# Patient Record
Sex: MALE | Race: WHITE | NOT HISPANIC OR LATINO | Employment: FULL TIME | URBAN - METROPOLITAN AREA
[De-identification: names, ages, dates, MRNs, and addresses within clinical notes are randomized per-mention and may not be internally consistent; named-entity substitution may affect disease eponyms.]

---

## 2017-02-13 ENCOUNTER — OFFICE VISIT (OUTPATIENT)
Dept: FAMILY MEDICINE CLINIC | Facility: CLINIC | Age: 62
End: 2017-02-13

## 2017-02-13 ENCOUNTER — TELEPHONE (OUTPATIENT)
Dept: FAMILY MEDICINE CLINIC | Facility: CLINIC | Age: 62
End: 2017-02-13

## 2017-02-13 VITALS
HEART RATE: 68 BPM | TEMPERATURE: 98.6 F | SYSTOLIC BLOOD PRESSURE: 150 MMHG | DIASTOLIC BLOOD PRESSURE: 82 MMHG | BODY MASS INDEX: 29.29 KG/M2 | WEIGHT: 221 LBS | HEIGHT: 73 IN | OXYGEN SATURATION: 99 %

## 2017-02-13 DIAGNOSIS — I10 ESSENTIAL HYPERTENSION: Primary | ICD-10-CM

## 2017-02-13 DIAGNOSIS — R74.8 ELEVATED LIVER ENZYMES: ICD-10-CM

## 2017-02-13 DIAGNOSIS — E78.5 HYPERLIPIDEMIA, UNSPECIFIED HYPERLIPIDEMIA TYPE: ICD-10-CM

## 2017-02-13 DIAGNOSIS — M1A.0490 CHRONIC GOUT OF HAND, UNSPECIFIED CAUSE, UNSPECIFIED LATERALITY: ICD-10-CM

## 2017-02-13 DIAGNOSIS — R74.8 ELEVATED LIVER ENZYMES: Primary | ICD-10-CM

## 2017-02-13 DIAGNOSIS — B35.1 ONYCHOMYCOSIS: ICD-10-CM

## 2017-02-13 LAB
ALBUMIN SERPL-MCNC: 4.4 G/DL (ref 3.5–5.2)
ALBUMIN/GLOB SERPL: 1.8 G/DL
ALP SERPL-CCNC: 59 U/L (ref 39–117)
ALT SERPL W P-5'-P-CCNC: 69 U/L (ref 1–41)
ANION GAP SERPL CALCULATED.3IONS-SCNC: 13.2 MMOL/L
AST SERPL-CCNC: 63 U/L (ref 1–40)
BILIRUB SERPL-MCNC: 0.4 MG/DL (ref 0.1–1.2)
BUN BLD-MCNC: 12 MG/DL (ref 6–20)
BUN/CREAT SERPL: 11.5 (ref 7–25)
CALCIUM SPEC-SCNC: 11.1 MG/DL (ref 8.6–10.5)
CHLORIDE SERPL-SCNC: 105 MMOL/L (ref 98–107)
CHOLEST SERPL-MCNC: 249 MG/DL (ref 0–200)
CO2 SERPL-SCNC: 24.8 MMOL/L (ref 22–29)
CREAT BLD-MCNC: 1.04 MG/DL (ref 0.76–1.27)
ERYTHROCYTE [DISTWIDTH] IN BLOOD BY AUTOMATED COUNT: 14.6 % (ref 4.5–15)
GFR SERPL CREATININE-BSD FRML MDRD: 73 ML/MIN/1.73
GLOBULIN UR ELPH-MCNC: 2.5 GM/DL
GLUCOSE BLD-MCNC: 86 MG/DL (ref 65–99)
HCT VFR BLD AUTO: 43.9 % (ref 35–60)
HDLC SERPL-MCNC: 94 MG/DL (ref 40–60)
HGB BLD-MCNC: 13.5 G/DL (ref 13.5–18)
KOH PREP NAIL: ABNORMAL
LDLC SERPL CALC-MCNC: 136 MG/DL (ref 0–100)
LDLC/HDLC SERPL: 1.44 {RATIO}
LYMPHOCYTES # BLD AUTO: 1.6 10*3/MM3 (ref 1.2–3.4)
LYMPHOCYTES NFR BLD AUTO: 30.8 % (ref 21–51)
MCH RBC QN AUTO: 30.8 PG (ref 26.1–33.1)
MCHC RBC AUTO-ENTMCNC: 30.8 G/DL (ref 33–37)
MCV RBC AUTO: 100.2 FL (ref 80–99)
MONOCYTES # BLD AUTO: 0.5 10*3/MM3 (ref 0.1–0.6)
MONOCYTES NFR BLD AUTO: 9.5 % (ref 2–9)
NEUTROPHILS # BLD AUTO: 3.2 10*3/MM3 (ref 1.4–6.5)
NEUTROPHILS NFR BLD AUTO: 59.7 % (ref 42–75)
PLATELET # BLD AUTO: 203 10*3/MM3 (ref 150–450)
PMV BLD AUTO: 7 FL (ref 7.1–10.5)
POTASSIUM BLD-SCNC: 4.9 MMOL/L (ref 3.5–5.2)
PROT SERPL-MCNC: 6.9 G/DL (ref 6–8.5)
RBC # BLD AUTO: 4.38 10*6/MM3 (ref 4–6)
SODIUM BLD-SCNC: 143 MMOL/L (ref 136–145)
TRIGL SERPL-MCNC: 96 MG/DL (ref 0–150)
URATE SERPL-MCNC: 3.3 MG/DL (ref 3.4–7)
VLDLC SERPL-MCNC: 19.2 MG/DL (ref 5–40)
WBC NRBC COR # BLD: 5.3 10*3/MM3 (ref 4.5–10)

## 2017-02-13 PROCEDURE — 80061 LIPID PANEL: CPT | Performed by: NURSE PRACTITIONER

## 2017-02-13 PROCEDURE — 36415 COLL VENOUS BLD VENIPUNCTURE: CPT | Performed by: NURSE PRACTITIONER

## 2017-02-13 PROCEDURE — 87220 TISSUE EXAM FOR FUNGI: CPT | Performed by: NURSE PRACTITIONER

## 2017-02-13 PROCEDURE — 99214 OFFICE O/P EST MOD 30 MIN: CPT | Performed by: NURSE PRACTITIONER

## 2017-02-13 PROCEDURE — 80053 COMPREHEN METABOLIC PANEL: CPT | Performed by: NURSE PRACTITIONER

## 2017-02-13 PROCEDURE — 85025 COMPLETE CBC W/AUTO DIFF WBC: CPT | Performed by: NURSE PRACTITIONER

## 2017-02-13 PROCEDURE — 84550 ASSAY OF BLOOD/URIC ACID: CPT | Performed by: NURSE PRACTITIONER

## 2017-02-13 RX ORDER — LISINOPRIL 10 MG/1
10 TABLET ORAL DAILY
Qty: 90 TABLET | Refills: 1 | Status: SHIPPED | OUTPATIENT
Start: 2017-02-13 | End: 2018-06-27 | Stop reason: SDUPTHER

## 2017-02-13 RX ORDER — TERBINAFINE HYDROCHLORIDE 250 MG/1
250 TABLET ORAL DAILY
Qty: 30 TABLET | Refills: 0 | Status: SHIPPED | OUTPATIENT
Start: 2017-02-13 | End: 2018-06-27

## 2017-02-13 RX ORDER — ALLOPURINOL 300 MG/1
300 TABLET ORAL DAILY
Qty: 90 TABLET | Refills: 1 | Status: SHIPPED | OUTPATIENT
Start: 2017-02-13 | End: 2017-08-17 | Stop reason: SDUPTHER

## 2017-02-13 NOTE — PATIENT INSTRUCTIONS
check labs and call with results, consider terbinafine pending labs, refill allopurinol 300 mg daily and lisinopril 10 mg daily, check BP at home, cont low purine diet, enc schedule overdue thyroid US per Dr Castellano

## 2017-02-13 NOTE — TELEPHONE ENCOUNTER
Yeast on fingernail, erx terbinafine 250 mg 1 PO daily x 1 mo and we can re-eval. We may have to do 2 mo but can elevate your liver enyzmes so we will need to recheck liver enzymes in 1 mo. Your liver enzymes are still high but stable. Have you ever had US of liver? Chol is elevated but chol medication can also increase your liver. Let's treat the fungal nail first and work on low chol diet and then we can consider chol medication. BS, kidney normal Uric acid level low at 3.3 but calcium still high 11.1.     Recommend we check US of liver, referral made

## 2017-02-13 NOTE — PROGRESS NOTES
Subjective   Mo Willoughby is a 61 y.o. male.     History of Present Illness   Here to FU on HTN on lisinopril 10 mg daily didn't take today, no CP dizziness HA LE edema, no regular BP checks at home, with gout on allopurinol 300 mg daily, last flair > 5 mo ago, now on low purine diet and weight maintained 221, with last uric acid 3, with elevated calcium and PTH, had FU with endo recommended parathyroid scan no clear adenoma, had FU with Dr Castellano who recommended thyroid US to recheck, has since had 7 teeth removed and took abx from dentist with some swelling improved, reluctant to have sgy  Last colonoscopy 2016 Dr Vilchis with hx of colon polyps, last PSA 07/16 normal  C/o thickened nail pointer finger hand nontender, works as cobbler, OTC ointment no change  With last labs elevated liver enzymes, neg hepatitis panel, with elevated chol never started crestor per Dr Webb    The following portions of the patient's history were reviewed and updated as appropriate: allergies, current medications, past family history, past medical history, past social history, past surgical history and problem list.    Review of Systems   Constitutional: Negative for fever.   Respiratory: Negative for cough, shortness of breath and wheezing.    Cardiovascular: Negative for chest pain, palpitations and leg swelling.   Musculoskeletal: Negative for arthralgias.   Skin: Positive for color change (nail).   Neurological: Negative for dizziness and headaches.   All other systems reviewed and are negative.      Objective   Physical Exam   Constitutional: He is oriented to person, place, and time. He appears well-developed and well-nourished.   HENT:   Head: Normocephalic and atraumatic.   Eyes: Conjunctivae and EOM are normal. Pupils are equal, round, and reactive to light.   Cardiovascular: Normal rate, regular rhythm and normal heart sounds.    Pulmonary/Chest: Effort normal and breath sounds normal.   Musculoskeletal: Normal range of  motion.   Neurological: He is alert and oriented to person, place, and time.   Skin: Skin is warm and dry.   Thickened fungal fingernail pointer finger L hand   Psychiatric: He has a normal mood and affect. His behavior is normal. Judgment and thought content normal.   Vitals reviewed.      Assessment/Plan   Mo was seen today for follow-up and nail problem.    Diagnoses and all orders for this visit:    Essential hypertension  -     CBC & Differential  -     Comprehensive Metabolic Panel  -     Lipid Panel  -     CBC Auto Differential    Chronic gout of hand, unspecified cause, unspecified laterality  -     Uric Acid    Onychomycosis  -     KOH Prep    Hyperlipidemia, unspecified hyperlipidemia type    Elevated liver enzymes    Other orders  -     lisinopril (PRINIVIL,ZESTRIL) 10 MG tablet; Take 1 tablet by mouth Daily.  -     allopurinol (ZYLOPRIM) 300 MG tablet; Take 1 tablet by mouth Daily.    check labs and call with results, consider terbinafine pending labs, refill allopurinol 300 mg daily and lisinopril 10 mg daily, check BP at home, cont low purine diet, enc schedule overdue thyroid US per Dr Castellano

## 2017-02-14 ENCOUNTER — TELEPHONE (OUTPATIENT)
Dept: FAMILY MEDICINE CLINIC | Facility: CLINIC | Age: 62
End: 2017-02-14

## 2017-02-21 ENCOUNTER — HOSPITAL ENCOUNTER (OUTPATIENT)
Dept: ULTRASOUND IMAGING | Facility: HOSPITAL | Age: 62
Discharge: HOME | End: 2017-02-21
Admitting: NURSE PRACTITIONER

## 2017-02-21 DIAGNOSIS — R74.8 ELEVATED LIVER ENZYMES: ICD-10-CM

## 2017-02-21 PROCEDURE — 76705 ECHO EXAM OF ABDOMEN: CPT

## 2017-03-29 ENCOUNTER — HOSPITAL ENCOUNTER (EMERGENCY)
Facility: HOSPITAL | Age: 62
Discharge: HOME | End: 2017-03-29
Attending: EMERGENCY MEDICINE | Admitting: EMERGENCY MEDICINE

## 2017-03-29 ENCOUNTER — OFFICE VISIT (OUTPATIENT)
Dept: FAMILY MEDICINE CLINIC | Facility: CLINIC | Age: 62
End: 2017-03-29

## 2017-03-29 ENCOUNTER — APPOINTMENT (OUTPATIENT)
Dept: CARDIOLOGY | Facility: HOSPITAL | Age: 62
End: 2017-03-29
Attending: EMERGENCY MEDICINE

## 2017-03-29 ENCOUNTER — APPOINTMENT (OUTPATIENT)
Dept: GENERAL RADIOLOGY | Facility: HOSPITAL | Age: 62
End: 2017-03-29

## 2017-03-29 VITALS
HEART RATE: 105 BPM | SYSTOLIC BLOOD PRESSURE: 120 MMHG | WEIGHT: 215.8 LBS | RESPIRATION RATE: 16 BRPM | TEMPERATURE: 99.5 F | HEIGHT: 73 IN | OXYGEN SATURATION: 99 % | BODY MASS INDEX: 28.6 KG/M2 | DIASTOLIC BLOOD PRESSURE: 90 MMHG

## 2017-03-29 VITALS
HEART RATE: 73 BPM | TEMPERATURE: 98.6 F | SYSTOLIC BLOOD PRESSURE: 169 MMHG | BODY MASS INDEX: 32.58 KG/M2 | RESPIRATION RATE: 18 BRPM | DIASTOLIC BLOOD PRESSURE: 99 MMHG | WEIGHT: 215 LBS | HEIGHT: 68 IN | OXYGEN SATURATION: 96 %

## 2017-03-29 DIAGNOSIS — R55 VASOVAGAL SYNCOPE: Primary | ICD-10-CM

## 2017-03-29 DIAGNOSIS — R79.89 ABNORMAL CBC: ICD-10-CM

## 2017-03-29 DIAGNOSIS — R55 SYNCOPE, UNSPECIFIED SYNCOPE TYPE: ICD-10-CM

## 2017-03-29 DIAGNOSIS — R68.89 FLU-LIKE SYMPTOMS: Primary | ICD-10-CM

## 2017-03-29 DIAGNOSIS — E86.0 DEHYDRATION: ICD-10-CM

## 2017-03-29 LAB
ALBUMIN SERPL-MCNC: 4.3 G/DL (ref 3.5–5.2)
ALBUMIN/GLOB SERPL: 1.5 G/DL
ALP SERPL-CCNC: 62 U/L (ref 39–117)
ALT SERPL W P-5'-P-CCNC: 79 U/L (ref 1–41)
ANION GAP SERPL CALCULATED.3IONS-SCNC: 11.8 MMOL/L
AST SERPL-CCNC: 72 U/L (ref 1–40)
BASOPHILS # BLD AUTO: 0.01 10*3/MM3 (ref 0–0.2)
BASOPHILS NFR BLD AUTO: 0.2 % (ref 0–1.5)
BILIRUB SERPL-MCNC: 0.6 MG/DL (ref 0.1–1.2)
BILIRUB UR QL STRIP: NEGATIVE
BUN BLD-MCNC: 20 MG/DL (ref 6–20)
BUN/CREAT SERPL: 17.4 (ref 7–25)
CALCIUM SPEC-SCNC: 11.6 MG/DL (ref 8.6–10.5)
CHLORIDE SERPL-SCNC: 102 MMOL/L (ref 98–107)
CLARITY UR: CLEAR
CO2 SERPL-SCNC: 25.2 MMOL/L (ref 22–29)
COLOR UR: YELLOW
CREAT BLD-MCNC: 1.15 MG/DL (ref 0.76–1.27)
DEPRECATED RDW RBC AUTO: 50.3 FL (ref 37–54)
EOSINOPHIL # BLD AUTO: 0.13 10*3/MM3 (ref 0–0.7)
EOSINOPHIL NFR BLD AUTO: 2.3 % (ref 0.3–6.2)
ERYTHROCYTE [DISTWIDTH] IN BLOOD BY AUTOMATED COUNT: 13.4 % (ref 11.5–14.5)
ERYTHROCYTE [DISTWIDTH] IN BLOOD BY AUTOMATED COUNT: 13.8 % (ref 4.5–15)
FLUAV AG NPH QL: NEGATIVE
FLUBV AG NPH QL IA: NEGATIVE
GFR SERPL CREATININE-BSD FRML MDRD: 65 ML/MIN/1.73
GLOBULIN UR ELPH-MCNC: 2.8 GM/DL
GLUCOSE BLD-MCNC: 101 MG/DL (ref 65–99)
GLUCOSE BLDC GLUCOMTR-MCNC: 88 MG/DL (ref 70–130)
GLUCOSE UR STRIP-MCNC: NEGATIVE MG/DL
HCT VFR BLD AUTO: 43.6 % (ref 35–60)
HCT VFR BLD AUTO: 44.2 % (ref 40.4–52.2)
HGB BLD-MCNC: 14.5 G/DL (ref 13.5–18)
HGB BLD-MCNC: 14.8 G/DL (ref 13.7–17.6)
HGB UR QL STRIP.AUTO: NEGATIVE
HOLD SPECIMEN: NORMAL
IMM GRANULOCYTES # BLD: 0 10*3/MM3 (ref 0–0.03)
IMM GRANULOCYTES NFR BLD: 0 % (ref 0–0.5)
KETONES UR QL STRIP: ABNORMAL
LEUKOCYTE ESTERASE UR QL STRIP.AUTO: NEGATIVE
LYMPHOCYTES # BLD AUTO: 1.49 10*3/MM3 (ref 0.9–4.8)
LYMPHOCYTES # BLD AUTO: 1.5 10*3/MM3 (ref 1.2–3.4)
LYMPHOCYTES NFR BLD AUTO: 24.8 % (ref 21–51)
LYMPHOCYTES NFR BLD AUTO: 26 % (ref 19.6–45.3)
MAGNESIUM SERPL-MCNC: 1.6 MG/DL (ref 1.6–2.6)
MCH RBC QN AUTO: 33.3 PG (ref 26.1–33.1)
MCH RBC QN AUTO: 33.9 PG (ref 27–32.7)
MCHC RBC AUTO-ENTMCNC: 33.2 G/DL (ref 33–37)
MCHC RBC AUTO-ENTMCNC: 33.5 G/DL (ref 32.6–36.4)
MCV RBC AUTO: 100.3 FL (ref 80–99)
MCV RBC AUTO: 101.4 FL (ref 79.8–96.2)
MONOCYTES # BLD AUTO: 0.6 10*3/MM3 (ref 0.1–0.6)
MONOCYTES # BLD AUTO: 0.87 10*3/MM3 (ref 0.2–1.2)
MONOCYTES NFR BLD AUTO: 15.2 % (ref 5–12)
MONOCYTES NFR BLD AUTO: 9 % (ref 2–9)
NEUTROPHILS # BLD AUTO: 3.23 10*3/MM3 (ref 1.9–8.1)
NEUTROPHILS # BLD AUTO: 4.1 10*3/MM3 (ref 1.4–6.5)
NEUTROPHILS NFR BLD AUTO: 56.3 % (ref 42.7–76)
NEUTROPHILS NFR BLD AUTO: 66.2 % (ref 42–75)
NITRITE UR QL STRIP: NEGATIVE
PH UR STRIP.AUTO: 6.5 [PH] (ref 5–8)
PLATELET # BLD AUTO: 153 10*3/MM3 (ref 140–500)
PLATELET # BLD AUTO: 153 10*3/MM3 (ref 150–450)
PMV BLD AUTO: 10.6 FL (ref 6–12)
PMV BLD AUTO: 7.1 FL (ref 7.1–10.5)
POTASSIUM BLD-SCNC: 4.3 MMOL/L (ref 3.5–5.2)
PROT SERPL-MCNC: 7.1 G/DL (ref 6–8.5)
PROT UR QL STRIP: NEGATIVE
RBC # BLD AUTO: 4.34 10*6/MM3 (ref 4–6)
RBC # BLD AUTO: 4.36 10*6/MM3 (ref 4.6–6)
SODIUM BLD-SCNC: 139 MMOL/L (ref 136–145)
SP GR UR STRIP: 1.02 (ref 1–1.03)
TROPONIN T SERPL-MCNC: <0.01 NG/ML (ref 0–0.03)
UROBILINOGEN UR QL STRIP: ABNORMAL
WBC NRBC COR # BLD: 5.73 10*3/MM3 (ref 4.5–10.7)
WBC NRBC COR # BLD: 6.2 10*3/MM3 (ref 4.5–10)
WHOLE BLOOD HOLD SPECIMEN: NORMAL

## 2017-03-29 PROCEDURE — 36415 COLL VENOUS BLD VENIPUNCTURE: CPT | Performed by: NURSE PRACTITIONER

## 2017-03-29 PROCEDURE — 85025 COMPLETE CBC W/AUTO DIFF WBC: CPT | Performed by: NURSE PRACTITIONER

## 2017-03-29 PROCEDURE — 87804 INFLUENZA ASSAY W/OPTIC: CPT | Performed by: NURSE PRACTITIONER

## 2017-03-29 PROCEDURE — 80053 COMPREHEN METABOLIC PANEL: CPT | Performed by: EMERGENCY MEDICINE

## 2017-03-29 PROCEDURE — 93000 ELECTROCARDIOGRAM COMPLETE: CPT | Performed by: NURSE PRACTITIONER

## 2017-03-29 PROCEDURE — 84484 ASSAY OF TROPONIN QUANT: CPT | Performed by: EMERGENCY MEDICINE

## 2017-03-29 PROCEDURE — 81003 URINALYSIS AUTO W/O SCOPE: CPT | Performed by: EMERGENCY MEDICINE

## 2017-03-29 PROCEDURE — 0296T HC EXT ECG > 48HR TO 21 DAY RCRD W/CONECT INTL RCRD: CPT

## 2017-03-29 PROCEDURE — 83735 ASSAY OF MAGNESIUM: CPT | Performed by: EMERGENCY MEDICINE

## 2017-03-29 PROCEDURE — 82962 GLUCOSE BLOOD TEST: CPT

## 2017-03-29 PROCEDURE — 93005 ELECTROCARDIOGRAM TRACING: CPT

## 2017-03-29 PROCEDURE — 96360 HYDRATION IV INFUSION INIT: CPT

## 2017-03-29 PROCEDURE — 85025 COMPLETE CBC W/AUTO DIFF WBC: CPT | Performed by: EMERGENCY MEDICINE

## 2017-03-29 PROCEDURE — 71010 HC CHEST PA OR AP: CPT

## 2017-03-29 PROCEDURE — 99213 OFFICE O/P EST LOW 20 MIN: CPT | Performed by: NURSE PRACTITIONER

## 2017-03-29 PROCEDURE — 99284 EMERGENCY DEPT VISIT MOD MDM: CPT

## 2017-03-29 PROCEDURE — 93010 ELECTROCARDIOGRAM REPORT: CPT | Performed by: INTERNAL MEDICINE

## 2017-03-29 RX ORDER — SODIUM CHLORIDE 0.9 % (FLUSH) 0.9 %
10 SYRINGE (ML) INJECTION AS NEEDED
Status: DISCONTINUED | OUTPATIENT
Start: 2017-03-29 | End: 2017-03-30 | Stop reason: HOSPADM

## 2017-03-29 RX ADMIN — SODIUM CHLORIDE 1000 ML: 9 INJECTION, SOLUTION INTRAVENOUS at 22:00

## 2017-03-29 NOTE — ED NOTES
"Patient saw Vi ORTIZ today in Ceferino's office.  Had negative flu swab, EKG (pt brought with him) and \"blood work\"     Delmy Maloney, RN  03/29/17 7079    "

## 2017-03-29 NOTE — PATIENT INSTRUCTIONS
Flu swab today, negative.  CBC today, WNL, elevated MCV. B12 and folate ordered. He will return to lab for this.   EKG today, borderline ekg,  If any more syncope patient advised to go to the ER.   D/t syncope x2 and abnormal EKG, patient advised to go to the ER at this time for full work up, report called, patient agrees, he will f/u in office, next week.   Increase fluid intake, get plenty of rest.   Patient agrees with plan of care and understands instructions. Call if worsening symptoms or any problems or concerns.

## 2017-03-29 NOTE — ED NOTES
Patient reports syncope yesterday while standing in the kitchen and then again later in the evening.  Glucose was 78 at that time.  Patient reports left elbow injury from syncope.  Patient does not know if he hit his head.       Delmy Maloney RN  03/29/17 0064

## 2017-03-29 NOTE — PROGRESS NOTES
"Subjective   Mo Willoughby is a 61 y.o. male.     History of Present Illness   C/o lack of appetite, and syncope x2 yesterday, he states he has had a fever. He states he passed out twice, the first time he states he \"was out a second,\" woke up on the floor around 5:00, the second time his girlfriend was home and saw him pass out, woke up and checked BP 77/65, BS 78, he states he hasn't been eating and thinks this had something to do with it. He states he started feeling bad Saturday, tried rachna seltzer cold plus, he didn't go to the hospital because he thought he was dehydrated, he states he drank a lot of fluids, feels better now, he is tired today. She did not get his flu shot. His girlfriend has bronchitis and strep throat. He does feel dizzy, denies CP, SOA. He has lost weight d/t no appetite. He states he originally had sore throat and cough, but since resolved. He does drink alcohol. He states he has had dopplers on his LLE for varicose veins, negative report from ED visit in 2015.    The following portions of the patient's history were reviewed and updated as appropriate: allergies, current medications, past family history, past medical history, past social history, past surgical history and problem list.    Review of Systems   Constitutional: Positive for appetite change and fever. Negative for chills and diaphoresis.   HENT: Positive for congestion and sore throat. Negative for ear pain, rhinorrhea and sinus pressure.    Respiratory: Positive for cough. Negative for chest tightness, shortness of breath and wheezing.    Cardiovascular: Negative for chest pain.   Musculoskeletal: Negative for arthralgias and myalgias.   Skin: Negative for pallor.   Allergic/Immunologic: Negative for environmental allergies.   Neurological: Positive for dizziness and syncope. Negative for weakness, light-headedness and headaches.   All other systems reviewed and are negative.      Objective   Physical Exam   Constitutional: He " is oriented to person, place, and time. He appears well-developed and well-nourished.   HENT:   Head: Normocephalic.   Right Ear: Tympanic membrane and ear canal normal.   Left Ear: Tympanic membrane and ear canal normal.   Nose: Nose normal.   Mouth/Throat: Uvula is midline, oropharynx is clear and moist and mucous membranes are normal.   Eyes: Pupils are equal, round, and reactive to light.   Neck: Neck supple.   Cardiovascular: Normal rate, regular rhythm and normal heart sounds.    Pulses:       Dorsalis pedis pulses are 2+ on the right side, and 2+ on the left side.        Posterior tibial pulses are 2+ on the right side, and 2+ on the left side.   Varicose veins noted LLE.   Pulmonary/Chest: Effort normal and breath sounds normal. No respiratory distress. He has no wheezes.   Musculoskeletal: Normal range of motion.   Lymphadenopathy:     He has no cervical adenopathy.   Neurological: He is alert and oriented to person, place, and time.   Skin: Skin is warm and dry.   Psychiatric: He has a normal mood and affect. His behavior is normal. Judgment and thought content normal.   Nursing note and vitals reviewed.      Assessment/Plan   Mo was seen today for loss of consciousness.    Diagnoses and all orders for this visit:    Flu-like symptoms  -     Influenza Antigen    Syncope, unspecified syncope type        Flu swab today, negative.  CBC today, WNL, elevated MCV. B12 and folate, MMA and homocysteine ordered. He will return to lab for this.   EKG today, borderline ekg,  If any more syncope patient advised to go to the ER.   D/t syncope x2 and abnormal EKG, patient advised to go to the ER at this time for full work up, report called, patient agrees, he will f/u in office, next week.   Increase fluid intake, get plenty of rest.   Patient agrees with plan of care and understands instructions. Call if worsening symptoms or any problems or concerns.

## 2017-03-29 NOTE — PROGRESS NOTES
Procedure     ECG 12 Lead  Date/Time: 3/29/2017 3:48 PM  Performed by: RASHAAD MCCAIN  Authorized by: RASHAAD MCCAIN   Previous ECG: no previous ECG available  Rhythm: sinus rhythm  Rate: normal  Clinical impression: abnormal ECG  Comments: Borderline ekg.

## 2017-03-29 NOTE — ED NOTES
TRIAGE RE-EVAL: PT STATES HE HAS NO NEW COMPLAINTS, RN THANKED PATIENT FOR HIS PATIENCE AND UPDATED HIM ON HIS WAITING STATUS.      Mary Paredes RN  03/29/17 1916

## 2017-03-30 NOTE — DISCHARGE INSTRUCTIONS
Drink plenty of fluids and rest tonight and tomorrow.  Please return to the ED if symptoms worsen with chest pain or if you pass out again.

## 2017-03-30 NOTE — ED PROVIDER NOTES
" EMERGENCY DEPARTMENT ENCOUNTER    CHIEF COMPLAINT  Chief Complaint: Syncope  History given by: Patient  History limited by:   Room Number: 24/24  PMD: Nevaeh Rey YANIRA Way      HPI:  Pt is a 61 y.o. male who presents complaining of 2x syncopal episodes that occurred last night. Pt reports he was standing at time of episodes and denies any preceding sxs. During the first episode he reports falling backward onto buttocks. During the second episode, pt reports falling into wall and onto floor. He denies striking his head during fall. Pt reports LOC during both episodes was brief. Prior to syncope he reports having congestion, fever and cough for the past few days.    Duration:  brief  Onset: sudden  Timing: twice  Quality: \"passed out\"  Intensity/Severity: moderate  Progression: resolved  Associated Symptoms: congestion, fever, cough  Aggravating Factors: none  Alleviating Factors: none  Previous Episodes: none  Treatment before arrival: none    PAST MEDICAL HISTORY  Active Ambulatory Problems     Diagnosis Date Noted   • Hypertension    • Erectile dysfunction    • Gout    • History of cellulitis 04/27/2016   • Hyperparathyroidism with most recent intact PTH 95, calcium 11.3 with SPECT revealing question of a subcentimeter left inferior parathyroid adenoma 11/15/2016   • Hypercalcemia 11/15/2016   • H/O: gout on allopurinol 11/15/2016   • Colon polyp 11/15/2016     Resolved Ambulatory Problems     Diagnosis Date Noted   • No Resolved Ambulatory Problems     Past Medical History:   Diagnosis Date   • Erectile dysfunction    • Gout    • Hx of colonic polyp    • Hypercalcemia    • Hypertension    • Parathyroid abnormality    • Syncope and collapse 03/28/2017       PAST SURGICAL HISTORY  Past Surgical History:   Procedure Laterality Date   • COLONOSCOPY     • COLONOSCOPY N/A 10/3/2016    Procedure: COLONOSCOPY TO CECUM TO TERMINAL ILIUM WITH COLD BIOPSY POLYPECTOMY;  Surgeon: Benoit Vilchis MD;  Location: [facility]" ENDOSCOPY;  Service:    • TONSILLECTOMY         FAMILY HISTORY  Family History   Problem Relation Age of Onset   • Hypertension Mother    • Breast cancer Mother    • Cancer Father    • Liver cancer Father        SOCIAL HISTORY  Social History     Social History   • Marital status: Single     Spouse name: N/A   • Number of children: N/A   • Years of education: N/A     Occupational History   • Not on file.     Social History Main Topics   • Smoking status: Never Smoker   • Smokeless tobacco: Never Used   • Alcohol use Yes      Comment: occasional   • Drug use: No   • Sexual activity: Defer     Other Topics Concern   • Not on file     Social History Narrative   • No narrative on file       ALLERGIES  Review of patient's allergies indicates no known allergies.    REVIEW OF SYSTEMS  Review of Systems   Constitutional: Positive for fever. Negative for activity change and appetite change.   HENT: Positive for congestion. Negative for sore throat.    Eyes: Negative.    Respiratory: Positive for cough. Negative for shortness of breath.    Cardiovascular: Negative for chest pain and leg swelling.   Gastrointestinal: Negative for abdominal pain, diarrhea and vomiting.   Endocrine: Negative.    Genitourinary: Negative for decreased urine volume and dysuria.   Musculoskeletal: Negative for neck pain.   Skin: Negative for rash and wound.   Allergic/Immunologic: Negative.    Neurological: Positive for syncope. Negative for weakness, numbness and headaches.   Hematological: Negative.    Psychiatric/Behavioral: Negative.    All other systems reviewed and are negative.      PHYSICAL EXAM  ED Triage Vitals   Temp Heart Rate Resp BP SpO2   03/29/17 1617 03/29/17 1617 03/29/17 1631 03/29/17 1631 03/29/17 1617   98.6 °F (37 °C) 112 16 141/90 99 %      Temp src Heart Rate Source Patient Position BP Location FiO2 (%)   03/29/17 1617 03/29/17 1617 03/29/17 1631 03/29/17 1631 --   Tympanic Monitor Sitting Right arm        Physical Exam    Constitutional: He is oriented to person, place, and time. He appears distressed ( mild).   HENT:   Head: Normocephalic and atraumatic.   Eyes: EOM are normal. Pupils are equal, round, and reactive to light.   Neck: Normal range of motion. Neck supple.   Cardiovascular: Normal rate, regular rhythm and normal heart sounds.    No murmur heard.  Pulmonary/Chest: Effort normal. No respiratory distress. He has rhonchi ( ocassional) in the right lower field and the left lower field.   Abdominal: Soft. Bowel sounds are normal. There is no tenderness. There is no rebound and no guarding.   Musculoskeletal: Normal range of motion. He exhibits no edema.   Neurological: He is alert and oriented to person, place, and time. He has normal sensation and normal strength.   Skin: Skin is warm and dry. Abrasion (L elbow ) noted.   Psychiatric: Mood and affect normal.   Nursing note and vitals reviewed.      LAB RESULTS  Lab Results (last 24 hours)     Procedure Component Value Units Date/Time    Influenza Antigen [[id number]]  (Normal) Collected:  03/29/17 1529    Specimen:  Swab from Nasopharynx Updated:  03/29/17 1533     Influenza A Ag, EIA Negative     Influenza B Ag, EIA Negative    CBC & Differential [[id number]] Collected:  03/29/17 1529    Specimen:  Blood Updated:  03/29/17 1538    Narrative:       The following orders were created for panel order CBC & Differential.  Procedure                               Abnormality         Status                     ---------                               -----------         ------                     CBC Auto Differential[[id number]]         Abnormal            Final result                 Please view results for these tests on the individual orders.    CBC Auto Differential [[id number]]  (Abnormal) Collected:  03/29/17 1529    Specimen:  Blood Updated:  03/29/17 1538     WBC 6.20 10*3/mm3      RBC 4.34 10*6/mm3      Hemoglobin 14.5 g/dL      Hematocrit 43.6 %      RDW 13.8 %      .3  (H) fL      MCH 33.3 (H) pg      MCHC 33.2 g/dL      MPV 7.1 fL      Platelets 153 10*3/mm3      Neutrophil % 66.2 %      Lymphocyte % 24.8 %      Monocyte % 9.0 %      Neutrophils, Absolute 4.10 10*3/mm3      Lymphocytes, Absolute 1.50 10*3/mm3      Monocytes, Absolute 0.60 10*3/mm3     CBC & Differential [[id number]] Collected:  03/29/17 1640    Specimen:  Blood Updated:  03/29/17 1706    Narrative:       The following orders were created for panel order CBC & Differential.  Procedure                               Abnormality         Status                     ---------                               -----------         ------                     CBC Auto Differential[[id number]]         Abnormal            Final result                 Please view results for these tests on the individual orders.    Comprehensive Metabolic Panel [[id number]]  (Abnormal) Collected:  03/29/17 1640    Specimen:  Blood Updated:  03/29/17 1731     Glucose 101 (H) mg/dL      BUN 20 mg/dL      Creatinine 1.15 mg/dL      Sodium 139 mmol/L      Potassium 4.3 mmol/L      Chloride 102 mmol/L      CO2 25.2 mmol/L      Calcium 11.6 (H) mg/dL      Total Protein 7.1 g/dL      Albumin 4.30 g/dL      ALT (SGPT) 79 (H) U/L      AST (SGOT) 72 (H) U/L      Alkaline Phosphatase 62 U/L      Total Bilirubin 0.6 mg/dL      eGFR Non African Amer 65 mL/min/1.73      Globulin 2.8 gm/dL      A/G Ratio 1.5 g/dL      BUN/Creatinine Ratio 17.4     Anion Gap 11.8 mmol/L     Troponin [[id number]]  (Normal) Collected:  03/29/17 1640    Specimen:  Blood Updated:  03/29/17 1727     Troponin T <0.010 ng/mL     Narrative:       Troponin T Reference Ranges:  Less than 0.03 ng/mL:    Negative for AMI  0.03 to 0.09 ng/mL:      Indeterminant for AMI  Greater than 0.09 ng/mL: Positive for AMI    Magnesium [[id number]]  (Normal) Collected:  03/29/17 1640    Specimen:  Blood Updated:  03/29/17 1727     Magnesium 1.6 mg/dL     CBC Auto Differential [[id number]]  (Abnormal) Collected:   03/29/17 1640    Specimen:  Blood Updated:  03/29/17 1706     WBC 5.73 10*3/mm3      RBC 4.36 (L) 10*6/mm3      Hemoglobin 14.8 g/dL      Hematocrit 44.2 %      .4 (H) fL      MCH 33.9 (H) pg      MCHC 33.5 g/dL      RDW 13.4 %      RDW-SD 50.3 fl      MPV 10.6 fL      Platelets 153 10*3/mm3      Neutrophil % 56.3 %      Lymphocyte % 26.0 %      Monocyte % 15.2 (H) %      Eosinophil % 2.3 %      Basophil % 0.2 %      Immature Grans % 0.0 %      Neutrophils, Absolute 3.23 10*3/mm3      Lymphocytes, Absolute 1.49 10*3/mm3      Monocytes, Absolute 0.87 10*3/mm3      Eosinophils, Absolute 0.13 10*3/mm3      Basophils, Absolute 0.01 10*3/mm3      Immature Grans, Absolute 0.00 10*3/mm3     Urinalysis With / Culture If Indicated [[id number]]  (Abnormal) Collected:  03/29/17 2109    Specimen:  Urine from Urine, Clean Catch Updated:  03/29/17 2143     Color, UA Yellow     Appearance, UA Clear     pH, UA 6.5     Specific Gravity, UA 1.022     Glucose, UA Negative     Ketones, UA 15 mg/dL (1+) (A)     Bilirubin, UA Negative     Blood, UA Negative     Protein, UA Negative     Leuk Esterase, UA Negative     Nitrite, UA Negative     Urobilinogen, UA 1.0 E.U./dL    Narrative:       Urine microscopic not indicated.    POC Glucose Fingerstick [[id number]]  (Normal) Collected:  03/29/17 2111    Specimen:  Blood Updated:  03/29/17 2113     Glucose 88 mg/dL     Narrative:       Meter: [id number] : [id number] Tadeo Cedeño I ordered the above labs and reviewed the results    RADIOLOGY  XR Chest 1 View   Final Result   Final result by Raad Watt MD (03/29/17 19:21:03)     Narrative:     PORTABLE CHEST 03/29/2017 AT 6:52 PM     CLINICAL HISTORY: Syncope. Dizziness. Weakness.     The lungs are well-expanded and appear free of infiltrates. There are no  pleural effusions. The cardiomediastinal silhouette is unremarkable.     IMPRESSIONS: Normal portable chest x-ray.     This report was finalized on 3/29/2017 7:21 PM by  Dr. Raad Watt MD.             I ordered the above noted radiological studies. Interpreted by radiologist. Reviewed by me in PACS.       PROCEDURES  Procedures  EKG           EKG time: 1918  Rhythm/Rate: NSR, 82BPM  P waves and NJ: nml  QRS, axis: IVCD   ST and T waves: nml     Interpreted Contemporaneously by me, independently viewed  No prior EKG for comparison      PROGRESS AND CONSULTS  ED Course   9:23 PM  Informed pt of his labs an imaging showing nothing remarkable. Discussed with pt about plan to get orthostatics and to place Zio patch for cardiology follow-up. Pt understands and agrees with plan. All concerns addressed.         MEDICAL DECISION MAKING    MDM  Number of Diagnoses or Management Options  Dehydration:   Vasovagal syncope:      Amount and/or Complexity of Data Reviewed  Clinical lab tests: ordered and reviewed  Tests in the radiology section of CPT®: ordered and reviewed    Patient Progress  Patient progress: improved         DIAGNOSIS  Final diagnoses:   Vasovagal syncope   Dehydration       DISPOSITION  DISCHARGE    Patient discharged in stable condition.    Reviewed implications of results, diagnosis, meds, responsibility to follow up, warning signs and symptoms of possible worsening, potential complications and reasons to return to ER.    Patient/Family voiced understanding of above instructions.    Discussed plan for discharge, as there is no emergent indication for admission.  Pt/family is agreeable and understands need for follow up and repeat testing.  Pt is aware that discharge does not mean that nothing is wrong but it indicates no emergency is present that requires admission and they must continue care with follow-up as given below or physician of their choice.     FOLLOW-UP  Nevaeh Way, APRN  [address] [place] [address] [phone]    Schedule an appointment as soon as possible for a visit           Medication List      Notice     No changes were made to  your prescriptions during this visit.            Latest Documented Vital Signs:  As of 11:39 PM  BP- 169/99 HR- 73 Temp- 98.6 °F (37 °C) (Tympanic) O2 sat- 96%    --  Documentation assistance provided by keyanna Mcgowan for Dr Rolon.  Information recorded by the scribe was done at my direction and has been verified and validated by me.         Denton Mcgowan  03/29/17 7450       Esequiel Rolon MD  03/29/17 0825

## 2017-03-31 ENCOUNTER — TELEPHONE (OUTPATIENT)
Dept: SOCIAL WORK | Facility: HOSPITAL | Age: 62
End: 2017-03-31

## 2017-03-31 NOTE — TELEPHONE ENCOUNTER
ED follow-up phone call. Spoke w/ sister, Mirtha, who states that patient is doing well, no further episodes

## 2017-04-27 PROCEDURE — 0298T ZIO PATCH: CPT | Performed by: INTERNAL MEDICINE

## 2017-05-19 ENCOUNTER — TELEPHONE (OUTPATIENT)
Dept: FAMILY MEDICINE CLINIC | Facility: CLINIC | Age: 62
End: 2017-05-19

## 2017-05-19 RX ORDER — METHYLPREDNISOLONE 4 MG/1
TABLET ORAL
Qty: 21 TABLET | Refills: 0 | Status: SHIPPED | OUTPATIENT
Start: 2017-05-19 | End: 2018-06-27

## 2017-08-17 RX ORDER — ALLOPURINOL 300 MG/1
TABLET ORAL
Qty: 90 TABLET | Refills: 1 | Status: SHIPPED | OUTPATIENT
Start: 2017-08-17 | End: 2018-02-21 | Stop reason: SDUPTHER

## 2018-02-21 RX ORDER — ALLOPURINOL 300 MG/1
TABLET ORAL
Qty: 90 TABLET | Refills: 0 | Status: SHIPPED | OUTPATIENT
Start: 2018-02-21 | End: 2018-05-22 | Stop reason: SDUPTHER

## 2018-05-22 RX ORDER — ALLOPURINOL 300 MG/1
TABLET ORAL
Qty: 30 TABLET | Refills: 0 | Status: SHIPPED | OUTPATIENT
Start: 2018-05-22 | End: 2018-06-18 | Stop reason: SDUPTHER

## 2018-06-18 ENCOUNTER — TELEPHONE (OUTPATIENT)
Dept: FAMILY MEDICINE CLINIC | Facility: CLINIC | Age: 63
End: 2018-06-18

## 2018-06-18 RX ORDER — ALLOPURINOL 300 MG/1
300 TABLET ORAL DAILY
Qty: 30 TABLET | Refills: 0 | Status: SHIPPED | OUTPATIENT
Start: 2018-06-18 | End: 2018-06-27 | Stop reason: SDUPTHER

## 2018-06-18 NOTE — TELEPHONE ENCOUNTER
HE HAS AN APPOINTMENT NEXT WEEK, CAN YOU FILL ALLOPURINOL UNTIL THEN? HE WILL RUN OUT ON Saturday.

## 2018-06-21 RX ORDER — ALLOPURINOL 300 MG/1
TABLET ORAL
Qty: 30 TABLET | Refills: 0 | Status: SHIPPED | OUTPATIENT
Start: 2018-06-21 | End: 2018-06-27 | Stop reason: SDUPTHER

## 2018-06-27 ENCOUNTER — OFFICE VISIT (OUTPATIENT)
Dept: FAMILY MEDICINE CLINIC | Facility: CLINIC | Age: 63
End: 2018-06-27

## 2018-06-27 VITALS
OXYGEN SATURATION: 100 % | SYSTOLIC BLOOD PRESSURE: 136 MMHG | BODY MASS INDEX: 32.58 KG/M2 | HEART RATE: 63 BPM | TEMPERATURE: 98.5 F | HEIGHT: 68 IN | WEIGHT: 215 LBS | DIASTOLIC BLOOD PRESSURE: 80 MMHG

## 2018-06-27 DIAGNOSIS — D17.1 LIPOMA OF TORSO: ICD-10-CM

## 2018-06-27 DIAGNOSIS — E66.09 CLASS 1 OBESITY DUE TO EXCESS CALORIES WITH SERIOUS COMORBIDITY AND BODY MASS INDEX (BMI) OF 32.0 TO 32.9 IN ADULT: ICD-10-CM

## 2018-06-27 DIAGNOSIS — M1A.0490 CHRONIC GOUT OF HAND, UNSPECIFIED CAUSE, UNSPECIFIED LATERALITY: ICD-10-CM

## 2018-06-27 DIAGNOSIS — E78.5 HYPERLIPIDEMIA, UNSPECIFIED HYPERLIPIDEMIA TYPE: ICD-10-CM

## 2018-06-27 DIAGNOSIS — I10 ESSENTIAL HYPERTENSION: Primary | ICD-10-CM

## 2018-06-27 LAB
ALBUMIN SERPL-MCNC: 4.5 G/DL (ref 3.5–5.2)
ALBUMIN/GLOB SERPL: 1.9 G/DL
ALP SERPL-CCNC: 46 U/L (ref 39–117)
ALT SERPL W P-5'-P-CCNC: 118 U/L (ref 1–41)
ANION GAP SERPL CALCULATED.3IONS-SCNC: 11.7 MMOL/L
AST SERPL-CCNC: 121 U/L (ref 1–40)
BACTERIA UR QL AUTO: NORMAL /HPF
BILIRUB SERPL-MCNC: 0.8 MG/DL (ref 0.1–1.2)
BILIRUB UR QL STRIP: NEGATIVE
BUN BLD-MCNC: 15 MG/DL (ref 8–23)
BUN/CREAT SERPL: 12.7 (ref 7–25)
CALCIUM SPEC-SCNC: 11.6 MG/DL (ref 8.6–10.5)
CHLORIDE SERPL-SCNC: 105 MMOL/L (ref 98–107)
CHOLEST SERPL-MCNC: 233 MG/DL (ref 0–200)
CLARITY UR: CLEAR
CO2 SERPL-SCNC: 24.3 MMOL/L (ref 22–29)
COLOR UR: YELLOW
CREAT BLD-MCNC: 1.18 MG/DL (ref 0.76–1.27)
ERYTHROCYTE [DISTWIDTH] IN BLOOD BY AUTOMATED COUNT: 15.3 % (ref 4.5–15)
GFR SERPL CREATININE-BSD FRML MDRD: 63 ML/MIN/1.73
GLOBULIN UR ELPH-MCNC: 2.4 GM/DL
GLUCOSE BLD-MCNC: 109 MG/DL (ref 65–99)
GLUCOSE UR STRIP-MCNC: NEGATIVE MG/DL
HCT VFR BLD AUTO: 41.4 % (ref 35–60)
HDLC SERPL-MCNC: 90 MG/DL (ref 40–60)
HGB BLD-MCNC: 13.9 G/DL (ref 13.5–18)
HGB UR QL STRIP.AUTO: ABNORMAL
KETONES UR QL STRIP: NEGATIVE
LDLC SERPL CALC-MCNC: 125 MG/DL (ref 0–100)
LDLC/HDLC SERPL: 1.38 {RATIO}
LEUKOCYTE ESTERASE UR QL STRIP.AUTO: NEGATIVE
LYMPHOCYTES # BLD AUTO: 1.4 10*3/MM3 (ref 1.2–3.4)
LYMPHOCYTES NFR BLD AUTO: 32 % (ref 21–51)
MCH RBC QN AUTO: 33.6 PG (ref 26.1–33.1)
MCHC RBC AUTO-ENTMCNC: 33.5 G/DL (ref 33–37)
MCV RBC AUTO: 100.1 FL (ref 80–99)
MONOCYTES # BLD AUTO: 0.4 10*3/MM3 (ref 0.1–0.6)
MONOCYTES NFR BLD AUTO: 9.4 % (ref 2–9)
NEUTROPHILS # BLD AUTO: 2.6 10*3/MM3 (ref 1.4–6.5)
NEUTROPHILS NFR BLD AUTO: 58.6 % (ref 42–75)
NITRITE UR QL STRIP: NEGATIVE
PH UR STRIP.AUTO: 6.5 [PH] (ref 4.6–8)
PLATELET # BLD AUTO: 178 10*3/MM3 (ref 150–450)
PMV BLD AUTO: 7.3 FL (ref 7.1–10.5)
POTASSIUM BLD-SCNC: 4.9 MMOL/L (ref 3.5–5.2)
PROT SERPL-MCNC: 6.9 G/DL (ref 6–8.5)
PROT UR QL STRIP: NEGATIVE
RBC # BLD AUTO: 4.13 10*6/MM3 (ref 4–6)
RBC # UR: NORMAL /HPF
REF LAB TEST METHOD: NORMAL
SODIUM BLD-SCNC: 141 MMOL/L (ref 136–145)
SP GR UR STRIP: 1.01 (ref 1–1.03)
SQUAMOUS #/AREA URNS HPF: NORMAL /HPF
TRIGL SERPL-MCNC: 92 MG/DL (ref 0–150)
TSH SERPL DL<=0.05 MIU/L-ACNC: 0.94 MIU/ML (ref 0.27–4.2)
URATE SERPL-MCNC: 3.5 MG/DL (ref 3.4–7)
UROBILINOGEN UR QL STRIP: ABNORMAL
VLDLC SERPL-MCNC: 18.4 MG/DL (ref 5–40)
WBC NRBC COR # BLD: 4.5 10*3/MM3 (ref 4.5–10)
WBC UR QL AUTO: NORMAL /HPF

## 2018-06-27 PROCEDURE — 84443 ASSAY THYROID STIM HORMONE: CPT | Performed by: NURSE PRACTITIONER

## 2018-06-27 PROCEDURE — 85025 COMPLETE CBC W/AUTO DIFF WBC: CPT | Performed by: NURSE PRACTITIONER

## 2018-06-27 PROCEDURE — 99214 OFFICE O/P EST MOD 30 MIN: CPT | Performed by: NURSE PRACTITIONER

## 2018-06-27 PROCEDURE — 36415 COLL VENOUS BLD VENIPUNCTURE: CPT | Performed by: NURSE PRACTITIONER

## 2018-06-27 PROCEDURE — 81001 URINALYSIS AUTO W/SCOPE: CPT | Performed by: NURSE PRACTITIONER

## 2018-06-27 PROCEDURE — 80061 LIPID PANEL: CPT | Performed by: NURSE PRACTITIONER

## 2018-06-27 PROCEDURE — 80053 COMPREHEN METABOLIC PANEL: CPT | Performed by: NURSE PRACTITIONER

## 2018-06-27 PROCEDURE — 84550 ASSAY OF BLOOD/URIC ACID: CPT | Performed by: NURSE PRACTITIONER

## 2018-06-27 RX ORDER — LISINOPRIL 10 MG/1
10 TABLET ORAL DAILY
Qty: 90 TABLET | Refills: 3 | Status: SHIPPED | OUTPATIENT
Start: 2018-06-27 | End: 2019-09-14 | Stop reason: SDUPTHER

## 2018-06-27 RX ORDER — ALLOPURINOL 300 MG/1
300 TABLET ORAL DAILY
Qty: 90 TABLET | Refills: 3 | Status: SHIPPED | OUTPATIENT
Start: 2018-06-27 | End: 2019-07-29 | Stop reason: SDUPTHER

## 2018-06-27 RX ORDER — IBUPROFEN 800 MG/1
800 TABLET ORAL EVERY 8 HOURS PRN
Qty: 60 TABLET | Refills: 1 | Status: SHIPPED | OUTPATIENT
Start: 2018-06-27 | End: 2022-02-07

## 2018-06-27 NOTE — PROGRESS NOTES
Subjective   Mo Willoughby is a 62 y.o. male.     History of Present Illness   Here to FU on HTN chronic controlled on lisinopril 10 mg daily, no CP dizziness HA LE edema, BP runs similar at home, with last chol elevated 02/17  Last gout flair R finger after eating ham 05/17 and tx medrol dose pack and resolved, working on strict low purine diet and feels great with weight loss 7 lbs since 02/17, on allopurinol 300 mg daily last uric acid 3.3 02/17, has IBU at home but hasn't had to use, still working cobbler on feet all day  C/o upper L back nodule x many years now enlarging and causing some pain with exercise, request sgy consult to eval and remove but wants to wait until after summer  Last colonoscopy 10/16 Dr Deng COLMENARES 2 years d/t polyps, sees 1st urology annual for prostate     The following portions of the patient's history were reviewed and updated as appropriate: allergies, current medications, past family history, past medical history, past social history, past surgical history and problem list.    Review of Systems   Constitutional: Negative for fever.   Eyes: Negative for visual disturbance.   Respiratory: Negative for cough, shortness of breath and wheezing.    Cardiovascular: Negative for chest pain, palpitations and leg swelling.   Gastrointestinal: Negative for abdominal distention, abdominal pain, anal bleeding, blood in stool, constipation, diarrhea, nausea, rectal pain and vomiting.   Musculoskeletal: Negative for arthralgias, back pain, gait problem, joint swelling, myalgias, neck pain and neck stiffness.   Neurological: Negative for dizziness and headaches.   All other systems reviewed and are negative.      Objective   Physical Exam   Constitutional: He is oriented to person, place, and time. He appears well-developed and well-nourished.   HENT:   Head: Normocephalic and atraumatic.   Eyes: Conjunctivae and EOM are normal. Pupils are equal, round, and reactive to light.   Cardiovascular: Normal  rate, regular rhythm and normal heart sounds.    Pulmonary/Chest: Effort normal and breath sounds normal.   Musculoskeletal: Normal range of motion.   Neurological: He is alert and oriented to person, place, and time.   Skin: Skin is warm and dry.   Psychiatric: He has a normal mood and affect. His behavior is normal. Judgment and thought content normal.   Vitals reviewed.      Assessment/Plan   Mo was seen today for gout, hypertension and med refill.    Diagnoses and all orders for this visit:    Essential hypertension  -     CBC & Differential  -     Comprehensive Metabolic Panel  -     Lipid Panel  -     TSH  -     Urinalysis With Microscopic - Urine, Clean Catch  -     CBC Auto Differential  -     Urinalysis without microscopic (no culture) - Urine, Clean Catch  -     Urinalysis, Microscopic Only - Urine, Clean Catch    Chronic gout of hand, unspecified cause, unspecified laterality  -     Uric Acid    Lipoma of torso  -     Ambulatory Referral to General Surgery    Class 1 obesity due to excess calories with serious comorbidity and body mass index (BMI) of 32.0 to 32.9 in adult    Hyperlipidemia, unspecified hyperlipidemia type    Other orders  -     lisinopril (PRINIVIL,ZESTRIL) 10 MG tablet; Take 1 tablet by mouth Daily.  -     allopurinol (ZYLOPRIM) 300 MG tablet; Take 1 tablet by mouth Daily.  -     ibuprofen (ADVIL,MOTRIN) 800 MG tablet; Take 1 tablet by mouth Every 8 (Eight) Hours As Needed for Moderate Pain .    check fasting labs and call with results, refill allopurinol and lisinopril and cont check BP at home, congratulated on weight loss and cont healthy diet and regular exercsie, refer sgn consult, due colonoscopy 10/18 reminded patient he will schedule

## 2018-06-27 NOTE — PATIENT INSTRUCTIONS
check fasting labs and call with results, refill allopurinol and lisinopril and cont check BP at home, congratulated on weight loss and cont healthy diet and regular exercsie, refer sgn consult, due colonoscopy 10/18 reminded patient he will schedule

## 2018-06-29 ENCOUNTER — TELEPHONE (OUTPATIENT)
Dept: FAMILY MEDICINE CLINIC | Facility: CLINIC | Age: 63
End: 2018-06-29

## 2018-06-29 DIAGNOSIS — R74.8 ELEVATED LIVER ENZYMES: Primary | ICD-10-CM

## 2018-07-02 ENCOUNTER — TELEPHONE (OUTPATIENT)
Dept: FAMILY MEDICINE CLINIC | Facility: CLINIC | Age: 63
End: 2018-07-02

## 2018-07-20 ENCOUNTER — OFFICE VISIT (OUTPATIENT)
Dept: FAMILY MEDICINE CLINIC | Facility: CLINIC | Age: 63
End: 2018-07-20

## 2018-07-20 VITALS
HEIGHT: 73 IN | HEART RATE: 84 BPM | RESPIRATION RATE: 16 BRPM | BODY MASS INDEX: 29.82 KG/M2 | OXYGEN SATURATION: 98 % | WEIGHT: 225 LBS | TEMPERATURE: 99 F | SYSTOLIC BLOOD PRESSURE: 142 MMHG | DIASTOLIC BLOOD PRESSURE: 100 MMHG

## 2018-07-20 DIAGNOSIS — L23.7 POISON IVY DERMATITIS: Primary | ICD-10-CM

## 2018-07-20 PROCEDURE — 99213 OFFICE O/P EST LOW 20 MIN: CPT | Performed by: NURSE PRACTITIONER

## 2018-07-20 RX ORDER — TRIAMCINOLONE ACETONIDE 1 MG/G
CREAM TOPICAL 2 TIMES DAILY
Qty: 45 G | Refills: 0 | Status: SHIPPED | OUTPATIENT
Start: 2018-07-20 | End: 2019-04-09

## 2018-07-20 NOTE — PATIENT INSTRUCTIONS
Triamcinolone cream 2x day to affected area as needed, steroid cream.   bactroban oint 2x day to affected areas as needed, antibiotic oint.   Keep clean and dry.   If symptoms persist call office.   Increase fluid intake, get plenty of rest.   Patient agrees with plan of care and understands instructions. Call if worsening symptoms or any problems or concerns.

## 2018-07-20 NOTE — PROGRESS NOTES
Subjective   Mo Willoughby is a 63 y.o. male.     History of Present Illness   C/o poison ivy, started about 1 week ago, noticed on left foot, between toes, denies itching, he states has gotten better, had been outside doing yard work. He states he wore compression stockings when he had poison ivy before he realized he had then on over rash. He tried calamine lotion at home. Denies hx of DM.    The following portions of the patient's history were reviewed and updated as appropriate: allergies, current medications, past family history, past medical history, past social history, past surgical history and problem list.    Review of Systems   Constitutional: Negative for chills, diaphoresis and fever.   Respiratory: Negative for cough and shortness of breath.    Cardiovascular: Negative for chest pain.   Musculoskeletal: Negative for arthralgias and joint swelling.   Skin: Positive for rash.   Neurological: Negative for dizziness, light-headedness and headache.   All other systems reviewed and are negative.      Objective   Physical Exam   Skin: Rash noted. Rash is maculopapular and urticarial.              Assessment/Plan   Mo was seen today for poison ivy.    Diagnoses and all orders for this visit:    Poison ivy dermatitis    Other orders  -     triamcinolone (KENALOG) 0.1 % cream; Apply  topically 2 (Two) Times a Day.  -     mupirocin (BACTROBAN) 2 % ointment; Apply  topically 3 (Three) Times a Day.        Triamcinolone cream 2x day to affected area as needed, steroid cream.   bactroban oint 2x day to affected areas as needed, antibiotic oint.   Keep clean and dry.   If symptoms persist call office.   Increase fluid intake, get plenty of rest.   Patient agrees with plan of care and understands instructions. Call if worsening symptoms or any problems or concerns

## 2018-07-31 ENCOUNTER — APPOINTMENT (OUTPATIENT)
Dept: ULTRASOUND IMAGING | Facility: HOSPITAL | Age: 63
End: 2018-07-31
Attending: EMERGENCY MEDICINE

## 2018-07-31 ENCOUNTER — HOSPITAL ENCOUNTER (INPATIENT)
Facility: HOSPITAL | Age: 63
LOS: 4 days | Discharge: HOME | End: 2018-08-04
Attending: EMERGENCY MEDICINE | Admitting: SURGERY

## 2018-07-31 ENCOUNTER — APPOINTMENT (OUTPATIENT)
Dept: CT IMAGING | Facility: HOSPITAL | Age: 63
End: 2018-07-31

## 2018-07-31 DIAGNOSIS — K80.20 CHOLELITHIASIS: ICD-10-CM

## 2018-07-31 DIAGNOSIS — K80.01 CALCULUS OF GALLBLADDER WITH ACUTE CHOLECYSTITIS AND OBSTRUCTION: ICD-10-CM

## 2018-07-31 DIAGNOSIS — K81.9 CHOLECYSTITIS: Primary | ICD-10-CM

## 2018-07-31 DIAGNOSIS — K85.10 ACUTE BILIARY PANCREATITIS, UNSPECIFIED COMPLICATION STATUS: ICD-10-CM

## 2018-07-31 LAB
ALBUMIN SERPL-MCNC: 4.7 G/DL (ref 3.5–5.2)
ALBUMIN/GLOB SERPL: 2 G/DL
ALP SERPL-CCNC: 68 U/L (ref 39–117)
ALT SERPL W P-5'-P-CCNC: 338 U/L (ref 1–41)
ANION GAP SERPL CALCULATED.3IONS-SCNC: 11.6 MMOL/L
ANION GAP SERPL CALCULATED.3IONS-SCNC: 19.1 MMOL/L
AST SERPL-CCNC: 534 U/L (ref 1–40)
BACTERIA UR QL AUTO: ABNORMAL /HPF
BASOPHILS # BLD AUTO: 0.01 10*3/MM3 (ref 0–0.2)
BASOPHILS # BLD AUTO: 0.02 10*3/MM3 (ref 0–0.2)
BASOPHILS NFR BLD AUTO: 0.1 % (ref 0–1.5)
BASOPHILS NFR BLD AUTO: 0.2 % (ref 0–1.5)
BILIRUB SERPL-MCNC: 3.7 MG/DL (ref 0.1–1.2)
BILIRUB UR QL STRIP: NEGATIVE
BUN BLD-MCNC: 18 MG/DL (ref 8–23)
BUN BLD-MCNC: 23 MG/DL (ref 8–23)
BUN/CREAT SERPL: 14.2 (ref 7–25)
BUN/CREAT SERPL: 14.3 (ref 7–25)
CALCIUM SPEC-SCNC: 11.9 MG/DL (ref 8.6–10.5)
CALCIUM SPEC-SCNC: 12.5 MG/DL (ref 8.6–10.5)
CHLORIDE SERPL-SCNC: 96 MMOL/L (ref 98–107)
CHLORIDE SERPL-SCNC: 97 MMOL/L (ref 98–107)
CLARITY UR: CLEAR
CO2 SERPL-SCNC: 24.9 MMOL/L (ref 22–29)
CO2 SERPL-SCNC: 29.4 MMOL/L (ref 22–29)
COLOR UR: ABNORMAL
CREAT BLD-MCNC: 1.27 MG/DL (ref 0.76–1.27)
CREAT BLD-MCNC: 1.61 MG/DL (ref 0.76–1.27)
DEPRECATED RDW RBC AUTO: 49.3 FL (ref 37–54)
DEPRECATED RDW RBC AUTO: 50.6 FL (ref 37–54)
EOSINOPHIL # BLD AUTO: 0 10*3/MM3 (ref 0–0.7)
EOSINOPHIL # BLD AUTO: 0.01 10*3/MM3 (ref 0–0.7)
EOSINOPHIL NFR BLD AUTO: 0 % (ref 0.3–6.2)
EOSINOPHIL NFR BLD AUTO: 0.1 % (ref 0.3–6.2)
ERYTHROCYTE [DISTWIDTH] IN BLOOD BY AUTOMATED COUNT: 13.1 % (ref 11.5–14.5)
ERYTHROCYTE [DISTWIDTH] IN BLOOD BY AUTOMATED COUNT: 13.4 % (ref 11.5–14.5)
GFR SERPL CREATININE-BSD FRML MDRD: 44 ML/MIN/1.73
GFR SERPL CREATININE-BSD FRML MDRD: 58 ML/MIN/1.73
GLOBULIN UR ELPH-MCNC: 2.4 GM/DL
GLUCOSE BLD-MCNC: 144 MG/DL (ref 65–99)
GLUCOSE BLD-MCNC: 151 MG/DL (ref 65–99)
GLUCOSE UR STRIP-MCNC: NEGATIVE MG/DL
HCT VFR BLD AUTO: 48.9 % (ref 40.4–52.2)
HCT VFR BLD AUTO: 49.6 % (ref 40.4–52.2)
HGB BLD-MCNC: 15.9 G/DL (ref 13.7–17.6)
HGB BLD-MCNC: 16.5 G/DL (ref 13.7–17.6)
HGB UR QL STRIP.AUTO: ABNORMAL
HYALINE CASTS UR QL AUTO: ABNORMAL /LPF
IMM GRANULOCYTES # BLD: 0 10*3/MM3 (ref 0–0.03)
IMM GRANULOCYTES # BLD: 0 10*3/MM3 (ref 0–0.03)
IMM GRANULOCYTES NFR BLD: 0 % (ref 0–0.5)
IMM GRANULOCYTES NFR BLD: 0 % (ref 0–0.5)
KETONES UR QL STRIP: ABNORMAL
LEUKOCYTE ESTERASE UR QL STRIP.AUTO: ABNORMAL
LIPASE SERPL-CCNC: >600 U/L (ref 13–60)
LYMPHOCYTES # BLD AUTO: 0.68 10*3/MM3 (ref 0.9–4.8)
LYMPHOCYTES # BLD AUTO: 0.94 10*3/MM3 (ref 0.9–4.8)
LYMPHOCYTES NFR BLD AUTO: 9 % (ref 19.6–45.3)
LYMPHOCYTES NFR BLD AUTO: 9.9 % (ref 19.6–45.3)
MCH RBC QN AUTO: 33.6 PG (ref 27–32.7)
MCH RBC QN AUTO: 34.2 PG (ref 27–32.7)
MCHC RBC AUTO-ENTMCNC: 32.5 G/DL (ref 32.6–36.4)
MCHC RBC AUTO-ENTMCNC: 33.3 G/DL (ref 32.6–36.4)
MCV RBC AUTO: 102.9 FL (ref 79.8–96.2)
MCV RBC AUTO: 103.4 FL (ref 79.8–96.2)
MONOCYTES # BLD AUTO: 0.53 10*3/MM3 (ref 0.2–1.2)
MONOCYTES # BLD AUTO: 1.23 10*3/MM3 (ref 0.2–1.2)
MONOCYTES NFR BLD AUTO: 12.9 % (ref 5–12)
MONOCYTES NFR BLD AUTO: 7 % (ref 5–12)
NEUTROPHILS # BLD AUTO: 6.3 10*3/MM3 (ref 1.9–8.1)
NEUTROPHILS # BLD AUTO: 7.34 10*3/MM3 (ref 1.9–8.1)
NEUTROPHILS NFR BLD AUTO: 76.9 % (ref 42.7–76)
NEUTROPHILS NFR BLD AUTO: 83.9 % (ref 42.7–76)
NITRITE UR QL STRIP: NEGATIVE
PH UR STRIP.AUTO: 5.5 [PH] (ref 5–8)
PLATELET # BLD AUTO: 160 10*3/MM3 (ref 140–500)
PLATELET # BLD AUTO: 177 10*3/MM3 (ref 140–500)
PMV BLD AUTO: 10 FL (ref 6–12)
PMV BLD AUTO: 10.1 FL (ref 6–12)
POTASSIUM BLD-SCNC: 4.1 MMOL/L (ref 3.5–5.2)
POTASSIUM BLD-SCNC: 5.1 MMOL/L (ref 3.5–5.2)
PROT SERPL-MCNC: 7.1 G/DL (ref 6–8.5)
PROT UR QL STRIP: ABNORMAL
RBC # BLD AUTO: 4.73 10*6/MM3 (ref 4.6–6)
RBC # BLD AUTO: 4.82 10*6/MM3 (ref 4.6–6)
RBC # UR: ABNORMAL /HPF
REF LAB TEST METHOD: ABNORMAL
SODIUM BLD-SCNC: 138 MMOL/L (ref 136–145)
SODIUM BLD-SCNC: 140 MMOL/L (ref 136–145)
SP GR UR STRIP: 1.02 (ref 1–1.03)
SQUAMOUS #/AREA URNS HPF: ABNORMAL /HPF
UROBILINOGEN UR QL STRIP: ABNORMAL
WBC NRBC COR # BLD: 7.52 10*3/MM3 (ref 4.5–10.7)
WBC NRBC COR # BLD: 9.54 10*3/MM3 (ref 4.5–10.7)
WBC UR QL AUTO: ABNORMAL /HPF

## 2018-07-31 PROCEDURE — 25010000002 ONDANSETRON PER 1 MG: Performed by: EMERGENCY MEDICINE

## 2018-07-31 PROCEDURE — 80048 BASIC METABOLIC PNL TOTAL CA: CPT | Performed by: SURGERY

## 2018-07-31 PROCEDURE — 85025 COMPLETE CBC W/AUTO DIFF WBC: CPT | Performed by: EMERGENCY MEDICINE

## 2018-07-31 PROCEDURE — 81001 URINALYSIS AUTO W/SCOPE: CPT | Performed by: EMERGENCY MEDICINE

## 2018-07-31 PROCEDURE — 85025 COMPLETE CBC W/AUTO DIFF WBC: CPT | Performed by: SURGERY

## 2018-07-31 PROCEDURE — 74177 CT ABD & PELVIS W/CONTRAST: CPT

## 2018-07-31 PROCEDURE — 25010000002 MORPHINE PER 10 MG: Performed by: SURGERY

## 2018-07-31 PROCEDURE — 25010000002 IOPAMIDOL 61 % SOLUTION: Performed by: SURGERY

## 2018-07-31 PROCEDURE — 99285 EMERGENCY DEPT VISIT HI MDM: CPT

## 2018-07-31 PROCEDURE — 76705 ECHO EXAM OF ABDOMEN: CPT

## 2018-07-31 PROCEDURE — 80053 COMPREHEN METABOLIC PANEL: CPT | Performed by: EMERGENCY MEDICINE

## 2018-07-31 PROCEDURE — 99223 1ST HOSP IP/OBS HIGH 75: CPT | Performed by: SURGERY

## 2018-07-31 PROCEDURE — 25010000002 MORPHINE PER 10 MG: Performed by: EMERGENCY MEDICINE

## 2018-07-31 PROCEDURE — 83690 ASSAY OF LIPASE: CPT | Performed by: EMERGENCY MEDICINE

## 2018-07-31 PROCEDURE — 25010000002 ONDANSETRON PER 1 MG: Performed by: SURGERY

## 2018-07-31 PROCEDURE — 25010000002 HYDROMORPHONE PER 4 MG: Performed by: EMERGENCY MEDICINE

## 2018-07-31 RX ORDER — HYDROMORPHONE HCL IN 0.9% NACL 10 MG/50ML
PATIENT CONTROLLED ANALGESIA SYRINGE INTRAVENOUS CONTINUOUS
Status: DISCONTINUED | OUTPATIENT
Start: 2018-07-31 | End: 2018-08-03

## 2018-07-31 RX ORDER — ALLOPURINOL 300 MG/1
300 TABLET ORAL DAILY
Status: DISCONTINUED | OUTPATIENT
Start: 2018-07-31 | End: 2018-08-04 | Stop reason: HOSPADM

## 2018-07-31 RX ORDER — NALOXONE HCL 0.4 MG/ML
0.1 VIAL (ML) INJECTION
Status: DISCONTINUED | OUTPATIENT
Start: 2018-07-31 | End: 2018-08-04 | Stop reason: HOSPADM

## 2018-07-31 RX ORDER — ONDANSETRON 2 MG/ML
4 INJECTION INTRAMUSCULAR; INTRAVENOUS EVERY 4 HOURS PRN
Status: DISCONTINUED | OUTPATIENT
Start: 2018-07-31 | End: 2018-08-04 | Stop reason: HOSPADM

## 2018-07-31 RX ORDER — ONDANSETRON 2 MG/ML
4 INJECTION INTRAMUSCULAR; INTRAVENOUS ONCE
Status: COMPLETED | OUTPATIENT
Start: 2018-07-31 | End: 2018-07-31

## 2018-07-31 RX ORDER — MORPHINE SULFATE 2 MG/ML
2 INJECTION, SOLUTION INTRAMUSCULAR; INTRAVENOUS
Status: DISCONTINUED | OUTPATIENT
Start: 2018-07-31 | End: 2018-08-04 | Stop reason: HOSPADM

## 2018-07-31 RX ORDER — DEXTROSE, SODIUM CHLORIDE, AND POTASSIUM CHLORIDE 5; .45; .15 G/100ML; G/100ML; G/100ML
100 INJECTION INTRAVENOUS CONTINUOUS
Status: DISCONTINUED | OUTPATIENT
Start: 2018-07-31 | End: 2018-07-31

## 2018-07-31 RX ORDER — SODIUM CHLORIDE 0.9 % (FLUSH) 0.9 %
10 SYRINGE (ML) INJECTION AS NEEDED
Status: DISCONTINUED | OUTPATIENT
Start: 2018-07-31 | End: 2018-08-04 | Stop reason: HOSPADM

## 2018-07-31 RX ORDER — SODIUM CHLORIDE 0.9 % (FLUSH) 0.9 %
1-10 SYRINGE (ML) INJECTION AS NEEDED
Status: DISCONTINUED | OUTPATIENT
Start: 2018-07-31 | End: 2018-08-04 | Stop reason: HOSPADM

## 2018-07-31 RX ORDER — HYDROMORPHONE HYDROCHLORIDE 1 MG/ML
0.5 INJECTION, SOLUTION INTRAMUSCULAR; INTRAVENOUS; SUBCUTANEOUS ONCE
Status: COMPLETED | OUTPATIENT
Start: 2018-07-31 | End: 2018-07-31

## 2018-07-31 RX ORDER — NALOXONE HCL 0.4 MG/ML
0.4 VIAL (ML) INJECTION
Status: DISCONTINUED | OUTPATIENT
Start: 2018-07-31 | End: 2018-08-04 | Stop reason: HOSPADM

## 2018-07-31 RX ORDER — LISINOPRIL 10 MG/1
10 TABLET ORAL
Status: DISCONTINUED | OUTPATIENT
Start: 2018-07-31 | End: 2018-08-04 | Stop reason: HOSPADM

## 2018-07-31 RX ORDER — SODIUM CHLORIDE 9 MG/ML
200 INJECTION, SOLUTION INTRAVENOUS CONTINUOUS
Status: DISCONTINUED | OUTPATIENT
Start: 2018-07-31 | End: 2018-08-04 | Stop reason: HOSPADM

## 2018-07-31 RX ADMIN — MORPHINE SULFATE 4 MG: 4 INJECTION INTRAVENOUS at 05:02

## 2018-07-31 RX ADMIN — HYDROMORPHONE HYDROCHLORIDE: 10 INJECTION INTRAMUSCULAR; INTRAVENOUS; SUBCUTANEOUS at 18:29

## 2018-07-31 RX ADMIN — POTASSIUM CHLORIDE, DEXTROSE MONOHYDRATE AND SODIUM CHLORIDE 100 ML/HR: 150; 5; 450 INJECTION, SOLUTION INTRAVENOUS at 13:08

## 2018-07-31 RX ADMIN — SODIUM CHLORIDE 100 ML/HR: 9 INJECTION, SOLUTION INTRAVENOUS at 20:13

## 2018-07-31 RX ADMIN — IOPAMIDOL 85 ML: 612 INJECTION, SOLUTION INTRAVENOUS at 07:53

## 2018-07-31 RX ADMIN — ONDANSETRON 4 MG: 2 INJECTION, SOLUTION INTRAMUSCULAR; INTRAVENOUS at 05:02

## 2018-07-31 RX ADMIN — ONDANSETRON HYDROCHLORIDE 4 MG: 2 SOLUTION INTRAMUSCULAR; INTRAVENOUS at 13:08

## 2018-07-31 RX ADMIN — MORPHINE SULFATE 2 MG: 2 INJECTION, SOLUTION INTRAMUSCULAR; INTRAVENOUS at 13:08

## 2018-07-31 RX ADMIN — ALLOPURINOL 300 MG: 300 TABLET ORAL at 18:04

## 2018-07-31 RX ADMIN — LISINOPRIL 10 MG: 10 TABLET ORAL at 18:04

## 2018-07-31 RX ADMIN — HYDROMORPHONE HYDROCHLORIDE 0.5 MG: 1 INJECTION, SOLUTION INTRAMUSCULAR; INTRAVENOUS; SUBCUTANEOUS at 07:31

## 2018-07-31 RX ADMIN — MORPHINE SULFATE 2 MG: 2 INJECTION, SOLUTION INTRAMUSCULAR; INTRAVENOUS at 16:14

## 2018-07-31 RX ADMIN — MORPHINE SULFATE 4 MG: 4 INJECTION INTRAVENOUS at 06:14

## 2018-08-01 PROBLEM — R00.0 SINUS TACHYCARDIA: Status: ACTIVE | Noted: 2018-08-01

## 2018-08-01 PROBLEM — K85.90 PANCREATITIS: Status: ACTIVE | Noted: 2018-08-01

## 2018-08-01 PROBLEM — N17.9 ACUTE KIDNEY INJURY: Status: ACTIVE | Noted: 2018-08-01

## 2018-08-01 LAB
ALBUMIN SERPL-MCNC: 3.7 G/DL (ref 3.5–5.2)
ALBUMIN/GLOB SERPL: 1.5 G/DL
ALP SERPL-CCNC: 54 U/L (ref 39–117)
ALT SERPL W P-5'-P-CCNC: 254 U/L (ref 1–41)
AMYLASE SERPL-CCNC: 777 U/L (ref 28–100)
ANION GAP SERPL CALCULATED.3IONS-SCNC: 12.7 MMOL/L
AST SERPL-CCNC: 178 U/L (ref 1–40)
BASOPHILS # BLD AUTO: 0.01 10*3/MM3 (ref 0–0.2)
BASOPHILS NFR BLD AUTO: 0.1 % (ref 0–1.5)
BILIRUB SERPL-MCNC: 2.3 MG/DL (ref 0.1–1.2)
BUN BLD-MCNC: 31 MG/DL (ref 8–23)
BUN/CREAT SERPL: 17.3 (ref 7–25)
CALCIUM SPEC-SCNC: 9.7 MG/DL (ref 8.6–10.5)
CHLORIDE SERPL-SCNC: 103 MMOL/L (ref 98–107)
CO2 SERPL-SCNC: 24.3 MMOL/L (ref 22–29)
CREAT BLD-MCNC: 1.79 MG/DL (ref 0.76–1.27)
DEPRECATED RDW RBC AUTO: 53.8 FL (ref 37–54)
EOSINOPHIL # BLD AUTO: 0.04 10*3/MM3 (ref 0–0.7)
EOSINOPHIL NFR BLD AUTO: 0.3 % (ref 0.3–6.2)
ERYTHROCYTE [DISTWIDTH] IN BLOOD BY AUTOMATED COUNT: 14.1 % (ref 11.5–14.5)
GFR SERPL CREATININE-BSD FRML MDRD: 39 ML/MIN/1.73
GLOBULIN UR ELPH-MCNC: 2.5 GM/DL
GLUCOSE BLD-MCNC: 106 MG/DL (ref 65–99)
HCT VFR BLD AUTO: 46.1 % (ref 40.4–52.2)
HGB BLD-MCNC: 15.3 G/DL (ref 13.7–17.6)
IMM GRANULOCYTES # BLD: 0.02 10*3/MM3 (ref 0–0.03)
IMM GRANULOCYTES NFR BLD: 0.2 % (ref 0–0.5)
LIPASE SERPL-CCNC: >600 U/L (ref 13–60)
LYMPHOCYTES # BLD AUTO: 0.49 10*3/MM3 (ref 0.9–4.8)
LYMPHOCYTES NFR BLD AUTO: 4.2 % (ref 19.6–45.3)
MCH RBC QN AUTO: 34.4 PG (ref 27–32.7)
MCHC RBC AUTO-ENTMCNC: 33.2 G/DL (ref 32.6–36.4)
MCV RBC AUTO: 103.6 FL (ref 79.8–96.2)
MONOCYTES # BLD AUTO: 1.55 10*3/MM3 (ref 0.2–1.2)
MONOCYTES NFR BLD AUTO: 13.4 % (ref 5–12)
NEUTROPHILS # BLD AUTO: 9.47 10*3/MM3 (ref 1.9–8.1)
NEUTROPHILS NFR BLD AUTO: 82 % (ref 42.7–76)
PLATELET # BLD AUTO: 136 10*3/MM3 (ref 140–500)
PMV BLD AUTO: 10.5 FL (ref 6–12)
POTASSIUM BLD-SCNC: 4.8 MMOL/L (ref 3.5–5.2)
PROT SERPL-MCNC: 6.2 G/DL (ref 6–8.5)
RBC # BLD AUTO: 4.45 10*6/MM3 (ref 4.6–6)
SODIUM BLD-SCNC: 140 MMOL/L (ref 136–145)
WBC NRBC COR # BLD: 11.56 10*3/MM3 (ref 4.5–10.7)

## 2018-08-01 PROCEDURE — 85025 COMPLETE CBC W/AUTO DIFF WBC: CPT | Performed by: SURGERY

## 2018-08-01 PROCEDURE — 99254 IP/OBS CNSLTJ NEW/EST MOD 60: CPT | Performed by: INTERNAL MEDICINE

## 2018-08-01 PROCEDURE — 83690 ASSAY OF LIPASE: CPT | Performed by: SURGERY

## 2018-08-01 PROCEDURE — 93010 ELECTROCARDIOGRAM REPORT: CPT | Performed by: INTERNAL MEDICINE

## 2018-08-01 PROCEDURE — 93005 ELECTROCARDIOGRAM TRACING: CPT | Performed by: SURGERY

## 2018-08-01 PROCEDURE — 82150 ASSAY OF AMYLASE: CPT | Performed by: SURGERY

## 2018-08-01 PROCEDURE — 99231 SBSQ HOSP IP/OBS SF/LOW 25: CPT | Performed by: SURGERY

## 2018-08-01 PROCEDURE — 80053 COMPREHEN METABOLIC PANEL: CPT | Performed by: SURGERY

## 2018-08-01 RX ORDER — NITROGLYCERIN 0.4 MG/1
0.4 TABLET SUBLINGUAL
Status: DISCONTINUED | OUTPATIENT
Start: 2018-08-01 | End: 2018-08-04 | Stop reason: HOSPADM

## 2018-08-01 RX ADMIN — SODIUM CHLORIDE 200 ML/HR: 9 INJECTION, SOLUTION INTRAVENOUS at 21:56

## 2018-08-01 RX ADMIN — ALLOPURINOL 300 MG: 300 TABLET ORAL at 09:30

## 2018-08-01 RX ADMIN — SODIUM CHLORIDE 100 ML/HR: 9 INJECTION, SOLUTION INTRAVENOUS at 04:34

## 2018-08-01 RX ADMIN — SODIUM CHLORIDE 1000 ML: 9 INJECTION, SOLUTION INTRAVENOUS at 17:17

## 2018-08-01 RX ADMIN — LISINOPRIL 10 MG: 10 TABLET ORAL at 09:30

## 2018-08-01 NOTE — PLAN OF CARE
Problem: Patient Care Overview  Goal: Plan of Care Review  Outcome: Ongoing (interventions implemented as appropriate)   08/01/18 0877   Coping/Psychosocial   Plan of Care Reviewed With patient   OTHER   Outcome Summary patient had no complaints of pain this morning. patient walked this afternoon and 1 hour post walk patient's heart rate was still elevated. MD notified and TX orders, EKG and consulted Cardiology. Report called to Alida BUCKLEY on [address]. will continue to monitor.   Plan of Care Review   Progress improving       Problem: Pain, Acute (Adult)  Goal: Identify Related Risk Factors and Signs and Symptoms  Outcome: Outcome(s) achieved Date Met: 08/01/18    Goal: Acceptable Pain Control/Comfort Level  Outcome: Ongoing (interventions implemented as appropriate)      Problem: Infection, Risk/Actual (Adult)  Goal: Identify Related Risk Factors and Signs and Symptoms  Outcome: Outcome(s) achieved Date Met: 08/01/18    Goal: Infection Prevention/Resolution  Outcome: Ongoing (interventions implemented as appropriate)

## 2018-08-01 NOTE — PROGRESS NOTES
Continued Stay Note  [facility]     Patient Name: Mo Willoughby  MRN: [id number]  Today's Date: 8/1/2018    Admit Date: 7/31/2018          Discharge Plan     Row Name 08/01/18 1302       Plan    Plan Home    Patient/Family in Agreement with Plan yes    Plan Comments Spoke with patient at bedside. Introduced self and role. Patient lives with his sister Mirtha and has no home safety concerns. He is IADLs. He uses no equipment. No history of HH/rehab. He does not have any advanced directives but says he has been looking at a paper about them. He says his sister Mirtha Willoughby [phone] or [phone] is his emergency contact, decision maker if he is not able and will provide transportation home at discharge. Confirmed face sheet correct. PCP is Nevaeh aWy and his pharmacy is [facility] on [address]. Patient plans home at discharge and says his sister can assist with any needs.               Discharge Codes    No documentation.           Cameron Brooks RN  Discharge Planning Assessment  [facility]     Patient Name: Mo Willoughby  MRN: [id number]  Today's Date: 8/1/2018    Admit Date: 7/31/2018          Discharge Needs Assessment     Row Name 08/01/18 1302       Living Environment    Lives With sibling(s)    Name(s) of Who Lives With Patient Mirtha Willoughby    Current Living Arrangements home/apartment/condo    Primary Care Provided by self    Provides Primary Care For no one    Family Caregiver if Needed sibling(s)    Family Caregiver Names Mirtha douglas    Quality of Family Relationships helpful;involved;supportive    Able to Return to Prior Arrangements yes       Resource/Environmental Concerns    Resource/Environmental Concerns none    Transportation Concerns car, none       Transition Planning    Patient/Family Anticipates Transition to home with family    Patient/Family Anticipated Services at Transition none    Transportation Anticipated family or friend will provide       Discharge Needs Assessment     Readmission Within the Last 30 Days no previous admission in last 30 days    Concerns to be Addressed denies needs/concerns at this time;basic needs;discharge planning;home safety    Equipment Currently Used at Home none    Anticipated Changes Related to Illness none    Equipment Needed After Discharge none    Offered/Gave Vendor List no            Discharge Plan     Row Name 08/01/18 1302       Plan    Plan Home    Patient/Family in Agreement with Plan yes    Plan Comments .        Destination     No service coordination in this encounter.      Durable Medical Equipment     No service coordination in this encounter.      Dialysis/Infusion     No service coordination in this encounter.      Home Medical Care     No service coordination in this encounter.      Social Care     No service coordination in this encounter.                Demographic Summary     Row Name 08/01/18 1300       General Information    Admission Type inpatient    Arrived From home    Referral Source admission list    Reason for Consult discharge planning    Preferred Language English     Used During This Interaction no       Contact Information    Permission Granted to Share Info With             Functional Status     Row Name 08/01/18 1301       Functional Status    Usual Activity Tolerance good    Current Activity Tolerance good       Functional Status, IADL    Medications independent    Meal Preparation independent    Housekeeping independent    Laundry independent    Shopping independent    IADL Comments IADLs       Mental Status    General Appearance WDL WDL       Mental Status Summary    Recent Changes in Mental Status/Cognitive Functioning no changes            Psychosocial    No documentation.           Abuse/Neglect    No documentation.           Legal    No documentation.           Substance Abuse    No documentation.           Patient Forms    No documentation.         Cameron Brooks RN

## 2018-08-01 NOTE — PROGRESS NOTES
Daily Progress Note    Chief Complaint: Abdominal pain    Subjective     Pt reports feeling much better today    Objective     Vital signs in last 24 hours:  Temp:  [96.9 °F (36.1 °C)-99 °F (37.2 °C)] 98.3 °F (36.8 °C)  Heart Rate:  [73-95] 95  Resp:  [16-18] 16  BP: (139-185)/() 139/85    Intake/Output last 3 shifts:  I/O last 3 completed shifts:  In: 0   Out: 1550 [Urine:1550]    Physical Exam:  Respiratory: CTA, normal inspiratory effort  CV: RRR, no JVD  Abd: Positive BS, soft, ND, NT, no rebound, no guarding  Ext:  No cyanosis, no edema    Results from last 7 days  Lab Units 08/01/18  0657   WBC 10*3/mm3 11.56*   HEMOGLOBIN g/dL 15.3   HEMATOCRIT % 46.1   PLATELETS 10*3/mm3 136*     Labs pending    Assessment/Plan   Gallstone pancreatitis   Continue supportive care, will check labs    Melina Kate MD  General, Minimally Invasive and Endoscopic Surgery  [facility]

## 2018-08-01 NOTE — PLAN OF CARE
Problem: Patient Care Overview  Goal: Plan of Care Review  Outcome: Ongoing (interventions implemented as appropriate)   08/01/18 0533   Coping/Psychosocial   Plan of Care Reviewed With patient   OTHER   Outcome Summary Pt had no c/o pain throughout the night. Dilaudid PCA continued. VSS. Remains NPO. Will continue to monitor per protocol.    Plan of Care Review   Progress improving     Goal: Individualization and Mutuality  Outcome: Ongoing (interventions implemented as appropriate)    Goal: Discharge Needs Assessment  Outcome: Ongoing (interventions implemented as appropriate)    Goal: Interprofessional Rounds/Family Conf  Outcome: Ongoing (interventions implemented as appropriate)      Problem: Pain, Acute (Adult)  Goal: Identify Related Risk Factors and Signs and Symptoms  Outcome: Ongoing (interventions implemented as appropriate)    Goal: Acceptable Pain Control/Comfort Level  Outcome: Ongoing (interventions implemented as appropriate)      Problem: Infection, Risk/Actual (Adult)  Goal: Identify Related Risk Factors and Signs and Symptoms  Outcome: Ongoing (interventions implemented as appropriate)    Goal: Infection Prevention/Resolution  Outcome: Ongoing (interventions implemented as appropriate)

## 2018-08-01 NOTE — H&P
Chief Complaint   Patient presents with   • Abdominal Pain   • Nausea   • Vomiting      This is a pleasant 63-year-old gentleman presented to the emergency room with acute onset of epigastric abdominal pain starting on Monday evening.  Patient reports that the pain feels like a stabbing pain into his back.  Patient was trying to sleep it off and could not get comfortable or fall asleep pain would not go away after taking Tylenol and Advil.  Patient presented to the emergency room at 4 AM.  Patient reports he had some nausea but no vomiting, fever, chills, jaundice, yaya colored stools or dark urine.  Patient has never had this in the past.  Patient reports nothing makes it better and moving makes it worse.  Patient reports he does occasionally drink EtOH.    Past Medical History:   Diagnosis Date   • Erectile dysfunction    • Gout    • Hx of colonic polyp    • Hypercalcemia    • Hypertension    • Parathyroid abnormality (CMS/HCC)    • Syncope and collapse 03/28/2017     Past Surgical History:   Procedure Laterality Date   • COLONOSCOPY     • COLONOSCOPY N/A 10/3/2016    Procedure: COLONOSCOPY TO CECUM TO TERMINAL ILIUM WITH COLD BIOPSY POLYPECTOMY;  Surgeon: Benoit Vilchis MD;  Location: [facility] ENDOSCOPY;  Service:    • TONSILLECTOMY       Family History   Problem Relation Age of Onset   • Hypertension Mother    • Breast cancer Mother    • Cancer Father    • Liver cancer Father      Social History   Substance Use Topics   • Smoking status: Never Smoker   • Smokeless tobacco: Never Used   • Alcohol use Yes      Comment: occasional         Current Facility-Administered Medications:   •  allopurinol (ZYLOPRIM) tablet 300 mg, 300 mg, Oral, Daily, Melina Kate MD, 300 mg at 07/31/18 1804  •  HYDROmorphone (DILAUDID) PCA 0.2 mg/ml 50 mL syringe, , Intravenous, Continuous, Melina Kate MD  •  lisinopril (PRINIVIL,ZESTRIL) tablet 10 mg, 10 mg, Oral, Q24H, Melina Kate MD, 10 mg at 07/31/18 1804  •   "morphine injection 2 mg, 2 mg, Intravenous, Q2H PRN, 2 mg at 07/31/18 1614 **AND** naloxone (NARCAN) injection 0.4 mg, 0.4 mg, Intravenous, Q5 Min PRN, Melina Kate MD  •  naloxone (NARCAN) injection 0.1 mg, 0.1 mg, Intravenous, Q5 Min PRN, Melina Kate MD  •  ondansetron (ZOFRAN) injection 4 mg, 4 mg, Intravenous, Q4H PRN, Melina Kate MD, 4 mg at 07/31/18 1308  •  sodium chloride 0.9 % flush 1-10 mL, 1-10 mL, Intravenous, PRN, Melina Kate MD  •  Insert peripheral IV, , , Once **AND** sodium chloride 0.9 % flush 10 mL, 10 mL, Intravenous, PRN, Leroy Damon MD  •  sodium chloride 0.9 % infusion, 100 mL/hr, Intravenous, Continuous, Melina Kate MD, Last Rate: 100 mL/hr at 07/31/18 2013, 100 mL/hr at 07/31/18 2013    Review of Systems   General: Patient reports good health  Eyes: No eye problems  Ears, nose, mouth and throat: No rhinitis, no hearing problems, no chronic cough  Cardiovascular/heart: Denies palpitations, syncope or chest pain  Respiratory/lung: Denies shortness of breath, hemoptysis, or dyspnea on exertion   Genital/urinary: No frequency, hematuria or dysuria  Hematological/lymphatic: Denies anemia or other problems  Musculoskeletal: Joint pain  Skin: No psoriasis or other skin issues  Neurological: No seizures or other neurological problems  Psychiatric: None  Endocrine: History of gout  Gastro-intestinal: No constipation, no diarrhea, no melena, no hematochezia  All other systems have been reviewed and are negative.  Vitals:    07/31/18 0905 07/31/18 1000 07/31/18 1451 07/31/18 1807   BP: 154/95 (!) 185/103 (!) 183/104 (!) 169/103   BP Location:  Left arm Left arm Left arm   Patient Position:  Lying Lying Sitting   Pulse:  73 75 80   Resp:  16 18 18   Temp:  97.1 °F (36.2 °C) 98.2 °F (36.8 °C) 99 °F (37.2 °C)   TempSrc:  Oral Oral Oral   SpO2: 98% 97% 97% 96%   Weight:  102 kg (224 lb 3.2 oz)     Height:  185.4 cm (73\")         Physical Exam  General/physcological:   " Alert and oriented x3 in no acute distress  HEENT: Normal cephalic, atraumatic, PERRLA, EOMI, sclera positive icteria, moist mucous membranes, neck is supple, no JVD, no carotid bruits, no thyromegaly no adenopathy  Respiratory: CTA and percussion  CVA: RRR, normal S1-S2, no murmurs, no gallops or rubs  GI: Positive BS, soft, nondistended, positive right upper quadrant and epigastric tenderness, rectus diastases, no rebound, no guarding, no hernias, no organomegaly and no masses  Musculoskeletal: no clubbing, no cyanosis or edema  Neurovascular: Grossly intact    Results from last 7 days  Lab Units 07/31/18  1249   WBC 10*3/mm3 7.52   HEMOGLOBIN g/dL 15.9   HEMATOCRIT % 48.9   PLATELETS 10*3/mm3 160       Results from last 7 days  Lab Units 07/31/18  1249 07/31/18  0458   SODIUM mmol/L 138 140   POTASSIUM mmol/L 5.1 4.1   CHLORIDE mmol/L 97* 96*   CO2 mmol/L 29.4* 24.9   BUN mg/dL 23 18   CREATININE mg/dL 1.61* 1.27   CALCIUM mg/dL 11.9* 12.5*   BILIRUBIN mg/dL  --  3.7*   ALK PHOS U/L  --  68   ALT (SGPT) U/L  --  338*   AST (SGOT) U/L  --  534*   GLUCOSE mg/dL 144* 151*       Lab Results  Lab Value Date/Time   LIPASE >600 (H) 07/31/2018 0458     CT scan has been personally reviewed and reveals gallstones and pancreatitis     Assessment:  Gallstone pancreatitis    Plan:  Patient does have a drinking history but also has gallstones.  I have discussed that either can cause pancreatitis.  I have recommended that the patient discontinue EtOH consumption.  I have also recommended prior to discharge patient should have his gallbladder removed as he does have gallstones.  I will treat his pancreatitis with supportive therapy, IV fluids, nothing by mouth, bowel rest, anti-medics and pain medicine.  I will continue to follow his labs and abdominal exam.    Melina Kate MD  General, Minimally Invasive and Endoscopic Surgery  [facility]    [address], [address]  [place], [place], [address]  P:  [phone]  F: [phone]    Cc:  YANIRA Britt

## 2018-08-01 NOTE — CONSULTS
Date of Hospital Visit: 18  Encounter Provider: Arthur Oconnor MD  Place of Service: [facility] CARDIOLOGY  Patient Name: Mo Willoughby  :1955  Referral Provider: Melina Kate MD    Chief complaint: abdominal pain    Consulted for: tachycardia     History of Present Illness: Mr. Willoughby is a 62 yo man with a history of hypertension (on lisinopril) who presents with abdominal pain.  He has been diagnosed with gallstone pancreatitis.  He has had sinus tachycardia during this admission.  Today was the first day he has really gotten out of bed, and he got up to walk.  After that his pulse was in the 140s.  An EKG shows sinus tach.  he had no symptoms at all with it.      He had an episode of syncope in 2017; a Zio showed nonsustained SVT vs sinus tachycardia. who 62 y/o male admitted with epigastric/abdomen pain on Monday, 2018. reports he had some nausea but no vomiting, fever, chills, jaundice, yaya colored stools or dark urine. He was diagnoised with pancreatitis and gallstones. Today developed increase -140s STACH.     Currently HR in 100-110 range.     Lipase: >600  Amylase: 777      Previous results:  3/29/2017 Ziopatch results  Monitor hooked-up on 3/29/2017 at 18:41 EDT. The monitor was scanned on 2017. The patient was monitored for 12 days 20 hours. Indications for this exam include syncope. Average HR: 84. Min HR: 48. Max HR: 176.      Study Findings Patient diary was not submitted.No symptoms reported during the monitoring period. No complications noted. The predominant rhythm noted during the testing period was sinus rhythm. There were 5 episodes of supraventricular tachycardia. The peak heart rate was 176 beats per minute. Longest run lasted 4 min 57 sec in SVT at a rather slow rate of 111 bpm. Premature ventricular contractions occured rarely. Ventricular couplets. There were no episodes of ventricular tachycardia. Sinoatrial node conduction  was normal. No atrioventricular block noted.      Study Impressions An abnormal monitor study.            Past Medical History:   Diagnosis Date   • Erectile dysfunction    • Gout    • Hx of colonic polyp    • Hypercalcemia    • Hypertension    • Parathyroid abnormality (CMS/HCC)    • Syncope and collapse 03/28/2017       Past Surgical History:   Procedure Laterality Date   • COLONOSCOPY     • COLONOSCOPY N/A 10/3/2016    Procedure: COLONOSCOPY TO CECUM TO TERMINAL ILIUM WITH COLD BIOPSY POLYPECTOMY;  Surgeon: Benoit Vilchis MD;  Location: [facility] ENDOSCOPY;  Service:    • TONSILLECTOMY         Prior to Admission medications    Medication Sig Start Date End Date Taking? Authorizing Provider   allopurinol (ZYLOPRIM) 300 MG tablet Take 1 tablet by mouth Daily. 6/27/18  Yes Nevaeh Way APRN   ibuprofen (ADVIL,MOTRIN) 800 MG tablet Take 1 tablet by mouth Every 8 (Eight) Hours As Needed for Moderate Pain . 6/27/18  Yes Nevaeh Way APRN   lisinopril (PRINIVIL,ZESTRIL) 10 MG tablet Take 1 tablet by mouth Daily. 6/27/18  Yes Nevaeh Way APRN   mupirocin (BACTROBAN) 2 % ointment Apply  topically 3 (Three) Times a Day. 7/20/18   Meg Casanova APRN   triamcinolone (KENALOG) 0.1 % cream Apply  topically 2 (Two) Times a Day. 7/20/18   Meg Casanova APRN   VIAGRA 50 MG tablet take 1 tablet by mouth 1 hour prior to intercourse 6/10/16   Jarrod Grajeda MD       Social History     Social History   • Marital status: Single     Spouse name: N/A   • Number of children: N/A   • Years of education: N/A     Occupational History   • Not on file.     Social History Main Topics   • Smoking status: Never Smoker   • Smokeless tobacco: Never Used   • Alcohol use Yes      Comment: occasional   • Drug use: No   • Sexual activity: Defer     Other Topics Concern   • Not on file     Social History Narrative   • No narrative on file       Family History   Problem Relation Age of Onset   • Hypertension Mother   "  • Breast cancer Mother    • Cancer Father    • Liver cancer Father        Review of Systems   Gastrointestinal: Positive for abdominal distention and abdominal pain.   All other systems reviewed and are negative.       Objective:     Vitals:    08/01/18 0930 08/01/18 1028 08/01/18 1436 08/01/18 1621   BP: 131/79 139/88 133/88 135/91   BP Location: Left arm Left arm Left arm Left arm   Patient Position: Sitting Lying Lying Lying   Pulse: 98 97 (!) 153 105   Resp: 16 16 24 22   Temp:  98.7 °F (37.1 °C) 100.1 °F (37.8 °C) 99.4 °F (37.4 °C)   TempSrc:  Oral Oral Oral   SpO2: 96% 94% 95% 94%   Weight:       Height:         Body mass index is 29.58 kg/m².  Flowsheet Rows      First Filed Value   Admission Height  185.4 cm (73\") Documented at 07/31/2018 0437   Admission Weight  101 kg (223 lb) Documented at 07/31/2018 0447          Physical Exam   Constitutional: He is oriented to person, place, and time. He appears well-developed and well-nourished.   HENT:   Head: Normocephalic.   Nose: Nose normal.   Mouth/Throat: Oropharynx is clear and moist.   Eyes: Pupils are equal, round, and reactive to light. Conjunctivae and EOM are normal.   Neck: Normal range of motion. No JVD present.   Cardiovascular: Regular rhythm, normal heart sounds and intact distal pulses.  Tachycardia present.    Pulmonary/Chest: Effort normal and breath sounds normal.   Abdominal: He exhibits distension. He exhibits no mass. There is tenderness.   Musculoskeletal: Normal range of motion. He exhibits no edema.   Neurological: He is alert and oriented to person, place, and time. No cranial nerve deficit.   Skin: Skin is warm and dry. No erythema.   Psychiatric: He has a normal mood and affect. His behavior is normal. Judgment and thought content normal.   Vitals reviewed.              Lab Review:                  Results from last 7 days  Lab Units 08/01/18  0657   SODIUM mmol/L 140   POTASSIUM mmol/L 4.8   CHLORIDE mmol/L 103   CO2 mmol/L 24.3   BUN " mg/dL 31*   CREATININE mg/dL 1.79*   GLUCOSE mg/dL 106*   CALCIUM mg/dL 9.7           Results from last 7 days  Lab Units 08/01/18  0657   WBC 10*3/mm3 11.56*   HEMOGLOBIN g/dL 15.3   HEMATOCRIT % 46.1   PLATELETS 10*3/mm3 136*                      Telemetry 8/1/2018 @1636          8/1/2018 EKG       3/29/2017 EKG       I personally viewed and interpreted the patient's EKG/Telemetry data -- sinus tach, o/w normal    Assessment/Plan:     1.  Sinus tachycardia -- this is due to his general medical condition.  It is not pathologic and does not require any targeted therapy.  If there is any thought of EtOH withdrawal (which doesn't seem likely as he is alert, pleasant, and nontremulous) then I would defer that to primary team.  He doesn't need any cardiac workup.    2.  OTF    3.  Pancreatitis/gallstones

## 2018-08-01 NOTE — PLAN OF CARE
Problem: Patient Care Overview  Goal: Plan of Care Review  Outcome: Ongoing (interventions implemented as appropriate)   08/01/18 8788   Coping/Psychosocial   Plan of Care Reviewed With patient   OTHER   Outcome Summary Pt transferred to floor this evening. C/O pain, PCA in use. NO SOA reported. Sinus tach on monitor. Cardio consuilted. IVF increased and bolus given. No acute signs of distress. Monitored closely   Plan of Care Review   Progress no change

## 2018-08-02 LAB
ANION GAP SERPL CALCULATED.3IONS-SCNC: 9.2 MMOL/L
BASOPHILS # BLD AUTO: 0.01 10*3/MM3 (ref 0–0.2)
BASOPHILS NFR BLD AUTO: 0.1 % (ref 0–1.5)
BUN BLD-MCNC: 32 MG/DL (ref 8–23)
BUN/CREAT SERPL: 24.8 (ref 7–25)
CALCIUM SPEC-SCNC: 8.5 MG/DL (ref 8.6–10.5)
CHLORIDE SERPL-SCNC: 108 MMOL/L (ref 98–107)
CO2 SERPL-SCNC: 23.8 MMOL/L (ref 22–29)
CREAT BLD-MCNC: 1.29 MG/DL (ref 0.76–1.27)
DEPRECATED RDW RBC AUTO: 55.8 FL (ref 37–54)
EOSINOPHIL # BLD AUTO: 0.06 10*3/MM3 (ref 0–0.7)
EOSINOPHIL NFR BLD AUTO: 0.7 % (ref 0.3–6.2)
ERYTHROCYTE [DISTWIDTH] IN BLOOD BY AUTOMATED COUNT: 14.1 % (ref 11.5–14.5)
GFR SERPL CREATININE-BSD FRML MDRD: 57 ML/MIN/1.73
GLUCOSE BLD-MCNC: 94 MG/DL (ref 65–99)
HCT VFR BLD AUTO: 40.9 % (ref 40.4–52.2)
HGB BLD-MCNC: 12.7 G/DL (ref 13.7–17.6)
IMM GRANULOCYTES # BLD: 0 10*3/MM3 (ref 0–0.03)
IMM GRANULOCYTES NFR BLD: 0 % (ref 0–0.5)
LYMPHOCYTES # BLD AUTO: 0.56 10*3/MM3 (ref 0.9–4.8)
LYMPHOCYTES NFR BLD AUTO: 6.5 % (ref 19.6–45.3)
MCH RBC QN AUTO: 33.2 PG (ref 27–32.7)
MCHC RBC AUTO-ENTMCNC: 31.1 G/DL (ref 32.6–36.4)
MCV RBC AUTO: 107.1 FL (ref 79.8–96.2)
MONOCYTES # BLD AUTO: 1.34 10*3/MM3 (ref 0.2–1.2)
MONOCYTES NFR BLD AUTO: 15.5 % (ref 5–12)
NEUTROPHILS # BLD AUTO: 6.69 10*3/MM3 (ref 1.9–8.1)
NEUTROPHILS NFR BLD AUTO: 77.2 % (ref 42.7–76)
PLATELET # BLD AUTO: 115 10*3/MM3 (ref 140–500)
PMV BLD AUTO: 10.6 FL (ref 6–12)
POTASSIUM BLD-SCNC: 4.4 MMOL/L (ref 3.5–5.2)
RBC # BLD AUTO: 3.82 10*6/MM3 (ref 4.6–6)
SODIUM BLD-SCNC: 141 MMOL/L (ref 136–145)
WBC NRBC COR # BLD: 8.66 10*3/MM3 (ref 4.5–10.7)

## 2018-08-02 PROCEDURE — 85025 COMPLETE CBC W/AUTO DIFF WBC: CPT | Performed by: SURGERY

## 2018-08-02 PROCEDURE — 80048 BASIC METABOLIC PNL TOTAL CA: CPT | Performed by: SURGERY

## 2018-08-02 PROCEDURE — 94799 UNLISTED PULMONARY SVC/PX: CPT

## 2018-08-02 PROCEDURE — 99232 SBSQ HOSP IP/OBS MODERATE 35: CPT | Performed by: SURGERY

## 2018-08-02 RX ADMIN — HYDROMORPHONE HYDROCHLORIDE: 10 INJECTION INTRAMUSCULAR; INTRAVENOUS; SUBCUTANEOUS at 18:00

## 2018-08-02 RX ADMIN — SODIUM CHLORIDE 200 ML/HR: 9 INJECTION, SOLUTION INTRAVENOUS at 04:15

## 2018-08-02 RX ADMIN — LISINOPRIL 10 MG: 10 TABLET ORAL at 09:33

## 2018-08-02 RX ADMIN — ALLOPURINOL 300 MG: 300 TABLET ORAL at 09:33

## 2018-08-02 RX ADMIN — SODIUM CHLORIDE 200 ML/HR: 9 INJECTION, SOLUTION INTRAVENOUS at 20:29

## 2018-08-02 RX ADMIN — SODIUM CHLORIDE 200 ML/HR: 9 INJECTION, SOLUTION INTRAVENOUS at 15:21

## 2018-08-02 RX ADMIN — SODIUM CHLORIDE 200 ML/HR: 9 INJECTION, SOLUTION INTRAVENOUS at 09:33

## 2018-08-02 NOTE — PROGRESS NOTES
Discharge Planning Assessment  [facility]     Patient Name: Mo Willoughby  MRN: [id number]  Today's Date: 8/2/2018    Admit Date: 7/31/2018          Discharge Needs Assessment    No documentation.             Discharge Plan     Row Name 08/02/18 1158       Plan    Plan Comments Transferred to [facility] for a tele bed from [facility] on 8/1/18 @ 16:00. DEBORAH Morales RN, [facility]        Destination     No service coordination in this encounter.      Durable Medical Equipment     No service coordination in this encounter.      Dialysis/Infusion     No service coordination in this encounter.      Home Medical Care     No service coordination in this encounter.      Social Care     No service coordination in this encounter.                Demographic Summary    No documentation.           Functional Status    No documentation.           Psychosocial    No documentation.           Abuse/Neglect    No documentation.           Legal    No documentation.           Substance Abuse    No documentation.           Patient Forms    No documentation.         Aissatou Morales, INA

## 2018-08-02 NOTE — PROGRESS NOTES
Daily Progress Note    Chief Complaint: abdominal pain     Subjective     Pt denies N&V, pain improving  Objective     Vital signs in last 24 hours:  Temp:  [98.9 °F (37.2 °C)-100.4 °F (38 °C)] 99.9 °F (37.7 °C)  Heart Rate:  [] 97  Resp:  [16-24] 16  BP: (133-158)/(87-95) 145/87    Intake/Output last 3 shifts:  I/O last 3 completed shifts:  In: 3118 [I.V.:2118; IV Piggyback:1000]  Out: 2445 [Urine:2445]    Physical Exam:  Respiratory: CTA, normal inspiratory effort  CV: sinus tach, no JVD  Abd: Positive BS, soft, ND, NT, no rebound, no guarding  Ext:  No cyanosis, no edema    Results from last 7 days  Lab Units 08/02/18  0700 08/01/18  0657   SODIUM mmol/L 141 140   POTASSIUM mmol/L 4.4 4.8   CHLORIDE mmol/L 108* 103   CO2 mmol/L 23.8 24.3   BUN mg/dL 32* 31*   CREATININE mg/dL 1.29* 1.79*   CALCIUM mg/dL 8.5* 9.7   BILIRUBIN mg/dL  --  2.3*   ALK PHOS U/L  --  54   ALT (SGPT) U/L  --  254*   AST (SGOT) U/L  --  178*   GLUCOSE mg/dL 94 106*         Results from last 7 days  Lab Units 08/02/18  0700   WBC 10*3/mm3 8.66   HEMOGLOBIN g/dL 12.7*   HEMATOCRIT % 40.9   PLATELETS 10*3/mm3 115*       Lab Results  Lab Value Date/Time   LIPASE >600 (H) 08/01/2018 0657   LIPASE >600 (H) 07/31/2018 0458       Results from last 7 days  Lab Units 08/01/18  0657   AMYLASE U/L 777*       Assessment/Plan   Gallstone pancreatitis   Continue NPO, IVFs   Check am labs   Sinus tachycardia - cardiology consult     Melina Kate MD  General, Minimally Invasive and Endoscopic Surgery  [facility]

## 2018-08-02 NOTE — PLAN OF CARE
Problem: Patient Care Overview  Goal: Plan of Care Review  Outcome: Ongoing (interventions implemented as appropriate)   08/02/18 0662   Coping/Psychosocial   Plan of Care Reviewed With patient   OTHER   Outcome Summary pt c/o pain w/good relief from PCA, used very few times; having urinary retention; have bladder scanned x2 this shift; SR - ST on monitor; NPO w/sips of water w/meds; no acute distress noted; will continue to monitor   Plan of Care Review   Progress no change       Problem: Pain, Acute (Adult)  Goal: Acceptable Pain Control/Comfort Level  Outcome: Ongoing (interventions implemented as appropriate)      Problem: Infection, Risk/Actual (Adult)  Goal: Infection Prevention/Resolution  Outcome: Ongoing (interventions implemented as appropriate)

## 2018-08-02 NOTE — NURSING NOTE
Pt is having some urinary retention; urine very dark orange/ almost red in color; urinating 50cc-100cc at a time; has had a 1L bolus of NS and two bags of NS @200mL/hr; output not matching input; spoke w/Dr. Philip chung for Fig[facility]; per orders: I have bladder scanned pt twice after urinating and has a little over 300 left in his bladder each time; have orders for straight cath if over 350, which it has been just under that amount; will continue to monitor I&O's;

## 2018-08-02 NOTE — ACP (ADVANCE CARE PLANNING)
I was requested to see patient regarding Advance Directive.  Patient stated that his sister has all the information and is able to make decisions for him.  I left an AD pamphlet just incase they wanted to read through the material.

## 2018-08-03 ENCOUNTER — ANESTHESIA (OUTPATIENT)
Dept: PERIOP | Facility: HOSPITAL | Age: 63
End: 2018-08-03

## 2018-08-03 ENCOUNTER — APPOINTMENT (OUTPATIENT)
Dept: GENERAL RADIOLOGY | Facility: HOSPITAL | Age: 63
End: 2018-08-03

## 2018-08-03 ENCOUNTER — ANESTHESIA EVENT (OUTPATIENT)
Dept: PERIOP | Facility: HOSPITAL | Age: 63
End: 2018-08-03

## 2018-08-03 LAB
ALBUMIN SERPL-MCNC: 3 G/DL (ref 3.5–5.2)
ALBUMIN/GLOB SERPL: 1.3 G/DL
ALP SERPL-CCNC: 42 U/L (ref 39–117)
ALT SERPL W P-5'-P-CCNC: 116 U/L (ref 1–41)
AMYLASE SERPL-CCNC: 142 U/L (ref 28–100)
ANION GAP SERPL CALCULATED.3IONS-SCNC: 11.1 MMOL/L
AST SERPL-CCNC: 71 U/L (ref 1–40)
BASOPHILS # BLD AUTO: 0.01 10*3/MM3 (ref 0–0.2)
BASOPHILS NFR BLD AUTO: 0.2 % (ref 0–1.5)
BILIRUB SERPL-MCNC: 1.9 MG/DL (ref 0.1–1.2)
BUN BLD-MCNC: 30 MG/DL (ref 8–23)
BUN/CREAT SERPL: 28.3 (ref 7–25)
CALCIUM SPEC-SCNC: 8.5 MG/DL (ref 8.6–10.5)
CHLORIDE SERPL-SCNC: 111 MMOL/L (ref 98–107)
CO2 SERPL-SCNC: 20.9 MMOL/L (ref 22–29)
CREAT BLD-MCNC: 1.06 MG/DL (ref 0.76–1.27)
DEPRECATED RDW RBC AUTO: 56.1 FL (ref 37–54)
EOSINOPHIL # BLD AUTO: 0.04 10*3/MM3 (ref 0–0.7)
EOSINOPHIL NFR BLD AUTO: 0.6 % (ref 0.3–6.2)
ERYTHROCYTE [DISTWIDTH] IN BLOOD BY AUTOMATED COUNT: 14.5 % (ref 11.5–14.5)
GFR SERPL CREATININE-BSD FRML MDRD: 71 ML/MIN/1.73
GLOBULIN UR ELPH-MCNC: 2.4 GM/DL
GLUCOSE BLD-MCNC: 88 MG/DL (ref 65–99)
HCT VFR BLD AUTO: 36.9 % (ref 40.4–52.2)
HGB BLD-MCNC: 11.8 G/DL (ref 13.7–17.6)
IMM GRANULOCYTES # BLD: 0.01 10*3/MM3 (ref 0–0.03)
IMM GRANULOCYTES NFR BLD: 0.2 % (ref 0–0.5)
LIPASE SERPL-CCNC: 89 U/L (ref 13–60)
LYMPHOCYTES # BLD AUTO: 0.66 10*3/MM3 (ref 0.9–4.8)
LYMPHOCYTES NFR BLD AUTO: 10.6 % (ref 19.6–45.3)
MCH RBC QN AUTO: 34 PG (ref 27–32.7)
MCHC RBC AUTO-ENTMCNC: 32 G/DL (ref 32.6–36.4)
MCV RBC AUTO: 106.3 FL (ref 79.8–96.2)
MONOCYTES # BLD AUTO: 1.28 10*3/MM3 (ref 0.2–1.2)
MONOCYTES NFR BLD AUTO: 20.6 % (ref 5–12)
NEUTROPHILS # BLD AUTO: 4.23 10*3/MM3 (ref 1.9–8.1)
NEUTROPHILS NFR BLD AUTO: 68 % (ref 42.7–76)
PLATELET # BLD AUTO: 120 10*3/MM3 (ref 140–500)
PMV BLD AUTO: 10.1 FL (ref 6–12)
POTASSIUM BLD-SCNC: 4.1 MMOL/L (ref 3.5–5.2)
PROT SERPL-MCNC: 5.4 G/DL (ref 6–8.5)
RBC # BLD AUTO: 3.47 10*6/MM3 (ref 4.6–6)
SODIUM BLD-SCNC: 143 MMOL/L (ref 136–145)
WBC NRBC COR # BLD: 6.22 10*3/MM3 (ref 4.5–10.7)

## 2018-08-03 PROCEDURE — 0 IOTHALAMATE 60 % SOLUTION: Performed by: SURGERY

## 2018-08-03 PROCEDURE — 25010000002 PROPOFOL 10 MG/ML EMULSION: Performed by: NURSE ANESTHETIST, CERTIFIED REGISTERED

## 2018-08-03 PROCEDURE — 25010000002 HYDRALAZINE PER 20 MG: Performed by: SURGERY

## 2018-08-03 PROCEDURE — 0FT44ZZ RESECTION OF GALLBLADDER, PERCUTANEOUS ENDOSCOPIC APPROACH: ICD-10-PCS | Performed by: SURGERY

## 2018-08-03 PROCEDURE — 25010000002 KETOROLAC TROMETHAMINE PER 15 MG: Performed by: NURSE ANESTHETIST, CERTIFIED REGISTERED

## 2018-08-03 PROCEDURE — 83690 ASSAY OF LIPASE: CPT | Performed by: SURGERY

## 2018-08-03 PROCEDURE — 25010000003 CEFAZOLIN IN DEXTROSE 2-4 GM/100ML-% SOLUTION: Performed by: SURGERY

## 2018-08-03 PROCEDURE — 25010000002 MIDAZOLAM PER 1 MG: Performed by: ANESTHESIOLOGY

## 2018-08-03 PROCEDURE — 25010000002 ONDANSETRON PER 1 MG: Performed by: SURGERY

## 2018-08-03 PROCEDURE — 85025 COMPLETE CBC W/AUTO DIFF WBC: CPT | Performed by: SURGERY

## 2018-08-03 PROCEDURE — 25010000002 HYDROMORPHONE PER 4 MG: Performed by: SURGERY

## 2018-08-03 PROCEDURE — 25010000002 FENTANYL CITRATE (PF) 100 MCG/2ML SOLUTION: Performed by: ANESTHESIOLOGY

## 2018-08-03 PROCEDURE — 99231 SBSQ HOSP IP/OBS SF/LOW 25: CPT | Performed by: SURGERY

## 2018-08-03 PROCEDURE — 80053 COMPREHEN METABOLIC PANEL: CPT | Performed by: SURGERY

## 2018-08-03 PROCEDURE — 25010000002 FENTANYL CITRATE (PF) 100 MCG/2ML SOLUTION: Performed by: NURSE ANESTHETIST, CERTIFIED REGISTERED

## 2018-08-03 PROCEDURE — 47563 LAPARO CHOLECYSTECTOMY/GRAPH: CPT | Performed by: SURGERY

## 2018-08-03 PROCEDURE — 88304 TISSUE EXAM BY PATHOLOGIST: CPT | Performed by: SURGERY

## 2018-08-03 PROCEDURE — 74300 X-RAY BILE DUCTS/PANCREAS: CPT

## 2018-08-03 PROCEDURE — BF101ZZ FLUOROSCOPY OF BILE DUCTS USING LOW OSMOLAR CONTRAST: ICD-10-PCS | Performed by: SURGERY

## 2018-08-03 PROCEDURE — 82150 ASSAY OF AMYLASE: CPT | Performed by: SURGERY

## 2018-08-03 RX ORDER — FENTANYL CITRATE 50 UG/ML
50 INJECTION, SOLUTION INTRAMUSCULAR; INTRAVENOUS
Status: DISCONTINUED | OUTPATIENT
Start: 2018-08-03 | End: 2018-08-03 | Stop reason: HOSPADM

## 2018-08-03 RX ORDER — PROPOFOL 10 MG/ML
VIAL (ML) INTRAVENOUS AS NEEDED
Status: DISCONTINUED | OUTPATIENT
Start: 2018-08-03 | End: 2018-08-03 | Stop reason: SURG

## 2018-08-03 RX ORDER — MIDAZOLAM HYDROCHLORIDE 1 MG/ML
1 INJECTION INTRAMUSCULAR; INTRAVENOUS
Status: DISCONTINUED | OUTPATIENT
Start: 2018-08-03 | End: 2018-08-03 | Stop reason: HOSPADM

## 2018-08-03 RX ORDER — SODIUM CHLORIDE 9 MG/ML
INJECTION, SOLUTION INTRAVENOUS AS NEEDED
Status: DISCONTINUED | OUTPATIENT
Start: 2018-08-03 | End: 2018-08-03 | Stop reason: HOSPADM

## 2018-08-03 RX ORDER — OXYCODONE HYDROCHLORIDE AND ACETAMINOPHEN 5; 325 MG/1; MG/1
2 TABLET ORAL EVERY 4 HOURS PRN
Status: DISCONTINUED | OUTPATIENT
Start: 2018-08-03 | End: 2018-08-04 | Stop reason: HOSPADM

## 2018-08-03 RX ORDER — NALOXONE HCL 0.4 MG/ML
0.1 VIAL (ML) INJECTION
Status: DISCONTINUED | OUTPATIENT
Start: 2018-08-03 | End: 2018-08-04 | Stop reason: HOSPADM

## 2018-08-03 RX ORDER — EPHEDRINE SULFATE 50 MG/ML
5 INJECTION, SOLUTION INTRAVENOUS ONCE AS NEEDED
Status: DISCONTINUED | OUTPATIENT
Start: 2018-08-03 | End: 2018-08-03 | Stop reason: HOSPADM

## 2018-08-03 RX ORDER — DEXTROSE, SODIUM CHLORIDE, AND POTASSIUM CHLORIDE 5; .45; .15 G/100ML; G/100ML; G/100ML
100 INJECTION INTRAVENOUS CONTINUOUS
Status: DISCONTINUED | OUTPATIENT
Start: 2018-08-03 | End: 2018-08-04 | Stop reason: HOSPADM

## 2018-08-03 RX ORDER — SODIUM CHLORIDE, SODIUM LACTATE, POTASSIUM CHLORIDE, CALCIUM CHLORIDE 600; 310; 30; 20 MG/100ML; MG/100ML; MG/100ML; MG/100ML
9 INJECTION, SOLUTION INTRAVENOUS CONTINUOUS
Status: DISCONTINUED | OUTPATIENT
Start: 2018-08-03 | End: 2018-08-04 | Stop reason: HOSPADM

## 2018-08-03 RX ORDER — LABETALOL HYDROCHLORIDE 5 MG/ML
5 INJECTION, SOLUTION INTRAVENOUS
Status: DISCONTINUED | OUTPATIENT
Start: 2018-08-03 | End: 2018-08-03 | Stop reason: HOSPADM

## 2018-08-03 RX ORDER — HYDROMORPHONE HYDROCHLORIDE 1 MG/ML
0.5 INJECTION, SOLUTION INTRAMUSCULAR; INTRAVENOUS; SUBCUTANEOUS
Status: DISCONTINUED | OUTPATIENT
Start: 2018-08-03 | End: 2018-08-04 | Stop reason: HOSPADM

## 2018-08-03 RX ORDER — ONDANSETRON 4 MG/1
4 TABLET, FILM COATED ORAL EVERY 6 HOURS PRN
Status: DISCONTINUED | OUTPATIENT
Start: 2018-08-03 | End: 2018-08-04 | Stop reason: HOSPADM

## 2018-08-03 RX ORDER — ROCURONIUM BROMIDE 10 MG/ML
INJECTION, SOLUTION INTRAVENOUS AS NEEDED
Status: DISCONTINUED | OUTPATIENT
Start: 2018-08-03 | End: 2018-08-03 | Stop reason: SURG

## 2018-08-03 RX ORDER — ONDANSETRON 2 MG/ML
4 INJECTION INTRAMUSCULAR; INTRAVENOUS ONCE AS NEEDED
Status: DISCONTINUED | OUTPATIENT
Start: 2018-08-03 | End: 2018-08-03 | Stop reason: HOSPADM

## 2018-08-03 RX ORDER — LISINOPRIL 10 MG/1
10 TABLET ORAL DAILY
Status: DISCONTINUED | OUTPATIENT
Start: 2018-08-03 | End: 2018-08-03 | Stop reason: SDUPTHER

## 2018-08-03 RX ORDER — OXYCODONE AND ACETAMINOPHEN 7.5; 325 MG/1; MG/1
1 TABLET ORAL ONCE AS NEEDED
Status: DISCONTINUED | OUTPATIENT
Start: 2018-08-03 | End: 2018-08-03 | Stop reason: HOSPADM

## 2018-08-03 RX ORDER — IBUPROFEN 400 MG/1
800 TABLET ORAL EVERY 8 HOURS PRN
Status: DISCONTINUED | OUTPATIENT
Start: 2018-08-03 | End: 2018-08-04 | Stop reason: HOSPADM

## 2018-08-03 RX ORDER — PROMETHAZINE HYDROCHLORIDE 25 MG/ML
12.5 INJECTION, SOLUTION INTRAMUSCULAR; INTRAVENOUS ONCE AS NEEDED
Status: DISCONTINUED | OUTPATIENT
Start: 2018-08-03 | End: 2018-08-03 | Stop reason: HOSPADM

## 2018-08-03 RX ORDER — DEXTROSE, SODIUM CHLORIDE, AND POTASSIUM CHLORIDE 5; .45; .15 G/100ML; G/100ML; G/100ML
INJECTION INTRAVENOUS
Status: DISPENSED
Start: 2018-08-03 | End: 2018-08-04

## 2018-08-03 RX ORDER — ACETAMINOPHEN 650 MG/1
650 SUPPOSITORY RECTAL EVERY 4 HOURS PRN
Status: DISCONTINUED | OUTPATIENT
Start: 2018-08-03 | End: 2018-08-04 | Stop reason: HOSPADM

## 2018-08-03 RX ORDER — PROMETHAZINE HYDROCHLORIDE 25 MG/1
25 SUPPOSITORY RECTAL ONCE AS NEEDED
Status: DISCONTINUED | OUTPATIENT
Start: 2018-08-03 | End: 2018-08-03 | Stop reason: HOSPADM

## 2018-08-03 RX ORDER — FAMOTIDINE 10 MG/ML
20 INJECTION, SOLUTION INTRAVENOUS ONCE
Status: COMPLETED | OUTPATIENT
Start: 2018-08-03 | End: 2018-08-03

## 2018-08-03 RX ORDER — ALLOPURINOL 300 MG/1
300 TABLET ORAL DAILY
Status: DISCONTINUED | OUTPATIENT
Start: 2018-08-03 | End: 2018-08-03 | Stop reason: SDUPTHER

## 2018-08-03 RX ORDER — HYDROMORPHONE HYDROCHLORIDE 1 MG/ML
0.5 INJECTION, SOLUTION INTRAMUSCULAR; INTRAVENOUS; SUBCUTANEOUS
Status: DISCONTINUED | OUTPATIENT
Start: 2018-08-03 | End: 2018-08-03 | Stop reason: HOSPADM

## 2018-08-03 RX ORDER — MAGNESIUM HYDROXIDE 1200 MG/15ML
LIQUID ORAL AS NEEDED
Status: DISCONTINUED | OUTPATIENT
Start: 2018-08-03 | End: 2018-08-03 | Stop reason: HOSPADM

## 2018-08-03 RX ORDER — ONDANSETRON 4 MG/1
4 TABLET, ORALLY DISINTEGRATING ORAL EVERY 6 HOURS PRN
Status: DISCONTINUED | OUTPATIENT
Start: 2018-08-03 | End: 2018-08-04 | Stop reason: HOSPADM

## 2018-08-03 RX ORDER — BUPIVACAINE HYDROCHLORIDE AND EPINEPHRINE 2.5; 5 MG/ML; UG/ML
INJECTION, SOLUTION INFILTRATION; PERINEURAL AS NEEDED
Status: DISCONTINUED | OUTPATIENT
Start: 2018-08-03 | End: 2018-08-03 | Stop reason: HOSPADM

## 2018-08-03 RX ORDER — FENTANYL CITRATE 50 UG/ML
50 INJECTION, SOLUTION INTRAMUSCULAR; INTRAVENOUS
Status: COMPLETED | OUTPATIENT
Start: 2018-08-03 | End: 2018-08-03

## 2018-08-03 RX ORDER — HYDROCODONE BITARTRATE AND ACETAMINOPHEN 7.5; 325 MG/1; MG/1
1 TABLET ORAL ONCE AS NEEDED
Status: DISCONTINUED | OUTPATIENT
Start: 2018-08-03 | End: 2018-08-03 | Stop reason: HOSPADM

## 2018-08-03 RX ORDER — ACETAMINOPHEN 160 MG/5ML
650 SOLUTION ORAL EVERY 4 HOURS PRN
Status: DISCONTINUED | OUTPATIENT
Start: 2018-08-03 | End: 2018-08-04 | Stop reason: HOSPADM

## 2018-08-03 RX ORDER — LIDOCAINE HYDROCHLORIDE 10 MG/ML
0.5 INJECTION, SOLUTION EPIDURAL; INFILTRATION; INTRACAUDAL; PERINEURAL ONCE AS NEEDED
Status: DISCONTINUED | OUTPATIENT
Start: 2018-08-03 | End: 2018-08-03 | Stop reason: HOSPADM

## 2018-08-03 RX ORDER — PROMETHAZINE HYDROCHLORIDE 25 MG/1
25 TABLET ORAL ONCE AS NEEDED
Status: DISCONTINUED | OUTPATIENT
Start: 2018-08-03 | End: 2018-08-03 | Stop reason: HOSPADM

## 2018-08-03 RX ORDER — LIDOCAINE HYDROCHLORIDE 20 MG/ML
INJECTION, SOLUTION INFILTRATION; PERINEURAL AS NEEDED
Status: DISCONTINUED | OUTPATIENT
Start: 2018-08-03 | End: 2018-08-03 | Stop reason: SURG

## 2018-08-03 RX ORDER — KETOROLAC TROMETHAMINE 30 MG/ML
INJECTION, SOLUTION INTRAMUSCULAR; INTRAVENOUS AS NEEDED
Status: DISCONTINUED | OUTPATIENT
Start: 2018-08-03 | End: 2018-08-03 | Stop reason: SURG

## 2018-08-03 RX ORDER — CEFAZOLIN SODIUM 2 G/100ML
2 INJECTION, SOLUTION INTRAVENOUS ONCE
Status: COMPLETED | OUTPATIENT
Start: 2018-08-03 | End: 2018-08-03

## 2018-08-03 RX ORDER — HYDRALAZINE HYDROCHLORIDE 20 MG/ML
20 INJECTION INTRAMUSCULAR; INTRAVENOUS EVERY 6 HOURS PRN
Status: DISCONTINUED | OUTPATIENT
Start: 2018-08-03 | End: 2018-08-04 | Stop reason: HOSPADM

## 2018-08-03 RX ORDER — FLUMAZENIL 0.1 MG/ML
0.2 INJECTION INTRAVENOUS AS NEEDED
Status: DISCONTINUED | OUTPATIENT
Start: 2018-08-03 | End: 2018-08-03 | Stop reason: HOSPADM

## 2018-08-03 RX ORDER — MIDAZOLAM HYDROCHLORIDE 1 MG/ML
2 INJECTION INTRAMUSCULAR; INTRAVENOUS
Status: DISCONTINUED | OUTPATIENT
Start: 2018-08-03 | End: 2018-08-03 | Stop reason: HOSPADM

## 2018-08-03 RX ORDER — SODIUM CHLORIDE 0.9 % (FLUSH) 0.9 %
1-10 SYRINGE (ML) INJECTION AS NEEDED
Status: DISCONTINUED | OUTPATIENT
Start: 2018-08-03 | End: 2018-08-03 | Stop reason: HOSPADM

## 2018-08-03 RX ORDER — ACETAMINOPHEN 325 MG/1
650 TABLET ORAL EVERY 4 HOURS PRN
Status: DISCONTINUED | OUTPATIENT
Start: 2018-08-03 | End: 2018-08-04 | Stop reason: HOSPADM

## 2018-08-03 RX ORDER — DIPHENHYDRAMINE HYDROCHLORIDE 50 MG/ML
12.5 INJECTION INTRAMUSCULAR; INTRAVENOUS
Status: DISCONTINUED | OUTPATIENT
Start: 2018-08-03 | End: 2018-08-03 | Stop reason: HOSPADM

## 2018-08-03 RX ORDER — PROMETHAZINE HYDROCHLORIDE 25 MG/1
12.5 TABLET ORAL ONCE AS NEEDED
Status: DISCONTINUED | OUTPATIENT
Start: 2018-08-03 | End: 2018-08-03 | Stop reason: HOSPADM

## 2018-08-03 RX ORDER — ONDANSETRON 2 MG/ML
4 INJECTION INTRAMUSCULAR; INTRAVENOUS EVERY 6 HOURS PRN
Status: DISCONTINUED | OUTPATIENT
Start: 2018-08-03 | End: 2018-08-04 | Stop reason: HOSPADM

## 2018-08-03 RX ADMIN — POTASSIUM CHLORIDE, DEXTROSE MONOHYDRATE AND SODIUM CHLORIDE 100 ML/HR: 150; 5; 450 INJECTION, SOLUTION INTRAVENOUS at 16:40

## 2018-08-03 RX ADMIN — FENTANYL CITRATE 50 MCG: 50 INJECTION INTRAMUSCULAR; INTRAVENOUS at 15:21

## 2018-08-03 RX ADMIN — LIDOCAINE HYDROCHLORIDE 40 MG: 20 INJECTION, SOLUTION INFILTRATION; PERINEURAL at 13:40

## 2018-08-03 RX ADMIN — FENTANYL CITRATE 50 MCG: 50 INJECTION INTRAMUSCULAR; INTRAVENOUS at 13:01

## 2018-08-03 RX ADMIN — PROPOFOL 30 MG: 10 INJECTION, EMULSION INTRAVENOUS at 14:32

## 2018-08-03 RX ADMIN — MIDAZOLAM 1 MG: 1 INJECTION INTRAMUSCULAR; INTRAVENOUS at 13:01

## 2018-08-03 RX ADMIN — CEFAZOLIN SODIUM 2 G: 2 INJECTION, SOLUTION INTRAVENOUS at 13:40

## 2018-08-03 RX ADMIN — HYDROMORPHONE HYDROCHLORIDE 0.5 MG: 1 INJECTION, SOLUTION INTRAMUSCULAR; INTRAVENOUS; SUBCUTANEOUS at 23:48

## 2018-08-03 RX ADMIN — POTASSIUM CHLORIDE, DEXTROSE MONOHYDRATE AND SODIUM CHLORIDE 100 ML/HR: 150; 5; 450 INJECTION, SOLUTION INTRAVENOUS at 23:48

## 2018-08-03 RX ADMIN — ONDANSETRON 4 MG: 2 INJECTION INTRAMUSCULAR; INTRAVENOUS at 23:48

## 2018-08-03 RX ADMIN — ROCURONIUM BROMIDE 10 MG: 10 INJECTION INTRAVENOUS at 14:08

## 2018-08-03 RX ADMIN — SODIUM CHLORIDE 200 ML/HR: 9 INJECTION, SOLUTION INTRAVENOUS at 11:32

## 2018-08-03 RX ADMIN — PROPOFOL 250 MG: 10 INJECTION, EMULSION INTRAVENOUS at 13:40

## 2018-08-03 RX ADMIN — FENTANYL CITRATE 50 MCG: 50 INJECTION INTRAMUSCULAR; INTRAVENOUS at 14:31

## 2018-08-03 RX ADMIN — FENTANYL CITRATE 25 MCG: 50 INJECTION INTRAMUSCULAR; INTRAVENOUS at 13:58

## 2018-08-03 RX ADMIN — ALLOPURINOL 300 MG: 300 TABLET ORAL at 08:37

## 2018-08-03 RX ADMIN — KETOROLAC TROMETHAMINE 30 MG: 30 INJECTION, SOLUTION INTRAMUSCULAR; INTRAVENOUS at 13:47

## 2018-08-03 RX ADMIN — HYDROMORPHONE HYDROCHLORIDE 0.5 MG: 1 INJECTION, SOLUTION INTRAMUSCULAR; INTRAVENOUS; SUBCUTANEOUS at 16:38

## 2018-08-03 RX ADMIN — SODIUM CHLORIDE 200 ML/HR: 9 INJECTION, SOLUTION INTRAVENOUS at 06:12

## 2018-08-03 RX ADMIN — LISINOPRIL 10 MG: 10 TABLET ORAL at 08:38

## 2018-08-03 RX ADMIN — SUGAMMADEX 400 MG: 100 INJECTION, SOLUTION INTRAVENOUS at 14:30

## 2018-08-03 RX ADMIN — HYDRALAZINE HYDROCHLORIDE 20 MG: 20 INJECTION INTRAMUSCULAR; INTRAVENOUS at 11:32

## 2018-08-03 RX ADMIN — FENTANYL CITRATE 25 MCG: 50 INJECTION INTRAMUSCULAR; INTRAVENOUS at 13:39

## 2018-08-03 RX ADMIN — ROCURONIUM BROMIDE 40 MG: 10 INJECTION INTRAVENOUS at 13:42

## 2018-08-03 RX ADMIN — SODIUM CHLORIDE 200 ML/HR: 9 INJECTION, SOLUTION INTRAVENOUS at 01:27

## 2018-08-03 RX ADMIN — ONDANSETRON HYDROCHLORIDE 4 MG: 2 SOLUTION INTRAMUSCULAR; INTRAVENOUS at 13:47

## 2018-08-03 RX ADMIN — FAMOTIDINE 20 MG: 10 INJECTION INTRAVENOUS at 13:01

## 2018-08-03 RX ADMIN — SODIUM CHLORIDE, POTASSIUM CHLORIDE, SODIUM LACTATE AND CALCIUM CHLORIDE 9 ML/HR: 600; 310; 30; 20 INJECTION, SOLUTION INTRAVENOUS at 13:01

## 2018-08-03 NOTE — OP NOTE
Operative Note:    Pre-op Dx:  Cholecystitis with Cholelithiasis     Post-op Dx: Same    Procedure:  Laparoscopic Cholecystectomy with IOC    Surgeon:  Melina Kate M.D.    Assistant:  SADIE Zafar    Anesthesia:  GET    EBL:  Minium    Specimen:  Gallbladder    Complications:  None    Findings:  Normal CBD, inflamed GB    Indications:  This is a pleasant 63 y.o. male  that presented to ER with gallstone pancreatitis     Procedure:   After the patient underwent general endotracheal anesthesia, patient was prepped and draped in the usual sterile fashion.  An infraumbilical linear incision was performed with an 11 blade scalpel and a verus needle was inserted.  A saline drop test was performed which revealed the needle to be in appropriate position.  The abdomen was insufflated to 15 mmHg.  Using an Optiview trocar and a 0° scope, the abdomen was entered under direct visualization.  Upon entering the abdomen, a 10 mm trocar was placed in the subxiphoid area under direct visualization as well as two 5 mm trochars in the right upper quadrant.  Patient was positioned in supine position, reverse Trendelenburg and tilted toward the left.  The gallbladder was grasped with a gallbladder grasper and elevated cephalad and retracted laterally.  The cystic duct and cystic artery were identified and dissected at the base of the gallbladder neck.  A distal clip was placed on the cystic duct and it was incised.  A cholangiogram catheter was inserted and a cholangiogram was performed.  The cholangiogram revealed no filling defects, duodenum filled readily and the right and left intrahepatic biliary ducts were visualized.  The catheter was removed and double clips were placed proximally.  The cystic duct was transected sharply.  The cystic artery was double clipped proximally and one distal clip and divided sharply.  The gallbladder was then dissected from the gallbladder bed using hook cautery.  The gallbladder was  delivered infraumbilically and submitted to pathology.  Copious irrigation and meticulous hemostasis was maintained throughout the procedure.  All trochars were removed under direct visualization without evidence of trocar site bleeding.  The infraumbilical trocar site was closed in a 2 layer fashion.  Closing the fascia with a figure 8, 0 Vicryl and the skin with a 4.0 Vicryl.  All incisions were infiltrated with 1/4% Marcaine and epinephrine and closed with 4.0 Vicryl.  Sterile bandages were applied and all needles and sponge counts were correct ×2.  Patient was transferred to recovery room in stable condition.    Melina Kate MD  General, Minimally Invasive and Endoscopic Surgery  [facility]    [address], [address]  [place], [place], [address]  P: [phone]  F: [phone]    Cc:  Nevaeh Way APRN

## 2018-08-03 NOTE — ANESTHESIA PREPROCEDURE EVALUATION
Anesthesia Evaluation     Patient summary reviewed and Nursing notes reviewed   no history of anesthetic complications:  NPO Solid Status: > 8 hours  NPO Liquid Status: > 8 hours           Airway   Mallampati: II  Dental    (+) partials    Pulmonary - negative pulmonary ROS and normal exam   Cardiovascular - normal exam    (+) hypertension 2 medications or greater,       Neuro/Psych  (+) syncope,     GI/Hepatic/Renal/Endo      Musculoskeletal     Abdominal    Substance History      OB/GYN          Other                        Anesthesia Plan    ASA 3     general     intravenous induction   Anesthetic plan and risks discussed with patient.

## 2018-08-03 NOTE — ANESTHESIA POSTPROCEDURE EVALUATION
"Patient: Mo Willoughby    Procedure Summary     Date:  08/03/18 Room / Location:  [facility] OR 02 / [facility] MAIN OR    Anesthesia Start:  1333 Anesthesia Stop:  1446    Procedure:  CHOLECYSTECTOMY LAPAROSCOPIC INTRAOPERATIVE CHOLANGIOGRAM (N/A Abdomen) Diagnosis:      Surgeon:  Melina Kate MD Provider:  Raad Levine MD    Anesthesia Type:  general ASA Status:  3          Anesthesia Type: general  Last vitals  BP   144/87 (08/03/18 1500)   Temp   36.9 °C (98.5 °F) (08/03/18 1445)   Pulse   91 (08/03/18 1500)   Resp   16 (08/03/18 1500)     SpO2   95 % (08/03/18 1500)     Post Anesthesia Care and Evaluation    Patient location during evaluation: PACU  Patient participation: complete - patient participated  Level of consciousness: awake and alert  Pain management: adequate  Airway patency: patent  Anesthetic complications: No anesthetic complications    Cardiovascular status: acceptable  Respiratory status: acceptable  Hydration status: acceptable    Comments: /87 (BP Location: Right arm, Patient Position: Lying)   Pulse 91   Temp 36.9 °C (98.5 °F) (Oral)   Resp 16   Ht 185.4 cm (73\")   Wt 102 kg (224 lb 3.2 oz)   SpO2 95%   BMI 29.58 kg/m²       "

## 2018-08-03 NOTE — ANESTHESIA PROCEDURE NOTES
Airway  Urgency: elective    Airway not difficult    General Information and Staff    Patient location during procedure: OR  Anesthesiologist: VERNON WALTERS  CRNA: NELL MATTHEWS    Indications and Patient Condition  Indications for airway management: airway protection    Preoxygenated: yes  Mask difficulty assessment: 1 - vent by mask    Final Airway Details  Final airway type: endotracheal airway      Successful airway: ETT  Cuffed: yes   Successful intubation technique: direct laryngoscopy  Endotracheal tube insertion site: oral  Blade: Jewel  Blade size: #3  ETT size: 7.5 mm  Cormack-Lehane Classification: grade I - full view of glottis  Placement verified by: chest auscultation and capnometry   Measured from: lips  ETT to lips (cm): 22  Number of attempts at approach: 1    Additional Comments  Smooth IV/mask induction/intubation. Easy bag-mask ventilation. Orally intubated, easy, atraumatic, lips/teeth/mouth left intact, as preop. Direct visual of vocal cords. +ETCO2, bilateral breath sounds and equal.

## 2018-08-03 NOTE — PROGRESS NOTES
Daily Progress Note    Chief Complaint: abdominal pain     Subjective     Patient reports urinating much better, pain resolved, ambulating     Objective     Vital signs in last 24 hours:  Temp:  [98.1 °F (36.7 °C)-100.4 °F (38 °C)] 98.1 °F (36.7 °C)  Heart Rate:  [] 103  Resp:  [16] 16  BP: (145-165)/() 158/91    Intake/Output last 3 shifts:  I/O last 3 completed shifts:  In: 4118 [I.V.:3118; IV Piggyback:1000]  Out: 2055 [Urine:2055]    Physical Exam:  Respiratory: CTA, normal inspiratory effort  CV: RRR, no JVD  Abd: Positive BS, soft, ND, NT, no rebound, no guarding  Ext:  No cyanosis, no edema    LABS: Pending    Assessment/Plan   Gallstone pancreatitis  Clinically improving   Await labs   Lap alina soon       Melina Kate MD  General, Minimally Invasive and Endoscopic Surgery  [facility]

## 2018-08-03 NOTE — PLAN OF CARE
Problem: Patient Care Overview  Goal: Plan of Care Review  Outcome: Ongoing (interventions implemented as appropriate)   08/03/18 6063   Coping/Psychosocial   Plan of Care Reviewed With patient   OTHER   Outcome Summary VSS; urinating more w/less retention than yesterday; bladder scanned x1; SR on monitor;NPO w/ice chips; IVF cont; no acute distress noted; will continue to monitor   Plan of Care Review   Progress no change       Problem: Pain, Acute (Adult)  Goal: Acceptable Pain Control/Comfort Level  Outcome: Ongoing (interventions implemented as appropriate)      Problem: Infection, Risk/Actual (Adult)  Goal: Infection Prevention/Resolution  Outcome: Ongoing (interventions implemented as appropriate)

## 2018-08-04 VITALS
BODY MASS INDEX: 29.72 KG/M2 | DIASTOLIC BLOOD PRESSURE: 99 MMHG | OXYGEN SATURATION: 94 % | HEART RATE: 88 BPM | SYSTOLIC BLOOD PRESSURE: 170 MMHG | HEIGHT: 73 IN | TEMPERATURE: 99.7 F | RESPIRATION RATE: 16 BRPM | WEIGHT: 224.2 LBS

## 2018-08-04 LAB
ANION GAP SERPL CALCULATED.3IONS-SCNC: 11.4 MMOL/L
BASOPHILS # BLD AUTO: 0.02 10*3/MM3 (ref 0–0.2)
BASOPHILS NFR BLD AUTO: 0.3 % (ref 0–1.5)
BUN BLD-MCNC: 26 MG/DL (ref 8–23)
BUN/CREAT SERPL: 25 (ref 7–25)
CALCIUM SPEC-SCNC: 9.4 MG/DL (ref 8.6–10.5)
CHLORIDE SERPL-SCNC: 106 MMOL/L (ref 98–107)
CO2 SERPL-SCNC: 21.6 MMOL/L (ref 22–29)
CREAT BLD-MCNC: 1.04 MG/DL (ref 0.76–1.27)
DEPRECATED RDW RBC AUTO: 53.4 FL (ref 37–54)
EOSINOPHIL # BLD AUTO: 0.06 10*3/MM3 (ref 0–0.7)
EOSINOPHIL NFR BLD AUTO: 0.8 % (ref 0.3–6.2)
ERYTHROCYTE [DISTWIDTH] IN BLOOD BY AUTOMATED COUNT: 13.8 % (ref 11.5–14.5)
GFR SERPL CREATININE-BSD FRML MDRD: 73 ML/MIN/1.73
GLUCOSE BLD-MCNC: 104 MG/DL (ref 65–99)
HCT VFR BLD AUTO: 37.2 % (ref 40.4–52.2)
HGB BLD-MCNC: 11.8 G/DL (ref 13.7–17.6)
IMM GRANULOCYTES # BLD: 0.02 10*3/MM3 (ref 0–0.03)
IMM GRANULOCYTES NFR BLD: 0.3 % (ref 0–0.5)
LYMPHOCYTES # BLD AUTO: 0.84 10*3/MM3 (ref 0.9–4.8)
LYMPHOCYTES NFR BLD AUTO: 11.7 % (ref 19.6–45.3)
MCH RBC QN AUTO: 33.5 PG (ref 27–32.7)
MCHC RBC AUTO-ENTMCNC: 31.7 G/DL (ref 32.6–36.4)
MCV RBC AUTO: 105.7 FL (ref 79.8–96.2)
MONOCYTES # BLD AUTO: 1.97 10*3/MM3 (ref 0.2–1.2)
MONOCYTES NFR BLD AUTO: 27.5 % (ref 5–12)
NEUTROPHILS # BLD AUTO: 4.26 10*3/MM3 (ref 1.9–8.1)
NEUTROPHILS NFR BLD AUTO: 59.4 % (ref 42.7–76)
NRBC BLD MANUAL-RTO: 0 /100 WBC (ref 0–0)
PLATELET # BLD AUTO: 142 10*3/MM3 (ref 140–500)
PMV BLD AUTO: 10.5 FL (ref 6–12)
POTASSIUM BLD-SCNC: 4.1 MMOL/L (ref 3.5–5.2)
RBC # BLD AUTO: 3.52 10*6/MM3 (ref 4.6–6)
SODIUM BLD-SCNC: 139 MMOL/L (ref 136–145)
WBC NRBC COR # BLD: 7.17 10*3/MM3 (ref 4.5–10.7)

## 2018-08-04 PROCEDURE — 99024 POSTOP FOLLOW-UP VISIT: CPT | Performed by: SURGERY

## 2018-08-04 PROCEDURE — 85025 COMPLETE CBC W/AUTO DIFF WBC: CPT | Performed by: SURGERY

## 2018-08-04 PROCEDURE — 80048 BASIC METABOLIC PNL TOTAL CA: CPT | Performed by: SURGERY

## 2018-08-04 RX ORDER — OXYCODONE HYDROCHLORIDE AND ACETAMINOPHEN 5; 325 MG/1; MG/1
2 TABLET ORAL EVERY 4 HOURS PRN
Qty: 20 TABLET | Refills: 0 | Status: SHIPPED | OUTPATIENT
Start: 2018-08-04 | End: 2018-08-13

## 2018-08-04 RX ADMIN — LISINOPRIL 10 MG: 10 TABLET ORAL at 09:53

## 2018-08-04 NOTE — PLAN OF CARE
Problem: Patient Care Overview  Goal: Plan of Care Review  Outcome: Outcome(s) achieved Date Met: 08/04/18 08/04/18 1019   Coping/Psychosocial   Plan of Care Reviewed With patient   OTHER   Outcome Summary Pt VSS, A&Ox4, denies any pain. 5 lap site C/D/I. Pt being D/C'd home today.    Plan of Care Review   Progress improving     Goal: Individualization and Mutuality  Outcome: Outcome(s) achieved Date Met: 08/04/18    Goal: Discharge Needs Assessment  Outcome: Outcome(s) achieved Date Met: 08/04/18    Goal: Interprofessional Rounds/Family Conf  Outcome: Outcome(s) achieved Date Met: 08/04/18

## 2018-08-04 NOTE — PROGRESS NOTES
Postoperative day one cholecystectomy    Subjective:  No complaints, tolerating diet, minimal pain    Objective:  Patient afebrile, vital stable  Gen.: Awake alert and oriented, no acute distress  Abdomen: Soft, mildly distended but soft and nontender    Assessment and plan:  Gallstone pancreatitis, clinically improved and now status post cholecystectomy doing well.  Plan for home today.    Carter Palacios MD  General and Endoscopic Surgery  [facility]    [address], [address]  [place], [place], [address]  P: [phone]  F: [phone]

## 2018-08-04 NOTE — PLAN OF CARE
Problem: Patient Care Overview  Goal: Plan of Care Review  Outcome: Ongoing (interventions implemented as appropriate)   08/04/18 9739   Coping/Psychosocial   Plan of Care Reviewed With patient   OTHER   Outcome Summary S/p lap alina with IOC, 5 lap sites to abd with steri strips & bandaides c/d/i; Increased UO, up ab allen,  NSR on monitor.remains hypertensive which is trend; IVF's infusing, iv dilaudid & zofran given x1 which was effective; offered po pain meds but declined & requested IV; tolerated full liquids, advanced to regular in am; 1 small loose stool last pm; possible d/c home today         Problem: Infection, Risk/Actual (Adult)  Goal: Infection Prevention/Resolution  Outcome: Ongoing (interventions implemented as appropriate)

## 2018-08-05 ENCOUNTER — READMISSION MANAGEMENT (OUTPATIENT)
Dept: CALL CENTER | Facility: HOSPITAL | Age: 63
End: 2018-08-05

## 2018-08-05 LAB
CYTO UR: NORMAL
LAB AP CASE REPORT: NORMAL
PATH REPORT.FINAL DX SPEC: NORMAL
PATH REPORT.GROSS SPEC: NORMAL

## 2018-08-06 ENCOUNTER — READMISSION MANAGEMENT (OUTPATIENT)
Dept: CALL CENTER | Facility: HOSPITAL | Age: 63
End: 2018-08-06

## 2018-08-06 NOTE — OUTREACH NOTE
General Surgery Week 1 Survey      Responses   Facility patient discharged from?  [facility]   Does the patient have one of the following disease processes/diagnoses(primary or secondary)?  General Surgery   Is there a successful TCM telephone encounter documented?  No   Week 1 attempt successful?  Yes   Call start time  1658   Call end time  1700   Discharge diagnosis  Lap OTF fuller   Is patient permission given to speak with other caregiver?  Yes   Person spoke with today (if not patient) and relationship  Spouse   Meds reviewed with patient/caregiver?  Yes   Is the patient having any side effects they believe may be caused by any medication additions or changes?  No   Does the patient have all medications related to this admission filled (includes all antibiotics, pain medications, etc.)  Yes   Is the patient taking all medications as directed (includes completed medication regime)?  Yes   Does the patient have a follow up appointment scheduled with their surgeon?  Yes   Has the patient kept scheduled appointments due by today?  N/A   Comments  He will see Surgeon.   Has home health visited the patient within 72 hours of discharge?  N/A   Psychosocial issues?  No   Comments  No complaints per spouse.   Did the patient receive a copy of their discharge instructions?  Yes   Nursing interventions  Reviewed instructions with patient   What is the patient's perception of their health status since discharge?  Improving   Nursing interventions  Nurse provided patient education   Is the patient /caregiver able to teach back basic post-op care?  Keep incision areas clean,dry and protected, Lifting as instructed by MD in discharge instructions, Drive as instructed by MD in discharge instructions   Is the patient/caregiver able to teach back signs and symptoms of incisional infection?  Increased redness, swelling or pain at the incisonal site, Increased drainage or bleeding, Pus or odor from incision, Fever   Is the  patient/caregiver able to teach back steps to recovery at home?  Rest and rebuild strength, gradually increase activity   Is the patient/caregiver able to teach back the hierarchy of who to call/visit for symptoms/problems? PCP, Specialist, Home health nurse, Urgent Care, ED, 911  Yes   Week 1 call completed?  Yes          Raad Morales RN

## 2018-08-06 NOTE — OUTREACH NOTE
Prep Survey      Responses   Facility patient discharged from?  [facility]   Is patient eligible?  Yes   Discharge diagnosis  Lap alina, OTF   Does the patient have one of the following disease processes/diagnoses(primary or secondary)?  General Surgery   Does the patient have Home health ordered?  No   Is there a DME ordered?  No   Comments regarding appointments  pt to schedule   Prep survey completed?  Yes          Lois Ram RN

## 2018-08-06 NOTE — PROGRESS NOTES
Case Management Discharge Note    Final Note: Pt dc'd home     Destination     No service has been selected for the patient.      Durable Medical Equipment     No service has been selected for the patient.      Dialysis/Infusion     No service has been selected for the patient.      Home Medical Care     No service has been selected for the patient.      Social Care     No service has been selected for the patient.        Other: Other (private vehicle )    Final Discharge Disposition Code: 01 - home or self-care

## 2018-08-13 NOTE — DISCHARGE SUMMARY
Admitting date: 7/31/2018    Discharging date: 8/4/2018    Admitting diagnosis: Gallstone pancreatitis    Discharging diagnoses: Gallstone pancreatitis, acute renal injury, acute cholecystitis, acute pancreatitis, sinus tachycardia    Admitting/discharging physician: Dr. Kate    Procedures: Laparoscopic cholecystectomy and intraoperative cholangiogram  Consults: Cardiology consult on 8/1/2018    Hospital course:  This is a pleasant 63-year-old gentleman who was admitted to the emergency room on 7/31/2018 with gallstone pancreatitis and acute renal injury secondary to dehydration.  Patient underwent supportive treatment for his gallstone pancreatitis with IV fluids, pain medicine, bowel rest and anti-emetics.  On 8/1/2018 patient experienced supraventricular tachycardia 140 to 150 at rest.  An EKG, troponins and a cardiology consult was obtained and the patient was transferred to a monitored telemetry bed.  Patient was ruled out for an MI.  Patient's pancreatitis resolved and on 8/3/2018 patient underwent a laparoscopic cholecystectomy and intraoperative cholangiogram.  On postop day #1, patient was tolerating a diet, pain was well controlled, patient was ambulating and urinating without difficulties.  Patient was discharged to home in stable condition follow-up in 2 weeks.  Patient was instructed on no lifting greater than 10 pounds and no driving on pain medicine.  Patient may shower.  Patient was discharged on his admitting medications and Percocet.        Melina Kate MD  General, Minimally Invasive and Endoscopic Surgery  [facility] [facility]    [address], [address]  [place], [place], [address]  P: [phone]  F: [phone]    Cc:  Nevaeh Way APRN

## 2018-08-14 ENCOUNTER — TELEPHONE (OUTPATIENT)
Dept: FAMILY MEDICINE CLINIC | Facility: CLINIC | Age: 63
End: 2018-08-14

## 2018-08-14 ENCOUNTER — READMISSION MANAGEMENT (OUTPATIENT)
Dept: CALL CENTER | Facility: HOSPITAL | Age: 63
End: 2018-08-14

## 2018-08-14 NOTE — TELEPHONE ENCOUNTER
PT STATES THAT HE HAD GALLBLADDER SURGERY, AND ON HIS DISCHARGE PAPERWORK, IT STATED FOR HIM TO FOLLOW UP WITH HIS PCP.  HE WANTED TO KNOW IF EMILEE NEEDED/WANTED TO SEE HIM, OR IF HE REALLY JUST NEEDED TO FOLLOW UP WITH HIS SURGEON ONLY?

## 2018-08-14 NOTE — TELEPHONE ENCOUNTER
Your follow up should be with surgeon and is included in your global fee to her, when you go to her follow up if she wants you to see me after I am happy to see you

## 2018-08-14 NOTE — OUTREACH NOTE
General Surgery Week 2 Survey      Responses   Facility patient discharged from?  [facility]   Does the patient have one of the following disease processes/diagnoses(primary or secondary)?  General Surgery   Week 2 attempt successful?  Yes   Call start time  1140   Call end time  1146   Discharge diagnosis  OTF [place]   Is patient permission given to speak with other caregiver?  Yes   Meds reviewed with patient/caregiver?  Yes   Is the patient having any side effects they believe may be caused by any medication additions or changes?  No   Does the patient have all medications related to this admission filled (includes all antibiotics, pain medications, etc.)  Yes   Is the patient taking all medications as directed (includes completed medication regime)?  Yes   Medication comments  08/14/18 Pain medication not Needed now.   Does the patient have a follow up appointment scheduled with their surgeon?  Yes   Has the patient kept scheduled appointments due by today?  N/A   Is the patient /caregiver able to teach back basic post-op care?  Lifting as instructed by MD in discharge instructions, Do not remove steri-strips, Keep incision areas clean,dry and protected, Drive as instructed by MD in discharge instructions, Take showers only when approved by MD-sponge bathe until then, Continue use of incentive spirometry at least 1 week post discharge, Practice 'cough and deep breath', No tub bath, swimming, or hot tub until instructed by MD   Is the patient/caregiver able to teach back signs and symptoms of incisional infection?  Increased redness, swelling or pain at the incisonal site, Increased drainage or bleeding, Pus or odor from incision, Fever, Incisional warmth   Is the patient/caregiver able to teach back steps to recovery at home?  Rest and rebuild strength, gradually increase activity   Is the patient/caregiver able to teach back the hierarchy of who to call/visit for symptoms/problems? PCP, Specialist, Home  health nurse, Urgent Care, ED, 911  Yes   Additional teach back comments  He has been released for 10lbs of lifting, riding  and some work activities.   Week 2 call completed?  Yes          Geraldine Knutson RN

## 2018-08-22 ENCOUNTER — OFFICE VISIT (OUTPATIENT)
Dept: SURGERY | Facility: CLINIC | Age: 63
End: 2018-08-22

## 2018-08-22 VITALS
DIASTOLIC BLOOD PRESSURE: 89 MMHG | OXYGEN SATURATION: 98 % | SYSTOLIC BLOOD PRESSURE: 143 MMHG | BODY MASS INDEX: 29.24 KG/M2 | WEIGHT: 221.6 LBS | HEART RATE: 74 BPM

## 2018-08-22 DIAGNOSIS — Z09 FOLLOW UP: Primary | ICD-10-CM

## 2018-08-22 PROCEDURE — 99024 POSTOP FOLLOW-UP VISIT: CPT | Performed by: SURGERY

## 2018-08-22 NOTE — PROGRESS NOTES
Chief complaint:  Post-op  Follow up    History of Present Illness    This is Mo Willoughby 63 y.o. status post laparoscopic cholecystectomy and is doing very well.  Patient denies fever, chills, nausea or vomiting.  Patient's pain is well-controlled.      The following portions of the patient's history were reviewed and updated as appropriate: allergies, current medications, past family history, past medical history, past social history, past surgical history and problem list.    Vitals:    08/22/18 1437   BP: 143/89   BP Location: Left arm   Patient Position: Sitting   Cuff Size: Adult   Pulse: 74   SpO2: 98%   Weight: 101 kg (221 lb 9.6 oz)       Physical Exam  Incision is well-healed without evidence of infection, herniation or seroma.    Patient does not use tobacco products currently and I have counseled the patient not to start using tobacco products in the future.    Assessment/plan:    This is Mo Willoughby 63 y.o. status post laparoscopic cholecystectomy and is doing very well.  I have instructed the patient not lift greater than 10 pounds for total of 6 weeks from the time of surgery. I have instructed the patient follow-up as needed.    Melina Kate MD  General, Minimally Invasive and Endoscopic Surgery  [facility]    [address], [address]  [place], [place], [address]  P: [phone]  F: [phone]    Cc:  Nevaeh aWy APRN

## 2018-08-23 ENCOUNTER — READMISSION MANAGEMENT (OUTPATIENT)
Dept: CALL CENTER | Facility: HOSPITAL | Age: 63
End: 2018-08-23

## 2018-08-23 NOTE — OUTREACH NOTE
General Surgery Week 3 Survey      Responses   Facility patient discharged from?  [facility]   Does the patient have one of the following disease processes/diagnoses(primary or secondary)?  General Surgery   Week 3 attempt successful?  Yes   Call start time  1354   Call end time  1403   Discharge diagnosis  OTF [place]   Is patient permission given to speak with other caregiver?  Yes   Person spoke with today (if not patient) and relationship  Spouse   Meds reviewed with patient/caregiver?  Yes   Is the patient having any side effects they believe may be caused by any medication additions or changes?  No   Does the patient have all medications related to this admission filled (includes all antibiotics, pain medications, etc.)  Yes   Is the patient taking all medications as directed (includes completed medication regime)?  Yes   Does the patient have a follow up appointment scheduled with their surgeon?  Yes   Has the patient kept scheduled appointments due by today?  Yes   Comments  Seen by surgeon on 08/22/2018   Has home health visited the patient within 72 hours of discharge?  N/A   Psychosocial issues?  No   Comments  No complaints per spouse.- He has been released by surgeon and needs no further followup.    Did the patient receive a copy of their discharge instructions?  Yes   Nursing interventions  Reviewed instructions with patient   What is the patient's perception of their health status since discharge?  Improving   Is the patient /caregiver able to teach back basic post-op care?  Lifting as instructed by MD in discharge instructions, Keep incision areas clean,dry and protected, Take showers only when approved by MD-sponge bathe until then   Is the patient/caregiver able to teach back signs and symptoms of incisional infection?  Increased redness, swelling or pain at the incisonal site, Increased drainage or bleeding, Incisional warmth, Pus or odor from incision, Fever   Is the patient/caregiver able to  teach back steps to recovery at home?  Set small, achievable goals for return to baseline health, Rest and rebuild strength, gradually increase activity, Make a list of questions for surgeon's appointment   Additional teach back comments  He has been released for 10lbs of lifting, riding  and some work activities.   Week 3 call completed?  Yes   Revoked  No further contact(revokes)-requires comment   Graduated/Revoked comments  Patient has been cleared from surgery to resume full activities. No further followup is needed.           Michael Yoon RN

## 2019-04-09 ENCOUNTER — OFFICE VISIT (OUTPATIENT)
Dept: SURGERY | Facility: CLINIC | Age: 64
End: 2019-04-09

## 2019-04-09 VITALS
WEIGHT: 246.7 LBS | BODY MASS INDEX: 32.7 KG/M2 | HEIGHT: 73 IN | SYSTOLIC BLOOD PRESSURE: 160 MMHG | OXYGEN SATURATION: 99 % | DIASTOLIC BLOOD PRESSURE: 100 MMHG | HEART RATE: 85 BPM

## 2019-04-09 DIAGNOSIS — D17.1 LIPOMA OF TORSO: Primary | ICD-10-CM

## 2019-04-09 PROCEDURE — 99213 OFFICE O/P EST LOW 20 MIN: CPT | Performed by: SURGERY

## 2019-04-09 NOTE — PROGRESS NOTES
"Chief complaint:  Post-op  Follow up    History of Present Illness    This is Mo Willoughby 63 y.o. status post *** and is doing very well.  Patient denies fever, chills, nausea or vomiting.  Patient's pain is well-controlled.      The following portions of the patient's history were reviewed and updated as appropriate: allergies, current medications, past family history, past medical history, past social history, past surgical history and problem list.    Vitals:    04/09/19 1331   BP: 160/100   Pulse: 85   SpO2: 99%   Weight: 112 kg (246 lb 11.2 oz)   Height: 185.4 cm (73\")       Physical Exam  Gen.: Patient is alert and oriented ×3, no acute distress  HEENT: Normal cephalic, atraumatic, moist mucous membranes, anicteric  Incision is well-healed without evidence of {No wound infection:41028}.    Assessment/plan:    This is Mo Willoughby 63 y.o. status post *** and is doing very well.  I have instructed the patient not lift greater than 10 pounds for total of 6 weeks from the time of surgery. I have instructed the patient follow-up as needed.    Melina Kate MD  General, Minimally Invasive and Endoscopic Surgery  [facility]    [address]   [address]    [address]  [place], [place] [address] [place] [address]    P: [phone]  F: [phone]    Cc:  Nevaeh Way APRN  "

## 2019-04-17 NOTE — PROGRESS NOTES
CC: Mass on upper back      Patient is a 63 y.o. male referred by Nevaeh Way APRN for evaluation of a mass on the patient's left upper back.  Patient reports that it has increased in size and has been there for several years however is more noticeable with trying to move his left arm.  Patient reports he does have discomfort with the mass.  Patient denies any fever, chills, nausea or vomiting.  Patient is unaware of anything that makes it better.  That movement makes it worse and more noticeable.    Past Medical History:   Diagnosis Date   • Erectile dysfunction    • Gout    • Hx of colonic polyp    • Hypercalcemia    • Hypertension    • Parathyroid abnormality (CMS/HCC)    • Risk factors for obstructive sleep apnea    • Syncope and collapse 03/28/2017     Past Surgical History:   Procedure Laterality Date   • CHOLECYSTECTOMY WITH INTRAOPERATIVE CHOLANGIOGRAM N/A 8/3/2018    Procedure: CHOLECYSTECTOMY LAPAROSCOPIC INTRAOPERATIVE CHOLANGIOGRAM;  Surgeon: Melina Kate MD;  Location: [facility] OR;  Service: General   • COLONOSCOPY     • COLONOSCOPY N/A 10/3/2016    Procedure: COLONOSCOPY TO CECUM TO TERMINAL ILIUM WITH COLD BIOPSY POLYPECTOMY;  Surgeon: Benoit Vilchis MD;  Location: [facility] ENDOSCOPY;  Service:    • TONSILLECTOMY       Family History   Problem Relation Age of Onset   • Hypertension Mother    • Breast cancer Mother    • Cancer Father    • Liver cancer Father      Social History     Tobacco Use   • Smoking status: Never Smoker   • Smokeless tobacco: Never Used   Substance Use Topics   • Alcohol use: Yes     Comment: occasional   • Drug use: No     No Known Allergies    Current Outpatient Medications:   •  allopurinol (ZYLOPRIM) 300 MG tablet, Take 1 tablet by mouth Daily., Disp: 90 tablet, Rfl: 3  •  ibuprofen (ADVIL,MOTRIN) 800 MG tablet, Take 1 tablet by mouth Every 8 (Eight) Hours As Needed for Moderate Pain ., Disp: 60 tablet, Rfl: 1  •  lisinopril (PRINIVIL,ZESTRIL) 10 MG  "tablet, Take 1 tablet by mouth Daily., Disp: 90 tablet, Rfl: 3    Review of Systems    Vitals:    04/09/19 1331   BP: 160/100   Pulse: 85   SpO2: 99%   Weight: 112 kg (246 lb 11.2 oz)   Height: 185.4 cm (73\")       Physical Exam  General/physcological:   Alert and oriented x3, in no acute distress  HEENT: Normal cephalic, atraumatic, PERRLA, EOMI, sclera anicteric, moist mucous membranes, neck is supple, no JVD, no carotid bruits, no thyromegaly no adenopathy  Respiratory: CTA and percussion  CVA: RRR, normal S1-S2, no murmurs, no gallops or rubs  Back: Large left upper back mass that is subcutaneous and greater than 8 cm in size, appears to be mobile and tender  GI: Positive BS, soft, nondistended, nontender, no rebound, no guarding, no hernias, no organomegaly and no masses  Musculoskeletal: Full range of motion, no clubbing, no cyanosis or edema  Neurovascular: Grossly intact  Debilities: none  Emotional behavior: appropriate     Patient does not use tobacco products currently.     Assessment:  Left upper back mass  Plan:  I recommend that the patient undergo excision of the left upper back mass for further evaluation.  I have discussed this procedure with the patient in detail.  I have discussed the risks, benefits and alternatives.  I have discussed the risk of anesthesia, bleeding, infection and recurrence.  Patient understands these risks, benefits and alternatives and wishes to proceed.  I have him scheduled at his earliest convenience.    Melina Kate MD  General, Minimally Invasive and Endoscopic Surgery  [facility]      [facility] [facility] [address] [place] [address]    [address]  [place], [place] [address] [place] [address]    P: [phone]  F: [phone]    Cc:  Nevaeh Way APRN                  "

## 2019-04-26 ENCOUNTER — OUTSIDE FACILITY SERVICE (OUTPATIENT)
Dept: SURGERY | Facility: CLINIC | Age: 64
End: 2019-04-26

## 2019-04-26 PROCEDURE — 21933 EXC BACK TUM DEEP 5 CM/>: CPT | Performed by: SURGERY

## 2019-04-26 PROCEDURE — 88304 TISSUE EXAM BY PATHOLOGIST: CPT | Performed by: SURGERY

## 2019-04-29 ENCOUNTER — LAB REQUISITION (OUTPATIENT)
Dept: LAB | Facility: HOSPITAL | Age: 64
End: 2019-04-29

## 2019-04-29 DIAGNOSIS — R22.2 LOCALIZED SWELLING, MASS AND LUMP, TRUNK: ICD-10-CM

## 2019-04-30 LAB
CYTO UR: NORMAL
LAB AP CASE REPORT: NORMAL
LAB AP CLINICAL INFORMATION: NORMAL
PATH REPORT.FINAL DX SPEC: NORMAL
PATH REPORT.GROSS SPEC: NORMAL

## 2019-05-09 NOTE — PLAN OF CARE
Chief Complaint   Patient presents with   • Sinus Problem     sinus pressure/congestion   • Ear Problem     left ear, pt did go swimming last night woke up with ear pain/swelling to left side of face         HISTORY OF PRESENT ILLNESS:   The patient is a 61 year old male who presents with complaints of cold, congestion, runny nose and says that she noticed since yesterday evening left side of his face and front of the year is swelling and painful, and wants to get checked out. Patient says he felt febrile last 2, days, but didn't really check his temperature. No difficulty breathing, wheezing    PAST MEDICAL HISTORY, PAST SURGICAL HISTORY, SOCIAL HISTORY, MEDICATIONS, AND ALLERGIES:  Reviewed per electronic chart.    REVIEW OF SYSTEMS:  Constitutional:  Denies fever or chills   Eyes:  Denies change in visual acuity   Respiratory:  Per History of Present Illness   Cardiovascular:  Denies chest pain or edema   Gastrointestinal:  Denies abdominal pain, nausea, vomiting, bloody stools or diarrhea   Integument:  Denies rash       PHYSICAL EXAM:  Vitals:   Vitals:    05/09/19 0929   BP: 142/90   Pulse: 78   Temp: 97.6 °F (36.4 °C)   TempSrc: Tympanic      SpO2 Readings from Last 1 Encounters:   04/22/19 97%     Constitutional:  No acute distress, nontoxic appearance.  HENT: Normocephalic, atraumatic. Bilateral external ears normal. Oropharynx moist. No oral exudates. Nose inflamed Mucous membranes with swollen turbinates. Serous fluid present behind the bilateral tympanic membranes. No inflammation.  Patient has significant swelling and tenderness of the parotid gland on the left side. No other glands enlarged  Eyes:  PERRLA (pupils equal, round, and reactive to light and accommodation), EOMI (extraocular movements are intact). Conjunctivae normal. No discharge.  Neck:  Normal range of motion. No tenderness. Supple. No lymphadenopathy. No stridor.  Cardiovascular:  Normal heart rate. Normal rhythm. No murmurs. No rubs. No  Problem: Patient Care Overview  Goal: Plan of Care Review  Outcome: Ongoing (interventions implemented as appropriate)   08/03/18 1633   Coping/Psychosocial   Plan of Care Reviewed With patient   OTHER   Outcome Summary Edward fuller today. C/o of pain 5/10 controlled with dilaudid. VSS. Will continue to monitor and assess.   Plan of Care Review   Progress improving          gallops.  Pulmonary/Chest:  Normal breath sounds. No respiratory distress. No wheezing. No chest tenderness .  Abdomen:  Bowel sounds normal. Soft. No tenderness. No masses. No pulsatile masses.    Lab/Radiology:  Orders Placed This Encounter   • Miscellaneous Lab Order   • amoxicillin-clavulanate (AUGMENTIN) 875-125 MG per tablet       ASSESSMENT:   Encounter Diagnoses   Name Primary?   • Parotitis, acute Yes   • Acute URI    • Bilateral acute serous otitis media, recurrence not specified        PLAN: Symptomatic care reviewed. Augmentin for 10 days. Looks more bacterial. However, cannot rule out any mumps. We'll go ahead and check for mumps. This patient hasn't had any history ofmumps in the past. Discussed with the patient acknowledges understanding. Patient will be staying home until we get results back    Follow up with primary if no improvement of symptoms or worsening. Patient acknowledged understanding of the instructions.

## 2019-05-14 ENCOUNTER — OFFICE VISIT (OUTPATIENT)
Dept: SURGERY | Facility: CLINIC | Age: 64
End: 2019-05-14

## 2019-05-14 VITALS
BODY MASS INDEX: 31.6 KG/M2 | WEIGHT: 238.4 LBS | OXYGEN SATURATION: 98 % | DIASTOLIC BLOOD PRESSURE: 80 MMHG | HEIGHT: 73 IN | HEART RATE: 82 BPM | SYSTOLIC BLOOD PRESSURE: 140 MMHG

## 2019-05-14 DIAGNOSIS — Z09 FOLLOW UP: Primary | ICD-10-CM

## 2019-05-14 PROCEDURE — 99024 POSTOP FOLLOW-UP VISIT: CPT | Performed by: SURGERY

## 2019-05-14 NOTE — PROGRESS NOTES
"Chief complaint:  Post-op  Follow up    History of Present Illness    This is Mo Willoughby 63 y.o. status post excision of a large lipoma on the upper back and is doing very well.  Patient denies fever, chills, nausea or vomiting.  Patient's pain is well-controlled.      The following portions of the patient's history were reviewed and updated as appropriate: allergies, current medications, past family history, past medical history, past social history, past surgical history and problem list.    Vitals:    05/14/19 1048   BP: 140/80   Pulse: 82   SpO2: 98%   Weight: 108 kg (238 lb 6.4 oz)   Height: 185.4 cm (73\")       Physical Exam  Gen.: Patient is alert and oriented ×3, no acute distress  HEENT: Normal cephalic, atraumatic, moist mucous membranes, anicteric  Incision is well-healed without evidence of infection, herniation or seroma.    Assessment/plan:    This is Mo Willoughby 63 y.o. status post excision of a large lipoma on the upper back and is doing very well.  I have reviewed the pathology report with the patient. I have instructed the patient follow-up as needed.    Melina Kate MD  General, Minimally Invasive and Endoscopic Surgery  [facility]    [address] [facility] [address] [facility]   [address]    [address]  [place], [place] [address] [place] [address]    P: [phone]  F: [phone]    Cc:  Nevaeh Way APRN  "

## 2019-07-29 RX ORDER — ALLOPURINOL 300 MG/1
TABLET ORAL
Qty: 90 TABLET | Refills: 0 | Status: SHIPPED | OUTPATIENT
Start: 2019-07-29 | End: 2019-10-29 | Stop reason: SDUPTHER

## 2019-09-16 RX ORDER — LISINOPRIL 10 MG/1
TABLET ORAL
Qty: 21 TABLET | Refills: 0 | Status: SHIPPED | OUTPATIENT
Start: 2019-09-16 | End: 2019-10-29 | Stop reason: SDUPTHER

## 2019-10-29 ENCOUNTER — TELEPHONE (OUTPATIENT)
Dept: FAMILY MEDICINE CLINIC | Facility: CLINIC | Age: 64
End: 2019-10-29

## 2019-10-29 RX ORDER — LISINOPRIL 10 MG/1
10 TABLET ORAL DAILY
Qty: 90 TABLET | Refills: 0 | Status: SHIPPED | OUTPATIENT
Start: 2019-10-29 | End: 2019-11-18 | Stop reason: SDUPTHER

## 2019-10-29 RX ORDER — ALLOPURINOL 300 MG/1
300 TABLET ORAL DAILY
Qty: 90 TABLET | Refills: 0 | Status: SHIPPED | OUTPATIENT
Start: 2019-10-29 | End: 2019-11-18 | Stop reason: SDUPTHER

## 2019-10-29 NOTE — TELEPHONE ENCOUNTER
REFILLED ON ALLOPURINOL 300 MG   LISINOPRIL 10 MG       PATIENT MADE AN APPT FOR 12/18/19 TO BE SEEN. PATIENT IS GOING OUT OF TOWN THIS AFTERNOON AND WANTS TO GET HIS MEDS REFILLED.     CALL PATIENT AND LET HIM KNOW IF THESE CAN BE FILLED OR [phone]

## 2019-11-18 ENCOUNTER — OFFICE VISIT (OUTPATIENT)
Dept: FAMILY MEDICINE CLINIC | Facility: CLINIC | Age: 64
End: 2019-11-18

## 2019-11-18 VITALS
BODY MASS INDEX: 30.3 KG/M2 | HEIGHT: 73 IN | WEIGHT: 228.6 LBS | OXYGEN SATURATION: 98 % | HEART RATE: 70 BPM | DIASTOLIC BLOOD PRESSURE: 80 MMHG | SYSTOLIC BLOOD PRESSURE: 138 MMHG | TEMPERATURE: 98.7 F

## 2019-11-18 DIAGNOSIS — E78.5 HYPERLIPIDEMIA, UNSPECIFIED HYPERLIPIDEMIA TYPE: ICD-10-CM

## 2019-11-18 DIAGNOSIS — Z00.00 ANNUAL PHYSICAL EXAM: Primary | ICD-10-CM

## 2019-11-18 DIAGNOSIS — M1A.0410 CHRONIC GOUT OF RIGHT HAND, UNSPECIFIED CAUSE: ICD-10-CM

## 2019-11-18 DIAGNOSIS — E66.09 CLASS 1 OBESITY DUE TO EXCESS CALORIES WITH SERIOUS COMORBIDITY AND BODY MASS INDEX (BMI) OF 30.0 TO 30.9 IN ADULT: ICD-10-CM

## 2019-11-18 DIAGNOSIS — I10 ESSENTIAL HYPERTENSION: ICD-10-CM

## 2019-11-18 DIAGNOSIS — Z12.11 COLON CANCER SCREENING: ICD-10-CM

## 2019-11-18 DIAGNOSIS — K63.5 POLYP OF COLON, UNSPECIFIED PART OF COLON, UNSPECIFIED TYPE: ICD-10-CM

## 2019-11-18 LAB
ALBUMIN SERPL-MCNC: 4.5 G/DL (ref 3.5–5.2)
ALBUMIN/GLOB SERPL: 1.5 G/DL
ALP SERPL-CCNC: 57 U/L (ref 39–117)
ALT SERPL W P-5'-P-CCNC: 104 U/L (ref 1–41)
ANION GAP SERPL CALCULATED.3IONS-SCNC: 13.3 MMOL/L (ref 5–15)
AST SERPL-CCNC: 121 U/L (ref 1–40)
BACTERIA UR QL AUTO: NORMAL /HPF
BILIRUB SERPL-MCNC: 0.6 MG/DL (ref 0.2–1.2)
BILIRUB UR QL STRIP: NEGATIVE
BUN BLD-MCNC: 11 MG/DL (ref 8–23)
BUN/CREAT SERPL: 10.8 (ref 7–25)
CALCIUM SPEC-SCNC: 11.2 MG/DL (ref 8.6–10.5)
CHLORIDE SERPL-SCNC: 104 MMOL/L (ref 98–107)
CHOLEST SERPL-MCNC: 232 MG/DL (ref 0–200)
CLARITY UR: CLEAR
CO2 SERPL-SCNC: 23.7 MMOL/L (ref 22–29)
COLOR UR: YELLOW
CREAT BLD-MCNC: 1.02 MG/DL (ref 0.76–1.27)
ERYTHROCYTE [DISTWIDTH] IN BLOOD BY AUTOMATED COUNT: 14.6 % (ref 12.3–15.4)
GFR SERPL CREATININE-BSD FRML MDRD: 74 ML/MIN/1.73
GLOBULIN UR ELPH-MCNC: 3 GM/DL
GLUCOSE BLD-MCNC: 86 MG/DL (ref 65–99)
GLUCOSE UR STRIP-MCNC: NEGATIVE MG/DL
HCT VFR BLD AUTO: 43.7 % (ref 37.5–51)
HDLC SERPL-MCNC: 64 MG/DL (ref 40–60)
HGB BLD-MCNC: 14.3 G/DL (ref 13–17.7)
HGB UR QL STRIP.AUTO: ABNORMAL
KETONES UR QL STRIP: NEGATIVE
LDLC SERPL CALC-MCNC: 133 MG/DL (ref 0–100)
LDLC/HDLC SERPL: 2.08 {RATIO}
LEUKOCYTE ESTERASE UR QL STRIP.AUTO: NEGATIVE
LYMPHOCYTES # BLD AUTO: 1.6 10*3/MM3 (ref 0.7–3.1)
LYMPHOCYTES NFR BLD AUTO: 36.5 % (ref 19.6–45.3)
MCH RBC QN AUTO: 33.9 PG (ref 26.6–33)
MCHC RBC AUTO-ENTMCNC: 32.7 G/DL (ref 31.5–35.7)
MCV RBC AUTO: 103.9 FL (ref 79–97)
MONOCYTES # BLD AUTO: 0.3 10*3/MM3 (ref 0.1–0.9)
MONOCYTES NFR BLD AUTO: 7.3 % (ref 5–12)
NEUTROPHILS # BLD AUTO: 2.5 10*3/MM3 (ref 1.7–7)
NEUTROPHILS NFR BLD AUTO: 56.2 % (ref 42.7–76)
NITRITE UR QL STRIP: NEGATIVE
PH UR STRIP.AUTO: 6 [PH] (ref 4.6–8)
PLATELET # BLD AUTO: 210 10*3/MM3 (ref 140–450)
PMV BLD AUTO: 6 FL (ref 6–12)
POTASSIUM BLD-SCNC: 5 MMOL/L (ref 3.5–5.2)
PROT SERPL-MCNC: 7.5 G/DL (ref 6–8.5)
PROT UR QL STRIP: ABNORMAL
RBC # BLD AUTO: 4.21 10*6/MM3 (ref 4.14–5.8)
RBC # UR: NORMAL /HPF
REF LAB TEST METHOD: NORMAL
SODIUM BLD-SCNC: 141 MMOL/L (ref 136–145)
SP GR UR STRIP: 1.01 (ref 1–1.03)
SQUAMOUS #/AREA URNS HPF: NORMAL /HPF
TRIGL SERPL-MCNC: 173 MG/DL (ref 0–150)
URATE SERPL-MCNC: 2.8 MG/DL (ref 3.4–7)
UROBILINOGEN UR QL STRIP: ABNORMAL
VLDLC SERPL-MCNC: 34.6 MG/DL (ref 5–40)
WBC NRBC COR # BLD: 4.5 10*3/MM3 (ref 3.4–10.8)
WBC UR QL AUTO: NORMAL /HPF

## 2019-11-18 PROCEDURE — 80061 LIPID PANEL: CPT | Performed by: NURSE PRACTITIONER

## 2019-11-18 PROCEDURE — 85025 COMPLETE CBC W/AUTO DIFF WBC: CPT | Performed by: NURSE PRACTITIONER

## 2019-11-18 PROCEDURE — 99396 PREV VISIT EST AGE 40-64: CPT | Performed by: NURSE PRACTITIONER

## 2019-11-18 PROCEDURE — 80053 COMPREHEN METABOLIC PANEL: CPT | Performed by: NURSE PRACTITIONER

## 2019-11-18 PROCEDURE — 84550 ASSAY OF BLOOD/URIC ACID: CPT | Performed by: NURSE PRACTITIONER

## 2019-11-18 PROCEDURE — 81001 URINALYSIS AUTO W/SCOPE: CPT | Performed by: NURSE PRACTITIONER

## 2019-11-18 RX ORDER — LISINOPRIL 10 MG/1
10 TABLET ORAL DAILY
Qty: 90 TABLET | Refills: 3 | Status: SHIPPED | OUTPATIENT
Start: 2019-11-18 | End: 2020-11-24

## 2019-11-18 RX ORDER — ALLOPURINOL 300 MG/1
300 TABLET ORAL DAILY
Qty: 90 TABLET | Refills: 3 | Status: SHIPPED | OUTPATIENT
Start: 2019-11-18 | End: 2020-11-24

## 2019-11-18 NOTE — PATIENT INSTRUCTIONS
"complete physical today, check labs today and call with results, refill allopurinol and lisinopril, defer EKG and CXR, check BP at home, gave DASH diet and Enc healthy diet and regular exercise for wt loss, refer overdue colonoscopy Dr Vilchis, cont FU with 1st urology, enc overdue vision, defer flu shot  DASH Eating Plan  DASH stands for \"Dietary Approaches to Stop Hypertension.\" The DASH eating plan is a healthy eating plan that has been shown to reduce high blood pressure (hypertension). It may also reduce your risk for type 2 diabetes, heart disease, and stroke. The DASH eating plan may also help with weight loss.  What are tips for following this plan?    General guidelines  · Avoid eating more than 2,300 mg (milligrams) of salt (sodium) a day. If you have hypertension, you may need to reduce your sodium intake to 1,500 mg a day.  · Limit alcohol intake to no more than 1 drink a day for nonpregnant women and 2 drinks a day for men. One drink equals 12 oz of beer, 5 oz of wine, or 1½ oz of hard liquor.  · Work with your health care provider to maintain a healthy body weight or to lose weight. Ask what an ideal weight is for you.  · Get at least 30 minutes of exercise that causes your heart to beat faster (aerobic exercise) most days of the week. Activities may include walking, swimming, or biking.  · Work with your health care provider or diet and nutrition specialist (dietitian) to adjust your eating plan to your individual calorie needs.  Reading food labels    · Check food labels for the amount of sodium per serving. Choose foods with less than 5 percent of the Daily Value of sodium. Generally, foods with less than 300 mg of sodium per serving fit into this eating plan.  · To find whole grains, look for the word \"whole\" as the first word in the ingredient list.  Shopping  · Buy products labeled as \"low-sodium\" or \"no salt added.\"  · Buy fresh foods. Avoid canned foods and premade or frozen " meals.  Cooking  · Avoid adding salt when cooking. Use salt-free seasonings or herbs instead of table salt or sea salt. Check with your health care provider or pharmacist before using salt substitutes.  · Do not delcid foods. Cook foods using healthy methods such as baking, boiling, grilling, and broiling instead.  · Cook with heart-healthy oils, such as olive, canola, soybean, or sunflower oil.  Meal planning  · Eat a balanced diet that includes:  ? 5 or more servings of fruits and vegetables each day. At each meal, try to fill half of your plate with fruits and vegetables.  ? Up to 6-8 servings of whole grains each day.  ? Less than 6 oz of lean meat, poultry, or fish each day. A 3-oz serving of meat is about the same size as a deck of cards. One egg equals 1 oz.  ? 2 servings of low-fat dairy each day.  ? A serving of nuts, seeds, or beans 5 times each week.  ? Heart-healthy fats. Healthy fats called Omega-3 fatty acids are found in foods such as flaxseeds and coldwater fish, like sardines, salmon, and mackerel.  · Limit how much you eat of the following:  ? Canned or prepackaged foods.  ? Food that is high in trans fat, such as fried foods.  ? Food that is high in saturated fat, such as fatty meat.  ? Sweets, desserts, sugary drinks, and other foods with added sugar.  ? Full-fat dairy products.  · Do not salt foods before eating.  · Try to eat at least 2 vegetarian meals each week.  · Eat more home-cooked food and less restaurant, buffet, and fast food.  · When eating at a restaurant, ask that your food be prepared with less salt or no salt, if possible.  What foods are recommended?  The items listed may not be a complete list. Talk with your dietitian about what dietary choices are best for you.  Grains  Whole-grain or whole-wheat bread. Whole-grain or whole-wheat pasta. Brown rice. Oatmeal. Quinoa. Bulgur. Whole-grain and low-sodium cereals. Joy bread. Low-fat, low-sodium crackers. Whole-wheat flour  tortillas.  Vegetables  Fresh or frozen vegetables (raw, steamed, roasted, or grilled). Low-sodium or reduced-sodium tomato and vegetable juice. Low-sodium or reduced-sodium tomato sauce and tomato paste. Low-sodium or reduced-sodium canned vegetables.  Fruits  All fresh, dried, or frozen fruit. Canned fruit in natural juice (without added sugar).  Meat and other protein foods  Skinless chicken or turkey. Ground chicken or turkey. Pork with fat trimmed off. Fish and seafood. Egg whites. Dried beans, peas, or lentils. Unsalted nuts, nut butters, and seeds. Unsalted canned beans. Lean cuts of beef with fat trimmed off. Low-sodium, lean deli meat.  Dairy  Low-fat (1%) or fat-free (skim) milk. Fat-free, low-fat, or reduced-fat cheeses. Nonfat, low-sodium ricotta or cottage cheese. Low-fat or nonfat yogurt. Low-fat, low-sodium cheese.  Fats and oils  Soft margarine without trans fats. Vegetable oil. Low-fat, reduced-fat, or light mayonnaise and salad dressings (reduced-sodium). Canola, safflower, olive, soybean, and sunflower oils. Avocado.  Seasoning and other foods  Herbs. Spices. Seasoning mixes without salt. Unsalted popcorn and pretzels. Fat-free sweets.  What foods are not recommended?  The items listed may not be a complete list. Talk with your dietitian about what dietary choices are best for you.  Grains  Baked goods made with fat, such as croissants, muffins, or some breads. Dry pasta or rice meal packs.  Vegetables  Creamed or fried vegetables. Vegetables in a cheese sauce. Regular canned vegetables (not low-sodium or reduced-sodium). Regular canned tomato sauce and paste (not low-sodium or reduced-sodium). Regular tomato and vegetable juice (not low-sodium or reduced-sodium). Pickles. Olives.  Fruits  Canned fruit in a light or heavy syrup. Fried fruit. Fruit in cream or butter sauce.  Meat and other protein foods  Fatty cuts of meat. Ribs. Fried meat. Greene. Sausage. Bologna and other processed lunch meats.  Salami. Fatback. Hotdogs. Bratwurst. Salted nuts and seeds. Canned beans with added salt. Canned or smoked fish. Whole eggs or egg yolks. Chicken or turkey with skin.  Dairy  Whole or 2% milk, cream, and half-and-half. Whole or full-fat cream cheese. Whole-fat or sweetened yogurt. Full-fat cheese. Nondairy creamers. Whipped toppings. Processed cheese and cheese spreads.  Fats and oils  Butter. Stick margarine. Lard. Shortening. Ghee. Greene fat. Tropical oils, such as coconut, palm kernel, or palm oil.  Seasoning and other foods  Salted popcorn and pretzels. Onion salt, garlic salt, seasoned salt, table salt, and sea salt. Worcestershire sauce. Tartar sauce. Barbecue sauce. Teriyaki sauce. Soy sauce, including reduced-sodium. Steak sauce. Canned and packaged gravies. Fish sauce. Oyster sauce. Cocktail sauce. Horseradish that you find on the shelf. Ketchup. Mustard. Meat flavorings and tenderizers. Bouillon cubes. Hot sauce and Tabasco sauce. Premade or packaged marinades. Premade or packaged taco seasonings. Relishes. Regular salad dressings.  Where to find more information:  · National Heart, Lung, and Blood [place]: www.nhlbi.nih.gov  · American Heart Association: www.heart.org  Summary  · The DASH eating plan is a healthy eating plan that has been shown to reduce high blood pressure (hypertension). It may also reduce your risk for type 2 diabetes, heart disease, and stroke.  · With the DASH eating plan, you should limit salt (sodium) intake to 2,300 mg a day. If you have hypertension, you may need to reduce your sodium intake to 1,500 mg a day.  · When on the DASH eating plan, aim to eat more fresh fruits and vegetables, whole grains, lean proteins, low-fat dairy, and heart-healthy fats.  · Work with your health care provider or diet and nutrition specialist (dietitian) to adjust your eating plan to your individual calorie needs.  This information is not intended to replace advice given to you by your health  care provider. Make sure you discuss any questions you have with your health care provider.  Document Released: 12/06/2012 Document Revised: 12/11/2017 Document Reviewed: 12/11/2017  [employer] Interactive Patient Education © 2019 [employer]

## 2019-11-18 NOTE — PROGRESS NOTES
Subjective   Mo Willoughby is a 64 y.o. male.     History of Present Illness   Here for annual physical with chronic HTN well controlled on lisinopril 10 mg daily, no CP dizziness HA but LE edema wears compression stockings attributes to prolonged standing working  and venous insufficiency, BP runs similar at home, with last chol elevated 06/18 fasting today for lab no prev chol med, with chronic gout last gout flair R finger 05/17 on allopurinol 300 mg daily last uric acid controlled 06/18 following diet  Last colonoscopy 10/16 Dr Deng COLMENARES 2 years d/t polyps no bowel sx, sees 1st urology Dr Walden annual for prostate UTD  S/p cholecystectomy 07/18 and lipoma removal 04/19 Dr Kate no complications  Last vision > 1 year wears glasses, dental UTD, last EKG 08/18 and CXR 03/17 NAD defer update today    The following portions of the patient's history were reviewed and updated as appropriate: allergies, current medications, past family history, past medical history, past social history, past surgical history and problem list.    Review of Systems   Constitutional: Negative for fever.   Eyes: Positive for visual disturbance (wears glasses).   Respiratory: Negative for cough, shortness of breath and wheezing.    Cardiovascular: Negative for chest pain, palpitations and leg swelling.   Gastrointestinal: Negative for abdominal distention, abdominal pain, anal bleeding, blood in stool, constipation, diarrhea, nausea, rectal pain and vomiting.   Musculoskeletal: Negative for arthralgias and back pain.   Neurological: Negative for dizziness and headaches.   All other systems reviewed and are negative.      Objective   Physical Exam   Constitutional: He is oriented to person, place, and time. He appears well-developed and well-nourished.   HENT:   Head: Normocephalic and atraumatic.   Right Ear: Hearing normal. A foreign body (cerumen soft) is present.   Left Ear: Hearing normal. A foreign body (cerumen soft) is  present.   Nose: Nose normal. Right sinus exhibits no maxillary sinus tenderness and no frontal sinus tenderness. Left sinus exhibits no maxillary sinus tenderness and no frontal sinus tenderness.   Mouth/Throat: Oropharynx is clear and moist.   Eyes: Conjunctivae and EOM are normal. Pupils are equal, round, and reactive to light.   Neck: Normal range of motion. Neck supple. No thyromegaly present.   Cardiovascular: Normal rate, regular rhythm and normal heart sounds.   Pulmonary/Chest: Effort normal and breath sounds normal.   Abdominal: Soft. He exhibits no distension and no mass. There is no tenderness. There is no rebound, no guarding and no CVA tenderness. No hernia.   Musculoskeletal: Normal range of motion. He exhibits edema (B LE nonpitting).   Lymphadenopathy:     He has no cervical adenopathy.   Neurological: He is alert and oriented to person, place, and time.   Skin: Skin is warm and dry.   Psychiatric: He has a normal mood and affect. His behavior is normal. Judgment and thought content normal.   Vitals reviewed.      Assessment/Plan   Mo was seen today for yearly physical and hypertension.    Diagnoses and all orders for this visit:    Annual physical exam  -     CBC & Differential  -     Comprehensive Metabolic Panel  -     Lipid Panel  -     Urinalysis With Microscopic - Urine, Clean Catch  -     CBC Auto Differential  -     Urinalysis without microscopic (no culture) - Urine, Clean Catch  -     Urinalysis, Microscopic Only - Urine, Clean Catch    Essential hypertension  -     Comprehensive Metabolic Panel  -     Lipid Panel    Polyp of colon, unspecified part of colon, unspecified type  -     Ambulatory Referral to Gastroenterology    Class 1 obesity due to excess calories with serious comorbidity and body mass index (BMI) of 30.0 to 30.9 in adult    Hyperlipidemia, unspecified hyperlipidemia type  -     Comprehensive Metabolic Panel  -     Lipid Panel    Colon cancer screening  -     Ambulatory  Referral to Gastroenterology    Chronic gout of right hand, unspecified cause  -     Uric Acid    Other orders  -     lisinopril (PRINIVIL,ZESTRIL) 10 MG tablet; Take 1 tablet by mouth Daily.  -     allopurinol (ZYLOPRIM) 300 MG tablet; Take 1 tablet by mouth Daily.    complete physical today, check labs today and call with results, refill allopurinol and lisinopril, defer EKG and CXR, check BP at home, gave DASH diet and Enc healthy diet and regular exercise for wt loss, refer overdue colonoscopy Dr Vilchis, cont FU with 1st urology, enc overdue vision, defer flu shot

## 2019-11-19 ENCOUNTER — TELEPHONE (OUTPATIENT)
Dept: FAMILY MEDICINE CLINIC | Facility: CLINIC | Age: 64
End: 2019-11-19

## 2019-11-19 DIAGNOSIS — R74.8 ELEVATED LIVER ENZYMES: Primary | ICD-10-CM

## 2019-11-19 NOTE — TELEPHONE ENCOUNTER
Uric acid well controlled on allopurinol. BS and kidney normal. Liver enzymes are still high, you prev had fatty liver on last CT abd pelvis 07/18, enc healthy diet and exercise for wt loss. Do you drink alcohol or take tylenol? Chol elevated recommend low chol diet don't want to add medicine at this time as it can elevate liver enzymes more. RTC lab only recheck

## 2019-12-11 ENCOUNTER — LAB (OUTPATIENT)
Dept: FAMILY MEDICINE CLINIC | Facility: CLINIC | Age: 64
End: 2019-12-11

## 2019-12-11 DIAGNOSIS — R74.8 ELEVATED LIVER ENZYMES: ICD-10-CM

## 2019-12-11 LAB
ALBUMIN SERPL-MCNC: 4.2 G/DL (ref 3.5–5.2)
ALP SERPL-CCNC: 53 U/L (ref 39–117)
ALT SERPL W P-5'-P-CCNC: 84 U/L (ref 1–41)
AST SERPL-CCNC: 94 U/L (ref 1–40)
BILIRUB CONJ SERPL-MCNC: <0.2 MG/DL (ref 0.2–0.3)
BILIRUB INDIRECT SERPL-MCNC: ABNORMAL MG/DL
BILIRUB SERPL-MCNC: 0.4 MG/DL (ref 0.2–1.2)
HAV IGM SERPL QL IA: NORMAL
HBV CORE IGM SERPL QL IA: NORMAL
HBV SURFACE AG SERPL QL IA: NORMAL
HCV AB SER DONR QL: NORMAL
PROT SERPL-MCNC: 7.1 G/DL (ref 6–8.5)

## 2019-12-11 PROCEDURE — 80074 ACUTE HEPATITIS PANEL: CPT | Performed by: NURSE PRACTITIONER

## 2019-12-11 PROCEDURE — 80076 HEPATIC FUNCTION PANEL: CPT | Performed by: NURSE PRACTITIONER

## 2019-12-11 PROCEDURE — 36415 COLL VENOUS BLD VENIPUNCTURE: CPT | Performed by: NURSE PRACTITIONER

## 2019-12-16 ENCOUNTER — TELEPHONE (OUTPATIENT)
Dept: FAMILY MEDICINE CLINIC | Facility: CLINIC | Age: 64
End: 2019-12-16

## 2020-06-30 ENCOUNTER — OFFICE VISIT (OUTPATIENT)
Dept: FAMILY MEDICINE CLINIC | Facility: CLINIC | Age: 65
End: 2020-06-30

## 2020-06-30 VITALS
TEMPERATURE: 98.9 F | HEART RATE: 83 BPM | OXYGEN SATURATION: 98 % | HEIGHT: 73 IN | SYSTOLIC BLOOD PRESSURE: 160 MMHG | BODY MASS INDEX: 32.42 KG/M2 | WEIGHT: 244.6 LBS | DIASTOLIC BLOOD PRESSURE: 90 MMHG

## 2020-06-30 DIAGNOSIS — E66.09 CLASS 1 OBESITY DUE TO EXCESS CALORIES WITH SERIOUS COMORBIDITY AND BODY MASS INDEX (BMI) OF 32.0 TO 32.9 IN ADULT: ICD-10-CM

## 2020-06-30 DIAGNOSIS — I83.92 ASYMPTOMATIC VARICOSE VEINS OF LEFT LOWER EXTREMITY: ICD-10-CM

## 2020-06-30 DIAGNOSIS — L03.116 CELLULITIS OF LEFT LOWER EXTREMITY: Primary | ICD-10-CM

## 2020-06-30 DIAGNOSIS — I10 ESSENTIAL HYPERTENSION: ICD-10-CM
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
## 2020-06-30 DIAGNOSIS — S80.12XA CONTUSION OF LEFT LOWER EXTREMITY, INITIAL ENCOUNTER: ICD-10-CM

## 2020-06-30 PROCEDURE — 99213 OFFICE O/P EST LOW 20 MIN: CPT | Performed by: NURSE PRACTITIONER

## 2020-06-30 RX ORDER — SULFAMETHOXAZOLE AND TRIMETHOPRIM 800; 160 MG/1; MG/1
1 TABLET ORAL 2 TIMES DAILY
Qty: 20 TABLET | Refills: 0 | Status: SHIPPED | OUTPATIENT
Start: 2020-06-30 | End: 2020-07-10

## 2020-06-30 RX ORDER — CEPHALEXIN 500 MG/1
500 CAPSULE ORAL 4 TIMES DAILY
Qty: 40 CAPSULE | Refills: 0 | Status: SHIPPED | OUTPATIENT
Start: 2020-06-30 | End: 2020-07-10

## 2020-06-30 NOTE — PATIENT INSTRUCTIONS
erx bactrim DS BID x 10 days, erx keflex 500 mg QID x 10 days, ice 15 min TID and elevate, monitor and wear compression stocking, discussed contusion and gravity with varicose veins affecting healing, call or RTC if sx persist or worsen, monitor BP at home elevated today, Enc healthy diet and regular exercise for wt loss, encourage overdue FU with Dr Vilchis

## 2020-06-30 NOTE — PROGRESS NOTES
Subjective   Mo Willoughby is a 64 y.o. male.     History of Present Illness   C/o fall last week on medial L LE going up steps approx 6 days ago with L LE varicose veins and redness and swelling, no pain, no icing, has been elevating in recliner, but not improving with hx of cellulitis last tx 2016 bactrim and keflex and tolerated, NKDA  with chronic HTN on lisinopril 10 mg daily, no CP dizziness HA but LE edema wears compression stockings attributes to prolonged standing working  and venous insufficiency, BP runs similar at home notes elevated today rushing to come to office, with chronic gout last gout flair R finger 05/17 on allopurinol 300 mg daily last uric acid controlled 11/19 following diet but notes weight gain 15 lbs attributes to pandemic  Last colonoscopy 10/16 Dr Deng COLMENARES 2 years d/t polyps no bowel sx, sees 1st urology Dr Walden annual for prostate UTD  S/p cholecystectomy 07/18 and lipoma removal 04/19 Dr Kate no complications  C/o some changing moles on UE doesn't want to see derm at this time d/t pandemic    The following portions of the patient's history were reviewed and updated as appropriate: allergies, current medications, past family history, past medical history, past social history, past surgical history and problem list.    Review of Systems   Constitutional: Negative for fever.   Respiratory: Negative for cough, shortness of breath and wheezing.    Cardiovascular: Negative for chest pain, palpitations and leg swelling.   Skin: Positive for color change.   Neurological: Negative for dizziness and headaches.   All other systems reviewed and are negative.      Objective   Physical Exam   Constitutional: He is oriented to person, place, and time. He appears well-developed and well-nourished.   HENT:   Head: Normocephalic and atraumatic.   Eyes: Pupils are equal, round, and reactive to light. Conjunctivae and EOM are normal.   Cardiovascular: Normal rate, regular rhythm and  normal heart sounds.   Pulmonary/Chest: Effort normal and breath sounds normal.   Musculoskeletal: Normal range of motion.   Neurological: He is alert and oriented to person, place, and time.   Skin: Skin is warm and dry. There is erythema (L medial lower leg inferior patella consistent with contusion and cellulitis, mild ecchymosis, some firm contusion, bulging varicose veins, FROM knee and ankle, normal gait).   Psychiatric: He has a normal mood and affect. His behavior is normal. Judgment and thought content normal.   Vitals reviewed.      Assessment/Plan   Mo was seen today for rash.    Diagnoses and all orders for this visit:    Cellulitis of left lower extremity    Contusion of left lower extremity, initial encounter    Fall, initial encounter    Asymptomatic varicose veins of left lower extremity    Essential hypertension    Class 1 obesity due to excess calories with serious comorbidity and body mass index (BMI) of 32.0 to 32.9 in adult    Other orders  -     sulfamethoxazole-trimethoprim (Bactrim DS) 800-160 MG per tablet; Take 1 tablet by mouth 2 (Two) Times a Day for 10 days.  -     cephalexin (Keflex) 500 MG capsule; Take 1 capsule by mouth 4 (Four) Times a Day for 10 days.    erx bactrim DS BID x 10 days, erx keflex 500 mg QID x 10 days, ice 15 min TID and elevate, monitor and wear compression stocking, discussed contusion and gravity with varicose veins affecting healing, call or RTC if sx persist or worsen, monitor BP at home elevated today, Enc healthy diet and regular exercise for wt loss, encourage overdue FU with Dr Vilchis     Answers for HPI/ROS submitted by the patient on 6/29/2020   What is the primary reason for your visit?: Other  Please describe your symptoms.: Leg is swollen and red ring around it  Have you had these symptoms before?: No  How long have you been having these symptoms?: 1-4 days

## 2020-07-06 ENCOUNTER — TELEPHONE (OUTPATIENT)
Dept: FAMILY MEDICINE CLINIC | Facility: CLINIC | Age: 65
End: 2020-07-06

## 2020-07-06 NOTE — TELEPHONE ENCOUNTER
.PATIENT IS CALLING TO SEE IF HE CAN GO BACK TO WORK AFTER CELLULITIS. HE SAYS HIS FOOT IS MUCH BETTER BUT WANTED TO GET PERMISSION TO GO BACK TO WORK.     PLEASE ADVISE    [phone]

## 2020-07-07 NOTE — TELEPHONE ENCOUNTER
Left message to call back [facility]UB please let patient know he can go back to work if he feels like redness and swelling improved he can return to work.

## 2020-11-24 RX ORDER — LISINOPRIL 10 MG/1
10 TABLET ORAL DAILY
Qty: 90 TABLET | Refills: 3 | Status: SHIPPED | OUTPATIENT
Start: 2020-11-24 | End: 2021-11-18

## 2020-11-24 RX ORDER — ALLOPURINOL 300 MG/1
300 TABLET ORAL DAILY
Qty: 90 TABLET | Refills: 3 | Status: SHIPPED | OUTPATIENT
Start: 2020-11-24 | End: 2021-11-18

## 2021-11-18 RX ORDER — LISINOPRIL 10 MG/1
10 TABLET ORAL DAILY
Qty: 30 TABLET | Refills: 0 | Status: SHIPPED | OUTPATIENT
Start: 2021-11-18 | End: 2021-12-20

## 2021-11-18 RX ORDER — ALLOPURINOL 300 MG/1
300 TABLET ORAL DAILY
Qty: 30 TABLET | Refills: 0 | Status: SHIPPED | OUTPATIENT
Start: 2021-11-18 | End: 2021-12-20

## 2021-12-20 RX ORDER — ALLOPURINOL 300 MG/1
300 TABLET ORAL DAILY
Qty: 30 TABLET | Refills: 0 | Status: SHIPPED | OUTPATIENT
Start: 2021-12-20 | End: 2022-01-21

## 2021-12-20 RX ORDER — LISINOPRIL 10 MG/1
10 TABLET ORAL DAILY
Qty: 30 TABLET | Refills: 0 | Status: SHIPPED | OUTPATIENT
Start: 2021-12-20 | End: 2022-01-21

## 2022-01-20 RX ORDER — ALLOPURINOL 300 MG/1
300 TABLET ORAL DAILY
Qty: 30 TABLET | Refills: 0 | OUTPATIENT
Start: 2022-01-20

## 2022-01-20 RX ORDER — LISINOPRIL 10 MG/1
10 TABLET ORAL DAILY
Qty: 30 TABLET | Refills: 0 | OUTPATIENT
Start: 2022-01-20

## 2022-01-21 RX ORDER — LISINOPRIL 10 MG/1
10 TABLET ORAL DAILY
Qty: 30 TABLET | Refills: 0 | Status: SHIPPED | OUTPATIENT
Start: 2022-01-21 | End: 2022-02-07

## 2022-01-21 RX ORDER — ALLOPURINOL 300 MG/1
300 TABLET ORAL DAILY
Qty: 30 TABLET | Refills: 0 | Status: SHIPPED | OUTPATIENT
Start: 2022-01-21 | End: 2022-02-07 | Stop reason: SDUPTHER

## 2022-01-21 NOTE — TELEPHONE ENCOUNTER
PATIENT MADE AN APPOINTMENT FOR 02/07/22 BUT HE IS TOTALLY OUT OF MEDICATION CAN THIS BE FILLED TODAY?      [id number] - [place], [place] - [address] AT [facility] [address] [facility] - [phone] [facility] [phone]   [phone]

## 2022-02-07 ENCOUNTER — OFFICE VISIT (OUTPATIENT)
Dept: FAMILY MEDICINE CLINIC | Facility: CLINIC | Age: 67
End: 2022-02-07

## 2022-02-07 ENCOUNTER — PATIENT ROUNDING (BHMG ONLY) (OUTPATIENT)
Dept: FAMILY MEDICINE CLINIC | Facility: CLINIC | Age: 67
End: 2022-02-07

## 2022-02-07 VITALS
TEMPERATURE: 98.7 F | OXYGEN SATURATION: 98 % | WEIGHT: 241 LBS | SYSTOLIC BLOOD PRESSURE: 166 MMHG | HEIGHT: 73 IN | HEART RATE: 87 BPM | DIASTOLIC BLOOD PRESSURE: 98 MMHG | BODY MASS INDEX: 31.94 KG/M2

## 2022-02-07 DIAGNOSIS — Z79.899 ENCOUNTER FOR MONITORING ALLOPURINOL THERAPY: ICD-10-CM

## 2022-02-07 DIAGNOSIS — I10 PRIMARY HYPERTENSION: ICD-10-CM

## 2022-02-07 DIAGNOSIS — Z12.5 PROSTATE CANCER SCREENING: ICD-10-CM

## 2022-02-07 DIAGNOSIS — Z87.39 H/O: GOUT: ICD-10-CM

## 2022-02-07 DIAGNOSIS — Z00.00 HEALTHCARE MAINTENANCE: Primary | ICD-10-CM

## 2022-02-07 DIAGNOSIS — Z51.81 ENCOUNTER FOR MONITORING ALLOPURINOL THERAPY: ICD-10-CM

## 2022-02-07 LAB
ALBUMIN SERPL-MCNC: 4.2 G/DL (ref 3.5–5.2)
ALBUMIN/GLOB SERPL: 1.4 G/DL
ALP SERPL-CCNC: 56 U/L (ref 39–117)
ALT SERPL W P-5'-P-CCNC: 36 U/L (ref 1–41)
ANION GAP SERPL CALCULATED.3IONS-SCNC: 12 MMOL/L (ref 5–15)
AST SERPL-CCNC: 80 U/L (ref 1–40)
BILIRUB SERPL-MCNC: 0.8 MG/DL (ref 0–1.2)
BUN SERPL-MCNC: 14 MG/DL (ref 8–23)
BUN/CREAT SERPL: 12.3 (ref 7–25)
CALCIUM SPEC-SCNC: 11.1 MG/DL (ref 8.6–10.5)
CHLORIDE SERPL-SCNC: 105 MMOL/L (ref 98–107)
CHOLEST SERPL-MCNC: 253 MG/DL (ref 0–200)
CO2 SERPL-SCNC: 22 MMOL/L (ref 22–29)
CREAT SERPL-MCNC: 1.14 MG/DL (ref 0.76–1.27)
ERYTHROCYTE [DISTWIDTH] IN BLOOD BY AUTOMATED COUNT: 16.1 % (ref 12.3–15.4)
GFR SERPL CREATININE-BSD FRML MDRD: 65 ML/MIN/1.73
GLOBULIN UR ELPH-MCNC: 3.1 GM/DL
GLUCOSE SERPL-MCNC: 121 MG/DL (ref 65–99)
HCT VFR BLD AUTO: 32.7 % (ref 37.5–51)
HDLC SERPL-MCNC: 68 MG/DL (ref 40–60)
HGB BLD-MCNC: 10.5 G/DL (ref 13–17.7)
LDLC SERPL CALC-MCNC: 163 MG/DL (ref 0–100)
LDLC/HDLC SERPL: 2.36 {RATIO}
LYMPHOCYTES # BLD AUTO: 1.3 10*3/MM3 (ref 0.7–3.1)
LYMPHOCYTES NFR BLD AUTO: 32.9 % (ref 19.6–45.3)
MCH RBC QN AUTO: 33.4 PG (ref 26.6–33)
MCHC RBC AUTO-ENTMCNC: 32.1 G/DL (ref 31.5–35.7)
MCV RBC AUTO: 104.2 FL (ref 79–97)
MONOCYTES # BLD AUTO: 0.4 10*3/MM3 (ref 0.1–0.9)
MONOCYTES NFR BLD AUTO: 9.9 % (ref 5–12)
NEUTROPHILS NFR BLD AUTO: 2.2 10*3/MM3 (ref 1.7–7)
NEUTROPHILS NFR BLD AUTO: 57.2 % (ref 42.7–76)
PLATELET # BLD AUTO: 116 10*3/MM3 (ref 140–450)
PMV BLD AUTO: 8.1 FL (ref 6–12)
POTASSIUM SERPL-SCNC: 4.5 MMOL/L (ref 3.5–5.2)
PROT SERPL-MCNC: 7.3 G/DL (ref 6–8.5)
PSA SERPL-MCNC: 0.42 NG/ML (ref 0–4)
RBC # BLD AUTO: 3.14 10*6/MM3 (ref 4.14–5.8)
SODIUM SERPL-SCNC: 139 MMOL/L (ref 136–145)
TRIGL SERPL-MCNC: 123 MG/DL (ref 0–150)
URATE SERPL-MCNC: 2.5 MG/DL (ref 3.4–7)
VLDLC SERPL-MCNC: 22 MG/DL (ref 5–40)
WBC NRBC COR # BLD: 3.8 10*3/MM3 (ref 3.4–10.8)

## 2022-02-07 PROCEDURE — G0103 PSA SCREENING: HCPCS | Performed by: NURSE PRACTITIONER

## 2022-02-07 PROCEDURE — 80061 LIPID PANEL: CPT | Performed by: NURSE PRACTITIONER

## 2022-02-07 PROCEDURE — 85025 COMPLETE CBC W/AUTO DIFF WBC: CPT | Performed by: NURSE PRACTITIONER

## 2022-02-07 PROCEDURE — 80053 COMPREHEN METABOLIC PANEL: CPT | Performed by: NURSE PRACTITIONER

## 2022-02-07 PROCEDURE — 99214 OFFICE O/P EST MOD 30 MIN: CPT | Performed by: NURSE PRACTITIONER

## 2022-02-07 PROCEDURE — 84550 ASSAY OF BLOOD/URIC ACID: CPT | Performed by: NURSE PRACTITIONER

## 2022-02-07 PROCEDURE — 99396 PREV VISIT EST AGE 40-64: CPT | Performed by: NURSE PRACTITIONER

## 2022-02-07 RX ORDER — LISINOPRIL 20 MG/1
20 TABLET ORAL DAILY
Qty: 90 TABLET | Refills: 0 | Status: SHIPPED | OUTPATIENT
Start: 2022-02-07 | End: 2022-03-09

## 2022-02-07 RX ORDER — ALLOPURINOL 300 MG/1
300 TABLET ORAL DAILY
Qty: 90 TABLET | Refills: 1 | Status: SHIPPED | OUTPATIENT
Start: 2022-02-07 | End: 2022-02-18

## 2022-02-07 NOTE — PATIENT INSTRUCTIONS
"Hypertension, Adult  High blood pressure (hypertension) is when the force of blood pumping through the arteries is too strong. The arteries are the blood vessels that carry blood from the heart throughout the body. Hypertension forces the heart to work harder to pump blood and may cause arteries to become narrow or stiff. Untreated or uncontrolled hypertension can cause a heart attack, heart failure, a stroke, kidney disease, and other problems.  A blood pressure reading consists of a higher number over a lower number. Ideally, your blood pressure should be below 120/80. The first (\"top\") number is called the systolic pressure. It is a measure of the pressure in your arteries as your heart beats. The second (\"bottom\") number is called the diastolic pressure. It is a measure of the pressure in your arteries as the heart relaxes.  What are the causes?  The exact cause of this condition is not known. There are some conditions that result in or are related to high blood pressure.  What increases the risk?  Some risk factors for high blood pressure are under your control. The following factors may make you more likely to develop this condition:  · Smoking.  · Having type 2 diabetes mellitus, high cholesterol, or both.  · Not getting enough exercise or physical activity.  · Being overweight.  · Having too much fat, sugar, calories, or salt (sodium) in your diet.  · Drinking too much alcohol.  Some risk factors for high blood pressure may be difficult or impossible to change. Some of these factors include:  · Having chronic kidney disease.  · Having a family history of high blood pressure.  · Age. Risk increases with age.  · Race. You may be at higher risk if you are .  · Gender. Men are at higher risk than women before age 45. After age 65, women are at higher risk than men.  · Having obstructive sleep apnea.  · Stress.  What are the signs or symptoms?  High blood pressure may not cause symptoms. Very high " blood pressure (hypertensive crisis) may cause:  · Headache.  · Anxiety.  · Shortness of breath.  · Nosebleed.  · Nausea and vomiting.  · Vision changes.  · Severe chest pain.  · Seizures.  How is this diagnosed?  This condition is diagnosed by measuring your blood pressure while you are seated, with your arm resting on a flat surface, your legs uncrossed, and your feet flat on the floor. The cuff of the blood pressure monitor will be placed directly against the skin of your upper arm at the level of your heart. It should be measured at least twice using the same arm. Certain conditions can cause a difference in blood pressure between your right and left arms.  Certain factors can cause blood pressure readings to be lower or higher than normal for a short period of time:  · When your blood pressure is higher when you are in a health care provider's office than when you are at home, this is called white coat hypertension. Most people with this condition do not need medicines.  · When your blood pressure is higher at home than when you are in a health care provider's office, this is called masked hypertension. Most people with this condition may need medicines to control blood pressure.  If you have a high blood pressure reading during one visit or you have normal blood pressure with other risk factors, you may be asked to:  · Return on a different day to have your blood pressure checked again.  · Monitor your blood pressure at home for 1 week or longer.  If you are diagnosed with hypertension, you may have other blood or imaging tests to help your health care provider understand your overall risk for other conditions.  How is this treated?  This condition is treated by making healthy lifestyle changes, such as eating healthy foods, exercising more, and reducing your alcohol intake. Your health care provider may prescribe medicine if lifestyle changes are not enough to get your blood pressure under control, and  if:  · Your systolic blood pressure is above 130.  · Your diastolic blood pressure is above 80.  Your personal target blood pressure may vary depending on your medical conditions, your age, and other factors.  Follow these instructions at home:  Eating and drinking    · Eat a diet that is high in fiber and potassium, and low in sodium, added sugar, and fat. An example eating plan is called the DASH (Dietary Approaches to Stop Hypertension) diet. To eat this way:  ? Eat plenty of fresh fruits and vegetables. Try to fill one half of your plate at each meal with fruits and vegetables.  ? Eat whole grains, such as whole-wheat pasta, brown rice, or whole-grain bread. Fill about one fourth of your plate with whole grains.  ? Eat or drink low-fat dairy products, such as skim milk or low-fat yogurt.  ? Avoid fatty cuts of meat, processed or cured meats, and poultry with skin. Fill about one fourth of your plate with lean proteins, such as fish, chicken without skin, beans, eggs, or tofu.  ? Avoid pre-made and processed foods. These tend to be higher in sodium, added sugar, and fat.  · Reduce your daily sodium intake. Most people with hypertension should eat less than 1,500 mg of sodium a day.  · Do not drink alcohol if:  ? Your health care provider tells you not to drink.  ? You are pregnant, may be pregnant, or are planning to become pregnant.  · If you drink alcohol:  ? Limit how much you use to:  § 0-1 drink a day for women.  § 0-2 drinks a day for men.  ? Be aware of how much alcohol is in your drink. In the [place] one drink equals one 12 oz bottle of beer (355 mL), one 5 oz glass of wine (148 mL), or one 1½ oz glass of hard liquor (44 mL).    Lifestyle    · Work with your health care provider to maintain a healthy body weight or to lose weight. Ask what an ideal weight is for you.  · Get at least 30 minutes of exercise most days of the week. Activities may include walking, swimming, or biking.  · Include exercise to  strengthen your muscles (resistance exercise), such as Pilates or lifting weights, as part of your weekly exercise routine. Try to do these types of exercises for 30 minutes at least 3 days a week.  · Do not use any products that contain nicotine or tobacco, such as cigarettes, e-cigarettes, and chewing tobacco. If you need help quitting, ask your health care provider.  · Monitor your blood pressure at home as told by your health care provider.  · Keep all follow-up visits as told by your health care provider. This is important.    Medicines  · Take over-the-counter and prescription medicines only as told by your health care provider. Follow directions carefully. Blood pressure medicines must be taken as prescribed.  · Do not skip doses of blood pressure medicine. Doing this puts you at risk for problems and can make the medicine less effective.  · Ask your health care provider about side effects or reactions to medicines that you should watch for.  Contact a health care provider if you:  · Think you are having a reaction to a medicine you are taking.  · Have headaches that keep coming back (recurring).  · Feel dizzy.  · Have swelling in your ankles.  · Have trouble with your vision.  Get help right away if you:  · Develop a severe headache or confusion.  · Have unusual weakness or numbness.  · Feel faint.  · Have severe pain in your chest or abdomen.  · Vomit repeatedly.  · Have trouble breathing.  Summary  · Hypertension is when the force of blood pumping through your arteries is too strong. If this condition is not controlled, it may put you at risk for serious complications.  · Your personal target blood pressure may vary depending on your medical conditions, your age, and other factors. For most people, a normal blood pressure is less than 120/80.  · Hypertension is treated with lifestyle changes, medicines, or a combination of both. Lifestyle changes include losing weight, eating a healthy, low-sodium diet,  "exercising more, and limiting alcohol.  This information is not intended to replace advice given to you by your health care provider. Make sure you discuss any questions you have with your health care provider.  Document Revised: 08/28/2019 Document Reviewed: 08/28/2019  [employer] Patient Education © 2021 [employer]  https://www.nhlbi.nih.gov/files/docs/public/heart/dash_brief.pdf\">   DASH Eating Plan  DASH stands for Dietary Approaches to Stop Hypertension. The DASH eating plan is a healthy eating plan that has been shown to:  · Reduce high blood pressure (hypertension).  · Reduce your risk for type 2 diabetes, heart disease, and stroke.  · Help with weight loss.  What are tips for following this plan?  Reading food labels  · Check food labels for the amount of salt (sodium) per serving. Choose foods with less than 5 percent of the Daily Value of sodium. Generally, foods with less than 300 milligrams (mg) of sodium per serving fit into this eating plan.  · To find whole grains, look for the word \"whole\" as the first word in the ingredient list.  Shopping  · Buy products labeled as \"low-sodium\" or \"no salt added.\"  · Buy fresh foods. Avoid canned foods and pre-made or frozen meals.  Cooking  · Avoid adding salt when cooking. Use salt-free seasonings or herbs instead of table salt or sea salt. Check with your health care provider or pharmacist before using salt substitutes.  · Do not delcid foods. Cook foods using healthy methods such as baking, boiling, grilling, roasting, and broiling instead.  · Cook with heart-healthy oils, such as olive, canola, avocado, soybean, or sunflower oil.  Meal planning    · Eat a balanced diet that includes:  ? 4 or more servings of fruits and 4 or more servings of vegetables each day. Try to fill one-half of your plate with fruits and vegetables.  ? 6-8 servings of whole grains each day.  ? Less than 6 oz (170 g) of lean meat, poultry, or fish each day. A 3-oz (85-g) serving of meat is " about the same size as a deck of cards. One egg equals 1 oz (28 g).  ? 2-3 servings of low-fat dairy each day. One serving is 1 cup (237 mL).  ? 1 serving of nuts, seeds, or beans 5 times each week.  ? 2-3 servings of heart-healthy fats. Healthy fats called omega-3 fatty acids are found in foods such as walnuts, flaxseeds, fortified milks, and eggs. These fats are also found in cold-water fish, such as sardines, salmon, and mackerel.  · Limit how much you eat of:  ? Canned or prepackaged foods.  ? Food that is high in trans fat, such as some fried foods.  ? Food that is high in saturated fat, such as fatty meat.  ? Desserts and other sweets, sugary drinks, and other foods with added sugar.  ? Full-fat dairy products.  · Do not salt foods before eating.  · Do not eat more than 4 egg yolks a week.  · Try to eat at least 2 vegetarian meals a week.  · Eat more home-cooked food and less restaurant, buffet, and fast food.    Lifestyle  · When eating at a restaurant, ask that your food be prepared with less salt or no salt, if possible.  · If you drink alcohol:  ? Limit how much you use to:  § 0-1 drink a day for women who are not pregnant.  § 0-2 drinks a day for men.  ? Be aware of how much alcohol is in your drink. In the [place] one drink equals one 12 oz bottle of beer (355 mL), one 5 oz glass of wine (148 mL), or one 1½ oz glass of hard liquor (44 mL).  General information  · Avoid eating more than 2,300 mg of salt a day. If you have hypertension, you may need to reduce your sodium intake to 1,500 mg a day.  · Work with your health care provider to maintain a healthy body weight or to lose weight. Ask what an ideal weight is for you.  · Get at least 30 minutes of exercise that causes your heart to beat faster (aerobic exercise) most days of the week. Activities may include walking, swimming, or biking.  · Work with your health care provider or dietitian to adjust your eating plan to your individual calorie needs.  What  foods should I eat?  Fruits  All fresh, dried, or frozen fruit. Canned fruit in natural juice (without added sugar).  Vegetables  Fresh or frozen vegetables (raw, steamed, roasted, or grilled). Low-sodium or reduced-sodium tomato and vegetable juice. Low-sodium or reduced-sodium tomato sauce and tomato paste. Low-sodium or reduced-sodium canned vegetables.  Grains  Whole-grain or whole-wheat bread. Whole-grain or whole-wheat pasta. Brown rice. Oatmeal. Quinoa. Bulgur. Whole-grain and low-sodium cereals. Joy bread. Low-fat, low-sodium crackers. Whole-wheat flour tortillas.  Meats and other proteins  Skinless chicken or turkey. Ground chicken or turkey. Pork with fat trimmed off. Fish and seafood. Egg whites. Dried beans, peas, or lentils. Unsalted nuts, nut butters, and seeds. Unsalted canned beans. Lean cuts of beef with fat trimmed off. Low-sodium, lean precooked or cured meat, such as sausages or meat loaves.  Dairy  Low-fat (1%) or fat-free (skim) milk. Reduced-fat, low-fat, or fat-free cheeses. Nonfat, low-sodium ricotta or cottage cheese. Low-fat or nonfat yogurt. Low-fat, low-sodium cheese.  Fats and oils  Soft margarine without trans fats. Vegetable oil. Reduced-fat, low-fat, or light mayonnaise and salad dressings (reduced-sodium). Canola, safflower, olive, avocado, soybean, and sunflower oils. Avocado.  Seasonings and condiments  Herbs. Spices. Seasoning mixes without salt.  Other foods  Unsalted popcorn and pretzels. Fat-free sweets.  The items listed above may not be a complete list of foods and beverages you can eat. Contact a dietitian for more information.  What foods should I avoid?  Fruits  Canned fruit in a light or heavy syrup. Fried fruit. Fruit in cream or butter sauce.  Vegetables  Creamed or fried vegetables. Vegetables in a cheese sauce. Regular canned vegetables (not low-sodium or reduced-sodium). Regular canned tomato sauce and paste (not low-sodium or reduced-sodium). Regular tomato and  vegetable juice (not low-sodium or reduced-sodium). Pickles. Olives.  Grains  Baked goods made with fat, such as croissants, muffins, or some breads. Dry pasta or rice meal packs.  Meats and other proteins  Fatty cuts of meat. Ribs. Fried meat. Greene. Bologna, salami, and other precooked or cured meats, such as sausages or meat loaves. Fat from the back of a pig (fatback). Bratwurst. Salted nuts and seeds. Canned beans with added salt. Canned or smoked fish. Whole eggs or egg yolks. Chicken or turkey with skin.  Dairy  Whole or 2% milk, cream, and half-and-half. Whole or full-fat cream cheese. Whole-fat or sweetened yogurt. Full-fat cheese. Nondairy creamers. Whipped toppings. Processed cheese and cheese spreads.  Fats and oils  Butter. Stick margarine. Lard. Shortening. Ghee. Greene fat. Tropical oils, such as coconut, palm kernel, or palm oil.  Seasonings and condiments  Onion salt, garlic salt, seasoned salt, table salt, and sea salt. Worcestershire sauce. Tartar sauce. Barbecue sauce. Teriyaki sauce. Soy sauce, including reduced-sodium. Steak sauce. Canned and packaged gravies. Fish sauce. Oyster sauce. Cocktail sauce. Store-bought horseradish. Ketchup. Mustard. Meat flavorings and tenderizers. Bouillon cubes. Hot sauces. Pre-made or packaged marinades. Pre-made or packaged taco seasonings. Relishes. Regular salad dressings.  Other foods  Salted popcorn and pretzels.  The items listed above may not be a complete list of foods and beverages you should avoid. Contact a dietitian for more information.  Where to find more information  · National Heart, Lung, and Blood [place]: www.nhlbi.nih.gov  · American Heart Association: www.heart.org  · Academy of Nutrition and Dietetics: www.eatright.org  · National Kidney Foundation: www.kidney.org  Summary  · The DASH eating plan is a healthy eating plan that has been shown to reduce high blood pressure (hypertension). It may also reduce your risk for type 2 diabetes, heart  disease, and stroke.  · When on the DASH eating plan, aim to eat more fresh fruits and vegetables, whole grains, lean proteins, low-fat dairy, and heart-healthy fats.  · With the DASH eating plan, you should limit salt (sodium) intake to 2,300 mg a day. If you have hypertension, you may need to reduce your sodium intake to 1,500 mg a day.  · Work with your health care provider or dietitian to adjust your eating plan to your individual calorie needs.  This information is not intended to replace advice given to you by your health care provider. Make sure you discuss any questions you have with your health care provider.  Document Revised: 11/20/2020 Document Reviewed: 11/20/2020  Elsevier Patient Education © 2021 [employer].

## 2022-02-07 NOTE — PROGRESS NOTES
February 7, 2022    Hello, may I speak with Mo Willoughby?    My name is Sallie    I am  with [facility]  [address] [place] [id number] [phone].    Before we get started may I verify your date of birth? 1955    I am calling to officially welcome you to our practice and ask about your recent visit. Is this a good time to talk? Yes, spoke with tonya    Tell me about your visit with us. What things went well? Everything went well. Nervous about new location, but pleasantly surprised.        We're always looking for ways to make our patients' experiences even better. Do you have recommendations on ways we may improve?  No    Overall were you satisfied with your first visit to our practice? Yes       I appreciate you taking the time to speak with me today. Is there anything else I can do for you? No      Thank you, and have a great day.

## 2022-02-07 NOTE — PROGRESS NOTES
"Chief Complaint  new pcp off board Nevaeh Wheelere Case presents to [facility]  History of Present Illness   66 yr old male, former Nevaeh pt, new to me, presenting to [facility] care and annual exam.  With hypertension , takes lisinopril mg QD, states monitors at home with averages 130-140's/80's, will increase lisinopril to 20 mg QD.  With history of gout, takes allopurinol 300 mg daily, last uric acid 2.8 on 11/18/19. Last , Trigly 173, HDL 64,  on 11/18/19. Colonoscopy completed 10/3/16, repeat 2026. Last PSA 0.550 on 7/1/16. Denies SOA, CP, HA, dizziness, LE edema.     Objective   Vital Signs:   /98   Pulse 87   Temp 98.7 °F (37.1 °C)   Ht 185.4 cm (73\")   Wt 109 kg (241 lb)   SpO2 98%   BMI 31.80 kg/m²     Physical Exam  Vitals and nursing note reviewed.   Constitutional:       Appearance: He is well-developed.   HENT:      Head: Normocephalic.   Eyes:      Pupils: Pupils are equal, round, and reactive to light.   Cardiovascular:      Rate and Rhythm: Normal rate and regular rhythm.      Heart sounds: Normal heart sounds.   Pulmonary:      Effort: Pulmonary effort is normal.      Breath sounds: Normal breath sounds.   Abdominal:      General: Bowel sounds are normal.      Palpations: Abdomen is soft.   Musculoskeletal:         General: Normal range of motion.      Cervical back: Normal range of motion and neck supple.   Skin:     General: Skin is warm and dry.      Findings: No rash.   Neurological:      Mental Status: He is alert and oriented to person, place, and time.   Psychiatric:         Behavior: Behavior normal.         Thought Content: Thought content normal.         Judgment: Judgment normal.        Result Review :                 Assessment and Plan    Diagnoses and all orders for this visit:    1. Healthcare maintenance (Primary)  -     Lipid Panel  -     CBC w AUTO Differential  -     Comprehensive metabolic panel  -     " PSA Screen    2. Primary hypertension  -     Lipid Panel  -     CBC w AUTO Differential  -     Comprehensive metabolic panel  -     PSA Screen  -     lisinopril (PRINIVIL,ZESTRIL) 20 MG tablet; Take 1 tablet by mouth Daily.  Dispense: 90 tablet; Refill: 0    3. Prostate cancer screening  -     PSA Screen    4. H/O: gout on allopurinol  -     allopurinol (ZYLOPRIM) 300 MG tablet; Take 1 tablet by mouth Daily.  Dispense: 90 tablet; Refill: 1  -     Uric Acid    5. Encounter for monitoring allopurinol therapy  -     Uric Acid        Follow Up   Return in about 2 weeks (around 2/21/2022) for Recheck.  Patient was given instructions and counseling regarding his condition or for health maintenance advice. Please see specific information pulled into the AVS if appropriate.

## 2022-02-08 RX ORDER — EZETIMIBE 10 MG/1
10 TABLET ORAL DAILY
Qty: 90 TABLET | Refills: 0 | Status: SHIPPED | OUTPATIENT
Start: 2022-02-08 | End: 2022-05-02

## 2022-02-18 ENCOUNTER — TELEPHONE (OUTPATIENT)
Dept: FAMILY MEDICINE CLINIC | Facility: CLINIC | Age: 67
End: 2022-02-18

## 2022-02-18 DIAGNOSIS — Z87.39 H/O: GOUT: ICD-10-CM

## 2022-02-18 RX ORDER — ALLOPURINOL 300 MG/1
300 TABLET ORAL DAILY
Qty: 30 TABLET | Refills: 0 | Status: SHIPPED | OUTPATIENT
Start: 2022-02-18 | End: 2022-02-18 | Stop reason: SDUPTHER

## 2022-02-18 RX ORDER — ALLOPURINOL 300 MG/1
300 TABLET ORAL DAILY
Qty: 90 TABLET | Refills: 1 | Status: SHIPPED | OUTPATIENT
Start: 2022-02-18 | End: 2023-02-16 | Stop reason: SDUPTHER

## 2022-02-18 NOTE — TELEPHONE ENCOUNTER
Pharmacy Name: [id number] - [place], [place] - [address] [facility] [address] [facility] [phone] [facility] [phone]      Pharmacy representative name: ANNABELLE    Pharmacy representative phone number: [phone]    What medication are you calling in regards to: allopurinol (ZYLOPRIM) 300 MG tablet    What question does the pharmacy have: PATIENT WOULD LIKE A 90 DAY INSTEAD OF THE 30 DAY THAT WAS CALLED IN. PHARMACY WAS CHECKING TO SEE IF IT WAS OKAY TO FILL IT AS A 90 DAY SUPPLY.     Who is the provider that prescribed the medication: VENKATA DE LA FUENTE

## 2022-02-23 ENCOUNTER — OFFICE VISIT (OUTPATIENT)
Dept: FAMILY MEDICINE CLINIC | Facility: CLINIC | Age: 67
End: 2022-02-23

## 2022-02-23 VITALS
OXYGEN SATURATION: 99 % | BODY MASS INDEX: 31.81 KG/M2 | SYSTOLIC BLOOD PRESSURE: 160 MMHG | HEIGHT: 73 IN | WEIGHT: 240 LBS | TEMPERATURE: 97.7 F | HEART RATE: 99 BPM | DIASTOLIC BLOOD PRESSURE: 96 MMHG

## 2022-02-23 DIAGNOSIS — Z09 FOLLOW-UP EXAM: ICD-10-CM

## 2022-02-23 DIAGNOSIS — I10 ESSENTIAL HYPERTENSION: Primary | ICD-10-CM

## 2022-02-23 PROCEDURE — 99213 OFFICE O/P EST LOW 20 MIN: CPT | Performed by: NURSE PRACTITIONER

## 2022-02-23 RX ORDER — HYDROCHLOROTHIAZIDE 12.5 MG/1
12.5 TABLET ORAL DAILY
Qty: 30 TABLET | Refills: 0 | Status: SHIPPED | OUTPATIENT
Start: 2022-02-23 | End: 2022-03-09

## 2022-02-23 NOTE — PATIENT INSTRUCTIONS
"https://www.nhlbi.nih.gov/files/docs/public/heart/dash_brief.pdf\">   DASH Eating Plan  DASH stands for Dietary Approaches to Stop Hypertension. The DASH eating plan is a healthy eating plan that has been shown to:  · Reduce high blood pressure (hypertension).  · Reduce your risk for type 2 diabetes, heart disease, and stroke.  · Help with weight loss.  What are tips for following this plan?  Reading food labels  · Check food labels for the amount of salt (sodium) per serving. Choose foods with less than 5 percent of the Daily Value of sodium. Generally, foods with less than 300 milligrams (mg) of sodium per serving fit into this eating plan.  · To find whole grains, look for the word \"whole\" as the first word in the ingredient list.  Shopping  · Buy products labeled as \"low-sodium\" or \"no salt added.\"  · Buy fresh foods. Avoid canned foods and pre-made or frozen meals.  Cooking  · Avoid adding salt when cooking. Use salt-free seasonings or herbs instead of table salt or sea salt. Check with your health care provider or pharmacist before using salt substitutes.  · Do not delcid foods. Cook foods using healthy methods such as baking, boiling, grilling, roasting, and broiling instead.  · Cook with heart-healthy oils, such as olive, canola, avocado, soybean, or sunflower oil.  Meal planning    · Eat a balanced diet that includes:  ? 4 or more servings of fruits and 4 or more servings of vegetables each day. Try to fill one-half of your plate with fruits and vegetables.  ? 6-8 servings of whole grains each day.  ? Less than 6 oz (170 g) of lean meat, poultry, or fish each day. A 3-oz (85-g) serving of meat is about the same size as a deck of cards. One egg equals 1 oz (28 g).  ? 2-3 servings of low-fat dairy each day. One serving is 1 cup (237 mL).  ? 1 serving of nuts, seeds, or beans 5 times each week.  ? 2-3 servings of heart-healthy fats. Healthy fats called omega-3 fatty acids are found in foods such as walnuts, " flaxseeds, fortified milks, and eggs. These fats are also found in cold-water fish, such as sardines, salmon, and mackerel.  · Limit how much you eat of:  ? Canned or prepackaged foods.  ? Food that is high in trans fat, such as some fried foods.  ? Food that is high in saturated fat, such as fatty meat.  ? Desserts and other sweets, sugary drinks, and other foods with added sugar.  ? Full-fat dairy products.  · Do not salt foods before eating.  · Do not eat more than 4 egg yolks a week.  · Try to eat at least 2 vegetarian meals a week.  · Eat more home-cooked food and less restaurant, buffet, and fast food.    Lifestyle  · When eating at a restaurant, ask that your food be prepared with less salt or no salt, if possible.  · If you drink alcohol:  ? Limit how much you use to:  § 0-1 drink a day for women who are not pregnant.  § 0-2 drinks a day for men.  ? Be aware of how much alcohol is in your drink. In the [place] one drink equals one 12 oz bottle of beer (355 mL), one 5 oz glass of wine (148 mL), or one 1½ oz glass of hard liquor (44 mL).  General information  · Avoid eating more than 2,300 mg of salt a day. If you have hypertension, you may need to reduce your sodium intake to 1,500 mg a day.  · Work with your health care provider to maintain a healthy body weight or to lose weight. Ask what an ideal weight is for you.  · Get at least 30 minutes of exercise that causes your heart to beat faster (aerobic exercise) most days of the week. Activities may include walking, swimming, or biking.  · Work with your health care provider or dietitian to adjust your eating plan to your individual calorie needs.  What foods should I eat?  Fruits  All fresh, dried, or frozen fruit. Canned fruit in natural juice (without added sugar).  Vegetables  Fresh or frozen vegetables (raw, steamed, roasted, or grilled). Low-sodium or reduced-sodium tomato and vegetable juice. Low-sodium or reduced-sodium tomato sauce and tomato paste.  Low-sodium or reduced-sodium canned vegetables.  Grains  Whole-grain or whole-wheat bread. Whole-grain or whole-wheat pasta. Brown rice. Oatmeal. Quinoa. Bulgur. Whole-grain and low-sodium cereals. Joy bread. Low-fat, low-sodium crackers. Whole-wheat flour tortillas.  Meats and other proteins  Skinless chicken or turkey. Ground chicken or turkey. Pork with fat trimmed off. Fish and seafood. Egg whites. Dried beans, peas, or lentils. Unsalted nuts, nut butters, and seeds. Unsalted canned beans. Lean cuts of beef with fat trimmed off. Low-sodium, lean precooked or cured meat, such as sausages or meat loaves.  Dairy  Low-fat (1%) or fat-free (skim) milk. Reduced-fat, low-fat, or fat-free cheeses. Nonfat, low-sodium ricotta or cottage cheese. Low-fat or nonfat yogurt. Low-fat, low-sodium cheese.  Fats and oils  Soft margarine without trans fats. Vegetable oil. Reduced-fat, low-fat, or light mayonnaise and salad dressings (reduced-sodium). Canola, safflower, olive, avocado, soybean, and sunflower oils. Avocado.  Seasonings and condiments  Herbs. Spices. Seasoning mixes without salt.  Other foods  Unsalted popcorn and pretzels. Fat-free sweets.  The items listed above may not be a complete list of foods and beverages you can eat. Contact a dietitian for more information.  What foods should I avoid?  Fruits  Canned fruit in a light or heavy syrup. Fried fruit. Fruit in cream or butter sauce.  Vegetables  Creamed or fried vegetables. Vegetables in a cheese sauce. Regular canned vegetables (not low-sodium or reduced-sodium). Regular canned tomato sauce and paste (not low-sodium or reduced-sodium). Regular tomato and vegetable juice (not low-sodium or reduced-sodium). Pickles. Olives.  Grains  Baked goods made with fat, such as croissants, muffins, or some breads. Dry pasta or rice meal packs.  Meats and other proteins  Fatty cuts of meat. Ribs. Fried meat. Greene. Bologna, salami, and other precooked or cured meats, such as  sausages or meat loaves. Fat from the back of a pig (fatback). Bratwurst. Salted nuts and seeds. Canned beans with added salt. Canned or smoked fish. Whole eggs or egg yolks. Chicken or turkey with skin.  Dairy  Whole or 2% milk, cream, and half-and-half. Whole or full-fat cream cheese. Whole-fat or sweetened yogurt. Full-fat cheese. Nondairy creamers. Whipped toppings. Processed cheese and cheese spreads.  Fats and oils  Butter. Stick margarine. Lard. Shortening. Ghee. Greene fat. Tropical oils, such as coconut, palm kernel, or palm oil.  Seasonings and condiments  Onion salt, garlic salt, seasoned salt, table salt, and sea salt. Worcestershire sauce. Tartar sauce. Barbecue sauce. Teriyaki sauce. Soy sauce, including reduced-sodium. Steak sauce. Canned and packaged gravies. Fish sauce. Oyster sauce. Cocktail sauce. Store-bought horseradish. Ketchup. Mustard. Meat flavorings and tenderizers. Bouillon cubes. Hot sauces. Pre-made or packaged marinades. Pre-made or packaged taco seasonings. Relishes. Regular salad dressings.  Other foods  Salted popcorn and pretzels.  The items listed above may not be a complete list of foods and beverages you should avoid. Contact a dietitian for more information.  Where to find more information  · National Heart, Lung, and Blood [place]: www.nhlbi.nih.gov  · American Heart Association: www.heart.org  · Academy of Nutrition and Dietetics: www.eatright.org  · National Kidney Foundation: www.kidney.org  Summary  · The DASH eating plan is a healthy eating plan that has been shown to reduce high blood pressure (hypertension). It may also reduce your risk for type 2 diabetes, heart disease, and stroke.  · When on the DASH eating plan, aim to eat more fresh fruits and vegetables, whole grains, lean proteins, low-fat dairy, and heart-healthy fats.  · With the DASH eating plan, you should limit salt (sodium) intake to 2,300 mg a day. If you have hypertension, you may need to reduce your  "sodium intake to 1,500 mg a day.  · Work with your health care provider or dietitian to adjust your eating plan to your individual calorie needs.  This information is not intended to replace advice given to you by your health care provider. Make sure you discuss any questions you have with your health care provider.  Document Revised: 11/20/2020 Document Reviewed: 11/20/2020  [employer] Patient Education © 2021 [employer]  Hypertension, Adult  High blood pressure (hypertension) is when the force of blood pumping through the arteries is too strong. The arteries are the blood vessels that carry blood from the heart throughout the body. Hypertension forces the heart to work harder to pump blood and may cause arteries to become narrow or stiff. Untreated or uncontrolled hypertension can cause a heart attack, heart failure, a stroke, kidney disease, and other problems.  A blood pressure reading consists of a higher number over a lower number. Ideally, your blood pressure should be below 120/80. The first (\"top\") number is called the systolic pressure. It is a measure of the pressure in your arteries as your heart beats. The second (\"bottom\") number is called the diastolic pressure. It is a measure of the pressure in your arteries as the heart relaxes.  What are the causes?  The exact cause of this condition is not known. There are some conditions that result in or are related to high blood pressure.  What increases the risk?  Some risk factors for high blood pressure are under your control. The following factors may make you more likely to develop this condition:  · Smoking.  · Having type 2 diabetes mellitus, high cholesterol, or both.  · Not getting enough exercise or physical activity.  · Being overweight.  · Having too much fat, sugar, calories, or salt (sodium) in your diet.  · Drinking too much alcohol.  Some risk factors for high blood pressure may be difficult or impossible to change. Some of these factors " include:  · Having chronic kidney disease.  · Having a family history of high blood pressure.  · Age. Risk increases with age.  · Race. You may be at higher risk if you are .  · Gender. Men are at higher risk than women before age 45. After age 65, women are at higher risk than men.  · Having obstructive sleep apnea.  · Stress.  What are the signs or symptoms?  High blood pressure may not cause symptoms. Very high blood pressure (hypertensive crisis) may cause:  · Headache.  · Anxiety.  · Shortness of breath.  · Nosebleed.  · Nausea and vomiting.  · Vision changes.  · Severe chest pain.  · Seizures.  How is this diagnosed?  This condition is diagnosed by measuring your blood pressure while you are seated, with your arm resting on a flat surface, your legs uncrossed, and your feet flat on the floor. The cuff of the blood pressure monitor will be placed directly against the skin of your upper arm at the level of your heart. It should be measured at least twice using the same arm. Certain conditions can cause a difference in blood pressure between your right and left arms.  Certain factors can cause blood pressure readings to be lower or higher than normal for a short period of time:  · When your blood pressure is higher when you are in a health care provider's office than when you are at home, this is called white coat hypertension. Most people with this condition do not need medicines.  · When your blood pressure is higher at home than when you are in a health care provider's office, this is called masked hypertension. Most people with this condition may need medicines to control blood pressure.  If you have a high blood pressure reading during one visit or you have normal blood pressure with other risk factors, you may be asked to:  · Return on a different day to have your blood pressure checked again.  · Monitor your blood pressure at home for 1 week or longer.  If you are diagnosed with  hypertension, you may have other blood or imaging tests to help your health care provider understand your overall risk for other conditions.  How is this treated?  This condition is treated by making healthy lifestyle changes, such as eating healthy foods, exercising more, and reducing your alcohol intake. Your health care provider may prescribe medicine if lifestyle changes are not enough to get your blood pressure under control, and if:  · Your systolic blood pressure is above 130.  · Your diastolic blood pressure is above 80.  Your personal target blood pressure may vary depending on your medical conditions, your age, and other factors.  Follow these instructions at home:  Eating and drinking    · Eat a diet that is high in fiber and potassium, and low in sodium, added sugar, and fat. An example eating plan is called the DASH (Dietary Approaches to Stop Hypertension) diet. To eat this way:  ? Eat plenty of fresh fruits and vegetables. Try to fill one half of your plate at each meal with fruits and vegetables.  ? Eat whole grains, such as whole-wheat pasta, brown rice, or whole-grain bread. Fill about one fourth of your plate with whole grains.  ? Eat or drink low-fat dairy products, such as skim milk or low-fat yogurt.  ? Avoid fatty cuts of meat, processed or cured meats, and poultry with skin. Fill about one fourth of your plate with lean proteins, such as fish, chicken without skin, beans, eggs, or tofu.  ? Avoid pre-made and processed foods. These tend to be higher in sodium, added sugar, and fat.  · Reduce your daily sodium intake. Most people with hypertension should eat less than 1,500 mg of sodium a day.  · Do not drink alcohol if:  ? Your health care provider tells you not to drink.  ? You are pregnant, may be pregnant, or are planning to become pregnant.  · If you drink alcohol:  ? Limit how much you use to:  § 0-1 drink a day for women.  § 0-2 drinks a day for men.  ? Be aware of how much alcohol is in  your drink. In the [place] one drink equals one 12 oz bottle of beer (355 mL), one 5 oz glass of wine (148 mL), or one 1½ oz glass of hard liquor (44 mL).    Lifestyle    · Work with your health care provider to maintain a healthy body weight or to lose weight. Ask what an ideal weight is for you.  · Get at least 30 minutes of exercise most days of the week. Activities may include walking, swimming, or biking.  · Include exercise to strengthen your muscles (resistance exercise), such as Pilates or lifting weights, as part of your weekly exercise routine. Try to do these types of exercises for 30 minutes at least 3 days a week.  · Do not use any products that contain nicotine or tobacco, such as cigarettes, e-cigarettes, and chewing tobacco. If you need help quitting, ask your health care provider.  · Monitor your blood pressure at home as told by your health care provider.  · Keep all follow-up visits as told by your health care provider. This is important.    Medicines  · Take over-the-counter and prescription medicines only as told by your health care provider. Follow directions carefully. Blood pressure medicines must be taken as prescribed.  · Do not skip doses of blood pressure medicine. Doing this puts you at risk for problems and can make the medicine less effective.  · Ask your health care provider about side effects or reactions to medicines that you should watch for.  Contact a health care provider if you:  · Think you are having a reaction to a medicine you are taking.  · Have headaches that keep coming back (recurring).  · Feel dizzy.  · Have swelling in your ankles.  · Have trouble with your vision.  Get help right away if you:  · Develop a severe headache or confusion.  · Have unusual weakness or numbness.  · Feel faint.  · Have severe pain in your chest or abdomen.  · Vomit repeatedly.  · Have trouble breathing.  Summary  · Hypertension is when the force of blood pumping through your arteries is too  strong. If this condition is not controlled, it may put you at risk for serious complications.  · Your personal target blood pressure may vary depending on your medical conditions, your age, and other factors. For most people, a normal blood pressure is less than 120/80.  · Hypertension is treated with lifestyle changes, medicines, or a combination of both. Lifestyle changes include losing weight, eating a healthy, low-sodium diet, exercising more, and limiting alcohol.  This information is not intended to replace advice given to you by your health care provider. Make sure you discuss any questions you have with your health care provider.  Document Revised: 08/28/2019 Document Reviewed: 08/28/2019  [employer] Patient Education © 2021 [employer]

## 2022-02-23 NOTE — PROGRESS NOTES
"Chief Complaint  2 weeks f/u HTN    Subjective          Mo Willoughby presents to [facility]  History of Present Illness   66-year-old male presenting for follow-up on blood pressure, started on lisinopril 20 mg QD, checks BP at home with average 140-150's/80's, will add HCTZ 12.5 to current regimen and he is to follow-up in 2 weeks. Denies SOA, CP, dizziness or LE edema.     Objective   Vital Signs:   /96   Pulse 99   Temp 97.7 °F (36.5 °C)   Ht 185.4 cm (73\")   Wt 109 kg (240 lb)   SpO2 99%   BMI 31.66 kg/m²     Physical Exam  HENT:      Head: Normocephalic.   Cardiovascular:      Rate and Rhythm: Normal rate and regular rhythm.      Pulses: Normal pulses.      Heart sounds: Normal heart sounds.   Pulmonary:      Effort: Pulmonary effort is normal.      Breath sounds: Normal breath sounds.   Abdominal:      General: Bowel sounds are normal.      Palpations: Abdomen is soft.   Neurological:      General: No focal deficit present.      Mental Status: He is alert and oriented to person, place, and time.        Result Review :                 Assessment and Plan    Diagnoses and all orders for this visit:    1. Essential hypertension (Primary)  -     hydroCHLOROthiazide (HYDRODIURIL) 12.5 MG tablet; Take 1 tablet by mouth Daily.  Dispense: 30 tablet; Refill: 0    2. Follow-up exam        Follow Up   Return in about 2 weeks (around 3/9/2022).  Patient was given instructions and counseling regarding his condition or for health maintenance advice. Please see specific information pulled into the AVS if appropriate.       "

## 2022-02-28 RX ORDER — LISINOPRIL 10 MG/1
10 TABLET ORAL DAILY
Qty: 30 TABLET | Refills: 0 | OUTPATIENT
Start: 2022-02-28

## 2022-03-09 ENCOUNTER — OFFICE VISIT (OUTPATIENT)
Dept: FAMILY MEDICINE CLINIC | Facility: CLINIC | Age: 67
End: 2022-03-09

## 2022-03-09 VITALS
HEIGHT: 73 IN | DIASTOLIC BLOOD PRESSURE: 90 MMHG | HEART RATE: 101 BPM | TEMPERATURE: 97.5 F | WEIGHT: 240.2 LBS | OXYGEN SATURATION: 98 % | BODY MASS INDEX: 31.83 KG/M2 | SYSTOLIC BLOOD PRESSURE: 152 MMHG

## 2022-03-09 DIAGNOSIS — I10 ESSENTIAL HYPERTENSION: Primary | ICD-10-CM

## 2022-03-09 DIAGNOSIS — Z09 FOLLOW-UP EXAM: ICD-10-CM

## 2022-03-09 PROCEDURE — 99213 OFFICE O/P EST LOW 20 MIN: CPT | Performed by: NURSE PRACTITIONER

## 2022-03-09 RX ORDER — LISINOPRIL AND HYDROCHLOROTHIAZIDE 25; 20 MG/1; MG/1
1 TABLET ORAL DAILY
Qty: 90 TABLET | Refills: 0 | Status: SHIPPED | OUTPATIENT
Start: 2022-03-09 | End: 2022-05-05 | Stop reason: HOSPADM

## 2022-03-09 NOTE — PROGRESS NOTES
"Chief Complaint  Hypertension (Follow up/)    Subjective          Mo Willoughby presents to [facility]  History of Present Illness   66-year-old male presenting for follow-up hypertension, lisinopril increased to 20 mg on 2/7/2022 and added HCTZ 12.5 mg on 2/23/2022, tolerating both mediations with no SE's, checks BP at home with average 130-140's/80's, will increase Lisinopril-HCTZ to 20/25 mg QD, pt to continue monitoring BP at home and follow-up in 2 months. Denies SOA, CP, HA, dizziness or LE edema.       Objective   Vital Signs:   /90 (BP Location: Right arm, Patient Position: Sitting, Cuff Size: Large Adult)   Pulse 101   Temp 97.5 °F (36.4 °C) (Infrared)   Ht 185.4 cm (72.99\")   Wt 109 kg (240 lb 3.2 oz)   SpO2 98%   BMI 31.70 kg/m²     Physical Exam  HENT:      Head: Normocephalic.   Cardiovascular:      Rate and Rhythm: Normal rate and regular rhythm.      Pulses: Normal pulses.      Heart sounds: Normal heart sounds.   Pulmonary:      Effort: Pulmonary effort is normal.      Breath sounds: Normal breath sounds.   Skin:     General: Skin is warm.   Neurological:      General: No focal deficit present.      Mental Status: He is alert and oriented to person, place, and time.        Result Review :                 Assessment and Plan    Diagnoses and all orders for this visit:    1. Essential hypertension (Primary)  -     lisinopril-hydrochlorothiazide (PRINZIDE,ZESTORETIC) 20-25 MG per tablet; Take 1 tablet by mouth Daily.  Dispense: 90 tablet; Refill: 0    2. Follow-up exam  -     lisinopril-hydrochlorothiazide (PRINZIDE,ZESTORETIC) 20-25 MG per tablet; Take 1 tablet by mouth Daily.  Dispense: 90 tablet; Refill: 0        Follow Up   Return in about 2 months (around 5/9/2022), or if symptoms worsen or fail to improve.  Patient was given instructions and counseling regarding his condition or for health maintenance advice. Please see specific information pulled into the " AVS if appropriate.

## 2022-03-15 ENCOUNTER — TELEPHONE (OUTPATIENT)
Dept: FAMILY MEDICINE CLINIC | Facility: CLINIC | Age: 67
End: 2022-03-15

## 2022-03-15 NOTE — TELEPHONE ENCOUNTER
BP 80/61 last night, today 150/87 without taking his medicine. Should he take bp meds today or no? Scheduled appt for john to see Nadia.

## 2022-03-15 NOTE — TELEPHONE ENCOUNTER
Patient informed he said has an appointment tomorrow with Nadia. Will take just half tablet of BP medicine for now. Said BP was 120/80 just a little bit ago.

## 2022-03-15 NOTE — TELEPHONE ENCOUNTER
I would have patient continue to check blood pressure throughout today. Has he ever had a low bp like that before? Was he dizzy, fatigue with the low bp? Have pt call back with some additional readings today please TY

## 2022-03-15 NOTE — TELEPHONE ENCOUNTER
Pt informed, he has never had bp that low. He had a little dizziness- his bp just now is 148/90- should he take his dose today?

## 2022-03-22 DIAGNOSIS — I10 ESSENTIAL HYPERTENSION: ICD-10-CM

## 2022-03-23 RX ORDER — HYDROCHLOROTHIAZIDE 12.5 MG/1
12.5 TABLET ORAL DAILY
Qty: 30 TABLET | Refills: 0 | OUTPATIENT
Start: 2022-03-23

## 2022-05-02 ENCOUNTER — OFFICE VISIT (OUTPATIENT)
Dept: FAMILY MEDICINE CLINIC | Facility: CLINIC | Age: 67
End: 2022-05-02

## 2022-05-02 ENCOUNTER — HOSPITAL ENCOUNTER (OUTPATIENT)
Facility: HOSPITAL | Age: 67
Discharge: HOME | End: 2022-05-05
Attending: EMERGENCY MEDICINE | Admitting: INTERNAL MEDICINE

## 2022-05-02 ENCOUNTER — APPOINTMENT (OUTPATIENT)
Dept: CT IMAGING | Facility: HOSPITAL | Age: 67
End: 2022-05-02

## 2022-05-02 ENCOUNTER — APPOINTMENT (OUTPATIENT)
Dept: GENERAL RADIOLOGY | Facility: HOSPITAL | Age: 67
End: 2022-05-02

## 2022-05-02 VITALS
HEART RATE: 82 BPM | HEIGHT: 73 IN | WEIGHT: 227.4 LBS | TEMPERATURE: 97.3 F | DIASTOLIC BLOOD PRESSURE: 74 MMHG | SYSTOLIC BLOOD PRESSURE: 122 MMHG | OXYGEN SATURATION: 100 % | BODY MASS INDEX: 30.14 KG/M2

## 2022-05-02 DIAGNOSIS — R07.9 CHEST PAIN, UNSPECIFIED TYPE: Primary | ICD-10-CM

## 2022-05-02 DIAGNOSIS — R42 DIZZINESS: ICD-10-CM

## 2022-05-02 DIAGNOSIS — R11.0 NAUSEA: ICD-10-CM

## 2022-05-02 DIAGNOSIS — D50.9 IRON DEFICIENCY ANEMIA, UNSPECIFIED IRON DEFICIENCY ANEMIA TYPE: ICD-10-CM

## 2022-05-02 DIAGNOSIS — R00.2 PALPITATIONS: Primary | ICD-10-CM

## 2022-05-02 DIAGNOSIS — R53.83 FATIGUE, UNSPECIFIED TYPE: ICD-10-CM

## 2022-05-02 DIAGNOSIS — R10.84 GENERALIZED ABDOMINAL PAIN: ICD-10-CM

## 2022-05-02 DIAGNOSIS — R55 NEAR SYNCOPE: ICD-10-CM

## 2022-05-02 DIAGNOSIS — R42 LIGHTHEADEDNESS: ICD-10-CM

## 2022-05-02 DIAGNOSIS — E86.9 VOLUME DEPLETION: ICD-10-CM

## 2022-05-02 DIAGNOSIS — N17.9 AKI (ACUTE KIDNEY INJURY): ICD-10-CM

## 2022-05-02 DIAGNOSIS — I95.1 ORTHOSTASIS: ICD-10-CM

## 2022-05-02 DIAGNOSIS — I10 PRIMARY HYPERTENSION: ICD-10-CM

## 2022-05-02 DIAGNOSIS — E53.8 FOLIC ACID DEFICIENCY: ICD-10-CM

## 2022-05-02 PROBLEM — I71.21 ANEURYSM OF ASCENDING AORTA: Status: ACTIVE | Noted: 2022-05-02

## 2022-05-02 PROBLEM — R74.8 ELEVATED LIVER ENZYMES: Status: ACTIVE | Noted: 2022-05-02

## 2022-05-02 PROBLEM — R05.9 COUGH: Status: ACTIVE | Noted: 2022-05-02

## 2022-05-02 LAB
ALBUMIN SERPL-MCNC: 4 G/DL (ref 3.5–5.2)
ALBUMIN/GLOB SERPL: 1.4 G/DL
ALP SERPL-CCNC: 56 U/L (ref 39–117)
ALT SERPL W P-5'-P-CCNC: 29 U/L (ref 1–41)
AMPHET+METHAMPHET UR QL: NEGATIVE
ANION GAP SERPL CALCULATED.3IONS-SCNC: 12 MMOL/L (ref 5–15)
AST SERPL-CCNC: 59 U/L (ref 1–40)
BACTERIA UR QL AUTO: ABNORMAL /HPF
BARBITURATES UR QL SCN: NEGATIVE
BASOPHILS # BLD AUTO: 0.05 10*3/MM3 (ref 0–0.2)
BASOPHILS NFR BLD AUTO: 0.9 % (ref 0–1.5)
BENZODIAZ UR QL SCN: NEGATIVE
BILIRUB SERPL-MCNC: 1.1 MG/DL (ref 0–1.2)
BILIRUB UR QL STRIP: NEGATIVE
BUN SERPL-MCNC: 30 MG/DL (ref 8–23)
BUN/CREAT SERPL: 15.2 (ref 7–25)
CALCIUM SPEC-SCNC: 11.5 MG/DL (ref 8.6–10.5)
CANNABINOIDS SERPL QL: POSITIVE
CHLORIDE SERPL-SCNC: 101 MMOL/L (ref 98–107)
CLARITY UR: CLEAR
CO2 SERPL-SCNC: 23 MMOL/L (ref 22–29)
COCAINE UR QL: POSITIVE
COLOR UR: ABNORMAL
CREAT SERPL-MCNC: 1.98 MG/DL (ref 0.76–1.27)
D DIMER PPP FEU-MCNC: 0.94 MCGFEU/ML (ref 0–0.49)
DEPRECATED RDW RBC AUTO: 55.6 FL (ref 37–54)
EGFRCR SERPLBLD CKD-EPI 2021: 37 ML/MIN/1.73
EOSINOPHIL # BLD AUTO: 0.01 10*3/MM3 (ref 0–0.4)
EOSINOPHIL NFR BLD AUTO: 0.2 % (ref 0.3–6.2)
ERYTHROCYTE [DISTWIDTH] IN BLOOD BY AUTOMATED COUNT: 15.3 % (ref 12.3–15.4)
ETHANOL BLD-MCNC: <10 MG/DL (ref 0–10)
ETHANOL UR QL: <0.01 %
GLOBULIN UR ELPH-MCNC: 2.9 GM/DL
GLUCOSE SERPL-MCNC: 112 MG/DL (ref 65–99)
GLUCOSE UR STRIP-MCNC: NEGATIVE MG/DL
HCT VFR BLD AUTO: 24.8 % (ref 37.5–51)
HCT VFR BLD AUTO: 27.7 % (ref 37.5–51)
HGB BLD-MCNC: 8.6 G/DL (ref 13–17.7)
HGB BLD-MCNC: 9.3 G/DL (ref 13–17.7)
HGB UR QL STRIP.AUTO: NEGATIVE
HOLD SPECIMEN: NORMAL
HYALINE CASTS UR QL AUTO: ABNORMAL /LPF
IMM GRANULOCYTES # BLD AUTO: 0.02 10*3/MM3 (ref 0–0.05)
IMM GRANULOCYTES NFR BLD AUTO: 0.4 % (ref 0–0.5)
KETONES UR QL STRIP: ABNORMAL
LEUKOCYTE ESTERASE UR QL STRIP.AUTO: ABNORMAL
LYMPHOCYTES # BLD AUTO: 1.01 10*3/MM3 (ref 0.7–3.1)
LYMPHOCYTES NFR BLD AUTO: 19 % (ref 19.6–45.3)
MCH RBC QN AUTO: 33.6 PG (ref 26.6–33)
MCHC RBC AUTO-ENTMCNC: 33.6 G/DL (ref 31.5–35.7)
MCV RBC AUTO: 100 FL (ref 79–97)
METHADONE UR QL SCN: NEGATIVE
MONOCYTES # BLD AUTO: 0.8 10*3/MM3 (ref 0.1–0.9)
MONOCYTES NFR BLD AUTO: 15 % (ref 5–12)
NEUTROPHILS NFR BLD AUTO: 3.43 10*3/MM3 (ref 1.7–7)
NEUTROPHILS NFR BLD AUTO: 64.5 % (ref 42.7–76)
NITRITE UR QL STRIP: NEGATIVE
NRBC BLD AUTO-RTO: 0 /100 WBC (ref 0–0.2)
OPIATES UR QL: NEGATIVE
OXYCODONE UR QL SCN: NEGATIVE
PH UR STRIP.AUTO: 7.5 [PH] (ref 5–8)
PLATELET # BLD AUTO: 139 10*3/MM3 (ref 140–450)
PMV BLD AUTO: 9.9 FL (ref 6–12)
POTASSIUM SERPL-SCNC: 4.9 MMOL/L (ref 3.5–5.2)
PROT SERPL-MCNC: 6.9 G/DL (ref 6–8.5)
PROT UR QL STRIP: ABNORMAL
QT INTERVAL: 368 MS
RBC # BLD AUTO: 2.77 10*6/MM3 (ref 4.14–5.8)
RBC # UR STRIP: ABNORMAL /HPF
REF LAB TEST METHOD: ABNORMAL
SARS-COV-2 RNA RESP QL NAA+PROBE: NOT DETECTED
SODIUM SERPL-SCNC: 136 MMOL/L (ref 136–145)
SP GR UR STRIP: 1.02 (ref 1–1.03)
SQUAMOUS #/AREA URNS HPF: ABNORMAL /HPF
TROPONIN T SERPL-MCNC: 0.02 NG/ML (ref 0–0.03)
TROPONIN T SERPL-MCNC: 0.03 NG/ML (ref 0–0.03)
TROPONIN T SERPL-MCNC: 0.03 NG/ML (ref 0–0.03)
UROBILINOGEN UR QL STRIP: ABNORMAL
WBC # UR STRIP: ABNORMAL /HPF
WBC NRBC COR # BLD: 5.32 10*3/MM3 (ref 3.4–10.8)
WHOLE BLOOD HOLD SPECIMEN: NORMAL
WHOLE BLOOD HOLD SPECIMEN: NORMAL

## 2022-05-02 PROCEDURE — G0378 HOSPITAL OBSERVATION PER HR: HCPCS

## 2022-05-02 PROCEDURE — 85025 COMPLETE CBC W/AUTO DIFF WBC: CPT | Performed by: EMERGENCY MEDICINE

## 2022-05-02 PROCEDURE — 85018 HEMOGLOBIN: CPT | Performed by: NURSE PRACTITIONER

## 2022-05-02 PROCEDURE — 80307 DRUG TEST PRSMV CHEM ANLYZR: CPT | Performed by: EMERGENCY MEDICINE

## 2022-05-02 PROCEDURE — 71275 CT ANGIOGRAPHY CHEST: CPT

## 2022-05-02 PROCEDURE — 36415 COLL VENOUS BLD VENIPUNCTURE: CPT | Performed by: NURSE PRACTITIONER

## 2022-05-02 PROCEDURE — 84466 ASSAY OF TRANSFERRIN: CPT | Performed by: INTERNAL MEDICINE

## 2022-05-02 PROCEDURE — 93005 ELECTROCARDIOGRAM TRACING: CPT

## 2022-05-02 PROCEDURE — 82077 ASSAY SPEC XCP UR&BREATH IA: CPT | Performed by: EMERGENCY MEDICINE

## 2022-05-02 PROCEDURE — 0 IOPAMIDOL PER 1 ML: Performed by: EMERGENCY MEDICINE

## 2022-05-02 PROCEDURE — 84484 ASSAY OF TROPONIN QUANT: CPT | Performed by: NURSE PRACTITIONER

## 2022-05-02 PROCEDURE — 82728 ASSAY OF FERRITIN: CPT | Performed by: INTERNAL MEDICINE

## 2022-05-02 PROCEDURE — 85379 FIBRIN DEGRADATION QUANT: CPT | Performed by: EMERGENCY MEDICINE

## 2022-05-02 PROCEDURE — 85014 HEMATOCRIT: CPT | Performed by: NURSE PRACTITIONER

## 2022-05-02 PROCEDURE — 81001 URINALYSIS AUTO W/SCOPE: CPT | Performed by: EMERGENCY MEDICINE

## 2022-05-02 PROCEDURE — 84484 ASSAY OF TROPONIN QUANT: CPT | Performed by: EMERGENCY MEDICINE

## 2022-05-02 PROCEDURE — 93000 ELECTROCARDIOGRAM COMPLETE: CPT | Performed by: NURSE PRACTITIONER

## 2022-05-02 PROCEDURE — 99285 EMERGENCY DEPT VISIT HI MDM: CPT

## 2022-05-02 PROCEDURE — 99214 OFFICE O/P EST MOD 30 MIN: CPT | Performed by: NURSE PRACTITIONER

## 2022-05-02 PROCEDURE — U0003 INFECTIOUS AGENT DETECTION BY NUCLEIC ACID (DNA OR RNA); SEVERE ACUTE RESPIRATORY SYNDROME CORONAVIRUS 2 (SARS-COV-2) (CORONAVIRUS DISEASE [COVID-19]), AMPLIFIED PROBE TECHNIQUE, MAKING USE OF HIGH THROUGHPUT TECHNOLOGIES AS DESCRIBED BY CMS-2020-01-R: HCPCS | Performed by: EMERGENCY MEDICINE

## 2022-05-02 PROCEDURE — 93010 ELECTROCARDIOGRAM REPORT: CPT | Performed by: INTERNAL MEDICINE

## 2022-05-02 PROCEDURE — 83540 ASSAY OF IRON: CPT | Performed by: INTERNAL MEDICINE

## 2022-05-02 PROCEDURE — 71045 X-RAY EXAM CHEST 1 VIEW: CPT

## 2022-05-02 PROCEDURE — 80053 COMPREHEN METABOLIC PANEL: CPT | Performed by: EMERGENCY MEDICINE

## 2022-05-02 RX ORDER — SODIUM CHLORIDE 0.9 % (FLUSH) 0.9 %
10 SYRINGE (ML) INJECTION EVERY 12 HOURS SCHEDULED
Status: DISCONTINUED | OUTPATIENT
Start: 2022-05-02 | End: 2022-05-03

## 2022-05-02 RX ORDER — ACETAMINOPHEN 325 MG/1
650 TABLET ORAL EVERY 4 HOURS PRN
Status: DISCONTINUED | OUTPATIENT
Start: 2022-05-02 | End: 2022-05-05 | Stop reason: HOSPADM

## 2022-05-02 RX ORDER — LORAZEPAM 2 MG/ML
2 INJECTION INTRAMUSCULAR
Status: DISCONTINUED | OUTPATIENT
Start: 2022-05-02 | End: 2022-05-05 | Stop reason: HOSPADM

## 2022-05-02 RX ORDER — NITROGLYCERIN 0.4 MG/1
0.4 TABLET SUBLINGUAL
Status: DISCONTINUED | OUTPATIENT
Start: 2022-05-02 | End: 2022-05-05 | Stop reason: HOSPADM

## 2022-05-02 RX ORDER — SODIUM CHLORIDE, SODIUM LACTATE, POTASSIUM CHLORIDE, CALCIUM CHLORIDE 600; 310; 30; 20 MG/100ML; MG/100ML; MG/100ML; MG/100ML
75 INJECTION, SOLUTION INTRAVENOUS CONTINUOUS
Status: DISCONTINUED | OUTPATIENT
Start: 2022-05-02 | End: 2022-05-04

## 2022-05-02 RX ORDER — SODIUM CHLORIDE 9 MG/ML
125 INJECTION, SOLUTION INTRAVENOUS CONTINUOUS
Status: DISCONTINUED | OUTPATIENT
Start: 2022-05-02 | End: 2022-05-02

## 2022-05-02 RX ORDER — MULTIPLE VITAMINS W/ MINERALS TAB 9MG-400MCG
1 TAB ORAL DAILY
Status: DISCONTINUED | OUTPATIENT
Start: 2022-05-03 | End: 2022-05-05 | Stop reason: HOSPADM

## 2022-05-02 RX ORDER — ASPIRIN 81 MG/1
324 TABLET, CHEWABLE ORAL ONCE
Status: COMPLETED | OUTPATIENT
Start: 2022-05-02 | End: 2022-05-02

## 2022-05-02 RX ORDER — LORAZEPAM 1 MG/1
1 TABLET ORAL
Status: DISCONTINUED | OUTPATIENT
Start: 2022-05-02 | End: 2022-05-05 | Stop reason: HOSPADM

## 2022-05-02 RX ORDER — ONDANSETRON 2 MG/ML
4 INJECTION INTRAMUSCULAR; INTRAVENOUS EVERY 6 HOURS PRN
Status: DISCONTINUED | OUTPATIENT
Start: 2022-05-02 | End: 2022-05-05 | Stop reason: HOSPADM

## 2022-05-02 RX ORDER — SODIUM CHLORIDE 9 MG/ML
100 INJECTION, SOLUTION INTRAVENOUS CONTINUOUS
Status: DISCONTINUED | OUTPATIENT
Start: 2022-05-02 | End: 2022-05-02

## 2022-05-02 RX ORDER — LORAZEPAM 2 MG/ML
1 INJECTION INTRAMUSCULAR
Status: DISCONTINUED | OUTPATIENT
Start: 2022-05-02 | End: 2022-05-05 | Stop reason: HOSPADM

## 2022-05-02 RX ORDER — SODIUM CHLORIDE 0.9 % (FLUSH) 0.9 %
10 SYRINGE (ML) INJECTION AS NEEDED
Status: DISCONTINUED | OUTPATIENT
Start: 2022-05-02 | End: 2022-05-05 | Stop reason: HOSPADM

## 2022-05-02 RX ORDER — ALLOPURINOL 300 MG/1
300 TABLET ORAL DAILY
Status: DISCONTINUED | OUTPATIENT
Start: 2022-05-03 | End: 2022-05-05 | Stop reason: HOSPADM

## 2022-05-02 RX ORDER — ACETAMINOPHEN 650 MG/1
650 SUPPOSITORY RECTAL EVERY 4 HOURS PRN
Status: DISCONTINUED | OUTPATIENT
Start: 2022-05-02 | End: 2022-05-05 | Stop reason: HOSPADM

## 2022-05-02 RX ORDER — LORAZEPAM 1 MG/1
2 TABLET ORAL
Status: DISCONTINUED | OUTPATIENT
Start: 2022-05-02 | End: 2022-05-05 | Stop reason: HOSPADM

## 2022-05-02 RX ORDER — ACETAMINOPHEN 160 MG/5ML
650 SOLUTION ORAL EVERY 4 HOURS PRN
Status: DISCONTINUED | OUTPATIENT
Start: 2022-05-02 | End: 2022-05-05 | Stop reason: HOSPADM

## 2022-05-02 RX ADMIN — SODIUM CHLORIDE, POTASSIUM CHLORIDE, SODIUM LACTATE AND CALCIUM CHLORIDE 125 ML/HR: 600; 310; 30; 20 INJECTION, SOLUTION INTRAVENOUS at 23:55

## 2022-05-02 RX ADMIN — SODIUM CHLORIDE 1000 ML: 9 INJECTION, SOLUTION INTRAVENOUS at 18:49

## 2022-05-02 RX ADMIN — SODIUM CHLORIDE 500 ML: 9 INJECTION, SOLUTION INTRAVENOUS at 15:28

## 2022-05-02 RX ADMIN — SODIUM CHLORIDE 100 ML/HR: 9 INJECTION, SOLUTION INTRAVENOUS at 21:37

## 2022-05-02 RX ADMIN — Medication 10 ML: at 21:38

## 2022-05-02 RX ADMIN — ASPIRIN 324 MG: 81 TABLET, CHEWABLE ORAL at 15:34

## 2022-05-02 RX ADMIN — IOPAMIDOL 95 ML: 755 INJECTION, SOLUTION INTRAVENOUS at 19:57

## 2022-05-02 NOTE — ED PROVIDER NOTES
EMERGENCY DEPARTMENT ENCOUNTER    Room Number:  13/13  Date of encounter:  5/2/2022  PCP: Jenn Davison APRN  Historian: Pt    Patient was placed in face mask during triage process. Patient was wearing facemask when I entered the room and throughout our encounter. I wore full protective equipment throughout this patient encounter including a face mask, eye protection, and gloves. Hand hygiene was performed before donning protective equipment and again following doffing of PPE after leaving the room.    HPI:  Chief Complaint: Fatigue, lightheaded, near syncope, chest discomfort  A complete HPI/ROS/PMH/PSH/SH/FH are unobtainable due to: N/A   Context: Mo Willoughby is a 66 y.o. male who presents to the ED c/o 2 weeks of fatigue with intermittent episodes of lightheadedness/dizziness seems worse when he first stands up.  He went to see his primary care provider today and also reportedly had 2 episodes of left-sided chest discomfort associated with nausea and diaphoresis for the last 2 days.  He reports these happen at rest and lasted approximately 1 to 2 minutes each time.  No exacerbating or relieving factors identified.  Patient has occasional morning cough with no fevers or ongoing dyspnea.  Within the last month patient has had at least 2 episodes where he did have full syncope with collapse.  No ongoing injuries reported from nose.  No abdominal pain, vomiting or diarrhea.  No black or bloody stools.  No dysuria or hematuria reported.  0 out of 10 pain at this time.      MEDICAL HISTORY REVIEW  EMR reviewed:    Admitting date: 7/31/2018  Discharging date: 8/4/2018  Admitting diagnosis: Gallstone pancreatitis  Discharging diagnoses: Gallstone pancreatitis, acute renal injury, acute cholecystitis, acute pancreatitis, sinus tachycardia    PAST MEDICAL HISTORY  Active Ambulatory Problems     Diagnosis Date Noted   • Hypertension    • Erectile dysfunction    • Gout    • History of cellulitis 04/27/2016   •  Hyperparathyroidism with most recent intact PTH 95, calcium 11.3 with SPECT revealing question of a subcentimeter left inferior parathyroid adenoma 11/15/2016   • Hypercalcemia 11/15/2016   • H/O: gout on allopurinol 11/15/2016   • Colon polyp 11/15/2016   • Cholecystitis 07/31/2018   • Sinus tachycardia 08/01/2018   • Pancreatitis 08/01/2018   • Acute kidney injury (HCC) 08/01/2018   • Elevated liver enzymes 05/02/2022   • Cough 05/02/2022   • Nausea 05/02/2022   • Fatigue 05/02/2022   • Dizziness 05/02/2022     Resolved Ambulatory Problems     Diagnosis Date Noted   • No Resolved Ambulatory Problems     Past Medical History:   Diagnosis Date   • Hx of colonic polyp    • Parathyroid abnormality (HCC)    • Risk factors for obstructive sleep apnea    • Syncope and collapse 03/28/2017         PAST SURGICAL HISTORY  Past Surgical History:   Procedure Laterality Date   • CHOLECYSTECTOMY WITH INTRAOPERATIVE CHOLANGIOGRAM N/A 8/3/2018    Procedure: CHOLECYSTECTOMY LAPAROSCOPIC INTRAOPERATIVE CHOLANGIOGRAM;  Surgeon: Melina Kate MD;  Location: [facility] OR;  Service: General   • COLONOSCOPY     • COLONOSCOPY N/A 10/3/2016    Procedure: COLONOSCOPY TO CECUM TO TERMINAL ILIUM WITH COLD BIOPSY POLYPECTOMY;  Surgeon: Benoit Vilchis MD;  Location: [facility] ENDOSCOPY;  Service:    • LIPOMA EXCISION     • TONSILLECTOMY           FAMILY HISTORY  Family History   Problem Relation Age of Onset   • Hypertension Mother    • Breast cancer Mother    • Cancer Father    • Liver cancer Father          SOCIAL HISTORY  Social History     Socioeconomic History   • Marital status: Single   Tobacco Use   • Smoking status: Never Smoker   • Smokeless tobacco: Never Used   Substance and Sexual Activity   • Alcohol use: Yes     Comment: occasional   • Drug use: No   • Sexual activity: Defer         ALLERGIES  Zetia [ezetimibe]        REVIEW OF SYSTEMS  Review of Systems     All systems reviewed and negative except for those discussed in  HPI.       PHYSICAL EXAM    I have reviewed the triage vital signs and nursing notes.    ED Triage Vitals [05/02/22 1307]   Temp Heart Rate Resp BP SpO2   96.9 °F (36.1 °C) (!) 133 18 128/78 100 %      Temp src Heart Rate Source Patient Position BP Location FiO2 (%)   -- -- -- -- --       Physical Exam    Physical Exam   Constitutional: No distress.   HENT:  Head: Normocephalic and atraumatic.   Oropharynx: Mucous membranes are moist.   Eyes: No scleral icterus. No conjunctival pallor.  Neck: Painless range of motion noted. Neck supple.   Cardiovascular: Normal rate, regular rhythm and intact distal pulses.  Pulmonary/Chest: No respiratory distress. There are no wheezes, no rhonchi, and no rales.   Abdominal: Soft. There is no tenderness. There is no rebound and no guarding.   Musculoskeletal: Moves all extremities equally. There is no pedal edema or calf tenderness.   Neurological: Alert.  Baseline strength and sensation noted.   Skin: Skin is pink, warm, and dry. No pallor.   Psychiatric: Mood and affect normal.   Nursing note and vitals reviewed.    LAB RESULTS  Recent Results (from the past 24 hour(s))   ECG 12 Lead    Collection Time: 05/02/22  1:13 PM   Result Value Ref Range    QT Interval 368 ms   Comprehensive Metabolic Panel    Collection Time: 05/02/22  3:27 PM    Specimen: Blood   Result Value Ref Range    Glucose 112 (H) 65 - 99 mg/dL    BUN 30 (H) 8 - 23 mg/dL    Creatinine 1.98 (H) 0.76 - 1.27 mg/dL    Sodium 136 136 - 145 mmol/L    Potassium 4.9 3.5 - 5.2 mmol/L    Chloride 101 98 - 107 mmol/L    CO2 23.0 22.0 - 29.0 mmol/L    Calcium 11.5 (H) 8.6 - 10.5 mg/dL    Total Protein 6.9 6.0 - 8.5 g/dL    Albumin 4.00 3.50 - 5.20 g/dL    ALT (SGPT) 29 1 - 41 U/L    AST (SGOT) 59 (H) 1 - 40 U/L    Alkaline Phosphatase 56 39 - 117 U/L    Total Bilirubin 1.1 0.0 - 1.2 mg/dL    Globulin 2.9 gm/dL    A/G Ratio 1.4 g/dL    BUN/Creatinine Ratio 15.2 7.0 - 25.0    Anion Gap 12.0 5.0 - 15.0 mmol/L    eGFR 37.0 (L)  >60.0 mL/min/1.73   CBC Auto Differential    Collection Time: 05/02/22  3:27 PM    Specimen: Blood   Result Value Ref Range    WBC 5.32 3.40 - 10.80 10*3/mm3    RBC 2.77 (L) 4.14 - 5.80 10*6/mm3    Hemoglobin 9.3 (L) 13.0 - 17.7 g/dL    Hematocrit 27.7 (L) 37.5 - 51.0 %    .0 (H) 79.0 - 97.0 fL    MCH 33.6 (H) 26.6 - 33.0 pg    MCHC 33.6 31.5 - 35.7 g/dL    RDW 15.3 12.3 - 15.4 %    RDW-SD 55.6 (H) 37.0 - 54.0 fl    MPV 9.9 6.0 - 12.0 fL    Platelets 139 (L) 140 - 450 10*3/mm3    Neutrophil % 64.5 42.7 - 76.0 %    Lymphocyte % 19.0 (L) 19.6 - 45.3 %    Monocyte % 15.0 (H) 5.0 - 12.0 %    Eosinophil % 0.2 (L) 0.3 - 6.2 %    Basophil % 0.9 0.0 - 1.5 %    Immature Grans % 0.4 0.0 - 0.5 %    Neutrophils, Absolute 3.43 1.70 - 7.00 10*3/mm3    Lymphocytes, Absolute 1.01 0.70 - 3.10 10*3/mm3    Monocytes, Absolute 0.80 0.10 - 0.90 10*3/mm3    Eosinophils, Absolute 0.01 0.00 - 0.40 10*3/mm3    Basophils, Absolute 0.05 0.00 - 0.20 10*3/mm3    Immature Grans, Absolute 0.02 0.00 - 0.05 10*3/mm3    nRBC 0.0 0.0 - 0.2 /100 WBC   Green Top (Gel)    Collection Time: 05/02/22  3:27 PM   Result Value Ref Range    Extra Tube Hold for add-ons.    Lavender Top    Collection Time: 05/02/22  3:27 PM   Result Value Ref Range    Extra Tube hold for add-on    Light Blue Top    Collection Time: 05/02/22  3:27 PM   Result Value Ref Range    Extra Tube hold for add-on    Ethanol    Collection Time: 05/02/22  3:27 PM    Specimen: Blood   Result Value Ref Range    Ethanol <10 0 - 10 mg/dL    Ethanol % <0.010 %   Troponin    Collection Time: 05/02/22  3:27 PM    Specimen: Blood   Result Value Ref Range    Troponin T 0.022 0.000 - 0.030 ng/mL   D-dimer, Quantitative    Collection Time: 05/02/22  3:27 PM    Specimen: Blood   Result Value Ref Range    D-Dimer, Quantitative 0.94 (H) 0.00 - 0.49 MCGFEU/mL   Urine Drug Screen - Urine, Clean Catch    Collection Time: 05/02/22  5:12 PM    Specimen: Urine, Clean Catch   Result Value Ref Range     Amphet/Methamphet, Screen Negative Negative    Barbiturates Screen, Urine Negative Negative    Benzodiazepine Screen, Urine Negative Negative    Cocaine Screen, Urine Positive (A) Negative    Opiate Screen Negative Negative    THC, Screen, Urine Positive (A) Negative    Methadone Screen, Urine Negative Negative    Oxycodone Screen, Urine Negative Negative   Urinalysis With Microscopic If Indicated (No Culture) - Urine, Clean Catch    Collection Time: 05/02/22  5:12 PM    Specimen: Urine, Clean Catch   Result Value Ref Range    Color, UA Dark Yellow (A) Yellow, Straw    Appearance, UA Clear Clear    pH, UA 7.5 5.0 - 8.0    Specific Gravity, UA 1.019 1.005 - 1.030    Glucose, UA Negative Negative    Ketones, UA Trace (A) Negative    Bilirubin, UA Negative Negative    Blood, UA Negative Negative    Protein, UA 30 mg/dL (1+) (A) Negative    Leuk Esterase, UA Small (1+) (A) Negative    Nitrite, UA Negative Negative    Urobilinogen, UA 1.0 E.U./dL 0.2 - 1.0 E.U./dL   Urinalysis, Microscopic Only - Urine, Clean Catch    Collection Time: 05/02/22  5:12 PM    Specimen: Urine, Clean Catch   Result Value Ref Range    RBC, UA None Seen None Seen, 0-2 /HPF    WBC, UA 3-5 (A) None Seen, 0-2 /HPF    Bacteria, UA None Seen None Seen /HPF    Squamous Epithelial Cells, UA 0-2 None Seen, 0-2 /HPF    Hyaline Casts, UA 3-6 None Seen /LPF    Methodology Manual Light Microscopy    Troponin    Collection Time: 05/02/22  5:13 PM    Specimen: Blood   Result Value Ref Range    Troponin T 0.025 0.000 - 0.030 ng/mL       Ordered the above labs and independently reviewed the results.        RADIOLOGY  XR Chest 1 View    Result Date: 5/2/2022  XR CHEST 1 VW-  HISTORY: Male who is 66 years-old,  chest pain  TECHNIQUE: Frontal views of the chest  COMPARISON: 03/29/2017  FINDINGS: The heart size is normal. Aorta is tortuous. Pulmonary vasculature is unremarkable. No focal pulmonary consolidation, pleural effusion, or pneumothorax. No acute osseous  process.      No focal pulmonary consolidation. Tortuous aorta. Follow-up as clinically indicated.  This report was finalized on 5/2/2022 3:42 PM by Dr. Edilberto Vidal M.D.        I ordered the above noted radiological studies. Reviewed by me and discussed with radiologist.  See dictation for official radiology interpretation.      PROCEDURES    Procedures    HEART SCORE:    History #1  (Highly suspicious 2, Moderately suspicious 1, Slightly or non-suspicious 0)    ECG #1  (Significant ST depression 2,  Nonspecific repol disturbance 1, Normal 0)    Age #1  (> or = 65 2, 46-65 1,  < or = 45 0)    Risk factors #1  (hypercholesterolemia, HTN, DM, smoking, pos fam hx, obesity)  (> or = to 3 RF 2, 1 or 2 1, No risk factors 0)    Troponin #0  (> or = 3x normal limit 2, 1-3x normal limit 1, < or = Normal limit 0)    HEART Score Key:  Scores 0-3: 0.9-1.7% risk of adverse cardiac event. In the HEART Score study, these patients were discharged (0.99% in the retrospective study, 1.7% in the prospective study)  Scores 4-6: 12-16.6% risk of adverse cardiac event. In the HEART Score study, these patients were admitted to the hospital. (11.6% retrospective, 16.6% prospective)  Scores ?7: 50-65% risk of adverse cardiac event. In the HEART Score study, these patients were candidates for early invasive measures. (65.2% retrospective, 50.1% prospective)      This patient's HEART score is 4      MEDICATIONS GIVEN IN ER    Medications   sodium chloride 0.9 % bolus 500 mL (0 mL Intravenous Stopped 5/2/22 1701)     Followed by   sodium chloride 0.9 % infusion (125 mL/hr Intravenous Not Given 5/2/22 1701)   sodium chloride 0.9 % bolus 1,000 mL (1,000 mL Intravenous New Bag 5/2/22 6452)   sodium chloride 0.9 % flush 10 mL (has no administration in time range)   sodium chloride 0.9 % flush 10 mL (has no administration in time range)   nitroglycerin (NITROSTAT) SL tablet 0.4 mg (has no administration in time range)   sodium chloride 0.9 %  infusion (has no administration in time range)   acetaminophen (TYLENOL) tablet 650 mg (has no administration in time range)     Or   acetaminophen (TYLENOL) 160 MG/5ML solution 650 mg (has no administration in time range)     Or   acetaminophen (TYLENOL) suppository 650 mg (has no administration in time range)   ondansetron (ZOFRAN) injection 4 mg (has no administration in time range)   aspirin chewable tablet 324 mg (324 mg Oral Given 5/2/22 0834)         PROGRESS, DATA ANALYSIS, CONSULTS, AND MEDICAL DECISION MAKING    My differential diagnosis for syncope includes but is not limited to:  Vasovagal reflex - situational stimulus, micturition, defecation, cough, sneezing, swallowing, postprandial state, react sinus hypersensitivity  Vascular-prolonged recumbency, sudden postural change, prolonged standing, hypovolemia, vasodilator drugs, autonomic neuropathy, adrenal insufficiency, subclavian steal, pulmonary embolism  Cardiac -arrhythmia, heart block, myocardial infarction, aortic stenosis, cardiac myxoma, cardiac, LV Dysfunction, Aortic Dissection, Pulmonary Hypertension, Pulmonary Stenosis, Pacemaker Failure  CNS-seizure, hypoxia, hypoglycemia, TIA,(basal vertebral), hydrocephalus    My differential diagnosis for chest pain includes but is not limited to:  Muscle strain, costochondritis, myositis, pleurisy, rib fracture, intercostal neuritis, herpes zoster, tumor, myocardial infarction, coronary syndrome, unstable angina, angina, aortic dissection, mitral valve prolapse, pericarditis, palpitations, pulmonary embolus, pneumonia, pneumothorax, lung cancer, GERD, esophagitis, esophageal spasm      All labs have been independently reviewed by me.  All radiology studies have been reviewed by me and discussed with radiologist dictating the report.   EKG's independently viewed and interpreted by me.  Discussion below represents my analysis of pertinent findings related to patient's condition, differential diagnosis,  treatment plan and final disposition.      ED Course as of 05/02/22 1914   Mon May 02, 2022   1510 EKG           EKG time: 1313  Rhythm/Rate: Sinus, 100  P waves and W[place]: LATRELL within normal limits  QRS, axis: Narrow complex  ST and T waves: No STEMI; QTC within normal limits    Interpreted Contemporaneously by me, independently viewed  Comparison: Improved rate as compared to a 118   [RS]   1810 Hemoglobin(!): 9.3  Acute on chronic [RS]   1810 Platelets(!): 139 [RS]   1810 BUN(!): 30 [RS]   1810 Creatinine(!): 1.98  Acute [RS]   1810 Cocaine Screen, Urine(!): Positive [RS]   1810 THC Screen, Urine(!): Positive [RS]   1810 Specific Gravity, UA: 1.019 [RS]   1810 Nitrite, UA: Negative [RS]   1810 Bacteria, UA: None Seen [RS]   1810 Troponin T: 0.025 [RS]   1810 D-Dimer, Quant(!): 0.94 [RS]   1913 CONSULT        Provider: Dr. [facility]    Discussion: Reviewed patient history, ED presentation and evaluation.  We talked about the pending CTA of the chest as well.  Agreeable to accept patient for observation admission with telemetry for further evaluation and treatment.    Agreeable c treatment and planned disposition.         [RS]   1913 Cocaine Screen, Urine(!): Positive  Patient denies cocaine use.  This is felt likely secondary to false positive. [RS]   1914 Patient and significant other updated with findings and recommendation for admission.  Agreeable with plan. [RS]      ED Course User Index  [RS] Sanjay Ram MD       AS OF 19:14 EDT VITALS:    BP - 135/90  HR - 97  TEMP - 96.9 °F (36.1 °C)  O2 SATS - 99%        DIAGNOSIS  Final diagnoses:   Chest pain, unspecified type   Near syncope-recurrent   Lightheadedness   OTF (acute kidney injury) (HCC)   Orthostasis   Volume depletion         DISPOSITION  ADMISSION    Discussed treatment plan and reason for admission with pt/family and admitting physician.  Pt/family voiced understanding of the plan for admission for further testing/treatment as needed.          Yo  Sanjay FERREIRA MD  05/02/22 1916

## 2022-05-02 NOTE — PROGRESS NOTES
"Chief Complaint  stomach nausea,tired,not eating (1 week with dizziness)    Subjective          Mo Willoughby presents to [facility]  Patient presents to the office today for increased fatigue, dizziness, decreased appetite and nausea. Patient reports he had a syncopal episode a week ago and checked his blood pressure and was very low in the 80s.  He did not go to ER or urgent care at that time.  Symptoms resolved and he went on with his day.  Patient denies cp at the time of the episode. Patient's friend has been checking his blood sugar and has been normal she reports today was 96. He reports a diaphoretic episode yesterday without chest pain.  Patient has not had anything to eat today, has had frequent nausea-taking Zofran as needed which has helped.  Patient's blood pressure today is 122/74.  He denies dizziness at today's visit.  He is without chest pain at time of dictation      Objective   Vital Signs:   /74   Pulse 82   Temp 97.3 °F (36.3 °C)   Ht 185.4 cm (73\")   Wt 103 kg (227 lb 6.4 oz)   SpO2 100%   BMI 30.00 kg/m²     Physical Exam  Constitutional:       General: He is not in acute distress.     Appearance: Normal appearance.   Eyes:      Pupils: Pupils are equal, round, and reactive to light.   Cardiovascular:      Rate and Rhythm: Normal rate and regular rhythm.      Pulses: Normal pulses.      Heart sounds: Normal heart sounds.   Pulmonary:      Effort: Pulmonary effort is normal.      Breath sounds: Normal breath sounds. No wheezing or rales.   Neurological:      Mental Status: He is alert.   Psychiatric:         Mood and Affect: Mood normal.         Behavior: Behavior normal.        Result Review :   The following data was reviewed by: YANIRA Batista on 05/02/2022:  Common labs    Common Labsle 2/7/22 2/7/22 2/7/22 2/7/22 2/7/22 5/2/22 5/2/22 5/2/22 5/3/22 5/3/22    0855 0855 0855 0855 0855 1527 1527 2105 0410 0410   Glucose   121 (A)    112 (A)  " 89    BUN   14    30 (A)  32 (A)    Creatinine   1.14    1.98 (A)  1.74 (A)    eGFR Non  Am   65          Sodium   139    136  138    Potassium   4.5    4.9  4.6    Chloride   105    101  106    Calcium   11.1 (A)    11.5 (A)  10.3    Albumin   4.20    4.00      Total Bilirubin   0.8    1.1      Alkaline Phosphatase   56    56      AST (SGOT)   80 (A)    59 (A)      ALT (SGPT)   36    29      WBC 3.80     5.32    3.75   Hemoglobin 10.5 (A)     9.3 (A)  8.6 (A)  7.4 (A)   Hematocrit 32.7 (A)     27.7 (A)  24.8 (A)  22.0 (A)   Platelets 116 (A)     139 (A)    92 (A)   Total Cholesterol  253 (A)           Triglycerides  123           HDL Cholesterol  68 (A)           LDL Cholesterol   163 (A)           PSA     0.419        Uric Acid    2.5 (A)         (A) Abnormal value                ECG 12 Lead    Date/Time: 5/2/2022 11:59 AM  Performed by: Jenn Davison APRN  Authorized by: Jnen Davison APRN   Comparison: compared with previous ECG from 8/1/2018  Comparison to previous ECG: Sinus Tachycardia  Borderline ECG    Rhythm: sinus rhythm  BPM: 96    Clinical impression: abnormal EKG  Comments: Sinus Rhythm Minimal ST Depression Borderline              Assessment and Plan    Diagnoses and all orders for this visit:    1. Palpitations (Primary)  Assessment & Plan:  Abnormal EKG patient to follow-up in ED  No EKG changes of acute ischemia, no clinical evidence of acute MI, responsibility rests with patient to return for follow up for any CP/SOA, go to ER for any further signs or symptoms      Orders:  -     ECG 12 Lead    2. Primary hypertension  Assessment & Plan:  Stable with diet and exercise 122/74  Orthostatics checked today was negative for orthostatic hypotension.      3. Dizziness  Assessment & Plan:  Syncopal episode 1 week ago, with low blood pressure, patient denied chest pain shortness of air.    Orders:  -     ECG 12 Lead    4. Fatigue, unspecified type    5. Nausea  Assessment &  Plan:  Stable with Zofran      6. Generalized abdominal pain  Assessment & Plan:  No clinical signs of appendicitis/pancreatitis, No guarding, no rigidity.  Report to ER if signs/ symptoms worsen.       Due to EKG, history of syncopal episode, episode of hypotension hypotension, and diaphoretic episode yesterday I advised patient to go to the ER.  BMI is >= 30 and <= 34.9 (Class 1 obesity). The following options were offered after discussion: exercise counseling/recommendations and nutrition counseling/recommendations       I spent 30 minutes caring for Mo on this date of service. This time includes time spent by me in the following activities:preparing for the visit, reviewing tests, obtaining and/or reviewing a separately obtained history, performing a medically appropriate examination and/or evaluation , counseling and educating the patient/family/caregiver, ordering medications, tests, or procedures, referring and communicating with other health care professionals , documenting information in the medical record, independently interpreting results and communicating that information with the patient/family/caregiver and care coordination  Follow Up   No follow-ups on file.  Patient was given instructions and counseling regarding his condition or for health maintenance advice. Please see specific information pulled into the AVS if appropriate.

## 2022-05-02 NOTE — ED NOTES
.  Nursing report ED to floor  Mo Willoughby  66 y.o.  male    HPI :   Chief Complaint   Patient presents with   • Chest Pain   • Dizziness       Admitting doctor:   Chet Son MD    Admitting diagnosis:   The primary encounter diagnosis was Chest pain, unspecified type. Diagnoses of Near syncope-recurrent, Lightheadedness, OTF (acute kidney injury) (HCC), Orthostasis, and Volume depletion were also pertinent to this visit.    Code status:   Current Code Status     Date Active Code Status Order ID Comments User Context       5/2/2022 1858 CPR (Attempt to Resuscitate) [id number]  Serena Loyd, APRN ED     Advance Care Planning Activity      Questions for Current Code Status     Question Answer    Code Status (Patient has no pulse and is not breathing) CPR (Attempt to Resuscitate)    Medical Interventions (Patient has pulse or is breathing) Full Support          Allergies:   Zetia [ezetimibe]    Intake and Output    Intake/Output Summary (Last 24 hours) at 5/2/2022 1937  Last data filed at 5/2/2022 1701  Gross per 24 hour   Intake 500 ml   Output --   Net 500 ml       Weight:   There were no vitals filed for this visit.    Most recent vitals:   Vitals:    05/02/22 1705 05/02/22 1741 05/02/22 1841 05/02/22 1911   BP:  132/95 135/90 133/90   Patient Position:       Pulse: 103 97 97 94   Resp:  18 18    Temp:       SpO2:  98% 99% 99%       Active LDAs/IV Access:   Lines, Drains & Airways     Active LDAs     Name Placement date Placement time Site Days    Peripheral IV 05/02/22 1525 Left Antecubital 05/02/22  1525  Antecubital  less than 1                Labs (abnormal labs have a star):   Labs Reviewed   COMPREHENSIVE METABOLIC PANEL - Abnormal; Notable for the following components:       Result Value    Glucose 112 (*)     BUN 30 (*)     Creatinine 1.98 (*)     Calcium 11.5 (*)     AST (SGOT) 59 (*)     eGFR 37.0 (*)     All other components within normal limits    Narrative:     GFR Normal >60  Chronic  Kidney Disease <60  Kidney Failure <15     CBC WITH AUTO DIFFERENTIAL - Abnormal; Notable for the following components:    RBC 2.77 (*)     Hemoglobin 9.3 (*)     Hematocrit 27.7 (*)     .0 (*)     MCH 33.6 (*)     RDW-SD 55.6 (*)     Platelets 139 (*)     Lymphocyte % 19.0 (*)     Monocyte % 15.0 (*)     Eosinophil % 0.2 (*)     All other components within normal limits   URINE DRUG SCREEN - Abnormal; Notable for the following components:    Cocaine Screen, Urine Positive (*)     THC, Screen, Urine Positive (*)     All other components within normal limits    Narrative:     Negative Thresholds Per Drugs Screened:    Amphetamines                 500 ng/ml  Barbiturates                 200 ng/ml  Benzodiazepines              100 ng/ml  Cocaine                      300 ng/ml  Methadone                    300 ng/ml  Opiates                      300 ng/ml  Oxycodone                    100 ng/ml  THC                           50 ng/ml    The Normal Value for all drugs tested is negative. This report includes final unconfirmed screening results to be used for medical treatment purposes only. Unconfirmed results must not be used for non-medical purposes such as employment or legal testing. Clinical consideration should be applied to any drug of abuse test, particularly when unconfirmed results are used.           D-DIMER, QUANTITATIVE - Abnormal; Notable for the following components:    D-Dimer, Quantitative 0.94 (*)     All other components within normal limits    Narrative:     The [employer] D-Dimer test used in conjunction with a clinical pretest probability (PTP) assessment model, has been approved by the FDA to rule out the presence of venous thromboembolism (VTE) in outpatients suspected of deep venous thrombosis (DVT) or pulmonary embolism (PE). The cut-off for negative predictive value is <0.50 MCGFEU/mL.   URINALYSIS W/ MICROSCOPIC IF INDICATED (NO CULTURE) - Abnormal; Notable for the following components:     Color, UA Dark Yellow (*)     Ketones, UA Trace (*)     Protein, UA 30 mg/dL (1+) (*)     Leuk Esterase, UA Small (1+) (*)     All other components within normal limits   URINALYSIS, MICROSCOPIC ONLY - Abnormal; Notable for the following components:    WBC, UA 3-5 (*)     All other components within normal limits   COVID-19,[facility] IN-HOUSE CEPHEID/FILEMON, NP SWAB IN TRANSPORT MEDIA 8-12 HR TAT - Normal    Narrative:     Fact sheet for providers: https://www.fda.gov/media/538730/download     Fact sheet for patients: https://www.fda.gov/media/575852/download   TROPONIN (IN-HOUSE) - Normal    Narrative:     Troponin T Reference Range:  <= 0.03 ng/mL-   Negative for AMI  >0.03 ng/mL-     Abnormal for myocardial necrosis.  Clinicians would have to utilize clinical acumen, EKG, Troponin and serial changes to determine if it is an Acute Myocardial Infarction or myocardial injury due to an underlying chronic condition.       Results may be falsely decreased if patient taking Biotin.     TROPONIN (IN-HOUSE) - Normal    Narrative:     Troponin T Reference Range:  <= 0.03 ng/mL-   Negative for AMI  >0.03 ng/mL-     Abnormal for myocardial necrosis.  Clinicians would have to utilize clinical acumen, EKG, Troponin and serial changes to determine if it is an Acute Myocardial Infarction or myocardial injury due to an underlying chronic condition.       Results may be falsely decreased if patient taking Biotin.     COVID PRE-OP / PRE-PROCEDURE SCREENING ORDER (NO ISOLATION)    Narrative:     The following orders were created for panel order COVID PRE-OP / PRE-PROCEDURE SCREENING ORDER (NO ISOLATION) - Swab, Nasopharynx.  Procedure                               Abnormality         Status                     ---------                               -----------         ------                     COVID-19,[facility] IN-HOUSE...[[id number]]  Normal              Final result                 Please view results for these tests on the individual  orders.   RAINBOW DRAW    Narrative:     The following orders were created for panel order [place] Draw.  Procedure                               Abnormality         Status                     ---------                               -----------         ------                     Green Top (Gel)[[id number]]                                  Final result               Lavender Top[[id number]]                                     Final result               Gold Top - SST[[id number]]                                                              Light Blue Top[[id number]]                                   Final result                 Please view results for these tests on the individual orders.   ETHANOL   TROPONIN (IN-HOUSE)   HEMOGLOBIN AND HEMATOCRIT, BLOOD   CBC AND DIFFERENTIAL    Narrative:     The following orders were created for panel order CBC & Differential.  Procedure                               Abnormality         Status                     ---------                               -----------         ------                     CBC Auto Differential[[id number]]        Abnormal            Final result                 Please view results for these tests on the individual orders.   GREEN TOP   LAVENDER TOP   LIGHT BLUE TOP       EKG:   ECG 12 Lead   Final Result   HEART RATE= 99  bpm   RR Interval= 606  ms   VA Interval= 162  ms   P Horizontal Axis= -1  deg   P Front Axis= -28  deg   QRSD Interval= 107  ms   QT Interval= 368  ms   QRS Axis= -14  deg   T Wave Axis= 4  deg   - OTHERWISE NORMAL ECG -   Sinus rhythm   Borderline low voltage, extremity leads   No change from previous tracing   Electronically Signed By: Jean Claude BlakelyGLADIS) ([facility]) 02-May-2022 14:32:38   Date and Time of Study: 2022-05-02 13:13:05          Meds given in ED:   Medications   sodium chloride 0.9 % bolus 500 mL (0 mL Intravenous Stopped 5/2/22 1701)     Followed by   sodium chloride 0.9 % infusion (125 mL/hr Intravenous Not Given 5/2/22 1701)    sodium chloride 0.9 % flush 10 mL (has no administration in time range)   sodium chloride 0.9 % flush 10 mL (has no administration in time range)   nitroglycerin (NITROSTAT) SL tablet 0.4 mg (has no administration in time range)   sodium chloride 0.9 % infusion (has no administration in time range)   acetaminophen (TYLENOL) tablet 650 mg (has no administration in time range)     Or   acetaminophen (TYLENOL) 160 MG/5ML solution 650 mg (has no administration in time range)     Or   acetaminophen (TYLENOL) suppository 650 mg (has no administration in time range)   ondansetron (ZOFRAN) injection 4 mg (has no administration in time range)   aspirin chewable tablet 324 mg (324 mg Oral Given 5/2/22 1534)   sodium chloride 0.9 % bolus 1,000 mL (1,000 mL Intravenous New Bag 5/2/22 1849)       Imaging results:  XR Chest 1 View    Result Date: 5/2/2022  No focal pulmonary consolidation. Tortuous aorta. Follow-up as clinically indicated.  This report was finalized on 5/2/2022 3:42 PM by Dr. Edilberto Vidal M.D.        Ambulatory status:   - up with assistance    Social issues:   Social History     Socioeconomic History   • Marital status: Single   Tobacco Use   • Smoking status: Never Smoker   • Smokeless tobacco: Never Used   Substance and Sexual Activity   • Alcohol use: Yes     Comment: occasional   • Drug use: No   • Sexual activity: Defer

## 2022-05-03 ENCOUNTER — APPOINTMENT (OUTPATIENT)
Dept: CARDIOLOGY | Facility: HOSPITAL | Age: 67
End: 2022-05-03

## 2022-05-03 PROBLEM — E53.8 FOLIC ACID DEFICIENCY: Status: ACTIVE | Noted: 2022-05-03

## 2022-05-03 PROBLEM — R10.84 GENERALIZED ABDOMINAL PAIN: Status: ACTIVE | Noted: 2022-05-03

## 2022-05-03 PROBLEM — D64.9 ANEMIA: Status: ACTIVE | Noted: 2022-05-03

## 2022-05-03 PROBLEM — R74.8 ELEVATED LIVER ENZYMES: Status: RESOLVED | Noted: 2022-05-02 | Resolved: 2022-05-03

## 2022-05-03 PROBLEM — R00.2 PALPITATIONS: Status: ACTIVE | Noted: 2022-05-03

## 2022-05-03 LAB
ANION GAP SERPL CALCULATED.3IONS-SCNC: 9 MMOL/L (ref 5–15)
ASCENDING AORTA: 3.8 CM
BASOPHILS # BLD AUTO: 0.03 10*3/MM3 (ref 0–0.2)
BASOPHILS NFR BLD AUTO: 0.8 % (ref 0–1.5)
BH CV ECHO MEAS - ACS: 3.1 CM
BH CV ECHO MEAS - AO MAX PG: 8.2 MMHG
BH CV ECHO MEAS - AO MEAN PG: 4.7 MMHG
BH CV ECHO MEAS - AO ROOT DIAM: 3.7 CM
BH CV ECHO MEAS - AO V2 MAX: 142.9 CM/SEC
BH CV ECHO MEAS - AO V2 VTI: 20.9 CM
BH CV ECHO MEAS - AVA(I,D): 4 CM2
BH CV ECHO MEAS - EDV(CUBED): 130 ML
BH CV ECHO MEAS - EDV(MOD-SP2): 79 ML
BH CV ECHO MEAS - EDV(MOD-SP4): 77 ML
BH CV ECHO MEAS - EF(MOD-BP): 56.3 %
BH CV ECHO MEAS - EF(MOD-SP2): 57 %
BH CV ECHO MEAS - EF(MOD-SP4): 55.8 %
BH CV ECHO MEAS - ESV(CUBED): 49.3 ML
BH CV ECHO MEAS - ESV(MOD-SP2): 34 ML
BH CV ECHO MEAS - ESV(MOD-SP4): 34 ML
BH CV ECHO MEAS - FS: 27.6 %
BH CV ECHO MEAS - IVS/LVPW: 1.08 CM
BH CV ECHO MEAS - IVSD: 1.74 CM
BH CV ECHO MEAS - LA DIMENSION: 3.8 CM
BH CV ECHO MEAS - LAT PEAK E' VEL: 14.6 CM/SEC
BH CV ECHO MEAS - LV DIASTOLIC VOL/BSA (35-75): 34.1 CM2
BH CV ECHO MEAS - LV MASS(C)D: 388 GRAMS
BH CV ECHO MEAS - LV MAX PG: 3.9 MMHG
BH CV ECHO MEAS - LV MEAN PG: 1.82 MMHG
BH CV ECHO MEAS - LV SYSTOLIC VOL/BSA (12-30): 15 CM2
BH CV ECHO MEAS - LV V1 MAX: 98.1 CM/SEC
BH CV ECHO MEAS - LV V1 VTI: 18.2 CM
BH CV ECHO MEAS - LVIDD: 5.1 CM
BH CV ECHO MEAS - LVIDS: 3.7 CM
BH CV ECHO MEAS - LVOT AREA: 4.6 CM2
BH CV ECHO MEAS - LVOT DIAM: 2.42 CM
BH CV ECHO MEAS - LVPWD: 1.61 CM
BH CV ECHO MEAS - MED PEAK E' VEL: 12.3 CM/SEC
BH CV ECHO MEAS - MV A MAX VEL: 86.1 CM/SEC
BH CV ECHO MEAS - MV DEC SLOPE: 281.1 CM/SEC2
BH CV ECHO MEAS - MV DEC TIME: 0.2 MSEC
BH CV ECHO MEAS - MV E MAX VEL: 45.8 CM/SEC
BH CV ECHO MEAS - MV E/A: 0.53
BH CV ECHO MEAS - MV MAX PG: 3.6 MMHG
BH CV ECHO MEAS - MV MEAN PG: 1.73 MMHG
BH CV ECHO MEAS - MV P1/2T: 73.5 MSEC
BH CV ECHO MEAS - MV V2 VTI: 16.1 CM
BH CV ECHO MEAS - MVA(P1/2T): 3 CM2
BH CV ECHO MEAS - MVA(VTI): 5.2 CM2
BH CV ECHO MEAS - PA ACC TIME: 0.11 SEC
BH CV ECHO MEAS - PA PR(ACCEL): 27.9 MMHG
BH CV ECHO MEAS - PA V2 MAX: 96.4 CM/SEC
BH CV ECHO MEAS - QP/QS: 0.68
BH CV ECHO MEAS - RV MAX PG: 3.4 MMHG
BH CV ECHO MEAS - RV V1 MAX: 92.5 CM/SEC
BH CV ECHO MEAS - RV V1 VTI: 15.3 CM
BH CV ECHO MEAS - RVOT DIAM: 2.18 CM
BH CV ECHO MEAS - SI(MOD-SP2): 19.9 ML/M2
BH CV ECHO MEAS - SI(MOD-SP4): 19 ML/M2
BH CV ECHO MEAS - SV(LVOT): 84.1 ML
BH CV ECHO MEAS - SV(MOD-SP2): 45 ML
BH CV ECHO MEAS - SV(MOD-SP4): 43 ML
BH CV ECHO MEAS - SV(RVOT): 57.4 ML
BH CV ECHO MEAS - TAPSE (>1.6): 2.43 CM
BH CV ECHO MEASUREMENTS AVERAGE E/E' RATIO: 3.41
BH CV XLRA - RV BASE: 3 CM
BH CV XLRA - RV LENGTH: 6.9 CM
BH CV XLRA - RV MID: 2.44 CM
BH CV XLRA - TDI S': 15 CM/SEC
BUN SERPL-MCNC: 32 MG/DL (ref 8–23)
BUN/CREAT SERPL: 18.4 (ref 7–25)
CALCIUM SPEC-SCNC: 10.3 MG/DL (ref 8.6–10.5)
CHLORIDE SERPL-SCNC: 106 MMOL/L (ref 98–107)
CO2 SERPL-SCNC: 23 MMOL/L (ref 22–29)
CREAT SERPL-MCNC: 1.74 MG/DL (ref 0.76–1.27)
D-LACTATE SERPL-SCNC: 0.9 MMOL/L (ref 0.5–2)
DEPRECATED RDW RBC AUTO: 55.1 FL (ref 37–54)
EGFRCR SERPLBLD CKD-EPI 2021: 43.2 ML/MIN/1.73
EOSINOPHIL # BLD AUTO: 0.05 10*3/MM3 (ref 0–0.4)
EOSINOPHIL NFR BLD AUTO: 1.3 % (ref 0.3–6.2)
ERYTHROCYTE [DISTWIDTH] IN BLOOD BY AUTOMATED COUNT: 15.5 % (ref 12.3–15.4)
FERRITIN SERPL-MCNC: 70.9 NG/ML (ref 30–400)
FOLATE SERPL-MCNC: 4.31 NG/ML (ref 4.78–24.2)
GLUCOSE SERPL-MCNC: 89 MG/DL (ref 65–99)
HCT VFR BLD AUTO: 22 % (ref 37.5–51)
HCT VFR BLD AUTO: 22.5 % (ref 37.5–51)
HCT VFR BLD AUTO: 22.5 % (ref 37.5–51)
HGB BLD-MCNC: 7.4 G/DL (ref 13–17.7)
HGB BLD-MCNC: 7.5 G/DL (ref 13–17.7)
HGB BLD-MCNC: 7.5 G/DL (ref 13–17.7)
IMM GRANULOCYTES # BLD AUTO: 0.01 10*3/MM3 (ref 0–0.05)
IMM GRANULOCYTES NFR BLD AUTO: 0.3 % (ref 0–0.5)
INR PPP: 1.12 (ref 0.9–1.1)
IRON 24H UR-MRATE: 62 MCG/DL (ref 59–158)
IRON SATN MFR SERPL: 13 % (ref 20–50)
LEFT ATRIUM VOLUME INDEX: 30.4 ML/M2
LYMPHOCYTES # BLD AUTO: 1.14 10*3/MM3 (ref 0.7–3.1)
LYMPHOCYTES NFR BLD AUTO: 30.4 % (ref 19.6–45.3)
MAXIMAL PREDICTED HEART RATE: 156 BPM
MCH RBC QN AUTO: 33.6 PG (ref 26.6–33)
MCHC RBC AUTO-ENTMCNC: 33.6 G/DL (ref 31.5–35.7)
MCV RBC AUTO: 100 FL (ref 79–97)
MONOCYTES # BLD AUTO: 0.54 10*3/MM3 (ref 0.1–0.9)
MONOCYTES NFR BLD AUTO: 14.4 % (ref 5–12)
NEUTROPHILS NFR BLD AUTO: 1.98 10*3/MM3 (ref 1.7–7)
NEUTROPHILS NFR BLD AUTO: 52.8 % (ref 42.7–76)
NRBC BLD AUTO-RTO: 0 /100 WBC (ref 0–0.2)
PLATELET # BLD AUTO: 92 10*3/MM3 (ref 140–450)
PMV BLD AUTO: 9.4 FL (ref 6–12)
POTASSIUM SERPL-SCNC: 4.6 MMOL/L (ref 3.5–5.2)
PROTHROMBIN TIME: 14.3 SECONDS (ref 11.7–14.2)
RBC # BLD AUTO: 2.2 10*6/MM3 (ref 4.14–5.8)
SINUS: 3.8 CM
SODIUM SERPL-SCNC: 138 MMOL/L (ref 136–145)
STJ: 3.8 CM
STRESS TARGET HR: 133 BPM
TIBC SERPL-MCNC: 472 MCG/DL (ref 298–536)
TRANSFERRIN SERPL-MCNC: 317 MG/DL (ref 200–360)
TROPONIN T SERPL-MCNC: 0.03 NG/ML (ref 0–0.03)
VIT B12 BLD-MCNC: 411 PG/ML (ref 211–946)
WBC NRBC COR # BLD: 3.75 10*3/MM3 (ref 3.4–10.8)

## 2022-05-03 PROCEDURE — 36415 COLL VENOUS BLD VENIPUNCTURE: CPT | Performed by: NURSE PRACTITIONER

## 2022-05-03 PROCEDURE — 82607 VITAMIN B-12: CPT | Performed by: INTERNAL MEDICINE

## 2022-05-03 PROCEDURE — 96361 HYDRATE IV INFUSION ADD-ON: CPT

## 2022-05-03 PROCEDURE — 25010000002 THIAMINE PER 100 MG: Performed by: INTERNAL MEDICINE

## 2022-05-03 PROCEDURE — 63710000001 DIPHENHYDRAMINE PER 50 MG: Performed by: NURSE PRACTITIONER

## 2022-05-03 PROCEDURE — 85025 COMPLETE CBC W/AUTO DIFF WBC: CPT | Performed by: NURSE PRACTITIONER

## 2022-05-03 PROCEDURE — 85014 HEMATOCRIT: CPT | Performed by: NURSE PRACTITIONER

## 2022-05-03 PROCEDURE — 25010000002 NA FERRIC GLUC CPLX PER 12.5 MG: Performed by: NURSE PRACTITIONER

## 2022-05-03 PROCEDURE — 85018 HEMOGLOBIN: CPT | Performed by: NURSE PRACTITIONER

## 2022-05-03 PROCEDURE — 82746 ASSAY OF FOLIC ACID SERUM: CPT | Performed by: INTERNAL MEDICINE

## 2022-05-03 PROCEDURE — G0378 HOSPITAL OBSERVATION PER HR: HCPCS

## 2022-05-03 PROCEDURE — 93306 TTE W/DOPPLER COMPLETE: CPT

## 2022-05-03 PROCEDURE — 93306 TTE W/DOPPLER COMPLETE: CPT | Performed by: INTERNAL MEDICINE

## 2022-05-03 PROCEDURE — 84484 ASSAY OF TROPONIN QUANT: CPT | Performed by: INTERNAL MEDICINE

## 2022-05-03 PROCEDURE — 85610 PROTHROMBIN TIME: CPT | Performed by: NURSE PRACTITIONER

## 2022-05-03 PROCEDURE — 99204 OFFICE O/P NEW MOD 45 MIN: CPT | Performed by: INTERNAL MEDICINE

## 2022-05-03 PROCEDURE — 96365 THER/PROPH/DIAG IV INF INIT: CPT

## 2022-05-03 PROCEDURE — 80048 BASIC METABOLIC PNL TOTAL CA: CPT | Performed by: NURSE PRACTITIONER

## 2022-05-03 PROCEDURE — 83605 ASSAY OF LACTIC ACID: CPT | Performed by: NURSE PRACTITIONER

## 2022-05-03 RX ORDER — ACETAMINOPHEN 325 MG/1
650 TABLET ORAL ONCE
Status: COMPLETED | OUTPATIENT
Start: 2022-05-03 | End: 2022-05-03

## 2022-05-03 RX ORDER — DIPHENHYDRAMINE HCL 25 MG
50 CAPSULE ORAL ONCE
Status: COMPLETED | OUTPATIENT
Start: 2022-05-03 | End: 2022-05-03

## 2022-05-03 RX ORDER — FOLIC ACID 1 MG/1
1 TABLET ORAL DAILY
Status: DISCONTINUED | OUTPATIENT
Start: 2022-05-03 | End: 2022-05-05 | Stop reason: HOSPADM

## 2022-05-03 RX ORDER — DIPHENHYDRAMINE HCL 25 MG
50 CAPSULE ORAL ONCE
Status: DISCONTINUED | OUTPATIENT
Start: 2022-05-03 | End: 2022-05-03 | Stop reason: CLARIF

## 2022-05-03 RX ORDER — SODIUM CHLORIDE 0.9 % (FLUSH) 0.9 %
10 SYRINGE (ML) INJECTION EVERY 12 HOURS SCHEDULED
Status: DISCONTINUED | OUTPATIENT
Start: 2022-05-03 | End: 2022-05-05 | Stop reason: HOSPADM

## 2022-05-03 RX ORDER — ACETAMINOPHEN 325 MG/1
650 TABLET ORAL ONCE
Status: DISCONTINUED | OUTPATIENT
Start: 2022-05-03 | End: 2022-05-03 | Stop reason: CLARIF

## 2022-05-03 RX ADMIN — MULTIPLE VITAMINS W/ MINERALS TAB 1 TABLET: TAB at 08:17

## 2022-05-03 RX ADMIN — ALLOPURINOL 300 MG: 300 TABLET ORAL at 08:16

## 2022-05-03 RX ADMIN — SODIUM CHLORIDE, POTASSIUM CHLORIDE, SODIUM LACTATE AND CALCIUM CHLORIDE 125 ML/HR: 600; 310; 30; 20 INJECTION, SOLUTION INTRAVENOUS at 20:39

## 2022-05-03 RX ADMIN — SODIUM CHLORIDE 250 MG: 9 INJECTION, SOLUTION INTRAVENOUS at 17:17

## 2022-05-03 RX ADMIN — Medication 10 ML: at 20:40

## 2022-05-03 RX ADMIN — ACETAMINOPHEN 650 MG: 325 TABLET ORAL at 16:19

## 2022-05-03 RX ADMIN — THIAMINE HYDROCHLORIDE 100 MG: 100 INJECTION, SOLUTION INTRAMUSCULAR; INTRAVENOUS at 00:15

## 2022-05-03 RX ADMIN — SODIUM CHLORIDE, POTASSIUM CHLORIDE, SODIUM LACTATE AND CALCIUM CHLORIDE 125 ML/HR: 600; 310; 30; 20 INJECTION, SOLUTION INTRAVENOUS at 09:33

## 2022-05-03 RX ADMIN — Medication 100 MG: at 08:17

## 2022-05-03 RX ADMIN — DIPHENHYDRAMINE HYDROCHLORIDE 50 MG: 25 CAPSULE ORAL at 16:20

## 2022-05-03 RX ADMIN — FOLIC ACID 1 MG: 1 TABLET ORAL at 14:34

## 2022-05-03 NOTE — PLAN OF CARE
Goal Outcome Evaluation:  Plan of Care Reviewed With: patient        Progress: improving  Outcome Evaluation: vss, no c/o cp or sob, LR infusion contin, pt agrees with current plan of care and testing, wctm

## 2022-05-03 NOTE — PAYOR COMM NOTE
"Mo Willoughby (66 y.o. Male)                         ATTENTION OBSERVATION  - AUTHORIZATION REQUEST  DX R07.9 CHEST PAINS                         REPLY TO UR DEPT  [phone] OR CALL    RONNEugenia WESTONEugenia WILY               Date of Birth   1955    Social Security Number       Address   [address] [place] [address]    Home Phone   [phone]    MRN   [id number]       Sabianism   Latter-day    Marital Status   Single                            Admission Date   5/2/22    Admission Type   Emergency    Admitting Provider   Chet Son MD    Attending Provider   Regan Naranjo MD    Department, Room/Bed   [facility] CARDIOVASC UNIT, 2222/1       Discharge Date       Discharge Disposition       Discharge Destination                               Attending Provider: Regan Naranjo MD    Allergies: Zetia [Ezetimibe]    Isolation: None   Infection: None   Code Status: CPR   Advance Care Planning Activity    Ht: 182.9 cm (72\")   Wt: 104 kg (230 lb)    Admission Cmt: None   Principal Problem: Chest pain [R07.9]                 Active Insurance as of 5/2/2022     Primary Coverage     Payor Plan Insurance Group Employer/Plan Group    [employer] [place] [employer] [place]ICA[place]      Payor Plan Address Payor Plan Phone Number Payor Plan Fax Number Effective Dates    [address] [address] [phone]  11/18/2019 - None Entered    [place] [address]       Subscriber Name Subscriber Birth Date Member ID       MO WILLOUGHBY 1955 [id number]                 Emergency Contacts      (Rel.) Home Phone Work Phone Mobile Phone    Mirtha Willoughby (Sister) [phone] -- [phone]               History & Physical      Chet Son MD at 05/02/22 2148              Patient Name:  Mo Willoughby  YOB: 1955  MRN:  [id number]  Admit Date:  5/2/2022  Patient Care Team:  Jenn Davison APRN as PCP - General (Nurse Practitioner)      Subjective   History Present " "Illness     Chief Complaint   Patient presents with   • Chest Pain   • Dizziness       Mr. Willoughby is a 66 y.o. non-smoker with a history of hypertension and gout that presents to [facility] complaining of dizziness on standing for the past 2-3 days. He has also had some occasional \"squeezing\" pain in the left side of his chest which is non-radiating and not consistently exertional but was associated with nausea and diaphoresis. He visited his PCP today and was sent to the ER for further evaluation.  He does admit to regular alcohol use (2-3 drinks a day). I asked him about recreational drug use and he does admit to cocaine and cannabis use but states he has not used those \"in a while\".     He states that his appetite has not been great these past several days. He was additionally noted to be anemic but he denies having had gross bleeding. According to the ER attending he did not want to undergo a rectal exam.     History of Present Illness  Review of Systems   Constitutional: Positive for appetite change (decreased), diaphoresis and fatigue. Negative for chills and fever.   HENT: Negative for congestion, sore throat and trouble swallowing.    Eyes: Negative for redness and visual disturbance.   Respiratory: Positive for chest tightness. Negative for cough and shortness of breath.    Cardiovascular: Positive for chest pain and leg swelling (chronic, unchanged). Negative for palpitations.   Gastrointestinal: Positive for nausea. Negative for abdominal pain, blood in stool, constipation, diarrhea and vomiting.   Endocrine: Negative for cold intolerance and heat intolerance.   Genitourinary: Negative for difficulty urinating, dysuria and hematuria.   Musculoskeletal: Negative for arthralgias and myalgias.   Skin: Negative for pallor and rash.   Neurological: Positive for dizziness, weakness (generalized) and light-headedness. Negative for syncope, numbness and headaches.   Hematological: Negative for " adenopathy. Does not bruise/bleed easily.   Psychiatric/Behavioral: Negative for confusion and decreased concentration.        Personal History     Past Medical History:   Diagnosis Date   • Erectile dysfunction    • Gout    • Hx of colonic polyp    • Hypercalcemia    • Hypertension    • Parathyroid abnormality (HCC)    • Risk factors for obstructive sleep apnea    • Syncope and collapse 03/28/2017     Past Surgical History:   Procedure Laterality Date   • CHOLECYSTECTOMY WITH INTRAOPERATIVE CHOLANGIOGRAM N/A 8/3/2018    Procedure: CHOLECYSTECTOMY LAPAROSCOPIC INTRAOPERATIVE CHOLANGIOGRAM;  Surgeon: Melina Kate MD;  Location: [facility] MAIN OR;  Service: General   • COLONOSCOPY     • COLONOSCOPY N/A 10/3/2016    Procedure: COLONOSCOPY TO CECUM TO TERMINAL ILIUM WITH COLD BIOPSY POLYPECTOMY;  Surgeon: Benoit Vilchis MD;  Location: [facility] ENDOSCOPY;  Service:    • LIPOMA EXCISION     • TONSILLECTOMY       Family History   Problem Relation Age of Onset   • Hypertension Mother    • Breast cancer Mother    • Cancer Father    • Liver cancer Father      Social History     Tobacco Use   • Smoking status: Never Smoker   • Smokeless tobacco: Never Used   Substance Use Topics   • Alcohol use: Yes     Comment: occasional   • Drug use: No     No current facility-administered medications on file prior to encounter.     Current Outpatient Medications on File Prior to Encounter   Medication Sig Dispense Refill   • allopurinol (ZYLOPRIM) 300 MG tablet Take 1 tablet by mouth Daily. 90 tablet 1   • lisinopril-hydrochlorothiazide (PRINZIDE,ZESTORETIC) 20-25 MG per tablet Take 1 tablet by mouth Daily. (Patient taking differently: Take 1 tablet by mouth Daily. Patient takes 0.5 tablet at home.) 90 tablet 0   • [DISCONTINUED] ezetimibe (Zetia) 10 MG tablet Take 1 tablet by mouth Daily. 90 tablet 0     Allergies   Allergen Reactions   • Zetia [Ezetimibe] Dizziness     Nausea, fatgue       Objective    Objective     Vital Signs  Temp:   [96.9 °F (36.1 °C)-98.9 °F (37.2 °C)] 98.9 °F (37.2 °C)  Heart Rate:  [] 89  Resp:  [18] 18  BP: (112-156)/() 140/93  SpO2:  [98 %-100 %] 100 %  on   ;   Device (Oxygen Therapy): room air  Body mass index is 30.46 kg/m².    Physical Exam  Vitals and nursing note reviewed.   Constitutional:       General: He is not in acute distress.     Appearance: He is not toxic-appearing or diaphoretic.   HENT:      Head: Normocephalic and atraumatic.      Nose: Nose normal.      Mouth/Throat:      Mouth: Mucous membranes are moist.      Pharynx: Oropharynx is clear.   Eyes:      Extraocular Movements: Extraocular movements intact.      Conjunctiva/sclera: Conjunctivae normal.      Pupils: Pupils are equal, round, and reactive to light.   Cardiovascular:      Rate and Rhythm: Normal rate and regular rhythm.      Pulses: Normal pulses.   Pulmonary:      Effort: Pulmonary effort is normal.      Breath sounds: Normal breath sounds.   Abdominal:      General: Bowel sounds are normal.      Palpations: Abdomen is soft.      Tenderness: There is no abdominal tenderness.   Musculoskeletal:         General: Swelling (1+ BLE) present. No tenderness.      Cervical back: Normal range of motion and neck supple.   Skin:     General: Skin is warm and dry.      Capillary Refill: Capillary refill takes less than 2 seconds.      Comments: Varicose veins BLE   Neurological:      General: No focal deficit present.      Mental Status: He is alert and oriented to person, place, and time.   Psychiatric:         Mood and Affect: Mood normal.         Behavior: Behavior normal.       Results Review:  I reviewed the patient's new clinical results.  I reviewed the patient's new imaging results and agree with the interpretation.  I reviewed the patient's other test results and agree with the interpretation  I personally viewed and interpreted the patient's EKG/Telemetry data  Discussed with ED provider.    Lab Results (last 24 hours)      Procedure Component Value Units Date/Time    CBC & Differential [[id number]]  (Abnormal) Collected: 05/02/22 1527    Specimen: Blood Updated: 05/02/22 1538    Narrative:      The following orders were created for panel order CBC & Differential.  Procedure                               Abnormality         Status                     ---------                               -----------         ------                     CBC Auto Differential[[id number]]        Abnormal            Final result                 Please view results for these tests on the individual orders.    Comprehensive Metabolic Panel [[id number]]  (Abnormal) Collected: 05/02/22 1527    Specimen: Blood Updated: 05/02/22 1604     Glucose 112 mg/dL      BUN 30 mg/dL      Creatinine 1.98 mg/dL      Sodium 136 mmol/L      Potassium 4.9 mmol/L      Chloride 101 mmol/L      CO2 23.0 mmol/L      Calcium 11.5 mg/dL      Total Protein 6.9 g/dL      Albumin 4.00 g/dL      ALT (SGPT) 29 U/L      AST (SGOT) 59 U/L      Alkaline Phosphatase 56 U/L      Total Bilirubin 1.1 mg/dL      Globulin 2.9 gm/dL      A/G Ratio 1.4 g/dL      BUN/Creatinine Ratio 15.2     Anion Gap 12.0 mmol/L      eGFR 37.0 mL/min/1.73      Comment: National Kidney Foundation and American Society of Nephrology (ASN) Task Force recommended calculation based on the Chronic Kidney Disease Epidemiology Collaboration (CKD-EPI) equation refit without adjustment for race.       Narrative:      GFR Normal >60  Chronic Kidney Disease <60  Kidney Failure <15      CBC Auto Differential [[id number]]  (Abnormal) Collected: 05/02/22 1527    Specimen: Blood Updated: 05/02/22 1538     WBC 5.32 10*3/mm3      RBC 2.77 10*6/mm3      Hemoglobin 9.3 g/dL      Hematocrit 27.7 %      .0 fL      MCH 33.6 pg      MCHC 33.6 g/dL      RDW 15.3 %      RDW-SD 55.6 fl      MPV 9.9 fL      Platelets 139 10*3/mm3      Neutrophil % 64.5 %      Lymphocyte % 19.0 %      Monocyte % 15.0 %      Eosinophil % 0.2 %      Basophil  % 0.9 %      Immature Grans % 0.4 %      Neutrophils, Absolute 3.43 10*3/mm3      Lymphocytes, Absolute 1.01 10*3/mm3      Monocytes, Absolute 0.80 10*3/mm3      Eosinophils, Absolute 0.01 10*3/mm3      Basophils, Absolute 0.05 10*3/mm3      Immature Grans, Absolute 0.02 10*3/mm3      nRBC 0.0 /100 WBC     Ethanol [[id number]] Collected: 05/02/22 1527    Specimen: Blood Updated: 05/02/22 1604     Ethanol <10 mg/dL      Ethanol % <0.010 %     Troponin [[id number]]  (Normal) Collected: 05/02/22 1527    Specimen: Blood Updated: 05/02/22 1601     Troponin T 0.022 ng/mL     Narrative:      Troponin T Reference Range:  <= 0.03 ng/mL-   Negative for AMI  >0.03 ng/mL-     Abnormal for myocardial necrosis.  Clinicians would have to utilize clinical acumen, EKG, Troponin and serial changes to determine if it is an Acute Myocardial Infarction or myocardial injury due to an underlying chronic condition.       Results may be falsely decreased if patient taking Biotin.      D-dimer, Quantitative [[id number]]  (Abnormal) Collected: 05/02/22 1527    Specimen: Blood Updated: 05/02/22 1553     D-Dimer, Quantitative 0.94 MCGFEU/mL     Narrative:      The Stago D-Dimer test used in conjunction with a clinical pretest probability (PTP) assessment model, has been approved by the FDA to rule out the presence of venous thromboembolism (VTE) in outpatients suspected of deep venous thrombosis (DVT) or pulmonary embolism (PE). The cut-off for negative predictive value is <0.50 MCGFEU/mL.    Urine Drug Screen - Urine, Clean Catch [[id number]]  (Abnormal) Collected: 05/02/22 1712    Specimen: Urine, Clean Catch Updated: 05/02/22 1745     Amphet/Methamphet, Screen Negative     Barbiturates Screen, Urine Negative     Benzodiazepine Screen, Urine Negative     Cocaine Screen, Urine Positive     Opiate Screen Negative     THC, Screen, Urine Positive     Methadone Screen, Urine Negative     Oxycodone Screen, Urine Negative    Narrative:      Negative  Thresholds Per Drugs Screened:    Amphetamines                 500 ng/ml  Barbiturates                 200 ng/ml  Benzodiazepines              100 ng/ml  Cocaine                      300 ng/ml  Methadone                    300 ng/ml  Opiates                      300 ng/ml  Oxycodone                    100 ng/ml  THC                           50 ng/ml    The Normal Value for all drugs tested is negative. This report includes final unconfirmed screening results to be used for medical treatment purposes only. Unconfirmed results must not be used for non-medical purposes such as employment or legal testing. Clinical consideration should be applied to any drug of abuse test, particularly when unconfirmed results are used.            Urinalysis With Microscopic If Indicated (No Culture) - Urine, Clean Catch [[id number]]  (Abnormal) Collected: 05/02/22 1712    Specimen: Urine, Clean Catch Updated: 05/02/22 1732     Color, UA Dark Yellow     Appearance, UA Clear     pH, UA 7.5     Specific Gravity, UA 1.019     Glucose, UA Negative     Ketones, UA Trace     Bilirubin, UA Negative     Blood, UA Negative     Protein, UA 30 mg/dL (1+)     Leuk Esterase, UA Small (1+)     Nitrite, UA Negative     Urobilinogen, UA 1.0 E.U./dL    Urinalysis, Microscopic Only - Urine, Clean Catch [[id number]]  (Abnormal) Collected: 05/02/22 1712    Specimen: Urine, Clean Catch Updated: 05/02/22 1744     RBC, UA None Seen /HPF      WBC, UA 3-5 /HPF      Bacteria, UA None Seen /HPF      Squamous Epithelial Cells, UA 0-2 /HPF      Hyaline Casts, UA 3-6 /LPF      Methodology Manual Light Microscopy    Troponin [[id number]]  (Normal) Collected: 05/02/22 1713    Specimen: Blood Updated: 05/02/22 1756     Troponin T 0.025 ng/mL     Narrative:      Troponin T Reference Range:  <= 0.03 ng/mL-   Negative for AMI  >0.03 ng/mL-     Abnormal for myocardial necrosis.  Clinicians would have to utilize clinical acumen, EKG, Troponin and serial changes to determine  if it is an Acute Myocardial Infarction or myocardial injury due to an underlying chronic condition.       Results may be falsely decreased if patient taking Biotin.      COVID PRE-OP / PRE-PROCEDURE SCREENING ORDER (NO ISOLATION) - Swab, Nasopharynx [[id number]]  (Normal) Collected: 05/02/22 1828    Specimen: Swab from Nasopharynx Updated: 05/02/22 1920    Narrative:      The following orders were created for panel order COVID PRE-OP / PRE-PROCEDURE SCREENING ORDER (NO ISOLATION) - Swab, Nasopharynx.  Procedure                               Abnormality         Status                     ---------                               -----------         ------                     COVID-19,[facility] IN-HOUSE...[[id number]]  Normal              Final result                 Please view results for these tests on the individual orders.    COVID-19,[facility] IN-HOUSE CEPHEID/FILEMON NP SWAB IN TRANSPORT MEDIA 8-12 HR TAT - Swab, Nasopharynx [[id number]]  (Normal) Collected: 05/02/22 1828    Specimen: Swab from Nasopharynx Updated: 05/02/22 1920     COVID19 Not Detected    Narrative:      Fact sheet for providers: https://www.fda.gov/media/474560/download     Fact sheet for patients: https://www.fda.gov/media/438373/download    Troponin [[id number]] Collected: 05/02/22 2105    Specimen: Blood Updated: 05/02/22 2141    Hemoglobin & Hematocrit, Blood [[id number]] Collected: 05/02/22 2105    Specimen: Blood Updated: 05/02/22 2141          Imaging Results (Last 24 Hours)     Procedure Component Value Units Date/Time    CT Angiogram Chest [[id number]] Collected: 05/02/22 2057     Updated: 05/02/22 2118    Narrative:      CT ANGIOGRAM CHEST-     INDICATIONS: Chest pain.  Radiation dose reduction techniques were  utilized, including automated exposure control and exposure modulation  based on body size.     TECHNIQUE: CT angiography of the chest. Three-dimensional  reconstructions.     COMPARISON: None available     FINDINGS:     No pulmonary  embolism. No aortic dissection. Ascending aorta is  aneurysmal, 4.5 cm.     The heart size is normal without pericardial effusion. A few small  subcentimeter short axis mediastinal lymph nodes are seen that are not  significant by size criteria.     The airways appear clear.     No pleural effusion or pneumothorax.     The lungs show an 8 mm right lower lobe nodule on axial image 113,  stable from 07/31/2018.     Upper abdominal structures show no acute findings. Micronodular contour  of the liver suggests cirrhosis.     Degenerative changes are seen in the spine. No acute fracture is  identified.       Impression:         Aneurysmal ascending aorta. No pulmonary embolism.      This report was finalized on 5/2/2022 9:15 PM by Dr. Edilberto Vidal M.D.       XR Chest 1 View [[id number]] Collected: 05/02/22 1542     Updated: 05/02/22 1545    Narrative:      XR CHEST 1 VW-     HISTORY: Male who is 66 years-old,  chest pain     TECHNIQUE: Frontal views of the chest     COMPARISON: 03/29/2017     FINDINGS: The heart size is normal. Aorta is tortuous. Pulmonary  vasculature is unremarkable. No focal pulmonary consolidation, pleural  effusion, or pneumothorax. No acute osseous process.       Impression:      No focal pulmonary consolidation. Tortuous aorta. Follow-up  as clinically indicated.     This report was finalized on 5/2/2022 3:42 PM by Dr. Edilberto Vidal M.D.                 ECG 12 Lead   Final Result   HEART RATE= 99  bpm   RR Interval= 606  ms   UT Interval= 162  ms   P Horizontal Axis= -1  deg   P Front Axis= -28  deg   QRSD Interval= 107  ms   QT Interval= 368  ms   QRS Axis= -14  deg   T Wave Axis= 4  deg   - OTHERWISE NORMAL ECG -   Sinus rhythm   Borderline low voltage, extremity leads   No change from previous tracing   Electronically Signed By: Jean Claude Blakely (GLADIS) ([facility]) 02-May-2022 14:32:38   Date and Time of Study: 2022-05-02 13:13:05           Assessment/Plan     Active Hospital Problems     Diagnosis  POA   • **Chest pain [R07.9]  Yes   • Fatigue [R53.83]  Yes   • Aneurysm of ascending aorta (HCC) [I71.2]  Yes   • Acute kidney injury (HCC) [N17.9]  Yes   • Hypercalcemia [E83.52]  Yes   • H/O: gout on allopurinol [Z87.39]  Yes   • Hypertension [I10]  Yes      Resolved Hospital Problems   No resolved problems to display.   Chest Pain/Dizziness  - he does have some dehydration to explain the dizziness-I suspect his chest pain is related to cocaine use  - no specific ischemic changes on ecg, will follow troponins  - d-dimer elevated slightly-no PE on CTA but 4.5cm ascending aortic aneurysm noted  - consult cardiology    OTF  - pre-renal cause suspected  - will check PVR, urinalysis noted with some mild proteinuria and a few WBCs  - monitor response to IVF  - he is hypercalcemic which is chronic-has known hyperparathyroidism-monitor on IVF    HTN  - hold lisinopril/HCTZ given OTF and dehydration    Anemia/TCP  - could be related to alcohol or cocaine use  - check iron studies, B12, FA  - vitamin replacement    EtOH use  - daily-will cover with CIWA protocol    I discussed the patient's findings and my recommendations with patient, nursing staff and ED provider.    VTE Prophylaxis - SCDs.  Code Status - Full code.       Chet Son MD  [facility]  05/02/22  21:48 EDT    Electronically signed by Chet Son MD at 05/03/22 0621          Emergency Department Notes      Tamika Noel RN at 05/02/22 1305        Patient to ed from  with complaints of midsternal cp and dizziness x 2 days.     Electronically signed by Tamika Noel RN at 05/02/22 1306     Sanjay Ram MD at 05/02/22 1510           EMERGENCY DEPARTMENT ENCOUNTER    Room Number:  13/13  Date of encounter:  5/2/2022  PCP: Jenn Davison APRN  Historian: Pt    Patient was placed in face mask during triage process. Patient was wearing facemask when I entered the room and throughout our  encounter. I wore full protective equipment throughout this patient encounter including a face mask, eye protection, and gloves. Hand hygiene was performed before donning protective equipment and again following doffing of PPE after leaving the room.    HPI:  Chief Complaint: Fatigue, lightheaded, near syncope, chest discomfort  A complete HPI/ROS/PMH/PSH/SH/FH are unobtainable due to: N/A   Context: Mo Willoughby is a 66 y.o. male who presents to the ED c/o 2 weeks of fatigue with intermittent episodes of lightheadedness/dizziness seems worse when he first stands up.  He went to see his primary care provider today and also reportedly had 2 episodes of left-sided chest discomfort associated with nausea and diaphoresis for the last 2 days.  He reports these happen at rest and lasted approximately 1 to 2 minutes each time.  No exacerbating or relieving factors identified.  Patient has occasional morning cough with no fevers or ongoing dyspnea.  Within the last month patient has had at least 2 episodes where he did have full syncope with collapse.  No ongoing injuries reported from nose.  No abdominal pain, vomiting or diarrhea.  No black or bloody stools.  No dysuria or hematuria reported.  0 out of 10 pain at this time.      MEDICAL HISTORY REVIEW  EMR reviewed:    Admitting date: 7/31/2018  Discharging date: 8/4/2018  Admitting diagnosis: Gallstone pancreatitis  Discharging diagnoses: Gallstone pancreatitis, acute renal injury, acute cholecystitis, acute pancreatitis, sinus tachycardia    PAST MEDICAL HISTORY  Active Ambulatory Problems     Diagnosis Date Noted   • Hypertension    • Erectile dysfunction    • Gout    • History of cellulitis 04/27/2016   • Hyperparathyroidism with most recent intact PTH 95, calcium 11.3 with SPECT revealing question of a subcentimeter left inferior parathyroid adenoma 11/15/2016   • Hypercalcemia 11/15/2016   • H/O: gout on allopurinol 11/15/2016   • Colon polyp 11/15/2016   •  Cholecystitis 07/31/2018   • Sinus tachycardia 08/01/2018   • Pancreatitis 08/01/2018   • Acute kidney injury (HCC) 08/01/2018   • Elevated liver enzymes 05/02/2022   • Cough 05/02/2022   • Nausea 05/02/2022   • Fatigue 05/02/2022   • Dizziness 05/02/2022     Resolved Ambulatory Problems     Diagnosis Date Noted   • No Resolved Ambulatory Problems     Past Medical History:   Diagnosis Date   • Hx of colonic polyp    • Parathyroid abnormality (HCC)    • Risk factors for obstructive sleep apnea    • Syncope and collapse 03/28/2017         PAST SURGICAL HISTORY  Past Surgical History:   Procedure Laterality Date   • CHOLECYSTECTOMY WITH INTRAOPERATIVE CHOLANGIOGRAM N/A 8/3/2018    Procedure: CHOLECYSTECTOMY LAPAROSCOPIC INTRAOPERATIVE CHOLANGIOGRAM;  Surgeon: Melina Kate MD;  Location: [facility] OR;  Service: General   • COLONOSCOPY     • COLONOSCOPY N/A 10/3/2016    Procedure: COLONOSCOPY TO CECUM TO TERMINAL ILIUM WITH COLD BIOPSY POLYPECTOMY;  Surgeon: Benoit Vilchis MD;  Location: [facility] ENDOSCOPY;  Service:    • LIPOMA EXCISION     • TONSILLECTOMY           FAMILY HISTORY  Family History   Problem Relation Age of Onset   • Hypertension Mother    • Breast cancer Mother    • Cancer Father    • Liver cancer Father          SOCIAL HISTORY  Social History     Socioeconomic History   • Marital status: Single   Tobacco Use   • Smoking status: Never Smoker   • Smokeless tobacco: Never Used   Substance and Sexual Activity   • Alcohol use: Yes     Comment: occasional   • Drug use: No   • Sexual activity: Defer         ALLERGIES  Zetia [ezetimibe]        REVIEW OF SYSTEMS  Review of Systems     All systems reviewed and negative except for those discussed in HPI.       PHYSICAL EXAM    I have reviewed the triage vital signs and nursing notes.    ED Triage Vitals [05/02/22 1307]   Temp Heart Rate Resp BP SpO2   96.9 °F (36.1 °C) (!) 133 18 128/78 100 %      Temp src Heart Rate Source Patient Position BP Location  FiO2 (%)   -- -- -- -- --       Physical Exam    Physical Exam   Constitutional: No distress.   HENT:  Head: Normocephalic and atraumatic.   Oropharynx: Mucous membranes are moist.   Eyes: No scleral icterus. No conjunctival pallor.  Neck: Painless range of motion noted. Neck supple.   Cardiovascular: Normal rate, regular rhythm and intact distal pulses.  Pulmonary/Chest: No respiratory distress. There are no wheezes, no rhonchi, and no rales.   Abdominal: Soft. There is no tenderness. There is no rebound and no guarding.   Musculoskeletal: Moves all extremities equally. There is no pedal edema or calf tenderness.   Neurological: Alert.  Baseline strength and sensation noted.   Skin: Skin is pink, warm, and dry. No pallor.   Psychiatric: Mood and affect normal.   Nursing note and vitals reviewed.    LAB RESULTS  Recent Results (from the past 24 hour(s))   ECG 12 Lead    Collection Time: 05/02/22  1:13 PM   Result Value Ref Range    QT Interval 368 ms   Comprehensive Metabolic Panel    Collection Time: 05/02/22  3:27 PM    Specimen: Blood   Result Value Ref Range    Glucose 112 (H) 65 - 99 mg/dL    BUN 30 (H) 8 - 23 mg/dL    Creatinine 1.98 (H) 0.76 - 1.27 mg/dL    Sodium 136 136 - 145 mmol/L    Potassium 4.9 3.5 - 5.2 mmol/L    Chloride 101 98 - 107 mmol/L    CO2 23.0 22.0 - 29.0 mmol/L    Calcium 11.5 (H) 8.6 - 10.5 mg/dL    Total Protein 6.9 6.0 - 8.5 g/dL    Albumin 4.00 3.50 - 5.20 g/dL    ALT (SGPT) 29 1 - 41 U/L    AST (SGOT) 59 (H) 1 - 40 U/L    Alkaline Phosphatase 56 39 - 117 U/L    Total Bilirubin 1.1 0.0 - 1.2 mg/dL    Globulin 2.9 gm/dL    A/G Ratio 1.4 g/dL    BUN/Creatinine Ratio 15.2 7.0 - 25.0    Anion Gap 12.0 5.0 - 15.0 mmol/L    eGFR 37.0 (L) >60.0 mL/min/1.73   CBC Auto Differential    Collection Time: 05/02/22  3:27 PM    Specimen: Blood   Result Value Ref Range    WBC 5.32 3.40 - 10.80 10*3/mm3    RBC 2.77 (L) 4.14 - 5.80 10*6/mm3    Hemoglobin 9.3 (L) 13.0 - 17.7 g/dL    Hematocrit 27.7 (L)  37.5 - 51.0 %    .0 (H) 79.0 - 97.0 fL    MCH 33.6 (H) 26.6 - 33.0 pg    MCHC 33.6 31.5 - 35.7 g/dL    RDW 15.3 12.3 - 15.4 %    RDW-SD 55.6 (H) 37.0 - 54.0 fl    MPV 9.9 6.0 - 12.0 fL    Platelets 139 (L) 140 - 450 10*3/mm3    Neutrophil % 64.5 42.7 - 76.0 %    Lymphocyte % 19.0 (L) 19.6 - 45.3 %    Monocyte % 15.0 (H) 5.0 - 12.0 %    Eosinophil % 0.2 (L) 0.3 - 6.2 %    Basophil % 0.9 0.0 - 1.5 %    Immature Grans % 0.4 0.0 - 0.5 %    Neutrophils, Absolute 3.43 1.70 - 7.00 10*3/mm3    Lymphocytes, Absolute 1.01 0.70 - 3.10 10*3/mm3    Monocytes, Absolute 0.80 0.10 - 0.90 10*3/mm3    Eosinophils, Absolute 0.01 0.00 - 0.40 10*3/mm3    Basophils, Absolute 0.05 0.00 - 0.20 10*3/mm3    Immature Grans, Absolute 0.02 0.00 - 0.05 10*3/mm3    nRBC 0.0 0.0 - 0.2 /100 WBC   Green Top (Gel)    Collection Time: 05/02/22  3:27 PM   Result Value Ref Range    Extra Tube Hold for add-ons.    Lavender Top    Collection Time: 05/02/22  3:27 PM   Result Value Ref Range    Extra Tube hold for add-on    Light Blue Top    Collection Time: 05/02/22  3:27 PM   Result Value Ref Range    Extra Tube hold for add-on    Ethanol    Collection Time: 05/02/22  3:27 PM    Specimen: Blood   Result Value Ref Range    Ethanol <10 0 - 10 mg/dL    Ethanol % <0.010 %   Troponin    Collection Time: 05/02/22  3:27 PM    Specimen: Blood   Result Value Ref Range    Troponin T 0.022 0.000 - 0.030 ng/mL   D-dimer, Quantitative    Collection Time: 05/02/22  3:27 PM    Specimen: Blood   Result Value Ref Range    D-Dimer, Quantitative 0.94 (H) 0.00 - 0.49 MCGFEU/mL   Urine Drug Screen - Urine, Clean Catch    Collection Time: 05/02/22  5:12 PM    Specimen: Urine, Clean Catch   Result Value Ref Range    Amphet/Methamphet, Screen Negative Negative    Barbiturates Screen, Urine Negative Negative    Benzodiazepine Screen, Urine Negative Negative    Cocaine Screen, Urine Positive (A) Negative    Opiate Screen Negative Negative    THC, Screen, Urine Positive (A)  Negative    Methadone Screen, Urine Negative Negative    Oxycodone Screen, Urine Negative Negative   Urinalysis With Microscopic If Indicated (No Culture) - Urine, Clean Catch    Collection Time: 05/02/22  5:12 PM    Specimen: Urine, Clean Catch   Result Value Ref Range    Color, UA Dark Yellow (A) Yellow, Straw    Appearance, UA Clear Clear    pH, UA 7.5 5.0 - 8.0    Specific Gravity, UA 1.019 1.005 - 1.030    Glucose, UA Negative Negative    Ketones, UA Trace (A) Negative    Bilirubin, UA Negative Negative    Blood, UA Negative Negative    Protein, UA 30 mg/dL (1+) (A) Negative    Leuk Esterase, UA Small (1+) (A) Negative    Nitrite, UA Negative Negative    Urobilinogen, UA 1.0 E.U./dL 0.2 - 1.0 E.U./dL   Urinalysis, Microscopic Only - Urine, Clean Catch    Collection Time: 05/02/22  5:12 PM    Specimen: Urine, Clean Catch   Result Value Ref Range    RBC, UA None Seen None Seen, 0-2 /HPF    WBC, UA 3-5 (A) None Seen, 0-2 /HPF    Bacteria, UA None Seen None Seen /HPF    Squamous Epithelial Cells, UA 0-2 None Seen, 0-2 /HPF    Hyaline Casts, UA 3-6 None Seen /LPF    Methodology Manual Light Microscopy    Troponin    Collection Time: 05/02/22  5:13 PM    Specimen: Blood   Result Value Ref Range    Troponin T 0.025 0.000 - 0.030 ng/mL       Ordered the above labs and independently reviewed the results.        RADIOLOGY  XR Chest 1 View    Result Date: 5/2/2022  XR CHEST 1 VW-  HISTORY: Male who is 66 years-old,  chest pain  TECHNIQUE: Frontal views of the chest  COMPARISON: 03/29/2017  FINDINGS: The heart size is normal. Aorta is tortuous. Pulmonary vasculature is unremarkable. No focal pulmonary consolidation, pleural effusion, or pneumothorax. No acute osseous process.      No focal pulmonary consolidation. Tortuous aorta. Follow-up as clinically indicated.  This report was finalized on 5/2/2022 3:42 PM by Dr. Edilberto Vidal M.D.        I ordered the above noted radiological studies. Reviewed by me and discussed  with radiologist.  See dictation for official radiology interpretation.      PROCEDURES    Procedures    HEART SCORE:    History #1  (Highly suspicious 2, Moderately suspicious 1, Slightly or non-suspicious 0)    ECG #1  (Significant ST depression 2,  Nonspecific repol disturbance 1, Normal 0)    Age #1  (> or = 65 2, 46-65 1,  < or = 45 0)    Risk factors #1  (hypercholesterolemia, HTN, DM, smoking, pos fam hx, obesity)  (> or = to 3 RF 2, 1 or 2 1, No risk factors 0)    Troponin #0  (> or = 3x normal limit 2, 1-3x normal limit 1, < or = Normal limit 0)    HEART Score Key:  Scores 0-3: 0.9-1.7% risk of adverse cardiac event. In the HEART Score study, these patients were discharged (0.99% in the retrospective study, 1.7% in the prospective study)  Scores 4-6: 12-16.6% risk of adverse cardiac event. In the HEART Score study, these patients were admitted to the hospital. (11.6% retrospective, 16.6% prospective)  Scores ?7: 50-65% risk of adverse cardiac event. In the HEART Score study, these patients were candidates for early invasive measures. (65.2% retrospective, 50.1% prospective)      This patient's HEART score is 4      MEDICATIONS GIVEN IN ER    Medications   sodium chloride 0.9 % bolus 500 mL (0 mL Intravenous Stopped 5/2/22 1701)     Followed by   sodium chloride 0.9 % infusion (125 mL/hr Intravenous Not Given 5/2/22 1701)   sodium chloride 0.9 % bolus 1,000 mL (1,000 mL Intravenous New Bag 5/2/22 0049)   sodium chloride 0.9 % flush 10 mL (has no administration in time range)   sodium chloride 0.9 % flush 10 mL (has no administration in time range)   nitroglycerin (NITROSTAT) SL tablet 0.4 mg (has no administration in time range)   sodium chloride 0.9 % infusion (has no administration in time range)   acetaminophen (TYLENOL) tablet 650 mg (has no administration in time range)     Or   acetaminophen (TYLENOL) 160 MG/5ML solution 650 mg (has no administration in time range)     Or   acetaminophen (TYLENOL)  suppository 650 mg (has no administration in time range)   ondansetron (ZOFRAN) injection 4 mg (has no administration in time range)   aspirin chewable tablet 324 mg (324 mg Oral Given 5/2/22 1534)         PROGRESS, DATA ANALYSIS, CONSULTS, AND MEDICAL DECISION MAKING    My differential diagnosis for syncope includes but is not limited to:  Vasovagal reflex - situational stimulus, micturition, defecation, cough, sneezing, swallowing, postprandial state, react sinus hypersensitivity  Vascular-prolonged recumbency, sudden postural change, prolonged standing, hypovolemia, vasodilator drugs, autonomic neuropathy, adrenal insufficiency, subclavian steal, pulmonary embolism  Cardiac -arrhythmia, heart block, myocardial infarction, aortic stenosis, cardiac myxoma, cardiac, LV Dysfunction, Aortic Dissection, Pulmonary Hypertension, Pulmonary Stenosis, Pacemaker Failure  CNS-seizure, hypoxia, hypoglycemia, TIA,(basal vertebral), hydrocephalus    My differential diagnosis for chest pain includes but is not limited to:  Muscle strain, costochondritis, myositis, pleurisy, rib fracture, intercostal neuritis, herpes zoster, tumor, myocardial infarction, coronary syndrome, unstable angina, angina, aortic dissection, mitral valve prolapse, pericarditis, palpitations, pulmonary embolus, pneumonia, pneumothorax, lung cancer, GERD, esophagitis, esophageal spasm      All labs have been independently reviewed by me.  All radiology studies have been reviewed by me and discussed with radiologist dictating the report.   EKG's independently viewed and interpreted by me.  Discussion below represents my analysis of pertinent findings related to patient's condition, differential diagnosis, treatment plan and final disposition.      ED Course as of 05/02/22 1914   Mon May 02, 2022   1510 EKG           EKG time: 1313  Rhythm/Rate: Sinus, 100  P waves and NY: LATRELL within normal limits  QRS, axis: Narrow complex  ST and T waves: No STEMI; QTC  within normal limits    Interpreted Contemporaneously by me, independently viewed  Comparison: Improved rate as compared to a 118   [RS]   1810 Hemoglobin(!): 9.3  Acute on chronic [RS]   1810 Platelets(!): 139 [RS]   1810 BUN(!): 30 [RS]   1810 Creatinine(!): 1.98  Acute [RS]   1810 Cocaine Screen, Urine(!): Positive [RS]   1810 THC Screen, Urine(!): Positive [RS]   1810 Specific Gravity, UA: 1.019 [RS]   1810 Nitrite, UA: Negative [RS]   1810 Bacteria, UA: None Seen [RS]   1810 Troponin T: 0.025 [RS]   1810 D-Dimer, Quant(!): 0.94 [RS]   1913 CONSULT        Provider: Dr. SaxenaGREGORIA    Discussion: Reviewed patient history, ED presentation and evaluation.  We talked about the pending CTA of the chest as well.  Agreeable to accept patient for observation admission with telemetry for further evaluation and treatment.    Agreeable c treatment and planned disposition.         [RS]   1913 Cocaine Screen, Urine(!): Positive  Patient denies cocaine use.  This is felt likely secondary to false positive. [RS]   1914 Patient and significant other updated with findings and recommendation for admission.  Agreeable with plan. [RS]      ED Course User Index  [RS] Sanjay Ram MD       AS OF 19:14 EDT VITALS:    BP - 135/90  HR - 97  TEMP - 96.9 °F (36.1 °C)  O2 SATS - 99%        DIAGNOSIS  Final diagnoses:   Chest pain, unspecified type   Near syncope-recurrent   Lightheadedness   OTF (acute kidney injury) (HCC)   Orthostasis   Volume depletion         DISPOSITION  ADMISSION    Discussed treatment plan and reason for admission with pt/family and admitting physician.  Pt/family voiced understanding of the plan for admission for further testing/treatment as needed.          Sanjay Ram MD  05/02/22 1914      Electronically signed by Sanjay Ram MD at 05/02/22 1914

## 2022-05-03 NOTE — CONSULTS
"[facility] Consult Note    Patient Name: Mo Willoughby  :1955  66 y.o.    Date of Admission: 2022  Date of Consultation:  22  Encounter Provider: Hazel Brown MD  Place of Service: [facility] C[facility]  Referring Provider: No ref. provider found  Patient Care Team:  Jenn Davison APRN as PCP - General (Nurse Practitioner)      Chief complaint: lightheadedness, chest pain    History of Present Illness: Mr Willoughby is a 66 year old male with hypertension, alcohol and drug abuse, who was admitted with complaints of lightheadedness and diaphoresis.    Patient reports that he has been feeling badly for a while now.  He attributes this to several medication changes to control his blood pressures.  He reports that due to the medication adjustments he has been having issues with lightheadedness with position changes.  In fact he reports that this is resulted in balance issues and a couple of falls that occurred about 2 to 3 weeks ago.  About 4 to 5 days ago he had an episode of diaphoresis while at rest.  This was not associated with any chest discomfort, shortness of breath, palpitations, or lightheadedness.  Since then he reports he has been having poor appetite and has not been drinking much in the way of water.  He is subsequently noticed a decrease in his urine output.  He saw his PCP yesterday and was subsequently referred to the emergency room.    The patient denies any chest discomfort to me although he reported symptoms of squeezing chest discomfort to Dr. Son overnight.  He does drink 2-3 alcoholic drinks a night and has been doing so for a while.  He does report occasional drug use and initially denied any recent drug use.  However when I asked him about cocaine use in light of his positive urine toxicology screen he admitted to using cocaine last about \"4 days ago\".     Work-up since his arrival to the hospital includes unremarkable EKG.  " Troponins have been in the indeterminate range but less than 0.030.  His knee was elevated on admission up to 1.96.  This has improved to 1.74 with IV hydration.  Hemoglobin was noted to be lower than his prior baseline of around 12-14.  Was 8.6-9.3 on admission is declined to 7.4 since.  Platelet count was also noted to be low and has declined further to 92 this morning.  As above his urine tox screen was positive for both cannabinoids and cocaine.  He was quite hypertensive on exam admission but this has since improved.    Previous Cardiac Testing:    Holter 3/29/17  Patient diary was not submitted.No symptoms reported during the monitoring period. No complications noted. The predominant rhythm noted during the testing period was sinus rhythm. There were 5 episodes of supraventricular tachycardia. The peak heart rate was 176 beats per minute. Longest run lasted 4 min 57 sec in SVT at a rather slow rate of 111 bpm. Premature ventricular contractions occured rarely. Ventricular couplets. There were no episodes of ventricular tachycardia. Sinoatrial node conduction was normal. No atrioventricular block noted.      Past Medical History:   Diagnosis Date   • Erectile dysfunction    • Gout    • Hx of colonic polyp    • Hypercalcemia    • Hypertension    • Parathyroid abnormality (HCC)    • Risk factors for obstructive sleep apnea    • Syncope and collapse 03/28/2017       Past Surgical History:   Procedure Laterality Date   • CHOLECYSTECTOMY WITH INTRAOPERATIVE CHOLANGIOGRAM N/A 8/3/2018    Procedure: CHOLECYSTECTOMY LAPAROSCOPIC INTRAOPERATIVE CHOLANGIOGRAM;  Surgeon: Melina Kate MD;  Location: [facility] OR;  Service: General   • COLONOSCOPY     • COLONOSCOPY N/A 10/3/2016    Procedure: COLONOSCOPY TO CECUM TO TERMINAL ILIUM WITH COLD BIOPSY POLYPECTOMY;  Surgeon: Benoit Vilchis MD;  Location: [facility] ENDOSCOPY;  Service:    • LIPOMA EXCISION     • TONSILLECTOMY           Prior to Admission medications     Medication Sig Start Date End Date Taking? Authorizing Provider   allopurinol (ZYLOPRIM) 300 MG tablet Take 1 tablet by mouth Daily. 2/18/22  Yes Rhiannon Kelly APRN   lisinopril-hydrochlorothiazide (PRINZIDE,ZESTORETIC) 20-25 MG per tablet Take 1 tablet by mouth Daily.  Patient taking differently: Take 1 tablet by mouth Daily. Patient takes 0.5 tablet at home. 3/9/22  Yes Rhiannon Kelly APRN       Allergies   Allergen Reactions   • Zetia [Ezetimibe] Dizziness     Nausea, fatgue       Social History     Socioeconomic History   • Marital status: Single   Tobacco Use   • Smoking status: Never Smoker   • Smokeless tobacco: Never Used   Substance and Sexual Activity   • Alcohol use: Yes     Comment: occasional   • Drug use: No   • Sexual activity: Defer       Family History   Problem Relation Age of Onset   • Hypertension Mother    • Breast cancer Mother    • Cancer Father    • Liver cancer Father        REVIEW OF SYSTEMS:   All systems reviewed.  Pertinent positives identified in HPI.  All other systems are negative.      Objective:     Vitals:    05/03/22 0517 05/03/22 0518 05/03/22 0520 05/03/22 0644   BP: 132/96 (!) 117/101 (!) 151/104 115/87   BP Location: Right arm Right arm Right arm Left arm   Patient Position: Lying Standing Standing Lying   Pulse: 98 98 105 85   Resp:   17 18   Temp:    98.8 °F (37.1 °C)   TempSrc:    Oral   SpO2: 97% 97% 99%    Weight:       Height:         Body mass index is 30.46 kg/m².    General Appearance:    Alert, cooperative, in no acute distress   Head:    Normocephalic, without obvious abnormality, atraumatic   Eyes:            Lids and lashes normal, conjunctivae and sclerae normal, no icterus, no pallor, corneas clear, PERRLA   Ears:    Ears appear intact with no abnormalities noted   Throat:   No oral lesions, no thrush, oral mucosa moist   Neck:   No adenopathy, supple, trachea midline, no thyromegaly, no carotid bruit, no JVD   Back:     No kyphosis present, no  scoliosis present, no skin lesions, erythema or scars, no tenderness to percussion or palpation, range of motion normal   Lungs:     Clear to auscultation, respirations regular, even and unlabored    Heart:    Regular rhythm and normal rate, normal S1 and S2, no murmur, no gallop, no rub, no click   Chest Wall:    No abnormalities observed   Abdomen:     Normal bowel sounds, no masses, no organomegaly, soft, nontender, nondistended, no guarding, no rebound  tenderness   Extremities:   Moves all extremities well, no edema, no cyanosis, no redness   Pulses:   Pulses palpable and equal bilaterally. Normal radial, carotid, femoral, dorsalis pedis and posterior tibial pulses bilaterally. Normal abdominal aorta   Skin:  Psychiatric:   No bleeding, bruising or rash    Alert and oriented x 3, normal mood and affect   Lab Review:     Results from last 7 days   Lab Units 05/03/22  0410 05/02/22  1527   SODIUM mmol/L 138 136   POTASSIUM mmol/L 4.6 4.9   CHLORIDE mmol/L 106 101   CO2 mmol/L 23.0 23.0   BUN mg/dL 32* 30*   CREATININE mg/dL 1.74* 1.98*   CALCIUM mg/dL 10.3 11.5*   BILIRUBIN mg/dL  --  1.1   ALK PHOS U/L  --  56   ALT (SGPT) U/L  --  29   AST (SGOT) U/L  --  59*   GLUCOSE mg/dL 89 112*     Results from last 7 days   Lab Units 05/02/22  2105 05/02/22  1713 05/02/22  1527   TROPONIN T ng/mL 0.029 0.025 0.022     Results from last 7 days   Lab Units 05/03/22  0410   WBC 10*3/mm3 3.75   HEMOGLOBIN g/dL 7.4*   HEMATOCRIT % 22.0*   PLATELETS 10*3/mm3 92*     Results from last 7 days   Lab Units 05/03/22  0410   INR  1.12*                     EKG 5/2/22    Previous EKG 8/1/18        I personally viewed and interpreted the patient's EKG/Telemetry data.        Assessment and Plan:       1.  Diaphoresis/reported chest discomfort.  The patient again denies any chest discomfort to me.  His troponins are in the indeterminate range.  His EKG shows no acute ischemic changes.  2.  Lightheadedness/near syncope.  Likely due to  hypovolemia.  Improved with IV fluid hydration.  3.  Acute kidney injury.  Appears to be due to dehydration.  Improving with IV fluid hydration.  4.  Anemia.  This appears to be acute on chronic.  Further decline this morning.  5.  Thrombocytopenia.  This appears to be a chronic issue with some worsening this morning.  6.  Hypertension.  Intermittently elevated since admission.  He has not received any antihypertensive medications since arrival.  7.  Alcohol use  8.  Drug abuse.  The patient states that he only uses drugs occasionally although his U tox on admission was positive for cocaine.    - Symptoms sound atypical for ischemic event or ischemic heart disease.  Start with an echocardiogram.  - Agree with IV fluid hydration.  - Monitor blood pressures and will add antihypertensive medications as needed.  I wonder if his issues with elevated or labile blood pressures is due to cocaine use.  - Defer work-up and treatment of anemia and thrombocytopenia to medicine.    Hazel Brown MD  05/03/22  08:15 EDT

## 2022-05-03 NOTE — ASSESSMENT & PLAN NOTE
Abnormal EKG patient to follow-up in ED  No EKG changes of acute ischemia, no clinical evidence of acute MI, responsibility rests with patient to return for follow up for any CP/SOA, go to ER for any further signs or symptoms

## 2022-05-03 NOTE — ASSESSMENT & PLAN NOTE
Stable with diet and exercise 122/74  Orthostatics checked today was negative for orthostatic hypotension.

## 2022-05-03 NOTE — H&P
"    Patient Name:  Mo Willoughby  YOB: 1955  MRN:  [id number]  Admit Date:  5/2/2022  Patient Care Team:  Jenn Davison APRN as PCP - General (Nurse Practitioner)      Subjective   History Present Illness     Chief Complaint   Patient presents with   • Chest Pain   • Dizziness       Mr. Willoughby is a 66 y.o. non-smoker with a history of hypertension and gout that presents to [facility] complaining of dizziness on standing for the past 2-3 days. He has also had some occasional \"squeezing\" pain in the left side of his chest which is non-radiating and not consistently exertional but was associated with nausea and diaphoresis. He visited his PCP today and was sent to the ER for further evaluation.  He does admit to regular alcohol use (2-3 drinks a day). I asked him about recreational drug use and he does admit to cocaine and cannabis use but states he has not used those \"in a while\".     He states that his appetite has not been great these past several days. He was additionally noted to be anemic but he denies having had gross bleeding. According to the ER attending he did not want to undergo a rectal exam.     History of Present Illness  Review of Systems   Constitutional: Positive for appetite change (decreased), diaphoresis and fatigue. Negative for chills and fever.   HENT: Negative for congestion, sore throat and trouble swallowing.    Eyes: Negative for redness and visual disturbance.   Respiratory: Positive for chest tightness. Negative for cough and shortness of breath.    Cardiovascular: Positive for chest pain and leg swelling (chronic, unchanged). Negative for palpitations.   Gastrointestinal: Positive for nausea. Negative for abdominal pain, blood in stool, constipation, diarrhea and vomiting.   Endocrine: Negative for cold intolerance and heat intolerance.   Genitourinary: Negative for difficulty urinating, dysuria and hematuria.   Musculoskeletal: Negative for arthralgias " and myalgias.   Skin: Negative for pallor and rash.   Neurological: Positive for dizziness, weakness (generalized) and light-headedness. Negative for syncope, numbness and headaches.   Hematological: Negative for adenopathy. Does not bruise/bleed easily.   Psychiatric/Behavioral: Negative for confusion and decreased concentration.        Personal History     Past Medical History:   Diagnosis Date   • Erectile dysfunction    • Gout    • Hx of colonic polyp    • Hypercalcemia    • Hypertension    • Parathyroid abnormality (HCC)    • Risk factors for obstructive sleep apnea    • Syncope and collapse 03/28/2017     Past Surgical History:   Procedure Laterality Date   • CHOLECYSTECTOMY WITH INTRAOPERATIVE CHOLANGIOGRAM N/A 8/3/2018    Procedure: CHOLECYSTECTOMY LAPAROSCOPIC INTRAOPERATIVE CHOLANGIOGRAM;  Surgeon: Melina Kate MD;  Location: [facility] MAIN OR;  Service: General   • COLONOSCOPY     • COLONOSCOPY N/A 10/3/2016    Procedure: COLONOSCOPY TO CECUM TO TERMINAL ILIUM WITH COLD BIOPSY POLYPECTOMY;  Surgeon: Benoit Vilchis MD;  Location: [facility] ENDOSCOPY;  Service:    • LIPOMA EXCISION     • TONSILLECTOMY       Family History   Problem Relation Age of Onset   • Hypertension Mother    • Breast cancer Mother    • Cancer Father    • Liver cancer Father      Social History     Tobacco Use   • Smoking status: Never Smoker   • Smokeless tobacco: Never Used   Substance Use Topics   • Alcohol use: Yes     Comment: occasional   • Drug use: No     No current facility-administered medications on file prior to encounter.     Current Outpatient Medications on File Prior to Encounter   Medication Sig Dispense Refill   • allopurinol (ZYLOPRIM) 300 MG tablet Take 1 tablet by mouth Daily. 90 tablet 1   • lisinopril-hydrochlorothiazide (PRINZIDE,ZESTORETIC) 20-25 MG per tablet Take 1 tablet by mouth Daily. (Patient taking differently: Take 1 tablet by mouth Daily. Patient takes 0.5 tablet at home.) 90 tablet 0   •  [DISCONTINUED] ezetimibe (Zetia) 10 MG tablet Take 1 tablet by mouth Daily. 90 tablet 0     Allergies   Allergen Reactions   • Zetia [Ezetimibe] Dizziness     Nausea, fatgue       Objective    Objective     Vital Signs  Temp:  [96.9 °F (36.1 °C)-98.9 °F (37.2 °C)] 98.9 °F (37.2 °C)  Heart Rate:  [] 89  Resp:  [18] 18  BP: (112-156)/() 140/93  SpO2:  [98 %-100 %] 100 %  on   ;   Device (Oxygen Therapy): room air  Body mass index is 30.46 kg/m².    Physical Exam  Vitals and nursing note reviewed.   Constitutional:       General: He is not in acute distress.     Appearance: He is not toxic-appearing or diaphoretic.   HENT:      Head: Normocephalic and atraumatic.      Nose: Nose normal.      Mouth/Throat:      Mouth: Mucous membranes are moist.      Pharynx: Oropharynx is clear.   Eyes:      Extraocular Movements: Extraocular movements intact.      Conjunctiva/sclera: Conjunctivae normal.      Pupils: Pupils are equal, round, and reactive to light.   Cardiovascular:      Rate and Rhythm: Normal rate and regular rhythm.      Pulses: Normal pulses.   Pulmonary:      Effort: Pulmonary effort is normal.      Breath sounds: Normal breath sounds.   Abdominal:      General: Bowel sounds are normal.      Palpations: Abdomen is soft.      Tenderness: There is no abdominal tenderness.   Musculoskeletal:         General: Swelling (1+ BLE) present. No tenderness.      Cervical back: Normal range of motion and neck supple.   Skin:     General: Skin is warm and dry.      Capillary Refill: Capillary refill takes less than 2 seconds.      Comments: Varicose veins BLE   Neurological:      General: No focal deficit present.      Mental Status: He is alert and oriented to person, place, and time.   Psychiatric:         Mood and Affect: Mood normal.         Behavior: Behavior normal.       Results Review:  I reviewed the patient's new clinical results.  I reviewed the patient's new imaging results and agree with the  interpretation.  I reviewed the patient's other test results and agree with the interpretation  I personally viewed and interpreted the patient's EKG/Telemetry data  Discussed with ED provider.    Lab Results (last 24 hours)     Procedure Component Value Units Date/Time    CBC & Differential [[id number]]  (Abnormal) Collected: 05/02/22 1527    Specimen: Blood Updated: 05/02/22 1538    Narrative:      The following orders were created for panel order CBC & Differential.  Procedure                               Abnormality         Status                     ---------                               -----------         ------                     CBC Auto Differential[[id number]]        Abnormal            Final result                 Please view results for these tests on the individual orders.    Comprehensive Metabolic Panel [[id number]]  (Abnormal) Collected: 05/02/22 1527    Specimen: Blood Updated: 05/02/22 1604     Glucose 112 mg/dL      BUN 30 mg/dL      Creatinine 1.98 mg/dL      Sodium 136 mmol/L      Potassium 4.9 mmol/L      Chloride 101 mmol/L      CO2 23.0 mmol/L      Calcium 11.5 mg/dL      Total Protein 6.9 g/dL      Albumin 4.00 g/dL      ALT (SGPT) 29 U/L      AST (SGOT) 59 U/L      Alkaline Phosphatase 56 U/L      Total Bilirubin 1.1 mg/dL      Globulin 2.9 gm/dL      A/G Ratio 1.4 g/dL      BUN/Creatinine Ratio 15.2     Anion Gap 12.0 mmol/L      eGFR 37.0 mL/min/1.73      Comment: National Kidney Foundation and American Society of Nephrology (ASN) Task Force recommended calculation based on the Chronic Kidney Disease Epidemiology Collaboration (CKD-EPI) equation refit without adjustment for race.       Narrative:      GFR Normal >60  Chronic Kidney Disease <60  Kidney Failure <15      CBC Auto Differential [[id number]]  (Abnormal) Collected: 05/02/22 1527    Specimen: Blood Updated: 05/02/22 1538     WBC 5.32 10*3/mm3      RBC 2.77 10*6/mm3      Hemoglobin 9.3 g/dL      Hematocrit 27.7 %      .0 fL       MCH 33.6 pg      MCHC 33.6 g/dL      RDW 15.3 %      RDW-SD 55.6 fl      MPV 9.9 fL      Platelets 139 10*3/mm3      Neutrophil % 64.5 %      Lymphocyte % 19.0 %      Monocyte % 15.0 %      Eosinophil % 0.2 %      Basophil % 0.9 %      Immature Grans % 0.4 %      Neutrophils, Absolute 3.43 10*3/mm3      Lymphocytes, Absolute 1.01 10*3/mm3      Monocytes, Absolute 0.80 10*3/mm3      Eosinophils, Absolute 0.01 10*3/mm3      Basophils, Absolute 0.05 10*3/mm3      Immature Grans, Absolute 0.02 10*3/mm3      nRBC 0.0 /100 WBC     Ethanol [[id number]] Collected: 05/02/22 1527    Specimen: Blood Updated: 05/02/22 1604     Ethanol <10 mg/dL      Ethanol % <0.010 %     Troponin [[id number]]  (Normal) Collected: 05/02/22 1527    Specimen: Blood Updated: 05/02/22 1601     Troponin T 0.022 ng/mL     Narrative:      Troponin T Reference Range:  <= 0.03 ng/mL-   Negative for AMI  >0.03 ng/mL-     Abnormal for myocardial necrosis.  Clinicians would have to utilize clinical acumen, EKG, Troponin and serial changes to determine if it is an Acute Myocardial Infarction or myocardial injury due to an underlying chronic condition.       Results may be falsely decreased if patient taking Biotin.      D-dimer, Quantitative [[id number]]  (Abnormal) Collected: 05/02/22 1527    Specimen: Blood Updated: 05/02/22 1553     D-Dimer, Quantitative 0.94 MCGFEU/mL     Narrative:      The Stago D-Dimer test used in conjunction with a clinical pretest probability (PTP) assessment model, has been approved by the FDA to rule out the presence of venous thromboembolism (VTE) in outpatients suspected of deep venous thrombosis (DVT) or pulmonary embolism (PE). The cut-off for negative predictive value is <0.50 MCGFEU/mL.    Urine Drug Screen - Urine, Clean Catch [[id number]]  (Abnormal) Collected: 05/02/22 1712    Specimen: Urine, Clean Catch Updated: 05/02/22 1745     Amphet/Methamphet, Screen Negative     Barbiturates Screen, Urine Negative      Benzodiazepine Screen, Urine Negative     Cocaine Screen, Urine Positive     Opiate Screen Negative     THC, Screen, Urine Positive     Methadone Screen, Urine Negative     Oxycodone Screen, Urine Negative    Narrative:      Negative Thresholds Per Drugs Screened:    Amphetamines                 500 ng/ml  Barbiturates                 200 ng/ml  Benzodiazepines              100 ng/ml  Cocaine                      300 ng/ml  Methadone                    300 ng/ml  Opiates                      300 ng/ml  Oxycodone                    100 ng/ml  THC                           50 ng/ml    The Normal Value for all drugs tested is negative. This report includes final unconfirmed screening results to be used for medical treatment purposes only. Unconfirmed results must not be used for non-medical purposes such as employment or legal testing. Clinical consideration should be applied to any drug of abuse test, particularly when unconfirmed results are used.            Urinalysis With Microscopic If Indicated (No Culture) - Urine, Clean Catch [[id number]]  (Abnormal) Collected: 05/02/22 1712    Specimen: Urine, Clean Catch Updated: 05/02/22 1732     Color, UA Dark Yellow     Appearance, UA Clear     pH, UA 7.5     Specific Gravity, UA 1.019     Glucose, UA Negative     Ketones, UA Trace     Bilirubin, UA Negative     Blood, UA Negative     Protein, UA 30 mg/dL (1+)     Leuk Esterase, UA Small (1+)     Nitrite, UA Negative     Urobilinogen, UA 1.0 E.U./dL    Urinalysis, Microscopic Only - Urine, Clean Catch [[id number]]  (Abnormal) Collected: 05/02/22 1712    Specimen: Urine, Clean Catch Updated: 05/02/22 1744     RBC, UA None Seen /HPF      WBC, UA 3-5 /HPF      Bacteria, UA None Seen /HPF      Squamous Epithelial Cells, UA 0-2 /HPF      Hyaline Casts, UA 3-6 /LPF      Methodology Manual Light Microscopy    Troponin [[id number]]  (Normal) Collected: 05/02/22 1713    Specimen: Blood Updated: 05/02/22 1756     Troponin T 0.025 ng/mL      Narrative:      Troponin T Reference Range:  <= 0.03 ng/mL-   Negative for AMI  >0.03 ng/mL-     Abnormal for myocardial necrosis.  Clinicians would have to utilize clinical acumen, EKG, Troponin and serial changes to determine if it is an Acute Myocardial Infarction or myocardial injury due to an underlying chronic condition.       Results may be falsely decreased if patient taking Biotin.      COVID PRE-OP / PRE-PROCEDURE SCREENING ORDER (NO ISOLATION) - Swab, Nasopharynx [[id number]]  (Normal) Collected: 05/02/22 1828    Specimen: Swab from Nasopharynx Updated: 05/02/22 1920    Narrative:      The following orders were created for panel order COVID PRE-OP / PRE-PROCEDURE SCREENING ORDER (NO ISOLATION) - Swab, Nasopharynx.  Procedure                               Abnormality         Status                     ---------                               -----------         ------                     COVID-19,[facility] IN-HOUSE...[[id number]]  Normal              Final result                 Please view results for these tests on the individual orders.    COVID-19,[facility] IN-HOUSE CEPHEID/FILEMON NP SWAB IN TRANSPORT MEDIA 8-12 HR TAT - Swab, Nasopharynx [[id number]]  (Normal) Collected: 05/02/22 1828    Specimen: Swab from Nasopharynx Updated: 05/02/22 1920     COVID19 Not Detected    Narrative:      Fact sheet for providers: https://www.fda.gov/media/677461/download     Fact sheet for patients: https://www.fda.gov/media/983592/download    Troponin [[id number]] Collected: 05/02/22 2105    Specimen: Blood Updated: 05/02/22 2141    Hemoglobin & Hematocrit, Blood [[id number]] Collected: 05/02/22 2105    Specimen: Blood Updated: 05/02/22 2141          Imaging Results (Last 24 Hours)     Procedure Component Value Units Date/Time    CT Angiogram Chest [[id number]] Collected: 05/02/22 2057     Updated: 05/02/22 2118    Narrative:      CT ANGIOGRAM CHEST-     INDICATIONS: Chest pain.  Radiation dose reduction techniques were  utilized,  including automated exposure control and exposure modulation  based on body size.     TECHNIQUE: CT angiography of the chest. Three-dimensional  reconstructions.     COMPARISON: None available     FINDINGS:     No pulmonary embolism. No aortic dissection. Ascending aorta is  aneurysmal, 4.5 cm.     The heart size is normal without pericardial effusion. A few small  subcentimeter short axis mediastinal lymph nodes are seen that are not  significant by size criteria.     The airways appear clear.     No pleural effusion or pneumothorax.     The lungs show an 8 mm right lower lobe nodule on axial image 113,  stable from 07/31/2018.     Upper abdominal structures show no acute findings. Micronodular contour  of the liver suggests cirrhosis.     Degenerative changes are seen in the spine. No acute fracture is  identified.       Impression:         Aneurysmal ascending aorta. No pulmonary embolism.      This report was finalized on 5/2/2022 9:15 PM by Dr. Edilberto Vidal M.D.       XR Chest 1 View [[id number]] Collected: 05/02/22 1542     Updated: 05/02/22 1545    Narrative:      XR CHEST 1 VW-     HISTORY: Male who is 66 years-old,  chest pain     TECHNIQUE: Frontal views of the chest     COMPARISON: 03/29/2017     FINDINGS: The heart size is normal. Aorta is tortuous. Pulmonary  vasculature is unremarkable. No focal pulmonary consolidation, pleural  effusion, or pneumothorax. No acute osseous process.       Impression:      No focal pulmonary consolidation. Tortuous aorta. Follow-up  as clinically indicated.     This report was finalized on 5/2/2022 3:42 PM by Dr. Edilberto Vidal M.D.                 ECG 12 Lead   Final Result   HEART RATE= 99  bpm   RR Interval= 606  ms   VA Interval= 162  ms   P Horizontal Axis= -1  deg   P Front Axis= -28  deg   QRSD Interval= 107  ms   QT Interval= 368  ms   QRS Axis= -14  deg   T Wave Axis= 4  deg   - OTHERWISE NORMAL ECG -   Sinus rhythm   Borderline low voltage, extremity  leads   No change from previous tracing   Electronically Signed By: Jean Claude Blakely) ([facility]) 02-May-2022 14:32:38   Date and Time of Study: 2022-05-02 13:13:05           Assessment/Plan     Active Hospital Problems    Diagnosis  POA   • **Chest pain [R07.9]  Yes   • Fatigue [R53.83]  Yes   • Aneurysm of ascending aorta (HCC) [I71.2]  Yes   • Acute kidney injury (HCC) [N17.9]  Yes   • Hypercalcemia [E83.52]  Yes   • H/O: gout on allopurinol [Z87.39]  Yes   • Hypertension [I10]  Yes      Resolved Hospital Problems   No resolved problems to display.   Chest Pain/Dizziness  - he does have some dehydration to explain the dizziness-I suspect his chest pain is related to cocaine use  - no specific ischemic changes on ecg, will follow troponins  - d-dimer elevated slightly-no PE on CTA but 4.5cm ascending aortic aneurysm noted  - consult cardiology    OTF  - pre-renal cause suspected  - will check PVR, urinalysis noted with some mild proteinuria and a few WBCs  - monitor response to IVF  - he is hypercalcemic which is chronic-has known hyperparathyroidism-monitor on IVF    HTN  - hold lisinopril/HCTZ given OTF and dehydration    Anemia/TCP  - could be related to alcohol or cocaine use  - check iron studies, B12, FA  - vitamin replacement    EtOH use  - daily-will cover with CIWA protocol    I discussed the patient's findings and my recommendations with patient, nursing staff and ED provider.    VTE Prophylaxis - SCDs.  Code Status - Full code.       Chet Son MD  [facility]  05/02/22  21:48 EDT

## 2022-05-03 NOTE — PROGRESS NOTES
Name: Mo Willoughby ADMIT: 2022   : 1955  PCP: Jenn Davison APRN    MRN: [id number] LOS: 0 days   AGE/SEX: 66 y.o. male  ROOM: /     Subjective   Subjective   No specific complaints. Actually asking about being discharged. Denies chest pain, shortness of breath, abd pain, hematochezia/melena, dizziness/lightheadedness    Review of Systems   Constitutional: Negative for chills and fever.   HENT: Negative for congestion.    Respiratory: Negative for shortness of breath.    Cardiovascular: Negative for chest pain.   Gastrointestinal: Negative for abdominal pain, blood in stool, nausea and vomiting.   Genitourinary: Negative for difficulty urinating.   Musculoskeletal: Negative for arthralgias and myalgias.   Skin: Negative for rash.   Neurological: Negative for dizziness and light-headedness.   Psychiatric/Behavioral: Negative for sleep disturbance.        Objective   Objective   Vital Signs  Temp:  [98.8 °F (37.1 °C)-99.2 °F (37.3 °C)] 99.2 °F (37.3 °C)  Heart Rate:  [] 93  Resp:  [16-18] 16  BP: (112-156)/() 112/98  SpO2:  [97 %-100 %] 99 %  on   ;   Device (Oxygen Therapy): room air  Body mass index is 31.19 kg/m².  Physical Exam  Vitals and nursing note reviewed.   Constitutional:       General: He is not in acute distress.  HENT:      Head: Normocephalic.      Mouth/Throat:      Mouth: Mucous membranes are moist.   Eyes:      Conjunctiva/sclera: Conjunctivae normal.   Cardiovascular:      Rate and Rhythm: Normal rate and regular rhythm.   Pulmonary:      Effort: Pulmonary effort is normal. No respiratory distress.      Breath sounds: Normal breath sounds.   Abdominal:      General: Bowel sounds are normal.      Palpations: Abdomen is soft.      Tenderness: There is no abdominal tenderness.   Musculoskeletal:      Cervical back: Neck supple.      Right lower leg: No edema.      Left lower leg: No edema.   Skin:     General: Skin is warm and dry.   Neurological:      Mental  Status: He is alert and oriented to person, place, and time.   Psychiatric:         Mood and Affect: Mood normal.         Behavior: Behavior normal.         Results Review     I reviewed the patient's new clinical results.  Results from last 7 days   Lab Units 05/03/22  0827 05/03/22  0410 05/02/22  2105 05/02/22  1527   WBC 10*3/mm3  --  3.75  --  5.32   HEMOGLOBIN g/dL 7.5* 7.4* 8.6* 9.3*   PLATELETS 10*3/mm3  --  92*  --  139*     Results from last 7 days   Lab Units 05/03/22  0410 05/02/22  1527   SODIUM mmol/L 138 136   POTASSIUM mmol/L 4.6 4.9   CHLORIDE mmol/L 106 101   CO2 mmol/L 23.0 23.0   BUN mg/dL 32* 30*   CREATININE mg/dL 1.74* 1.98*   GLUCOSE mg/dL 89 112*   EGFR mL/min/1.73 43.2* 37.0*     Results from last 7 days   Lab Units 05/02/22  1527   ALBUMIN g/dL 4.00   BILIRUBIN mg/dL 1.1   ALK PHOS U/L 56   AST (SGOT) U/L 59*   ALT (SGPT) U/L 29     Results from last 7 days   Lab Units 05/03/22  0410 05/02/22  1527   CALCIUM mg/dL 10.3 11.5*   ALBUMIN g/dL  --  4.00     Results from last 7 days   Lab Units 05/03/22  0410   LACTATE mmol/L 0.9     No results found for: HGBA1C, POCGLU    XR Chest 1 View    Result Date: 5/2/2022  No focal pulmonary consolidation. Tortuous aorta. Follow-up as clinically indicated.  This report was finalized on 5/2/2022 3:42 PM by Dr. Edilberto Vidal M.D.      CT Angiogram Chest    Result Date: 5/2/2022   Aneurysmal ascending aorta. No pulmonary embolism.  This report was finalized on 5/2/2022 9:15 PM by Dr. Edilberto Vidal M.D.      Scheduled Medications  allopurinol, 300 mg, Oral, Daily  folic acid, 1 mg, Oral, Daily  multivitamin with minerals, 1 tablet, Oral, Daily  sodium chloride, 10 mL, Intravenous, Q12H  thiamine, 100 mg, Oral, Daily    Infusions  lactated ringers, 125 mL/hr, Last Rate: 125 mL/hr (05/03/22 0933)    Diet  Diet Regular       Assessment/Plan     Active Hospital Problems    Diagnosis  POA   • **Chest pain [R07.9]  Yes   • Anemia [D64.9]  Yes   • Folic  acid deficiency [E53.8]  Yes   • Fatigue [R53.83]  Yes   • Aneurysm of ascending aorta (HCC) [I71.2]  Yes   • Acute kidney injury (HCC) [N17.9]  Yes   • Hypercalcemia [E83.52]  Yes   • H/O: gout on allopurinol [Z87.39]  Yes   • Hypertension [I10]  Yes      Resolved Hospital Problems   No resolved problems to display.       66 y.o. male admitted with Chest pain.    Chest Pain/Dizziness  -Appreciate Cardiology assistance  -Denies further chest pain today. Troponin trends unremarkable. EKG without ischemic changes. Checking Echo  - d-dimer elevated slightly-no PE on CTA but 4.5cm ascending aortic aneurysm noted     OFT  - pre-renal cause suspected  - will check PVR, urinalysis noted with some mild proteinuria and a few WBCs  - Continue IVF's through today. Cr improving but still above baseline. Continue to hold ACE/HCTZ  - he is hypercalcemic which is chronic-has known hyperparathyroidism-improving with IVF's     HTN  - hold lisinopril/HCTZ given OTF and dehydration  - BP stable     Anemia/TCP  - could be related to alcohol or cocaine use  - Hgb down to 7.4 today, platelets lower. Denies bleeding;  Negative abd exam. Start po folic acid. Give IV iron x 2. Consult GI to evaluate. C scope in 2016 pathology showed tubular adenomas;  never had upper endoscopy  - vitamin replacement     EtOH use  - daily-will cover with CIWA protocol; has not required meds      · SCDs for DVT prophylaxis.  · Full code.  · Discussed with patient, nursing staff and Dr. Naranjo.  · Anticipate discharge home in 1-2 days.      YANIRA Joseph  [facility]  05/03/22  15:11 EDT

## 2022-05-03 NOTE — ASSESSMENT & PLAN NOTE
No clinical signs of appendicitis/pancreatitis, No guarding, no rigidity.  Report to ER if signs/ symptoms worsen.

## 2022-05-04 ENCOUNTER — APPOINTMENT (OUTPATIENT)
Dept: ULTRASOUND IMAGING | Facility: HOSPITAL | Age: 67
End: 2022-05-04

## 2022-05-04 LAB
ANION GAP SERPL CALCULATED.3IONS-SCNC: 9.7 MMOL/L (ref 5–15)
BUN SERPL-MCNC: 27 MG/DL (ref 8–23)
BUN/CREAT SERPL: 21.3 (ref 7–25)
CALCIUM SPEC-SCNC: 10.3 MG/DL (ref 8.6–10.5)
CHLORIDE SERPL-SCNC: 106 MMOL/L (ref 98–107)
CO2 SERPL-SCNC: 21.3 MMOL/L (ref 22–29)
CREAT SERPL-MCNC: 1.27 MG/DL (ref 0.76–1.27)
EGFRCR SERPLBLD CKD-EPI 2021: 63.1 ML/MIN/1.73
GLUCOSE SERPL-MCNC: 102 MG/DL (ref 65–99)
HCT VFR BLD AUTO: 22 % (ref 37.5–51)
HCT VFR BLD AUTO: 23.5 % (ref 37.5–51)
HCT VFR BLD AUTO: 23.8 % (ref 37.5–51)
HCT VFR BLD AUTO: 26.9 % (ref 37.5–51)
HGB BLD-MCNC: 7.3 G/DL (ref 13–17.7)
HGB BLD-MCNC: 7.8 G/DL (ref 13–17.7)
HGB BLD-MCNC: 7.9 G/DL (ref 13–17.7)
HGB BLD-MCNC: 8.8 G/DL (ref 13–17.7)
INR PPP: 1.06 (ref 0.9–1.1)
POTASSIUM SERPL-SCNC: 4 MMOL/L (ref 3.5–5.2)
PROTHROMBIN TIME: 13.7 SECONDS (ref 11.7–14.2)
RETICS # AUTO: 0.06 10*6/MM3 (ref 0.02–0.13)
RETICS/RBC NFR AUTO: 2.42 % (ref 0.7–1.9)
SODIUM SERPL-SCNC: 137 MMOL/L (ref 136–145)

## 2022-05-04 PROCEDURE — 85045 AUTOMATED RETICULOCYTE COUNT: CPT | Performed by: NURSE PRACTITIONER

## 2022-05-04 PROCEDURE — 96361 HYDRATE IV INFUSION ADD-ON: CPT

## 2022-05-04 PROCEDURE — 99214 OFFICE O/P EST MOD 30 MIN: CPT | Performed by: PHYSICIAN ASSISTANT

## 2022-05-04 PROCEDURE — 85014 HEMATOCRIT: CPT | Performed by: NURSE PRACTITIONER

## 2022-05-04 PROCEDURE — 85610 PROTHROMBIN TIME: CPT | Performed by: PHYSICIAN ASSISTANT

## 2022-05-04 PROCEDURE — 76705 ECHO EXAM OF ABDOMEN: CPT

## 2022-05-04 PROCEDURE — 99214 OFFICE O/P EST MOD 30 MIN: CPT | Performed by: INTERNAL MEDICINE

## 2022-05-04 PROCEDURE — 85018 HEMOGLOBIN: CPT | Performed by: NURSE PRACTITIONER

## 2022-05-04 PROCEDURE — G0378 HOSPITAL OBSERVATION PER HR: HCPCS

## 2022-05-04 PROCEDURE — 96367 TX/PROPH/DG ADDL SEQ IV INF: CPT

## 2022-05-04 PROCEDURE — 36415 COLL VENOUS BLD VENIPUNCTURE: CPT | Performed by: NURSE PRACTITIONER

## 2022-05-04 PROCEDURE — 96366 THER/PROPH/DIAG IV INF ADDON: CPT

## 2022-05-04 PROCEDURE — 80048 BASIC METABOLIC PNL TOTAL CA: CPT | Performed by: NURSE PRACTITIONER

## 2022-05-04 PROCEDURE — 25010000002 NA FERRIC GLUC CPLX PER 12.5 MG: Performed by: NURSE PRACTITIONER

## 2022-05-04 RX ADMIN — SODIUM CHLORIDE, POTASSIUM CHLORIDE, SODIUM LACTATE AND CALCIUM CHLORIDE 125 ML/HR: 600; 310; 30; 20 INJECTION, SOLUTION INTRAVENOUS at 04:12

## 2022-05-04 RX ADMIN — ALLOPURINOL 300 MG: 300 TABLET ORAL at 08:37

## 2022-05-04 RX ADMIN — SODIUM CHLORIDE 250 MG: 9 INJECTION, SOLUTION INTRAVENOUS at 17:05

## 2022-05-04 RX ADMIN — Medication 100 MG: at 08:37

## 2022-05-04 RX ADMIN — POLYETHYLENE GLYCOL 3350, SODIUM SULFATE ANHYDROUS, SODIUM BICARBONATE, SODIUM CHLORIDE, POTASSIUM CHLORIDE 4000 ML: 236; 22.74; 6.74; 5.86; 2.97 POWDER, FOR SOLUTION ORAL at 17:04

## 2022-05-04 RX ADMIN — MULTIPLE VITAMINS W/ MINERALS TAB 1 TABLET: TAB at 08:37

## 2022-05-04 RX ADMIN — FOLIC ACID 1 MG: 1 TABLET ORAL at 08:37

## 2022-05-04 NOTE — PROGRESS NOTES
[facility] Progress Note       Encounter Date:22  Patient:Mo Willoughby  :1955  MRN:[id number]      Chief Complaint: Follow-up chest pain      Subjective:      Patient doing well this morning.  No new issues      Review of Systems:  Review of Systems   Constitutional: Negative for malaise/fatigue.   Cardiovascular: Negative for chest pain.   Respiratory: Negative for shortness of breath.        Medications:  Scheduled Meds:  allopurinol, 300 mg, Oral, Daily  ferric gluconate, 250 mg, Intravenous, Q24H  folic acid, 1 mg, Oral, Daily  multivitamin with minerals, 1 tablet, Oral, Daily  sodium chloride, 10 mL, Intravenous, Q12H  thiamine, 100 mg, Oral, Daily    Continuous Infusions:  lactated ringers, 125 mL/hr, Last Rate: 125 mL/hr (22 0647)    PRN Meds:  •  acetaminophen **OR** acetaminophen **OR** acetaminophen  •  LORazepam **OR** LORazepam **OR** LORazepam **OR** LORazepam **OR** LORazepam **OR** LORazepam  •  nitroglycerin  •  ondansetron  •  sodium chloride         Objective:       Vitals:    22 1539 22 1835 22 0345 22 0716   BP: 134/81 128/83 138/94 148/92   BP Location: Right arm Right arm Right arm Right arm   Patient Position: Lying Lying Lying Sitting   Pulse: 86 86 71 84   Resp: 18 16 16 16   Temp: 99.1 °F (37.3 °C) 99.1 °F (37.3 °C) 98.9 °F (37.2 °C) 98.8 °F (37.1 °C)   TempSrc: Oral Oral Oral Oral   SpO2:  99% 97% 99%   Weight:       Height:               Physical Exam:  Constitutional: Well appearing, well developed, no acute distress   HENT: Oropharynx clear and membrane moist  Eyes: Normal conjunctiva, no sclera icterus.  Neck: Supple, no carotid bruit bilaterally.  Cardiovascular: Regular rate and rhythm, No Murmur, No bilateral lower extremity edema.  Pulmonary: Normal respiratory effort, normal lung sounds, no wheezing.  Abdominal: Soft, nontender, no hepatosplenomegaly, liver is non-pulsatile.  Neurological: Alert and orient x 3.   Skin:  Warm, dry, no ecchymosis, no rash.  Psych: Appropriate mood and affect. Normal judgment and insight.           Lab Review:   Results from last 7 days   Lab Units 05/04/22  0803 05/03/22  0410 05/02/22  1527   SODIUM mmol/L 137 138 136   POTASSIUM mmol/L 4.0 4.6 4.9   CHLORIDE mmol/L 106 106 101   CO2 mmol/L 21.3* 23.0 23.0   BUN mg/dL 27* 32* 30*   CREATININE mg/dL 1.27 1.74* 1.98*   GLUCOSE mg/dL 102* 89 112*   CALCIUM mg/dL 10.3 10.3 11.5*   AST (SGOT) U/L  --   --  59*   ALT (SGPT) U/L  --   --  29     Results from last 7 days   Lab Units 05/03/22  0410 05/02/22 2105 05/02/22  1713 05/02/22  1527   TROPONIN T ng/mL 0.028 0.029 0.025 0.022     Results from last 7 days   Lab Units 05/04/22  0803 05/04/22  0001 05/03/22  1550 05/03/22  0827 05/03/22  0410 05/02/22  2105 05/02/22  1527   WBC 10*3/mm3  --   --   --   --  3.75  --  5.32   HEMOGLOBIN g/dL 7.9* 7.3* 7.5* 7.5* 7.4* 8.6* 9.3*   HEMATOCRIT % 23.5* 22.0* 22.5* 22.5* 22.0* 24.8* 27.7*   PLATELETS 10*3/mm3  --   --   --   --  92*  --  139*     Results from last 7 days   Lab Units 05/03/22  0410   INR  1.12*               Invalid input(s): LDLCALC            Echocardiogram 5/4/2022 images reviewed by myself:  · Calculated left ventricular EF = 56.3% Estimated left ventricular EF was in agreement with the calculated left ventricular EF. Left ventricular systolic function is normal. Normal left ventricular cavity size noted. Left ventricular wall thickness is consistent with moderate concentric hypertrophy. All left ventricular wall segments contract normally. Left ventricular diastolic function is consistent with (grade I) impaired relaxation.  · The left atrial cavity is borderline dilated. Along the medial aspect of the left atrium, there is an intermittent echodensity. While this may represent shadowing from the aortic root or lipomatous hypertrophy of the superior portion of the atrial septum, a mass cannot be excluded. Consider cardiac CT for further  evaluation.           Assessment:          Diagnosis Plan   1. Chest pain, unspecified type     2. Near syncope-recurrent     3. Lightheadedness     4. OTF (acute kidney injury) (HCC)     5. Orthostasis     6. Volume depletion            Plan:       Mr. Willoughby is a 66-year-old gentleman with past medical history notable for hypertension, history of drug abuse, and history of alcohol abuse who presents to the hospital with chest pain.  Work-up identified positive tox screen for cocaine.  Fortunately with supportive therapy the patient has been doing better.  Troponins were mildly elevated in indeterminate range but overall is doing much better with improvement in his blood pressure.  Echocardiogram yesterday demonstrated a lipomatous septum some concern for mass but on my review think this is more consistent with lipomatous hypertrophy of the septum.  Would hold off on any further cardiac work-up at this time.  Obviously he is got hematological issues which are currently being evaluated.  From a cardiac perspective we will sign off and follow peripherally.    Chest pain:  · We will hold off on ischemic evaluation given times fairly atypical and echocardiogram without any high risk features  · Other issues including anemia and drug use more likely to be etiology of symptoms     Lipomatous septum:  · Echo fairly consistent with a lipomatous hypertrophy of the septum.  Would hold off on any further work-up at this time    Essential hypertension:  · Patient previously on lisinopril and hydrochlorothiazide.  These will be both excellent medications long-term for him however he did present with some mild acute kidney injury likely related to some amount of dehydration.  With his ongoing drug use this may not be a great option for him long-term if he does not hydrate himself.  · Could consider adding low-dose amlodipine to his regimen to see if this will improve.  · Given cocaine abuse would be probably better to try and  avoid beta-blocker use although not definitively contraindicated if needed would use nonselective beta-blocker such as atenolol           Benoit Barreto MD  [facility]  05/04/22  09:55 EDT

## 2022-05-04 NOTE — PLAN OF CARE
Goal Outcome Evaluation:  Plan of Care Reviewed With: patient        Progress: improving  Outcome Evaluation: vss, GI scheduled upper/lower scope for tomorrow, npo at midnight, consent signed, bowel prep starting, US of liver also scheduled for tonight, wcabilio

## 2022-05-04 NOTE — CASE MANAGEMENT/SOCIAL WORK
Discharge Planning Assessment  [facility]     Patient Name: Mo Willoughby  MRN: [id number]  Today's Date: 5/4/2022    Admit Date: 5/2/2022     Discharge Needs Assessment     Row Name 05/04/22 1638       Living Environment    People in Home sibling(s)    Name(s) of People in Home sister Golden    Current Living Arrangements home    Primary Care Provided by self    Family Caregiver if Needed none    Quality of Family Relationships helpful;involved;supportive    Able to Return to Prior Arrangements yes       Resource/Environmental Concerns    Resource/Environmental Concerns home accessibility    Home Accessibility Concerns stairs to access bedroom or bathroom    Transportation Concerns none       Transition Planning    Patient/Family Anticipates Transition to home with family    Patient/Family Anticipated Services at Transition none    Transportation Anticipated family or friend will provide       Discharge Needs Assessment    Readmission Within the Last 30 Days no previous admission in last 30 days    Equipment Currently Used at Home bp cuff;scales;grab bar    Concerns to be Addressed no discharge needs identified;denies needs/concerns at this time    Anticipated Changes Related to Illness none    Equipment Needed After Discharge none    Provided Post Acute Provider List? N/A    N/A Provider List Comment no need for list identified               Discharge Plan     Row Name 05/04/22 1639       Plan    Plan Home: Denies needs              Continued Care and Services - Admitted Since 5/2/2022    Coordination has not been started for this encounter.       Expected Discharge Date and Time     Expected Discharge Date Expected Discharge Time    May 5, 2022          Demographic Summary     Row Name 05/04/22 1638       General Information    Admission Type observation    Arrived From emergency department    Referral Source admission list    Reason for Consult discharge planning    Preferred Language English        Contact Information    Permission Granted to Share Info With family/designee               Functional Status    No documentation.                Psychosocial    No documentation.                Abuse/Neglect    No documentation.                Legal    No documentation.                Substance Abuse    No documentation.                Patient Forms    No documentation.                   Elizabeth Ortiz RN

## 2022-05-04 NOTE — PROGRESS NOTES
Name: Mo Willoughby ADMIT: 2022   : 1955  PCP: Jenn Davison APRN    MRN: [id number] LOS: 0 days   AGE/SEX: 66 y.o. male  ROOM: [facility]     Subjective   Subjective   Feels well. Denies chest pain, shortness of breath.     Review of Systems   Constitutional: Negative for chills and fever.   HENT: Negative for congestion.    Respiratory: Negative for shortness of breath.    Cardiovascular: Negative for chest pain.   Gastrointestinal: Negative for nausea.   Genitourinary: Negative for difficulty urinating.   Musculoskeletal: Negative for arthralgias and myalgias.   Skin: Negative for rash.   Neurological: Negative for headaches.   Psychiatric/Behavioral: Negative for sleep disturbance.        Objective   Objective   Vital Signs  Temp:  [98.8 °F (37.1 °C)-99.3 °F (37.4 °C)] 99.3 °F (37.4 °C)  Heart Rate:  [71-86] 86  Resp:  [16-18] 16  BP: (128-148)/(81-96) 140/96  SpO2:  [95 %-99 %] 95 %  on   ;   Device (Oxygen Therapy): room air  Body mass index is 31.19 kg/m².  Physical Exam  Vitals and nursing note reviewed.   Constitutional:       General: He is not in acute distress.  HENT:      Head: Normocephalic.      Mouth/Throat:      Mouth: Mucous membranes are moist.   Eyes:      Conjunctiva/sclera: Conjunctivae normal.   Cardiovascular:      Rate and Rhythm: Normal rate and regular rhythm.   Pulmonary:      Effort: Pulmonary effort is normal. No respiratory distress.      Breath sounds: No wheezing or rales.   Abdominal:      General: Bowel sounds are normal.      Palpations: Abdomen is soft.   Musculoskeletal:      Cervical back: Neck supple.      Right lower leg: No edema.      Left lower leg: No edema.   Skin:     General: Skin is warm and dry.   Neurological:      Mental Status: He is alert and oriented to person, place, and time.   Psychiatric:         Mood and Affect: Mood normal.         Behavior: Behavior normal.         Results Review     I reviewed the patient's new clinical results.  Results  from last 7 days   Lab Units 05/04/22  0803 05/04/22  0001 05/03/22  1550 05/03/22  0827 05/03/22  0410 05/02/22  2105 05/02/22  1527   WBC 10*3/mm3  --   --   --   --  3.75  --  5.32   HEMOGLOBIN g/dL 7.9* 7.3* 7.5* 7.5* 7.4*   < > 9.3*   PLATELETS 10*3/mm3  --   --   --   --  92*  --  139*    < > = values in this interval not displayed.     Results from last 7 days   Lab Units 05/04/22  0803 05/03/22  0410 05/02/22  1527   SODIUM mmol/L 137 138 136   POTASSIUM mmol/L 4.0 4.6 4.9   CHLORIDE mmol/L 106 106 101   CO2 mmol/L 21.3* 23.0 23.0   BUN mg/dL 27* 32* 30*   CREATININE mg/dL 1.27 1.74* 1.98*   GLUCOSE mg/dL 102* 89 112*   EGFR mL/min/1.73 63.1 43.2* 37.0*     Results from last 7 days   Lab Units 05/02/22  1527   ALBUMIN g/dL 4.00   BILIRUBIN mg/dL 1.1   ALK PHOS U/L 56   AST (SGOT) U/L 59*   ALT (SGPT) U/L 29     Results from last 7 days   Lab Units 05/04/22  0803 05/03/22  0410 05/02/22  1527   CALCIUM mg/dL 10.3 10.3 11.5*   ALBUMIN g/dL  --   --  4.00     Results from last 7 days   Lab Units 05/03/22  0410   LACTATE mmol/L 0.9     No results found for: HGBA1C, POCGLU    XR Chest 1 View    Result Date: 5/2/2022  No focal pulmonary consolidation. Tortuous aorta. Follow-up as clinically indicated.  This report was finalized on 5/2/2022 3:42 PM by Dr. Edilberto Vidal M.D.      CT Angiogram Chest    Result Date: 5/2/2022   Aneurysmal ascending aorta. No pulmonary embolism.  This report was finalized on 5/2/2022 9:15 PM by Dr. Edilberto Vidal M.D.      Scheduled Medications  allopurinol, 300 mg, Oral, Daily  ferric gluconate, 250 mg, Intravenous, Q24H  folic acid, 1 mg, Oral, Daily  multivitamin with minerals, 1 tablet, Oral, Daily  sodium chloride, 10 mL, Intravenous, Q12H  thiamine, 100 mg, Oral, Daily    Infusions  lactated ringers, 75 mL/hr, Last Rate: 75 mL/hr (05/04/22 1123)    Diet  Diet Regular       Assessment/Plan     Active Hospital Problems    Diagnosis  POA   • **Chest pain [R07.9]  Yes   •  Anemia [D64.9]  Yes   • Folic acid deficiency [E53.8]  Yes   • Fatigue [R53.83]  Yes   • Aneurysm of ascending aorta (HCC) [I71.2]  Yes   • Acute kidney injury (HCC) [N17.9]  Yes   • Hypercalcemia [E83.52]  Yes   • H/O: gout on allopurinol [Z87.39]  Yes   • Hypertension [I10]  Yes      Resolved Hospital Problems   No resolved problems to display.       66 y.o. male admitted with Chest pain.    Chest Pain/Dizziness  -Appreciate Cardiology assistance  -Denies further chest pain. Troponin trends unremarkable. EKG without ischemic changes. Echo EF 56%, LV diastolic grade 1 impaired relaxation, lipomatous hypertrophy favored per Cardiology note; no further workup recommended  - d-dimer elevated slightly-no PE on CTA but 4.5cm ascending aortic aneurysm noted     OTF  - pre-renal cause suspected  - Stop IVF's.  Cr improved. Continue to hold ACE/HCTZ  - Hypercalcemic on arrival, which is chronic-has known hyperparathyroidism-improved with IVF's     HTN  - hold lisinopril/HCTZ given OTF and dehydration  - BP stable  -Cardiology following; recommending ACE/HCTZ if pt can avoid dehydration vs adding low dose amlodipine     Anemia/TCP  - could be related to alcohol or cocaine use  - Hgb remains in the 7 range, platelets lower. Denies bleeding;  Negative abd exam. Start po folic acid. Give IV iron x 2. GI consult pending. C scope in 2016 pathology showed tubular adenomas;  never had upper endoscopy  - vitamin replacement     EtOH use  - daily-will cover with CIWA protocol; has not required meds     · SCDs for DVT prophylaxis.  · Full code.  · Discussed with patient and nursing staff.  · Anticipate discharge home when cleared by consultants.      YANIRA Joseph  [facility]  05/04/22  13:13 EDT       eyeglasses

## 2022-05-04 NOTE — CONSULTS
[facility]  Initial Inpatient Consult Note    Referring Provider: [facility]     Reason for Consultation: Anemia    Subjective     History of present illness:      Thank you for allowing us to participate in the care of this patient.  66 y.o. male patient of Dr. Vilchis consulted for anemia with history of HTN, drug abuse, alcohol abuse initially presented to the ED for chest pain.  Positive for cocaine.  Troponins mildly elevated-cardiology consulted with unremarkable echo and EKG.  They are holding on any further cardiac work-up and have signed off.  Chest pain improved with stabilization of blood pressure.  He currently denies nausea, vomiting, dysphagia, abdominal pain, constipation, diarrhea, melena hematochezia, weight loss, dyspepsia, GERD.  He does state that he had some mid to lower abdominal pain last week with some dizziness.  He does not use NSAIDs.  He does drink vodka daily around 6 to 7/day per patient states that he plans to stop.  Cardiology consult note states he did admit to cocaine use about 4 days prior to arrival.  He denies any history of known liver disease.  He did have a small bowel movement today but without black or blood present.    Hemoglobin 7.4 yesterday and 7.9 today.  , MCH 33.6 platelets 92 yesterday.  Iron panel on 5/2 with low saturation at 13% otherwise normal.  Folate mildly low at 4.3.  B12 411.  LFTs at admission with mildly elevated AST 59 otherwise normal.  Elevated creatinine initially at 1.9 but now improved to 1.27.  BUN 27.  Started on folic acid and IV iron.    Last colonoscopy in 2016 with Dr. Vilchis with multiple tubular adenomas with LGD, the largest being 15 mm.  Recall was recommended for 2 years due to poor prep.  Has never had an EGD.  He denies family history of colon cancer.  He does state his mother was anemic for unknown reasons.  He has had a cholecystectomy.    Family in room during interview.    Past Medical History:  Past Medical  History:   Diagnosis Date   • Erectile dysfunction    • Gout    • Hx of colonic polyp    • Hypercalcemia    • Hypertension    • Parathyroid abnormality (HCC)    • Risk factors for obstructive sleep apnea    • Syncope and collapse 03/28/2017     Past Surgical History:  Past Surgical History:   Procedure Laterality Date   • CHOLECYSTECTOMY WITH INTRAOPERATIVE CHOLANGIOGRAM N/A 8/3/2018    Procedure: CHOLECYSTECTOMY LAPAROSCOPIC INTRAOPERATIVE CHOLANGIOGRAM;  Surgeon: Melina Kate MD;  Location: [facility] MAIN OR;  Service: General   • COLONOSCOPY     • COLONOSCOPY N/A 10/3/2016    Procedure: COLONOSCOPY TO CECUM TO TERMINAL ILIUM WITH COLD BIOPSY POLYPECTOMY;  Surgeon: Benoit Vilchis MD;  Location: [facility] ENDOSCOPY;  Service:    • LIPOMA EXCISION     • TONSILLECTOMY        Social History:   Social History     Tobacco Use   • Smoking status: Never Smoker   • Smokeless tobacco: Never Used   Substance Use Topics   • Alcohol use: Yes     Comment: occasional      Family History:  Family History   Problem Relation Age of Onset   • Hypertension Mother    • Breast cancer Mother    • Cancer Father    • Liver cancer Father        Home Meds:  Medications Prior to Admission   Medication Sig Dispense Refill Last Dose   • allopurinol (ZYLOPRIM) 300 MG tablet Take 1 tablet by mouth Daily. 90 tablet 1 5/2/2022 at Unknown time   • lisinopril-hydrochlorothiazide (PRINZIDE,ZESTORETIC) 20-25 MG per tablet Take 1 tablet by mouth Daily. (Patient taking differently: Take 1 tablet by mouth Daily. Patient takes 0.5 tablet at home.) 90 tablet 0 5/2/2022 at Unknown time     Current Meds:   allopurinol, 300 mg, Oral, Daily  ferric gluconate, 250 mg, Intravenous, Q24H  folic acid, 1 mg, Oral, Daily  multivitamin with minerals, 1 tablet, Oral, Daily  sodium chloride, 10 mL, Intravenous, Q12H  thiamine, 100 mg, Oral, Daily      Allergies:  Allergies   Allergen Reactions   • Zetia [Ezetimibe] Dizziness     Nausea, fatgue     Review of  Systems  General ROS: negative for - chills, fever or weight loss  Psychological ROS: negative for - hallucinations or suicidal ideation  ENT ROS: negative for - headaches, nasal congestion or nasal discharge  Respiratory ROS: no cough, shortness of breath, or wheezing  Cardiovascular ROS: no chest pain or dyspnea on exertion  Gastrointestinal ROS: no abdominal pain, change in bowel habits, or black or bloody stools  Genito-Urinary ROS: no dysuria, trouble voiding, or hematuria  Musculoskeletal ROS: negative for - muscle pain or muscular weakness  Neurological ROS: negative for - seizures, speech problems or weakness  Dermatological ROS: negative for pruritus and rash    Objective     Vital Signs  Temp:  [98.8 °F (37.1 °C)-99.3 °F (37.4 °C)] 99.3 °F (37.4 °C)  Heart Rate:  [71-86] 86  Resp:  [16-18] 16  BP: (128-148)/(81-96) 140/96  Physical Exam:   Constitutional:    Alert, cooperative, in no acute distress    Eyes:          conjunctivae and sclerae normal, no icterus    HENT:   Atraumatic, normal hearing, mucosa moist    Neck:   Trachea midline, supple    Lungs:     Clear to auscultation bilaterally, normal respiratory effort     Heart:    Regular rhythm and normal rate, no MGR, no abdominal bruits    Abdomen:     Soft, nondistended, nontender; normal bowel sounds , no organomegaly    Extremities:   no edema or erythema of bilateral lower extremities    Skin:   No bruising, rash, or pallor   Neurologic:  No tremors, no asterixis    Psychiatric:  normal mood and affect; A&O x3     Results Review:   I reviewed the patient's new clinical results.    Results from last 7 days   Lab Units 05/04/22  0803 05/04/22  0001 05/03/22  1550 05/03/22  0827 05/03/22  0410 05/02/22  2105 05/02/22  1527   WBC 10*3/mm3  --   --   --   --  3.75  --  5.32   HEMOGLOBIN g/dL 7.9* 7.3* 7.5*   < > 7.4*   < > 9.3*   HEMATOCRIT % 23.5* 22.0* 22.5*   < > 22.0*   < > 27.7*   PLATELETS 10*3/mm3  --   --   --   --  92*  --  139*    < > = values  in this interval not displayed.     Results from last 7 days   Lab Units 05/04/22  0803 05/03/22  0410 05/02/22  1527   SODIUM mmol/L 137 138 136   POTASSIUM mmol/L 4.0 4.6 4.9   CHLORIDE mmol/L 106 106 101   CO2 mmol/L 21.3* 23.0 23.0   BUN mg/dL 27* 32* 30*   CREATININE mg/dL 1.27 1.74* 1.98*   CALCIUM mg/dL 10.3 10.3 11.5*   BILIRUBIN mg/dL  --   --  1.1   ALK PHOS U/L  --   --  56   ALT (SGPT) U/L  --   --  29   AST (SGOT) U/L  --   --  59*   GLUCOSE mg/dL 102* 89 112*     Results from last 7 days   Lab Units 05/03/22  0410   INR  1.12*     Lab Results   Lab Value Date/Time    LIPASE 89 (H) 08/03/2018 0745    LIPASE >600 (H) 08/01/2018 0657    LIPASE >600 (H) 07/31/2018 0458       Radiology:  CT Angiogram Chest   Final Result       Aneurysmal ascending aorta. No pulmonary embolism.        This report was finalized on 5/2/2022 9:15 PM by Dr. Edilberto Vidal M.D.          XR Chest 1 View   Final Result   No focal pulmonary consolidation. Tortuous aorta. Follow-up   as clinically indicated.       This report was finalized on 5/2/2022 3:42 PM by Dr. Edilberto Vidal M.D.              Assessment/Plan   Patient Active Problem List   Diagnosis   • Hypertension   • Erectile dysfunction   • Gout   • History of cellulitis   • Hyperparathyroidism with most recent intact PTH 95, calcium 11.3 with SPECT revealing question of a subcentimeter left inferior parathyroid adenoma   • Hypercalcemia   • H/O: gout on allopurinol   • Colon polyp   • Cholecystitis   • Sinus tachycardia   • Pancreatitis   • Acute kidney injury (HCC)   • Cough   • Nausea   • Fatigue   • Dizziness   • Chest pain   • Aneurysm of ascending aorta (HCC)   • Palpitations   • Generalized abdominal pain   • Anemia   • Folic acid deficiency       Assessment:  1. Iron deficiency anemia, unknown ideology  2. Personal history of tubular adenomas, overdue for colonoscopy  3. Chest pain, resolved and cleared by cardiology  4. History of alcohol  abuse  5. Hepatic steatosis noted on 2018 CT scan  6. History of drug abuse, positive for cocaine at admission  7. Thrombocytopenia    Plan:  · We will order liver ultrasound to evaluate for possible liver disease and cirrhosis given history of alcohol abuse and thrombocytopenia.  · Recommend proceeding with EGD and colonoscopy for evaluation of iron deficiency anemia.  · Continue to monitor H&H and transfuse per primary hospital team  · Clear liquid diet today and n.p.o. after midnight.  · Recommended strict alcohol and drug cessation      I discussed the patients findings and my recommendations with patient and family.    Dragon dictation used throughout this note.            Serena Monaco PA-C  [facility]  [address] [place] [address]  Office: [phone]

## 2022-05-05 ENCOUNTER — READMISSION MANAGEMENT (OUTPATIENT)
Dept: CALL CENTER | Facility: HOSPITAL | Age: 67
End: 2022-05-05

## 2022-05-05 ENCOUNTER — TELEPHONE (OUTPATIENT)
Dept: FAMILY MEDICINE CLINIC | Facility: CLINIC | Age: 67
End: 2022-05-05

## 2022-05-05 ENCOUNTER — ANESTHESIA EVENT (OUTPATIENT)
Dept: GASTROENTEROLOGY | Facility: HOSPITAL | Age: 67
End: 2022-05-05

## 2022-05-05 ENCOUNTER — ANESTHESIA (OUTPATIENT)
Dept: GASTROENTEROLOGY | Facility: HOSPITAL | Age: 67
End: 2022-05-05

## 2022-05-05 VITALS
SYSTOLIC BLOOD PRESSURE: 140 MMHG | BODY MASS INDEX: 31.15 KG/M2 | WEIGHT: 230 LBS | HEIGHT: 72 IN | DIASTOLIC BLOOD PRESSURE: 85 MMHG | HEART RATE: 65 BPM | TEMPERATURE: 98.6 F | OXYGEN SATURATION: 96 % | RESPIRATION RATE: 20 BRPM

## 2022-05-05 PROBLEM — D50.9 IRON DEFICIENCY ANEMIA: Status: ACTIVE | Noted: 2022-05-05

## 2022-05-05 LAB
DEPRECATED RDW RBC AUTO: 57.9 FL (ref 37–54)
ERYTHROCYTE [DISTWIDTH] IN BLOOD BY AUTOMATED COUNT: 15.6 % (ref 12.3–15.4)
HCT VFR BLD AUTO: 24.1 % (ref 37.5–51)
HGB BLD-MCNC: 8 G/DL (ref 13–17.7)
MCH RBC QN AUTO: 33.5 PG (ref 26.6–33)
MCHC RBC AUTO-ENTMCNC: 33.2 G/DL (ref 31.5–35.7)
MCV RBC AUTO: 100.8 FL (ref 79–97)
PLATELET # BLD AUTO: 117 10*3/MM3 (ref 140–450)
PMV BLD AUTO: 10 FL (ref 6–12)
RBC # BLD AUTO: 2.39 10*6/MM3 (ref 4.14–5.8)
WBC NRBC COR # BLD: 3.39 10*3/MM3 (ref 3.4–10.8)

## 2022-05-05 PROCEDURE — 88305 TISSUE EXAM BY PATHOLOGIST: CPT | Performed by: INTERNAL MEDICINE

## 2022-05-05 PROCEDURE — 25010000002 PROPOFOL 10 MG/ML EMULSION: Performed by: NURSE ANESTHETIST, CERTIFIED REGISTERED

## 2022-05-05 PROCEDURE — G0378 HOSPITAL OBSERVATION PER HR: HCPCS

## 2022-05-05 PROCEDURE — 43239 EGD BIOPSY SINGLE/MULTIPLE: CPT | Performed by: INTERNAL MEDICINE

## 2022-05-05 PROCEDURE — 85027 COMPLETE CBC AUTOMATED: CPT | Performed by: NURSE PRACTITIONER

## 2022-05-05 PROCEDURE — 45385 COLONOSCOPY W/LESION REMOVAL: CPT | Performed by: INTERNAL MEDICINE

## 2022-05-05 DEVICE — CLIPPING DEVICE
Type: IMPLANTABLE DEVICE | Site: DESCENDING COLON | Status: FUNCTIONAL
Brand: RESOLUTION CLIP

## 2022-05-05 RX ORDER — PROPOFOL 10 MG/ML
VIAL (ML) INTRAVENOUS AS NEEDED
Status: DISCONTINUED | OUTPATIENT
Start: 2022-05-05 | End: 2022-05-05 | Stop reason: SURG

## 2022-05-05 RX ORDER — LIDOCAINE HYDROCHLORIDE 20 MG/ML
INJECTION, SOLUTION INFILTRATION; PERINEURAL AS NEEDED
Status: DISCONTINUED | OUTPATIENT
Start: 2022-05-05 | End: 2022-05-05 | Stop reason: SURG

## 2022-05-05 RX ORDER — PROPOFOL 10 MG/ML
VIAL (ML) INTRAVENOUS CONTINUOUS PRN
Status: DISCONTINUED | OUTPATIENT
Start: 2022-05-05 | End: 2022-05-05 | Stop reason: SURG

## 2022-05-05 RX ORDER — SODIUM CHLORIDE 9 MG/ML
30 INJECTION, SOLUTION INTRAVENOUS CONTINUOUS PRN
Status: DISCONTINUED | OUTPATIENT
Start: 2022-05-05 | End: 2022-05-05 | Stop reason: HOSPADM

## 2022-05-05 RX ORDER — FOLIC ACID 1 MG/1
1 TABLET ORAL DAILY
Qty: 90 TABLET | Refills: 0 | Status: SHIPPED | OUTPATIENT
Start: 2022-05-06 | End: 2022-08-04 | Stop reason: SDUPTHER

## 2022-05-05 RX ORDER — PANTOPRAZOLE SODIUM 40 MG/1
40 TABLET, DELAYED RELEASE ORAL
Qty: 90 TABLET | Refills: 0 | Status: SHIPPED | OUTPATIENT
Start: 2022-05-05 | End: 2022-06-03

## 2022-05-05 RX ORDER — PANTOPRAZOLE SODIUM 40 MG/1
40 TABLET, DELAYED RELEASE ORAL
Status: DISCONTINUED | OUTPATIENT
Start: 2022-05-05 | End: 2022-05-05 | Stop reason: HOSPADM

## 2022-05-05 RX ORDER — LISINOPRIL 10 MG/1
10 TABLET ORAL DAILY
Qty: 30 TABLET | Refills: 0 | Status: SHIPPED | OUTPATIENT
Start: 2022-05-05 | End: 2022-05-19

## 2022-05-05 RX ADMIN — FOLIC ACID 1 MG: 1 TABLET ORAL at 11:39

## 2022-05-05 RX ADMIN — PROPOFOL 100 MG: 10 INJECTION, EMULSION INTRAVENOUS at 10:02

## 2022-05-05 RX ADMIN — LIDOCAINE HYDROCHLORIDE 60 MG: 20 INJECTION, SOLUTION INFILTRATION; PERINEURAL at 09:58

## 2022-05-05 RX ADMIN — SODIUM CHLORIDE 30 ML/HR: 9 INJECTION, SOLUTION INTRAVENOUS at 09:29

## 2022-05-05 RX ADMIN — ALLOPURINOL 300 MG: 300 TABLET ORAL at 11:39

## 2022-05-05 RX ADMIN — MULTIPLE VITAMINS W/ MINERALS TAB 1 TABLET: TAB at 11:39

## 2022-05-05 RX ADMIN — PROPOFOL 50 MG: 10 INJECTION, EMULSION INTRAVENOUS at 10:00

## 2022-05-05 RX ADMIN — PROPOFOL 100 MG: 10 INJECTION, EMULSION INTRAVENOUS at 09:58

## 2022-05-05 RX ADMIN — Medication 100 MG: at 11:39

## 2022-05-05 RX ADMIN — Medication 300 MCG/KG/MIN: at 09:58

## 2022-05-05 RX ADMIN — Medication 10 ML: at 08:31

## 2022-05-05 NOTE — CASE MANAGEMENT/SOCIAL WORK
Case Management Discharge Note      Final Note: home no needs    Provided Post Acute Provider List?: N/A  N/A Provider List Comment: no need for list identified    Selected Continued Care - Discharged on 5/5/2022 Admission date: 5/2/2022 - Discharge disposition: Home or Self Care    Destination    No services have been selected for the patient.              Durable Medical Equipment    No services have been selected for the patient.              Dialysis/Infusion    No services have been selected for the patient.              Home Medical Care    No services have been selected for the patient.              Therapy    No services have been selected for the patient.              Community Resources    No services have been selected for the patient.              Community & DME    No services have been selected for the patient.                  Transportation Services  Private: Car    Final Discharge Disposition Code: 01 - home or self-care

## 2022-05-05 NOTE — ANESTHESIA POSTPROCEDURE EVALUATION
"Patient: Mo Willoughby    Procedure Summary     Date: 05/05/22 Room / Location: [facility] ENDOSCOPY 5 / [facility] ENDOSCOPY    Anesthesia Start: 0955 Anesthesia Stop: 1035    Procedures:       COLONOSCOPY TO CECUM WITH COLD SNARE POLYPECTOMIES & RESOLUTION CLIP PLACEMENT X 2 (N/A )      ESOPHAGOGASTRODUODENOSCOPY WITH BX (N/A Esophagus) Diagnosis:       Chest pain, unspecified type      Iron deficiency anemia, unspecified iron deficiency anemia type      Folic acid deficiency      (Chest pain, unspecified type [R07.9])      (Iron deficiency anemia, unspecified iron deficiency anemia type [D50.9])      (Folic acid deficiency [E53.8])    Surgeons: Benoit Mosley MD Provider: Kevon Duncan MD    Anesthesia Type: MAC ASA Status: 2          Anesthesia Type: MAC    Vitals  Vitals Value Taken Time   /96 05/05/22 1051   Temp     Pulse 82 05/05/22 1051   Resp 20 05/05/22 1051   SpO2 98 % 05/05/22 1051           Post Anesthesia Care and Evaluation    Patient location during evaluation: bedside  Patient participation: complete - patient participated  Level of consciousness: sleepy but conscious  Pain score: 0  Pain management: adequate  Airway patency: patent  Anesthetic complications: No anesthetic complications    Cardiovascular status: acceptable  Respiratory status: acceptable  Hydration status: acceptable    Comments: /96   Pulse 82   Temp 37.2 °C (99 °F) (Oral)   Resp 20   Ht 182.9 cm (72\")   Wt 104 kg (230 lb)   SpO2 98%   BMI 31.19 kg/m²         "

## 2022-05-05 NOTE — DISCHARGE SUMMARY
NAME: Mo Willoughby ADMIT: 2022   : 1955  PCP: Jenn Davison APRN    MRN: [id number] LOS: 0 days   AGE/SEX: 66 y.o. male  ROOM: [facility]/     Date of Admission:  2022  Date of Discharge:  2022    PCP: Jenn Davison APRN    CHIEF COMPLAINT  Chest Pain and Dizziness      DISCHARGE DIAGNOSIS  Active Hospital Problems    Diagnosis  POA   • **Chest pain [R07.9]  Yes   • Iron deficiency anemia [D50.9]  Unknown   • Anemia [D64.9]  Yes   • Folic acid deficiency [E53.8]  Yes   • Fatigue [R53.83]  Yes   • Aneurysm of ascending aorta (HCC) [I71.2]  Yes   • Acute kidney injury (HCC) [N17.9]  Yes   • Hypercalcemia [E83.52]  Yes   • H/O: gout on allopurinol [Z87.39]  Yes   • Hypertension [I10]  Yes      Resolved Hospital Problems   No resolved problems to display.       SECONDARY DIAGNOSES  Past Medical History:   Diagnosis Date   • Erectile dysfunction    • Gout    • Hx of colonic polyp    • Hypercalcemia    • Hypertension    • Parathyroid abnormality (HCC)    • Risk factors for obstructive sleep apnea    • Syncope and collapse 2017       CONSULTS   Cardiology  GI    HOSPITAL COURSE  Patient is a 66 y.o. male with history of alcohol use, drug abuse, hypertension who presented to the hospital with chest pain.  Troponins were mildly elevated and he was positive for cocaine.  He had work-up with cardiology including echo, EKG.  They did not feel he needed additional cardiac testing at this time and recommended lifestyle modifications and continued control of hypertension as an outpatient.  Of note his blood pressure medicines are held during the admission and at discharge due to slightly decreased as only restarting lisinopril and not hctz as he had dehydration/acute kidney injury on admission. PCP can titrate BP medications as appropriate.    He was seen by GI for anemia.  He looked to be both iron deficiency anemia as well as folic acid deficiency.  He was given IV iron while here.  He had  EGD which had showed grade 1 esophageal varices, portal hypertensive gastropathy as well as erosive gastropathy without bleeding.  They recommended twice daily PPI.  He had multiple polyps removed on colonoscopy.  He did have hepatic steatosis seen on imaging and should follow-up with GI as an outpatient as well as continue alcohol cessation.        DIAGNOSTICS  05/05/2022 0824 05/05/2022 0831 CBC (No Diff) [[id number]]   (Abnormal)   Blood    Final result Component Value Units   WBC 3.39 Low  10*3/mm3   RBC 2.39 Low  10*6/mm3   Hemoglobin 8.0 Low  g/dL   Hematocrit 24.1 Low  %   .8 High  fL   MCH 33.5 High  pg   MCHC 33.2 g/dL   RDW 15.6 High  %   RDW-SD 57.9 High  fl   MPV 10.0 fL   Platelets 117 Low  10*3/mm3                  EGD 5/5/22  - Grade I esophageal varices.  - Portal hypertensive gastropathy.  - Erosive gastropathy with no bleeding and no stigmata of recent bleeding. Biopsied.  - Normal examined duodenum. Biopsied.  Impression:  - Please call my office for pathology results in 2 weeks if we have not called you to review results.  - Proceed with colonoscopy  - Avoid NSAIDs  - BID PPI    C scope 5/5/22  - Four 4 to 6 mm polyps in the descending colon, removed with a cold snare. Resected and retrieved. Clips (MR  conditional) were placed.  - One 4 mm polyp in the sigmoid colon, removed with a cold snare. Resected and retrieved.  - The examination was otherwise normal on direct and retroflexion views.  Impression:  - Please call my office for pathology results in 2 weeks if we have not called you to review results.  - Repeat colonoscopy for surveillance based on pathology results.  - Please know that small polyps can be missed based on many factors, including the quality of the bowel prep,  anatomy, and motility of the colon during the procedure. These polyps could cause problems (including cancer)  before your next colonoscopy is due. Please let your physician know if you develop any problems, such  as blood  in your stool, change in your stool habits, abdominal pain or other concerning changes.    US Liver [[id number]] Leroy as Reviewed   Order Status: Completed Collected: 05/04/22 2021    Updated: 05/04/22 2027   Narrative:     US LIVER-       INDICATIONS: Thrombocytopenia, hepatic steatosis, prior cholecystectomy       TECHNIQUE: Ultrasound of the right upper quadrant       COMPARISON: CT from 07/31/2018       FINDINGS:       The gallbladder is absent.       No intrahepatic or extrahepatic biliary ductal dilatation is   demonstrated. The common biliary duct caliber is measured at 2.6 mm.       No liver lesion is identified. Diffusely, the echogenicity of the liver   is heterogeneous and mildly increased.       The pancreas is partly obscured. Right renal cysts are noted, as large   as 2 cm. The right kidney, 11.4 cm, and the pancreas otherwise appear   unremarkable.                   Impression:         Status post cholecystectomy. No biliary ductal dilatation or discrete   liver lesions identified. Evidence of hepatic steatosis.   Follow-up/further evaluation can be obtained as indicated.       Right renal cysts.       This report was finalized on 5/4/2022 8:24 PM by Dr. Edilberto Vidal M.D.       CT Angiogram Chest [[id number]] Leroy as Reviewed   Order Status: Completed Collected: 05/02/22 2057    Updated: 05/02/22 2118   Narrative:     CT ANGIOGRAM CHEST-       INDICATIONS: Chest pain.  Radiation dose reduction techniques were   utilized, including automated exposure control and exposure modulation   based on body size.       TECHNIQUE: CT angiography of the chest. Three-dimensional   reconstructions.       COMPARISON: None available       FINDINGS:       No pulmonary embolism. No aortic dissection. Ascending aorta is   aneurysmal, 4.5 cm.       The heart size is normal without pericardial effusion. A few small   subcentimeter short axis mediastinal lymph nodes are seen that are not   significant by  size criteria.       The airways appear clear.       No pleural effusion or pneumothorax.       The lungs show an 8 mm right lower lobe nodule on axial image 113,   stable from 07/31/2018.       Upper abdominal structures show no acute findings. Micronodular contour   of the liver suggests cirrhosis.       Degenerative changes are seen in the spine. No acute fracture is   identified.       Impression:         Aneurysmal ascending aorta. No pulmonary embolism.       This report was finalized on 5/2/2022 9:15 PM by Dr. Edilberto Vidal M.D.       XR Chest 1 View [[id number]] Leroy as Reviewed   Order Status: Completed Collected: 05/02/22 1542    Updated: 05/02/22 1545   Narrative:     XR CHEST 1 VW-       HISTORY: Male who is 66 years-old,  chest pain       TECHNIQUE: Frontal views of the chest       COMPARISON: 03/29/2017       FINDINGS: The heart size is normal. Aorta is tortuous. Pulmonary   vasculature is unremarkable. No focal pulmonary consolidation, pleural   effusion, or pneumothorax. No acute osseous process.       Impression:     No focal pulmonary consolidation. Tortuous aorta. Follow-up   as clinically indicated.              PHYSICAL EXAM  Objective    Alert  nad  No resp distress    CONDITION ON DISCHARGE  Stable.      DISCHARGE DISPOSITION   Home or Self Care      DISCHARGE MEDICATIONS       Your medication list      ASK your doctor about these medications      Instructions Last Dose Given Next Dose Due   allopurinol 300 MG tablet  Commonly known as: ZYLOPRIM      Take 1 tablet by mouth Daily.       lisinopril-hydrochlorothiazide 20-25 MG per tablet  Commonly known as: PRINZIDE,ZESTORETIC      Take 1 tablet by mouth Daily.               Future Appointments   Date Time Provider Department Center   6/7/2022 10:00 AM Ann Stewart APRN [facility]     Additional Instructions for the Follow-ups that You Need to Schedule     Discharge Follow-up with Specialty: PCP in 1-2 weeks, GI physician in  3-4 weeks   As directed      Specialty: PCP in 1-2 weeks, GI physician in 3-4 weeks            Follow-up Information     Jenn Davison APRN .    Specialty: Nurse Practitioner  Contact information:  [address] [place] [id number] [phone]                         TEST  RESULTS PENDING AT DISCHARGE  Pending Labs     Order Current Status    Tissue Pathology Exam In process             Regan Naranjo MD  [facility]  05/05/22  12:01 EDT      Time: greater than 32 minutes on discharge  It was a pleasure taking care of this patient while in the hospital.

## 2022-05-05 NOTE — OUTREACH NOTE
Prep Survey    Flowsheet Row Responses   [facility] facility patient discharged from? [facility]   Is LACE score < 7 ? Yes   Emergency Room discharge w/ pulse ox? No   Eligibility [facility]   Date of Admission 05/02/22   Date of Discharge 05/05/22   Discharge Disposition Home or Self Care   Discharge diagnosis Polypectomies and clip placement   Does the patient have one of the following disease processes/diagnoses(primary or secondary)? Other   Does the patient have Home health ordered? No   Is there a DME ordered? No   Prep survey completed? Yes          CRISTINA KINNEY - Registered Nurse

## 2022-05-05 NOTE — TELEPHONE ENCOUNTER
Caller: CATRINA    Relationship to patient:  CARDIO    Best call back number: [phone]    Chief complaint: PATIENT GETTING DISCHARGED FROM HOSPITAL 05/05 AND NEEDS TO FOLLOW UP WITH PCP    Type of visit: HOSPITAL FOLLOW UP    Requested date:1/2 WEEKS AND PATIENT REQUESTING IT BE ON A MONDAY    Additional notes:PLEASE ADVISE

## 2022-05-05 NOTE — ANESTHESIA PREPROCEDURE EVALUATION
Anesthesia Evaluation     Patient summary reviewed and Nursing notes reviewed   NPO Solid Status: > 8 hours  NPO Liquid Status: > 4 hours           Airway   Mallampati: II  Neck ROM: full  No difficulty expected  Dental - normal exam     Pulmonary     breath sounds clear to auscultation  Cardiovascular     Rhythm: regular    (+) hypertension,       Neuro/Psych  (+) dizziness/light headedness, syncope,    GI/Hepatic/Renal/Endo    (+)   renal disease,     Musculoskeletal     Abdominal   (+) obese,    Substance History      OB/GYN          Other                        Anesthesia Plan    ASA 2     MAC     intravenous induction           CODE STATUS:    Code Status (Patient has no pulse and is not breathing): CPR (Attempt to Resuscitate)  Medical Interventions (Patient has pulse or is breathing): Full Support

## 2022-05-06 ENCOUNTER — TRANSITIONAL CARE MANAGEMENT TELEPHONE ENCOUNTER (OUTPATIENT)
Dept: CALL CENTER | Facility: HOSPITAL | Age: 67
End: 2022-05-06

## 2022-05-06 LAB
LAB AP CASE REPORT: NORMAL
PATH REPORT.FINAL DX SPEC: NORMAL
PATH REPORT.GROSS SPEC: NORMAL

## 2022-05-06 NOTE — OUTREACH NOTE
Call Center TCM Note    Flowsheet Row Responses   [facility] patient discharged from? [place]   Does the patient have one of the following disease processes/diagnoses(primary or secondary)? Other   TCM attempt successful? No   Unsuccessful attempts Attempt 2          Chloé Parker LPN    5/6/2022, 16:07 EDT

## 2022-05-06 NOTE — OUTREACH NOTE
Call Center TCM Note    Flowsheet Row Responses   [facility] patient discharged from? [place]   Does the patient have one of the following disease processes/diagnoses(primary or secondary)? Other   TCM attempt successful? No   Unsuccessful attempts Attempt 1          Chloé Parker LPN    5/6/2022, 11:51 EDT

## 2022-05-09 ENCOUNTER — TRANSITIONAL CARE MANAGEMENT TELEPHONE ENCOUNTER (OUTPATIENT)
Dept: CALL CENTER | Facility: HOSPITAL | Age: 67
End: 2022-05-09

## 2022-05-09 NOTE — OUTREACH NOTE
Call Center TCM Note    Flowsheet Row Responses   [facility] [place] patient discharged from? [place]   Does the patient have one of the following disease processes/diagnoses(primary or secondary)? Other   TCM attempt successful? Yes   Call start time 0812   Call end time 0814   Discharge diagnosis Polypectomies and clip placement   Is patient permission given to speak with other caregiver? Yes   List who call center can speak with Mirtha Sister    Person spoke with today (if not patient) and relationship Mirtha Sister    Meds reviewed with patient/caregiver? Yes   Is the patient having any side effects they believe may be caused by any medication additions or changes? No   Does the patient have all medications ordered at discharge? Yes   Is the patient taking all medications as directed (includes completed medication regime)? Yes   Comments regarding appointments Gastro apt on 5/25/22 ,  Cardio apt on 6/7/22   Does the patient have a primary care provider?  Yes   Does the patient have an appointment with their PCP within 7 days of discharge? Greater than 7 days   Comments regarding PCP [facility] on 5/16/22 with PCP    What is preventing the patient from scheduling follow up appointments within 7 days of discharge? Earlier appointment not available   Nursing Interventions Verified appointment date/time/provider   Has the patient kept scheduled appointments due by today? N/A   Psychosocial issues? No   Did the patient receive a copy of their discharge instructions? Yes   Nursing interventions Reviewed instructions with patient   What is the patient's perception of their health status since discharge? Improving   Is the patient/caregiver able to teach back signs and symptoms related to disease process for when to call PCP? Yes   Is the patient/caregiver able to teach back signs and symptoms related to disease process for when to call 911? Yes   Is the patient/caregiver able to teach back the hierarchy of who to  call/visit for symptoms/problems? PCP, Specialist, Home health nurse, Urgent Care, ED, 911 Yes   If the patient is a current smoker, are they able to teach back resources for cessation? Not a smoker   TCM call completed? Yes          Tierra Cormier RN    5/9/2022, 08:15 EDT

## 2022-05-16 ENCOUNTER — OFFICE VISIT (OUTPATIENT)
Dept: FAMILY MEDICINE CLINIC | Facility: CLINIC | Age: 67
End: 2022-05-16

## 2022-05-16 VITALS
BODY MASS INDEX: 31.59 KG/M2 | TEMPERATURE: 97.1 F | WEIGHT: 233.2 LBS | DIASTOLIC BLOOD PRESSURE: 88 MMHG | HEIGHT: 72 IN | HEART RATE: 85 BPM | OXYGEN SATURATION: 97 % | SYSTOLIC BLOOD PRESSURE: 146 MMHG

## 2022-05-16 DIAGNOSIS — R79.89 ABNORMAL SERUM THYROID STIMULATING HORMONE (TSH) LEVEL: ICD-10-CM

## 2022-05-16 DIAGNOSIS — Z09 HOSPITAL DISCHARGE FOLLOW-UP: ICD-10-CM

## 2022-05-16 DIAGNOSIS — E78.5 HYPERLIPIDEMIA, UNSPECIFIED HYPERLIPIDEMIA TYPE: ICD-10-CM

## 2022-05-16 DIAGNOSIS — N17.9 ACUTE KIDNEY INJURY: Primary | ICD-10-CM

## 2022-05-16 DIAGNOSIS — I10 PRIMARY HYPERTENSION: ICD-10-CM

## 2022-05-16 DIAGNOSIS — E53.8 FOLIC ACID DEFICIENCY: ICD-10-CM

## 2022-05-16 DIAGNOSIS — D50.9 IRON DEFICIENCY ANEMIA, UNSPECIFIED IRON DEFICIENCY ANEMIA TYPE: ICD-10-CM

## 2022-05-16 PROBLEM — D64.9 ANEMIA: Status: RESOLVED | Noted: 2022-05-03 | Resolved: 2022-05-16

## 2022-05-16 LAB
ALBUMIN SERPL-MCNC: 3.9 G/DL (ref 3.5–5.2)
ALBUMIN/GLOB SERPL: 1.3 G/DL
ALP SERPL-CCNC: 60 U/L (ref 39–117)
ALT SERPL W P-5'-P-CCNC: 40 U/L (ref 1–41)
ANION GAP SERPL CALCULATED.3IONS-SCNC: 11 MMOL/L (ref 5–15)
AST SERPL-CCNC: 54 U/L (ref 1–40)
BILIRUB SERPL-MCNC: 0.6 MG/DL (ref 0–1.2)
BUN SERPL-MCNC: 17 MG/DL (ref 8–23)
BUN/CREAT SERPL: 14.7 (ref 7–25)
CALCIUM SPEC-SCNC: 10.4 MG/DL (ref 8.6–10.5)
CHLORIDE SERPL-SCNC: 106 MMOL/L (ref 98–107)
CHOLEST SERPL-MCNC: 167 MG/DL (ref 0–200)
CO2 SERPL-SCNC: 21 MMOL/L (ref 22–29)
CREAT SERPL-MCNC: 1.16 MG/DL (ref 0.76–1.27)
EGFRCR SERPLBLD CKD-EPI 2021: 70.3 ML/MIN/1.73
ERYTHROCYTE [DISTWIDTH] IN BLOOD BY AUTOMATED COUNT: 18.9 % (ref 12.3–15.4)
FERRITIN SERPL-MCNC: 187 NG/ML (ref 30–400)
FOLATE SERPL-MCNC: >20 NG/ML (ref 4.78–24.2)
GLOBULIN UR ELPH-MCNC: 2.9 GM/DL
GLUCOSE SERPL-MCNC: 96 MG/DL (ref 65–99)
HCT VFR BLD AUTO: 26.6 % (ref 37.5–51)
HDLC SERPL-MCNC: 44 MG/DL (ref 40–60)
HGB BLD-MCNC: 8.7 G/DL (ref 13–17.7)
IRON 24H UR-MRATE: 55 MCG/DL (ref 59–158)
IRON SATN MFR SERPL: 13 % (ref 20–50)
LDLC SERPL CALC-MCNC: 106 MG/DL (ref 0–100)
LDLC/HDLC SERPL: 2.37 {RATIO}
LYMPHOCYTES # BLD AUTO: 1.2 10*3/MM3 (ref 0.7–3.1)
LYMPHOCYTES NFR BLD AUTO: 22.4 % (ref 19.6–45.3)
MCH RBC QN AUTO: 33.4 PG (ref 26.6–33)
MCHC RBC AUTO-ENTMCNC: 32.9 G/DL (ref 31.5–35.7)
MCV RBC AUTO: 101.7 FL (ref 79–97)
MONOCYTES # BLD AUTO: 0.4 10*3/MM3 (ref 0.1–0.9)
MONOCYTES NFR BLD AUTO: 8.2 % (ref 5–12)
NEUTROPHILS NFR BLD AUTO: 3.7 10*3/MM3 (ref 1.7–7)
NEUTROPHILS NFR BLD AUTO: 69.4 % (ref 42.7–76)
PLATELET # BLD AUTO: 206 10*3/MM3 (ref 140–450)
PMV BLD AUTO: 8.4 FL (ref 6–12)
POTASSIUM SERPL-SCNC: 5 MMOL/L (ref 3.5–5.2)
PROT SERPL-MCNC: 6.8 G/DL (ref 6–8.5)
RBC # BLD AUTO: 2.61 10*6/MM3 (ref 4.14–5.8)
SODIUM SERPL-SCNC: 138 MMOL/L (ref 136–145)
T-UPTAKE NFR SERPL: 1.04 TBI (ref 0.8–1.3)
T4 SERPL-MCNC: 6.23 MCG/DL (ref 4.5–11.7)
TIBC SERPL-MCNC: 437 MCG/DL (ref 298–536)
TRANSFERRIN SERPL-MCNC: 293 MG/DL (ref 200–360)
TRIGL SERPL-MCNC: 93 MG/DL (ref 0–150)
TSH SERPL DL<=0.05 MIU/L-ACNC: 2.18 UIU/ML (ref 0.27–4.2)
VLDLC SERPL-MCNC: 17 MG/DL (ref 5–40)
WBC NRBC COR # BLD: 5.4 10*3/MM3 (ref 3.4–10.8)

## 2022-05-16 PROCEDURE — 82746 ASSAY OF FOLIC ACID SERUM: CPT | Performed by: NURSE PRACTITIONER

## 2022-05-16 PROCEDURE — 80050 GENERAL HEALTH PANEL: CPT | Performed by: NURSE PRACTITIONER

## 2022-05-16 PROCEDURE — 84479 ASSAY OF THYROID (T3 OR T4): CPT | Performed by: NURSE PRACTITIONER

## 2022-05-16 PROCEDURE — 80061 LIPID PANEL: CPT | Performed by: NURSE PRACTITIONER

## 2022-05-16 PROCEDURE — 84466 ASSAY OF TRANSFERRIN: CPT | Performed by: NURSE PRACTITIONER

## 2022-05-16 PROCEDURE — 99214 OFFICE O/P EST MOD 30 MIN: CPT | Performed by: NURSE PRACTITIONER

## 2022-05-16 PROCEDURE — 82728 ASSAY OF FERRITIN: CPT | Performed by: NURSE PRACTITIONER

## 2022-05-16 PROCEDURE — 83540 ASSAY OF IRON: CPT | Performed by: NURSE PRACTITIONER

## 2022-05-16 PROCEDURE — 84436 ASSAY OF TOTAL THYROXINE: CPT | Performed by: NURSE PRACTITIONER

## 2022-05-16 NOTE — PROGRESS NOTES
Transitional Care Follow Up Visit  Subjective     Mo Willoughby is a 66 y.o. male who presents for a transitional care management visit.    Within 48 business hours after discharge our office contacted him via telephone to coordinate his care and needs.      I reviewed and discussed the details of that call along with the discharge summary, hospital problems, inpatient lab results, inpatient diagnostic studies, and consultation reports with Mo.     Current outpatient and discharge medications have been reconciled for the patient.  Reviewed by: YANIRA Batista      Date of TCM Phone Call 5/5/2022   [facility]   Date of Admission 5/2/2022   Date of Discharge 5/5/2022   Discharge Disposition Home or Self Care     Risk for Readmission (LACE) No data recorded      Presents to the office today for hospital follow-up.  Admission was 5/2 through 5/5/2022.  Patient was admitted with chest pain, hypertension.  Patient had a cardiology work-up with echo and EKG.  Blood pressure has been stable at home and in the hospital.  Today blood pressure is 146/88.  Advised patient to continue to monitor blood pressure at home.  Patient was found to have acute kidney injury on admission.  Hydrochlorothiazide was held.  Did not restart hydrochlorothiazide at today's visit.  Continue lisinopril.  Patient was found to be anemic during his hospital stay.  Iron deficiency anemia as well as folic acid deficiency.  Patient was treated with IV iron, EGD which showed a grade 1 esophageal varices, portal hypertensive gastropathy, and erosive gastropathy without bleeding.  Patient is to follow-up with GI.  He is currently taking PPI twice daily.  Patient had colonoscopy had multiple polyps removed.  On labs patient's had a elevated liver enzymes and was noted to have a hepatic steatosis.  Patient reports that he has noticed an increase in his drinking before hospitalization.  Patient has cut way back and is continuing to work on  alcohol cessation.  Plan is to check labs today discussed blood pressure.       Course During Hospital Stay:  5/2/22-5/5/2022     The following portions of the patient's history were reviewed and updated as appropriate: allergies, current medications, past family history, past medical history, past social history, past surgical history and problem list.    Review of Systems   Constitutional: Negative for activity change and appetite change.   HENT: Negative for congestion and dental problem.    Eyes: Negative for visual disturbance.   Respiratory: Negative for shortness of breath and wheezing.    Cardiovascular: Negative for chest pain, palpitations and leg swelling.   Gastrointestinal: Negative for abdominal distention, abdominal pain, anal bleeding, blood in stool, constipation, diarrhea, nausea, rectal pain and vomiting.   Endocrine: Negative for cold intolerance and heat intolerance.   Genitourinary: Negative for difficulty urinating and dysuria.   Musculoskeletal: Negative for arthralgias, back pain and gait problem.   Skin: Negative for color change, pallor and rash.   Allergic/Immunologic: Negative for environmental allergies, food allergies and immunocompromised state.   Neurological: Negative for dizziness, tremors, facial asymmetry and weakness.   Hematological: Negative for adenopathy. Does not bruise/bleed easily.   Psychiatric/Behavioral: Negative for agitation and hallucinations. The patient is not nervous/anxious and is not hyperactive.        Objective   Physical Exam  Constitutional:       General: He is not in acute distress.     Appearance: Normal appearance.   Eyes:      Pupils: Pupils are equal, round, and reactive to light.   Cardiovascular:      Rate and Rhythm: Normal rate and regular rhythm.      Pulses: Normal pulses.      Heart sounds: Normal heart sounds.   Pulmonary:      Effort: Pulmonary effort is normal.      Breath sounds: Normal breath sounds. No wheezing or rales.   Neurological:       Mental Status: He is alert.   Psychiatric:         Mood and Affect: Mood normal.         Behavior: Behavior normal.         Assessment & Plan   Problems Addressed this Visit        Cardiac and Vasculature    Hypertension     Hypertension controlled.  Stop hydrochlorothiazide continue lisinopril monitor blood pressure daily.              Endocrine and Metabolic    Folic acid deficiency     Repeat labs today.  Continue multivitamin           Relevant Orders    CBC & Differential (Completed)    Ferritin (Completed)    Iron Profile (Completed)    Folate (Completed)       Genitourinary and Reproductive     Acute kidney injury (HCC) - Primary     Renal condition was reviewed in the hospital with acute kidney injury.  Repeat labs today patient has stopped hydrochlorothiazide increased fluids.           Relevant Orders    Comprehensive metabolic panel (Completed)       Health Encounters    Hospital discharge follow-up       Hematology and Neoplasia    Iron deficiency anemia     Repeat labs today.           Relevant Orders    CBC & Differential (Completed)    Ferritin (Completed)    Iron Profile (Completed)       Symptoms and Signs    Abnormal serum thyroid stimulating hormone (TSH) level    Relevant Orders    Thyroid Panel With TSH (Completed)      Other Visit Diagnoses     Hyperlipidemia, unspecified hyperlipidemia type        Relevant Orders    Lipid panel (Completed)      Diagnoses       Codes Comments    Acute kidney injury (HCC)    -  Primary ICD-10-CM: N17.9  ICD-9-CM: 584.9     Primary hypertension     ICD-10-CM: I10  ICD-9-CM: 401.9     Folic acid deficiency     ICD-10-CM: E53.8  ICD-9-CM: 266.2     Iron deficiency anemia, unspecified iron deficiency anemia type     ICD-10-CM: D50.9  ICD-9-CM: 280.9     Hospital discharge follow-up     ICD-10-CM: Z09  ICD-9-CM: V67.59     Abnormal serum thyroid stimulating hormone (TSH) level     ICD-10-CM: R79.89  ICD-9-CM: 790.6     Hyperlipidemia, unspecified hyperlipidemia  type     ICD-10-CM: E78.5  ICD-9-CM: 272.4         I spent 30 minutes, reviewing discharge summary, ordering labs, reporting tests/labs and results reporting to patient.

## 2022-05-17 NOTE — ASSESSMENT & PLAN NOTE
Hypertension controlled.  Stop hydrochlorothiazide continue lisinopril monitor blood pressure daily.

## 2022-05-17 NOTE — ASSESSMENT & PLAN NOTE
Renal condition was reviewed in the hospital with acute kidney injury.  Repeat labs today patient has stopped hydrochlorothiazide increased fluids.

## 2022-05-18 RX ORDER — FERROUS SULFATE 325(65) MG
325 TABLET ORAL
Qty: 60 TABLET | Refills: 0 | Status: SHIPPED | OUTPATIENT
Start: 2022-05-18 | End: 2022-06-13

## 2022-05-19 DIAGNOSIS — D50.9 IRON DEFICIENCY ANEMIA, UNSPECIFIED IRON DEFICIENCY ANEMIA TYPE: Primary | ICD-10-CM

## 2022-05-19 RX ORDER — LISINOPRIL 10 MG/1
10 TABLET ORAL DAILY
Qty: 30 TABLET | Refills: 5 | Status: SHIPPED | OUTPATIENT
Start: 2022-05-19 | End: 2022-12-06 | Stop reason: SDUPTHER

## 2022-05-26 ENCOUNTER — TELEPHONE (OUTPATIENT)
Dept: GASTROENTEROLOGY | Facility: CLINIC | Age: 67
End: 2022-05-26

## 2022-05-26 NOTE — TELEPHONE ENCOUNTER
Called pt and advised of Dr Mosley's note.  Pt verbalized understanding.     F/u with MEREDITH Larkin 6/3/22  @0968am.

## 2022-05-26 NOTE — TELEPHONE ENCOUNTER
----- Message from Benoit Mosley MD sent at 5/26/2022  3:04 PM EDT -----  Mild gastritis  Colonic adenomas    F/u Dr Vilchis or  in 4-6 weeks

## 2022-06-01 ENCOUNTER — TELEPHONE (OUTPATIENT)
Dept: FAMILY MEDICINE CLINIC | Facility: CLINIC | Age: 67
End: 2022-06-01

## 2022-06-01 NOTE — TELEPHONE ENCOUNTER
Caller: Mirtha Willoughby    Relationship: Emergency Contact    Best call back number: [phone]    What is the best time to reach you: ANY     Who are you requesting to speak with (clinical staff, provider,  specific staff member): CLINICAL       What was the call regarding: PATIENT'S SISTER WAS RETURNING A PHONE CALL FROM OFFICE, UNABLE TO WARM TRANSFER     Do you require a callback: YES

## 2022-06-01 NOTE — TELEPHONE ENCOUNTER
Called patient we keep getting refill request for ezetimibe from [facility] asked if patient takes because not on medicine list. He said no does not take that medicine anymore.

## 2022-06-03 ENCOUNTER — OFFICE VISIT (OUTPATIENT)
Dept: GASTROENTEROLOGY | Facility: CLINIC | Age: 67
End: 2022-06-03

## 2022-06-03 VITALS — WEIGHT: 236 LBS | BODY MASS INDEX: 31.28 KG/M2 | HEIGHT: 73 IN | TEMPERATURE: 97.1 F

## 2022-06-03 DIAGNOSIS — I85.00 ESOPHAGEAL VARICES WITHOUT BLEEDING, UNSPECIFIED ESOPHAGEAL VARICES TYPE: ICD-10-CM

## 2022-06-03 DIAGNOSIS — Z86.010 PERSONAL HISTORY OF COLONIC POLYPS: ICD-10-CM

## 2022-06-03 DIAGNOSIS — K76.6 PORTAL HYPERTENSIVE GASTROPATHY: ICD-10-CM

## 2022-06-03 DIAGNOSIS — K31.89 PORTAL HYPERTENSIVE GASTROPATHY: ICD-10-CM

## 2022-06-03 DIAGNOSIS — F10.20 ALCOHOLISM: ICD-10-CM

## 2022-06-03 DIAGNOSIS — K76.0 FATTY LIVER DISEASE, NONALCOHOLIC: ICD-10-CM

## 2022-06-03 DIAGNOSIS — D50.8 OTHER IRON DEFICIENCY ANEMIA: Primary | ICD-10-CM

## 2022-06-03 PROCEDURE — 99214 OFFICE O/P EST MOD 30 MIN: CPT | Performed by: NURSE PRACTITIONER

## 2022-06-03 RX ORDER — PANTOPRAZOLE SODIUM 40 MG/1
40 TABLET, DELAYED RELEASE ORAL 2 TIMES DAILY
Qty: 180 TABLET | Refills: 3 | Status: SHIPPED | OUTPATIENT
Start: 2022-06-03 | End: 2022-06-21 | Stop reason: SDUPTHER

## 2022-06-03 NOTE — PROGRESS NOTES
"Chief Complaint   Patient presents with   • Anemia       HPI    Mo Willoughby is a  66 y.o. male here with a history of alcohol abuse, drug abuse, and hypertension for a follow up visit for anemia, hospital follow-up.    Patient follows with glendy Vasquez to me.    Patient was admitted to [facility] beginning of May seen by our services for anemia.  Patient did not have iron deficiency and folic acid deficiency.  Patient had elevated troponins and was positive for cocaine.  He had cardiac work-up which included echo and EKG and cardiology did not feel that further testing was needed patient was to focus on lifestyle modifications and continued control of hypertension as an outpatient.    He was treated with IV iron infusions.  He underwent an EGD that showed grade 1 esophageal varices, PHG, erosive gastropathy without bleeding.  He was treated with twice daily PPI therapy.  He had 5 TA polyps removed on colonoscopy.  Imaging confirmed hepatic steatosis.    Today he reports abstinence from alcohol and illicit drugs since hospital stay.  Overall he feels like a \"new man.\"  He denies abdominal pain, nausea, vomiting, rectal bleeding, or diarrhea.  He is compliant with twice daily dosing PPI therapy, twice daily dosing iron supplementation, folic acid, and thiamine.  He has lab work plan through his primary care provider in 4 weeks to reassess anemia.  He needs a refill on Protonix as this was not approved for twice daily dosing per his primary care.    Past Medical History:   Diagnosis Date   • Colon polyp 5/5/22   • Erectile dysfunction    • Gout    • Hx of colonic polyp    • Hypercalcemia    • Hypertension    • Parathyroid abnormality (HCC)    • Risk factors for obstructive sleep apnea    • Syncope and collapse 03/28/2017       Past Surgical History:   Procedure Laterality Date   • CHOLECYSTECTOMY     • CHOLECYSTECTOMY WITH INTRAOPERATIVE CHOLANGIOGRAM N/A 08/03/2018    Procedure: CHOLECYSTECTOMY LAPAROSCOPIC " INTRAOPERATIVE CHOLANGIOGRAM;  Surgeon: Melina Kate MD;  Location: [facility] MAIN OR;  Service: General   • COLONOSCOPY     • COLONOSCOPY N/A 10/03/2016    Procedure: COLONOSCOPY TO CECUM TO TERMINAL ILIUM WITH COLD BIOPSY POLYPECTOMY;  Surgeon: Benoit Vilchis MD;  Location: [facility] ENDOSCOPY;  Service:    • COLONOSCOPY N/A 05/05/2022    Procedure: COLONOSCOPY TO CECUM WITH COLD SNARE POLYPECTOMIES & RESOLUTION CLIP PLACEMENT X 2;  Surgeon: Benoit Mosley MD;  Location: [facility] ENDOSCOPY;  Service: Gastroenterology;  Laterality: N/A;  ANEMIA/POLYPS, HEMORRHOIDS    • ENDOSCOPY N/A 05/05/2022    Procedure: ESOPHAGOGASTRODUODENOSCOPY WITH BX;  Surgeon: Benoit Mosley MD;  Location: [facility] ENDOSCOPY;  Service: Gastroenterology;  Laterality: N/A;  ANEMIA/PORTAL GASTROPATHY, EROSIVE GASTRITIS    • LIPOMA EXCISION     • TONSILLECTOMY         Scheduled Meds:     Continuous Infusions: No current facility-administered medications for this visit.      PRN Meds:     Allergies   Allergen Reactions   • Zetia [Ezetimibe] Dizziness     Nausea, fatgue       Social History     Socioeconomic History   • Marital status: Single   Tobacco Use   • Smoking status: Never Smoker   • Smokeless tobacco: Never Used   Substance and Sexual Activity   • Alcohol use: Yes     Comment: occasional   • Drug use: No   • Sexual activity: Yes     Partners: Female       Family History   Problem Relation Age of Onset   • Hypertension Mother    • Breast cancer Mother    • Cancer Father    • Liver cancer Father        Review of Systems   Constitutional: Negative for activity change, appetite change, fatigue, fever and unexpected weight change.   HENT: Negative for trouble swallowing.    Respiratory: Negative for apnea, cough, choking, chest tightness, shortness of breath and wheezing.    Cardiovascular: Negative for chest pain, palpitations and leg swelling.   Gastrointestinal: Negative for abdominal distention, abdominal pain, anal  bleeding, blood in stool, constipation, diarrhea, nausea, rectal pain and vomiting.       Vitals:    06/03/22 0946   Temp: 97.1 °F (36.2 °C)       Physical Exam  Constitutional:       Appearance: He is well-developed.   Abdominal:      General: Bowel sounds are normal. There is no distension.      Palpations: Abdomen is soft. There is no mass.      Tenderness: There is no abdominal tenderness. There is no guarding.      Hernia: No hernia is present.   Skin:     General: Skin is warm and dry.      Capillary Refill: Capillary refill takes less than 2 seconds.   Neurological:      Mental Status: He is alert and oriented to person, place, and time.   Psychiatric:         Behavior: Behavior normal.       Assessment    Diagnoses and all orders for this visit:    1. Other iron deficiency anemia (Primary)    2. Alcoholism (HCC)    3. Esophageal varices without bleeding, unspecified esophageal varices type (HCC)    4. Fatty liver disease, nonalcoholic    5. Portal hypertensive gastropathy (HCC)    6. Personal history of colonic polyps    Other orders  -     pantoprazole (PROTONIX) 40 MG EC tablet; Take 1 tablet by mouth 2 (Two) Times a Day.  Dispense: 180 tablet; Refill: 3       Plan    66-year-old male seen today in follow-up from recent hospital stay as outlined above found to have iron deficiency anemia status post bidirectional endoscopic examination.  Reviewed procedural findings with the patient as well as pathology.  All questions were answered in great detail.  Reviewed long-term treatment for esophageal varices and fatty liver disease.  I informed the patient he continues to consume alcohol he will develop cirrhosis.  He seems very motivated to quit drinking.  Also discussed complications that can arise from esophageal varices such as acute bleeding.    For now continue twice daily dosing PPI therapy, refill provided.  Continue vitamin supplementation in the form of folic acid and B12.  Continue ferrous sulfate at  twice daily dosing.  Avoid NSAIDs.    Keep plan lab work through PCP in 4 weeks to reassess anemia.    Return to clinic 4 weeks to see myself and 3 months to see Dr. Vilchis.    Call with questions or concerns in the interim.         YANIRA Cheung  [facility]  [address] [place] [address]  Office: [phone]    I spent 30 minutes caring for Mo on this date of service. This time includes time spent by me in the following activities: preparing for the visit, reviewing tests, obtaining and/or reviewing a separately obtained history, performing a medically appropriate examination and/or evaluation , counseling and educating the patient/family/caregiver, ordering medications, tests, or procedures, documenting information in the medical record, independently interpreting results and communicating that information with the patient/family/caregiver and care coordination.

## 2022-06-07 ENCOUNTER — LAB (OUTPATIENT)
Dept: LAB | Facility: HOSPITAL | Age: 67
End: 2022-06-07

## 2022-06-07 ENCOUNTER — OFFICE VISIT (OUTPATIENT)
Dept: CARDIOLOGY | Facility: CLINIC | Age: 67
End: 2022-06-07

## 2022-06-07 VITALS
WEIGHT: 234 LBS | HEIGHT: 73 IN | SYSTOLIC BLOOD PRESSURE: 130 MMHG | DIASTOLIC BLOOD PRESSURE: 88 MMHG | BODY MASS INDEX: 31.01 KG/M2 | HEART RATE: 65 BPM

## 2022-06-07 DIAGNOSIS — I10 PRIMARY HYPERTENSION: ICD-10-CM

## 2022-06-07 DIAGNOSIS — I71.21 ANEURYSM OF ASCENDING AORTA: Primary | ICD-10-CM

## 2022-06-07 LAB
ANION GAP SERPL CALCULATED.3IONS-SCNC: 9 MMOL/L (ref 5–15)
BUN SERPL-MCNC: 14 MG/DL (ref 8–23)
BUN/CREAT SERPL: 12.2 (ref 7–25)
CALCIUM SPEC-SCNC: 11.1 MG/DL (ref 8.6–10.5)
CHLORIDE SERPL-SCNC: 107 MMOL/L (ref 98–107)
CO2 SERPL-SCNC: 21 MMOL/L (ref 22–29)
CREAT SERPL-MCNC: 1.15 MG/DL (ref 0.76–1.27)
EGFRCR SERPLBLD CKD-EPI 2021: 71.1 ML/MIN/1.73
GLUCOSE SERPL-MCNC: 101 MG/DL (ref 65–99)
POTASSIUM SERPL-SCNC: 4.7 MMOL/L (ref 3.5–5.2)
SODIUM SERPL-SCNC: 137 MMOL/L (ref 136–145)

## 2022-06-07 PROCEDURE — 36415 COLL VENOUS BLD VENIPUNCTURE: CPT

## 2022-06-07 PROCEDURE — 93000 ELECTROCARDIOGRAM COMPLETE: CPT | Performed by: NURSE PRACTITIONER

## 2022-06-07 PROCEDURE — 99214 OFFICE O/P EST MOD 30 MIN: CPT | Performed by: NURSE PRACTITIONER

## 2022-06-07 PROCEDURE — 80048 BASIC METABOLIC PNL TOTAL CA: CPT

## 2022-06-07 NOTE — PROGRESS NOTES
[facility] Follow Up Office Note     Encounter Date:22  Patient:Mo Willoughby  :1955  MRN:[id number]      Chief Complaint:   Chief Complaint   Patient presents with   • Chest Pain   • Follow-up     From [facility]         History of Presenting Illness:        Mo Willoughby is a 66 y.o. male who is here for follow-up of hypertension, chest pain.  He is a patient of Dr. Brown.    Patient has past medical history significant for hypertension, cocaine and alcohol abuse, anemia, esophageal varices, SVT.    Patient was seen by Dr. Brown while admitted to [facility] with presenting complaints of lightheadedness and chest pain.  He reported positional lightheadedness and balance issues for several weeks.  He attributed this to changes and his blood pressure medications.  EKG unremarkable, troponins mildly elevated but flat.  UDS positive for cannabinoids and cocaine.  His symptoms improved with blood pressure control and IV hydration.  He was treated for OTF.  Blood pressure medications adjusted.  Echocardiogram during admission demonstrated normal LVEF with no wall motion abnormalities.  Patient was ultimately discharged on lisinopril/hydrochlorothiazide.  During admission patient was seen by GI for anemia work-up.  He received IV iron while inpatient.  He had an EGD demonstrating grade 1 esophageal varices, portal hypertensive gastropathy and erosive gastropathy without bleeding.  Twice daily PPI recommended.  Multiple polyps were removed during a colonoscopy.    Patient reports that he has been doing well since hospital discharge.  He denies chest pain, palpitations, shortness of breath, orthopnea, lower extremity edema.  He has been taking lisinopril 10 mg daily.  His blood pressure is controlled at his visit today.  His EKG is without evidence of ischemia.  He has also stopped drinking since hospital discharge.  He reports that he has gained weight with this.  He has already followed up  with GI and continues on twice daily PPI.  We discussed cardiac health and lifestyle modifications.    Review of Systems:  Review of Systems   Constitutional: Positive for weight gain.   Cardiovascular: Negative for chest pain, dyspnea on exertion, leg swelling, orthopnea and palpitations.   Respiratory: Negative for shortness of breath.        Current Outpatient Medications on File Prior to Visit   Medication Sig Dispense Refill   • allopurinol (ZYLOPRIM) 300 MG tablet Take 1 tablet by mouth Daily. 90 tablet 1   • ferrous sulfate (FerrouSul) 325 (65 FE) MG tablet Take 1 tablet by mouth Daily With Breakfast & Dinner for 30 days. 60 tablet 0   • folic acid (FOLVITE) 1 MG tablet Take 1 tablet by mouth Daily. 90 tablet 0   • lisinopril (PRINIVIL,ZESTRIL) 10 MG tablet Take 1 tablet by mouth Daily. 30 tablet 5   • pantoprazole (PROTONIX) 40 MG EC tablet Take 1 tablet by mouth 2 (Two) Times a Day. 180 tablet 3   • thiamine (VITAMIN B-1) 100 MG tablet  tablet Take 1 tablet by mouth Daily. 30 tablet 0     No current facility-administered medications on file prior to visit.       Allergies   Allergen Reactions   • Zetia [Ezetimibe] Dizziness     Nausea, fatgue       Past Medical History:   Diagnosis Date   • Colon polyp 5/5/22   • Erectile dysfunction    • Gout    • Hx of colonic polyp    • Hypercalcemia    • Hypertension    • Parathyroid abnormality (HCC)    • Risk factors for obstructive sleep apnea    • Syncope and collapse 03/28/2017       Past Surgical History:   Procedure Laterality Date   • CHOLECYSTECTOMY     • CHOLECYSTECTOMY WITH INTRAOPERATIVE CHOLANGIOGRAM N/A 08/03/2018    Procedure: CHOLECYSTECTOMY LAPAROSCOPIC INTRAOPERATIVE CHOLANGIOGRAM;  Surgeon: Melina Kate MD;  Location: [facility] MAIN OR;  Service: General   • COLONOSCOPY     • COLONOSCOPY N/A 10/03/2016    Procedure: COLONOSCOPY TO CECUM TO TERMINAL ILIUM WITH COLD BIOPSY POLYPECTOMY;  Surgeon: Benoit Vilchis MD;  Location: [facility] ENDOSCOPY;   "Service:    • COLONOSCOPY N/A 05/05/2022    Procedure: COLONOSCOPY TO CECUM WITH COLD SNARE POLYPECTOMIES & RESOLUTION CLIP PLACEMENT X 2;  Surgeon: Benoit Mosley MD;  Location: [facility] ENDOSCOPY;  Service: Gastroenterology;  Laterality: N/A;  ANEMIA/POLYPS, HEMORRHOIDS    • ENDOSCOPY N/A 05/05/2022    Procedure: ESOPHAGOGASTRODUODENOSCOPY WITH BX;  Surgeon: Benoit Mosley MD;  Location: [facility] ENDOSCOPY;  Service: Gastroenterology;  Laterality: N/A;  ANEMIA/PORTAL GASTROPATHY, EROSIVE GASTRITIS    • LIPOMA EXCISION     • TONSILLECTOMY         Social History     Socioeconomic History   • Marital status: Single   Tobacco Use   • Smoking status: Never Smoker   • Smokeless tobacco: Never Used   Substance and Sexual Activity   • Alcohol use: Yes     Comment: occasional   • Drug use: No   • Sexual activity: Yes     Partners: Female       Family History   Problem Relation Age of Onset   • Hypertension Mother    • Breast cancer Mother    • Cancer Father    • Liver cancer Father    • Hypertension Father    • Hypertension Sister    • Hypertension Brother        The following portions of the patient's history were reviewed and updated as appropriate: allergies, current medications, past family history, past medical history, past social history, past surgical history and problem list.       Objective:       Vitals:    06/07/22 1003   BP: 130/88   Pulse: 65   Weight: 106 kg (234 lb)   Height: 185.4 cm (73\")         Physical Exam:  Constitutional: Well appearing, well developed, no acute distress   HENT: Oropharynx clear and membrane moist  Eyes: Normal conjunctiva, no sclera icterus  Neck: Supple, no carotid bruit bilaterally  Cardiovascular: Regular rate and rhythm, No Murmur, No bilateral lower extremity edema  Pulmonary: Normal respiratory effort, normal lung sounds, no wheezing  Neurological: Alert and orient x 3  Skin: Warm, dry, no ecchymosis, no rash  Psych: Appropriate mood and affect. Normal judgment " and insight         Lab Results   Component Value Date     05/16/2022     05/04/2022    K 5.0 05/16/2022    K 4.0 05/04/2022     05/16/2022     05/04/2022    CO2 21.0 (L) 05/16/2022    CO2 21.3 (L) 05/04/2022    BUN 17 05/16/2022    BUN 27 (H) 05/04/2022    CREATININE 1.16 05/16/2022    CREATININE 1.27 05/04/2022    EGFRIFNONA 65 02/07/2022    EGFRIFNONA 74 11/18/2019    EGFRIFAFRI 94 09/26/2016    GLUCOSE 96 05/16/2022    GLUCOSE 102 (H) 05/04/2022    CALCIUM 10.4 05/16/2022    CALCIUM 10.3 05/04/2022    PROTENTOTREF 6.8 09/26/2016    ALBUMIN 3.90 05/16/2022    ALBUMIN 4.00 05/02/2022    BILITOT 0.6 05/16/2022    BILITOT 1.1 05/02/2022    AST 54 (H) 05/16/2022    AST 59 (H) 05/02/2022    ALT 40 05/16/2022    ALT 29 05/02/2022     Lab Results   Component Value Date    WBC 5.40 05/16/2022    WBC 3.39 (L) 05/05/2022    HGB 8.7 (C) 05/16/2022    HGB 8.0 (L) 05/05/2022    HCT 26.6 (C) 05/16/2022    HCT 24.1 (L) 05/05/2022    .7 (H) 05/16/2022    .8 (H) 05/05/2022     05/16/2022     (L) 05/05/2022     Lab Results   Component Value Date    CHOL 167 05/16/2022    CHOL 253 (H) 02/07/2022    TRIG 93 05/16/2022    TRIG 123 02/07/2022    HDL 44 05/16/2022    HDL 68 (H) 02/07/2022     (H) 05/16/2022     (H) 02/07/2022     No results found for: PROBNP, BNP  Lab Results   Component Value Date    TROPONINT 0.028 05/03/2022     Lab Results   Component Value Date    TSH 2.180 05/16/2022    TSH 0.938 06/27/2018           ECG 12 Lead    Date/Time: 6/7/2022 10:19 AM  Performed by: Ann Stewart APRN  Authorized by: Ann Stewart APRN   Comparison: compared with previous ECG from 5/2/2022  Rhythm: sinus rhythm  Rate: normal  Q waves: V1 and V2              Echocardiogram 5/3/2022:  · Calculated left ventricular EF = 56.3% Estimated left ventricular EF was in agreement with the calculated left ventricular EF. Left ventricular systolic function is normal. Normal  left ventricular cavity size noted. Left ventricular wall thickness is consistent with moderate concentric hypertrophy. All left ventricular wall segments contract normally. Left ventricular diastolic function is consistent with (grade I) impaired relaxation.  · The left atrial cavity is borderline dilated. Along the medial aspect of the left atrium, there is an intermittent echodensity. While this may represent shadowing from the aortic root or lipomatous hypertrophy of the superior portion of the atrial septum, a mass cannot be excluded. Consider cardiac CT for further evaluation.    SqurlO monitor 2017:  Patient diary was not submitted.No symptoms reported during the monitoring period. No complications noted. The predominant rhythm noted during the testing period was sinus rhythm. There were 5 episodes of supraventricular tachycardia. The peak heart rate was 176 beats per minute. Longest run lasted 4 min 57 sec in SVT at a rather slow rate of 111 bpm. Premature ventricular contractions occured rarely. Ventricular couplets. There were no episodes of ventricular tachycardia. Sinoatrial node conduction was normal. No atrioventricular block noted.     Assessment:          Diagnosis Plan   1. Aneurysm of ascending aorta (HCC)     2. Primary hypertension  ECG 12 Lead    Basic Metabolic Panel          Plan:       Hypertension:  · Lisinopril 10 mg daily  · BP controlled at visit today  · Check lab work    Alcohol and cocaine abuse:  · Patient denies use since hospital discharge  · Discussed dangers of alcohol and cocaine use for cardiac health    Recent OTF:  · During recent hospital admission.  Resolved with IV hydration  · Repeat lab work now on lisinopril    Lipomatous atrial septum:  · Echocardiogram reviewed by Dr. Dr. Barreto while inpatient.  No further evaluation recommended at this time    Ascending aortic aneurysm:  · Noted on CT  · Will need surveillance monitoring    Mr. Willoughby seems to be doing well overall  since hospital discharge.  He is taking lisinopril 10 mg daily and denies concerns with this medication.  His blood pressure is controlled today.  We will check lab work to make sure his kidney function and potassium are stable.  We discussed lifestyle modifications for cardiac health.  He has not been drinking or using illicit substances since hospital discharge.  He will follow-up with Dr. Brown in 2 months or earlier with problems.  I will call him with the results of his lab work.    Orders Placed This Encounter   Procedures   • Basic Metabolic Panel     Standing Status:   Future     Standing Expiration Date:   6/7/2023     Order Specific Question:   Release to patient     Answer:   Immediate   • ECG 12 Lead     This order was created via procedure documentation     Order Specific Question:   Release to patient     Answer:   Immediate            YANIRA Harp  [facility]  06/07/22  10:35 EDT

## 2022-06-13 RX ORDER — FERROUS SULFATE 325(65) MG
TABLET ORAL
Qty: 60 TABLET | Refills: 0 | Status: SHIPPED | OUTPATIENT
Start: 2022-06-13 | End: 2022-07-15

## 2022-06-20 NOTE — TELEPHONE ENCOUNTER
Pt left vm asking, If you can refill his Protonix and write the new order on how he his suppose to take his meds.

## 2022-06-21 RX ORDER — PANTOPRAZOLE SODIUM 40 MG/1
40 TABLET, DELAYED RELEASE ORAL 2 TIMES DAILY
Qty: 180 TABLET | Refills: 3 | OUTPATIENT
Start: 2022-06-21

## 2022-06-21 RX ORDER — PANTOPRAZOLE SODIUM 40 MG/1
40 TABLET, DELAYED RELEASE ORAL 2 TIMES DAILY
Qty: 180 TABLET | Refills: 3 | Status: SHIPPED | OUTPATIENT
Start: 2022-06-21

## 2022-06-21 NOTE — TELEPHONE ENCOUNTER
Caller: Mo Willoughby    Relationship: Self    Best call back number: [phone]    Requested Prescriptions:   Requested Prescriptions     Pending Prescriptions Disp Refills   • pantoprazole (PROTONIX) 40 MG EC tablet 180 tablet 3     Sig: Take 1 tablet by mouth 2 (Two) Times a Day.        Pharmacy where request should be sent: [id number] [place], [place] - [address] [facility] [address] [facility] [phone] [facility] [phone]      Additional details provided by patient: PATIENT STATES THAT HE DISCUSSED WITH YANIRA DORAN ABOUT GETTING PRESCRIPTION ADJUSTED. WAS ORIGINALLY FOR 1 TABLET DAILY, BUT NEEDS TO MAKE SURE IT IS UPDATED TO 1 TABLET TWICE DAILY. REQUESTS A 90 DAY SUPPLY (180 TABLETS) FOR INSURANCE PURPOSES    Does the patient have less than a 3 day supply:  [x] Yes  [] No    Chi Holman Rep   06/21/22 10:32 EDT

## 2022-06-21 NOTE — TELEPHONE ENCOUNTER
Please let patient know he needs to follow-up with GI for refills and management of this medication. It was not prescribed by cardiology.

## 2022-07-11 ENCOUNTER — OFFICE VISIT (OUTPATIENT)
Dept: GASTROENTEROLOGY | Facility: CLINIC | Age: 67
End: 2022-07-11

## 2022-07-11 VITALS
WEIGHT: 239 LBS | HEIGHT: 72 IN | BODY MASS INDEX: 32.37 KG/M2 | HEART RATE: 81 BPM | DIASTOLIC BLOOD PRESSURE: 92 MMHG | TEMPERATURE: 96.5 F | SYSTOLIC BLOOD PRESSURE: 155 MMHG

## 2022-07-11 DIAGNOSIS — F10.20 ALCOHOLISM: ICD-10-CM

## 2022-07-11 DIAGNOSIS — Z86.010 PERSONAL HISTORY OF COLONIC POLYPS: ICD-10-CM

## 2022-07-11 DIAGNOSIS — K76.6 PORTAL HYPERTENSIVE GASTROPATHY: ICD-10-CM

## 2022-07-11 DIAGNOSIS — K76.0 FATTY LIVER DISEASE, NONALCOHOLIC: ICD-10-CM

## 2022-07-11 DIAGNOSIS — K31.89 PORTAL HYPERTENSIVE GASTROPATHY: ICD-10-CM

## 2022-07-11 DIAGNOSIS — I85.00 ESOPHAGEAL VARICES WITHOUT BLEEDING, UNSPECIFIED ESOPHAGEAL VARICES TYPE: ICD-10-CM

## 2022-07-11 DIAGNOSIS — D50.8 OTHER IRON DEFICIENCY ANEMIA: Primary | ICD-10-CM

## 2022-07-11 PROCEDURE — 99213 OFFICE O/P EST LOW 20 MIN: CPT | Performed by: NURSE PRACTITIONER

## 2022-07-11 NOTE — PROGRESS NOTES
Chief Complaint   Patient presents with   • Anemia       HPI    Mo Willoughby is a  67 y.o. male here for a follow up visit for history of alcohol abuse, drug abuse, and hypertension for a follow up visit for anemia.    This patient follows with Dr. Vilchis and myself.    EGD May of this year during hospital admission showing grade 1 esophageal varices, PHG, erosive gastropathy without bleeding.  He was treated with twice daily PPI therapy.  He had 5 TA polyps removed on colonoscopy.  Imaging confirmed hepatic steatosis.    On visit today has been able to maintain sobriety and is staying away from illicit drugs.  He is compliant with twice daily dosing PPI therapy, iron supplementation, and vitamin supplements.  Denies nausea, vomiting, abdominal pain, diarrhea, constipation, or weight loss.    Past Medical History:   Diagnosis Date   • Colon polyp 5/5/22   • Erectile dysfunction    • Gout    • Hx of colonic polyp    • Hypercalcemia    • Hypertension    • Parathyroid abnormality (HCC)    • Risk factors for obstructive sleep apnea    • Syncope and collapse 03/28/2017       Past Surgical History:   Procedure Laterality Date   • CHOLECYSTECTOMY     • CHOLECYSTECTOMY WITH INTRAOPERATIVE CHOLANGIOGRAM N/A 08/03/2018    Procedure: CHOLECYSTECTOMY LAPAROSCOPIC INTRAOPERATIVE CHOLANGIOGRAM;  Surgeon: Melina Kate MD;  Location: [facility] MAIN OR;  Service: General   • COLONOSCOPY     • COLONOSCOPY N/A 10/03/2016    Procedure: COLONOSCOPY TO CECUM TO TERMINAL ILIUM WITH COLD BIOPSY POLYPECTOMY;  Surgeon: Benoit Vilchis MD;  Location: [facility] ENDOSCOPY;  Service:    • COLONOSCOPY N/A 05/05/2022    Procedure: COLONOSCOPY TO CECUM WITH COLD SNARE POLYPECTOMIES & RESOLUTION CLIP PLACEMENT X 2;  Surgeon: Benoit Mosley MD;  Location: [facility] ENDOSCOPY;  Service: Gastroenterology;  Laterality: N/A;  ANEMIA/POLYPS, HEMORRHOIDS    • ENDOSCOPY N/A 05/05/2022    Procedure: ESOPHAGOGASTRODUODENOSCOPY WITH BX;  Surgeon:  Benoit Mosley MD;  Location: [facility] ENDOSCOPY;  Service: Gastroenterology;  Laterality: N/A;  ANEMIA/PORTAL GASTROPATHY, EROSIVE GASTRITIS    • LIPOMA EXCISION     • TONSILLECTOMY         Scheduled Meds:     Continuous Infusions: No current facility-administered medications for this visit.      PRN Meds:     Allergies   Allergen Reactions   • Zetia [Ezetimibe] Dizziness     Nausea, fatgue       Social History     Socioeconomic History   • Marital status: Single   Tobacco Use   • Smoking status: Never Smoker   • Smokeless tobacco: Never Used   Substance and Sexual Activity   • Alcohol use: Yes     Comment: occasional   • Drug use: No   • Sexual activity: Yes     Partners: Female       Family History   Problem Relation Age of Onset   • Hypertension Mother    • Breast cancer Mother    • Cancer Father    • Liver cancer Father    • Hypertension Father    • Hypertension Sister    • Hypertension Brother        Review of Systems   Constitutional: Negative for activity change, appetite change, fatigue, fever and unexpected weight change.   HENT: Negative for trouble swallowing.    Respiratory: Negative for apnea, cough, choking, chest tightness, shortness of breath and wheezing.    Cardiovascular: Negative for chest pain, palpitations and leg swelling.   Gastrointestinal: Negative for abdominal distention, abdominal pain, anal bleeding, blood in stool, constipation, diarrhea, nausea, rectal pain and vomiting.       Vitals:    07/11/22 1031   BP: 155/92   Pulse: 81   Temp: 96.5 °F (35.8 °C)       Physical Exam  Constitutional:       Appearance: He is well-developed.   Abdominal:      General: Bowel sounds are normal. There is no distension.      Palpations: Abdomen is soft. There is no mass.      Tenderness: There is no abdominal tenderness. There is no guarding.      Hernia: No hernia is present.   Skin:     General: Skin is warm and dry.      Capillary Refill: Capillary refill takes less than 2 seconds.    Neurological:      Mental Status: He is alert and oriented to person, place, and time.   Psychiatric:         Behavior: Behavior normal.     Assessment    Diagnoses and all orders for this visit:    1. Other iron deficiency anemia (Primary)  -     CBC & Differential  -     Iron and TIBC  -     Vitamin B12 and Folate  -     Comprehensive Metabolic Panel    2. Alcoholism (HCC)    3. Esophageal varices without bleeding, unspecified esophageal varices type (HCC)    4. Fatty liver disease, nonalcoholic    5. Portal hypertensive gastropathy (HCC)    6. Personal history of colonic polyps       Plan    Continue twice daily dosing Protonix  Continue twice daily dosing ferrous sulfate  Continue folic acid and thiamine  Encouraged patient continue to avoid alcohol and drugs  Labs today as above to reassess anemia, vitamin levels, and liver function  Keep previously scheduled follow-up with Dr. Vilchis in September --reviewed date and time for appointment with patient         YANIRA Cheung  [facility]  [address] [place] [address]  Office: [phone]

## 2022-07-12 LAB
ALBUMIN SERPL-MCNC: 4 G/DL (ref 3.8–4.8)
ALBUMIN/GLOB SERPL: 1.5 {RATIO} (ref 1.2–2.2)
ALP SERPL-CCNC: 52 IU/L (ref 44–121)
ALT SERPL-CCNC: 28 IU/L (ref 0–44)
AST SERPL-CCNC: 49 IU/L (ref 0–40)
BASOPHILS # BLD AUTO: 0 X10E3/UL (ref 0–0.2)
BASOPHILS NFR BLD AUTO: 1 %
BILIRUB SERPL-MCNC: 0.5 MG/DL (ref 0–1.2)
BUN SERPL-MCNC: 22 MG/DL (ref 8–27)
BUN/CREAT SERPL: 16 (ref 10–24)
CALCIUM SERPL-MCNC: 11.2 MG/DL (ref 8.6–10.2)
CHLORIDE SERPL-SCNC: 107 MMOL/L (ref 96–106)
CO2 SERPL-SCNC: 21 MMOL/L (ref 20–29)
CREAT SERPL-MCNC: 1.41 MG/DL (ref 0.76–1.27)
EGFRCR SERPLBLD CKD-EPI 2021: 55 ML/MIN/1.73
EOSINOPHIL # BLD AUTO: 0.2 X10E3/UL (ref 0–0.4)
EOSINOPHIL NFR BLD AUTO: 4 %
ERYTHROCYTE [DISTWIDTH] IN BLOOD BY AUTOMATED COUNT: 14.3 % (ref 11.6–15.4)
FOLATE SERPL-MCNC: >20 NG/ML
GLOBULIN SER CALC-MCNC: 2.6 G/DL (ref 1.5–4.5)
GLUCOSE SERPL-MCNC: 106 MG/DL (ref 65–99)
HCT VFR BLD AUTO: 34.6 % (ref 37.5–51)
HGB BLD-MCNC: 11.4 G/DL (ref 13–17.7)
IMM GRANULOCYTES # BLD AUTO: 0 X10E3/UL (ref 0–0.1)
IMM GRANULOCYTES NFR BLD AUTO: 0 %
IRON SATN MFR SERPL: 76 % (ref 15–55)
IRON SERPL-MCNC: 255 UG/DL (ref 38–169)
LYMPHOCYTES # BLD AUTO: 1.3 X10E3/UL (ref 0.7–3.1)
LYMPHOCYTES NFR BLD AUTO: 27 %
MCH RBC QN AUTO: 33.7 PG (ref 26.6–33)
MCHC RBC AUTO-ENTMCNC: 32.9 G/DL (ref 31.5–35.7)
MCV RBC AUTO: 102 FL (ref 79–97)
MONOCYTES # BLD AUTO: 0.6 X10E3/UL (ref 0.1–0.9)
MONOCYTES NFR BLD AUTO: 13 %
NEUTROPHILS # BLD AUTO: 2.7 X10E3/UL (ref 1.4–7)
NEUTROPHILS NFR BLD AUTO: 55 %
PLATELET # BLD AUTO: 133 X10E3/UL (ref 150–450)
POTASSIUM SERPL-SCNC: 5.1 MMOL/L (ref 3.5–5.2)
PROT SERPL-MCNC: 6.6 G/DL (ref 6–8.5)
RBC # BLD AUTO: 3.38 X10E6/UL (ref 4.14–5.8)
SODIUM SERPL-SCNC: 140 MMOL/L (ref 134–144)
TIBC SERPL-MCNC: 334 UG/DL (ref 250–450)
UIBC SERPL-MCNC: 79 UG/DL (ref 111–343)
VIT B12 SERPL-MCNC: 341 PG/ML (ref 232–1245)
WBC # BLD AUTO: 4.8 X10E3/UL (ref 3.4–10.8)

## 2022-07-12 NOTE — PROGRESS NOTES
Please inform the patient that his anemia has almost resolved.  Much improved.  Blood counts up to 11.4 from 8.7.  Normal B12 and folate.  Liver function stable.  Kidney function more elevated than prior recommend he discuss further with her primary care provider.  Repeat CBC 6 weeks.  He can reduce down to once daily dosing iron.

## 2022-07-13 ENCOUNTER — TELEPHONE (OUTPATIENT)
Dept: GASTROENTEROLOGY | Facility: CLINIC | Age: 67
End: 2022-07-13

## 2022-07-13 DIAGNOSIS — D64.9 ANEMIA, UNSPECIFIED TYPE: Primary | ICD-10-CM

## 2022-07-15 RX ORDER — FERROUS SULFATE 325(65) MG
TABLET ORAL
Qty: 60 TABLET | Refills: 0 | Status: SHIPPED | OUTPATIENT
Start: 2022-07-15 | End: 2022-08-16

## 2022-08-04 RX ORDER — FOLIC ACID 1 MG/1
1 TABLET ORAL DAILY
Qty: 90 TABLET | Refills: 0 | Status: SHIPPED | OUTPATIENT
Start: 2022-08-04 | End: 2022-10-31

## 2022-08-04 NOTE — TELEPHONE ENCOUNTER
Caller: Mo Willoughby    Relationship: Self    Best call back number: [phone]    Requested Prescriptions:   Requested Prescriptions     Pending Prescriptions Disp Refills   • folic acid (FOLVITE) 1 MG tablet 90 tablet 0     Sig: Take 1 tablet by mouth Daily.        Pharmacy where request should be sent: [id number] [place], [place] - [address] [facility] [address] [facility] [phone] [facility] [phone] FX     Additional details provided by patient: PATIENT STATES HE IS COMPLETLEY OUT OF THIS MEDICATION.     Does the patient have less than a 3 day supply:  [x] Yes  [] No    Chi Landers Rep   08/04/22 10:50 EDT

## 2022-08-16 ENCOUNTER — OFFICE VISIT (OUTPATIENT)
Dept: CARDIOLOGY | Facility: CLINIC | Age: 67
End: 2022-08-16

## 2022-08-16 VITALS
WEIGHT: 239.8 LBS | SYSTOLIC BLOOD PRESSURE: 138 MMHG | OXYGEN SATURATION: 99 % | BODY MASS INDEX: 30.77 KG/M2 | HEIGHT: 74 IN | HEART RATE: 91 BPM | DIASTOLIC BLOOD PRESSURE: 86 MMHG

## 2022-08-16 DIAGNOSIS — I10 PRIMARY HYPERTENSION: Primary | ICD-10-CM

## 2022-08-16 DIAGNOSIS — I71.21 ASCENDING AORTIC ANEURYSM: ICD-10-CM

## 2022-08-16 PROBLEM — R07.9 CHEST PAIN: Status: RESOLVED | Noted: 2022-05-02 | Resolved: 2022-08-16

## 2022-08-16 PROCEDURE — 99214 OFFICE O/P EST MOD 30 MIN: CPT | Performed by: INTERNAL MEDICINE

## 2022-08-16 PROCEDURE — 93000 ELECTROCARDIOGRAM COMPLETE: CPT | Performed by: INTERNAL MEDICINE

## 2022-08-16 RX ORDER — FERROUS SULFATE 325(65) MG
TABLET ORAL
Qty: 60 TABLET | Refills: 0 | Status: SHIPPED | OUTPATIENT
Start: 2022-08-16 | End: 2022-10-11

## 2022-08-16 NOTE — PROGRESS NOTES
Subjective:     Encounter Date:08/16/2022      Patient ID: Mo Willoughby is a 67 y.o. male.    Chief Complaint:  History of Present Illness    This is a 67-year-old with hypertension, alcohol and drug abuse, ascending aortic aneurysm, who presents for follow-up.    I saw the patient initially in 5/2022 when he presented with complaints of episodes of diaphoresis at rest, possible chest pain, and acute kidney injury.  Following his arrival he was noted to have an elevated creatinine up to 1.96 which improved with IV fluids.  Troponin remained in the indeterminate range.  EKG was unremarkable.  He did have a decline in his hemoglobin during that admission.  Prior to the admission he did admit to drinking 2-3 alcoholic drinks a night and using both marijuana and cocaine.  He was hypertensive on admission.  His symptoms sounded atypical so I did not recommend an ischemic work-up.  I did recommend proceeding with an echocardiogram which was performed on 5/3/2022 and showed normal left ventricular systolic function and wall motion with an EF of 56%, grade 1 diastolic dysfunction, lipomatous atrial septum, and no significant valvular disease.  A CT angiogram of the chest during that admission showed ascending aorta measuring 4.5 cm but no evidence of pulmonary embolism.  He was previously on lisinopril-hydrochlorothiazide but the hydrochlorothiazide was discontinued due to issues with dehydration and acute kidney injury.  He was evaluated by GI for his anemia.  He was treated with IV iron.  EGD showed grade 1 esophageal varices and portal hypertensive gastropathy as well as erosive gastropathy without bleeding.  Twice daily PPI was recommended.  He also had multiple polyps removed on her colonoscopy.  Alcohol cessation was also recommended.    He saw YANIRA Lafleur follow-up in 6/2022 at which time he reported he was doing well.  His blood pressures appear to be well controlled on his current regimen of  medications.    He returns today for follow-up.  He continues to feel well.  He states he has been refraining from cocaine use.  He denies any chest pain, shortness of breath, palpitations, orthopnea, near-syncope or syncope, or lower extremity edema.  He has been putting on weight and recognizes that he could work on his diet.    Review of Systems   Constitutional: Negative for malaise/fatigue.   HENT: Negative for hearing loss, hoarse voice, nosebleeds and sore throat.    Eyes: Negative for pain.   Cardiovascular: Negative for chest pain, claudication, cyanosis, dyspnea on exertion, irregular heartbeat, leg swelling, near-syncope, orthopnea, palpitations, paroxysmal nocturnal dyspnea and syncope.   Respiratory: Negative for shortness of breath and snoring.    Endocrine: Negative for cold intolerance, heat intolerance, polydipsia, polyphagia and polyuria.   Skin: Negative for itching and rash.   Musculoskeletal: Negative for arthritis, falls, joint pain, joint swelling, muscle cramps, muscle weakness and myalgias.   Gastrointestinal: Negative for constipation, diarrhea, dysphagia, heartburn, hematemesis, hematochezia, melena, nausea and vomiting.   Genitourinary: Negative for frequency, hematuria and hesitancy.   Neurological: Negative for excessive daytime sleepiness, dizziness, headaches, light-headedness, numbness and weakness.   Psychiatric/Behavioral: Negative for depression. The patient is not nervous/anxious.          Current Outpatient Medications:   •  allopurinol (ZYLOPRIM) 300 MG tablet, Take 1 tablet by mouth Daily., Disp: 90 tablet, Rfl: 1  •  folic acid (FOLVITE) 1 MG tablet, Take 1 tablet by mouth Daily., Disp: 90 tablet, Rfl: 0  •  lisinopril (PRINIVIL,ZESTRIL) 10 MG tablet, Take 1 tablet by mouth Daily., Disp: 30 tablet, Rfl: 5  •  pantoprazole (PROTONIX) 40 MG EC tablet, Take 1 tablet by mouth 2 (Two) Times a Day., Disp: 180 tablet, Rfl: 3  •  thiamine (VITAMIN B-1) 100 MG tablet  tablet, Take 1  tablet by mouth Daily., Disp: 30 tablet, Rfl: 0  •  FeroSul 325 (65 Fe) MG tablet, TAKE 1 TABLET BY MOUTH DAILY WITH BREAKFAST AND DINNER, Disp: 60 tablet, Rfl: 0    Past Medical History:   Diagnosis Date   • Colon polyp 5/5/22   • Erectile dysfunction    • Gout    • Hx of colonic polyp    • Hypercalcemia    • Hypertension    • Parathyroid abnormality (HCC)    • Risk factors for obstructive sleep apnea    • Syncope and collapse 03/28/2017       Past Surgical History:   Procedure Laterality Date   • CHOLECYSTECTOMY     • CHOLECYSTECTOMY WITH INTRAOPERATIVE CHOLANGIOGRAM N/A 08/03/2018    Procedure: CHOLECYSTECTOMY LAPAROSCOPIC INTRAOPERATIVE CHOLANGIOGRAM;  Surgeon: Melina Kate MD;  Location: [facility] MAIN OR;  Service: General   • COLONOSCOPY     • COLONOSCOPY N/A 10/03/2016    Procedure: COLONOSCOPY TO CECUM TO TERMINAL ILIUM WITH COLD BIOPSY POLYPECTOMY;  Surgeon: Benoit Vilchis MD;  Location: [facility] ENDOSCOPY;  Service:    • COLONOSCOPY N/A 05/05/2022    Procedure: COLONOSCOPY TO CECUM WITH COLD SNARE POLYPECTOMIES & RESOLUTION CLIP PLACEMENT X 2;  Surgeon: Benoit Mosley MD;  Location: [facility] ENDOSCOPY;  Service: Gastroenterology;  Laterality: N/A;  ANEMIA/POLYPS, HEMORRHOIDS    • ENDOSCOPY N/A 05/05/2022    Procedure: ESOPHAGOGASTRODUODENOSCOPY WITH BX;  Surgeon: Benoit Mosley MD;  Location: [facility] ENDOSCOPY;  Service: Gastroenterology;  Laterality: N/A;  ANEMIA/PORTAL GASTROPATHY, EROSIVE GASTRITIS    • LIPOMA EXCISION     • TONSILLECTOMY         Family History   Problem Relation Age of Onset   • Hypertension Mother    • Breast cancer Mother    • Cancer Father    • Liver cancer Father    • Hypertension Father    • Hypertension Sister    • Hypertension Brother        Social History     Tobacco Use   • Smoking status: Never Smoker   • Smokeless tobacco: Never Used   Substance Use Topics   • Alcohol use: Yes     Comment: occasional   • Drug use: No         ECG 12 Lead    Date/Time:  "8/16/2022 1:56 PM  Performed by: Hazel Brown MD  Authorized by: Hazel Brown MD   Comparison: compared with previous ECG   Similar to previous ECG  Rhythm: sinus rhythm               Objective:     Visit Vitals  /86 (BP Location: Right arm, Patient Position: Sitting, Cuff Size: Large Adult)   Pulse 91   Ht 188 cm (74\")   Wt 109 kg (239 lb 12.8 oz)   SpO2 99%   BMI 30.79 kg/m²         Constitutional:       Appearance: Normal appearance. Well-developed.   HENT:      Head: Normocephalic and atraumatic.   Neck:      Vascular: No carotid bruit or JVD.   Pulmonary:      Effort: Pulmonary effort is normal.      Breath sounds: Normal breath sounds.   Cardiovascular:      Normal rate. Regular rhythm.      No gallop.   Pulses:     Radial: 2+ bilaterally.  Edema:     Peripheral edema absent.   Abdominal:      Palpations: Abdomen is soft.   Skin:     General: Skin is warm and dry.   Neurological:      Mental Status: Alert and oriented to person, place, and time.             Assessment:          Diagnosis Plan   1. Primary hypertension     2. Ascending aortic aneurysm (HCC)            Plan:       1.  Hypertension.  Remains fairly well-controlled his current regimen medications.  Continue the same.  2.  Ascending aortic aneurysm.  Mildly dilated measuring about 4.5 cm on recent CT scan.  Will need repeat imaging either with an echocardiogram or repeat CT scan in about a year.  3.  Lipomatous atrial septum.    I will plan on seeing the patient back again in about 6 months.         "

## 2022-08-19 ENCOUNTER — OFFICE VISIT (OUTPATIENT)
Dept: FAMILY MEDICINE CLINIC | Facility: CLINIC | Age: 67
End: 2022-08-19

## 2022-08-19 VITALS
DIASTOLIC BLOOD PRESSURE: 98 MMHG | HEART RATE: 84 BPM | RESPIRATION RATE: 20 BRPM | TEMPERATURE: 97.8 F | BODY MASS INDEX: 32.05 KG/M2 | SYSTOLIC BLOOD PRESSURE: 150 MMHG | WEIGHT: 241.8 LBS | HEIGHT: 73 IN | OXYGEN SATURATION: 98 %

## 2022-08-19 DIAGNOSIS — R35.0 URINARY FREQUENCY: ICD-10-CM

## 2022-08-19 DIAGNOSIS — M54.50 ACUTE BILATERAL LOW BACK PAIN WITHOUT SCIATICA: Primary | ICD-10-CM

## 2022-08-19 DIAGNOSIS — I10 PRIMARY HYPERTENSION: ICD-10-CM

## 2022-08-19 DIAGNOSIS — N50.89 SWELLING OF THE TESTICLES: ICD-10-CM

## 2022-08-19 DIAGNOSIS — N28.9 RENAL INSUFFICIENCY: ICD-10-CM

## 2022-08-19 DIAGNOSIS — E83.52 HYPERCALCEMIA: ICD-10-CM

## 2022-08-19 LAB
ALBUMIN SERPL-MCNC: 4.3 G/DL (ref 3.5–5.2)
ALBUMIN/GLOB SERPL: 1.6 G/DL
ALP SERPL-CCNC: 48 U/L (ref 39–117)
ALT SERPL W P-5'-P-CCNC: 26 U/L (ref 1–41)
ANION GAP SERPL CALCULATED.3IONS-SCNC: 11.5 MMOL/L (ref 5–15)
AST SERPL-CCNC: 38 U/L (ref 1–40)
BACTERIA UR QL AUTO: NORMAL /HPF
BILIRUB SERPL-MCNC: 0.7 MG/DL (ref 0–1.2)
BILIRUB UR QL STRIP: NEGATIVE
BUN SERPL-MCNC: 20 MG/DL (ref 8–23)
BUN/CREAT SERPL: 14.7 (ref 7–25)
CALCIUM SPEC-SCNC: 11.2 MG/DL (ref 8.6–10.5)
CHLORIDE SERPL-SCNC: 103 MMOL/L (ref 98–107)
CLARITY UR: CLEAR
CO2 SERPL-SCNC: 22.5 MMOL/L (ref 22–29)
COLOR UR: YELLOW
CREAT SERPL-MCNC: 1.36 MG/DL (ref 0.76–1.27)
EGFRCR SERPLBLD CKD-EPI 2021: 57.8 ML/MIN/1.73
GLOBULIN UR ELPH-MCNC: 2.7 GM/DL
GLUCOSE SERPL-MCNC: 81 MG/DL (ref 65–99)
GLUCOSE UR STRIP-MCNC: NEGATIVE MG/DL
HGB UR QL STRIP.AUTO: ABNORMAL
KETONES UR QL STRIP: NEGATIVE
LEUKOCYTE ESTERASE UR QL STRIP.AUTO: NEGATIVE
NITRITE UR QL STRIP: NEGATIVE
PH UR STRIP.AUTO: 6 [PH] (ref 4.6–8)
POTASSIUM SERPL-SCNC: 4.7 MMOL/L (ref 3.5–5.2)
PROT SERPL-MCNC: 7 G/DL (ref 6–8.5)
PROT UR QL STRIP: NEGATIVE
RBC # UR STRIP: NORMAL /HPF
REF LAB TEST METHOD: NORMAL
SODIUM SERPL-SCNC: 137 MMOL/L (ref 136–145)
SP GR UR STRIP: 1.01 (ref 1–1.03)
SQUAMOUS #/AREA URNS HPF: NORMAL /HPF
UROBILINOGEN UR QL STRIP: ABNORMAL
WBC # UR STRIP: NORMAL /HPF

## 2022-08-19 PROCEDURE — 81001 URINALYSIS AUTO W/SCOPE: CPT | Performed by: NURSE PRACTITIONER

## 2022-08-19 PROCEDURE — 80053 COMPREHEN METABOLIC PANEL: CPT | Performed by: NURSE PRACTITIONER

## 2022-08-19 PROCEDURE — 99214 OFFICE O/P EST MOD 30 MIN: CPT | Performed by: NURSE PRACTITIONER

## 2022-08-19 RX ORDER — PREDNISONE 10 MG/1
TABLET ORAL
Qty: 21 TABLET | Refills: 0 | Status: SHIPPED | OUTPATIENT
Start: 2022-08-19 | End: 2022-12-06

## 2022-08-19 NOTE — PROGRESS NOTES
"Chief Complaint  Back Pain (For 3 weeks)    Subjective        Mo Willoughby presents to [facility]  Patient presents to the office for left testicular swelling. He denies pain/urinary frequency or urgency. He has an additional complaint today of lumbar pain. The pain has gradually been getting worse. He denies any known injury. He denies numbness/tingling. He reports back stiffness is worse in the morning. He rates pain at an 8 today. Blood pressure is elevated 150/98. He denies chest pain or soa.               Back Pain  This is a new problem. The current episode started in the past 7 days. The problem occurs constantly. The problem has been gradually worsening since onset. The pain is present in the lumbar spine. The quality of the pain is described as aching. The pain is at a severity of 8/10. The pain is worse during the day. The symptoms are aggravated by bending. Stiffness is present in the morning.       Objective   Vital Signs:  /98   Pulse 84   Temp 97.8 °F (36.6 °C)   Resp 20   Ht 185.4 cm (73\")   Wt 110 kg (241 lb 12.8 oz)   SpO2 98%   BMI 31.90 kg/m²   Estimated body mass index is 31.9 kg/m² as calculated from the following:    Height as of this encounter: 185.4 cm (73\").    Weight as of this encounter: 110 kg (241 lb 12.8 oz).    BMI is >= 30 and <35. (Class 1 Obesity). The following options were offered after discussion;: weight loss educational material (shared in after visit summary), exercise counseling/recommendations and nutrition counseling/recommendations      Physical Exam  Constitutional:       Appearance: Normal appearance.   HENT:      Head: Normocephalic.   Cardiovascular:      Rate and Rhythm: Normal rate and regular rhythm.   Pulmonary:      Effort: Pulmonary effort is normal. No respiratory distress.      Breath sounds: Normal breath sounds. No wheezing.   Abdominal:      General: Abdomen is flat.      Palpations: Abdomen is soft. There is no " mass.      Tenderness: There is no abdominal tenderness.      Hernia: No hernia is present.   Genitourinary:     Testes:         Left: Swelling present.   Musculoskeletal:         General: Tenderness present.      Cervical back: Normal. No spasms or tenderness. Normal range of motion.      Thoracic back: Normal. No spasms or tenderness. Normal range of motion.      Lumbar back: Spasms and tenderness present. No edema or bony tenderness. Decreased range of motion. No scoliosis.   Neurological:      Mental Status: He is alert.        Result Review :  The following data was reviewed by: YANIRA Batista on 08/19/2022:  Common labs    Common Labsle 6/7/22 7/11/22 7/11/22 8/19/22     1119 1119    Glucose 101 (A)  106 (A) 81   BUN 14  22 20   Creatinine 1.15  1.41 (A) 1.36 (A)   Sodium 137  140 137   Potassium 4.7  5.1 4.7   Chloride 107  107 (A) 103   Calcium 11.1 (A)  11.2 (A) 11.2 (A)   Total Protein   6.6    Albumin   4.0 4.30   Total Bilirubin   0.5 0.7   Alkaline Phosphatase   52 48   AST (SGOT)   49 (A) 38   ALT (SGPT)   28 26   WBC  4.8     Hemoglobin  11.4 (A)     Hematocrit  34.6 (A)     Platelets  133 (A)     (A) Abnormal value                      Assessment and Plan   Diagnoses and all orders for this visit:    1. Acute bilateral low back pain without sciatica (Primary)  -     XR Spine Lumbar 2 or 3 View (In Office)  -     predniSONE (DELTASONE) 10 MG tablet; Take 4 tablets for 2 days, 3 tablets for 2 days, 2 tablets for 2 days, 1 tablet for 2 days.  Dispense: 21 tablet; Refill: 0    2. Swelling of the testicles  -     US testicular or ovarian vascular limited; Future    3. Renal insufficiency  -     Comprehensive metabolic panel  -     Urinalysis With Microscopic - Urine, Clean Catch    4. Urinary frequency  -     Urinalysis With Microscopic - Urine, Clean Catch    5. Hypercalcemia  -     PTH, Intact; Future    6. Primary hypertension  Assessment & Plan:  Hypertension is elevated today, patient  reports bp has been normal at home  Advised pt to keep a bp log     Disussed with patient the importance of maintaining a normal BMI.  Reviewed the Dash diet, dietary sodium restriction.  Encourage the patient to engage in 30 minutes of regular aerobic physical activity at least 3 days a week.  Limit alcohol consumption to no more than 2 drinks per day for men, 1 drink per day for women.  Patient voiced understanding all questions answered.      Side effects of all new and old medications reviewed with the patient -willing to accept all risks involved.  Advised to rto if no improvement or worsening of symptoms.  Patient instructed to  clinical summary at .     Called patient -- to have PTH level checked lab only     Negative for UTI; increase water             Follow Up   Return in about 6 months (around 2/19/2023) for Next scheduled follow up.  Patient was given instructions and counseling regarding his condition or for health maintenance advice. Please see specific information pulled into the AVS if appropriate.

## 2022-08-21 PROBLEM — R35.0 URINARY FREQUENCY: Status: ACTIVE | Noted: 2022-08-21

## 2022-08-21 PROBLEM — N28.9 RENAL INSUFFICIENCY: Status: ACTIVE | Noted: 2022-08-21

## 2022-08-21 NOTE — ASSESSMENT & PLAN NOTE
Hypertension is elevated today, patient reports bp has been normal at home  Advised pt to keep a bp log     Disussed with patient the importance of maintaining a normal BMI.  Reviewed the Dash diet, dietary sodium restriction.  Encourage the patient to engage in 30 minutes of regular aerobic physical activity at least 3 days a week.  Limit alcohol consumption to no more than 2 drinks per day for men, 1 drink per day for women.  Patient voiced understanding all questions answered.

## 2022-08-22 DIAGNOSIS — N50.89 SWELLING OF THE TESTICLES: Primary | ICD-10-CM

## 2022-08-22 DIAGNOSIS — D50.9 IRON DEFICIENCY ANEMIA, UNSPECIFIED IRON DEFICIENCY ANEMIA TYPE: Primary | ICD-10-CM

## 2022-08-24 ENCOUNTER — HOSPITAL ENCOUNTER (OUTPATIENT)
Dept: GENERAL RADIOLOGY | Facility: HOSPITAL | Age: 67
Discharge: HOME | End: 2022-08-24

## 2022-08-24 ENCOUNTER — LAB (OUTPATIENT)
Dept: LAB | Facility: HOSPITAL | Age: 67
End: 2022-08-24

## 2022-08-24 DIAGNOSIS — D50.9 IRON DEFICIENCY ANEMIA, UNSPECIFIED IRON DEFICIENCY ANEMIA TYPE: ICD-10-CM

## 2022-08-24 DIAGNOSIS — E83.52 HYPERCALCEMIA: ICD-10-CM

## 2022-08-24 LAB
BASOPHILS # BLD AUTO: 0.05 10*3/MM3 (ref 0–0.2)
BASOPHILS NFR BLD AUTO: 0.7 % (ref 0–1.5)
DEPRECATED RDW RBC AUTO: 52 FL (ref 37–54)
EOSINOPHIL # BLD AUTO: 0.1 10*3/MM3 (ref 0–0.4)
EOSINOPHIL NFR BLD AUTO: 1.4 % (ref 0.3–6.2)
ERYTHROCYTE [DISTWIDTH] IN BLOOD BY AUTOMATED COUNT: 14.2 % (ref 12.3–15.4)
HCT VFR BLD AUTO: 36.8 % (ref 37.5–51)
HGB BLD-MCNC: 12.3 G/DL (ref 13–17.7)
IMM GRANULOCYTES # BLD AUTO: 0.03 10*3/MM3 (ref 0–0.05)
IMM GRANULOCYTES NFR BLD AUTO: 0.4 % (ref 0–0.5)
LYMPHOCYTES # BLD AUTO: 1.91 10*3/MM3 (ref 0.7–3.1)
LYMPHOCYTES NFR BLD AUTO: 27.4 % (ref 19.6–45.3)
MCH RBC QN AUTO: 33.7 PG (ref 26.6–33)
MCHC RBC AUTO-ENTMCNC: 33.4 G/DL (ref 31.5–35.7)
MCV RBC AUTO: 100.8 FL (ref 79–97)
MONOCYTES # BLD AUTO: 1.01 10*3/MM3 (ref 0.1–0.9)
MONOCYTES NFR BLD AUTO: 14.5 % (ref 5–12)
NEUTROPHILS NFR BLD AUTO: 3.86 10*3/MM3 (ref 1.7–7)
NEUTROPHILS NFR BLD AUTO: 55.6 % (ref 42.7–76)
NRBC BLD AUTO-RTO: 0 /100 WBC (ref 0–0.2)
PLATELET # BLD AUTO: 123 10*3/MM3 (ref 140–450)
PMV BLD AUTO: 10.2 FL (ref 6–12)
PTH-INTACT SERPL-MCNC: 104 PG/ML (ref 15–65)
RBC # BLD AUTO: 3.65 10*6/MM3 (ref 4.14–5.8)
WBC NRBC COR # BLD: 6.96 10*3/MM3 (ref 3.4–10.8)

## 2022-08-24 PROCEDURE — 36415 COLL VENOUS BLD VENIPUNCTURE: CPT

## 2022-08-24 PROCEDURE — 72100 X-RAY EXAM L-S SPINE 2/3 VWS: CPT

## 2022-08-24 PROCEDURE — 85025 COMPLETE CBC W/AUTO DIFF WBC: CPT

## 2022-08-24 PROCEDURE — 83970 ASSAY OF PARATHORMONE: CPT

## 2022-08-26 DIAGNOSIS — M54.50 ACUTE BILATERAL LOW BACK PAIN WITHOUT SCIATICA: Primary | ICD-10-CM

## 2022-08-29 DIAGNOSIS — D50.9 IRON DEFICIENCY ANEMIA, UNSPECIFIED IRON DEFICIENCY ANEMIA TYPE: Primary | ICD-10-CM

## 2022-08-29 DIAGNOSIS — E21.3 HYPERPARATHYROIDISM: Primary | ICD-10-CM

## 2022-08-29 DIAGNOSIS — E34.9 ELEVATED PARATHYROID HORMONE: ICD-10-CM

## 2022-08-29 DIAGNOSIS — E83.52 HYPERCALCEMIA: ICD-10-CM

## 2022-09-04 ENCOUNTER — HOSPITAL ENCOUNTER (OUTPATIENT)
Dept: ULTRASOUND IMAGING | Facility: HOSPITAL | Age: 67
Discharge: HOME | End: 2022-09-04
Admitting: NURSE PRACTITIONER

## 2022-09-04 DIAGNOSIS — N50.89 SWELLING OF THE TESTICLES: ICD-10-CM

## 2022-09-04 PROCEDURE — 93976 VASCULAR STUDY: CPT

## 2022-09-04 PROCEDURE — 76870 US EXAM SCROTUM: CPT

## 2022-09-12 ENCOUNTER — OFFICE VISIT (OUTPATIENT)
Dept: GASTROENTEROLOGY | Facility: CLINIC | Age: 67
End: 2022-09-12

## 2022-09-12 VITALS
DIASTOLIC BLOOD PRESSURE: 91 MMHG | WEIGHT: 241.4 LBS | HEIGHT: 73 IN | BODY MASS INDEX: 31.99 KG/M2 | TEMPERATURE: 97.3 F | SYSTOLIC BLOOD PRESSURE: 145 MMHG | HEART RATE: 89 BPM

## 2022-09-12 DIAGNOSIS — D12.2 ADENOMATOUS POLYP OF ASCENDING COLON: Primary | ICD-10-CM

## 2022-09-12 PROCEDURE — 99214 OFFICE O/P EST MOD 30 MIN: CPT | Performed by: INTERNAL MEDICINE

## 2022-09-12 NOTE — PROGRESS NOTES
Chief Complaint   Patient presents with   • Anemia     Subjective     HPI  Mo Willoughby is a 67 y.o. male who presents today for follow up.  This gentleman iron deficiency anemia, he was placed on iron underwent EGD and colonoscopy  Colonoscopy with removal of small tubular adenomas colonoscopy with mild gastritis, H. pylori negative  Grade 1 varices, portal hypertensive gastropathy  He has quit drinking alcohol May of this year.  He feels much better.  We are waiting for the 6-month arlene to check labs to ensure he has no component of cirrhosis but I see no evidence of that so far    Objective   Vitals:    09/12/22 1330   BP: 145/91   Pulse: 89   Temp: 97.3 °F (36.3 °C)       Physical Exam  Vitals reviewed.   Constitutional:       Appearance: He is well-developed.   HENT:      Head: Normocephalic and atraumatic.   Neurological:      Mental Status: He is alert and oriented to person, place, and time.   Psychiatric:         Behavior: Behavior normal.         Thought Content: Thought content normal.         Judgment: Judgment normal.       The following data was reviewed by: Benoit Vilchis MD on 09/12/2022:  Common labs    Common Labsle 7/11/22 7/11/22 8/19/22 8/24/22    1119 1119     Glucose  106 (A) 81    BUN  22 20    Creatinine  1.41 (A) 1.36 (A)    Sodium  140 137    Potassium  5.1 4.7    Chloride  107 (A) 103    Calcium  11.2 (A) 11.2 (A)    Total Protein  6.6     Albumin  4.0 4.30    Total Bilirubin  0.5 0.7    Alkaline Phosphatase  52 48    AST (SGOT)  49 (A) 38    ALT (SGPT)  28 26    WBC 4.8   6.96   Hemoglobin 11.4 (A)   12.3 (A)   Hematocrit 34.6 (A)   36.8 (A)   Platelets 133 (A)   123 (A)   (A) Abnormal value            CMP    CMP 6/7/22 7/11/22 8/19/22   Glucose 101 (A) 106 (A) 81   BUN 14 22 20   Creatinine 1.15 1.41 (A) 1.36 (A)   Sodium 137 140 137   Potassium 4.7 5.1 4.7   Chloride 107 107 (A) 103   Calcium 11.1 (A) 11.2 (A) 11.2 (A)   Total Protein  6.6    Albumin  4.0 4.30   Globulin  2.6     Total Bilirubin  0.5 0.7   Alkaline Phosphatase  52 48   AST (SGOT)  49 (A) 38   ALT (SGPT)  28 26   (A) Abnormal value            CBC    CBC 5/16/22 7/11/22 8/24/22   WBC 5.40 4.8 6.96   RBC 2.61 (A) 3.38 (A) 3.65 (A)   Hemoglobin 8.7 (A) 11.4 (A) 12.3 (A)   Hematocrit 26.6 (A) 34.6 (A) 36.8 (A)   .7 (A) 102 (A) 100.8 (A)   MCH 33.4 (A) 33.7 (A) 33.7 (A)   MCHC 32.9 32.9 33.4   RDW 18.9 (A) 14.3 14.2   Platelets 206 133 (A) 123 (A)   (A) Abnormal value            CBC w/diff    CBC w/Diff 5/16/22 7/11/22 8/24/22   WBC 5.40 4.8 6.96   RBC 2.61 (A) 3.38 (A) 3.65 (A)   Hemoglobin 8.7 (A) 11.4 (A) 12.3 (A)   Hematocrit 26.6 (A) 34.6 (A) 36.8 (A)   .7 (A) 102 (A) 100.8 (A)   MCH 33.4 (A) 33.7 (A) 33.7 (A)   MCHC 32.9 32.9 33.4   RDW 18.9 (A) 14.3 14.2   Platelets 206 133 (A) 123 (A)   Neutrophil Rel % 69.4 55 55.6   Immature Granulocyte Rel %   0.4   Lymphocyte Rel % 22.4 27 27.4   Monocyte Rel % 8.2 13 14.5 (A)   Eosinophil Rel %  4 1.4   Basophil Rel %  1 0.7   (A) Abnormal value            Lipid Panel    Lipid Panel 2/7/22 5/16/22   Total Cholesterol 253 (A) 167   Triglycerides 123 93   HDL Cholesterol 68 (A) 44   VLDL Cholesterol 22 17   LDL Cholesterol  163 (A) 106 (A)   LDL/HDL Ratio 2.36 2.37   (A) Abnormal value            TSH    TSH 5/16/22   TSH 2.180           Data reviewed: GI studies EGD and colonoscopy reviewed     Assessment & Plan   Assessment:       Alcohol abuse, has not had a drink since May 2022  Grade 1 varices esophagus  Portal hypertensive gastropathy  Anemia-resolving  GERD    Plan:   Continue iron therapy at the current dose  Return to clinic December 2022 for a CBC, CMP, PT/INR and I will determine if there is a component of cirrhosis at that time or if his alcohol hepatitis has resolved  Continue pantoprazole  Anemia has almost resolved, no further work-up required at this time    I spent 35 minutes caring for Mo on this date of service. This time includes time spent by me in  the following activities:preparing for the visit, reviewing tests, obtaining and/or reviewing a separately obtained history, performing a medically appropriate examination and/or evaluation , counseling and educating the patient/family/caregiver, ordering medications, tests, or procedures, referring and communicating with other health care professionals , documenting information in the medical record, independently interpreting results and communicating that information with the patient/family/caregiver and care coordination    Benoit Vilchis MD  [facility]  [address] [place] [address]  Office: [phone]

## 2022-09-13 ENCOUNTER — TELEPHONE (OUTPATIENT)
Dept: FAMILY MEDICINE CLINIC | Facility: CLINIC | Age: 67
End: 2022-09-13

## 2022-09-13 NOTE — TELEPHONE ENCOUNTER
Called patient to discuss ultrasound results.  Patient is currently seeing a urologist.  Patient reports that swelling has resolved.  I advised patient if testicular swelling reoccurs any pain develops patient to follow-up with the urologist that he is currently seeing.

## 2022-09-21 ENCOUNTER — TELEPHONE (OUTPATIENT)
Dept: FAMILY MEDICINE CLINIC | Facility: CLINIC | Age: 67
End: 2022-09-21

## 2022-09-21 PROBLEM — R11.0 NAUSEA: Status: RESOLVED | Noted: 2022-05-02 | Resolved: 2022-09-21

## 2022-09-21 PROBLEM — I10 ESSENTIAL HYPERTENSION, BENIGN: Status: ACTIVE | Noted: 2022-09-21

## 2022-09-21 PROBLEM — R42 DIZZINESS: Status: RESOLVED | Noted: 2022-05-02 | Resolved: 2022-09-21

## 2022-09-21 PROBLEM — N17.9 ACUTE KIDNEY INJURY (HCC): Status: RESOLVED | Noted: 2018-08-01 | Resolved: 2022-09-21

## 2022-09-21 PROBLEM — N28.9 RENAL INSUFFICIENCY: Status: RESOLVED | Noted: 2022-08-21 | Resolved: 2022-09-21

## 2022-09-21 PROBLEM — R00.2 PALPITATIONS: Status: RESOLVED | Noted: 2022-05-03 | Resolved: 2022-09-21

## 2022-09-21 PROBLEM — K81.9 CHOLECYSTITIS: Status: RESOLVED | Noted: 2018-07-31 | Resolved: 2022-09-21

## 2022-09-21 PROBLEM — R79.89 ABNORMAL SERUM THYROID STIMULATING HORMONE (TSH) LEVEL: Status: RESOLVED | Noted: 2022-05-16 | Resolved: 2022-09-21

## 2022-09-21 PROBLEM — Z09 HOSPITAL DISCHARGE FOLLOW-UP: Status: RESOLVED | Noted: 2022-05-16 | Resolved: 2022-09-21

## 2022-09-21 PROBLEM — R53.83 FATIGUE: Status: RESOLVED | Noted: 2022-05-02 | Resolved: 2022-09-21

## 2022-09-21 PROBLEM — R05.9 COUGH: Status: RESOLVED | Noted: 2022-05-02 | Resolved: 2022-09-21

## 2022-09-21 NOTE — TELEPHONE ENCOUNTER
Caller: KEEGAN      Relationship: Self    Best call back number  [phone]  What is the best time to reach you: ANY     Who are you requesting to speak with (clinical staff, provider,  specific staff member): CLINICAL STAFF       What was the call regarding: THE ASSESSMENT AND CARE PLAN HAS BEEN UPDATED IN THE PROVIDER PORTAL.  THE INTER DISCIPLINARY TEAM ROUNDS IF SHANNON DORAN WOULD LIKE TO ATTEND THESE.  SHE CAN CALL [phone] OPT 3 OR EMAIL TO [email or web address]     Do you require a callback: YES        D

## 2022-10-10 ENCOUNTER — HOSPITAL ENCOUNTER (OUTPATIENT)
Dept: MRI IMAGING | Facility: HOSPITAL | Age: 67
Discharge: HOME | End: 2022-10-10
Admitting: NURSE PRACTITIONER

## 2022-10-10 DIAGNOSIS — M54.50 ACUTE BILATERAL LOW BACK PAIN WITHOUT SCIATICA: ICD-10-CM

## 2022-10-10 PROCEDURE — 72148 MRI LUMBAR SPINE W/O DYE: CPT

## 2022-10-11 RX ORDER — FERROUS SULFATE 325(65) MG
TABLET ORAL
Qty: 60 TABLET | Refills: 0 | Status: SHIPPED | OUTPATIENT
Start: 2022-10-11 | End: 2022-12-13

## 2022-10-14 ENCOUNTER — TELEPHONE (OUTPATIENT)
Dept: FAMILY MEDICINE CLINIC | Facility: CLINIC | Age: 67
End: 2022-10-14

## 2022-10-14 DIAGNOSIS — G89.29 CHRONIC BILATERAL LOW BACK PAIN WITH BILATERAL SCIATICA: Primary | ICD-10-CM

## 2022-10-14 DIAGNOSIS — M54.42 CHRONIC BILATERAL LOW BACK PAIN WITH BILATERAL SCIATICA: Primary | ICD-10-CM

## 2022-10-14 DIAGNOSIS — M54.41 CHRONIC BILATERAL LOW BACK PAIN WITH BILATERAL SCIATICA: Primary | ICD-10-CM

## 2022-10-14 RX ORDER — CYCLOBENZAPRINE HCL 10 MG
10 TABLET ORAL 3 TIMES DAILY PRN
Qty: 30 TABLET | Refills: 0 | Status: SHIPPED | OUTPATIENT
Start: 2022-10-14 | End: 2022-10-19

## 2022-10-14 NOTE — TELEPHONE ENCOUNTER
Caller: Mo Willoughby    Relationship: Self    Best call back number: [phone]    What is the best time to reach you: ANY TIME    Who are you requesting to speak with (clinical staff, provider,  specific staff member): SHANNON DORAN    What was the call regarding: PATIENT CALLED WANTING TO KNOW IF THERE WAS ANYTHING THAT SHANNON CAN PRESCRIBE FOR HIM TO HELP WITH THE PAIN UNTIL HE CAN SEE NEUROSURGERY ABOUT HIS BACK PAIN. SHE PRESCRIBED A STEROID PACK IN THE PAST BUT IT DID NOT HELP WITH THE PAIN.    PLEASE ADVISE    Do you require a callback: YES

## 2022-10-18 ENCOUNTER — TELEPHONE (OUTPATIENT)
Dept: FAMILY MEDICINE CLINIC | Facility: CLINIC | Age: 67
End: 2022-10-18

## 2022-10-18 NOTE — TELEPHONE ENCOUNTER
Called patient on [phone] went over providers recommendations, PTs sister voiced understanding and agreement. Provider is aware and sent in prescriptions

## 2022-10-19 DIAGNOSIS — M54.42 CHRONIC BILATERAL LOW BACK PAIN WITH BILATERAL SCIATICA: ICD-10-CM

## 2022-10-19 DIAGNOSIS — G89.29 CHRONIC BILATERAL LOW BACK PAIN WITH BILATERAL SCIATICA: ICD-10-CM

## 2022-10-19 DIAGNOSIS — M54.41 CHRONIC BILATERAL LOW BACK PAIN WITH BILATERAL SCIATICA: ICD-10-CM

## 2022-10-19 RX ORDER — CYCLOBENZAPRINE HCL 10 MG
TABLET ORAL
Qty: 30 TABLET | Refills: 0 | Status: SHIPPED | OUTPATIENT
Start: 2022-10-19 | End: 2022-12-06

## 2022-10-24 ENCOUNTER — LAB (OUTPATIENT)
Dept: FAMILY MEDICINE CLINIC | Facility: CLINIC | Age: 67
End: 2022-10-24

## 2022-10-24 DIAGNOSIS — D50.9 IRON DEFICIENCY ANEMIA, UNSPECIFIED IRON DEFICIENCY ANEMIA TYPE: ICD-10-CM

## 2022-10-24 LAB
ERYTHROCYTE [DISTWIDTH] IN BLOOD BY AUTOMATED COUNT: 15.7 % (ref 12.3–15.4)
HCT VFR BLD AUTO: 38.2 % (ref 37.5–51)
HGB BLD-MCNC: 12.6 G/DL (ref 13–17.7)
LYMPHOCYTES # BLD AUTO: 1.9 10*3/MM3 (ref 0.7–3.1)
LYMPHOCYTES NFR BLD AUTO: 36.8 % (ref 19.6–45.3)
MCH RBC QN AUTO: 34 PG (ref 26.6–33)
MCHC RBC AUTO-ENTMCNC: 32.9 G/DL (ref 31.5–35.7)
MCV RBC AUTO: 103.5 FL (ref 79–97)
MONOCYTES # BLD AUTO: 0.2 10*3/MM3 (ref 0.1–0.9)
MONOCYTES NFR BLD AUTO: 4.6 % (ref 5–12)
NEUTROPHILS NFR BLD AUTO: 3 10*3/MM3 (ref 1.7–7)
NEUTROPHILS NFR BLD AUTO: 58.6 % (ref 42.7–76)
PLATELET # BLD AUTO: 142 10*3/MM3 (ref 140–450)
PMV BLD AUTO: 7.4 FL (ref 6–12)
RBC # BLD AUTO: 3.69 10*6/MM3 (ref 4.14–5.8)
WBC NRBC COR # BLD: 5.2 10*3/MM3 (ref 3.4–10.8)

## 2022-10-24 PROCEDURE — 85025 COMPLETE CBC W/AUTO DIFF WBC: CPT | Performed by: NURSE PRACTITIONER

## 2022-10-24 PROCEDURE — 36415 COLL VENOUS BLD VENIPUNCTURE: CPT | Performed by: NURSE PRACTITIONER

## 2022-10-31 RX ORDER — FOLIC ACID 1 MG/1
1 TABLET ORAL DAILY
Qty: 90 TABLET | Refills: 0 | Status: SHIPPED | OUTPATIENT
Start: 2022-10-31 | End: 2023-01-30

## 2022-11-09 ENCOUNTER — TELEPHONE (OUTPATIENT)
Dept: FAMILY MEDICINE CLINIC | Facility: CLINIC | Age: 67
End: 2022-11-09

## 2022-11-09 RX ORDER — OSELTAMIVIR PHOSPHATE 75 MG/1
75 CAPSULE ORAL 2 TIMES DAILY
Qty: 10 CAPSULE | Refills: 0 | Status: SHIPPED | OUTPATIENT
Start: 2022-11-09 | End: 2022-12-06

## 2022-11-09 NOTE — TELEPHONE ENCOUNTER
Caller: Mo Willoughby    Relationship: Self    Best call back number: [phone]  What is the best time to reach you:ANYTIME  Who are you requesting to speak with (clinical staff, provider,  specific staff member): CLINICAL STAFF    Do you know the name of the person who called: SELF  What was the call regarding:PATIENT CALLED AND STATED HIS GIRLFRIEND HAS THE FLU AND HE IS HAVING SYMPTOMS OF THE FLU. PATIENT WOULD LIKE FOR YOU TO CALL HIM. THANKS  Do you require a callback: YES

## 2022-12-05 NOTE — H&P (VIEW-ONLY)
The patient has a pain history of the following:  Chronic low back pain    Previous interventions that the patient has received include:   Shoulder injections   Knee injections     Pain medications include:  Previously: Flexeril, prednisone    Other conservative modalities which the patient reports using include:  Physical Therapy: no  Chiropractor: yes  Massage Therapy: no  TENS: no  Neck or back surgery: no  Past pain management: no  Heat  Ice    Past Significant Surgical History:  None     HPI:       CHIEF COMPLAINT: Back Pain    Mo Willoughby is a 67 y.o. male referred here by YANIRA Batista. Mo Willoughby presents to the office for evaluation and treatment of Back Pain      History of Present Illness  Onset:  5 months ago   Inciting Event:  Nothing in particular   Location:  Low back   Pain: Pain described as sharp. Located in the low back and does radiate into the right hip and into the right knee.  Severity:  Pain rated as a 7 /10.  Apportions pain as 90%  Back  pain and 10% extremity pain.  Symptoms have been constant.  Exacerbation:  Standing up straight, any activity.   Alleviation:  Nothing.  Associated Symptoms:   He denies any new onset of bowel or bladder weakness, or saddle anesthesia. Denies balance problems or lower extremity incoordination.  He states his right leg is giving out on him.   Ambulates: Without assistive device.   Limitations: This pain limits the patient from standing up straight, walking without limping, any activity or being in any position.   Goals: Functional goals include ability to improve the above.     Mr. Willoughby is here with his significant other who helps provide history.  He describes the pain as sharp pain that is radiated towards the right side of his spine, but not located over his spine.  The pain gets worse with any, even small, movement of his back.  It radiates through his right buttocks and hip area, and has started causing pain in his knee as well.  He  cannot walk without a limp.  He states that prior to this injury he was working out almost every day, he does bench presses etc., and he has almost been unable to stand since this pain started.  He is unable to tolerate treatment such as physical therapy at this time.       PEG Assessment   What number best describes your pain on average in the past week?8  What number best describes how, during the past week, pain has interfered with your enjoyment of life?9  What number best describes how, during the past week, pain has interfered with your general activity?  8        Current Outpatient Medications:   •  allopurinol (ZYLOPRIM) 300 MG tablet, Take 1 tablet by mouth Daily., Disp: 90 tablet, Rfl: 1  •  FeroSul 325 (65 Fe) MG tablet, TAKE 1 TABLET BY MOUTH DAILY WITH BREAKFAST AND DINNER, Disp: 60 tablet, Rfl: 0  •  folic acid (FOLVITE) 1 MG tablet, TAKE 1 TABLET BY MOUTH DAILY, Disp: 90 tablet, Rfl: 0  •  lisinopril (PRINIVIL,ZESTRIL) 10 MG tablet, Take 1 tablet by mouth Daily., Disp: 30 tablet, Rfl: 5  •  pantoprazole (PROTONIX) 40 MG EC tablet, Take 1 tablet by mouth 2 (Two) Times a Day., Disp: 180 tablet, Rfl: 3  •  thiamine (VITAMIN B-1) 100 MG tablet  tablet, Take 1 tablet by mouth Daily., Disp: 30 tablet, Rfl: 0    The following portions of the patient's history were reviewed and updated as appropriate: allergies, current medications, past family history, past medical history, past social history, past surgical history and problem list.      REVIEW OF PERTINENT MEDICAL DATA    10/10/22 MRI OF THE LUMBAR SPINE WITHOUT CONTRAST     CLINICAL HISTORY:Acute low back pain without sciatica.     TECHNIQUE: MRI of the lumbar spine was obtained with sagittal T1,  proton-density, and T2-weighted images. Additionally, there are axial T1  and T2-weighted images through the lumbar spine.     COMPARISON:No previous similar studies are available for comparison.     FINDINGS:     The conus medullaris terminates at the level of  the mid body of L2 and  has normal signal intensity. The visualized distal thoracic spinal cord  is unremarkable.     At T11-T12, there is a disc bulge which mildly indents the ventral  subarachnoid space. There is mild-to-moderate left foraminal narrowing  secondary to facet hypertrophic change. At T12-L1, there is a disc bulge  with a posterior annular fissure. Additionally, there may be small left  central disc protrusion as well at this level. There are no axial images  obtained through the T12-L1 level. Overall, a mild-to-moderate degree of  canal stenosis is seen secondary to bulging disc material in addition to  a potential protrusion. No significant foraminal stenosis is seen at  T12-L1.     At L1-L2, there is a disc bulge which results in a mild degree of canal  and bilateral foraminal narrowing. Prominent fluid signal intensity is  seen within the L1-L2 interspinous region which is degenerative in  nature.     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  Additionally, there is a disc bulge in addition to a left central/left  subarticular disc protrusion that results in a moderate degree of canal  stenosis. There is mild-to-moderate bilateral left and moderate right  narrowing secondary to disc bulging and facet hypertrophic change.  Additionally, there is a right foraminal disc protrusion at L2-L3 that  contributes to the L2-L3 foraminal stenosis. Prominent fluid signal  intensity is seen within the L2-L3 interspinous region which is  degenerative in nature.     At L3-L4, the spinal canal is again narrowed on a congenital basis due  to shortened pedicles. Bulging disc material superimposed upon these  congenital phenomena results in a moderate degree of canal stenosis.  There is mild-to-moderate bilateral foraminal narrowing secondary to  bulging disc material and facet hypertrophic change. Moderate facet  hypertrophy is appreciated at L3-L4. Prominent fluid signal intensity  is  seen within the interspinous region at the L3-L4 level which is again  degenerative in nature.     At L4-L5, there is anterior spondylolisthesis of L4 and L5 by  approximately 13 mm. There is severe central canal stenosis secondary to  unroofed bulging disc material, ligamentum flavum thickening, and severe  facet hypertrophic change. Additionally, the spinal canal is relatively  narrow on a congenital basis at the L4-L5 level. There is a moderate  degree of bilateral foraminal narrowing secondary to bulging disc  material and facet hypertrophy.     At L5-S1, there is a disc bulge and facet hypertrophy that results in a  mild-to-moderate degree of right and a moderate degree of left foraminal  stenosis.     IMPRESSION:     The spinal canal is relatively narrow on a congenital basis due to  shortened pedicles from L2-L3 down to L4-L5. Superimposed degenerative  phenomena are seen at these levels and overall, there is moderate L2-L3,  moderate L3-L4, and severe L4-L5 canal stenosis as discussed in detail  above.     Degenerative anterior spondylolisthesis of L4 onL5 by approximately 13  mm is seen.     The remaining multilevel degenerative phenomena including multilevel  foraminal stenosis is discussed in detail above.           This report was finalized on 10/12/2022 6:09 AM by Dr. Elfego Navarro M.D.    8/24/22 X-RAY SPINE LUMBAR TWO OR THREE-VIEW     HISTORY: 67-year-old male with back pain.     FINDINGS: There is moderate spondylosis at L2-3 and there is 5 mm  retrolisthesis of L2 on L3. There is 1.5 cm anterolisthesis of L4 on L5,  likely degenerative. Vertebral body height is within normal limits and  no acute abnormality is seen. Further evaluation with MRI should be  considered.     This report was finalized on 8/24/2022 1:22 PM by Dr. Prachi Costa M.D.    8/19/22 Creatinine 1.36    100/24/22 Platelets 142 (10*3)    Review of Systems   Constitutional: Negative for fatigue.   HENT: Negative for congestion.   "  Eyes: Negative for visual disturbance.   Respiratory: Negative for shortness of breath.    Cardiovascular: Negative for chest pain.   Gastrointestinal: Negative for constipation and diarrhea.   Genitourinary: Negative for difficulty urinating.   Musculoskeletal: Positive for back pain.   Neurological: Negative for weakness and numbness.   Psychiatric/Behavioral: Positive for sleep disturbance. Negative for suicidal ideas. The patient is not nervous/anxious.      I have reviewed and confirmed the accuracy of the ROS as documented by the MA/LPN/RN Isaura Torres MD      Vitals:    12/06/22 1018   BP: 152/95   Pulse: 71   Resp: 18   Temp: 98.6 °F (37 °C)   SpO2: 97%   Weight: 116 kg (255 lb)   Height: 185.4 cm (73\")   PainSc:   7   PainLoc: Back         Objective   Physical Exam  Vitals reviewed.   Constitutional:       General: He is not in acute distress.  Pulmonary:      Effort: Pulmonary effort is normal. No respiratory distress.   Musculoskeletal:      Comments: Ambulation: Antalgic, without assistive device   Lumbar Exam:  Appearance: Scoliotic curve absent and scarring absent  Range of Motion: diminished range with pain  Palpated over lumbosacral paravertebral regions and transverse processes with negative tenderness appreciated, Bilateral.   Sacroiliac joints are not tender, Bilateral.  Trochanteric bursa are not tender, Bilateral.  Straight leg raise is positive radiculopathy, Right. Facet loading is positive for pain, Bilateral.  Paraspinal/adjacent lumbar musculature are not tender to palpation, Bilateral.   Skin:     General: Skin is warm and dry.   Neurological:      General: No focal deficit present.      Mental Status: He is alert.      Deep Tendon Reflexes:      Reflex Scores:       Bicep reflexes are 3+ on the right side and 3+ on the left side.       Brachioradialis reflexes are 3+ on the right side and 3+ on the left side.       Patellar reflexes are 3+ on the right side and 3+ on the left " side.  Psychiatric:         Mood and Affect: Mood normal.         Thought Content: Thought content normal.         Assessment & Plan   Diagnoses and all orders for this visit:    1. Lumbar radiculitis (Primary)  -     Case Request    2. Spinal stenosis of lumbar region, unspecified whether neurogenic claudication present  -     Ambulatory Referral to Neurosurgery  -     Case Request    3. Facet hypertrophy of lumbar region  -     Ambulatory Referral to Neurosurgery    4. Spondylolisthesis of lumbar region  -     Ambulatory Referral to Neurosurgery    5. Displacement of lumbar intervertebral disc without myelopathy  -     Ambulatory Referral to Neurosurgery        - Pertinent labs reviewed.   - Pertinent imaging reviewed.   - I do not think he can tolerate physical therapy at this time due to his level of pain.   - Referral placed to neurosurgery due to his stenosis and spondylolisthesis (with slight discrepancy in x-ray versus MRI measurement of listhesis).   - Will schedule for right L2-3 and right L4-5 Transforaminal epidural steroid injection.  Risks discussed including but not limited to bleeding, bruising, infection, damage to surrounding structures, headache, and rare things such as being paralyzed, seizure, stroke, heart attack and death.  The risk of steroid medications include but are not limited to immunosuppression, which can increase the risk of jonas an infectious disease as well as decrease the immune response to a vaccine.    - Briefly discussed gabapentin as an option for medication management, however he is not interested in PO medications at this time.  He would like to avoid medications if possible.   - Mo Willoughby reports a pain score of 7.  Given his pain assessment as noted, treatment options were discussed and the following options were decided upon as a follow-up plan to address the patient's pain: steroid injections.    --- Follow-up for right L2-3 and right L4-5 Transforaminal  epidural steroid injection and office visit 2-3 weeks following.            While examining this patient, I wore protective equipment including a mask and gloves.  I washed my hands before and after this patient encounter.  The patient wore a mask throughout the visit as well.     Isaura Torres MD  Pain Management    EMR Dragon/Transcription disclaimer:   Much of this encounter note is an electronic transcription/translation of spoken language to printed text. The electronic translation of spoken language may permit erroneous, or at times, nonsensical words or phrases to be inadvertently transcribed; Although I have reviewed the note for such errors, some may still exist.

## 2022-12-06 ENCOUNTER — TRANSCRIBE ORDERS (OUTPATIENT)
Dept: SURGERY | Facility: SURGERY CENTER | Age: 67
End: 2022-12-06

## 2022-12-06 ENCOUNTER — OFFICE VISIT (OUTPATIENT)
Dept: PAIN MEDICINE | Facility: CLINIC | Age: 67
End: 2022-12-06

## 2022-12-06 ENCOUNTER — PREP FOR SURGERY (OUTPATIENT)
Dept: SURGERY | Facility: SURGERY CENTER | Age: 67
End: 2022-12-06

## 2022-12-06 VITALS
RESPIRATION RATE: 18 BRPM | WEIGHT: 255 LBS | HEART RATE: 71 BPM | TEMPERATURE: 98.6 F | HEIGHT: 73 IN | BODY MASS INDEX: 33.8 KG/M2 | SYSTOLIC BLOOD PRESSURE: 152 MMHG | OXYGEN SATURATION: 97 % | DIASTOLIC BLOOD PRESSURE: 95 MMHG

## 2022-12-06 DIAGNOSIS — M47.816 FACET HYPERTROPHY OF LUMBAR REGION: ICD-10-CM

## 2022-12-06 DIAGNOSIS — M51.26 DISPLACEMENT OF LUMBAR INTERVERTEBRAL DISC WITHOUT MYELOPATHY: ICD-10-CM

## 2022-12-06 DIAGNOSIS — M43.16 SPONDYLOLISTHESIS OF LUMBAR REGION: ICD-10-CM

## 2022-12-06 DIAGNOSIS — I10 ESSENTIAL HYPERTENSION, BENIGN: Primary | ICD-10-CM

## 2022-12-06 DIAGNOSIS — M48.061 SPINAL STENOSIS OF LUMBAR REGION, UNSPECIFIED WHETHER NEUROGENIC CLAUDICATION PRESENT: ICD-10-CM

## 2022-12-06 DIAGNOSIS — Z41.9 SURGERY, ELECTIVE: Primary | ICD-10-CM

## 2022-12-06 DIAGNOSIS — M54.16 LUMBAR RADICULITIS: Primary | ICD-10-CM

## 2022-12-06 PROCEDURE — 99204 OFFICE O/P NEW MOD 45 MIN: CPT | Performed by: ANESTHESIOLOGY

## 2022-12-06 RX ORDER — SODIUM CHLORIDE 0.9 % (FLUSH) 0.9 %
10 SYRINGE (ML) INJECTION AS NEEDED
Status: CANCELLED | OUTPATIENT
Start: 2022-12-06

## 2022-12-06 RX ORDER — SODIUM CHLORIDE 0.9 % (FLUSH) 0.9 %
10 SYRINGE (ML) INJECTION EVERY 12 HOURS SCHEDULED
Status: CANCELLED | OUTPATIENT
Start: 2022-12-06

## 2022-12-06 RX ORDER — LISINOPRIL 10 MG/1
10 TABLET ORAL DAILY
Qty: 30 TABLET | Refills: 5 | Status: SHIPPED | OUTPATIENT
Start: 2022-12-06 | End: 2023-02-28

## 2022-12-06 NOTE — DISCHARGE INSTRUCTIONS
[facility] - Post-procedure Instructions          --  While there are no absolute restrictions, it is recommended that you do not perform strenuous activity today. In the morning, you may resume your level of activity as before your block.    --  If you have a band-aid at your injection site, please remove it later today. Observe the area for any redness, swelling, pus-like drainage, or a temperature over 101°. If any of these symptoms occur, please call your doctor at [phone]. If after office hours, leave a message and the on-call provider will return your call.    --  Ice may be applied to your injection site. It is recommended you avoid direct heat (heating pad; hot tub) for 1-2 days.    --  Call [facility] at [phone] if you experience persistent headache, persistent bleeding from the injection site, or severe pain not relieved by heat or oral medication.    --  Do not make important decisions today.    --  Due to the effects of the block and/or the I.V. Sedation, DO NOT drive or operate hazardous machinery for 12 hours.  Local anesthetics may cause numbness after procedure and precautions must be taken with regards to operating equipment as well as with walking, even if ambulating with assistance of another person or with an assistive device.    --  Do not drink alcohol for 12 hours.    -- You may return to work tomorrow, or as directed by your referring doctor.    --  Occasionally you may notice a slight increase in your pain after the procedure. This should start to improve within the next 24-48 hours. Radiofrequency ablation procedure pain may last 3-4 weeks.    --  It may take as long as 3-4 days before you notice a gradual improvement in your pain and/or other symptoms.    -- You may continue to take your prescribed pain medication as needed.    --  Some normal possible side effects of steroid use could include fluid retention, increased blood sugar, dull headache,  increased sweating, increased appetite, mood swings and flushing.    --  Diabetics are recommended to watch their blood glucose level closely for 24-48 hours after the injection.    --  Must stay in PACU for 20 min upon arrival and prove no leg weakness before being discharged.    --  IN THE EVENT OF A LIFE THREATENING EMERGENCY, (CHEST PAIN, BREATHING DIFFICULTIES, PARALYSIS…) YOU SHOULD GO TO YOUR NEAREST EMERGENCY ROOM.    --  You should be contacted by our office within 2-3 days to schedule follow up or next appointment date.  If not contacted within 7 days, please call the office at [phone]

## 2022-12-07 ENCOUNTER — HOSPITAL ENCOUNTER (OUTPATIENT)
Dept: GENERAL RADIOLOGY | Facility: SURGERY CENTER | Age: 67
Setting detail: HOSPITAL OUTPATIENT SURGERY
End: 2022-12-07

## 2022-12-07 ENCOUNTER — HOSPITAL ENCOUNTER (OUTPATIENT)
Facility: SURGERY CENTER | Age: 67
Setting detail: HOSPITAL OUTPATIENT SURGERY
Discharge: HOME | End: 2022-12-07
Attending: ANESTHESIOLOGY | Admitting: ANESTHESIOLOGY

## 2022-12-07 VITALS
TEMPERATURE: 98.6 F | RESPIRATION RATE: 20 BRPM | DIASTOLIC BLOOD PRESSURE: 88 MMHG | HEART RATE: 73 BPM | WEIGHT: 230 LBS | BODY MASS INDEX: 31.15 KG/M2 | OXYGEN SATURATION: 98 % | SYSTOLIC BLOOD PRESSURE: 137 MMHG | HEIGHT: 72 IN

## 2022-12-07 DIAGNOSIS — M48.061 SPINAL STENOSIS OF LUMBAR REGION, UNSPECIFIED WHETHER NEUROGENIC CLAUDICATION PRESENT: ICD-10-CM

## 2022-12-07 DIAGNOSIS — M54.16 LUMBAR RADICULITIS: ICD-10-CM

## 2022-12-07 DIAGNOSIS — M51.26 DISPLACEMENT OF LUMBAR INTERVERTEBRAL DISC WITHOUT MYELOPATHY: ICD-10-CM

## 2022-12-07 DIAGNOSIS — Z41.9 SURGERY, ELECTIVE: ICD-10-CM

## 2022-12-07 PROCEDURE — 76000 FLUOROSCOPY <1 HR PHYS/QHP: CPT

## 2022-12-07 PROCEDURE — 64483 NJX AA&/STRD TFRM EPI L/S 1: CPT | Performed by: ANESTHESIOLOGY

## 2022-12-07 PROCEDURE — 25010000002 IOPAMIDOL 61 % SOLUTION: Performed by: ANESTHESIOLOGY

## 2022-12-07 PROCEDURE — 25010000002 FENTANYL CITRATE (PF) 50 MCG/ML SOLUTION: Performed by: ANESTHESIOLOGY

## 2022-12-07 PROCEDURE — 64484 NJX AA&/STRD TFRM EPI L/S EA: CPT | Performed by: ANESTHESIOLOGY

## 2022-12-07 PROCEDURE — 25010000002 MIDAZOLAM PER 1 MG: Performed by: ANESTHESIOLOGY

## 2022-12-07 PROCEDURE — 77002 NEEDLE LOCALIZATION BY XRAY: CPT

## 2022-12-07 RX ORDER — SODIUM CHLORIDE 0.9 % (FLUSH) 0.9 %
10 SYRINGE (ML) INJECTION AS NEEDED
Status: DISCONTINUED | OUTPATIENT
Start: 2022-12-07 | End: 2022-12-07 | Stop reason: HOSPADM

## 2022-12-07 RX ORDER — SODIUM CHLORIDE 0.9 % (FLUSH) 0.9 %
10 SYRINGE (ML) INJECTION EVERY 12 HOURS SCHEDULED
Status: DISCONTINUED | OUTPATIENT
Start: 2022-12-07 | End: 2022-12-07 | Stop reason: HOSPADM

## 2022-12-07 RX ORDER — FENTANYL CITRATE 50 UG/ML
INJECTION, SOLUTION INTRAMUSCULAR; INTRAVENOUS AS NEEDED
Status: DISCONTINUED | OUTPATIENT
Start: 2022-12-07 | End: 2022-12-07 | Stop reason: HOSPADM

## 2022-12-07 RX ORDER — MIDAZOLAM HYDROCHLORIDE 1 MG/ML
INJECTION INTRAMUSCULAR; INTRAVENOUS AS NEEDED
Status: DISCONTINUED | OUTPATIENT
Start: 2022-12-07 | End: 2022-12-07 | Stop reason: HOSPADM

## 2022-12-07 NOTE — OP NOTE
Lumbar Transforaminal Epidural Steroid Injection Right L2-3 and Right L4-5 Levels  [facility]    PREOPERATIVE DIAGNOSIS:  Lumbar radicular pain, Lumbar Spinal Stenosis without Neurogenic Claudication and Lumbar Radiculopathy    POSTOPERATIVE DIAGNOSIS:  Same as preop diagnosis    PROCEDURE:    1. CPT 79778 --  Diagnostic & Therapeutic Transforaminal Epidural Steroid Injection at the L2 level, on the right   2. CPT 48416 --  Diagnostic & Therapeutic Transforaminal Epidural Steroid Injection at the L4 level, on the right     PRE-PROCEDURE DISCUSSION WITH PATIENT:    Risks and complications were discussed with the patient prior to starting the procedure and informed consent was obtained.  We discussed various topics including but not limited to bleeding, infection, injury, nerve injury, paralysis, coma, death, postprocedural painful flare-up, postprocedural site soreness, and a lack of pain relief.  We discussed the diagnostic aspect of transforaminal epidural / selective nerve root blockade.    SURGEON:  Isaura Torres MD    SEDATION:  Versed 1mg & Fentanyl 50 mcg IV  ANESTHETIC:  0.5% bupivacaine  STEROID:  15mg dexamethasone    DESCRIPTON OF PROCEDURE:  After obtaining informed consent, an I.V. was started in the preoperative area. The patient taken to the operating room and was placed in the prone position with a pillow under the abdomen.  All pressure points were well padded.  EKG, blood pressure, and pulse oximeter were monitored.  The lumbar area was prepped with Chloraprep and draped in a sterile fashion.     The AP fluoroscopic image was used to visualize the L2 vertebral body.  The superior endplate was squared off radiographically.  The image was then obliqued towards the patient's right side to maximize visualization of the pedicle. The skin and subcutaneous tissue at the 6 o'clock position of the pedicle was anesthetized with 1% lidocaine. A 22-gauge spinal needle was then advanced  percutaneously through the anesthetized skin tract under fluoroscopic guidance in AP and lateral views until the needle tip lie in the arlene-lateral aspect of the epidural space.  After negative aspiration of the needle for blood or CSF, a volume of 1.5 mL of Isovue was injected producing good epidural spread with no evidence of loculation, vascular run-off, or intrathecal spread. Subsequently, a volume of 3 mL of injectate was administered without resistance.  The needle was removed intact.  Vital signs were stable throughout.      The same procedure was then performed at the right L4-5 foramen in the exact same fashion.      The total volume injected consisted of 15 mg of dexamethasone with 1 mL of 0.5% bupivacaine and preservative-free normal saline.       ESTIMATED BLOOD LOSS:  <5 mL  SPECIMENS:  none    COMPLICATIONS:   No complications were noted., There was no indication of vascular uptake on live injection of contrast dye. and There was no indication of intrathecal uptake on live injection of contrast dye.    TOLERANCE & DISCHARGE CONDITION:    The patient tolerated the procedure well.  The patient was transported to the recovery area without difficulties.  The patient was discharged to home under the care of family in stable and satisfactory condition.    PLAN OF CARE:  1. The patient was given our standard instruction sheet.  2. The patient will Return to clinic 1-2 wks.  3. The patient will resume all medications as per the medication reconciliation sheet.

## 2022-12-12 ENCOUNTER — OFFICE VISIT (OUTPATIENT)
Dept: GASTROENTEROLOGY | Facility: CLINIC | Age: 67
End: 2022-12-12

## 2022-12-12 VITALS
HEART RATE: 74 BPM | TEMPERATURE: 97.7 F | BODY MASS INDEX: 33.56 KG/M2 | HEIGHT: 73 IN | DIASTOLIC BLOOD PRESSURE: 80 MMHG | SYSTOLIC BLOOD PRESSURE: 136 MMHG | WEIGHT: 253.2 LBS

## 2022-12-12 DIAGNOSIS — K76.6 PORTAL HYPERTENSIVE GASTROPATHY: ICD-10-CM

## 2022-12-12 DIAGNOSIS — Z86.010 PERSONAL HISTORY OF COLONIC POLYPS: ICD-10-CM

## 2022-12-12 DIAGNOSIS — F10.20 ALCOHOLISM: Primary | ICD-10-CM

## 2022-12-12 DIAGNOSIS — K31.89 PORTAL HYPERTENSIVE GASTROPATHY: ICD-10-CM

## 2022-12-12 DIAGNOSIS — K76.0 FATTY LIVER DISEASE, NONALCOHOLIC: ICD-10-CM

## 2022-12-12 DIAGNOSIS — I85.00 ESOPHAGEAL VARICES WITHOUT BLEEDING, UNSPECIFIED ESOPHAGEAL VARICES TYPE: ICD-10-CM

## 2022-12-12 PROCEDURE — 99214 OFFICE O/P EST MOD 30 MIN: CPT | Performed by: NURSE PRACTITIONER

## 2022-12-12 NOTE — PROGRESS NOTES
Chief Complaint   Patient presents with   • Anemia       HPI    Mo Willoughby is a  67 y.o. male here for a follow up visit for iron deficiency anemia.    This patient follows with Dr. Vilchis, new to me.    Underwent endoscopic examination for the symptoms with small tubular adenomatous polyp on colonoscopy and EGD with mild gastritis, negative H. pylori, grade 1 esophageal varices, portal hypertensive gastropathy.  Concern for questionable cirrhosis given his history of alcohol abuse with patient reporting abstaining from alcohol since May 2022.    He is here today to follow-up with serology to determine whether or not he actually has cirrhosis secondary to alcohol versus alcoholic hepatitis which should have resolved by now.    Currently he has been able to maintain alcohol abstinence.  He feels quite well.  He is compliant with daily PPI therapy.  He denies abdominal pain, rectal pain, rectal bleeding, nausea, vomiting, poor appetite, or weight loss.    Past Medical History:   Diagnosis Date   • Abnormal TSH    • Ascending aortic aneurysm    • Colon polyp 5/5/22   • CRI (chronic renal insufficiency), stage 3 (moderate) (HCC) 2016    from stage 3A to 4   • ED (erectile dysfunction)    • Erectile dysfunction    • Essential hypertension, benign    • Folic acid deficiency    • Gout    • Hx of colonic polyp    • Hypercalcemia 2015    as far back as data goes so likely pre-dates even this time   • Hyperparathyroidism, unspecified (HCC) 2016    as far back as data goes likely pre-dates even this date, likely multifactorial some primary and some secondary , w/ imaging from 10/16 showing possible L inferior adenoma   • Iron deficiency anemia    • Low back pain with bilateral sciatica    • Pancreatitis    • Risk factors for obstructive sleep apnea    • Syncope and collapse 03/28/2017       Past Surgical History:   Procedure Laterality Date   • CHOLECYSTECTOMY WITH INTRAOPERATIVE CHOLANGIOGRAM N/A 08/03/2018    Procedure:  CHOLECYSTECTOMY LAPAROSCOPIC INTRAOPERATIVE CHOLANGIOGRAM;  Surgeon: Melina Kate MD;  Location: [facility] MAIN OR;  Service: General   • COLONOSCOPY     • COLONOSCOPY N/A 10/03/2016    Procedure: COLONOSCOPY TO CECUM TO TERMINAL ILIUM WITH COLD BIOPSY POLYPECTOMY;  Surgeon: Benoit Vilchis MD;  Location: [facility] ENDOSCOPY;  Service:    • COLONOSCOPY N/A 05/05/2022    Procedure: COLONOSCOPY TO CECUM WITH COLD SNARE POLYPECTOMIES & RESOLUTION CLIP PLACEMENT X 2;  Surgeon: Benoit Mosley MD;  Location: [facility] ENDOSCOPY;  Service: Gastroenterology;  Laterality: N/A;  ANEMIA/POLYPS, HEMORRHOIDS    • ENDOSCOPY N/A 05/05/2022    Procedure: ESOPHAGOGASTRODUODENOSCOPY WITH BX;  Surgeon: Benoit Mosley MD;  Location: [facility] ENDOSCOPY;  Service: Gastroenterology;  Laterality: N/A;  ANEMIA/PORTAL GASTROPATHY, EROSIVE GASTRITIS    • EPIDURAL Right 12/7/2022    Procedure: LUMBAR/SACRAL TRANSFORAMINAL EPIDURAL Right L2-3 and right L4-5;  Surgeon: Isaura Torres MD;  [place] [facility] [place];  Service: Pain Management;  Laterality: Right;   • LIPOMA EXCISION     • TONSILLECTOMY         Scheduled Meds:     Continuous Infusions: No current facility-administered medications for this visit.      PRN Meds:     Allergies   Allergen Reactions   • Zetia [Ezetimibe] Dizziness     Nausea, fatgue       Social History     Socioeconomic History   • Marital status: Single   Tobacco Use   • Smoking status: Never   • Smokeless tobacco: Never   Substance and Sexual Activity   • Alcohol use: Yes     Comment: occasional   • Drug use: No   • Sexual activity: Yes     Partners: Female       Family History   Problem Relation Age of Onset   • Hypertension Mother    • Breast cancer Mother    • Cancer Father    • Liver cancer Father    • Hypertension Father    • Hypertension Sister    • Hypertension Brother        Review of Systems   Constitutional: Negative for activity change, appetite change, fatigue, fever and unexpected weight  change.   HENT: Negative for trouble swallowing.    Respiratory: Negative for apnea, cough, choking, chest tightness, shortness of breath and wheezing.    Cardiovascular: Negative for chest pain, palpitations and leg swelling.   Gastrointestinal: Negative for abdominal distention, abdominal pain, anal bleeding, blood in stool, constipation, diarrhea, nausea, rectal pain and vomiting.       Vitals:    12/12/22 1034   BP: 136/80   Pulse: 74   Temp: 97.7 °F (36.5 °C)       Physical Exam  Constitutional:       Appearance: He is well-developed.   Abdominal:      General: Bowel sounds are normal. There is no distension.      Palpations: Abdomen is soft. There is no mass.      Tenderness: There is no abdominal tenderness. There is no guarding.      Hernia: No hernia is present.   Skin:     General: Skin is warm and dry.      Capillary Refill: Capillary refill takes less than 2 seconds.   Neurological:      Mental Status: He is alert and oriented to person, place, and time.   Psychiatric:         Behavior: Behavior normal.       Assessment    Diagnoses and all orders for this visit:    1. Alcoholism (HCC) (Primary)  -     CBC & Differential  -     Comprehensive Metabolic Panel  -     Protime-INR    2. Fatty liver disease, nonalcoholic  -     CBC & Differential  -     Comprehensive Metabolic Panel  -     Protime-INR    3. Esophageal varices without bleeding, unspecified esophageal varices type (HCC)    4. Portal hypertensive gastropathy (HCC)       Plan    Continue PPI therapy  Labs today to include CBC, CMP, and PT/INR to reassess overall liver health  Continue oral iron supplement  Continue folic acid  Follow-up and further recommendations pending serology         YANIRA Cheung  [facility]  [address] [place] [address]  Office: [phone]    I spent 30 minutes caring for Mo on this date of service. This time includes time spent by me in the following activities:  preparing for the visit, reviewing tests, obtaining and/or reviewing a separately obtained history, performing a medically appropriate examination and/or evaluation , counseling and educating the patient/family/caregiver, ordering medications, tests, or procedures, documenting information in the medical record, independently interpreting results and communicating that information with the patient/family/caregiver and care coordination.

## 2022-12-13 ENCOUNTER — TELEPHONE (OUTPATIENT)
Dept: GASTROENTEROLOGY | Facility: CLINIC | Age: 67
End: 2022-12-13

## 2022-12-13 LAB
ALBUMIN SERPL-MCNC: 3.8 G/DL (ref 3.5–5.2)
ALBUMIN/GLOB SERPL: 1.5 G/DL
ALP SERPL-CCNC: 48 U/L (ref 39–117)
ALT SERPL-CCNC: 36 U/L (ref 1–41)
AST SERPL-CCNC: 35 U/L (ref 1–40)
BASOPHILS # BLD AUTO: 0.04 10*3/MM3 (ref 0–0.2)
BASOPHILS NFR BLD AUTO: 0.7 % (ref 0–1.5)
BILIRUB SERPL-MCNC: 0.7 MG/DL (ref 0–1.2)
BUN SERPL-MCNC: 18 MG/DL (ref 8–23)
BUN/CREAT SERPL: 17.3 (ref 7–25)
CALCIUM SERPL-MCNC: 9.8 MG/DL (ref 8.6–10.5)
CHLORIDE SERPL-SCNC: 106 MMOL/L (ref 98–107)
CO2 SERPL-SCNC: 22.9 MMOL/L (ref 22–29)
CREAT SERPL-MCNC: 1.04 MG/DL (ref 0.76–1.27)
EGFRCR SERPLBLD CKD-EPI 2021: 79.7 ML/MIN/1.73
EOSINOPHIL # BLD AUTO: 0.27 10*3/MM3 (ref 0–0.4)
EOSINOPHIL NFR BLD AUTO: 4.5 % (ref 0.3–6.2)
ERYTHROCYTE [DISTWIDTH] IN BLOOD BY AUTOMATED COUNT: 13.4 % (ref 12.3–15.4)
GLOBULIN SER CALC-MCNC: 2.5 GM/DL
GLUCOSE SERPL-MCNC: 94 MG/DL (ref 65–99)
HCT VFR BLD AUTO: 37.5 % (ref 37.5–51)
HGB BLD-MCNC: 12.9 G/DL (ref 13–17.7)
IMM GRANULOCYTES # BLD AUTO: 0.01 10*3/MM3 (ref 0–0.05)
IMM GRANULOCYTES NFR BLD AUTO: 0.2 % (ref 0–0.5)
INR PPP: 1.05 (ref 0.9–1.1)
LYMPHOCYTES # BLD AUTO: 1.92 10*3/MM3 (ref 0.7–3.1)
LYMPHOCYTES NFR BLD AUTO: 32.1 % (ref 19.6–45.3)
MCH RBC QN AUTO: 35.2 PG (ref 26.6–33)
MCHC RBC AUTO-ENTMCNC: 34.4 G/DL (ref 31.5–35.7)
MCV RBC AUTO: 102.5 FL (ref 79–97)
MONOCYTES # BLD AUTO: 0.76 10*3/MM3 (ref 0.1–0.9)
MONOCYTES NFR BLD AUTO: 12.7 % (ref 5–12)
NEUTROPHILS # BLD AUTO: 2.99 10*3/MM3 (ref 1.7–7)
NEUTROPHILS NFR BLD AUTO: 49.8 % (ref 42.7–76)
NRBC BLD AUTO-RTO: 0 /100 WBC (ref 0–0.2)
PLATELET # BLD AUTO: 120 10*3/MM3 (ref 140–450)
POTASSIUM SERPL-SCNC: 4.4 MMOL/L (ref 3.5–5.2)
PROT SERPL-MCNC: 6.3 G/DL (ref 6–8.5)
PROTHROMBIN TIME: 13.9 SECONDS (ref 11.7–14.2)
RBC # BLD AUTO: 3.66 10*6/MM3 (ref 4.14–5.8)
SODIUM SERPL-SCNC: 139 MMOL/L (ref 136–145)
WBC # BLD AUTO: 5.99 10*3/MM3 (ref 3.4–10.8)

## 2022-12-13 RX ORDER — FERROUS SULFATE 325(65) MG
325 TABLET ORAL
Qty: 30 TABLET | Refills: 0 | Status: SHIPPED | OUTPATIENT
Start: 2022-12-13 | End: 2023-01-14

## 2022-12-13 NOTE — PROGRESS NOTES
Liver function normal.  Hemoglobin just about back to normal.  Would recommend primary care continue to monitor his labs.  We can see him back as needed or in 1 year.

## 2022-12-13 NOTE — TELEPHONE ENCOUNTER
----- Message from YANIRA Cheung sent at 12/13/2022  1:05 PM EST -----  Liver function normal.  Hemoglobin just about back to normal.  Would recommend primary care continue to monitor his labs.  We can see him back as needed or in 1 year.

## 2022-12-14 ENCOUNTER — TELEPHONE (OUTPATIENT)
Dept: GASTROENTEROLOGY | Facility: CLINIC | Age: 67
End: 2022-12-14

## 2022-12-14 NOTE — TELEPHONE ENCOUNTER
Called pt and spoke with sister (ok per REBEL) and advised of Chaya HARPER's note.  She verbalized understanding.

## 2022-12-19 ENCOUNTER — PREP FOR SURGERY (OUTPATIENT)
Dept: SURGERY | Facility: SURGERY CENTER | Age: 67
End: 2022-12-19

## 2022-12-19 ENCOUNTER — OFFICE VISIT (OUTPATIENT)
Dept: PAIN MEDICINE | Facility: CLINIC | Age: 67
End: 2022-12-19

## 2022-12-19 VITALS
DIASTOLIC BLOOD PRESSURE: 85 MMHG | HEIGHT: 73 IN | BODY MASS INDEX: 34.09 KG/M2 | OXYGEN SATURATION: 99 % | HEART RATE: 84 BPM | RESPIRATION RATE: 18 BRPM | TEMPERATURE: 97.3 F | SYSTOLIC BLOOD PRESSURE: 138 MMHG | WEIGHT: 257.2 LBS

## 2022-12-19 DIAGNOSIS — M47.816 LUMBAR FACET ARTHROPATHY: Primary | ICD-10-CM

## 2022-12-19 DIAGNOSIS — M54.16 LUMBAR RADICULITIS: ICD-10-CM

## 2022-12-19 PROCEDURE — 99214 OFFICE O/P EST MOD 30 MIN: CPT | Performed by: PHYSICIAN ASSISTANT

## 2022-12-19 RX ORDER — SODIUM CHLORIDE 0.9 % (FLUSH) 0.9 %
10 SYRINGE (ML) INJECTION EVERY 12 HOURS SCHEDULED
Status: CANCELLED | OUTPATIENT
Start: 2022-12-19

## 2022-12-19 RX ORDER — SODIUM CHLORIDE 0.9 % (FLUSH) 0.9 %
10 SYRINGE (ML) INJECTION AS NEEDED
Status: CANCELLED | OUTPATIENT
Start: 2022-12-19

## 2022-12-19 NOTE — PROGRESS NOTES
CHIEF COMPLAINT    Procedure follow up    Subjective   Mo Willoughby is a 67 y.o. male  who presents to the office for follow-up of procedure.  He completed a Lumbar Transforaminal Epidural Steroid Injection Right L2-3 and Right L4-5 Levels   on 12/7/22 performed by Dr. Torres for management of low back and lower extremity pain. Patient reports 50% relief from the procedure ongoing as it pertains to the lower extremity symptoms.  His primary pain complaint today is pain within the right lumbar paraspinal region referring into the right posterior buttock and hip which is aggravated by any type of lumbar facet loading maneuvers.    Patient is scheduled for neurosurgical evaluation with Dr. Abel Perez for 6/5/2023.    Pain today 6/10 VAS in severity.        Back Pain  This is a chronic problem. The current episode started more than 1 year ago. The problem occurs constantly. The problem is unchanged. The pain is present in the lumbar spine. The quality of the pain is described as aching and shooting. The pain is at a severity of 6/10. The pain is moderate. The pain is worse during the day. The symptoms are aggravated by bending, standing, position and twisting. Stiffness is present all day. Pertinent negatives include no chest pain, dysuria, fever or numbness.        PEG Assessment   What number best describes your pain on average in the past week?6  What number best describes how, during the past week, pain has interfered with your enjoyment of life?5  What number best describes how, during the past week, pain has interfered with your general activity?  6    Review of Pertinent Medical Data ---    10/10/22 MRI OF THE LUMBAR SPINE WITHOUT CONTRAST     CLINICAL HISTORY:Acute low back pain without sciatica.     TECHNIQUE: MRI of the lumbar spine was obtained with sagittal T1,  proton-density, and T2-weighted images. Additionally, there are axial T1  and T2-weighted images through the lumbar spine.     COMPARISON:No  previous similar studies are available for comparison.     FINDINGS:     The conus medullaris terminates at the level of the mid body of L2 and  has normal signal intensity. The visualized distal thoracic spinal cord  is unremarkable.     At T11-T12, there is a disc bulge which mildly indents the ventral  subarachnoid space. There is mild-to-moderate left foraminal narrowing  secondary to facet hypertrophic change. At T12-L1, there is a disc bulge  with a posterior annular fissure. Additionally, there may be small left  central disc protrusion as well at this level. There are no axial images  obtained through the T12-L1 level. Overall, a mild-to-moderate degree of  canal stenosis is seen secondary to bulging disc material in addition to  a potential protrusion. No significant foraminal stenosis is seen at  T12-L1.     At L1-L2, there is a disc bulge which results in a mild degree of canal  and bilateral foraminal narrowing. Prominent fluid signal intensity is  seen within the L1-L2 interspinous region which is degenerative in  nature.     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  Additionally, there is a disc bulge in addition to a left central/left  subarticular disc protrusion that results in a moderate degree of canal  stenosis. There is mild-to-moderate bilateral left and moderate right  narrowing secondary to disc bulging and facet hypertrophic change.  Additionally, there is a right foraminal disc protrusion at L2-L3 that  contributes to the L2-L3 foraminal stenosis. Prominent fluid signal  intensity is seen within the L2-L3 interspinous region which is  degenerative in nature.     At L3-L4, the spinal canal is again narrowed on a congenital basis due  to shortened pedicles. Bulging disc material superimposed upon these  congenital phenomena results in a moderate degree of canal stenosis.  There is mild-to-moderate bilateral foraminal narrowing secondary to  bulging disc material  and facet hypertrophic change. Moderate facet  hypertrophy is appreciated at L3-L4. Prominent fluid signal intensity is  seen within the interspinous region at the L3-L4 level which is again  degenerative in nature.     At L4-L5, there is anterior spondylolisthesis of L4 and L5 by  approximately 13 mm. There is severe central canal stenosis secondary to  unroofed bulging disc material, ligamentum flavum thickening, and severe  facet hypertrophic change. Additionally, the spinal canal is relatively  narrow on a congenital basis at the L4-L5 level. There is a moderate  degree of bilateral foraminal narrowing secondary to bulging disc  material and facet hypertrophy.     At L5-S1, there is a disc bulge and facet hypertrophy that results in a  mild-to-moderate degree of right and a moderate degree of left foraminal  stenosis.     IMPRESSION:     The spinal canal is relatively narrow on a congenital basis due to  shortened pedicles from L2-L3 down to L4-L5. Superimposed degenerative  phenomena are seen at these levels and overall, there is moderate L2-L3,  moderate L3-L4, and severe L4-L5 canal stenosis as discussed in detail  above.     Degenerative anterior spondylolisthesis of L4 onL5 by approximately 13  mm is seen.     The remaining multilevel degenerative phenomena including multilevel  foraminal stenosis is discussed in detail above.           This report was finalized on 10/12/2022 6:09 AM by Dr. Elfego Navarro M.D.     8/24/22 X-RAY SPINE LUMBAR TWO OR THREE-VIEW     HISTORY: 67-year-old male with back pain.     FINDINGS: There is moderate spondylosis at L2-3 and there is 5 mm  retrolisthesis of L2 on L3. There is 1.5 cm anterolisthesis of L4 on L5,  likely degenerative. Vertebral body height is within normal limits and  no acute abnormality is seen. Further evaluation with MRI should be  considered.     This report was finalized on 8/24/2022 1:22 PM by Dr. Prachi Costa M.D.    The following portions of the  "patient's history were reviewed and updated as appropriate: allergies, current medications, past family history, past medical history, past social history, past surgical history and problem list.    Review of Systems   Constitutional: Negative for fever.   HENT: Negative for congestion.    Eyes: Negative for visual disturbance.   Respiratory: Negative for shortness of breath.    Cardiovascular: Negative for chest pain.   Gastrointestinal: Negative for constipation and diarrhea.   Genitourinary: Negative for difficulty urinating and dysuria.   Musculoskeletal: Positive for back pain.   Neurological: Negative for numbness.   Psychiatric/Behavioral: Positive for sleep disturbance.     I have reviewed and confirmed the accuracy of the ROS as documented by the MA/LPN/RN IVANA Paredes     Vitals:    12/19/22 1240   BP: 138/85   Pulse: 84   Resp: 18   Temp: 97.3 °F (36.3 °C)   SpO2: 99%   Weight: 117 kg (257 lb 3.2 oz)   Height: 185.4 cm (72.99\")   PainSc:   6   PainLoc: Back         Objective   Physical Exam  Vitals and nursing note reviewed. Chaperone present: WIFE PRESENT.   Constitutional:       Appearance: Normal appearance.   HENT:      Head: Normocephalic.   Pulmonary:      Effort: Pulmonary effort is normal.   Musculoskeletal:      Lumbar back: Tenderness: Nontender to palpation within the overlying bilateral lumbar facet joint spaces. Decreased range of motion.      Comments: Pain is increased with lumbar extension and lateral rotation   Skin:     General: Skin is warm and dry.   Neurological:      General: No focal deficit present.      Mental Status: He is alert and oriented to person, place, and time.      Cranial Nerves: Cranial nerves 2-12 are intact.      Sensory: Sensation is intact.      Motor: Motor function is intact.      Gait: Gait abnormal.   Psychiatric:         Mood and Affect: Mood normal.         Behavior: Behavior normal.         Thought Content: Thought content normal.         Judgment: " Judgment normal.         Assessment & Plan   Diagnoses and all orders for this visit:    1. Lumbar facet arthropathy (Primary)    2. Lumbar radiculitis        --- Based on patient's history physical exam findings I recommend proceeding with #1 diagnostic right L3-5 MBB with plan to progress to RFA assuming favorable response.  --- RTC in 1-2 weeks after injective therapy    Diagnostic Facet Joint Procedure:   MBB     The first diagnostic facet joint procedure is considered medically reasonable and necessary for the diagnosis and treatment of chronic pain for this patient due to the patient meeting all of the following criteria:    - 1. Moderate to severe chronic neck or low back pain, predominantly axial, that causes functional deficit measured on pain or disability scale.  - 2. Pain present for minimum of 3 months with documented failure to respond to noninvasive conservative management (as tolerated)  - 3. Absence of untreated radiculopathy or neurogenic claudication (except for radiculopathy caused by facet joint synovial cyst)  - 4. There is no non-facet pathology per clinical assessment or radiology studies that could explain the source of the patient’s pain, including but not limited to fracture, tumor, infection, or significant deformity.    The confirmatory diagnostic facet joint procedure is considered medically reasonable and necessary for the diagnosis and treatment of chronic pain for this patient due to the patient meeting all of the following criteria:    - 1. Moderate to severe chronic neck or low back pain, predominantly axial, that causes functional deficit measured on pain or disability scale.  - 2. Pain present for minimum of 3 months with documented failure to respond to noninvasive conservative management (as tolerated)  - 3. Absence of untreated radiculopathy or neurogenic claudication (except for radiculopathy caused by facet joint synovial cyst)  - 4. There is no non-facet pathology per  clinical assessment or radiology studies that could explain the source of the patient’s pain, including but not limited to fracture, tumor, infection, or significant deformity.    The patient has also shown a consistent positive response of at least 80% relief of primary (index) pain (with the duration of relief being consistent with the agent used).            Dictated utilizing Dragon dictation.      Patient remained masked during entire encounter. No cough present. I donned a mask and eye protection throughout entire visit. Prior to donning mask and eye protection, hand hygiene was performed, as well as when it was doffed.  I was closer than 6 feet, but not for an extended period of time. No obvious exposure to any bodily fluids.

## 2022-12-19 NOTE — PATIENT INSTRUCTIONS
"-------  Education about Medial Branch Blockade and RF Therapy:    This medial branch blockade (MBB) suggested is intended for diagnostic purposes, with the intent of offering the patient Radiofrequency thermal rhizotomy (RF) if the MBB is diagnostically effective.  The diagnostic blockade is necessary to determine the likelihood that RF therapy could be efficacious in providing long term relief to the patient.    Medial branches are sensory nerve branches that connect to a facet joint and transmit sensations & pain signals from that joint.  Facet is a term for the type of joints found in the spine.  Medial branches are the nerves that go to a facet, and therefore are also sometimes called \"facet joint nerves\" (FJNs).      In a medial branch blockade procedure, xray fluoroscopy is used to verify the locations of the outside of the joint lines which are being targeted.  Under xray guidance, needles are placed to these areas.  Contrast dye is injected to confirm proper placement, with dye flowing over the joint area, and to ensure that the dye does not flow into unintended areas such as a vein.  When this is confirmed, local anesthetic is injected to block the medial branch at that joint level.      If MBBs are diagnostically successful in blocking pain, then the patient is most likely a great candidate for Radiofrequency of those facet joint nerves.  In the RF procedure, needles are placed to the joint lines in the same fashion, and after testing, the needle tips are heated to thermally treat the nerves, blocking the nerves by in essence damaging the nerves with the heat treatment.       Medically, a successful RF procedure should provide a patient with 50% pain relief or more for at least 6 months.  Clinical experience suggests that successful patients receive relief more in the range of 12 months on average.  We also discussed that a fortunate minority of patients receive therapeutic success from the MBB, and may " not require RF ablation.  If a patient receives more than 8 weeks of relief from MBB, then occasional repeat MBB for therapeutic purposes is a very reasonable alternative therapy.  This course of therapy is consistent with our LCDs according to our CMS  in the area, and therefore other insurance providers should follow accordingly.  We will monitor our patients to screen for these therapeutic responders and will offer RF therapy only when necessary.        We discussed that MBB & RF are not without risks.  Guidelines regarding anticoagulant use & neuraxial procedures will be respected.  Patients that are ill or otherwise may be at risk for sepsis will not have their spines accessed by neuraxial injections of any type.  This patient will not be offered these therapies if there is an increased risk.   We discussed that there is a risk of postprocedural pain and also a risk of worsening of clinical picture with these procedures as with any neuraxial procedure.    -------

## 2022-12-28 ENCOUNTER — TRANSCRIBE ORDERS (OUTPATIENT)
Dept: SURGERY | Facility: SURGERY CENTER | Age: 67
End: 2022-12-28

## 2022-12-28 DIAGNOSIS — Z41.9 SURGERY, ELECTIVE: Primary | ICD-10-CM

## 2023-01-09 ENCOUNTER — TRANSCRIBE ORDERS (OUTPATIENT)
Dept: SURGERY | Facility: SURGERY CENTER | Age: 68
End: 2023-01-09
Payer: MEDICARE

## 2023-01-09 DIAGNOSIS — Z41.9 SURGERY, ELECTIVE: Primary | ICD-10-CM

## 2023-01-14 RX ORDER — FERROUS SULFATE 325(65) MG
TABLET ORAL
Qty: 30 TABLET | Refills: 0 | Status: SHIPPED | OUTPATIENT
Start: 2023-01-14

## 2023-01-20 NOTE — H&P
[facility]   HISTORY AND PHYSICAL    Patient Name: Mo Willoughby  : 1955  MRN: [id number]  Primary Care Physician:  Jenn Davison APRN  Date of admission: 2023    Subjective   Subjective     Chief Complaint: Low back pain    History of Present Illness  Aching and shooting low back pain that is unresolved after epidural steroid injection.  His lower extremity symptoms have improved after transforaminal epidural steroids, however target today is his low back pain.      Review of Systems   Constitutional: Negative for chills and fever.   Respiratory: Negative for cough and shortness of breath.    Musculoskeletal: Positive for back pain.        Personal History     Past Medical History:   Diagnosis Date   • Abnormal TSH    • Ascending aortic aneurysm    • Colon polyp 22   • CRI (chronic renal insufficiency), stage 3 (moderate) (HCC) 2016    from stage 3A to 4   • ED (erectile dysfunction)    • Erectile dysfunction    • Essential hypertension, benign    • Folic acid deficiency    • Gout    • Hx of colonic polyp    • Hypercalcemia     as far back as data goes so likely pre-dates even this time   • Hyperparathyroidism, unspecified (HCC)     as far back as data goes likely pre-dates even this date, likely multifactorial some primary and some secondary , w/ imaging from 10/16 showing possible L inferior adenoma   • Iron deficiency anemia    • Low back pain with bilateral sciatica    • Pancreatitis    • Risk factors for obstructive sleep apnea    • Syncope and collapse 2017       Past Surgical History:   Procedure Laterality Date   • CHOLECYSTECTOMY WITH INTRAOPERATIVE CHOLANGIOGRAM N/A 2018    Procedure: CHOLECYSTECTOMY LAPAROSCOPIC INTRAOPERATIVE CHOLANGIOGRAM;  Surgeon: Melina Kate MD;  Location: [facility];  Service: General   • COLONOSCOPY     • COLONOSCOPY N/A 10/03/2016    Procedure: COLONOSCOPY TO CECUM TO TERMINAL ILIUM WITH COLD BIOPSY POLYPECTOMY;  Surgeon:  Benoit Vilchis MD;  Location: [facility] ENDOSCOPY;  Service:    • COLONOSCOPY N/A 05/05/2022    Procedure: COLONOSCOPY TO CECUM WITH COLD SNARE POLYPECTOMIES & RESOLUTION CLIP PLACEMENT X 2;  Surgeon: Benoit Mosley MD;  Location: [facility] ENDOSCOPY;  Service: Gastroenterology;  Laterality: N/A;  ANEMIA/POLYPS, HEMORRHOIDS    • ENDOSCOPY N/A 05/05/2022    Procedure: ESOPHAGOGASTRODUODENOSCOPY WITH BX;  Surgeon: Benoit Mosley MD;  Location: [facility] ENDOSCOPY;  Service: Gastroenterology;  Laterality: N/A;  ANEMIA/PORTAL GASTROPATHY, EROSIVE GASTRITIS    • EPIDURAL Right 12/7/2022    Procedure: LUMBAR/SACRAL TRANSFORAMINAL EPIDURAL Right L2-3 and right L4-5;  Surgeon: Isaura Torres MD;  Location: [facility] OR;  Service: Pain Management;  Laterality: Right;   • LIPOMA EXCISION     • TONSILLECTOMY         Family History: family history includes Breast cancer in his mother; Cancer in his father; Hypertension in his brother, father, mother, and sister; Liver cancer in his father. Otherwise pertinent FHx was reviewed and not pertinent to current issue.    Social History:  reports that he has never smoked. He has never used smokeless tobacco. He reports current alcohol use. He reports that he does not use drugs.    Home Medications:  allopurinol, ferrous sulfate, folic acid, lisinopril, pantoprazole, and thiamine    Allergies:  Allergies   Allergen Reactions   • Zetia [Ezetimibe] Dizziness     Nausea, fatgue       Objective    Objective     Vitals:        Physical Exam  Constitutional:       General: He is not in acute distress.  Pulmonary:      Effort: Pulmonary effort is normal. No respiratory distress.   Neurological:      Mental Status: He is alert.   Psychiatric:         Mood and Affect: Mood normal.         Thought Content: Thought content normal.         Result Review    Result Review:  I have personally reviewed the results from the time of this admission to 1/23/2023 09:48 EST and agree with these  findings:  []  Laboratory list / accordion  []  Microbiology  []  Radiology  []  EKG/Telemetry   []  Cardiology/Vascular   []  Pathology  []  Old records  []  Other:  Most notable findings include: No new      Assessment & Plan   Assessment / Plan     Brief Patient Summary:  Mo Willoughby is a 67 y.o. male who has chronic low back pain    Active Hospital Problems:  Active Hospital Problems    Diagnosis    • **Lumbar facet arthropathy      Plan: Lumbar medial branch blocks      DVT prophylaxis:  No DVT prophylaxis order currently exists.      Isaura Torres MD

## 2023-01-20 NOTE — DISCHARGE INSTRUCTIONS
[facility] - Post-procedure Instructions          --  While there are no absolute restrictions, it is recommended that you do not perform strenuous activity today. In the morning, you may resume your level of activity as before your block.    --  If you have a band-aid at your injection site, please remove it later today. Observe the area for any redness, swelling, pus-like drainage, or a temperature over 101°. If any of these symptoms occur, please call your doctor at [phone]. If after office hours, leave a message and the on-call provider will return your call.    --  Ice may be applied to your injection site. It is recommended you avoid direct heat (heating pad; hot tub) for 1-2 days.    --  Call [facility] Management at [phone] if you experience persistent headache, persistent bleeding from the injection site, or severe pain not relieved by heat or oral medication.    --  Do not make important decisions today.    --  Due to the effects of the block and/or the I.V. Sedation, DO NOT drive or operate hazardous machinery for 12 hours.  Local anesthetics may cause numbness after procedure and precautions must be taken with regards to operating equipment as well as with walking, even if ambulating with assistance of another person or with an assistive device.    --  Do not drink alcohol for 12 hours.    -- You may return to work tomorrow, or as directed by your referring doctor.    --  Occasionally you may notice a slight increase in your pain after the procedure. This should start to improve within the next 24-48 hours. Radiofrequency ablation procedure pain may last 3-4 weeks.    --  It may take as long as 3-4 days before you notice a gradual improvement in your pain and/or other symptoms.    -- You may continue to take your prescribed pain medication as needed.    --  Some normal possible side effects of steroid use could include fluid retention, increased blood sugar, dull headache,  increased sweating, increased appetite, mood swings and flushing.    --  Diabetics are recommended to watch their blood glucose level closely for 24-48 hours after the injection.    --  Must stay in PACU for 20 min upon arrival and prove no leg weakness before being discharged.    --  IN THE EVENT OF A LIFE THREATENING EMERGENCY, (CHEST PAIN, BREATHING DIFFICULTIES, PARALYSIS…) YOU SHOULD GO TO YOUR NEAREST EMERGENCY ROOM.    --  You should be contacted by our office within 2-3 days to schedule follow up or next appointment date.  If not contacted within 7 days, please call the office at [phone]    If you have even 1 hour of relief, these injections are considered successful.

## 2023-01-23 ENCOUNTER — HOSPITAL ENCOUNTER (OUTPATIENT)
Facility: SURGERY CENTER | Age: 68
Setting detail: HOSPITAL OUTPATIENT SURGERY
Discharge: HOME | End: 2023-01-23
Attending: ANESTHESIOLOGY | Admitting: ANESTHESIOLOGY
Payer: MEDICARE

## 2023-01-23 ENCOUNTER — HOSPITAL ENCOUNTER (OUTPATIENT)
Dept: GENERAL RADIOLOGY | Facility: SURGERY CENTER | Age: 68
Setting detail: HOSPITAL OUTPATIENT SURGERY
End: 2023-01-23
Payer: MEDICARE

## 2023-01-23 VITALS
DIASTOLIC BLOOD PRESSURE: 97 MMHG | TEMPERATURE: 98 F | RESPIRATION RATE: 16 BRPM | BODY MASS INDEX: 33.13 KG/M2 | HEART RATE: 75 BPM | OXYGEN SATURATION: 100 % | HEIGHT: 73 IN | WEIGHT: 250 LBS | SYSTOLIC BLOOD PRESSURE: 141 MMHG

## 2023-01-23 DIAGNOSIS — Z41.9 SURGERY, ELECTIVE: ICD-10-CM

## 2023-01-23 PROCEDURE — 64494 INJ PARAVERT F JNT L/S 2 LEV: CPT | Performed by: ANESTHESIOLOGY

## 2023-01-23 PROCEDURE — 76000 FLUOROSCOPY <1 HR PHYS/QHP: CPT

## 2023-01-23 PROCEDURE — 77002 NEEDLE LOCALIZATION BY XRAY: CPT

## 2023-01-23 PROCEDURE — 64493 INJ PARAVERT F JNT L/S 1 LEV: CPT | Performed by: ANESTHESIOLOGY

## 2023-01-23 RX ORDER — SODIUM CHLORIDE 0.9 % (FLUSH) 0.9 %
10 SYRINGE (ML) INJECTION EVERY 12 HOURS SCHEDULED
Status: DISCONTINUED | OUTPATIENT
Start: 2023-01-23 | End: 2023-01-23 | Stop reason: HOSPADM

## 2023-01-23 RX ORDER — SODIUM CHLORIDE 0.9 % (FLUSH) 0.9 %
10 SYRINGE (ML) INJECTION AS NEEDED
Status: DISCONTINUED | OUTPATIENT
Start: 2023-01-23 | End: 2023-01-23 | Stop reason: HOSPADM

## 2023-01-23 NOTE — OP NOTE
Lumbar Medial Branch Blockade at  Right L4-S1  [facility]      PREOPERATIVE DIAGNOSIS:  Lumbar spondylosis without myelopathy    POSTOPERATIVE DIAGNOSIS:  Lumbar spondylosis without myelopathy    PROCEDURE:   Diagnostic Lumbar Medial Branch Nerve Blockades, with fluoroscopy:  at the L4, L5 transverse processes and the sacral alar groove)   1. 51045-UO -- Lumbar Facet blocks, 1st Level  2. 80412-BD -- Lumbar Facet blocks, 2nd  Level     PRE-PROCEDURE DISCUSSION WITH PATIENT:    Risks and complications were discussed with the patient prior to starting the procedure and informed consent was obtained.      SURGEON:  Isaura Torres MD    REASON FOR PROCEDURE:    The patient complains of pain that seems to have a significant axial component and Tenderness of the affected facet joints on exam under fluoroscopy    SEDATION:  Patient declined administration of moderate sedation    ANESTHETIC:  0.75% bupivacaine  STEROID:  None  TOTAL VOLUME OF SOLUTION:  3ml    DESCRIPTON OF PROCEDURE:  After obtaining informed consent, IV access was not obtained in the preoperative area.   The patient was taken to the operating room.  The patient was placed in the prone position with a pillow under the abdomen. All pressure points were well padded.  EKG, blood pressure, and pulse oximeter were monitored.  The patient was monitored and sedated by the RN under my direction. The lumbosacral area was prepped with Chloraprep and draped in a sterile fashion.     AP fluoroscopic image was used to visualize the junction of the right S1 superior articular process with the sacral ala.  The skin and subcutaneous tissue over the area was anesthetized with 1% lidocaine.  A 22-gauge spinal needle was then advanced percutaneously through the anesthetized skin tract under fluoroscopic guidance in a coaxial view to contact periosteum.  After negative aspiration, a volume of 1 mL of the local anesthetic was injected without resistance.   The image was then optimized to maximize visualization of the junctions of the L4, L5 superior articular processes with the transverse processes.  The skin and subcutaneous tissue over the areas was anesthetized with 1% lidocaine.  A 22-gauge spinal needle was then advanced percutaneously through the anesthetized skin tracts under fluoroscopic guidance in a coaxial view to contact periosteum at the target sites.  After negative aspiration, a volume of 1 mL of the local anesthetic  was injected without resistance at each of the target sites.        Onset of analgesia was noted.  Vital signs remained stable throughout.      ESTIMATED BLOOD LOSS:  <5 mL  SPECIMENS:  none    COMPLICATIONS:   No complications were noted.    TOLERANCE & DISCHARGE CONDITION:    The patient tolerated the procedure well.  The patient was transported to the recovery area without difficulties.  The patient was discharged to home under the care of family in stable and satisfactory condition.    Pre-procedure pain score: 5/10 at rest, 8/10 with activity  Post-procedure pain score: 0/10    PLAN OF CARE:  1. The patient was given our standard instruction sheet.  2. We discussed that Lumbar Medial Branch Blockade is a diagnostic procedure in consideration for radiofrequency ablation if two diagnostic procedures prove to be positive for significant benefit.  An alternative plan could be therapeutic lumbar branch blockades.  3. The patient is asked to keep an account of pain relief after the procedure today.  4. The patient will  Return to clinic 1-2 wks.  5. The patient will resume all medications as per the medication reconciliation sheet.

## 2023-01-30 RX ORDER — FOLIC ACID 1 MG/1
1 TABLET ORAL DAILY
Qty: 90 TABLET | Refills: 0 | Status: SHIPPED | OUTPATIENT
Start: 2023-01-30

## 2023-02-06 ENCOUNTER — PREP FOR SURGERY (OUTPATIENT)
Dept: SURGERY | Facility: SURGERY CENTER | Age: 68
End: 2023-02-06
Payer: MEDICARE

## 2023-02-06 ENCOUNTER — OFFICE VISIT (OUTPATIENT)
Dept: PAIN MEDICINE | Facility: CLINIC | Age: 68
End: 2023-02-06
Payer: MEDICARE

## 2023-02-06 VITALS
BODY MASS INDEX: 33.93 KG/M2 | HEART RATE: 84 BPM | TEMPERATURE: 98 F | OXYGEN SATURATION: 97 % | WEIGHT: 256 LBS | RESPIRATION RATE: 16 BRPM | SYSTOLIC BLOOD PRESSURE: 137 MMHG | DIASTOLIC BLOOD PRESSURE: 90 MMHG | HEIGHT: 73 IN

## 2023-02-06 DIAGNOSIS — M47.816 LUMBAR FACET ARTHROPATHY: Primary | ICD-10-CM

## 2023-02-06 DIAGNOSIS — M54.16 LUMBAR RADICULITIS: ICD-10-CM

## 2023-02-06 DIAGNOSIS — M48.061 SPINAL STENOSIS OF LUMBAR REGION WITHOUT NEUROGENIC CLAUDICATION: ICD-10-CM

## 2023-02-06 PROCEDURE — 99214 OFFICE O/P EST MOD 30 MIN: CPT | Performed by: PHYSICIAN ASSISTANT

## 2023-02-06 RX ORDER — SODIUM CHLORIDE 0.9 % (FLUSH) 0.9 %
10 SYRINGE (ML) INJECTION EVERY 12 HOURS SCHEDULED
Status: CANCELLED | OUTPATIENT
Start: 2023-02-06

## 2023-02-06 RX ORDER — SODIUM CHLORIDE 0.9 % (FLUSH) 0.9 %
10 SYRINGE (ML) INJECTION AS NEEDED
Status: CANCELLED | OUTPATIENT
Start: 2023-02-06

## 2023-02-06 NOTE — PROGRESS NOTES
CHIEF COMPLAINT    Procedure follow up     Subjective   Mo Willoughby is a 67 y.o. male  who presents to the office for follow-up of procedure.  He completed a Lumbar Medial Branch Blockade at  Right L4-S1   on  1/23/23 performed by Dr. Torres for management of low back pain. Patient reports 80% relief from the procedure x2 hours with improvement of ROM.  Patient has since noted significant return of right-sided lumbosacral spine pain which is aggravated by any type of lumbar facet loading maneuvers.    Pain today 8/10 VAS in severity.        Back Pain  This is a chronic problem. The current episode started more than 1 year ago. The problem occurs constantly. The problem is unchanged. The pain is present in the lumbar spine. The quality of the pain is described as aching, cramping and burning. The pain does not radiate. The pain is at a severity of 8/10. The pain is moderate. The pain is the same all the time. The symptoms are aggravated by standing, twisting and position. Pertinent negatives include no chest pain, dysuria, fever or numbness.        PEG Assessment   What number best describes your pain on average in the past week?7  What number best describes how, during the past week, pain has interfered with your enjoyment of life?7  What number best describes how, during the past week, pain has interfered with your general activity?  8    Review of Pertinent Medical Data ---    MRI OF THE LUMBAR SPINE WITHOUT CONTRAST     CLINICAL HISTORY:Acute low back pain without sciatica.     TECHNIQUE: MRI of the lumbar spine was obtained with sagittal T1,  proton-density, and T2-weighted images. Additionally, there are axial T1  and T2-weighted images through the lumbar spine.     COMPARISON:No previous similar studies are available for comparison.     FINDINGS:     The conus medullaris terminates at the level of the mid body of L2 and  has normal signal intensity. The visualized distal thoracic spinal cord  is  unremarkable.     At T11-T12, there is a disc bulge which mildly indents the ventral  subarachnoid space. There is mild-to-moderate left foraminal narrowing  secondary to facet hypertrophic change. At T12-L1, there is a disc bulge  with a posterior annular fissure. Additionally, there may be small left  central disc protrusion as well at this level. There are no axial images  obtained through the T12-L1 level. Overall, a mild-to-moderate degree of  canal stenosis is seen secondary to bulging disc material in addition to  a potential protrusion. No significant foraminal stenosis is seen at  T12-L1.     At L1-L2, there is a disc bulge which results in a mild degree of canal  and bilateral foraminal narrowing. Prominent fluid signal intensity is  seen within the L1-L2 interspinous region which is degenerative in  nature.     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  Additionally, there is a disc bulge in addition to a left central/left  subarticular disc protrusion that results in a moderate degree of canal  stenosis. There is mild-to-moderate bilateral left and moderate right  narrowing secondary to disc bulging and facet hypertrophic change.  Additionally, there is a right foraminal disc protrusion at L2-L3 that  contributes to the L2-L3 foraminal stenosis. Prominent fluid signal  intensity is seen within the L2-L3 interspinous region which is  degenerative in nature.     At L3-L4, the spinal canal is again narrowed on a congenital basis due  to shortened pedicles. Bulging disc material superimposed upon these  congenital phenomena results in a moderate degree of canal stenosis.  There is mild-to-moderate bilateral foraminal narrowing secondary to  bulging disc material and facet hypertrophic change. Moderate facet  hypertrophy is appreciated at L3-L4. Prominent fluid signal intensity is  seen within the interspinous region at the L3-L4 level which is again  degenerative in nature.      At L4-L5, there is anterior spondylolisthesis of L4 and L5 by  approximately 13 mm. There is severe central canal stenosis secondary to  unroofed bulging disc material, ligamentum flavum thickening, and severe  facet hypertrophic change. Additionally, the spinal canal is relatively  narrow on a congenital basis at the L4-L5 level. There is a moderate  degree of bilateral foraminal narrowing secondary to bulging disc  material and facet hypertrophy.     At L5-S1, there is a disc bulge and facet hypertrophy that results in a  mild-to-moderate degree of right and a moderate degree of left foraminal  stenosis.     IMPRESSION:     The spinal canal is relatively narrow on a congenital basis due to  shortened pedicles from L2-L3 down to L4-L5. Superimposed degenerative  phenomena are seen at these levels and overall, there is moderate L2-L3,  moderate L3-L4, and severe L4-L5 canal stenosis as discussed in detail  above.     Degenerative anterior spondylolisthesis of L4 onL5 by approximately 13  mm is seen.     The remaining multilevel degenerative phenomena including multilevel  foraminal stenosis is discussed in detail above.      This report was finalized on 10/12/2022 6:09 AM by Dr. Elfego Navarro M.D.    The following portions of the patient's history were reviewed and updated as appropriate: allergies, current medications, past family history, past medical history, past social history, past surgical history and problem list.    Review of Systems   Constitutional: Negative for fever.   HENT: Negative for congestion.    Eyes: Negative for visual disturbance.   Respiratory: Negative for shortness of breath.    Cardiovascular: Negative for chest pain.   Gastrointestinal: Negative for constipation and diarrhea.   Genitourinary: Negative for difficulty urinating and dysuria.   Musculoskeletal: Positive for back pain.   Neurological: Negative for numbness.   Psychiatric/Behavioral: Positive for sleep disturbance.     I  "have reviewed and confirmed the accuracy of the ROS as documented by the MA/LPN/RN IVANA Paredes     Vitals:    02/06/23 1019   BP: 137/90   Pulse: 84   Resp: 16   Temp: 98 °F (36.7 °C)   SpO2: 97%   Weight: 116 kg (256 lb)   Height: 185.4 cm (72.99\")   PainSc:   8   PainLoc: Back         Objective   Physical Exam  Vitals and nursing note reviewed.   Constitutional:       Appearance: Normal appearance.   HENT:      Head: Normocephalic.   Pulmonary:      Effort: Pulmonary effort is normal.   Musculoskeletal:      Lumbar back: Spasms and tenderness (Moderate tenderness to palpation within the overlying right lumbar facet joint spaces as well as within the quadratus lumborum muscles) present. Decreased range of motion.      Comments: Pain is increased with lumbar extension and lateral bending   Skin:     General: Skin is warm and dry.   Neurological:      General: No focal deficit present.      Mental Status: He is alert and oriented to person, place, and time.      Cranial Nerves: Cranial nerves 2-12 are intact.      Sensory: Sensation is intact.      Motor: Motor function is intact.      Gait: Gait is intact.   Psychiatric:         Mood and Affect: Mood normal.         Behavior: Behavior normal.         Thought Content: Thought content normal.         Judgment: Judgment normal.         Assessment & Plan   Diagnoses and all orders for this visit:    1. Lumbar facet arthropathy (Primary)    2. Spinal stenosis of lumbar region without neurogenic claudication    3. Lumbar radiculitis        --- Based on favorable response patient will proceed with #2 confirmatory right L4-S1 MBB with plan to progress to RFA if continued favorable response  --- Patient return for further follow-up after injective therapy         Diagnostic Facet Joint Procedure:   MBB     The confirmatory diagnostic facet joint procedure is considered medically reasonable and necessary for the diagnosis and treatment of chronic pain for this patient " due to the patient meeting all of the following criteria:    - 1. Moderate to severe chronic neck or low back pain, predominantly axial, that causes functional deficit measured on pain or disability scale.  - 2. Pain present for minimum of 3 months with documented failure to respond to noninvasive conservative management (as tolerated)  - 3. Absence of untreated radiculopathy or neurogenic claudication (except for radiculopathy caused by facet joint synovial cyst)  - 4. There is no non-facet pathology per clinical assessment or radiology studies that could explain the source of the patient’s pain, including but not limited to fracture, tumor, infection, or significant deformity.    The patient has also shown a consistent positive response of at least 80% relief of primary (index) pain (with the duration of relief being consistent with the agent used).       Dictated utilizing Dragon dictation.      Patient remained masked during entire encounter. No cough present. I donned a mask and eye protection throughout entire visit. Prior to donning mask and eye protection, hand hygiene was performed, as well as when it was doffed.  I was closer than 6 feet, but not for an extended period of time. No obvious exposure to any bodily fluids.

## 2023-02-09 NOTE — DISCHARGE INSTRUCTIONS
[facility] - Post-procedure Instructions          --  While there are no absolute restrictions, it is recommended that you do not perform strenuous activity today. In the morning, you may resume your level of activity as before your block.    --  If you have a band-aid at your injection site, please remove it later today. Observe the area for any redness, swelling, pus-like drainage, or a temperature over 101°. If any of these symptoms occur, please call your doctor at [phone]. If after office hours, leave a message and the on-call provider will return your call.    --  Ice may be applied to your injection site. It is recommended you avoid direct heat (heating pad; hot tub) for 1-2 days.    --  Call [facility] at [phone] if you experience persistent headache, persistent bleeding from the injection site, or severe pain not relieved by heat or oral medication.    --  Do not make important decisions today.    --  Due to the effects of the block and/or the I.V. Sedation, DO NOT drive or operate hazardous machinery for 12 hours.  Local anesthetics may cause numbness after procedure and precautions must be taken with regards to operating equipment as well as with walking, even if ambulating with assistance of another person or with an assistive device.    --  Do not drink alcohol for 12 hours.    -- You may return to work tomorrow, or as directed by your referring doctor.    --  Occasionally you may notice a slight increase in your pain after the procedure. This should start to improve within the next 24-48 hours. Radiofrequency ablation procedure pain may last 3-4 weeks.    --  It may take as long as 3-4 days before you notice a gradual improvement in your pain and/or other symptoms.    -- You may continue to take your prescribed pain medication as needed.    --  Some normal possible side effects of steroid use could include fluid retention, increased blood sugar, dull headache,  increased sweating, increased appetite, mood swings and flushing.    --  Diabetics are recommended to watch their blood glucose level closely for 24-48 hours after the injection.    --  Must stay in PACU for 20 min upon arrival and prove no leg weakness before being discharged.    --  IN THE EVENT OF A LIFE THREATENING EMERGENCY, (CHEST PAIN, BREATHING DIFFICULTIES, PARALYSIS…) YOU SHOULD GO TO YOUR NEAREST EMERGENCY ROOM.    --  You should be contacted by our office within 2-3 days to schedule follow up or next appointment date.  If not contacted within 7 days, please call the office at [phone]    If you have even 1 hour of relief, these injections are considered successful.

## 2023-02-13 ENCOUNTER — HOSPITAL ENCOUNTER (OUTPATIENT)
Dept: GENERAL RADIOLOGY | Facility: SURGERY CENTER | Age: 68
Setting detail: HOSPITAL OUTPATIENT SURGERY
End: 2023-02-13
Payer: MEDICARE

## 2023-02-13 ENCOUNTER — HOSPITAL ENCOUNTER (OUTPATIENT)
Facility: SURGERY CENTER | Age: 68
Setting detail: HOSPITAL OUTPATIENT SURGERY
Discharge: HOME | End: 2023-02-13
Attending: ANESTHESIOLOGY | Admitting: ANESTHESIOLOGY
Payer: MEDICARE

## 2023-02-13 VITALS
HEIGHT: 72 IN | HEART RATE: 78 BPM | SYSTOLIC BLOOD PRESSURE: 155 MMHG | TEMPERATURE: 98.6 F | RESPIRATION RATE: 16 BRPM | BODY MASS INDEX: 32.51 KG/M2 | DIASTOLIC BLOOD PRESSURE: 98 MMHG | WEIGHT: 240 LBS | OXYGEN SATURATION: 97 %

## 2023-02-13 DIAGNOSIS — Z41.9 SURGERY, ELECTIVE: ICD-10-CM

## 2023-02-13 PROCEDURE — 64494 INJ PARAVERT F JNT L/S 2 LEV: CPT | Performed by: ANESTHESIOLOGY

## 2023-02-13 PROCEDURE — 64493 INJ PARAVERT F JNT L/S 1 LEV: CPT | Performed by: ANESTHESIOLOGY

## 2023-02-13 PROCEDURE — 77002 NEEDLE LOCALIZATION BY XRAY: CPT

## 2023-02-13 PROCEDURE — 76000 FLUOROSCOPY <1 HR PHYS/QHP: CPT

## 2023-02-13 RX ORDER — SODIUM CHLORIDE 0.9 % (FLUSH) 0.9 %
10 SYRINGE (ML) INJECTION EVERY 12 HOURS SCHEDULED
Status: DISCONTINUED | OUTPATIENT
Start: 2023-02-13 | End: 2023-02-13 | Stop reason: HOSPADM

## 2023-02-13 RX ORDER — SODIUM CHLORIDE 0.9 % (FLUSH) 0.9 %
10 SYRINGE (ML) INJECTION AS NEEDED
Status: DISCONTINUED | OUTPATIENT
Start: 2023-02-13 | End: 2023-02-13 | Stop reason: HOSPADM

## 2023-02-13 NOTE — OP NOTE
Lumbar Medial Branch Blockade at  L4-S1  [facility]      PREOPERATIVE DIAGNOSIS:  Lumbar spondylosis without myelopathy    POSTOPERATIVE DIAGNOSIS:  Lumbar spondylosis without myelopathy    PROCEDURE:   Diagnostic Lumbar Medial Branch Nerve Blockades, with fluoroscopy:  at the L4, L5 transverse processes and the sacral alar groove)   1. 74225-CR -- Lumbar Facet blocks, 1st Level  2. 35316-FU -- Lumbar Facet blocks, 2nd  Level     PRE-PROCEDURE DISCUSSION WITH PATIENT:    Risks and complications were discussed with the patient prior to starting the procedure and informed consent was obtained.      SURGEON:  Isaura Torres MD    REASON FOR PROCEDURE:    The patient complains of pain that seems to have a significant axial component and Previous diagnostic positivity of a Lumbar Medial Branch Blockade at the same levels    SEDATION:  No sedation was used for this procedure  ANESTHETIC:  0.5% bupivacaine  STEROID:  None  TOTAL VOLUME OF SOLUTION:  3ml    DESCRIPTON OF PROCEDURE:  After obtaining informed consent, IV access was not obtained in the preoperative area.   The patient was taken to the operating room.  The patient was placed in the prone position with a pillow under the abdomen. All pressure points were well padded.  EKG, blood pressure, and pulse oximeter were monitored.  The patient was monitored and sedated by the RN under my direction. The lumbosacral area was prepped with Chloraprep and draped in a sterile fashion.     AP fluoroscopic image was used to visualize the junction of the right S1 superior articular process with the sacral ala.  The skin and subcutaneous tissue over the area was anesthetized with 1% lidocaine.  A 22-gauge spinal needle was then advanced percutaneously through the anesthetized skin tract under fluoroscopic guidance in a coaxial view to contact periosteum.  After negative aspiration, a volume of 1 mL of the local anesthetic was injected without resistance.  The  image was then optimized to maximize visualization of the junctions of the L4, L5 superior articular processes with the transverse processes.  The skin and subcutaneous tissue over the areas was anesthetized with 1% lidocaine.  A 22-gauge spinal needle was then advanced percutaneously through the anesthetized skin tracts under fluoroscopic guidance in a coaxial view to contact periosteum at the target sites.  After negative aspiration, a volume of 1 mL of the local anesthetic  was injected without resistance at each of the target sites.        Onset of analgesia was noted.  Vital signs remained stable throughout.      ESTIMATED BLOOD LOSS:  <5 mL  SPECIMENS:  none    COMPLICATIONS:   No complications were noted.    TOLERANCE & DISCHARGE CONDITION:    The patient tolerated the procedure well.  The patient was transported to the recovery area without difficulties.  The patient was discharged to home under the care of family in stable and satisfactory condition.    Pre-procedure pain score: 8/10 at rest, 9/10 with activity  Post-procedure pain score: 0/10    PLAN OF CARE:  1. The patient was given our standard instruction sheet.  2. We discussed that Lumbar Medial Branch Blockade is a diagnostic procedure in consideration for radiofrequency ablation if two diagnostic procedures prove to be positive for significant benefit.  An alternative plan could be therapeutic lumbar branch blockades.  3. The patient is asked to keep an account of pain relief after the procedure today.  4. The patient will  Return to clinic 1-2 wks.  5. The patient will resume all medications as per the medication reconciliation sheet.

## 2023-02-16 DIAGNOSIS — Z87.39 H/O: GOUT: ICD-10-CM

## 2023-02-16 RX ORDER — ALLOPURINOL 300 MG/1
300 TABLET ORAL DAILY
Qty: 90 TABLET | Refills: 1 | Status: SHIPPED | OUTPATIENT
Start: 2023-02-16

## 2023-02-16 NOTE — TELEPHONE ENCOUNTER
Caller: Case Mo L    Relationship: Self    Best call back number: [phone]  Requested Prescriptions:   Requested Prescriptions     Pending Prescriptions Disp Refills   • allopurinol (ZYLOPRIM) 300 MG tablet 90 tablet 1     Sig: Take 1 tablet by mouth Daily.        Pharm[facility] where request [facility] be sent[id number] [place], [place] - [address] [facility] [address] [facility] [phone] [facility] [phone] FX     Does the patient have less than a 3 day supply:  [] Yes  [x] No    Would you like a call back once the refill request has been completed: [] Yes [x] No    If the office needs to give you a call back, can they leave a voicemail: [] Yes [x] No    Amy Chahal, Chi Rep   02/16/23 12:12 EST

## 2023-02-20 ENCOUNTER — OFFICE VISIT (OUTPATIENT)
Dept: CARDIOLOGY | Facility: CLINIC | Age: 68
End: 2023-02-20
Payer: MEDICARE

## 2023-02-20 VITALS
HEIGHT: 72 IN | BODY MASS INDEX: 34.75 KG/M2 | WEIGHT: 256.6 LBS | OXYGEN SATURATION: 98 % | HEART RATE: 86 BPM | SYSTOLIC BLOOD PRESSURE: 150 MMHG | DIASTOLIC BLOOD PRESSURE: 80 MMHG

## 2023-02-20 DIAGNOSIS — I71.21 ASCENDING AORTIC ANEURYSM, UNSPECIFIED WHETHER RUPTURED: ICD-10-CM

## 2023-02-20 DIAGNOSIS — R00.0 SINUS TACHYCARDIA: Primary | ICD-10-CM

## 2023-02-20 DIAGNOSIS — I10 ESSENTIAL HYPERTENSION, BENIGN: ICD-10-CM

## 2023-02-20 PROCEDURE — 99214 OFFICE O/P EST MOD 30 MIN: CPT | Performed by: NURSE PRACTITIONER

## 2023-02-20 PROCEDURE — 93000 ELECTROCARDIOGRAM COMPLETE: CPT | Performed by: NURSE PRACTITIONER

## 2023-02-20 NOTE — PROGRESS NOTES
Subjective:     Encounter Date:02/20/2023      Patient ID: Mo Willoughby is a 67 y.o. male.    Chief Complaint:follow up HTN, ascending aortic aneurysm  History of Present Illness  This is a 68 y/o man who follows with Dr. Brown and is new to me today. He has a pmhx of hypertension, ETOH and drug abuse, ascending aortic aneurysm and tachycardia. Dr. Brown began following the patient in May 2022 during a hospital admission for chest pain and diaphoresis. His troponin was in the indeterminate range, EKG was unremarkable, and his symptoms were pretty atypical. He also admitted to consuming 2-3 alcoholic drinks along with marijuana and cocaine. It was not felt that he needed an ischemic work-up. An echocardiogram was obtained that showed a normal LV systolic function, EF 56%, grade 1 diastolic dysfunction, lipomatous atrial septum and no significant valvular disease. A CTA of the chest showed an ascending aorta measuring 4.5 cm but no PE.     He saw Dr. Brown in follow up in August 2022 and was feeling well. A repeat echo or CT in 1 year was recommended. He is here today for a follow up visit. He tells me he has been doing well since last seen. He denies any chest pain, shortness of breath, palpitations, dizziness or syncope. He denies any swelling in his lower extremities, orthopnea or PND. His blood pressure is a little elevated today at 150/90. He says at home it is usually 120s systolic but does have the occasional reading in the 140s.     I have reviewed and updated as appropriate allergies, current medications, past family history, past medical history, past surgical history and problem list.    Review of Systems   Constitutional: Negative for fever, malaise/fatigue, weight gain and weight loss.   HENT: Negative for congestion, hoarse voice and sore throat.    Eyes: Negative for blurred vision and double vision.   Cardiovascular: Negative for chest pain, dyspnea on exertion, leg swelling, orthopnea,  palpitations and syncope.   Respiratory: Negative for cough, shortness of breath and wheezing.    Gastrointestinal: Negative for abdominal pain, hematemesis, hematochezia and melena.   Genitourinary: Negative for dysuria and hematuria.   Neurological: Negative for dizziness, headaches, light-headedness and numbness.   Psychiatric/Behavioral: Negative for depression. The patient is not nervous/anxious.          Current Outpatient Medications:   •  allopurinol (ZYLOPRIM) 300 MG tablet, Take 1 tablet by mouth Daily., Disp: 90 tablet, Rfl: 1  •  FeroSul 325 (65 Fe) MG tablet, TAKE 1 TABLET BY MOUTH DAILY WITH BREAKFAST, Disp: 30 tablet, Rfl: 0  •  folic acid (FOLVITE) 1 MG tablet, TAKE 1 TABLET BY MOUTH DAILY, Disp: 90 tablet, Rfl: 0  •  lisinopril (PRINIVIL,ZESTRIL) 10 MG tablet, Take 1 tablet by mouth Daily., Disp: 30 tablet, Rfl: 5  •  pantoprazole (PROTONIX) 40 MG EC tablet, Take 1 tablet by mouth 2 (Two) Times a Day., Disp: 180 tablet, Rfl: 3  •  thiamine (VITAMIN B-1) 100 MG tablet  tablet, Take 1 tablet by mouth Daily., Disp: 30 tablet, Rfl: 0    Past Medical History:   Diagnosis Date   • Abnormal TSH    • Ascending aortic aneurysm    • Colon polyp 5/5/22   • CRI (chronic renal insufficiency), stage 3 (moderate) (HCC) 2016    from stage 3A to 4   • ED (erectile dysfunction)    • Erectile dysfunction    • Essential hypertension, benign    • Folic acid deficiency    • Gout    • Hx of colonic polyp    • Hypercalcemia 2015    as far back as data goes so likely pre-dates even this time   • Hyperparathyroidism, unspecified (HCC) 2016    as far back as data goes likely pre-dates even this date, likely multifactorial some primary and some secondary , w/ imaging from 10/16 showing possible L inferior adenoma   • Iron deficiency anemia    • Low back pain with bilateral sciatica    • Pancreatitis    • Risk factors for obstructive sleep apnea    • Syncope and collapse 03/28/2017       Past Surgical History:   Procedure  Laterality Date   • CHOLECYSTECTOMY WITH INTRAOPERATIVE CHOLANGIOGRAM N/A 08/03/2018    Procedure: CHOLECYSTECTOMY LAPAROSCOPIC INTRAOPERATIVE CHOLANGIOGRAM;  Surgeon: Melina Kate MD;  Location:  [facility];  Service: General   • COLONOSCOPY     • COLONOSCOPY N/A 10/03/2016    Procedure: COLONOSCOPY TO CECUM TO TERMINAL ILIUM WITH COLD BIOPSY POLYPECTOMY;  Surgeon: Benoit Vilchis MD;  [facility];  Service:    • COLONOSCOPY N/A 05/05/2022    Procedure: COLONOSCOPY TO CECUM WITH COLD SNARE POLYPECTOMIES & RESOLUTION CLIP PLACEMENT X 2;  Surgeon: Benoit Mosley MD;  [facility];  Service: Gastroenterology;  Laterality: N/A;  ANEMIA/POLYPS, HEMORRHOIDS    • ENDOSCOPY N/A 05/05/2022    Procedure: ESOPHAGOGASTRODUODENOSCOPY WITH BX;  Surgeon: Benoit Mosley MD;  Location: [facility] ENDOSCOPY;  Service: Gastroenterology;  Laterality: N/A;  ANEMIA/PORTAL GASTROPATHY, EROSIVE GASTRITIS    • EPIDURAL Right 12/07/2022    Procedure: LUMBAR/SACRAL TRANSFORAMINAL EPIDURAL Right L2-3 and right L4-5;  Surgeon: Isaura Torres MD;  Location: [facility];  Service: Pain Management;  Laterality: Right;   • LIPOMA EXCISION     • MEDIAL BRANCH BLOCK Right 01/23/2023    Procedure: LUMBAR MEDIAL BRANCH BLOCK RIGHT L3-L5 #1;  Surgeon: Isaura Torres MD;  Location: [facility];  Service: Pain Management;  Laterality: Right;   • MEDIAL BRANCH BLOCK Right 02/13/2023    Procedure: LUMBAR MEDIAL BRANCH BLOCK RIGHT L3-L5 #1;  Surgeon: Isaura Torres MD;  Location: [facility];  Service: Pain Management;  Laterality: Right;   • TONSILLECTOMY         Family History   Problem Relation Age of Onset   • Hypertension Mother    • Breast cancer Mother    • Cancer Father    • Liver cancer Father    • Hypertension Father    • Hypertension Sister    • Heart attack Sister    • Hypertension Brother        Social History     Tobacco Use   • Smoking status: Never   • Smokeless tobacco: Never  "  Substance Use Topics   • Alcohol use: Yes     Comment: occasional   • Drug use: No         ECG 12 Lead    Date/Time: 2/20/2023 11:20 AM  Performed by: Tabatha Means APRN  Authorized by: Tabatha Means APRN   Comparison: compared with previous ECG from 8/16/2022  Similar to previous ECG  Rhythm: sinus rhythm  Ectopy: unifocal PVCs  Comments: No significant change from previous EKG               Objective:     Visit Vitals  /80 (BP Location: Left arm, Patient Position: Sitting, Cuff Size: Adult)   Pulse 86   Ht 182.9 cm (72\")   Wt 116 kg (256 lb 9.6 oz)   SpO2 98%   BMI 34.80 kg/m²             Physical Exam  Constitutional:       Appearance: Normal appearance. He is normal weight.   HENT:      Head: Normocephalic.   Neck:      Vascular: No carotid bruit.   Cardiovascular:      Rate and Rhythm: Normal rate and regular rhythm.      Chest Wall: PMI is not displaced.      Pulses: Normal pulses.           Radial pulses are 2+ on the right side and 2+ on the left side.        Posterior tibial pulses are 2+ on the right side and 2+ on the left side.      Heart sounds: Normal heart sounds. No murmur heard.    No friction rub. No gallop.   Pulmonary:      Effort: Pulmonary effort is normal.      Breath sounds: Normal breath sounds.   Abdominal:      General: Bowel sounds are normal. There is no distension.      Palpations: Abdomen is soft.   Musculoskeletal:      Right lower leg: No edema.      Left lower leg: No edema.   Skin:     General: Skin is warm and dry.      Capillary Refill: Capillary refill takes less than 2 seconds.   Neurological:      Mental Status: He is alert and oriented to person, place, and time.   Psychiatric:         Mood and Affect: Mood normal.         Behavior: Behavior normal.         Thought Content: Thought content normal.          Lab Review:   Lipid Panel    Lipid Panel 5/16/22   Total Cholesterol 167   Triglycerides 93   HDL Cholesterol 44   VLDL Cholesterol 17   LDL Cholesterol  106 " (A)   LDL/HDL Ratio 2.37   (A) Abnormal value                Cardiac Procedures:   1. Echocardiogram 5/3/22:  • Calculated left ventricular EF = 56.3% Estimated left ventricular EF was in agreement with the calculated left ventricular EF. Left ventricular systolic function is normal. Normal left ventricular cavity size noted. Left ventricular wall thickness is consistent with moderate concentric hypertrophy. All left ventricular wall segments contract normally. Left ventricular diastolic function is consistent with (grade I) impaired relaxation.  • The left atrial cavity is borderline dilated. Along the medial aspect of the left atrium, there is an intermittent echodensity. While this may represent shadowing from the aortic root or lipomatous hypertrophy of the superior portion of the atrial septum, a mass cannot be excluded. Consider cardiac CT for further evaluation.     2. Holter Monitor 3/29/17:  Study Description    Monitor hooked-up on 3/29/2017 at 18:41 EDT. The monitor was scanned on 4/11/2017. The patient was monitored for 12 days 20 hours. Indications for this exam include syncope. Average HR:  84. Min HR:  48. Max HR:  176.     Study Findings    Patient diary was not submitted.No symptoms reported during the monitoring period. No complications noted. The predominant rhythm noted during the testing period was sinus rhythm. There were 5 episodes of supraventricular tachycardia. The peak heart rate was 176 beats per minute. Longest run lasted 4 min 57 sec in SVT at a rather slow rate of 111 bpm. Premature ventricular contractions occured rarely. Ventricular couplets. There were no episodes of ventricular tachycardia. Sinoatrial node conduction was normal. No atrioventricular block noted.     Study Impressions    An abnormal monitor study.         Assessment:         Diagnoses and all orders for this visit:    1. Sinus tachycardia (Primary)    2. Ascending aortic aneurysm, unspecified whether ruptured    3.  Essential hypertension, benign    Other orders  -     ECG 12 Lead            Plan:       1. Hypertension: blood pressure is elevated in office today. He reports better control at home. I have asked that he keep a blood pressure log over the next week, checking his BP one hour after taking his lisinopril. He is going to contact me on Monday with his readings and will decide if any adjustments need to be made at that time.  2. Ascending aortic aneurysm: 4.5 cm on CTA in May 2022. Discussed the importance of adequate blood pressure control in preventing further complications of his aneurysm. Will need to recheck an echo or CTA at next appointment.  3. Elevated LDL: mildly elevated at 106. Recommend dietary modifications and exercise.    Thank you for allowing me to participate in this patient's care. Please call with any questions or concerns. Mr. Willoughby will follow up with Dr. Brown in 6 months.          Your medication list          Accurate as of February 20, 2023 11:21 AM. If you have any questions, ask your nurse or doctor.            CONTINUE taking these medications      Instructions Last Dose Given Next Dose Due   allopurinol 300 MG tablet  Commonly known as: ZYLOPRIM      Take 1 tablet by mouth Daily.       FeroSul 325 (65 FE) MG tablet  Generic drug: ferrous sulfate      TAKE 1 TABLET BY MOUTH DAILY WITH BREAKFAST       folic acid 1 MG tablet  Commonly known as: FOLVITE      TAKE 1 TABLET BY MOUTH DAILY       lisinopril 10 MG tablet  Commonly known as: PRINIVIL,ZESTRIL      Take 1 tablet by mouth Daily.       pantoprazole 40 MG EC tablet  Commonly known as: PROTONIX      Take 1 tablet by mouth 2 (Two) Times a Day.       thiamine 100 MG tablet  tablet  Commonly known as: VITAMIN B-1      Take 1 tablet by mouth Daily.                Tabatha Means, YANIRA  02/20/23  9:01 AM EST

## 2023-02-23 ENCOUNTER — OFFICE VISIT (OUTPATIENT)
Dept: PAIN MEDICINE | Facility: CLINIC | Age: 68
End: 2023-02-23
Payer: MEDICARE

## 2023-02-23 ENCOUNTER — PREP FOR SURGERY (OUTPATIENT)
Dept: SURGERY | Facility: SURGERY CENTER | Age: 68
End: 2023-02-23
Payer: MEDICARE

## 2023-02-23 VITALS
SYSTOLIC BLOOD PRESSURE: 140 MMHG | BODY MASS INDEX: 34.43 KG/M2 | HEIGHT: 72 IN | OXYGEN SATURATION: 95 % | RESPIRATION RATE: 12 BRPM | HEART RATE: 83 BPM | WEIGHT: 254.2 LBS | TEMPERATURE: 97.7 F | DIASTOLIC BLOOD PRESSURE: 86 MMHG

## 2023-02-23 DIAGNOSIS — M54.16 LUMBAR RADICULITIS: ICD-10-CM

## 2023-02-23 DIAGNOSIS — M47.816 LUMBAR FACET ARTHROPATHY: Primary | ICD-10-CM

## 2023-02-23 DIAGNOSIS — M48.061 SPINAL STENOSIS OF LUMBAR REGION WITHOUT NEUROGENIC CLAUDICATION: ICD-10-CM

## 2023-02-23 PROCEDURE — 99214 OFFICE O/P EST MOD 30 MIN: CPT | Performed by: PHYSICIAN ASSISTANT

## 2023-02-23 RX ORDER — SODIUM CHLORIDE 0.9 % (FLUSH) 0.9 %
10 SYRINGE (ML) INJECTION AS NEEDED
Status: CANCELLED | OUTPATIENT
Start: 2023-02-23

## 2023-02-23 RX ORDER — SODIUM CHLORIDE 0.9 % (FLUSH) 0.9 %
10 SYRINGE (ML) INJECTION EVERY 12 HOURS SCHEDULED
Status: CANCELLED | OUTPATIENT
Start: 2023-02-23

## 2023-02-23 NOTE — PROGRESS NOTES
CHIEF COMPLAINT  PROCEDURE FOLLOW UP LUMBAR MEDIAL BRANCH BLOCK RIGHT L3-L5 #1  2/13/2023  Patient reports 80% pain relief for 6 hours      Subjective   Mo Willoughby is a 67 y.o. male  who presents to the office for follow-up of procedure.  He completed a #2 confirmatory right L3-5 MBB   on 2/13/2023 performed by Dr. Torres for management of low back pain. Patient reports 80% relief from the procedure x6 hours.  Patient is noted recrudescence of primarily right-sided lumbosacral spine pain radiating to the posterior buttock/hip and denies lower extremity radicular symptoms.    Pain today 8/10 VAS in severity.        Back Pain  This is a chronic problem. The current episode started more than 1 year ago. The problem occurs constantly. The problem is unchanged. The pain is present in the lumbar spine. The quality of the pain is described as aching and cramping. The pain does not radiate. The pain is at a severity of 8/10. The pain is severe. The pain is the same all the time. The symptoms are aggravated by standing, position, bending and twisting. Pertinent negatives include no abdominal pain, chest pain, dysuria, fever, headaches, numbness or weakness.        PEG Assessment   What number best describes your pain on average in the past week?8  What number best describes how, during the past week, pain has interfered with your enjoyment of life?8  What number best describes how, during the past week, pain has interfered with your general activity?  9    Review of Pertinent Medical Data ---    MRI OF THE LUMBAR SPINE WITHOUT CONTRAST     CLINICAL HISTORY:Acute low back pain without sciatica.     TECHNIQUE: MRI of the lumbar spine was obtained with sagittal T1,  proton-density, and T2-weighted images. Additionally, there are axial T1  and T2-weighted images through the lumbar spine.     COMPARISON:No previous similar studies are available for comparison.     FINDINGS:     The conus medullaris terminates at the level of  the mid body of L2 and  has normal signal intensity. The visualized distal thoracic spinal cord  is unremarkable.     At T11-T12, there is a disc bulge which mildly indents the ventral  subarachnoid space. There is mild-to-moderate left foraminal narrowing  secondary to facet hypertrophic change. At T12-L1, there is a disc bulge  with a posterior annular fissure. Additionally, there may be small left  central disc protrusion as well at this level. There are no axial images  obtained through the T12-L1 level. Overall, a mild-to-moderate degree of  canal stenosis is seen secondary to bulging disc material in addition to  a potential protrusion. No significant foraminal stenosis is seen at  T12-L1.     At L1-L2, there is a disc bulge which results in a mild degree of canal  and bilateral foraminal narrowing. Prominent fluid signal intensity is  seen within the L1-L2 interspinous region which is degenerative in  nature.     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  Additionally, there is a disc bulge in addition to a left central/left  subarticular disc protrusion that results in a moderate degree of canal  stenosis. There is mild-to-moderate bilateral left and moderate right  narrowing secondary to disc bulging and facet hypertrophic change.  Additionally, there is a right foraminal disc protrusion at L2-L3 that  contributes to the L2-L3 foraminal stenosis. Prominent fluid signal  intensity is seen within the L2-L3 interspinous region which is  degenerative in nature.     At L3-L4, the spinal canal is again narrowed on a congenital basis due  to shortened pedicles. Bulging disc material superimposed upon these  congenital phenomena results in a moderate degree of canal stenosis.  There is mild-to-moderate bilateral foraminal narrowing secondary to  bulging disc material and facet hypertrophic change. Moderate facet  hypertrophy is appreciated at L3-L4. Prominent fluid signal intensity  is  seen within the interspinous region at the L3-L4 level which is again  degenerative in nature.     At L4-L5, there is anterior spondylolisthesis of L4 and L5 by  approximately 13 mm. There is severe central canal stenosis secondary to  unroofed bulging disc material, ligamentum flavum thickening, and severe  facet hypertrophic change. Additionally, the spinal canal is relatively  narrow on a congenital basis at the L4-L5 level. There is a moderate  degree of bilateral foraminal narrowing secondary to bulging disc  material and facet hypertrophy.     At L5-S1, there is a disc bulge and facet hypertrophy that results in a  mild-to-moderate degree of right and a moderate degree of left foraminal  stenosis.     IMPRESSION:     The spinal canal is relatively narrow on a congenital basis due to  shortened pedicles from L2-L3 down to L4-L5. Superimposed degenerative  phenomena are seen at these levels and overall, there is moderate L2-L3,  moderate L3-L4, and severe L4-L5 canal stenosis as discussed in detail  above.     Degenerative anterior spondylolisthesis of L4 onL5 by approximately 13  mm is seen.     The remaining multilevel degenerative phenomena including multilevel  foraminal stenosis is discussed in detail above.           This report was finalized on 10/12/2022 6:09 AM by Dr. Elfego Navarro M.D.    The following portions of the patient's history were reviewed and updated as appropriate: allergies, current medications, past family history, past medical history, past social history, past surgical history and problem list.    Review of Systems   Constitutional: Negative for activity change, fatigue and fever.   HENT: Negative for congestion.    Eyes: Negative for visual disturbance.   Respiratory: Negative for cough and chest tightness.    Cardiovascular: Negative for chest pain.   Gastrointestinal: Negative for abdominal pain, constipation and diarrhea.   Genitourinary: Negative for difficulty urinating and  "dysuria.   Musculoskeletal: Positive for back pain.   Neurological: Negative for dizziness, weakness, light-headedness, numbness and headaches.   Psychiatric/Behavioral: Positive for sleep disturbance. Negative for suicidal ideas. The patient is not nervous/anxious.      I have reviewed and confirmed the accuracy of the ROS as documented by the MA/LPN/RN IVANA Paredes     Vitals:    02/23/23 1047   BP: 140/86   BP Location: Left arm   Patient Position: Sitting   Cuff Size: Large Adult   Pulse: 83   Resp: 12   Temp: 97.7 °F (36.5 °C)   TempSrc: Temporal   SpO2: 95%   Weight: 115 kg (254 lb 3.2 oz)   Height: 182.9 cm (72\")   PainSc:   8         Objective   Physical Exam  Vitals and nursing note reviewed.   Constitutional:       Appearance: Normal appearance.   HENT:      Head: Normocephalic.   Pulmonary:      Effort: Pulmonary effort is normal.   Musculoskeletal:      Lumbar back: Spasms and tenderness (Moderate tenderness to palpation within the overlying right lumbar facet joint spaces) present. Decreased range of motion.      Comments: Pain is increased with performing ROM in all planes   Skin:     General: Skin is warm and dry.   Neurological:      General: No focal deficit present.      Mental Status: He is alert and oriented to person, place, and time.      Cranial Nerves: Cranial nerves 2-12 are intact.      Motor: Motor function is intact.      Gait: Gait abnormal.   Psychiatric:         Mood and Affect: Mood normal.         Behavior: Behavior normal.         Thought Content: Thought content normal.         Judgment: Judgment normal.         Assessment & Plan   Diagnoses and all orders for this visit:    1. Lumbar facet arthropathy (Primary)    2. Spinal stenosis of lumbar region without neurogenic claudication    3. Lumbar radiculitis        --- Based on patient's favorable response to diagnostic and confirmatory right L3-5 MBB I will have him return for lumbar radiofrequency ablation.  The risk and " benefits of the procedure been thoroughly discussed with the patient and all of his questions addressed.  --- RTC in 4-6 weeks for further evaluation after RFA    Thermal Radiofrequency Destruction    This initial thermal radiofrequency destruction (RFA) of cervical, thoracic, or lumbar paravertebral facet joint (medial branch) nerves is considered medically reasonable and necessary as this patient has met the criteria of having at least two (2) medically reasonable and necessary diagnostic MBBs, with each one providing a consistent minimum of 80% sustained relief of primary (index) pain (with the duration of relief being consistent with the agent used).    This repeat thermal radiofrequency destruction (RFA) of cervical, thoracic, or lumbar paravertebral facet joint (medial branch) nerves at the same anatomical site is considered medically reasonable and necessary as this patient has met the criteria of having a minimum of consistent 50% improvement in pain for at least six (6) months or at least 50% consistent improvement in the ability to perform previously painful movements and ADLs as compared to baseline measurement using the same scale.                Dictated utilizing Dragon dictation.      Patient remained masked during entire encounter. No cough present. I donned a mask and eye protection throughout entire visit. Prior to donning mask and eye protection, hand hygiene was performed, as well as when it was doffed.  I was closer than 6 feet, but not for an extended period of time. No obvious exposure to any bodily fluids.

## 2023-02-24 ENCOUNTER — TRANSCRIBE ORDERS (OUTPATIENT)
Dept: SURGERY | Facility: SURGERY CENTER | Age: 68
End: 2023-02-24
Payer: MEDICARE

## 2023-02-24 DIAGNOSIS — Z41.9 SURGERY, ELECTIVE: Primary | ICD-10-CM

## 2023-02-27 ENCOUNTER — OFFICE VISIT (OUTPATIENT)
Dept: FAMILY MEDICINE CLINIC | Facility: CLINIC | Age: 68
End: 2023-02-27
Payer: MEDICARE

## 2023-02-27 VITALS
WEIGHT: 252.4 LBS | HEART RATE: 83 BPM | OXYGEN SATURATION: 99 % | BODY MASS INDEX: 34.19 KG/M2 | SYSTOLIC BLOOD PRESSURE: 150 MMHG | DIASTOLIC BLOOD PRESSURE: 92 MMHG | TEMPERATURE: 98.7 F | HEIGHT: 72 IN

## 2023-02-27 DIAGNOSIS — N50.812 PAIN IN BOTH TESTICLES: ICD-10-CM

## 2023-02-27 DIAGNOSIS — R35.0 URINARY FREQUENCY: ICD-10-CM

## 2023-02-27 DIAGNOSIS — E78.2 MIXED HYPERLIPIDEMIA: ICD-10-CM

## 2023-02-27 DIAGNOSIS — I10 ESSENTIAL HYPERTENSION, BENIGN: ICD-10-CM

## 2023-02-27 DIAGNOSIS — N50.811 PAIN IN BOTH TESTICLES: ICD-10-CM

## 2023-02-27 DIAGNOSIS — L81.9 CHANGE IN PIGMENTED SKIN LESION OF FACE: ICD-10-CM

## 2023-02-27 DIAGNOSIS — Z12.5 SCREENING PSA (PROSTATE SPECIFIC ANTIGEN): ICD-10-CM

## 2023-02-27 DIAGNOSIS — D50.9 IRON DEFICIENCY ANEMIA, UNSPECIFIED IRON DEFICIENCY ANEMIA TYPE: ICD-10-CM

## 2023-02-27 DIAGNOSIS — Z00.00 MEDICARE ANNUAL WELLNESS VISIT, SUBSEQUENT: Primary | ICD-10-CM

## 2023-02-27 DIAGNOSIS — R53.83 OTHER FATIGUE: ICD-10-CM

## 2023-02-27 PROCEDURE — 1170F FXNL STATUS ASSESSED: CPT | Performed by: NURSE PRACTITIONER

## 2023-02-27 PROCEDURE — 1159F MED LIST DOCD IN RCRD: CPT | Performed by: NURSE PRACTITIONER

## 2023-02-27 PROCEDURE — 1125F AMNT PAIN NOTED PAIN PRSNT: CPT | Performed by: NURSE PRACTITIONER

## 2023-02-27 PROCEDURE — 99213 OFFICE O/P EST LOW 20 MIN: CPT | Performed by: NURSE PRACTITIONER

## 2023-02-27 PROCEDURE — G0439 PPPS, SUBSEQ VISIT: HCPCS | Performed by: NURSE PRACTITIONER

## 2023-02-28 DIAGNOSIS — I10 ESSENTIAL HYPERTENSION, BENIGN: ICD-10-CM

## 2023-02-28 LAB
ALBUMIN SERPL-MCNC: 4.4 G/DL (ref 3.8–4.8)
ALBUMIN/GLOB SERPL: 1.8 {RATIO} (ref 1.2–2.2)
ALP SERPL-CCNC: 52 IU/L (ref 44–121)
ALT SERPL-CCNC: 30 IU/L (ref 0–44)
APPEARANCE UR: CLEAR
AST SERPL-CCNC: 37 IU/L (ref 0–40)
BACTERIA #/AREA URNS HPF: NORMAL /[HPF]
BASOPHILS # BLD AUTO: 0.1 X10E3/UL (ref 0–0.2)
BASOPHILS NFR BLD AUTO: 1 %
BILIRUB SERPL-MCNC: 0.8 MG/DL (ref 0–1.2)
BILIRUB UR QL STRIP: NEGATIVE
BUN SERPL-MCNC: 22 MG/DL (ref 8–27)
BUN/CREAT SERPL: 17 (ref 10–24)
CALCIUM SERPL-MCNC: 9.9 MG/DL (ref 8.6–10.2)
CASTS URNS QL MICRO: NORMAL /LPF
CHLORIDE SERPL-SCNC: 104 MMOL/L (ref 96–106)
CHOLEST SERPL-MCNC: 212 MG/DL (ref 100–199)
CO2 SERPL-SCNC: 20 MMOL/L (ref 20–29)
COLOR UR: YELLOW
CREAT SERPL-MCNC: 1.31 MG/DL (ref 0.76–1.27)
EGFRCR SERPLBLD CKD-EPI 2021: 60 ML/MIN/1.73
EOSINOPHIL # BLD AUTO: 0.2 X10E3/UL (ref 0–0.4)
EOSINOPHIL NFR BLD AUTO: 4 %
EPI CELLS #/AREA URNS HPF: NORMAL /HPF (ref 0–10)
ERYTHROCYTE [DISTWIDTH] IN BLOOD BY AUTOMATED COUNT: 13.4 % (ref 11.6–15.4)
FERRITIN SERPL-MCNC: 192 NG/ML (ref 30–400)
FOLATE SERPL-MCNC: >20 NG/ML
GLOBULIN SER CALC-MCNC: 2.5 G/DL (ref 1.5–4.5)
GLUCOSE SERPL-MCNC: 92 MG/DL (ref 70–99)
GLUCOSE UR QL STRIP: NEGATIVE
HCT VFR BLD AUTO: 40.5 % (ref 37.5–51)
HDLC SERPL-MCNC: 51 MG/DL
HGB BLD-MCNC: 14 G/DL (ref 13–17.7)
HGB UR QL STRIP: NEGATIVE
IMM GRANULOCYTES # BLD AUTO: 0 X10E3/UL (ref 0–0.1)
IMM GRANULOCYTES NFR BLD AUTO: 0 %
IRON SATN MFR SERPL: 46 % (ref 15–55)
IRON SERPL-MCNC: 157 UG/DL (ref 38–169)
KETONES UR QL STRIP: NEGATIVE
LDLC SERPL CALC-MCNC: 135 MG/DL (ref 0–99)
LEUKOCYTE ESTERASE UR QL STRIP: NEGATIVE
LYMPHOCYTES # BLD AUTO: 2 X10E3/UL (ref 0.7–3.1)
LYMPHOCYTES NFR BLD AUTO: 36 %
MCH RBC QN AUTO: 34.8 PG (ref 26.6–33)
MCHC RBC AUTO-ENTMCNC: 34.6 G/DL (ref 31.5–35.7)
MCV RBC AUTO: 101 FL (ref 79–97)
MICRO URNS: NORMAL
MICRO URNS: NORMAL
MONOCYTES # BLD AUTO: 0.6 X10E3/UL (ref 0.1–0.9)
MONOCYTES NFR BLD AUTO: 11 %
NEUTROPHILS # BLD AUTO: 2.6 X10E3/UL (ref 1.4–7)
NEUTROPHILS NFR BLD AUTO: 48 %
NITRITE UR QL STRIP: NEGATIVE
PH UR STRIP: 7 [PH] (ref 5–7.5)
PLATELET # BLD AUTO: 147 X10E3/UL (ref 150–450)
POTASSIUM SERPL-SCNC: 5 MMOL/L (ref 3.5–5.2)
PROT SERPL-MCNC: 6.9 G/DL (ref 6–8.5)
PROT UR QL STRIP: NEGATIVE
PSA SERPL-MCNC: 0.3 NG/ML (ref 0–4)
RBC # BLD AUTO: 4.02 X10E6/UL (ref 4.14–5.8)
RBC #/AREA URNS HPF: NORMAL /HPF (ref 0–2)
SODIUM SERPL-SCNC: 142 MMOL/L (ref 134–144)
SP GR UR STRIP: 1.01 (ref 1–1.03)
TIBC SERPL-MCNC: 342 UG/DL (ref 250–450)
TRIGL SERPL-MCNC: 147 MG/DL (ref 0–149)
TSH SERPL DL<=0.005 MIU/L-ACNC: 0.89 UIU/ML (ref 0.45–4.5)
UIBC SERPL-MCNC: 185 UG/DL (ref 111–343)
UROBILINOGEN UR STRIP-MCNC: 0.2 MG/DL (ref 0.2–1)
VIT B12 SERPL-MCNC: 188 PG/ML (ref 232–1245)
VLDLC SERPL CALC-MCNC: 26 MG/DL (ref 5–40)
WBC # BLD AUTO: 5.5 X10E3/UL (ref 3.4–10.8)
WBC #/AREA URNS HPF: NORMAL /HPF (ref 0–5)

## 2023-02-28 RX ORDER — LISINOPRIL 10 MG/1
15 TABLET ORAL DAILY
Qty: 30 TABLET | Refills: 5
Start: 2023-02-28

## 2023-03-01 PROBLEM — L81.9 CHANGE IN PIGMENTED SKIN LESION OF FACE: Status: ACTIVE | Noted: 2023-03-01

## 2023-03-01 PROBLEM — N50.811 PAIN IN BOTH TESTICLES: Status: ACTIVE | Noted: 2023-03-01

## 2023-03-01 PROBLEM — N50.812 PAIN IN BOTH TESTICLES: Status: ACTIVE | Noted: 2023-03-01

## 2023-03-02 NOTE — SIGNIFICANT NOTE
Patient educated on the following :    - If you are receiving Sedation for your procedure Nothing to Eat 6 hours and only clear liquids for 2 hours prior to your procedure.     -You will need to have someone drive you home after your PROCEDURE and remain with you for 24 hours after the PROCEDURE  - The date of your procedure, your are welcome to have one visitor at bedside or remain within 10-15 minutes of [facility]  -You will need to arrive at 0715 on 3/6/23 PROCEDURE  -Please contact [facility] at: [phone] with any questions and/or concerns

## 2023-03-02 NOTE — DISCHARGE INSTRUCTIONS
[facility] - Post-procedure Instructions          --  While there are no absolute restrictions, it is recommended that you do not perform strenuous activity today. In the morning, you may resume your level of activity as before your block.    --  If you have a band-aid at your injection site, please remove it later today. Observe the area for any redness, swelling, pus-like drainage, or a temperature over 101°. If any of these symptoms occur, please call your doctor at [phone]. If after office hours, leave a message and the on-call provider will return your call.    --  Ice may be applied to your injection site. It is recommended you avoid direct heat (heating pad; hot tub) for 1-2 days.    --  Call [facility] at [phone] if you experience persistent headache, persistent bleeding from the injection site, or severe pain not relieved by heat or oral medication.    --  Do not make important decisions today.    --  Due to the effects of the block and/or the I.V. Sedation, DO NOT drive or operate hazardous machinery for 12 hours.  Local anesthetics may cause numbness after procedure and precautions must be taken with regards to operating equipment as well as with walking, even if ambulating with assistance of another person or with an assistive device.    --  Do not drink alcohol for 12 hours.    -- You may return to work tomorrow, or as directed by your referring doctor.    --  Occasionally you may notice a slight increase in your pain after the procedure. This should start to improve within the next 24-48 hours. Radiofrequency ablation procedure pain may last 3-4 weeks.    --  It may take as long as 3-4 days before you notice a gradual improvement in your pain and/or other symptoms.    -- You may continue to take your prescribed pain medication as needed.    --  Some normal possible side effects of steroid use could include fluid retention, increased blood sugar, dull headache,  "increased sweating, increased appetite, mood swings and flushing.    --  Diabetics are recommended to watch their blood glucose level closely for 24-48 hours after the injection.    --  Must stay in PACU for 20 min upon arrival and prove no leg weakness before being discharged.    --  IN THE EVENT OF A LIFE THREATENING EMERGENCY, (CHEST PAIN, BREATHING DIFFICULTIES, PARALYSIS…) YOU SHOULD GO TO YOUR NEAREST EMERGENCY ROOM.    --  You should be contacted by our office within 2-3 days to schedule follow up or next appointment date.  If not contacted within 7 days, please call the office at [phone]    Radiofrequency ablation (RFA):     - Radiofrequency ablation is a term used to describe cauterization or \"burning.\"   - In pain management, we can use this technique with a special needle to target and destroy areas that are causing your pain.   - In most cases, you must have TWO successful \"test injections\" before you are a candidate for RFA.    After your RFA:   - Because heat is used in this technique, it is common to have soreness after the procedure.  Sometimes \"neuritis\" occurs, which feels like tingling, prickly, or sunburn under the skin sensations.   - Ice packs are helpful in decreasing this soreness and preventing post-procedure \"neuritis\" pain.  Use an ice pack for 20 minutes at a time at least 3 times the day of and the day after your procedure.   - It is common to have arm/leg numbness or weakness the day of your procedure, but this should wear off by the following day.   - It may take up to 6 weeks to gain full benefit from this procedure.   "

## 2023-03-06 ENCOUNTER — HOSPITAL ENCOUNTER (OUTPATIENT)
Dept: GENERAL RADIOLOGY | Facility: SURGERY CENTER | Age: 68
Setting detail: HOSPITAL OUTPATIENT SURGERY
End: 2023-03-06
Payer: MEDICARE

## 2023-03-06 ENCOUNTER — HOSPITAL ENCOUNTER (OUTPATIENT)
Facility: SURGERY CENTER | Age: 68
Setting detail: HOSPITAL OUTPATIENT SURGERY
Discharge: HOME | End: 2023-03-06
Attending: ANESTHESIOLOGY | Admitting: ANESTHESIOLOGY
Payer: MEDICARE

## 2023-03-06 VITALS
WEIGHT: 252 LBS | OXYGEN SATURATION: 98 % | TEMPERATURE: 98.7 F | DIASTOLIC BLOOD PRESSURE: 87 MMHG | HEART RATE: 64 BPM | SYSTOLIC BLOOD PRESSURE: 125 MMHG | BODY MASS INDEX: 33.4 KG/M2 | HEIGHT: 73 IN | RESPIRATION RATE: 16 BRPM

## 2023-03-06 DIAGNOSIS — Z41.9 SURGERY, ELECTIVE: ICD-10-CM

## 2023-03-06 PROCEDURE — 25010000002 MIDAZOLAM PER 1 MG: Performed by: ANESTHESIOLOGY

## 2023-03-06 PROCEDURE — 76000 FLUOROSCOPY <1 HR PHYS/QHP: CPT

## 2023-03-06 PROCEDURE — 25010000002 FENTANYL CITRATE (PF) 50 MCG/ML SOLUTION: Performed by: ANESTHESIOLOGY

## 2023-03-06 PROCEDURE — 64635 DESTROY LUMB/SAC FACET JNT: CPT | Performed by: ANESTHESIOLOGY

## 2023-03-06 PROCEDURE — 64636 DESTROY L/S FACET JNT ADDL: CPT | Performed by: ANESTHESIOLOGY

## 2023-03-06 RX ORDER — MIDAZOLAM HYDROCHLORIDE 1 MG/ML
INJECTION INTRAMUSCULAR; INTRAVENOUS AS NEEDED
Status: DISCONTINUED | OUTPATIENT
Start: 2023-03-06 | End: 2023-03-06 | Stop reason: HOSPADM

## 2023-03-06 RX ORDER — SODIUM CHLORIDE 0.9 % (FLUSH) 0.9 %
10 SYRINGE (ML) INJECTION AS NEEDED
Status: DISCONTINUED | OUTPATIENT
Start: 2023-03-06 | End: 2023-03-06 | Stop reason: HOSPADM

## 2023-03-06 RX ORDER — FENTANYL CITRATE 50 UG/ML
INJECTION, SOLUTION INTRAMUSCULAR; INTRAVENOUS AS NEEDED
Status: DISCONTINUED | OUTPATIENT
Start: 2023-03-06 | End: 2023-03-06 | Stop reason: HOSPADM

## 2023-03-06 RX ORDER — SODIUM CHLORIDE 0.9 % (FLUSH) 0.9 %
10 SYRINGE (ML) INJECTION EVERY 12 HOURS SCHEDULED
Status: DISCONTINUED | OUTPATIENT
Start: 2023-03-06 | End: 2023-03-06 | Stop reason: HOSPADM

## 2023-03-06 NOTE — OP NOTE
Radiofrequency ablation of right L4-S1  [facility]    PREOPERATIVE DIAGNOSIS:  Lumbar spondylosis without myelopathy   POSTOPERATIVE DIAGNOSIS:  Lumbar spondylosis without myelopathy     PROCEDURES PERFORMED:   Right  lumbar radiofrequency ablation of the medial branches at the transverse processes of L4, L5, and the sacral alar groove to thermally treat these facet joints.  1.  39667 -RT Lumbar radiofrequency ablation 1st level.  2.  00639 -RT Lumbar radiofrequency ablation 2nd level.    INFORMED CONSENT:  In preprocedure discussion with the patient, the risks and complications were discussed prior to starting the procedure and informed consent was obtained.     SURGEON:   Isaura Torres MD    INDICATIONS:  The patient presents with chronic lower back pain. The patient underwent 2 lumbar medial branch blocks with diagnostically positive relief. Given the patient’s significant pain relief, it is diagnostic that we have likely found the source of the patient’s pain; therefore, lumbar radiofrequency ablation has been indicated.     SEDATION:  Light sedation was used for this procedure which included and Versed 1mg & Fentanyl 50mcg    TIME OF PROCEDURE:  The Interoperative procedure time, after administration of the IV sedative, was 12 minutes.    ANESTHETIC:  Lidocaine 1% for skin infiltration, 2% lidocaine and 0.25% bupivacaine for injection.    STEROID:  None.    DESCRIPTION OF PROCEDURE:  After obtaining informed consent an IV was  started in the preoperative area. The patient was taken to the operating room. The patient was placed in prone position with a pillow under the abdomen. All pressure points were padded. EKG, blood pressure, and pulse oximetry were monitored. The patient was  sedated. The lumbosacral area was prepped with ChloraPrep and draped in a sterile fashion.     The junction of the right S1 superior articular process and sacral ala was identified in a AP fluoroscopic view. The  skin and subcutaneous tissue inferior to the junction was anesthetized with 1% lidocaine. A 20-gauge 150mm RF Abbott needle was then advanced percutaneously through the anesthetized skin tract under fluoroscopic guidance until the non-insulated portion of the needle lie at the junction.  The image was then obliqued towards the right side to maximize visualization of the junctions of the L4, L5 superior articular processes with the transverse processes.  Needle placement was performed as described above until the non-insulated portion of the needles lie at the targeted junctions.  All needle tips were confirmed to be posterior to the neural foramen in the lateral fluoroscopic view. Sensory stimulation was then performed with good stimulation of the back at 0.5V or less at each level. Motor stimulation was performed up to 1.5V at each level producing stimulation of the multifidus muscles of the back and no stimulation of the lower extremity at any level. Each level was then anesthetized with 2% lidocaine prior to treatment with pulsed radiofrequency thermocoagulation at 42 degrees Celsius. Each level was then treated with thermal radiofrequency thermocoagulation at 80 degrees Celsius for 60 seconds in two separate cycles.  Prior to the removal of each needle, a volume of 1 mL of injectate was administered at each site.  The total volume consisted of 1mL of 2% lidocaine and 1mL of 0.25% bupivacaine.    ESTIMATED BLOOD LOSS:  Minimal.    SPECIMENS:  None.    COMPLICATIONS:  None.    TOLERANCE AND DISCHARGE:  The patient tolerated the procedure well. The patient was transported to the recovery area without difficulties. The patient was discharged home under the care of family in stable and satisfactory condition.    PLAN:  1.  The patient was given our standard instruction sheet.  2.  The patient will resume all medications per the medication reconciliation sheet.  3.  The patient will Return to clinic 6 wks.

## 2023-03-15 ENCOUNTER — TELEPHONE (OUTPATIENT)
Dept: FAMILY MEDICINE CLINIC | Facility: CLINIC | Age: 68
End: 2023-03-15

## 2023-03-15 NOTE — TELEPHONE ENCOUNTER
Caller: Mo Willoughby    Relationship: Self    Best call back number: [phone]    What was the call regarding: PATIENT STATES HIS HEART DOCTOR WANTS THE lisinopril (PRINIVIL,ZESTRIL) INCREASED FROM 10 MG TO 15 MG. PATIENT WOULD LIKE TO KNOW IF SHANNON DORAN COULD TAKE OVER THIS MEDICATION AND INCREASE THE MEDICATION    PHARMACY:[id number] [place], [place] - [address] [facility] [address] [facility] [phone] [facility] [phone] FX        Do you require a callback: YES

## 2023-03-17 ENCOUNTER — TELEPHONE (OUTPATIENT)
Dept: FAMILY MEDICINE CLINIC | Facility: CLINIC | Age: 68
End: 2023-03-17
Payer: MEDICARE

## 2023-03-17 RX ORDER — LISINOPRIL 5 MG/1
5 TABLET ORAL DAILY
Qty: 30 TABLET | Refills: 5 | Status: SHIPPED | OUTPATIENT
Start: 2023-03-17

## 2023-03-17 NOTE — TELEPHONE ENCOUNTER
Caller: Case Mo L    Relationship: Self    Best call back number: [phone] WORK/[phone]    What medications are you currently taking:   Current Outpatient Medications on File Prior to Visit   Medication Sig Dispense Refill   • allopurinol (ZYLOPRIM) 300 MG tablet Take 1 tablet by mouth Daily. 90 tablet 1   • FeroSul 325 (65 Fe) MG tablet TAKE 1 TABLET BY MOUTH DAILY WITH BREAKFAST 30 tablet 0   • folic acid (FOLVITE) 1 MG tablet TAKE 1 TABLET BY MOUTH DAILY 90 tablet 0   • lisinopril (PRINIVIL,ZESTRIL) 10 MG tablet Take 1.5 tablets by mouth Daily. 30 tablet 5   • pantoprazole (PROTONIX) 40 MG EC tablet Take 1 tablet by mouth 2 (Two) Times a Day. 180 tablet 3   • thiamine (VITAMIN B-1) 100 MG tablet  tablet Take 1 tablet by mouth Daily. 30 tablet 0     No current facility-administered medications on file prior to visit.          When did you start taking these medications: ONGOING     Which medication are you concerned about: LISINOPRIL     Who prescribed you this medication: CARDIOLOGIST     What are your concerns: HAS BEEN RELEASED FROM CARDIOLOGY, BUT THEY CALLED ASKING HIM TO TAKE 15 MG A DAY-WHICH IS CAUSING HIM TO CUT PILLS IN HALF DAILY. HE IS WANTING TO KNOW IF A PRESCRIPTION FOR 15MG TABLETS CAN BE SENT AS HE IS HAVING A HARD TIME CUTTING THE PILLS.     How long have you had these concerns:

## 2023-04-11 ENCOUNTER — PREP FOR SURGERY (OUTPATIENT)
Dept: SURGERY | Facility: SURGERY CENTER | Age: 68
End: 2023-04-11
Payer: MEDICARE

## 2023-04-11 ENCOUNTER — OFFICE VISIT (OUTPATIENT)
Dept: PAIN MEDICINE | Facility: CLINIC | Age: 68
End: 2023-04-11
Payer: MEDICARE

## 2023-04-11 VITALS
RESPIRATION RATE: 18 BRPM | TEMPERATURE: 98.6 F | DIASTOLIC BLOOD PRESSURE: 94 MMHG | BODY MASS INDEX: 35.05 KG/M2 | HEIGHT: 72 IN | WEIGHT: 258.8 LBS | SYSTOLIC BLOOD PRESSURE: 160 MMHG | HEART RATE: 93 BPM | OXYGEN SATURATION: 98 %

## 2023-04-11 DIAGNOSIS — M47.816 LUMBAR FACET ARTHROPATHY: ICD-10-CM

## 2023-04-11 DIAGNOSIS — M46.1 SACROILIITIS: Primary | ICD-10-CM

## 2023-04-11 DIAGNOSIS — M48.061 SPINAL STENOSIS OF LUMBAR REGION WITHOUT NEUROGENIC CLAUDICATION: ICD-10-CM

## 2023-04-11 DIAGNOSIS — M54.16 LUMBAR RADICULITIS: ICD-10-CM

## 2023-04-11 PROCEDURE — 99214 OFFICE O/P EST MOD 30 MIN: CPT | Performed by: PHYSICIAN ASSISTANT

## 2023-04-11 PROCEDURE — 3080F DIAST BP >= 90 MM HG: CPT | Performed by: PHYSICIAN ASSISTANT

## 2023-04-11 PROCEDURE — 1159F MED LIST DOCD IN RCRD: CPT | Performed by: PHYSICIAN ASSISTANT

## 2023-04-11 PROCEDURE — 1160F RVW MEDS BY RX/DR IN RCRD: CPT | Performed by: PHYSICIAN ASSISTANT

## 2023-04-11 PROCEDURE — 1125F AMNT PAIN NOTED PAIN PRSNT: CPT | Performed by: PHYSICIAN ASSISTANT

## 2023-04-11 PROCEDURE — 3077F SYST BP >= 140 MM HG: CPT | Performed by: PHYSICIAN ASSISTANT

## 2023-04-11 NOTE — H&P (VIEW-ONLY)
CHIEF COMPLAINT  Follow-up for back pain.      Subjective   Mo Willoughby is a 67 y.o. male  who presents to the office for follow-up of procedure.  He completed a Radiofrequency ablation of right L4-S1  on  3/6/2023 performed by Dr. Torres for management of back pain. Patient reports 40-50% ongoing relief from the procedure with improvement of his range of motion.  Over the last several weeks however the patient is noted progressive worsening of right posterior buttock pain which is referred slightly into the hip into the upper portion of the thigh.  This pain is very sharp and stabbing in nature and is aggravated with prolonged sitting or even standing.    Pain today 5/10 VAS in severity.        Back Pain  This is a chronic problem. The current episode started more than 1 month ago. The problem occurs constantly. The problem has been gradually worsening since onset. The pain is present in the sacro-iliac. The quality of the pain is described as shooting and stabbing (Sharp). The pain radiates to the right thigh. The pain is at a severity of 5/10. The pain is moderate. The pain is worse during the day. The symptoms are aggravated by sitting, lying down and position. Pertinent negatives include no numbness or weakness.        PEG Assessment   What number best describes your pain on average in the past week?6  What number best describes how, during the past week, pain has interfered with your enjoyment of life?6  What number best describes how, during the past week, pain has interfered with your general activity?  6    Review of Pertinent Medical Data ---  MRI OF THE LUMBAR SPINE WITHOUT CONTRAST     CLINICAL HISTORY:Acute low back pain without sciatica.     TECHNIQUE: MRI of the lumbar spine was obtained with sagittal T1,  proton-density, and T2-weighted images. Additionally, there are axial T1  and T2-weighted images through the lumbar spine.     COMPARISON:No previous similar studies are available for  comparison.     FINDINGS:     The conus medullaris terminates at the level of the mid body of L2 and  has normal signal intensity. The visualized distal thoracic spinal cord  is unremarkable.     At T11-T12, there is a disc bulge which mildly indents the ventral  subarachnoid space. There is mild-to-moderate left foraminal narrowing  secondary to facet hypertrophic change. At T12-L1, there is a disc bulge  with a posterior annular fissure. Additionally, there may be small left  central disc protrusion as well at this level. There are no axial images  obtained through the T12-L1 level. Overall, a mild-to-moderate degree of  canal stenosis is seen secondary to bulging disc material in addition to  a potential protrusion. No significant foraminal stenosis is seen at  T12-L1.     At L1-L2, there is a disc bulge which results in a mild degree of canal  and bilateral foraminal narrowing. Prominent fluid signal intensity is  seen within the L1-L2 interspinous region which is degenerative in  nature.     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  Additionally, there is a disc bulge in addition to a left central/left  subarticular disc protrusion that results in a moderate degree of canal  stenosis. There is mild-to-moderate bilateral left and moderate right  narrowing secondary to disc bulging and facet hypertrophic change.  Additionally, there is a right foraminal disc protrusion at L2-L3 that  contributes to the L2-L3 foraminal stenosis. Prominent fluid signal  intensity is seen within the L2-L3 interspinous region which is  degenerative in nature.     At L3-L4, the spinal canal is again narrowed on a congenital basis due  to shortened pedicles. Bulging disc material superimposed upon these  congenital phenomena results in a moderate degree of canal stenosis.  There is mild-to-moderate bilateral foraminal narrowing secondary to  bulging disc material and facet hypertrophic change. Moderate  facet  hypertrophy is appreciated at L3-L4. Prominent fluid signal intensity is  seen within the interspinous region at the L3-L4 level which is again  degenerative in nature.     At L4-L5, there is anterior spondylolisthesis of L4 and L5 by  approximately 13 mm. There is severe central canal stenosis secondary to  unroofed bulging disc material, ligamentum flavum thickening, and severe  facet hypertrophic change. Additionally, the spinal canal is relatively  narrow on a congenital basis at the L4-L5 level. There is a moderate  degree of bilateral foraminal narrowing secondary to bulging disc  material and facet hypertrophy.     At L5-S1, there is a disc bulge and facet hypertrophy that results in a  mild-to-moderate degree of right and a moderate degree of left foraminal  stenosis.     IMPRESSION:     The spinal canal is relatively narrow on a congenital basis due to  shortened pedicles from L2-L3 down to L4-L5. Superimposed degenerative  phenomena are seen at these levels and overall, there is moderate L2-L3,  moderate L3-L4, and severe L4-L5 canal stenosis as discussed in detail  above.     Degenerative anterior spondylolisthesis of L4 onL5 by approximately 13  mm is seen.     The remaining multilevel degenerative phenomena including multilevel  foraminal stenosis is discussed in detail above.           This report was finalized on 10/12/2022 6:09 AM by Dr. Elfego Navarro M.D.    The following portions of the patient's history were reviewed and updated as appropriate: allergies, current medications, past family history, past medical history, past social history, past surgical history and problem list.    Review of Systems   Gastrointestinal: Negative for constipation and diarrhea.   Genitourinary: Negative for difficulty urinating.   Musculoskeletal: Positive for back pain.   Neurological: Negative for weakness and numbness.   Psychiatric/Behavioral: Positive for sleep disturbance. Negative for suicidal ideas.  "The patient is not nervous/anxious.      I have reviewed and confirmed the accuracy of the ROS as documented by the MA/LPN/RN IVANA Paredes     Vitals:    04/11/23 1235   BP: 160/94   Pulse: 93   Resp: 18   Temp: 98.6 °F (37 °C)   SpO2: 98%   Weight: 117 kg (258 lb 12.8 oz)   Height: 182.9 cm (72\")   PainSc:   5   PainLoc: Back         Objective   Physical Exam  Vitals and nursing note reviewed.   Constitutional:       Appearance: Normal appearance. He is normal weight.   HENT:      Head: Normocephalic.   Pulmonary:      Effort: Pulmonary effort is normal.   Musculoskeletal:      Lumbar back: Tenderness (MODERATE PAIN TO PALPATION OVER THE RIGHT SI JOINT SPACE; +PATRICKS/+SI THRUST/+SI COMPRESSION TESTS ON THE RIGHT) present.      Comments: Improvement of ROM with lumbar extension and lateral bending   Skin:     General: Skin is warm and dry.   Neurological:      General: No focal deficit present.      Mental Status: He is alert and oriented to person, place, and time.      Cranial Nerves: Cranial nerves 2-12 are intact.      Sensory: Sensation is intact.      Motor: Motor function is intact.      Gait: Gait is intact.   Psychiatric:         Mood and Affect: Mood normal.         Behavior: Behavior normal.         Thought Content: Thought content normal.         Judgment: Judgment normal.           Assessment & Plan   Diagnoses and all orders for this visit:    1. Sacroiliitis (Primary)    2. Lumbar facet arthropathy    3. Lumbar radiculitis    4. Spinal stenosis of lumbar region without neurogenic claudication        --- Based on patient's physical exam findings with pain reproducible with SI provoking maneuvers I feel that he would benefit from #1 diagnostic/therapeutic right SIJ injection.  The risk and benefits of the procedure been discussed with the patient and all of his questions addressed.  --- TC in 4 weeks after the injection for further evaluation    ----------  Education about Sacroiliac joint " injections:  This Sacroiliac joint injection (blockade) we have suggested is intended for diagnostic purposes, with the intent of offering the patient Radiofrequency thermal rhizotomy (RF) of the sensory branches to the joint if the block is diagnostically effective.  The diagnostic blockade is necessary to determine the likelihood that RF therapy could be efficacious in providing long term relief to the patient.    In this procedure, the sacroiliac joint is aligned with imaging, and under image guidance a needle is placed with the needle tip into the joint.  The needle position is confirmed to be appropriately in the joint before injection of medication into the joint.  When xray fluoroscopy is used, contrast dye is used to confirm a proper arthrogram (i.e., outline of the joint).  When ultrasound is used, IV fluid (normal saline) is injected to see the flow of the fluid into the joint.  Once confirmed, then the medication can be injected into the joint.  Oftentimes this medication is a combination of local anesthetics (for diagnostic purposes) and also a steroid (to decrease irritation & inflammation in the joint, also known as sacroilitis).      Medically, a successful RF procedure should provide a patient with 50% pain relief or more for at least 6 months.  Clinical experience suggests that successful patients receive relief more in the range of 12 months on average.  We also discussed that many patients receive therapeutic success from the intraarticular joint injection, and may not require RF ablation.  If a patient receives more than 8 weeks of relief from joint injection, then occasional repeat joint blocks for therapeutic purposes is a very reasonable alternative therapy.  This course of therapy is consistent with our LCDs according to our CMS  in the area, and therefore other insurance providers should follow accordingly.  We will monitor our patients to screen for these therapeutic responders  and will offer RF therapy only when necessary.      We discussed that joint injections & also RF procedures are not without risks.  Best practices regarding anticoagulant use & neuraxial procedures will be respected.  Oftentimes a patient on an anticoagulant can be offered a joint injection safely, but again this is not risk-free, and such patients give consent with regards to this increased bleeding risk, which could cause problems including but not limited to worsening of pain, nerve damage, or muscle damage.  Patients that are ill or otherwise may be at risk for sepsis will not have their spines accessed by neuraxial injections of any type.  This patient will not be offered these therapies if there is an increased risk.   We discussed that there is a risk of postprocedural pain and also a risk of worsening of clinical picture with these procedures as with any neuraxial procedure.    ----------            Dictated utilizing Dragon dictation.

## 2023-04-14 ENCOUNTER — TRANSCRIBE ORDERS (OUTPATIENT)
Dept: SURGERY | Facility: SURGERY CENTER | Age: 68
End: 2023-04-14
Payer: MEDICARE

## 2023-04-14 DIAGNOSIS — Z41.9 SURGERY, ELECTIVE: Primary | ICD-10-CM

## 2023-05-01 RX ORDER — FOLIC ACID 1 MG/1
1 TABLET ORAL DAILY
Qty: 90 TABLET | Refills: 0 | Status: SHIPPED | OUTPATIENT
Start: 2023-05-01

## 2023-05-04 NOTE — SIGNIFICANT NOTE
Patient educated on the following :    - If you are receiving Sedation for your procedure Nothing to Eat 6 hours and only clear liquids for 2 hours prior to your procedure.     -You will need to have someone drive you home after your PROCEDURE and remain with you for 24 hours after the PROCEDURE  - The date of your procedure, your are welcome to have one visitor at bedside or remain within 10-15 minutes of [facility]  -You will need to arrive at 0745 on 5-5-23 for your PROCEDURE  -Please contact [employer] [facility] at: [phone] with any questions and/or concerns

## 2023-05-04 NOTE — DISCHARGE INSTRUCTIONS
[facility] – [place], [place] [facility] - Post-procedure Instructions          --  While there are no absolute restrictions, it is recommended that you do not perform strenuous activity today. In the morning, you may resume your level of activity as before your block.    --  If you have a band-aid at your injection site, please remove it later today. Observe the area for any redness, swelling, pus-like drainage, or a temperature over 101°. If any of these symptoms occur, please call your doctor at [phone]. If after office hours, leave a message and the on-call provider will return your call.    --  Ice may be applied to your injection site. It is recommended you avoid direct heat (heating pad; hot tub) for 1-2 days.    --  Call [facility] [place], [place]-[facility] Management at [phone] if you experience persistent headache, persistent bleeding from the injection site, or severe pain not relieved by heat or oral medication.    --  Do not make important decisions today.    --  Due to the effects of the block and/or the I.V. Sedation, DO NOT drive or operate hazardous machinery for 12 hours.  Local anesthetics may cause numbness after procedure and precautions must be taken with regards to operating equipment as well as with walking, even if ambulating with assistance of another person or with an assistive device.    --  Do not drink alcohol for 12 hours.    -- You may return to work tomorrow, or as directed by your referring doctor.    --  Occasionally you may notice a slight increase in your pain after the procedure. This should start to improve within the next 24-48 hours. Radiofrequency ablation procedure pain may last 3-4 weeks.    --  It may take as long as 3-4 days before you notice a gradual improvement in your pain and/or other symptoms.    -- You may continue to take your prescribed pain medication as needed.    --  Some normal possible side effects of steroid use could include fluid retention, increased blood sugar, dull headache,  increased sweating, increased appetite, mood swings and flushing.    --  Diabetics are recommended to watch their blood glucose level closely for 24-48 hours after the injection.    --  Must stay in PACU for 20 min upon arrival and prove no leg weakness before being discharged.    --  IN THE EVENT OF A LIFE THREATENING EMERGENCY, (CHEST PAIN, BREATHING DIFFICULTIES, PARALYSIS…) YOU SHOULD GO TO YOUR NEAREST EMERGENCY ROOM.    --  You should be contacted by our office within 2-3 days to schedule follow up or next appointment date.  If not contacted within 7 days, please call the office at [phone]

## 2023-05-05 ENCOUNTER — HOSPITAL ENCOUNTER (OUTPATIENT)
Dept: GENERAL RADIOLOGY | Facility: SURGERY CENTER | Age: 68
Setting detail: HOSPITAL OUTPATIENT SURGERY
End: 2023-05-05
Payer: MEDICARE

## 2023-05-05 ENCOUNTER — HOSPITAL ENCOUNTER (OUTPATIENT)
Facility: SURGERY CENTER | Age: 68
Setting detail: HOSPITAL OUTPATIENT SURGERY
Discharge: HOME | End: 2023-05-05
Attending: ANESTHESIOLOGY | Admitting: ANESTHESIOLOGY
Payer: MEDICARE

## 2023-05-05 VITALS
RESPIRATION RATE: 18 BRPM | HEIGHT: 72 IN | HEART RATE: 73 BPM | SYSTOLIC BLOOD PRESSURE: 133 MMHG | WEIGHT: 250 LBS | DIASTOLIC BLOOD PRESSURE: 85 MMHG | OXYGEN SATURATION: 99 % | BODY MASS INDEX: 33.86 KG/M2 | TEMPERATURE: 98.7 F

## 2023-05-05 DIAGNOSIS — Z41.9 SURGERY, ELECTIVE: ICD-10-CM

## 2023-05-05 PROCEDURE — 77002 NEEDLE LOCALIZATION BY XRAY: CPT

## 2023-05-05 PROCEDURE — 27096 INJECT SACROILIAC JOINT: CPT | Performed by: ANESTHESIOLOGY

## 2023-05-05 PROCEDURE — 76000 FLUOROSCOPY <1 HR PHYS/QHP: CPT

## 2023-05-05 PROCEDURE — 25010000002 DEXAMETHASONE SODIUM PHOSPHATE 100 MG/10ML SOLUTION 10 ML VIAL: Performed by: ANESTHESIOLOGY

## 2023-05-05 PROCEDURE — 25510000001 IOPAMIDOL 61 % SOLUTION 30 ML VIAL: Performed by: ANESTHESIOLOGY

## 2023-05-05 PROCEDURE — G0260 INJ FOR SACROILIAC JT ANESTH: HCPCS | Performed by: ANESTHESIOLOGY

## 2023-05-05 NOTE — OP NOTE
Right Sacroiliac Joint Injection  [facility]      PREOPERATIVE DIAGNOSIS:   Sacroiliac joint dysfunction    POSTOPERATIVE DIAGNOSIS:  Sacroiliac joint dysfunction    PROCEDURE:  Sacroiliac Joint Injection, with fluoroscopic guidance [facility] [id number] -RT    PRE-PROCEDURE DISCUSSION WITH PATIENT:    Risks and complications were discussed with the patient prior to starting the procedure and informed consent was obtained.  We discussed various topics including but not limited to bleeding, infection, injury, postprocedural site soreness, painful flareup, worsening of clinical picture, paralysis, coma, and death.     SURGEON:  Isaura Torres MD    REASON FOR PROCEDURE:    Patient has pain consistent with SI pathology on history and physical exam.    SEDATION:  No sedation was used for this procedure  ANESTHETIC AGENT:  Lidocaine 1%  STEROID AGENT:  15mg Dexamethasone    DESCRIPTON OF PROCEDURE:  After obtaining informed consent, IV access was not obtained in the preoperative area.  The patient was transported to the operative suite and placed in the prone position. EKG, blood pressure, and pulse oximeter were monitored. The lumbosacral area was prepped with Chloraprep and draped in a sterile fashion. Under fluoroscopic guidance the inferior most portion of the right SI joint was identified. The overlying skin and subcutaneous tissue was anesthetized with 1% lidocaine. A 22-gauge spinal needle was then advanced under fluoroscopic guidance in a coaxial view into the joint space.  After negative aspiration, 1 mL of Isovue 61% was injected, and after seeing appropriate spread into the joint a volume of 3ml of the above medication was injected.    Needle was removed intact.      Vital signs remained stable.  The onset of analgesia was noted.    Pre-procedure pain score: 8/10 at rest, 10/10 with activity  Post-procedure pain score: 0/10      ESTIMATED BLOOD LOSS:  minimal  SPECIMENS:  None  COMPLICATIONS:  No  complications were noted.    TOLERANCE & DISCHARGE CONDITION:    The patient tolerated the procedure well.  The patient was transported to the recovery area without difficulties.  The patient was discharged to home under the care of family in stable and satisfactory condition.    PLAN OF CARE:  1. The patient was given our standard instruction sheet and will resume all medications as per the medication reconciliation sheet.  2. The patient will Return to clinic 4-6 wks.  3. The patient is instructed to keep an account of pain relief after the procedure.

## 2023-05-31 DIAGNOSIS — I10 ESSENTIAL HYPERTENSION, BENIGN: ICD-10-CM

## 2023-05-31 RX ORDER — LISINOPRIL 10 MG/1
TABLET ORAL
Qty: 30 TABLET | Refills: 5 | Status: SHIPPED | OUTPATIENT
Start: 2023-05-31

## 2023-06-01 NOTE — PROGRESS NOTES
Subjective   Patient ID: Mo Willoughby is a 67 y.o. male is being seen for consultation today at the request of Isaura Torres MD NEW PATIENT-Spinal stenosis of lumbar region, unspecified whether neurogenic claudication present- Facet hypertrophy of lumbar region-Spondylolisthesis of lumbar region-Displacement of lumbar intervertebral disc without myelopathy-MRI LUMBAR 10/10/22 [facility]     Is very nice gentleman is with his significant other.  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  He continues to work.  He has had about a several year history of right-sided low back pain and right buttock and radiating leg pain.  The left leg is unaffected.  The right buttock and leg bother him more than the back.  He does have neurogenic claudication.  He has a positive shopping cart sign.  Sitting and lying down tend to make it better.  He has severe stenosis at L3-L4 and L4-L5 with a slip at L4-L5.  But he has unilateral symptoms.  Blocks have failed including epidural blocks and SI blocks.  I think the appropriate surgery for him is a right-sided L3-L4 and right-sided L4-L5 decompression and discectomy.  He does not have significant groin pain so I think L2-L3 can be ignored for now.  He knows that he might need a fusion in the future but I think a unilateral decompression focusing on those 2 levels in the L4 and L5 nerve roots would be sufficient for now.  He is fairly healthy and would like to proceed sooner rather than later.  He knows that he may be followed up with an APC.  I would like to see him at the 4-month arlene with flexion-extension x-rays.    We will also try some gabapentin at 300 mg twice daily as that might help a bit.          History of Present Illness    The following portions of the patient's history were reviewed and updated as appropriate: allergies, current medications, past family history, past medical history, past social history, past surgical history, and problem  "list.    Review of Systems   Constitutional:  Negative for fever.   Musculoskeletal:  Positive for back pain.   Neurological:  Positive for weakness and numbness.   All other systems reviewed and are negative.        Objective     Vitals:    06/05/23 1424   BP: 142/80   BP Location: Left arm   Patient Position: Sitting   Cuff Size: Adult   Pulse: 95   Resp: 20   SpO2: 97%   Weight: 113 kg (250 lb)   Height: 182.9 cm (72.01\")   PainSc:   7   PainLoc: Back     Body mass index is 33.9 kg/m².    Tobacco Use: Low Risk     Smoking Tobacco Use: Never    Smokeless Tobacco Use: Never    Passive Exposure: Not on file          Physical Exam  Constitutional:       Appearance: He is well-developed.   HENT:      Head: Normocephalic and atraumatic.   Eyes:      Extraocular Movements: EOM normal.      Conjunctiva/sclera: Conjunctivae normal.      Pupils: Pupils are equal, round, and reactive to light.   Neck:      Vascular: No carotid bruit.   Neurological:      Mental Status: He is oriented to person, place, and time.      Coordination: Finger-Nose-Finger Test and Heel to Shin Test normal.      Gait: Gait is intact.      Deep Tendon Reflexes:      Reflex Scores:       Tricep reflexes are 2+ on the right side and 2+ on the left side.       Bicep reflexes are 2+ on the right side and 2+ on the left side.       Brachioradialis reflexes are 2+ on the right side and 2+ on the left side.       Patellar reflexes are 2+ on the right side and 2+ on the left side.       Achilles reflexes are 2+ on the right side and 2+ on the left side.  Psychiatric:         Speech: Speech normal.     Neurologic Exam     Mental Status   Oriented to person, place, and time.   Registration of memory: Good recent and remote memory.   Attention: normal. Concentration: normal.   Speech: speech is normal   Level of consciousness: alert  Knowledge: consistent with education.     Cranial Nerves     CN II   Visual fields full to confrontation.   Visual acuity: " normal    CN III, IV, VI   Pupils are equal, round, and reactive to light.  Extraocular motions are normal.     CN V   Facial sensation intact.   Right corneal reflex: normal  Left corneal reflex: normal    CN VII   Facial expression full, symmetric.   Right facial weakness: none  Left facial weakness: none    CN VIII   Hearing: intact    CN IX, X   Palate: symmetric    CN XI   Right sternocleidomastoid strength: normal  Left sternocleidomastoid strength: normal    CN XII   Tongue: not atrophic  Tongue deviation: none    Motor Exam   Muscle bulk: normal  Right arm tone: normal  Left arm tone: normal  Right leg tone: normal  Left leg tone: normal    Strength   Strength 5/5 except as noted.     Sensory Exam   Light touch normal.     Gait, Coordination, and Reflexes     Gait  Gait: normal    Coordination   Finger to nose coordination: normal  Heel to shin coordination: normal    Reflexes   Right brachioradialis: 2+  Left brachioradialis: 2+  Right biceps: 2+  Left biceps: 2+  Right triceps: 2+  Left triceps: 2+  Right patellar: 2+  Left patellar: 2+  Right achilles: 2+  Left achilles: 2+  Right : 2+  Left : 2+        Assessment & Plan   Independent Review of Radiographic Studies:      I personally reviewed the images from the following studies.    The lumbar MRI done in October 2022 shows severe spinal stenosis at L3-L4 and L4-L5 with a spondylolisthesis at L4-L5 and a more modest degree of stenosis at L to L3.    Medical Decision Making:      I described and recommended an open right sided lumbar decompression at L3-L4 and L4-L5 without fusion.  The goal of surgery is relief of radiating leg pain with improvement of numbness, tingling, walking, and quality of life.  This will not help or hinder low back pain.  The risks include, but are not limited to, infection, hemorrhage on transfusion or reoperation, CSF leak requiring reoperation, incomplete relief of symptoms, potential need for additional surgeries in  the future including a fusion, stroke, paralysis, coma, and death.  The patient agrees to proceed.  Gabapentin is 300 mg twice daily will be tried as well.    He knows that he will likely follow-up with an APC after surgery.  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  If he is seen by the APC then I would like to see him at the 4-month arlene with flexion-extension x-rays.        Diagnoses and all orders for this visit:    1. DDD (degenerative disc disease), lumbar (Primary)  -     Case Request; Standing  -     ceFAZolin (ANCEF) 2 g in sodium chloride 0.9 % 100 mL IVPB  -     Case Request    2. Spondylolisthesis at L4-L5 level  -     Case Request; Standing  -     ceFAZolin (ANCEF) 2 g in sodium chloride 0.9 % 100 mL IVPB  -     Case Request    3. Spinal stenosis of lumbar region with neurogenic claudication  -     Case Request; Standing  -     ceFAZolin (ANCEF) 2 g in sodium chloride 0.9 % 100 mL IVPB  -     Case Request    4. Herniation of lumbar intervertebral disc with radiculopathy  -     Case Request; Standing  -     ceFAZolin (ANCEF) 2 g in sodium chloride 0.9 % 100 mL IVPB  -     Case Request    Other orders  -     Follow Anesthesia Guidelines / Protocol; Future  -     Follow Anesthesia Guidelines / Protocol; Standing  -     SCD (sequential compression device)- to be placed on patient in Pre-op; Standing  -     Verify / Perform Chlorhexidine Skin Prep; Standing  -     Obtain informed consent  -     Provide NPO Instructions to Patient; Future  -     Chlorhexidine Skin Prep; Future      Return for 2 weeks after surgery with an APC.

## 2023-06-05 ENCOUNTER — OFFICE VISIT (OUTPATIENT)
Dept: NEUROSURGERY | Facility: CLINIC | Age: 68
End: 2023-06-05
Payer: MEDICARE

## 2023-06-05 VITALS
OXYGEN SATURATION: 97 % | WEIGHT: 250 LBS | BODY MASS INDEX: 33.86 KG/M2 | HEART RATE: 95 BPM | DIASTOLIC BLOOD PRESSURE: 80 MMHG | HEIGHT: 72 IN | RESPIRATION RATE: 20 BRPM | SYSTOLIC BLOOD PRESSURE: 142 MMHG

## 2023-06-05 DIAGNOSIS — M51.36 DDD (DEGENERATIVE DISC DISEASE), LUMBAR: Primary | ICD-10-CM

## 2023-06-05 DIAGNOSIS — M48.062 SPINAL STENOSIS OF LUMBAR REGION WITH NEUROGENIC CLAUDICATION: ICD-10-CM

## 2023-06-05 DIAGNOSIS — M51.16 HERNIATION OF LUMBAR INTERVERTEBRAL DISC WITH RADICULOPATHY: ICD-10-CM

## 2023-06-05 DIAGNOSIS — M43.16 SPONDYLOLISTHESIS AT L4-L5 LEVEL: ICD-10-CM

## 2023-06-05 PROBLEM — M51.369 DDD (DEGENERATIVE DISC DISEASE), LUMBAR: Status: ACTIVE | Noted: 2023-06-05

## 2023-06-05 PROCEDURE — 3077F SYST BP >= 140 MM HG: CPT | Performed by: NEUROLOGICAL SURGERY

## 2023-06-05 PROCEDURE — 1159F MED LIST DOCD IN RCRD: CPT | Performed by: NEUROLOGICAL SURGERY

## 2023-06-05 PROCEDURE — 3079F DIAST BP 80-89 MM HG: CPT | Performed by: NEUROLOGICAL SURGERY

## 2023-06-05 PROCEDURE — 99204 OFFICE O/P NEW MOD 45 MIN: CPT | Performed by: NEUROLOGICAL SURGERY

## 2023-06-05 PROCEDURE — 1160F RVW MEDS BY RX/DR IN RCRD: CPT | Performed by: NEUROLOGICAL SURGERY

## 2023-06-05 RX ORDER — GABAPENTIN 300 MG/1
300 CAPSULE ORAL 2 TIMES DAILY
Qty: 60 CAPSULE | Refills: 3 | Status: SHIPPED | OUTPATIENT
Start: 2023-06-05

## 2023-06-09 ENCOUNTER — PATIENT ROUNDING (BHMG ONLY) (OUTPATIENT)
Dept: NEUROSURGERY | Facility: CLINIC | Age: 68
End: 2023-06-09
Payer: MEDICARE

## 2023-06-14 NOTE — PROGRESS NOTES
Subjective   Patient ID: Mo Willoughby is a 68 y.o. male is here today for follow-up for Open right sided lumbar decompression lumbar three/four, lumbar four/five done on 7/6/23 by Dr. Perez    History of Present Illness    Returns office today for first postop visit status post three-level lumbar decompression.  He is thrilled with his postop status and states that he has had complete resolution of right leg and knee pain following surgery.  He states that he has not been able to walk upright in over a year.  He has very minimal low back pain, only muscle tightness.  He is off of the narcotics.  He is weaning off of the gabapentin and is now taking only two 300 mg tablets daily.  He is taking Tylenol as needed.  He has been up walking quite a bit without any issue.  He denies any weakness in his legs.  He denies any balance or gait issues.  He denies any new issues at all.    He presents with his wife.      The following portions of the patient's history were reviewed and updated as appropriate: allergies, current medications, past family history, past medical history, past social history, past surgical history, and problem list.    Review of Systems   Musculoskeletal:  Negative for back pain, neck pain and neck stiffness.     /90   Pulse 66   SpO2 92%       Objective     Vitals:    07/24/23 1455   BP: 130/90   Pulse: 66   SpO2: 92%     There is no height or weight on file to calculate BMI.    Tobacco Use: Low Risk     Smoking Tobacco Use: Never    Smokeless Tobacco Use: Never    Passive Exposure: Not on file          Physical Exam  Vitals reviewed.   Constitutional:       Appearance: He is obese.   HENT:      Head: Atraumatic.   Pulmonary:      Effort: Pulmonary effort is normal.   Musculoskeletal:         General: No tenderness.      Comments: Strength is equal and intact bilateral lower extremities, normal muscle bulk and tone  Deferred lumbar range of motion exam   Skin:     General: Skin is warm  and dry.      Comments: Well-healed midline lumbar incision site, flat, normal surrounding skin, nontender   Neurological:      Mental Status: He is alert and oriented to person, place, and time.      GCS: GCS eye subscore is 4. GCS verbal subscore is 5. GCS motor subscore is 6.      Sensory: No sensory deficit.      Motor: No weakness.      Gait: Gait normal.      Deep Tendon Reflexes:      Reflex Scores:       Patellar reflexes are 2+ on the right side and 2+ on the left side.       Achilles reflexes are 2+ on the right side and 2+ on the left side.     Comments: Stable and upright, nonantalgic  Able to stand on heels and toes   Psychiatric:         Mood and Affect: Mood normal.     Neurologic Exam     Mental Status   Oriented to person, place, and time.     Gait, Coordination, and Reflexes     Reflexes   Right patellar: 2+  Left patellar: 2+  Right achilles: 2+  Left achilles: 2+        Assessment & Plan   Independent Review of Radiographic Studies:      I personally reviewed the images from the following studies.    No new imaging    Medical Decision Making:      He returns office today for first postop visit status post that of a lumbar decompression.  Exam as noted above, no red flags.  He is doing remarkably well with hardly any pain.  He has also had complete resolution of right leg and knee pain.  He is extremely pleased with his postoperative status.  He is okay to resume driving as he has normal leg strength and is off narcotics.  He is to refrain from excessive bending and twisting at the waist.  Okay to lift upwards of 15 to 20 pounds.  He and his wife are okay to resume intercourse as long as it is not painful and with caution.  He is weaning off the gabapentin and I have sent more of the medication to his pharmacy.  He will continue taking 2 tablets daily for the next week.  Then he will transition down to 1 tablet daily for 2 weeks, then he can discontinue the medication completely.  All questions  from the patient and his wife were answered at length.  We will see him back in 1 month for follow-up.  They are to call at anytime with any questions or concerns.  The incision site is healing well.  He is to refrain from getting in a swimming pool for a couple more weeks until the incision site is well-healed and completely closed.    Plan: Wean off gabapentin as directed, follow-up in 1 month    Diagnoses and all orders for this visit:    1. DDD (degenerative disc disease), lumbar (Primary)  -     gabapentin (NEURONTIN) 300 MG capsule; Take 1 capsule by mouth 2 (Two) Times a Day.  Dispense: 45 capsule; Refill: 0      Return in about 4 weeks (around 8/21/2023).

## 2023-07-07 PROBLEM — M48.061 SPINAL STENOSIS OF LUMBAR REGION WITH RADICULOPATHY: Status: ACTIVE | Noted: 2023-07-07

## 2023-07-07 PROBLEM — M54.16 SPINAL STENOSIS OF LUMBAR REGION WITH RADICULOPATHY: Status: ACTIVE | Noted: 2023-07-07

## 2023-07-24 ENCOUNTER — OFFICE VISIT (OUTPATIENT)
Dept: NEUROSURGERY | Facility: CLINIC | Age: 68
End: 2023-07-24
Payer: MEDICARE

## 2023-07-24 VITALS — OXYGEN SATURATION: 92 % | HEART RATE: 66 BPM | DIASTOLIC BLOOD PRESSURE: 90 MMHG | SYSTOLIC BLOOD PRESSURE: 130 MMHG

## 2023-07-24 DIAGNOSIS — M51.36 DDD (DEGENERATIVE DISC DISEASE), LUMBAR: Primary | ICD-10-CM

## 2023-07-24 PROCEDURE — 1159F MED LIST DOCD IN RCRD: CPT | Performed by: NURSE PRACTITIONER

## 2023-07-24 PROCEDURE — 3080F DIAST BP >= 90 MM HG: CPT | Performed by: NURSE PRACTITIONER

## 2023-07-24 PROCEDURE — 1160F RVW MEDS BY RX/DR IN RCRD: CPT | Performed by: NURSE PRACTITIONER

## 2023-07-24 PROCEDURE — 99024 POSTOP FOLLOW-UP VISIT: CPT | Performed by: NURSE PRACTITIONER

## 2023-07-24 PROCEDURE — 3075F SYST BP GE 130 - 139MM HG: CPT | Performed by: NURSE PRACTITIONER

## 2023-07-24 RX ORDER — GABAPENTIN 300 MG/1
300 CAPSULE ORAL 2 TIMES DAILY
Qty: 45 CAPSULE | Refills: 0 | Status: SHIPPED | OUTPATIENT
Start: 2023-07-24

## 2023-07-25 ENCOUNTER — TELEPHONE (OUTPATIENT)
Dept: NEUROSURGERY | Facility: CLINIC | Age: 68
End: 2023-07-25
Payer: MEDICARE

## 2023-07-25 NOTE — TELEPHONE ENCOUNTER
Patient called in and stated Dr. Mata had told him to take his gabapentin 3 times a day and he is now running out. Pharmacy needed us to give a verbal order that it is ok to fill early.

## 2023-07-31 RX ORDER — FOLIC ACID 1 MG/1
TABLET ORAL
Qty: 90 TABLET | Refills: 0 | Status: SHIPPED | OUTPATIENT
Start: 2023-07-31

## 2023-08-18 DIAGNOSIS — Z87.39 H/O: GOUT: ICD-10-CM

## 2023-08-20 RX ORDER — ALLOPURINOL 300 MG/1
300 TABLET ORAL DAILY
Qty: 90 TABLET | Refills: 1 | Status: SHIPPED | OUTPATIENT
Start: 2023-08-20

## 2023-09-08 NOTE — PROGRESS NOTES
Subjective   Patient ID: Mo Willoughby is a 68 y.o. male is here today for follow-up for Open right sided lumbar decompression lumbar three/four, lumbar four/five done on 7/7/2023 done by Dr. Perez.     History of Present Illness    He returns to the office today for 6 week follow-up status post the above-noted procedure with Dr Perez.  He is doing great.  He denies any pain, numbness, tingling or weakness in his legs.  He is walking without any problem.  He has resumed most of his normal preoperative routine without any issue.  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  He is extremely pleased with his postoperative status.  He has weaned off of the gabapentin completely.    He presents with his wife.    The following portions of the patient's history were reviewed and updated as appropriate: allergies, current medications, past family history, past medical history, past social history, past surgical history, and problem list.    Review of Systems   Musculoskeletal:  Negative for back pain, neck pain and neck stiffness.     /86   Pulse 81   SpO2 96%       Objective     Vitals:    09/14/23 1103   BP: 134/86   Pulse: 81   SpO2: 96%     There is no height or weight on file to calculate BMI.    Tobacco Use: Low Risk     Smoking Tobacco Use: Never    Smokeless Tobacco Use: Never    Passive Exposure: Not on file          Physical Exam  Vitals reviewed.   Constitutional:       Appearance: Normal appearance.   HENT:      Head: Atraumatic.   Pulmonary:      Effort: Pulmonary effort is normal.   Musculoskeletal:         General: No tenderness. Normal range of motion.   Skin:     General: Skin is warm and dry.   Neurological:      Mental Status: He is alert and oriented to person, place, and time.      GCS: GCS eye subscore is 4. GCS verbal subscore is 5. GCS motor subscore is 6.      Sensory: No sensory deficit.      Motor: No weakness.      Gait: Gait normal.      Comments: Gait  is stable and upright, nonantalgic   Psychiatric:         Mood and Affect: Mood normal.     Neurologic Exam     Mental Status   Oriented to person, place, and time.         Assessment & Plan   Independent Review of Radiographic Studies:      I personally reviewed the images from the following studies.    No new imaging    Medical Decision Making:    He presents to the office today for second postop visit.  He is doing great and denies any issues at this time.  He is off all of his pre and postoperative medications.  He is okay to return to work at this time without restriction.  Okay lifting, pushing or pulling upwards of 20 to 25 pounds and can increase slowly and as tolerated from there.  He is okay to return to the gym.  We will see him back in 2 months.  He will call with any additional questions or concerns.    Plan: Return to office in 2 months    Diagnoses and all orders for this visit:    1. Spondylolisthesis at L4-L5 level (Primary)    2. DDD (degenerative disc disease), lumbar      Return in about 2 months (around 11/14/2023).

## 2023-09-14 ENCOUNTER — OFFICE VISIT (OUTPATIENT)
Dept: NEUROSURGERY | Facility: CLINIC | Age: 68
End: 2023-09-14
Payer: MEDICARE

## 2023-09-14 VITALS — DIASTOLIC BLOOD PRESSURE: 86 MMHG | OXYGEN SATURATION: 96 % | SYSTOLIC BLOOD PRESSURE: 134 MMHG | HEART RATE: 81 BPM

## 2023-09-14 DIAGNOSIS — M43.16 SPONDYLOLISTHESIS AT L4-L5 LEVEL: Primary | ICD-10-CM

## 2023-09-14 DIAGNOSIS — M51.36 DDD (DEGENERATIVE DISC DISEASE), LUMBAR: ICD-10-CM

## 2023-09-14 PROCEDURE — 1159F MED LIST DOCD IN RCRD: CPT | Performed by: NURSE PRACTITIONER

## 2023-09-14 PROCEDURE — 99024 POSTOP FOLLOW-UP VISIT: CPT | Performed by: NURSE PRACTITIONER

## 2023-09-14 PROCEDURE — 3075F SYST BP GE 130 - 139MM HG: CPT | Performed by: NURSE PRACTITIONER

## 2023-09-14 PROCEDURE — 3079F DIAST BP 80-89 MM HG: CPT | Performed by: NURSE PRACTITIONER

## 2023-09-14 PROCEDURE — 1160F RVW MEDS BY RX/DR IN RCRD: CPT | Performed by: NURSE PRACTITIONER

## 2023-09-14 RX ORDER — LISINOPRIL 5 MG/1
TABLET ORAL
OUTPATIENT
Start: 2023-09-14

## 2023-09-18 RX ORDER — LISINOPRIL 5 MG/1
TABLET ORAL
Qty: 30 TABLET | Refills: 5 | Status: SHIPPED | OUTPATIENT
Start: 2023-09-18

## 2023-10-04 DIAGNOSIS — I10 ESSENTIAL HYPERTENSION, BENIGN: ICD-10-CM

## 2023-10-04 RX ORDER — LISINOPRIL 10 MG/1
10 TABLET ORAL DAILY
Qty: 90 TABLET | Refills: 1 | Status: SHIPPED | OUTPATIENT
Start: 2023-10-04

## 2023-10-04 NOTE — TELEPHONE ENCOUNTER
Caller: Liam Willoughbygermaine PERAZA    Relationship: Self    Best call back number: [phone]     Requested Prescriptions:   Requested Prescriptions     Pending Prescriptions Disp Refills    lisinopril (PRINIVIL,ZESTRIL) 10 MG tablet 30 tablet 5     Sig: Take 1 tablet by mouth Daily.        Pharmacy where request should be sent: [facility] #[id number] [facility] [address] [facility] [address] [facility] [phone] [facility] [phone]      Last office visit with prescribing clinician: 2/27/2023   Last telemedicine visit with prescribing clinician: Visit date not found   Next office visit with prescribing clinician: Visit date not found     Additional details provided by patient: TOOK LAST DOSE YESTERDAY     Does the patient have less than a 3 day supply:  [x] Yes  [] No    Would you like a call back once the refill request has been completed: [] Yes [] No    If the office needs to give you a call back, can they leave a voicemail: [] Yes [] No    Chi Turner Rep   10/04/23 10:00 EDT

## 2023-10-19 RX ORDER — FOLIC ACID 1 MG/1
TABLET ORAL
Qty: 90 TABLET | Refills: 2 | Status: SHIPPED | OUTPATIENT
Start: 2023-10-19

## 2023-11-09 NOTE — PROGRESS NOTES
Subjective   Patient ID: Mo Willoughby is a 68 y.o. male is here today for follow-up. Patient is doing well.     History of Present Illness    He returns the office today for ongoing follow-up status post open right sided lumbar decompression at L3-4 and L4-5 with Dr Perez on July 6, 2023.    Today, he is very happy to report that he continues to do very well.  He denies any low back or leg pain.  He denies any balance or gait issues.  He is having some right knee pain and is wearing a knee brace.  He has returned to work as a  a few hours a day with no problem.  He denies any new concerns.  He is taking Tylenol and Aleve as needed otherwise he is weaned off all of the other medications, including gabapentin.    He presents unaccompanied.      The following portions of the patient's history were reviewed and updated as appropriate: allergies, current medications, past family history, past medical history, past social history, past surgical history, and problem list.    Review of Systems   Gastrointestinal:  Negative for constipation.   Genitourinary:  Negative for difficulty urinating and enuresis.   Musculoskeletal:  Positive for gait problem. Negative for back pain.   Neurological:  Negative for weakness and numbness.   Psychiatric/Behavioral:  Negative for sleep disturbance.        /100   Pulse 79   SpO2 99%       Objective     Vitals:    11/13/23 1116   BP: 160/100   Pulse: 79   SpO2: 99%     There is no height or weight on file to calculate BMI.    Tobacco Use: Low Risk  (11/13/2023)    Patient History     Smoking Tobacco Use: Never     Smokeless Tobacco Use: Never     Passive Exposure: Not on file          Physical Exam  Vitals reviewed.   Constitutional:       Appearance: Normal appearance.   HENT:      Head: Atraumatic.   Pulmonary:      Effort: Pulmonary effort is normal.   Musculoskeletal:         General: No tenderness or deformity. Normal range of motion.      Comments: Full  lumbar range of motion without pain  Strength is equal and intact bilateral lower extremities   Skin:     General: Skin is warm and dry.   Neurological:      Mental Status: He is alert and oriented to person, place, and time.      GCS: GCS eye subscore is 4. GCS verbal subscore is 5. GCS motor subscore is 6.      Sensory: No sensory deficit.      Motor: No weakness or tremor.      Gait: Gait normal.      Comments: Stable upright gait, nonantalgic  Able to stand on heels and toes   Psychiatric:         Mood and Affect: Mood normal.       Neurologic Exam     Mental Status   Oriented to person, place, and time.           Assessment & Plan   Independent Review of Radiographic Studies:      I personally reviewed the images from the following studies.    No new imaging    Medical Decision Making:      He presents office today for 4-month visit status post two-level lumbar decompression and fusion.  Exam as noted above, no red flags.  He is doing great and is perfectly intact on exam with normal strength, sensation and reflexes.  He is okay resuming his normal preoperative activities lifting upwards of 15 to 20 pounds and he can increase the weight limit slowly and as tolerated.  He is fine bending and twisting at the waist.  I think at this time he is also fine to resume returning to work full-time, as long as he has good energy and stamina.  We will see him back on an as-needed basis.  He will call with any additional questions or concerns.    Plan: Return to office as needed    Diagnoses and all orders for this visit:    1. DDD (degenerative disc disease), lumbar (Primary)      Return if symptoms worsen or fail to improve.

## 2023-11-13 ENCOUNTER — OFFICE VISIT (OUTPATIENT)
Dept: NEUROSURGERY | Facility: CLINIC | Age: 68
End: 2023-11-13
Payer: MEDICARE

## 2023-11-13 VITALS — HEART RATE: 79 BPM | SYSTOLIC BLOOD PRESSURE: 160 MMHG | OXYGEN SATURATION: 99 % | DIASTOLIC BLOOD PRESSURE: 100 MMHG

## 2023-11-13 DIAGNOSIS — M51.36 DDD (DEGENERATIVE DISC DISEASE), LUMBAR: Primary | ICD-10-CM

## 2023-11-13 PROCEDURE — 3080F DIAST BP >= 90 MM HG: CPT | Performed by: NURSE PRACTITIONER

## 2023-11-13 PROCEDURE — 99212 OFFICE O/P EST SF 10 MIN: CPT | Performed by: NURSE PRACTITIONER

## 2023-11-13 PROCEDURE — 3077F SYST BP >= 140 MM HG: CPT | Performed by: NURSE PRACTITIONER

## 2023-11-13 PROCEDURE — 1160F RVW MEDS BY RX/DR IN RCRD: CPT | Performed by: NURSE PRACTITIONER

## 2023-11-13 PROCEDURE — 1159F MED LIST DOCD IN RCRD: CPT | Performed by: NURSE PRACTITIONER

## 2024-02-15 DIAGNOSIS — Z87.39 H/O: GOUT: ICD-10-CM

## 2024-02-16 RX ORDER — ALLOPURINOL 300 MG/1
300 TABLET ORAL DAILY
Qty: 90 TABLET | Refills: 0 | Status: SHIPPED | OUTPATIENT
Start: 2024-02-16

## 2024-03-08 DIAGNOSIS — I10 ESSENTIAL HYPERTENSION, BENIGN: ICD-10-CM

## 2024-03-08 RX ORDER — LISINOPRIL 5 MG/1
TABLET ORAL
Qty: 30 TABLET | Refills: 5 | OUTPATIENT
Start: 2024-03-08

## 2024-03-11 DIAGNOSIS — I10 ESSENTIAL HYPERTENSION, BENIGN: ICD-10-CM

## 2024-03-11 RX ORDER — LISINOPRIL 10 MG/1
10 TABLET ORAL DAILY
Qty: 90 TABLET | Refills: 1 | Status: CANCELLED | OUTPATIENT
Start: 2024-03-11

## 2024-03-11 RX ORDER — LISINOPRIL 10 MG/1
10 TABLET ORAL DAILY
Qty: 90 TABLET | Refills: 1 | OUTPATIENT
Start: 2024-03-11

## 2024-03-16 DIAGNOSIS — I10 ESSENTIAL HYPERTENSION, BENIGN: ICD-10-CM

## 2024-03-19 RX ORDER — LISINOPRIL 5 MG/1
TABLET ORAL
Qty: 30 TABLET | Refills: 5 | OUTPATIENT
Start: 2024-03-19

## 2024-03-20 DIAGNOSIS — I10 ESSENTIAL HYPERTENSION, BENIGN: ICD-10-CM

## 2024-03-20 RX ORDER — LISINOPRIL 5 MG/1
TABLET ORAL
Qty: 30 TABLET | Refills: 5 | OUTPATIENT
Start: 2024-03-20

## 2024-03-20 RX ORDER — LISINOPRIL 10 MG/1
10 TABLET ORAL DAILY
Qty: 90 TABLET | Refills: 1 | OUTPATIENT
Start: 2024-03-20

## 2024-03-20 NOTE — TELEPHONE ENCOUNTER
Caller: Mo Willoughby STU    Relationship: Self    Best call back number: [phone]     Requested Prescriptions:   Requested Prescriptions     Pending Prescriptions Disp Refills    lisinopril (PRINIVIL,ZESTRIL) 5 MG tablet 30 tablet 5    lisinopril (PRINIVIL,ZESTRIL) 10 MG tablet 90 tablet 1     Sig: Take 1 tablet by mouth Daily.        Pharmacy where request should be sent: [facility] #[id number] [facility] [address] [facility] [address] [facility] [phone] [facility] [phone]      Last office visit with prescribing clinician: 2/27/2023   Last telemedicine visit with prescribing clinician: Visit date not found   Next office visit with prescribing clinician: 3/16/2024     Additional details provided by patient: 1 DAY LEFT  REQUESTED 1 WEEK AGO    Does the patient have less than a 3 day supply:  [x] Yes  [] No    Chi Pedro Rep   03/20/24 15:00 EDT

## 2024-03-21 ENCOUNTER — TELEPHONE (OUTPATIENT)
Dept: FAMILY MEDICINE CLINIC | Facility: CLINIC | Age: 69
End: 2024-03-21
Payer: MEDICARE

## 2024-03-21 DIAGNOSIS — I10 ESSENTIAL HYPERTENSION, BENIGN: ICD-10-CM

## 2024-03-21 RX ORDER — LISINOPRIL 10 MG/1
10 TABLET ORAL DAILY
Qty: 90 TABLET | Refills: 1 | OUTPATIENT
Start: 2024-03-21

## 2024-03-21 RX ORDER — LISINOPRIL 5 MG/1
5 TABLET ORAL DAILY
Qty: 4 TABLET | Refills: 0 | Status: SHIPPED | OUTPATIENT
Start: 2024-03-21

## 2024-03-21 RX ORDER — LISINOPRIL 10 MG/1
10 TABLET ORAL DAILY
Qty: 4 TABLET | Refills: 0 | Status: SHIPPED | OUTPATIENT
Start: 2024-03-21

## 2024-03-21 RX ORDER — LISINOPRIL 5 MG/1
TABLET ORAL
Qty: 30 TABLET | Refills: 5 | OUTPATIENT
Start: 2024-03-21

## 2024-03-21 NOTE — TELEPHONE ENCOUNTER
PT MADE APPT TO SEE SHANNON ON MONDAY, APRIL 15th, REQUESTING REFILLS ON BOTH THE 5 AND 10 MG TABLETS TO GET HIM TO THAT APPT

## 2024-03-21 NOTE — TELEPHONE ENCOUNTER
Spoke to patient re: refill on Lisinopril 5 & 10 mg daily. He has been trying to get for awhile he said no one called him and he will be out tomorrow. He moved up his appt. Til 3/25 after knowing and gave 4 pills each until appt on Monday which told him provider could refill after appt.

## 2024-03-26 ENCOUNTER — TELEPHONE (OUTPATIENT)
Dept: FAMILY MEDICINE CLINIC | Facility: CLINIC | Age: 69
End: 2024-03-26
Payer: MEDICARE

## 2024-03-26 DIAGNOSIS — I10 ESSENTIAL HYPERTENSION, BENIGN: ICD-10-CM

## 2024-03-26 RX ORDER — LISINOPRIL 5 MG/1
5 TABLET ORAL DAILY
Qty: 30 TABLET | Refills: 0 | Status: SHIPPED | OUTPATIENT
Start: 2024-03-26 | End: 2024-03-27 | Stop reason: SDUPTHER

## 2024-03-26 RX ORDER — LISINOPRIL 10 MG/1
10 TABLET ORAL DAILY
Qty: 30 TABLET | Refills: 0 | Status: SHIPPED | OUTPATIENT
Start: 2024-03-26 | End: 2024-03-27 | Stop reason: SDUPTHER

## 2024-03-26 NOTE — TELEPHONE ENCOUNTER
Caller: Mo Willoughby    Relationship: Self    Best call back number: [phone]    What medication are you requesting: lisinopril (PRINIVIL,ZESTRIL) 10 MG tablet  AND lisinopril (PRINIVIL,ZESTRIL) 5 MG tablet     If a prescription is needed, what is your preferred pharmacy and phone number: [facility] STORE #[id number] [place], [place] - [address] [facility] [address] [facility] [phone] [facility] [phone]      Additional notes: PATIENT HAD AN APPOINTMENT FOR MEDICATION REFILLS YESTERDAY 03/25/24 BUT HAD TO BE CANCELED DUE TO PROVIDER.    PATIENT RESCHEDULED NEXT AVAILABLE WITH PCP, BUT STATED HE IS OUT OF MEDICATION AND WOULD LIKE TO KNOW IF HE COULD HAVE ENOUGH CALLED IN UNTIL APPOINTMENT ON 04/15/24.    PLEASE CALL.

## 2024-03-27 DIAGNOSIS — I10 ESSENTIAL HYPERTENSION, BENIGN: ICD-10-CM

## 2024-03-27 RX ORDER — LISINOPRIL 5 MG/1
5 TABLET ORAL DAILY
Qty: 90 TABLET | Refills: 0 | Status: SHIPPED | OUTPATIENT
Start: 2024-03-27

## 2024-03-27 RX ORDER — LISINOPRIL 10 MG/1
10 TABLET ORAL DAILY
Qty: 90 TABLET | Refills: 0 | Status: SHIPPED | OUTPATIENT
Start: 2024-03-27

## 2024-03-27 NOTE — TELEPHONE ENCOUNTER
Caller: Mo Willoughby    Relationship: Self    Best call back number: [phone]     Requested Prescriptions:   Requested Prescriptions     Pending Prescriptions Disp Refills    lisinopril (PRINIVIL,ZESTRIL) 5 MG tablet 30 tablet 0     Sig: Take 1 tablet by mouth Daily.    lisinopril (PRINIVIL,ZESTRIL) 10 MG tablet 30 tablet 0     Sig: Take 1 tablet by mouth Daily.        Pharmacy where request should be sent: [id number] [address] AT [facility] [address] & [facility] [phone] [facility] [phone]      Last office visit with prescribing clinician: 2/27/2023   Last telemedicine visit with prescribing clinician: Visit date not found   Next office visit with prescribing clinician: 4/15/2024     Additional details provided by patient: PATIENT IS OUT OF MEDICATION     PATIENT STATES THE PHARMACY DOES NOT HAVE THE QUANTITY OF THE MEDICATION. HE SI REQUESTING A 90 DAY SUPPLY     Does the patient have less than a 3 day supply:  [x] Yes  [] No    Would you like a call back once the refill request has been completed: [x] Yes [] No    If the office needs to give you a call back, can they leave a voicemail: [x] Yes [] No    Chi Cotter Rep   03/27/24 14:02 EDT

## 2024-04-15 ENCOUNTER — OFFICE VISIT (OUTPATIENT)
Dept: FAMILY MEDICINE CLINIC | Facility: CLINIC | Age: 69
End: 2024-04-15
Payer: MEDICARE

## 2024-04-15 VITALS
SYSTOLIC BLOOD PRESSURE: 154 MMHG | TEMPERATURE: 98.6 F | HEART RATE: 106 BPM | WEIGHT: 236.6 LBS | DIASTOLIC BLOOD PRESSURE: 98 MMHG | OXYGEN SATURATION: 98 % | BODY MASS INDEX: 32.05 KG/M2 | HEIGHT: 72 IN

## 2024-04-15 DIAGNOSIS — Z12.5 SCREENING PSA (PROSTATE SPECIFIC ANTIGEN): ICD-10-CM

## 2024-04-15 DIAGNOSIS — E78.2 MIXED HYPERLIPIDEMIA: ICD-10-CM

## 2024-04-15 DIAGNOSIS — I10 ESSENTIAL HYPERTENSION, BENIGN: ICD-10-CM

## 2024-04-15 DIAGNOSIS — Z00.00 MEDICARE ANNUAL WELLNESS VISIT, SUBSEQUENT: Primary | ICD-10-CM

## 2024-04-15 DIAGNOSIS — I48.91 NEW ONSET ATRIAL FIBRILLATION: ICD-10-CM

## 2024-04-15 DIAGNOSIS — Z87.39 H/O: GOUT: ICD-10-CM

## 2024-04-15 DIAGNOSIS — D50.9 IRON DEFICIENCY ANEMIA, UNSPECIFIED IRON DEFICIENCY ANEMIA TYPE: ICD-10-CM

## 2024-04-15 PROCEDURE — 3077F SYST BP >= 140 MM HG: CPT | Performed by: NURSE PRACTITIONER

## 2024-04-15 PROCEDURE — 1159F MED LIST DOCD IN RCRD: CPT | Performed by: NURSE PRACTITIONER

## 2024-04-15 PROCEDURE — G0439 PPPS, SUBSEQ VISIT: HCPCS | Performed by: NURSE PRACTITIONER

## 2024-04-15 PROCEDURE — 3080F DIAST BP >= 90 MM HG: CPT | Performed by: NURSE PRACTITIONER

## 2024-04-15 PROCEDURE — 93000 ELECTROCARDIOGRAM COMPLETE: CPT | Performed by: NURSE PRACTITIONER

## 2024-04-15 PROCEDURE — 1160F RVW MEDS BY RX/DR IN RCRD: CPT | Performed by: NURSE PRACTITIONER

## 2024-04-15 PROCEDURE — 1170F FXNL STATUS ASSESSED: CPT | Performed by: NURSE PRACTITIONER

## 2024-04-15 PROCEDURE — 99214 OFFICE O/P EST MOD 30 MIN: CPT | Performed by: NURSE PRACTITIONER

## 2024-04-15 RX ORDER — LISINOPRIL 5 MG/1
5 TABLET ORAL DAILY
Qty: 90 TABLET | Refills: 2 | Status: SHIPPED | OUTPATIENT
Start: 2024-04-15

## 2024-04-15 RX ORDER — ALLOPURINOL 300 MG/1
300 TABLET ORAL DAILY
Qty: 90 TABLET | Refills: 2 | Status: SHIPPED | OUTPATIENT
Start: 2024-04-15

## 2024-04-15 RX ORDER — LISINOPRIL 10 MG/1
10 TABLET ORAL DAILY
Qty: 90 TABLET | Refills: 2 | Status: SHIPPED | OUTPATIENT
Start: 2024-04-15

## 2024-04-15 NOTE — PROGRESS NOTES
The ABCs of the Annual Wellness Visit  Subsequent Medicare Wellness Visit    Subjective    {Wrapup  Review (Popup)  Advance Care Planning  Labs  CC  Problem List  Visit Diagnosis  Medications  Result Review  Imaging  [facility]  [facility]  [facility]  SnapShot  Encounters  Notes  Media  Procedures :23}  Mo Willoughby is a 68 y.o. male who presents for a Subsequent Medicare Wellness Visit.    The following portions of the patient's history were reviewed and   updated as appropriate: allergies, current medications, past family history, past medical history, past social history, past surgical history, and problem list.    Compared to one year ago, the patient feels his physical   health is the same.    Compared to one year ago, the patient feels his mental   health is the same.    Recent Hospitalizations:  He was not admitted to the hospital during the last year.       Current Medical Providers:  Patient Care Team:  Jenn Davison APRN as PCP - General (Nurse Practitioner)    Outpatient Medications Prior to Visit   Medication Sig Dispense Refill    ferrous sulfate 325 (65 FE) MG tablet Take 1 tablet by mouth Daily With Breakfast.      folic acid (FOLVITE) 1 MG tablet TAKE 1 TABLET BY MOUTH DAILY 90 tablet 2    vitamin B-12 (CYANOCOBALAMIN) 1000 MCG tablet Take 1 tablet by mouth Daily. HOLD FOR SURGERY ONE WEEK PRIOR      allopurinol (ZYLOPRIM) 300 MG tablet TAKE 1 TABLET BY MOUTH DAILY 90 tablet 0    lisinopril (PRINIVIL,ZESTRIL) 10 MG tablet Take 1 tablet by mouth Daily. 90 tablet 0    lisinopril (PRINIVIL,ZESTRIL) 5 MG tablet Take 1 tablet by mouth Daily. 90 tablet 0    pantoprazole (PROTONIX) 40 MG EC tablet TAKE 1 TABLET BY MOUTH TWICE DAILY (Patient taking differently: Take 1 tablet by mouth Daily.) 180 tablet 3    thiamine (VITAMIN B-1) 100 MG tablet  tablet Take 1 tablet by mouth Daily. 30 tablet 0    acetaminophen (TYLENOL) 325 MG tablet Take 2 tablets by mouth Every 4  (Four) Hours As Needed for Mild Pain.      docusate sodium 100 MG capsule Take 2 capsules by mouth 2 (Two) Times a Day.      gabapentin (NEURONTIN) 300 MG capsule Take 1 capsule by mouth 3 (Three) Times a Day. 90 capsule 3    gabapentin (NEURONTIN) 300 MG capsule Take 1 capsule by mouth 2 (Two) Times a Day. 45 capsule 0    methocarbamol (ROBAXIN) 750 MG tablet Take 1 tablet by mouth 4 (Four) Times a Day As Needed for Muscle Spasms. 40 tablet 1     No facility-administered medications prior to visit.       No opioid medication identified on active medication list. I have reviewed chart for other potential  high risk medication/s and harmful drug interactions in the elderly.        Aspirin is not on active medication list.  Aspirin use is not indicated based on review of current medical condition/s. Risk of harm outweighs potential benefits.  .    Patient Active Problem List   Diagnosis    Erectile dysfunction    Gout    Hypercalcemia    Colon polyp    Sinus tachycardia    Pancreatitis    Other fatigue    Ascending aortic aneurysm    Generalized abdominal pain    Folic acid deficiency    Iron deficiency anemia    Acute bilateral low back pain without sciatica    Swelling of the testicles    Urinary frequency    Hyperparathyroidism, unspecified    CRI (chronic renal insufficiency), stage 3 (moderate)    Essential hypertension, benign    Spinal stenosis of lumbar region    Lumbar radiculitis    Lumbar facet arthropathy    Screening PSA (prostate specific antigen)    Mixed hyperlipidemia    Change in pigmented skin lesion of face    Pain in both testicles    Sacroiliitis    DDD (degenerative disc disease), lumbar    Spondylolisthesis at L4-L5 level    Herniation of lumbar intervertebral disc with radiculopathy    Spinal stenosis of lumbar region with radiculopathy     Advance Care Planning   Advance Care Planning     Advance Directive is not on file.  ACP discussion was held with the patient during this visit. Patient  "has an advance directive (not in EMR), copy requested.     Objective    Vitals:    04/15/24 1305   BP: 154/98   BP Location: Left arm   Patient Position: Sitting   Cuff Size: Adult   Pulse: 106   Temp: 98.6 °F (37 °C)   SpO2: 98%   Weight: 107 kg (236 lb 9.6 oz)   Height: 182.9 cm (72.01\")     Estimated body mass index is 32.08 kg/m² as calculated from the following:    Height as of this encounter: 182.9 cm (72.01\").    Weight as of this encounter: 107 kg (236 lb 9.6 oz).    BMI is >= 30 and <35. (Class 1 Obesity). The following options were offered after discussion;: weight loss educational material (shared in after visit summary) and exercise counseling/recommendations      Does the patient have evidence of cognitive impairment?   No            HEALTH RISK ASSESSMENT    Smoking Status:  Social History     Tobacco Use   Smoking Status Never   Smokeless Tobacco Never     Alcohol Consumption:  Social History     Substance and Sexual Activity   Alcohol Use Yes    Comment: occasional     Fall Risk Screen:    JIMENEZADI Fall Risk Assessment was completed, and patient is at LOW risk for falls.Assessment completed on:4/15/2024    Depression Screenin/15/2024     1:00 PM   PHQ-2/PHQ-9 Depression Screening   Little Interest or Pleasure in Doing Things 0-->not at all   Feeling Down, Depressed or Hopeless 0-->not at all   PHQ-9: Brief Depression Severity Measure Score 0       Health Habits and Functional and Cognitive Screenin/15/2024     1:00 PM   Functional & Cognitive Status   Do you have difficulty preparing food and eating? No   Do you have difficulty bathing yourself, getting dressed or grooming yourself? No   Do you have difficulty using the toilet? No   Do you have difficulty moving around from place to place? No   Do you have trouble with steps or getting out of a bed or a chair? No   Current Diet Well Balanced Diet   Dental Exam Up to date   Eye Exam Up to date   Exercise (times per week) 3 times per " week   Current Exercises Include Weightlifting   Do you need help using the phone?  No   Are you deaf or do you have serious difficulty hearing?  No   Do you need help to go to places out of walking distance? No   Do you need help shopping? No   Do you need help preparing meals?  No   Do you need help with housework?  No   Do you need help with laundry? No   Do you need help taking your medications? No   Do you need help managing money? No   Do you ever drive or ride in a car without wearing a seat belt? No   Have you felt unusual stress, anger or loneliness in the last month? No   Who do you live with? Spouse   If you need help, do you have trouble finding someone available to you? No   Have you been bothered in the last four weeks by sexual problems? No   Do you have difficulty concentrating, remembering or making decisions? No       Age-appropriate Screening Schedule:  Refer to the list below for future screening recommendations based on patient's age, sex and/or medical conditions. Orders for these recommended tests are listed in the plan section. The patient has been provided with a written plan.    Health Maintenance   Topic Date Due    ANNUAL WELLNESS VISIT  02/27/2024    LIPID PANEL  02/27/2024    BMI FOLLOWUP  02/27/2024    ZOSTER VACCINE (1 of 2) 04/15/2024 (Originally 7/1/2007)    COVID-19 Vaccine (4 - 2023-24 season) 04/17/2024 (Originally 9/1/2023)    RSV Vaccine - Adults (1 - 1-dose 60+ series) 04/15/2025 (Originally 7/1/2017)    Pneumococcal Vaccine 65+ (1 of 1 - PCV) 04/15/2025 (Originally 7/1/2022)    INFLUENZA VACCINE  08/01/2024    TDAP/TD VACCINES (2 - Td or Tdap) 03/29/2027    COLORECTAL CANCER SCREENING  05/05/2032    HEPATITIS C SCREENING  Completed                  CMS Preventative Services Quick Reference  Risk Factors Identified During Encounter:    Immunizations Discussed/Encouraged: Influenza, Prevnar 20 (Pneumococcal 20-valent conjugate), COVID19, and RSV (Respiratory Syncytial  Virus)  Dental Screening Recommended  Vision Screening Recommended    The above risks/problems have been discussed with the patient.  Pertinent information has been shared with the patient in the After Visit Summary.    Diagnoses and all orders for this visit:    1. Medicare annual wellness visit, subsequent (Primary)  Assessment & Plan:  Counseling was provided on nutrition, physical activity, development, and injury prevention, dental health, and safe sex practices patient verbalizes understanding no additional questions were asked.        2. Mixed hyperlipidemia  -     Lipid Panel    3. Essential hypertension, benign  -     Comprehensive Metabolic Panel  -     lisinopril (PRINIVIL,ZESTRIL) 5 MG tablet; Take 1 tablet by mouth Daily.  Dispense: 90 tablet; Refill: 2  -     lisinopril (PRINIVIL,ZESTRIL) 10 MG tablet; Take 1 tablet by mouth Daily.  Dispense: 90 tablet; Refill: 2    4. Iron deficiency anemia, unspecified iron deficiency anemia type  -     CBC & Differential    5. DDD (degenerative disc disease), lumbar    6. Screening PSA (prostate specific antigen)  Assessment & Plan:  Counseling was provided on nutrition, physical activity, development, and injury prevention, dental health, and safe sex practices patient verbalizes understanding no additional questions were asked.      Orders:  -     PSA Screen    7. H/O: gout on allopurinol  -     allopurinol (ZYLOPRIM) 300 MG tablet; Take 1 tablet by mouth Daily.  Dispense: 90 tablet; Refill: 2        Follow Up:   Next Medicare Wellness visit to be scheduled in 1 year.      An After Visit Summary and PPPS were made available to the patient.

## 2024-04-15 NOTE — PROGRESS NOTES
The ABCs of the Annual Wellness Visit  Subsequent Medicare Wellness Visit    Subjective    Mo Willoughby is a 68 y.o. male who presents for a Subsequent Medicare Wellness Visit.    The following portions of the patient's history were reviewed and   updated as appropriate: allergies, current medications, past family history, past medical history, past social history, past surgical history, and problem list.    Compared to one year ago, the patient feels his physical   health is the same.    Compared to one year ago, the patient feels his mental   health is the same.    Recent Hospitalizations:  He was not admitted to the hospital during the last year.       Current Medical Providers:  Patient Care Team:  Jenn Davison APRN as PCP - General (Nurse Practitioner)    Outpatient Medications Prior to Visit   Medication Sig Dispense Refill    ferrous sulfate 325 (65 FE) MG tablet Take 1 tablet by mouth Daily With Breakfast.      folic acid (FOLVITE) 1 MG tablet TAKE 1 TABLET BY MOUTH DAILY 90 tablet 2    vitamin B-12 (CYANOCOBALAMIN) 1000 MCG tablet Take 1 tablet by mouth Daily. HOLD FOR SURGERY ONE WEEK PRIOR      allopurinol (ZYLOPRIM) 300 MG tablet TAKE 1 TABLET BY MOUTH DAILY 90 tablet 0    lisinopril (PRINIVIL,ZESTRIL) 10 MG tablet Take 1 tablet by mouth Daily. 90 tablet 0    lisinopril (PRINIVIL,ZESTRIL) 5 MG tablet Take 1 tablet by mouth Daily. 90 tablet 0    pantoprazole (PROTONIX) 40 MG EC tablet TAKE 1 TABLET BY MOUTH TWICE DAILY (Patient taking differently: Take 1 tablet by mouth Daily.) 180 tablet 3    thiamine (VITAMIN B-1) 100 MG tablet  tablet Take 1 tablet by mouth Daily. 30 tablet 0    acetaminophen (TYLENOL) 325 MG tablet Take 2 tablets by mouth Every 4 (Four) Hours As Needed for Mild Pain.      docusate sodium 100 MG capsule Take 2 capsules by mouth 2 (Two) Times a Day.      gabapentin (NEURONTIN) 300 MG capsule Take 1 capsule by mouth 3 (Three) Times a Day. 90 capsule 3    gabapentin (NEURONTIN)  300 MG capsule Take 1 capsule by mouth 2 (Two) Times a Day. 45 capsule 0    methocarbamol (ROBAXIN) 750 MG tablet Take 1 tablet by mouth 4 (Four) Times a Day As Needed for Muscle Spasms. 40 tablet 1     No facility-administered medications prior to visit.       No opioid medication identified on active medication list. I have reviewed chart for other potential  high risk medication/s and harmful drug interactions in the elderly.        Aspirin is not on active medication list.   New onset of atrial fibrillation  .    Patient Active Problem List   Diagnosis    Erectile dysfunction    Gout    Hypercalcemia    Colon polyp    Sinus tachycardia    Pancreatitis    Other fatigue    Ascending aortic aneurysm    Generalized abdominal pain    Folic acid deficiency    Iron deficiency anemia    Acute bilateral low back pain without sciatica    Swelling of the testicles    Urinary frequency    Hyperparathyroidism, unspecified    CRI (chronic renal insufficiency), stage 3 (moderate)    Essential hypertension, benign    Spinal stenosis of lumbar region    Lumbar radiculitis    Lumbar facet arthropathy    Screening PSA (prostate specific antigen)    Mixed hyperlipidemia    Change in pigmented skin lesion of face    Pain in both testicles    Sacroiliitis    DDD (degenerative disc disease), lumbar    Spondylolisthesis at L4-L5 level    Herniation of lumbar intervertebral disc with radiculopathy    Spinal stenosis of lumbar region with radiculopathy    New onset atrial fibrillation    H/O: gout on allopurinol     Advance Care Planning   Advance Care Planning     Advance Directive is not on file.  ACP discussion was held with the patient during this visit. Patient has an advance directive (not in EMR), copy requested.     Objective    Vitals:    04/15/24 1305   BP: 154/98   BP Location: Left arm   Patient Position: Sitting   Cuff Size: Adult   Pulse: 106   Temp: 98.6 °F (37 °C)   SpO2: 98%   Weight: 107 kg (236 lb 9.6 oz)   Height:  "182.9 cm (72.01\")     Estimated body mass index is 32.08 kg/m² as calculated from the following:    Height as of this encounter: 182.9 cm (72.01\").    Weight as of this encounter: 107 kg (236 lb 9.6 oz).    BMI is >= 30 and <35. (Class 1 Obesity). The following options were offered after discussion;: weight loss educational material (shared in after visit summary) and exercise counseling/recommendations      Does the patient have evidence of cognitive impairment? No          HEALTH RISK ASSESSMENT    Smoking Status:  Social History     Tobacco Use   Smoking Status Never   Smokeless Tobacco Never     Alcohol Consumption:  Social History     Substance and Sexual Activity   Alcohol Use Yes    Comment: occasional     Fall Risk Screen:    BRITTNEY Fall Risk Assessment was completed, and patient is at LOW risk for falls.Assessment completed on:4/15/2024    Depression Screenin/15/2024     1:00 PM   PHQ-2/PHQ-9 Depression Screening   Little Interest or Pleasure in Doing Things 0-->not at all   Feeling Down, Depressed or Hopeless 0-->not at all   PHQ-9: Brief Depression Severity Measure Score 0       Health Habits and Functional and Cognitive Screenin/15/2024     1:00 PM   Functional & Cognitive Status   Do you have difficulty preparing food and eating? No   Do you have difficulty bathing yourself, getting dressed or grooming yourself? No   Do you have difficulty using the toilet? No   Do you have difficulty moving around from place to place? No   Do you have trouble with steps or getting out of a bed or a chair? No   Current Diet Well Balanced Diet   Dental Exam Up to date   Eye Exam Up to date   Exercise (times per week) 3 times per week   Current Exercises Include Weightlifting   Do you need help using the phone?  No   Are you deaf or do you have serious difficulty hearing?  No   Do you need help to go to places out of walking distance? No   Do you need help shopping? No   Do you need help preparing meals? "  No   Do you need help with housework?  No   Do you need help with laundry? No   Do you need help taking your medications? No   Do you need help managing money? No   Do you ever drive or ride in a car without wearing a seat belt? No   Have you felt unusual stress, anger or loneliness in the last month? No   Who do you live with? Spouse   If you need help, do you have trouble finding someone available to you? No   Have you been bothered in the last four weeks by sexual problems? No   Do you have difficulty concentrating, remembering or making decisions? No       Age-appropriate Screening Schedule:  Refer to the list below for future screening recommendations based on patient's age, sex and/or medical conditions. Orders for these recommended tests are listed in the plan section. The patient has been provided with a written plan.    Health Maintenance   Topic Date Due    ANNUAL WELLNESS VISIT  02/27/2024    LIPID PANEL  02/27/2024    BMI FOLLOWUP  02/27/2024    ZOSTER VACCINE (1 of 2) 04/15/2024 (Originally 7/1/2007)    COVID-19 Vaccine (4 - 2023-24 season) 04/17/2024 (Originally 9/1/2023)    RSV Vaccine - Adults (1 - 1-dose 60+ series) 04/15/2025 (Originally 7/1/2017)    Pneumococcal Vaccine 65+ (1 of 1 - PCV) 04/15/2025 (Originally 7/1/2022)    INFLUENZA VACCINE  08/01/2024    TDAP/TD VACCINES (2 - Td or Tdap) 03/29/2027    COLORECTAL CANCER SCREENING  05/05/2032    HEPATITIS C SCREENING  Completed                  CMS Preventative Services Quick Reference  Risk Factors Identified During Encounter  Immunizations Discussed/Encouraged: Influenza, Prevnar 20 (Pneumococcal 20-valent conjugate), and RSV (Respiratory Syncytial Virus)  Dental Screening Recommended  Vision Screening Recommended  The above risks/problems have been discussed with the patient.  Pertinent information has been shared with the patient in the After Visit Summary.  An After Visit Summary and PPPS were made available to the patient.    Follow Up:  "  Next Medicare Wellness visit to be scheduled in 1 year.       Additional E&M Note during same encounter follows:  Patient has multiple medical problems which are significant and separately identifiable that require additional work above and beyond the Medicare Wellness Visit.      Chief Complaint  Med Refill and Medicare Wellness-subsequent (Fasting/)    Subjective        HPI  Mo Willoughby is also being seen today for new onset atrial fibrillation on physical exam.  Patient denies chest pain shortness of air.  Patient denies palpitations.    Review of Systems   Constitutional:  Negative for activity change.   HENT:  Negative for congestion.    Eyes:  Negative for discharge.   Respiratory:  Negative for cough, chest tightness, shortness of breath and wheezing.    Cardiovascular:  Negative for chest pain, palpitations and leg swelling.        Irregular heart rate   Endocrine: Negative for cold intolerance and heat intolerance.   Genitourinary:  Negative for dysuria and frequency.   Musculoskeletal:  Negative for back pain and neck pain.   Skin:  Negative for color change and rash.   Allergic/Immunologic: Negative for environmental allergies.   Neurological:  Negative for dizziness.   Psychiatric/Behavioral:  The patient is not nervous/anxious.        Objective   Vital Signs:  /98 (BP Location: Left arm, Patient Position: Sitting, Cuff Size: Adult)   Pulse 106   Temp 98.6 °F (37 °C)   Ht 182.9 cm (72.01\")   Wt 107 kg (236 lb 9.6 oz)   SpO2 98%   BMI 32.08 kg/m²     Physical Exam  Constitutional:       General: He is not in acute distress.     Appearance: Normal appearance.   HENT:      Head: Normocephalic.      Right Ear: Tympanic membrane normal.      Left Ear: Tympanic membrane normal.      Nose: Nose normal.   Eyes:      Pupils: Pupils are equal, round, and reactive to light.   Cardiovascular:      Rate and Rhythm: Normal rate. Rhythm irregular.      Pulses: Normal pulses.      Heart sounds: Normal " heart sounds.   Pulmonary:      Effort: Pulmonary effort is normal. No respiratory distress.      Breath sounds: Normal breath sounds. No stridor. No wheezing, rhonchi or rales.   Chest:      Chest wall: No tenderness.   Abdominal:      General: Bowel sounds are normal.      Palpations: Abdomen is soft.   Musculoskeletal:         General: Normal range of motion.      Cervical back: Normal range of motion and neck supple.   Skin:     General: Skin is warm.   Neurological:      General: No focal deficit present.      Mental Status: He is alert and oriented to person, place, and time.   Psychiatric:         Mood and Affect: Mood normal.         Behavior: Behavior normal.         Thought Content: Thought content normal.         Judgment: Judgment normal.          The following data was reviewed by: YANIRA Batista on 04/15/2024:  Common labs          6/29/2023    12:49   Common Labs   Glucose 98    BUN 15    Creatinine 1.19    Sodium 139    Potassium 4.6    Chloride 107    Calcium 10.6    WBC 3.97    Hemoglobin 13.6    Hematocrit 38.2    Platelets 117      Data reviewed : Cardiology studies EKG 2/20/2023, EKG 8/16/2022, EKG 6/7/2022.    ECG 12 Lead    Date/Time: 4/15/2024 2:38 PM  Performed by: Jenn Davison APRN    Authorized by: Jenn Davison APRN  Comparison: compared with previous ECG from 2/20/2023  Comparison to previous ECG: NSR; PVC  Rhythm: atrial fibrillation  Rate: normal  BPM: 95  Conduction: non-specific intraventricular conduction delay    Clinical impression: abnormal EKG  Comments: Refer cardiology; on schedule for 4/16/2024  Advised if develops cp soa go to ER               Assessment and Plan   Diagnoses and all orders for this visit:    1. Medicare annual wellness visit, subsequent (Primary)  Assessment & Plan:  Counseling was provided on nutrition, physical activity, development, and injury prevention, dental health, and safe sex practices patient verbalizes understanding no  additional questions were asked.        2. New onset atrial fibrillation  Assessment & Plan:  EKG positive for atrial fibrillation heart rate 95.  Patient is not symptomatic.  He denies chest pain shortness of air.  Reached out to cardiology patient has appointment on 4/16/2024.  Patient has been advised if he develops chest pain shortness of air to go to ER.  Patient vies to take a baby aspirin until seen by cardiology  ER was advised and patient deferred    Orders:  -     ECG 12 Lead    3. Mixed hyperlipidemia  Assessment & Plan:   Lipid abnormalities are stable    Plan:  Continue same medication/s without change.      Discussed medication dosage, use, side effects, and goals of treatment in detail.    Counseled patient on lifestyle modifications to help control hyperlipidemia.     Patient Treatment Goals:   LDL goal is less than 70    Followup in 6 months.    Orders:  -     Lipid Panel    4. Essential hypertension, benign  Assessment & Plan:  Blood pressure elevated 154/98.  Heart rate on exam 106 patient to keep a BP log.-Diet exercise.    Orders:  -     Comprehensive Metabolic Panel  -     lisinopril (PRINIVIL,ZESTRIL) 5 MG tablet; Take 1 tablet by mouth Daily.  Dispense: 90 tablet; Refill: 2  -     lisinopril (PRINIVIL,ZESTRIL) 10 MG tablet; Take 1 tablet by mouth Daily.  Dispense: 90 tablet; Refill: 2    5. Iron deficiency anemia, unspecified iron deficiency anemia type  -     CBC & Differential    6. Screening PSA (prostate specific antigen)  Assessment & Plan:  Counseling was provided on nutrition, physical activity, development, and injury prevention, dental health, and safe sex practices patient verbalizes understanding no additional questions were asked.      Orders:  -     PSA Screen    7. H/O: gout on allopurinol  -     allopurinol (ZYLOPRIM) 300 MG tablet; Take 1 tablet by mouth Daily.  Dispense: 90 tablet; Refill: 2           I spent 40 minutes caring for Mo on this date of service. This time  includes time spent by me in the following activities:preparing for the visit, reviewing tests, obtaining and/or reviewing a separately obtained history, performing a medically appropriate examination and/or evaluation , counseling and educating the patient/family/caregiver, ordering medications, tests, or procedures, referring and communicating with other health care professionals , documenting information in the medical record, independently interpreting results and communicating that information with the patient/family/caregiver, and care coordination  Follow Up   Return in about 6 months (around 10/15/2024) for Recheck.  Patient was given instructions and counseling regarding his condition or for health maintenance advice. Please see specific information pulled into the AVS if appropriate.

## 2024-04-15 NOTE — ASSESSMENT & PLAN NOTE
EKG positive for atrial fibrillation heart rate 95.  Patient is not symptomatic.  He denies chest pain shortness of air.  Reached out to cardiology patient has appointment on 4/16/2024.  Patient has been advised if he develops chest pain shortness of air to go to ER.  Patient vies to take a baby aspirin until seen by cardiology  ER was advised and patient deferred

## 2024-04-16 ENCOUNTER — OFFICE VISIT (OUTPATIENT)
Dept: CARDIOLOGY | Facility: CLINIC | Age: 69
End: 2024-04-16
Payer: MEDICARE

## 2024-04-16 VITALS
WEIGHT: 235 LBS | DIASTOLIC BLOOD PRESSURE: 84 MMHG | BODY MASS INDEX: 31.83 KG/M2 | HEART RATE: 81 BPM | SYSTOLIC BLOOD PRESSURE: 152 MMHG | HEIGHT: 72 IN

## 2024-04-16 DIAGNOSIS — R94.31 ABNORMAL ELECTROCARDIOGRAM (ECG) (EKG): ICD-10-CM

## 2024-04-16 DIAGNOSIS — I48.91 NEW ONSET A-FIB: Primary | ICD-10-CM

## 2024-04-16 LAB
ALBUMIN SERPL-MCNC: 4.2 G/DL (ref 3.9–4.9)
ALBUMIN/GLOB SERPL: 1.6 {RATIO} (ref 1.2–2.2)
ALP SERPL-CCNC: 75 IU/L (ref 44–121)
ALT SERPL-CCNC: 45 IU/L (ref 0–44)
AST SERPL-CCNC: 65 IU/L (ref 0–40)
BASOPHILS # BLD AUTO: 0.1 X10E3/UL (ref 0–0.2)
BASOPHILS NFR BLD AUTO: 1 %
BILIRUB SERPL-MCNC: 1.1 MG/DL (ref 0–1.2)
BUN SERPL-MCNC: 13 MG/DL (ref 8–27)
BUN/CREAT SERPL: 12 (ref 10–24)
CALCIUM SERPL-MCNC: 10.7 MG/DL (ref 8.6–10.2)
CHLORIDE SERPL-SCNC: 108 MMOL/L (ref 96–106)
CHOLEST SERPL-MCNC: 220 MG/DL (ref 100–199)
CO2 SERPL-SCNC: 20 MMOL/L (ref 20–29)
CREAT SERPL-MCNC: 1.09 MG/DL (ref 0.76–1.27)
EGFRCR SERPLBLD CKD-EPI 2021: 75 ML/MIN/1.73
EOSINOPHIL # BLD AUTO: 0.1 X10E3/UL (ref 0–0.4)
EOSINOPHIL NFR BLD AUTO: 2 %
ERYTHROCYTE [DISTWIDTH] IN BLOOD BY AUTOMATED COUNT: 13.2 % (ref 11.6–15.4)
GLOBULIN SER CALC-MCNC: 2.6 G/DL (ref 1.5–4.5)
GLUCOSE SERPL-MCNC: 90 MG/DL (ref 70–99)
HCT VFR BLD AUTO: 39.5 % (ref 37.5–51)
HDLC SERPL-MCNC: 68 MG/DL
HGB BLD-MCNC: 13.6 G/DL (ref 13–17.7)
IMM GRANULOCYTES # BLD AUTO: 0 X10E3/UL (ref 0–0.1)
IMM GRANULOCYTES NFR BLD AUTO: 0 %
LDLC SERPL CALC-MCNC: 126 MG/DL (ref 0–99)
LYMPHOCYTES # BLD AUTO: 1.5 X10E3/UL (ref 0.7–3.1)
LYMPHOCYTES NFR BLD AUTO: 28 %
MCH RBC QN AUTO: 35.8 PG (ref 26.6–33)
MCHC RBC AUTO-ENTMCNC: 34.4 G/DL (ref 31.5–35.7)
MCV RBC AUTO: 104 FL (ref 79–97)
MONOCYTES # BLD AUTO: 0.6 X10E3/UL (ref 0.1–0.9)
MONOCYTES NFR BLD AUTO: 12 %
NEUTROPHILS # BLD AUTO: 3 X10E3/UL (ref 1.4–7)
NEUTROPHILS NFR BLD AUTO: 57 %
PLATELET # BLD AUTO: 113 X10E3/UL (ref 150–450)
POTASSIUM SERPL-SCNC: 4.9 MMOL/L (ref 3.5–5.2)
PROT SERPL-MCNC: 6.8 G/DL (ref 6–8.5)
PSA SERPL-MCNC: 0.3 NG/ML (ref 0–4)
RBC # BLD AUTO: 3.8 X10E6/UL (ref 4.14–5.8)
SODIUM SERPL-SCNC: 146 MMOL/L (ref 134–144)
TRIGL SERPL-MCNC: 151 MG/DL (ref 0–149)
VLDLC SERPL CALC-MCNC: 26 MG/DL (ref 5–40)
WBC # BLD AUTO: 5.2 X10E3/UL (ref 3.4–10.8)

## 2024-04-16 PROCEDURE — 93000 ELECTROCARDIOGRAM COMPLETE: CPT | Performed by: NURSE PRACTITIONER

## 2024-04-16 PROCEDURE — 99214 OFFICE O/P EST MOD 30 MIN: CPT | Performed by: NURSE PRACTITIONER

## 2024-04-16 PROCEDURE — 3079F DIAST BP 80-89 MM HG: CPT | Performed by: NURSE PRACTITIONER

## 2024-04-16 PROCEDURE — 1160F RVW MEDS BY RX/DR IN RCRD: CPT | Performed by: NURSE PRACTITIONER

## 2024-04-16 PROCEDURE — 1159F MED LIST DOCD IN RCRD: CPT | Performed by: NURSE PRACTITIONER

## 2024-04-16 PROCEDURE — 3077F SYST BP >= 140 MM HG: CPT | Performed by: NURSE PRACTITIONER

## 2024-04-16 RX ORDER — PANTOPRAZOLE SODIUM 40 MG/1
40 TABLET, DELAYED RELEASE ORAL DAILY
COMMUNITY

## 2024-04-16 NOTE — PROGRESS NOTES
Subjective:     Encounter Date:04/16/2024      Patient ID: Mo Willoughby is a 68 y.o. male.    Chief Complaint:follow up HTN, ascending aortic aneurysm  History of Present Illness  This is a 67 y/o man who follows with Dr. Brown and is known to me. He has a pmhx of hypertension, ETOH and drug abuse, ascending aortic aneurysm and tachycardia.     He is here today for an overdue follow up visit after having an abnormal EKG at his PCP's office yesterday. EKG there showed atrial fibrillation which is new for him and he was referred back to us. He tells me that he has been feeling well and was quite surprised to learn that his EKG was abnormal. He was just getting his annual Medicare wellness exam. He denies any chest pain, shortness of breath, palpitations, dizziness or syncope. He denies swelling in his lower extremities, orthopnea or PND. He denies any thyroid issues and reports that he drinks about 2 mixed drinks a night. His mother had atrial fibrillation. He reports good BP control at home.    Prior history:  Dr. Brown began following the patient in May 2022 during a hospital admission for chest pain and diaphoresis. His troponin was in the indeterminate range, EKG was unremarkable, and his symptoms were pretty atypical. He also admitted to consuming 2-3 alcoholic drinks along with marijuana and cocaine. It was not felt that he needed an ischemic work-up. An echocardiogram was obtained that showed a normal LV systolic function, EF 56%, grade 1 diastolic dysfunction, lipomatous atrial septum and no significant valvular disease. A CTA of the chest showed an ascending aorta measuring 4.5 cm but no PE.     He saw Dr. Brown in follow up in August 2022 and was feeling well. A repeat echo or CT in 1 year was recommended. I saw him for the first time in February 2023 and he continued to feel well. No changes were made. He has been lost to follow up since.    I have reviewed and updated as appropriate allergies,  current medications, past family history, past medical history, past surgical history and problem list.    Review of Systems   Constitutional: Negative for fever, malaise/fatigue, weight gain and weight loss.   HENT:  Negative for congestion, hoarse voice and sore throat.    Eyes:  Negative for blurred vision and double vision.   Cardiovascular:  Negative for chest pain, dyspnea on exertion, leg swelling, orthopnea, palpitations and syncope.   Respiratory:  Negative for cough, shortness of breath and wheezing.    Gastrointestinal:  Negative for abdominal pain, hematemesis, hematochezia and melena.   Genitourinary:  Negative for dysuria and hematuria.   Neurological:  Negative for dizziness, headaches, light-headedness and numbness.   Psychiatric/Behavioral:  Negative for depression. The patient is not nervous/anxious.          Current Outpatient Medications:     allopurinol (ZYLOPRIM) 300 MG tablet, Take 1 tablet by mouth Daily., Disp: 90 tablet, Rfl: 2    ferrous sulfate 325 (65 FE) MG tablet, Take 1 tablet by mouth Daily With Breakfast., Disp: , Rfl:     folic acid (FOLVITE) 1 MG tablet, TAKE 1 TABLET BY MOUTH DAILY, Disp: 90 tablet, Rfl: 2    lisinopril (PRINIVIL,ZESTRIL) 10 MG tablet, Take 1 tablet by mouth Daily., Disp: 90 tablet, Rfl: 2    lisinopril (PRINIVIL,ZESTRIL) 5 MG tablet, Take 1 tablet by mouth Daily., Disp: 90 tablet, Rfl: 2    pantoprazole (PROTONIX) 40 MG EC tablet, Take 1 tablet by mouth Daily., Disp: , Rfl:     thiamine (VITAMIN B-1) 100 MG tablet  tablet, Take 1 tablet by mouth Daily., Disp: , Rfl:     vitamin B-12 (CYANOCOBALAMIN) 1000 MCG tablet, Take 1 tablet by mouth Daily. HOLD FOR SURGERY ONE WEEK PRIOR, Disp: , Rfl:     apixaban (ELIQUIS) 5 MG tablet tablet, Take 1 tablet by mouth 2 (Two) Times a Day., Disp: , Rfl:     Past Medical History:   Diagnosis Date    Abnormal TSH     Ascending aortic aneurysm     Colon polyp 5/5/22    CRI (chronic renal insufficiency), stage 3 (moderate) 2016     from stage 3A to 4    DDD (degenerative disc disease), lumbar     ED (erectile dysfunction)     Erectile dysfunction     Essential hypertension, benign     Folic acid deficiency     Gout     Hip pain, right     Hx of colonic polyp     Hypercalcemia 2015    as far back as data goes so likely pre-dates even this time    Hyperparathyroidism, unspecified 2016    as far back as data goes likely pre-dates even this date, likely multifactorial some primary and some secondary , w/ imaging from 10/16 showing possible L inferior adenoma    Iron deficiency anemia     Low back pain with bilateral sciatica     Mixed hyperlipidemia 02/27/2023    New onset atrial fibrillation 4/15/2024    Pancreatitis     Risk factors for obstructive sleep apnea     Spinal stenosis of lumbar region with neurogenic claudication     Syncope and collapse 03/28/2017       Past Surgical History:   Procedure Laterality Date    CHOLECYSTECTOMY      CHOLECYSTECTOMY WITH INTRAOPERATIVE CHOLANGIOGRAM N/A 08/03/2018    Procedure: CHOLECYSTECTOMY LAPAROSCOPIC INTRAOPERATIVE CHOLANGIOGRAM;  Surgeon: Melina Kate MD;  Location: [facility] MAIN OR;  Service: General    COLONOSCOPY      COLONOSCOPY N/A 10/03/2016    Procedure: COLONOSCOPY TO CECUM TO TERMINAL ILIUM WITH COLD BIOPSY POLYPECTOMY;  Surgeon: Benoit Vilchis MD;  Location: [facility] ENDOSCOPY;  Service:     COLONOSCOPY N/A 05/05/2022    Procedure: COLONOSCOPY TO CECUM WITH COLD SNARE POLYPECTOMIES & RESOLUTION CLIP PLACEMENT X 2;  Surgeon: Benoit Mosley MD;  Location: [facility] ENDOSCOPY;  Service: Gastroenterology;  Laterality: N/A;  ANEMIA/POLYPS, HEMORRHOIDS     ENDOSCOPY N/A 05/05/2022    Procedure: ESOPHAGOGASTRODUODENOSCOPY WITH BX;  Surgeon: Benoit Mosley MD;  Location: [facility] ENDOSCOPY;  Service: Gastroenterology;  Laterality: N/A;  ANEMIA/PORTAL GASTROPATHY, EROSIVE GASTRITIS     EPIDURAL Right 12/07/2022    Procedure: LUMBAR/SACRAL TRANSFORAMINAL EPIDURAL Right L2-3 and  right L4-5;  Surgeon: Isaura Torres MD;  Location: [facility] MAIN OR;  Service: Pain Management;  Laterality: Right;    LIPOMA EXCISION      LUMBAR LAMINECTOMY DISCECTOMY DECOMPRESSION N/A 7/7/2023    Procedure: Open right sided lumbar decompression lumbar three/four, lumbar four/five;  Surgeon: Abel Perez MD;  Location: [facility] MAIN OR;  Service: Neurosurgery;  Laterality: N/A;    MEDIAL BRANCH BLOCK Right 01/23/2023    Procedure: LUMBAR MEDIAL BRANCH BLOCK RIGHT L3-L5 #1;  Surgeon: Isaura Torres MD;  Location: [facility] MAIN [facility];  Service: Pain Management;  Laterality: Right;    MEDIAL BRANCH BLOCK Right 02/13/2023    Procedure: LUMBAR MEDIAL BRANCH BLOCK RIGHT L3-L5 #1;  Surgeon: Isaura Torres MD;  Location: [facility];  Service: Pain Management;  Laterality: Right;    NERVE SURGERY Right 3/6/2023    Procedure: LUMBAR RADIOFREQUENCY ABLATION RIGHT L3-L5  08824, 61473;  Surgeon: Isaura Torres MD;  Location: [facility];  Service: Pain Management;  Laterality: Right;    SACROILIAC JOINT INJECTION Right 5/5/2023    Procedure: SACROILIAC JOINT INJECTION RIGHT 82077;  Surgeon: Isauar Torres MD;  Location: [facility];  Service: Pain Management;  Laterality: Right;    TONSILLECTOMY         Family History   Problem Relation Age of Onset    Hypertension Mother     Breast cancer Mother     Cancer Mother     Cancer Father     Liver cancer Father     Hypertension Father     Hypertension Sister     Heart attack Sister     Hypertension Brother     Cancer Brother     Learning disabilities Neg Hx        Social History     Tobacco Use    Smoking status: Never    Smokeless tobacco: Never   Vaping Use    Vaping status: Never Used   Substance Use Topics    Alcohol use: Yes     Comment: occasional    Drug use: No         ECG 12 Lead    Date/Time: 4/16/2024 3:32 PM  Performed by: Tabatha Parrish APRN    Authorized by: Tabatha Parrish APRN  Comparison: compared with previous ECG from 2/20/2023  Comparison to  "previous ECG: Atrial fibrillation is new  Rhythm: atrial fibrillation  BPM: 81             Objective:     Visit Vitals  /84   Pulse 81   Ht 182.9 cm (72\")   Wt 107 kg (235 lb)   BMI 31.87 kg/m²             Physical Exam  Constitutional:       Appearance: Normal appearance. He is obese.   HENT:      Head: Normocephalic.   Neck:      Vascular: No carotid bruit.   Cardiovascular:      Rate and Rhythm: Normal rate. Rhythm irregularly irregular.      Chest Wall: PMI is not displaced.      Pulses: Normal pulses.           Radial pulses are 2+ on the right side and 2+ on the left side.        Posterior tibial pulses are 2+ on the right side and 2+ on the left side.      Heart sounds: Normal heart sounds. No murmur heard.     No friction rub. No gallop.   Pulmonary:      Effort: Pulmonary effort is normal.      Breath sounds: Normal breath sounds.   Abdominal:      General: Bowel sounds are normal. There is no distension.      Palpations: Abdomen is soft.   Musculoskeletal:      Right lower leg: No edema.      Left lower leg: No edema.   Skin:     General: Skin is warm and dry.      Capillary Refill: Capillary refill takes less than 2 seconds.   Neurological:      Mental Status: He is alert and oriented to person, place, and time.   Psychiatric:         Mood and Affect: Mood normal.         Behavior: Behavior normal.         Thought Content: Thought content normal.          Lab Review:   Lipid Panel          4/15/2024    14:16   Lipid Panel   Total Cholesterol 220    Triglycerides 151    HDL Cholesterol 68    VLDL Cholesterol 26    LDL Cholesterol  126          Cardiac Procedures:   Echocardiogram 5/3/22:  Calculated left ventricular EF = 56.3% Estimated left ventricular EF was in agreement with the calculated left ventricular EF. Left ventricular systolic function is normal. Normal left ventricular cavity size noted. Left ventricular wall thickness is consistent with moderate concentric hypertrophy. All left " ventricular wall segments contract normally. Left ventricular diastolic function is consistent with (grade I) impaired relaxation.  The left atrial cavity is borderline dilated. Along the medial aspect of the left atrium, there is an intermittent echodensity. While this may represent shadowing from the aortic root or lipomatous hypertrophy of the superior portion of the atrial septum, a mass cannot be excluded. Consider cardiac CT for further evaluation.     Holter Monitor 3/29/17:  Study Description    Monitor hooked-up on 3/29/2017 at 18:41 EDT. The monitor was scanned on 4/11/2017. The patient was monitored for 12 days 20 hours. Indications for this exam include syncope. Average HR:  84. Min HR:  48. Max HR:  176.     Study Findings    Patient diary was not submitted.No symptoms reported during the monitoring period. No complications noted. The predominant rhythm noted during the testing period was sinus rhythm. There were 5 episodes of supraventricular tachycardia. The peak heart rate was 176 beats per minute. Longest run lasted 4 min 57 sec in SVT at a rather slow rate of 111 bpm. Premature ventricular contractions occured rarely. Ventricular couplets. There were no episodes of ventricular tachycardia. Sinoatrial node conduction was normal. No atrioventricular block noted.     Study Impressions    An abnormal monitor study.         Assessment:         Diagnoses and all orders for this visit:    1. New onset a-fib (Primary)  -     Holter Monitor - 48 Hour; Future    Other orders  -     apixaban (ELIQUIS) 5 MG tablet tablet; Take 1 tablet by mouth 2 (Two) Times a Day.              Plan:       New onset atrial fibrillation: rates are controlled. Denies any symptoms with this. Will place a 48 hour Holter. I am going to start him on apixaban for anticoagulation-samples provided today. Will also check an echocardiogram to assess the structure and function of his heart.  Ascending aortic aneurysm: 4.5 cm on CTA in May  2022. No repeat imaging has been performed. Will reassess on the echo.  Hypertension: blood pressure is elevated in office today. He reports better control at home.   Elevated LDL: mildly elevated at 126. Recommend dietary modifications and exercise.    Thank you for allowing me to participate in this patient's care. Please call with any questions or concerns. Mr. Willoughby will follow up with Dr. Brown in 3 months.          Your medication list            Accurate as of April 16, 2024  3:26 PM. If you have any questions, ask your nurse or doctor.                START taking these medications        Instructions Last Dose Given Next Dose Due   apixaban 5 MG tablet tablet  Commonly known as: ELIQUIS  Started by: YANIRA Tang      Take 1 tablet by mouth 2 (Two) Times a Day.              CONTINUE taking these medications        Instructions Last Dose Given Next Dose Due   allopurinol 300 MG tablet  Commonly known as: ZYLOPRIM      Take 1 tablet by mouth Daily.       ferrous sulfate 325 (65 FE) MG tablet      Take 1 tablet by mouth Daily With Breakfast.       folic acid 1 MG tablet  Commonly known as: FOLVITE      TAKE 1 TABLET BY MOUTH DAILY       lisinopril 5 MG tablet  Commonly known as: PRINIVIL,ZESTRIL      Take 1 tablet by mouth Daily.       lisinopril 10 MG tablet  Commonly known as: PRINIVIL,ZESTRIL      Take 1 tablet by mouth Daily.       pantoprazole 40 MG EC tablet  Commonly known as: PROTONIX      Take 1 tablet by mouth Daily.       thiamine 100 MG tablet  tablet  Commonly known as: VITAMIN B-1      Take 1 tablet by mouth Daily.       vitamin B-12 1000 MCG tablet  Commonly known as: CYANOCOBALAMIN      Take 1 tablet by mouth Daily. HOLD FOR SURGERY ONE WEEK PRIOR                 Where to Get Your Medications        Information about where to get these medications is not yet available    Ask your nurse or doctor about these medications  apixaban 5 MG tablet tablet           Tabatha Parrish  YANIRA  04/16/24  9:01 AM EST

## 2024-04-21 DIAGNOSIS — E83.52 HYPERCALCEMIA: Primary | ICD-10-CM

## 2024-04-21 DIAGNOSIS — K76.0 HEPATIC STEATOSIS: ICD-10-CM

## 2024-04-22 DIAGNOSIS — E78.2 MIXED HYPERLIPIDEMIA: Primary | ICD-10-CM

## 2024-04-22 DIAGNOSIS — I10 ESSENTIAL HYPERTENSION, BENIGN: ICD-10-CM

## 2024-04-22 RX ORDER — ROSUVASTATIN CALCIUM 10 MG/1
10 TABLET, COATED ORAL DAILY
Qty: 90 TABLET | Refills: 0 | Status: SHIPPED | OUTPATIENT
Start: 2024-04-22

## 2024-04-24 ENCOUNTER — TELEPHONE (OUTPATIENT)
Dept: CARDIOLOGY | Facility: CLINIC | Age: 69
End: 2024-04-24

## 2024-04-24 NOTE — TELEPHONE ENCOUNTER
Caller Name: PETER  Relationship: SELF  Best Contact Number: [phone]    Patient is requesting samples of ELIQUIS 5 MG  How many days of medication do you have left? 5 DAYS

## 2024-05-07 ENCOUNTER — HOSPITAL ENCOUNTER (OUTPATIENT)
Dept: CARDIOLOGY | Facility: HOSPITAL | Age: 69
Discharge: HOME | End: 2024-05-07
Admitting: NURSE PRACTITIONER
Payer: MEDICARE

## 2024-05-07 VITALS
DIASTOLIC BLOOD PRESSURE: 82 MMHG | BODY MASS INDEX: 31.83 KG/M2 | WEIGHT: 235 LBS | HEIGHT: 72 IN | SYSTOLIC BLOOD PRESSURE: 156 MMHG | HEART RATE: 95 BPM

## 2024-05-07 DIAGNOSIS — R94.31 ABNORMAL ELECTROCARDIOGRAM (ECG) (EKG): ICD-10-CM

## 2024-05-07 DIAGNOSIS — I48.91 NEW ONSET A-FIB: ICD-10-CM

## 2024-05-07 LAB
ASCENDING AORTA: 4.3 CM
BH CV ECHO MEAS - ACS: 2.9 CM
BH CV ECHO MEAS - AI P1/2T: 1344 MSEC
BH CV ECHO MEAS - AO MAX PG: 14.3 MMHG
BH CV ECHO MEAS - AO MEAN PG: 6.7 MMHG
BH CV ECHO MEAS - AO ROOT DIAM: 4.3 CM
BH CV ECHO MEAS - AO V2 MAX: 188.9 CM/SEC
BH CV ECHO MEAS - AO V2 VTI: 27.8 CM
BH CV ECHO MEAS - AVA(I,D): 1.77 CM2
BH CV ECHO MEAS - EDV(CUBED): 251 ML
BH CV ECHO MEAS - EDV(MOD-SP2): 113 ML
BH CV ECHO MEAS - EDV(MOD-SP4): 131 ML
BH CV ECHO MEAS - EF(MOD-BP): 47.5 %
BH CV ECHO MEAS - EF(MOD-SP2): 44.2 %
BH CV ECHO MEAS - EF(MOD-SP4): 52.7 %
BH CV ECHO MEAS - ESV(MOD-SP2): 63 ML
BH CV ECHO MEAS - ESV(MOD-SP4): 62 ML
BH CV ECHO MEAS - FS: 24.9 %
BH CV ECHO MEAS - IVS/LVPW: 1.11 CM
BH CV ECHO MEAS - IVSD: 1.12 CM
BH CV ECHO MEAS - LAT PEAK E' VEL: 5.8 CM/SEC
BH CV ECHO MEAS - LV DIASTOLIC VOL/BSA (35-75): 57.4 CM2
BH CV ECHO MEAS - LV MASS(C)D: 290.1 GRAMS
BH CV ECHO MEAS - LV MAX PG: 3.1 MMHG
BH CV ECHO MEAS - LV MEAN PG: 1.62 MMHG
BH CV ECHO MEAS - LV SYSTOLIC VOL/BSA (12-30): 27.2 CM2
BH CV ECHO MEAS - LV V1 MAX: 88.3 CM/SEC
BH CV ECHO MEAS - LV V1 VTI: 15.4 CM
BH CV ECHO MEAS - LVIDD: 6.3 CM
BH CV ECHO MEAS - LVIDS: 4.7 CM
BH CV ECHO MEAS - LVOT AREA: 3.2 CM2
BH CV ECHO MEAS - LVOT DIAM: 2.02 CM
BH CV ECHO MEAS - LVPWD: 1.01 CM
BH CV ECHO MEAS - MED PEAK E' VEL: 6.4 CM/SEC
BH CV ECHO MEAS - MR MAX PG: 95.5 MMHG
BH CV ECHO MEAS - MR MAX VEL: 488.7 CM/SEC
BH CV ECHO MEAS - MV A DUR: 0.11 SEC
BH CV ECHO MEAS - MV A MAX VEL: 84.4 CM/SEC
BH CV ECHO MEAS - MV DEC SLOPE: 621.3 CM/SEC2
BH CV ECHO MEAS - MV DEC TIME: 0.1 SEC
BH CV ECHO MEAS - MV E MAX VEL: 45.1 CM/SEC
BH CV ECHO MEAS - MV E/A: 0.53
BH CV ECHO MEAS - MV MAX PG: 2.9 MMHG
BH CV ECHO MEAS - MV MEAN PG: 1.34 MMHG
BH CV ECHO MEAS - MV P1/2T: 31.1 MSEC
BH CV ECHO MEAS - MV V2 VTI: 15.3 CM
BH CV ECHO MEAS - MVA(P1/2T): 7.1 CM2
BH CV ECHO MEAS - MVA(VTI): 3.2 CM2
BH CV ECHO MEAS - PA V2 MAX: 110.1 CM/SEC
BH CV ECHO MEAS - PULM A REVS DUR: 0.09 SEC
BH CV ECHO MEAS - PULM A REVS VEL: 23.2 CM/SEC
BH CV ECHO MEAS - PULM DIAS VEL: 34.1 CM/SEC
BH CV ECHO MEAS - PULM S/D: 1.44
BH CV ECHO MEAS - PULM SYS VEL: 49.3 CM/SEC
BH CV ECHO MEAS - QP/QS: 0.64
BH CV ECHO MEAS - RAP SYSTOLE: 3 MMHG
BH CV ECHO MEAS - RV MAX PG: 2.2 MMHG
BH CV ECHO MEAS - RV V1 MAX: 74.2 CM/SEC
BH CV ECHO MEAS - RV V1 VTI: 9.3 CM
BH CV ECHO MEAS - RVOT DIAM: 2.08 CM
BH CV ECHO MEAS - RVSP: 36 MMHG
BH CV ECHO MEAS - SV(LVOT): 49.2 ML
BH CV ECHO MEAS - SV(MOD-SP2): 50 ML
BH CV ECHO MEAS - SV(MOD-SP4): 69 ML
BH CV ECHO MEAS - SV(RVOT): 31.4 ML
BH CV ECHO MEAS - SVI(LVOT): 21.6 ML/M2
BH CV ECHO MEAS - SVI(MOD-SP2): 21.9 ML/M2
BH CV ECHO MEAS - SVI(MOD-SP4): 30.2 ML/M2
BH CV ECHO MEAS - TR MAX PG: 33.1 MMHG
BH CV ECHO MEAS - TR MAX VEL: 287.7 CM/SEC
BH CV ECHO MEASUREMENTS AVERAGE E/E' RATIO: 7.39
BH CV XLRA - RV BASE: 3.8 CM
BH CV XLRA - RV LENGTH: 7.8 CM
BH CV XLRA - RV MID: 2.28 CM
BH CV XLRA - TDI S': 9.2 CM/SEC
LEFT ATRIUM VOLUME INDEX: 43.4 ML/M2
SINUS: 3.8 CM
STJ: 4 CM

## 2024-05-07 PROCEDURE — 93306 TTE W/DOPPLER COMPLETE: CPT | Performed by: INTERNAL MEDICINE

## 2024-05-07 PROCEDURE — 93306 TTE W/DOPPLER COMPLETE: CPT

## 2024-05-08 ENCOUNTER — TELEPHONE (OUTPATIENT)
Dept: CARDIOLOGY | Facility: CLINIC | Age: 69
End: 2024-05-08
Payer: MEDICARE

## 2024-05-10 ENCOUNTER — TELEPHONE (OUTPATIENT)
Dept: CARDIOLOGY | Facility: CLINIC | Age: 69
End: 2024-05-10
Payer: MEDICARE

## 2024-05-10 NOTE — TELEPHONE ENCOUNTER
Caller: Mo Willoughby    Relationship: Self    Best call back number:[phone]    What is the best time to reach you: ANY    Who are you requesting to speak with (clinical staff, provider,  specific staff member): CLINICAL        What was the call regarding: PATIENT WAS RECENTLY DIAGNOSED WITH AFIB , HE WOULD LIKE TO KNOW IF IT IS SAFE TO EXERCISE AND HAVE SEX, PLEASE ADVISE.

## 2024-06-05 RX ORDER — FOLIC ACID 1 MG/1
TABLET ORAL
Qty: 90 TABLET | Refills: 1 | Status: SHIPPED | OUTPATIENT
Start: 2024-06-05

## 2024-06-05 RX ORDER — PANTOPRAZOLE SODIUM 40 MG/1
40 TABLET, DELAYED RELEASE ORAL 2 TIMES DAILY
Qty: 180 TABLET | Refills: 1 | Status: SHIPPED | OUTPATIENT
Start: 2024-06-05

## 2024-07-08 NOTE — PROGRESS NOTES
Subjective:     Encounter Date:07/09/2024      Patient ID: Mo Willoughby is a 69 y.o. male.    Chief Complaint:  History of Present Illness    This is a 69-year-old with hypertension, alcohol and drug abuse, ascending aortic aneurysm, who presents for follow-up.     I saw the patient initially in 5/2022 when he presented with complaints of episodes of diaphoresis at rest, possible chest pain, and acute kidney injury.  Following his arrival he was noted to have an elevated creatinine up to 1.96 which improved with IV fluids.  Troponin remained in the indeterminate range.  EKG was unremarkable.  He did have a decline in his hemoglobin during that admission.  Prior to the admission he did admit to drinking 2-3 alcoholic drinks a night and using both marijuana and cocaine.  He was hypertensive on admission.  His symptoms sounded atypical so I did not recommend an ischemic work-up.  I did recommend proceeding with an echocardiogram which was performed on 5/3/2022 and showed normal left ventricular systolic function and wall motion with an EF of 56%, grade 1 diastolic dysfunction, lipomatous atrial septum, and no significant valvular disease.  A CT angiogram of the chest during that admission showed ascending aorta measuring 4.5 cm but no evidence of pulmonary embolism.  He was previously on lisinopril-hydrochlorothiazide but the hydrochlorothiazide was discontinued due to issues with dehydration and acute kidney injury.  He was evaluated by GI for his anemia.  He was treated with IV iron.  EGD showed grade 1 esophageal varices and portal hypertensive gastropathy as well as erosive gastropathy without bleeding.  Twice daily PPI was recommended.  He also had multiple polyps removed on her colonoscopy.  Alcohol cessation was also recommended.     He saw YANIRA Lafleur follow-up in 6/2022 at which time he reported he was doing well.  His blood pressures appear to be well controlled on his current regimen of  medications.     I saw the patient last in 8/2022 at which time he was doing well from a cardiac standpoint.  Plan was to repeat imaging in a year.    He return in 2/2023 and was seen by YANIRA Still.  Blood pressures were mildly elevated in the office but better controlled at home.  He was asked to check and keep a blood pressure log.    He was seen last in the office in 4/2024 by YANIRA Still.  He was noted to be in atrial fibrillation at his primary care's physician's office the day prior.  He denies any significant symptoms.  He does report drinking 2 mixed drinks at night.  He indicated his blood pressures were well-controlled at home.  He was started on apixaban.  An echocardiogram was recommended.  A Holter monitor was also placed.  Holter monitor showed frequent supraventricular ectopy but no evidence of atrial fibrillation.  Echocardiogram showed low normal left ventricular function with an EF of 48%, mild dilatation of the ascending aorta measuring 4.3 cm, and possible atrial fibrillation during the study.  He is kept on apixaban until his follow-up with me.    He presents back today for follow-up.  He denies any chest pain, palpitations, orthopnea, near-syncope or syncope or lower extremity swelling.  He reports some lightheadedness with position changes.  He feels like this started once he started the apixaban.  He denies any bleeding issues.  Blood pressures have been largely well-controlled per his report.    Review of Systems   Constitutional: Positive for malaise/fatigue.   HENT:  Negative for hearing loss, hoarse voice, nosebleeds and sore throat.    Eyes:  Negative for pain.   Cardiovascular:  Negative for chest pain, claudication, cyanosis, dyspnea on exertion, irregular heartbeat, leg swelling, near-syncope, orthopnea, palpitations, paroxysmal nocturnal dyspnea and syncope.   Respiratory:  Negative for shortness of breath and snoring.    Endocrine: Negative for cold intolerance, heat  intolerance, polydipsia, polyphagia and polyuria.   Skin:  Negative for itching and rash.   Musculoskeletal:  Negative for arthritis, falls, joint pain, joint swelling, muscle cramps, muscle weakness and myalgias.   Gastrointestinal:  Negative for constipation, diarrhea, dysphagia, heartburn, hematemesis, hematochezia, melena, nausea and vomiting.   Genitourinary:  Negative for frequency, hematuria and hesitancy.   Neurological:  Positive for light-headedness. Negative for excessive daytime sleepiness, dizziness, headaches, numbness and weakness.   Psychiatric/Behavioral:  Negative for depression. The patient is not nervous/anxious.          Current Outpatient Medications:     allopurinol (ZYLOPRIM) 300 MG tablet, Take 1 tablet by mouth Daily., Disp: 90 tablet, Rfl: 2    apixaban (ELIQUIS) 5 MG tablet tablet, Take 1 tablet by mouth 2 (Two) Times a Day., Disp: 60 tablet, Rfl: 3    ferrous sulfate 325 (65 FE) MG tablet, Take 1 tablet by mouth Daily With Breakfast., Disp: , Rfl:     folic acid (FOLVITE) 1 MG tablet, TAKE 1 TABLET BY MOUTH DAILY, Disp: 90 tablet, Rfl: 1    lisinopril (PRINIVIL,ZESTRIL) 10 MG tablet, Take 1 tablet by mouth Daily., Disp: 90 tablet, Rfl: 2    lisinopril (PRINIVIL,ZESTRIL) 5 MG tablet, Take 1 tablet by mouth Daily., Disp: 90 tablet, Rfl: 2    pantoprazole (PROTONIX) 40 MG EC tablet, TAKE 1 TABLET BY MOUTH TWICE DAILY, Disp: 180 tablet, Rfl: 1    rosuvastatin (Crestor) 10 MG tablet, Take 1 tablet by mouth Daily., Disp: 90 tablet, Rfl: 0    thiamine (VITAMIN B-1) 100 MG tablet  tablet, Take 1 tablet by mouth Daily., Disp: , Rfl:     vitamin B-12 (CYANOCOBALAMIN) 1000 MCG tablet, Take 1 tablet by mouth Daily. HOLD FOR SURGERY ONE WEEK PRIOR, Disp: , Rfl:     Past Medical History:   Diagnosis Date    Abnormal TSH     Ascending aortic aneurysm     Colon polyp 5/5/22    CRI (chronic renal insufficiency), stage 3 (moderate) 2016    from stage 3A to 4    DDD (degenerative disc disease), lumbar      ED (erectile dysfunction)     Erectile dysfunction     Essential hypertension, benign     Folic acid deficiency     Gout     Hip pain, right     Hx of colonic polyp     Hypercalcemia 2015    as far back as data goes so likely pre-dates even this time    Hyperparathyroidism, unspecified 2016    as far back as data goes likely pre-dates even this date, likely multifactorial some primary and some secondary , w/ imaging from 10/16 showing possible L inferior adenoma    Iron deficiency anemia     Low back pain with bilateral sciatica     Mixed hyperlipidemia 02/27/2023    New onset atrial fibrillation 4/15/2024    Pancreatitis     Risk factors for obstructive sleep apnea     Spinal stenosis of lumbar region with neurogenic claudication     Syncope and collapse 03/28/2017       Past Surgical History:   Procedure Laterality Date    CHOLECYSTECTOMY      CHOLECYSTECTOMY WITH INTRAOPERATIVE CHOLANGIOGRAM N/A 08/03/2018    Procedure: CHOLECYSTECTOMY LAPAROSCOPIC INTRAOPERATIVE CHOLANGIOGRAM;  Surgeon: Melina Kate MD;  Location: [facility] MAIN OR;  Service: General    COLONOSCOPY      COLONOSCOPY N/A 10/03/2016    Procedure: COLONOSCOPY TO CECUM TO TERMINAL ILIUM WITH COLD BIOPSY POLYPECTOMY;  Surgeon: Benoit Vilchis MD;  Location: [facility] ENDOSCOPY;  Service:     COLONOSCOPY N/A 05/05/2022    Procedure: COLONOSCOPY TO CECUM WITH COLD SNARE POLYPECTOMIES & RESOLUTION CLIP PLACEMENT X 2;  Surgeon: Benoit Mosley MD;  Location: [facility] ENDOSCOPY;  Service: Gastroenterology;  Laterality: N/A;  ANEMIA/POLYPS, HEMORRHOIDS     ENDOSCOPY N/A 05/05/2022    Procedure: ESOPHAGOGASTRODUODENOSCOPY WITH BX;  Surgeon: Benoit Mosley MD;  Location: [facility] ENDOSCOPY;  Service: Gastroenterology;  Laterality: N/A;  ANEMIA/PORTAL GASTROPATHY, EROSIVE GASTRITIS     EPIDURAL Right 12/07/2022    Procedure: LUMBAR/SACRAL TRANSFORAMINAL EPIDURAL Right L2-3 and right L4-5;  Surgeon: Isaura Torres MD;  Location: [facility] MAIN OR;   Service: Pain Management;  Laterality: Right;    LIPOMA EXCISION      LUMBAR LAMINECTOMY DISCECTOMY DECOMPRESSION N/A 7/7/2023    Procedure: Open right sided lumbar decompression lumbar three/four, lumbar four/five;  Surgeon: Abel Perez MD;  Location: [facility] MAIN OR;  Service: Neurosurgery;  Laterality: N/A;    MEDIAL BRANCH BLOCK Right 01/23/2023    Procedure: LUMBAR MEDIAL BRANCH BLOCK RIGHT L3-L5 #1;  Surgeon: Isaura Torres MD;  Location: [facility];  Service: Pain Management;  Laterality: Right;    MEDIAL BRANCH BLOCK Right 02/13/2023    Procedure: LUMBAR MEDIAL BRANCH BLOCK RIGHT L3-L5 #1;  Surgeon: Isaura Torres MD;  Location: [facility];  Service: Pain Management;  Laterality: Right;    NERVE SURGERY Right 3/6/2023    Procedure: LUMBAR RADIOFREQUENCY ABLATION RIGHT L3-L5  19410, 14770;  Surgeon: Isaura Torres MD;  Location: [facility] MAIN [facility];  Service: Pain Management;  Laterality: Right;    SACROILIAC JOINT INJECTION Right 5/5/2023    Procedure: SACROILIAC JOINT INJECTION RIGHT 84765;  Surgeon: Isaura Torres MD;  Location: [facility];  Service: Pain Management;  Laterality: Right;    TONSILLECTOMY         Family History   Problem Relation Age of Onset    Hypertension Mother     Breast cancer Mother     Cancer Mother     Cancer Father     Liver cancer Father     Hypertension Father     Hypertension Sister     Heart attack Sister     Hypertension Brother     Cancer Brother     Learning disabilities Neg Hx        Social History     Tobacco Use    Smoking status: Never    Smokeless tobacco: Never   Vaping Use    Vaping status: Never Used   Substance Use Topics    Alcohol use: Yes     Comment: occasional    Drug use: No         ECG 12 Lead    Date/Time: 7/9/2024 1:09 PM  Performed by: Hazel Brown MD    Authorized by: Hazel Brown MD  Comparison: compared with previous ECG   Similar to previous ECG  Rhythm: sinus rhythm  Ectopy: atrial premature contractions             Objective:  "    Visit Vitals  /81 (BP Location: Left arm, Patient Position: Sitting, Cuff Size: Adult)   Pulse 95   Ht 182.9 cm (72\")   Wt 108 kg (237 lb 9.6 oz)   SpO2 95%   BMI 32.22 kg/m²         Constitutional:       Appearance: Normal appearance. Well-developed.   HENT:      Head: Normocephalic and atraumatic.   Neck:      Vascular: No carotid bruit or JVD.   Pulmonary:      Effort: Pulmonary effort is normal.      Breath sounds: Normal breath sounds.   Cardiovascular:      Normal rate. Frequent ectopic beats. Regular rhythm.      No gallop.    Pulses:     Radial: 2+ bilaterally.  Edema:     Peripheral edema absent.   Abdominal:      Palpations: Abdomen is soft.   Skin:     General: Skin is warm and dry.   Neurological:      Mental Status: Alert and oriented to person, place, and time.             Assessment:          Diagnosis Plan   1. Premature atrial contractions        2. Ascending aortic aneurysm, unspecified whether ruptured        3. Essential hypertension, benign        4. Mixed hyperlipidemia               Plan:       1.  Supraventricular ectopy.  I reviewed his EKGs tracings from April both in our office, YANIRA Batista's office and his EKG today.   I also reviewed his Holter monitor strips and his telemetry from his echocardiogram.  On my review his rhythm appears to be sinus with frequent premature atrial contractions.  I had Dr. Moran review some of his EKG strips and Holter also and he agrees there is no significant evidence of atrial fibrillation.  Recommend discontinuation of apixaban.  I am going to start him on a low-dose of metoprolol succinate to help with both his premature atrial contractions and tachycardia in the setting of ascending aortic aneurysm and low normal left ventricular function.  2.  Low normal left ventricular systolic function.  Decline from his prior echocardiograms.  Suspect this is impacted by his frequent PACs which make assessment of his LV function difficult.  " Plan to start metoprolol as above.  Suspect his LV function will look better if we repeat an echocardiogram once his heart rates and PACs have improved.    3.  Ascending aortic dilatation.  Mildly dilated at 4.3 cm which is stable compared to prior assessments.  4.  Hypertension.  Well-controlled on his current medications.  Will see how he does with the addition of metoprolol.  5.  Hyperlipidemia.  On rosuvastatin which is managed by YANIRA Batista.    Will plan on seeing the patient back again in 3 months to see how he is doing with the addition of the metoprolol.

## 2024-07-09 ENCOUNTER — OFFICE VISIT (OUTPATIENT)
Age: 69
End: 2024-07-09
Payer: MEDICARE

## 2024-07-09 VITALS
OXYGEN SATURATION: 95 % | HEART RATE: 95 BPM | HEIGHT: 72 IN | WEIGHT: 237.6 LBS | BODY MASS INDEX: 32.18 KG/M2 | SYSTOLIC BLOOD PRESSURE: 130 MMHG | DIASTOLIC BLOOD PRESSURE: 81 MMHG

## 2024-07-09 DIAGNOSIS — I71.21 ASCENDING AORTIC ANEURYSM, UNSPECIFIED WHETHER RUPTURED: ICD-10-CM

## 2024-07-09 DIAGNOSIS — E78.2 MIXED HYPERLIPIDEMIA: ICD-10-CM

## 2024-07-09 DIAGNOSIS — I49.1 PREMATURE ATRIAL CONTRACTIONS: Primary | ICD-10-CM

## 2024-07-09 DIAGNOSIS — I10 ESSENTIAL HYPERTENSION, BENIGN: ICD-10-CM

## 2024-07-09 PROBLEM — I48.91 NEW ONSET ATRIAL FIBRILLATION: Status: RESOLVED | Noted: 2024-04-15 | Resolved: 2024-07-09

## 2024-07-09 PROCEDURE — 93000 ELECTROCARDIOGRAM COMPLETE: CPT | Performed by: INTERNAL MEDICINE

## 2024-07-09 PROCEDURE — 99214 OFFICE O/P EST MOD 30 MIN: CPT | Performed by: INTERNAL MEDICINE

## 2024-07-09 PROCEDURE — 3075F SYST BP GE 130 - 139MM HG: CPT | Performed by: INTERNAL MEDICINE

## 2024-07-09 PROCEDURE — 1160F RVW MEDS BY RX/DR IN RCRD: CPT | Performed by: INTERNAL MEDICINE

## 2024-07-09 PROCEDURE — 3079F DIAST BP 80-89 MM HG: CPT | Performed by: INTERNAL MEDICINE

## 2024-07-09 PROCEDURE — 1159F MED LIST DOCD IN RCRD: CPT | Performed by: INTERNAL MEDICINE

## 2024-07-09 RX ORDER — METOPROLOL SUCCINATE 25 MG/1
25 TABLET, EXTENDED RELEASE ORAL DAILY
Qty: 30 TABLET | Refills: 5 | Status: SHIPPED | OUTPATIENT
Start: 2024-07-09

## 2024-07-09 NOTE — LETTER
July 9, 2024       No Recipients    Patient: Mo Willoughby   YOB: 1955   Date of Visit: 7/9/2024       Dear YANIRA Batista,    Mo Willoughby was in my office today. Below are the relevant portions of my assessment and plan of care.           If you have questions, please do not hesitate to call me. I look forward to following Mo along with you.         Sincerely,        Hazel Brown MD        CC:   No Recipients

## 2024-07-22 RX ORDER — ROSUVASTATIN CALCIUM 10 MG/1
10 TABLET, COATED ORAL DAILY
Qty: 90 TABLET | Refills: 1 | Status: SHIPPED | OUTPATIENT
Start: 2024-07-22

## 2024-07-26 ENCOUNTER — TELEPHONE (OUTPATIENT)
Dept: CARDIOLOGY | Facility: CLINIC | Age: 69
End: 2024-07-26
Payer: MEDICARE

## 2024-07-26 NOTE — TELEPHONE ENCOUNTER
Notified pt of recommendations from Norma. Pt verbalized understanding.    Thank you,    Padmini Zavala, RN  Triage [facility]  07/26/24 13:03 EDT

## 2024-07-26 NOTE — TELEPHONE ENCOUNTER
"Norma,    This is a patient of Dr. Brown's. Pt was started on metoprolol earlier this month and he called to let us know his legs have been swelling since the day he started it. He said his B/P and HR \"have been good\". Do you have any recommendations for him?    Thank you,    Padmini Zavala, INA  Triage [facility] [place]  07/26/24 09:19 EDT    "

## 2024-10-14 ENCOUNTER — OFFICE VISIT (OUTPATIENT)
Dept: CARDIOLOGY | Facility: CLINIC | Age: 69
End: 2024-10-14
Payer: MEDICARE

## 2024-10-14 VITALS
WEIGHT: 228 LBS | HEART RATE: 88 BPM | HEIGHT: 72 IN | BODY MASS INDEX: 30.88 KG/M2 | DIASTOLIC BLOOD PRESSURE: 70 MMHG | SYSTOLIC BLOOD PRESSURE: 134 MMHG

## 2024-10-14 DIAGNOSIS — I43 CARDIOMYOPATHY IN DISEASES CLASSIFIED ELSEWHERE: ICD-10-CM

## 2024-10-14 DIAGNOSIS — I42.9 CARDIOMYOPATHY, UNSPECIFIED TYPE: Primary | ICD-10-CM

## 2024-10-14 DIAGNOSIS — I10 PRIMARY HYPERTENSION: ICD-10-CM

## 2024-10-14 PROCEDURE — 3078F DIAST BP <80 MM HG: CPT | Performed by: NURSE PRACTITIONER

## 2024-10-14 PROCEDURE — 99214 OFFICE O/P EST MOD 30 MIN: CPT | Performed by: NURSE PRACTITIONER

## 2024-10-14 PROCEDURE — 3075F SYST BP GE 130 - 139MM HG: CPT | Performed by: NURSE PRACTITIONER

## 2024-10-14 PROCEDURE — 93000 ELECTROCARDIOGRAM COMPLETE: CPT | Performed by: NURSE PRACTITIONER

## 2024-10-14 NOTE — PROGRESS NOTES
Subjective:     Encounter Date:04/16/2024      Patient ID: Mo Willoughby is a 69 y.o. male.    Chief Complaint:follow up HTN, ascending aortic aneurysm  History of Present Illness  This is a 69 y/o man who follows with Dr. Brown and is known to me. He has a pmhx of hypertension, ETOH and drug abuse, ascending aortic aneurysm and tachycardia.     Is here today for a follow-up visit.  He reports that he has been feeling very well.  He did stop taking his metoprolol due to lower extremity swelling.  He says that he was told to stop taking as after he contacted the office with the complaints of swelling.  I reviewed telephone encounters and do not see any documentation of the recommendation to stop metoprolol.  Nevertheless he has been feeling okay and says the swelling improved after he stopped taking it.  He denies any chest discomfort, shortness of breath.  Denies palpitations, dizziness or syncope.  He feels like his swelling is back at his baseline.  No reported orthopnea or PND.    Prior history:  Dr. Brown began following the patient in May 2022 during a hospital admission for chest pain and diaphoresis. His troponin was in the indeterminate range, EKG was unremarkable, and his symptoms were pretty atypical. He also admitted to consuming 2-3 alcoholic drinks along with marijuana and cocaine. It was not felt that he needed an ischemic work-up. An echocardiogram was obtained that showed a normal LV systolic function, EF 56%, grade 1 diastolic dysfunction, lipomatous atrial septum and no significant valvular disease. A CTA of the chest showed an ascending aorta measuring 4.5 cm but no PE.     He saw Dr. Brown in follow up in August 2022 and was feeling well. A repeat echo or CT in 1 year was recommended. I saw him for the first time in February 2023 and he continued to feel well. No changes were made. He has been lost to follow up since.    I then saw him in April 2024 after he was noted to be in atrial  fibrillation at his PCPs office the day prior.  At that visit I felt that his EKG was concerning for atrial fibrillation and so I started him on apixaban.  A Holter monitor was placed that showed frequent SVT but no evidence of A-fib.  Echocardiogram showed low normal left ventricular function with an EF of 48%, mild dilatation of the ascending aorta measuring 4.3 cm, and possible atrial fibrillation during the study.  I recommended that he remain on apixaban until his follow-up with Dr. Brown in July.    He then saw Dr. Brown and reported some lightheadedness with position changes.  He felt this started once he started with the apixaban.  His blood pressures have been well-controlled.  Dr. Brown stopped apixaban.  She reviewed his EKG tracings, Holter monitor strips and telemetry from his echocardiogram with Dr. Moran who felt that there was no significant evidence of atrial fibrillation as well.  She did start him on low-dose metoprolol succinate to help with his PACs and SVT.    I have reviewed and updated as appropriate allergies, current medications, past family history, past medical history, past surgical history and problem list.    Review of Systems   Constitutional: Negative for fever, malaise/fatigue, weight gain and weight loss.   HENT:  Negative for congestion, hoarse voice and sore throat.    Eyes:  Negative for blurred vision and double vision.   Cardiovascular:  Negative for chest pain, dyspnea on exertion, leg swelling, orthopnea, palpitations and syncope.   Respiratory:  Negative for cough, shortness of breath and wheezing.    Gastrointestinal:  Negative for abdominal pain, hematemesis, hematochezia and melena.   Genitourinary:  Negative for dysuria and hematuria.   Neurological:  Negative for dizziness, headaches, light-headedness and numbness.   Psychiatric/Behavioral:  Negative for depression. The patient is not nervous/anxious.          Current Outpatient Medications:     allopurinol  (ZYLOPRIM) 300 MG tablet, Take 1 tablet by mouth Daily., Disp: 90 tablet, Rfl: 2    ferrous sulfate 325 (65 FE) MG tablet, Take 1 tablet by mouth Daily With Breakfast., Disp: , Rfl:     folic acid (FOLVITE) 1 MG tablet, TAKE 1 TABLET BY MOUTH DAILY, Disp: 90 tablet, Rfl: 1    lisinopril (PRINIVIL,ZESTRIL) 10 MG tablet, Take 1 tablet by mouth Daily., Disp: 90 tablet, Rfl: 2    lisinopril (PRINIVIL,ZESTRIL) 5 MG tablet, Take 1 tablet by mouth Daily., Disp: 90 tablet, Rfl: 2    metoprolol succinate XL (TOPROL-XL) 25 MG 24 hr tablet, Take 1 tablet by mouth Daily., Disp: 30 tablet, Rfl: 5    pantoprazole (PROTONIX) 40 MG EC tablet, TAKE 1 TABLET BY MOUTH TWICE DAILY, Disp: 180 tablet, Rfl: 1    rosuvastatin (CRESTOR) 10 MG tablet, TAKE 1 TABLET BY MOUTH DAILY, Disp: 90 tablet, Rfl: 1    thiamine (VITAMIN B-1) 100 MG tablet  tablet, Take 1 tablet by mouth Daily., Disp: , Rfl:     vitamin B-12 (CYANOCOBALAMIN) 1000 MCG tablet, Take 1 tablet by mouth Daily. HOLD FOR SURGERY ONE WEEK PRIOR, Disp: , Rfl:     Past Medical History:   Diagnosis Date    Abnormal TSH     Ascending aortic aneurysm     Colon polyp 5/5/22    CRI (chronic renal insufficiency), stage 3 (moderate) 2016    from stage 3A to 4    DDD (degenerative disc disease), lumbar     ED (erectile dysfunction)     Erectile dysfunction     Essential hypertension, benign     Folic acid deficiency     Gout     Hip pain, right     Hx of colonic polyp     Hypercalcemia 2015    as far back as data goes so likely pre-dates even this time    Hyperparathyroidism, unspecified 2016    as far back as data goes likely pre-dates even this date, likely multifactorial some primary and some secondary , w/ imaging from 10/16 showing possible L inferior adenoma    Iron deficiency anemia     Low back pain with bilateral sciatica     Mixed hyperlipidemia 02/27/2023    New onset atrial fibrillation 4/15/2024    Pancreatitis     Risk factors for obstructive sleep apnea     Spinal stenosis  of lumbar region with neurogenic claudication     Syncope and collapse 03/28/2017       Past Surgical History:   Procedure Laterality Date    CHOLECYSTECTOMY      CHOLECYSTECTOMY WITH INTRAOPERATIVE CHOLANGIOGRAM N/A 08/03/2018    Procedure: CHOLECYSTECTOMY LAPAROSCOPIC INTRAOPERATIVE CHOLANGIOGRAM;  Surgeon: Melina Kate MD;  Location:  [facility];  Service: General    COLONOSCOPY      COLONOSCOPY N/A 10/03/2016    Procedure: COLONOSCOPY TO CECUM TO TERMINAL ILIUM WITH COLD BIOPSY POLYPECTOMY;  Surgeon: Benoit Vilchis MD;  [facility];  Service:     COLONOSCOPY N/A 05/05/2022    Procedure: COLONOSCOPY TO CECUM WITH COLD SNARE POLYPECTOMIES & RESOLUTION CLIP PLACEMENT X 2;  Surgeon: Benoit Mosley MD;  [facility];  Service: Gastroenterology;  Laterality: N/A;  ANEMIA/POLYPS, HEMORRHOIDS     ENDOSCOPY N/A 05/05/2022    Procedure: ESOPHAGOGASTRODUODENOSCOPY WITH BX;  Surgeon: Benoit Mosley MD;  [facility];  Service: Gastroenterology;  Laterality: N/A;  ANEMIA/PORTAL GASTROPATHY, EROSIVE GASTRITIS     EPIDURAL Right 12/07/2022    Procedure: LUMBAR/SACRAL TRANSFORAMINAL EPIDURAL Right L2-3 and right L4-5;  Surgeon: Isaura Torres MD;  [facility];  Service: Pain Management;  Laterality: Right;    LIPOMA EXCISION      LUMBAR LAMINECTOMY DISCECTOMY DECOMPRESSION N/A 7/7/2023    Procedure: Open right sided lumbar decompression lumbar three/four, lumbar four/five;  Surgeon: Abel Perez MD;  Location:  [facility];  Service: Neurosurgery;  Laterality: N/A;    MEDIAL BRANCH BLOCK Right 01/23/2023    Procedure: LUMBAR MEDIAL BRANCH BLOCK RIGHT L3-L5 #1;  Surgeon: Isaura Torres MD;  [facility]: [facility];  Service: Pain Management;  Laterality: Right;    MEDIAL BRANCH BLOCK Right 02/13/2023    Procedure: LUMBAR MEDIAL BRANCH BLOCK RIGHT L3-L5 #1;  Surgeon: Isaura Torres MD;  [facility];  Service: Pain  "Management;  Laterality: Right;    NERVE SURGERY Right 3/6/2023    Procedure: LUMBAR RADIOFREQUENCY ABLATION RIGHT L3-L5  94089, 17413;  Surgeon: Isaura Torres MD;  [facility];  Service: Pain Management;  Laterality: Right;    SACROILIAC JOINT INJECTION Right 5/5/2023    Procedure: SACROILIAC JOINT INJECTION RIGHT 56674;  Surgeon: Isaura Torres MD;  [facility]: [facility];  Service: Pain Management;  Laterality: Right;    TONSILLECTOMY         Family History   Problem Relation Age of Onset    Hypertension Mother     Breast cancer Mother     Cancer Mother     Cancer Father     Liver cancer Father     Hypertension Father     Hypertension Sister     Heart attack Sister     Hypertension Brother     Cancer Brother     Learning disabilities Neg Hx        Social History     Tobacco Use    Smoking status: Never    Smokeless tobacco: Never   Vaping Use    Vaping status: Never Used   Substance Use Topics    Alcohol use: Yes     Comment: occasional    Drug use: No         ECG 12 Lead    Date/Time: 10/14/2024 11:25 AM  Performed by: Tabatha Parrish APRN    Authorized by: Tabatha Parrish APRN  Comparison: compared with previous ECG from 7/9/2024  Rhythm: sinus rhythm  Ectopy: multifocal PVCs             Objective:     Visit Vitals  /70 (BP Location: Right arm, Patient Position: Sitting, Cuff Size: Adult)   Pulse 88   Ht 182.9 cm (72\")   Wt 103 kg (228 lb)   BMI 30.92 kg/m²             Physical Exam  Constitutional:       Appearance: Normal appearance. He is obese.   HENT:      Head: Normocephalic.   Neck:      Vascular: No carotid bruit.   Cardiovascular:      Rate and Rhythm: Normal rate. Rhythm irregularly irregular. Frequent Extrasystoles are present.     Chest Wall: PMI is not displaced.      Pulses: Normal pulses.           Radial pulses are 2+ on the right side and 2+ on the left side.        Posterior tibial pulses are 2+ on the right side and 2+ on the left side.      Heart sounds: Normal heart " sounds. No murmur heard.     No friction rub. No gallop.   Pulmonary:      Effort: Pulmonary effort is normal.      Breath sounds: Normal breath sounds.   Abdominal:      General: Bowel sounds are normal. There is no distension.      Palpations: Abdomen is soft.   Musculoskeletal:      Right lower le+ No edema.      Left lower le+ No edema.   Skin:     General: Skin is warm and dry.      Capillary Refill: Capillary refill takes less than 2 seconds.   Neurological:      Mental Status: He is alert and oriented to person, place, and time.   Psychiatric:         Mood and Affect: Mood normal.         Behavior: Behavior normal.         Thought Content: Thought content normal.          Lab Review:   Lipid Panel          4/15/2024    14:16   Lipid Panel   Total Cholesterol 220    Triglycerides 151    HDL Cholesterol 68    VLDL Cholesterol 26    LDL Cholesterol  126          Cardiac Procedures:   Echocardiogram 5/3/22:  Calculated left ventricular EF = 56.3% Estimated left ventricular EF was in agreement with the calculated left ventricular EF. Left ventricular systolic function is normal. Normal left ventricular cavity size noted. Left ventricular wall thickness is consistent with moderate concentric hypertrophy. All left ventricular wall segments contract normally. Left ventricular diastolic function is consistent with (grade I) impaired relaxation.  The left atrial cavity is borderline dilated. Along the medial aspect of the left atrium, there is an intermittent echodensity. While this may represent shadowing from the aortic root or lipomatous hypertrophy of the superior portion of the atrial septum, a mass cannot be excluded. Consider cardiac CT for further evaluation.     Holter Monitor 3/29/17:  Study Description    Monitor hooked-up on 3/29/2017 at 18:41 EDT. The monitor was scanned on 2017. The patient was monitored for 12 days 20 hours. Indications for this exam include syncope. Average HR:  84. Min HR:   48. Max HR:  176.     Study Findings    Patient diary was not submitted.No symptoms reported during the monitoring period. No complications noted. The predominant rhythm noted during the testing period was sinus rhythm. There were 5 episodes of supraventricular tachycardia. The peak heart rate was 176 beats per minute. Longest run lasted 4 min 57 sec in SVT at a rather slow rate of 111 bpm. Premature ventricular contractions occured rarely. Ventricular couplets. There were no episodes of ventricular tachycardia. Sinoatrial node conduction was normal. No atrioventricular block noted.     Study Impressions    An abnormal monitor study.         Assessment:         There are no diagnoses linked to this encounter.            Plan:       Supraventricular ectopy: PVCs noted on EKG today and on exam. He stopped taking metoprolol succinate due to swelling. Denies any palpitations or shortness of breath. I do feel he should be on a beta blocker. I am going to reassess his LV function on echocardiogram. Depending on the results of that, will determine if we should try atenolol or carvedilol.   Low normal LV systolic function: felt to likely be due to frequent PACs. He stopped metoprolol. Will await repeat echo.  Ascending aortic aneurysm: 4.5 cm on CTA in May 2022. No repeat imaging has been performed. Will reassess on the echo.  Hypertension: blood pressure is elevated in office today. He reports better control at home.   Elevated LDL: mildly elevated at 126. Recommend dietary modifications and exercise.    Thank you for allowing me to participate in this patient's care. Please call with any questions or concerns. Mr. Willoughby will follow up with Dr. Brown in 6 months.          Your medication list            Accurate as of October 14, 2024 11:06 AM. If you have any questions, ask your nurse or doctor.                CONTINUE taking these medications        Instructions Last Dose Given Next Dose Due   allopurinol 300 MG  tablet  Commonly known as: ZYLOPRIM      Take 1 tablet by mouth Daily.       ferrous sulfate 325 (65 FE) MG tablet      Take 1 tablet by mouth Daily With Breakfast.       folic acid 1 MG tablet  Commonly known as: FOLVITE      TAKE 1 TABLET BY MOUTH DAILY       lisinopril 5 MG tablet  Commonly known as: PRINIVIL,ZESTRIL      Take 1 tablet by mouth Daily.       lisinopril 10 MG tablet  Commonly known as: PRINIVIL,ZESTRIL      Take 1 tablet by mouth Daily.       metoprolol succinate XL 25 MG 24 hr tablet  Commonly known as: TOPROL-XL      Take 1 tablet by mouth Daily.       pantoprazole 40 MG EC tablet  Commonly known as: PROTONIX      TAKE 1 TABLET BY MOUTH TWICE DAILY       rosuvastatin 10 MG tablet  Commonly known as: CRESTOR      TAKE 1 TABLET BY MOUTH DAILY       thiamine 100 MG tablet  tablet  Commonly known as: VITAMIN B-1      Take 1 tablet by mouth Daily.       vitamin B-12 1000 MCG tablet  Commonly known as: CYANOCOBALAMIN      Take 1 tablet by mouth Daily. HOLD FOR SURGERY ONE WEEK PRIOR                  YANIRA Tang  10/14/24  9:01 AM EST

## 2024-11-04 ENCOUNTER — TELEPHONE (OUTPATIENT)
Dept: CARDIOLOGY | Facility: CLINIC | Age: 69
End: 2024-11-04
Payer: MEDICARE

## 2024-11-04 ENCOUNTER — HOSPITAL ENCOUNTER (OUTPATIENT)
Dept: CARDIOLOGY | Facility: HOSPITAL | Age: 69
Discharge: HOME | End: 2024-11-04
Admitting: NURSE PRACTITIONER
Payer: MEDICARE

## 2024-11-04 VITALS
DIASTOLIC BLOOD PRESSURE: 89 MMHG | HEIGHT: 72 IN | HEART RATE: 101 BPM | WEIGHT: 228 LBS | BODY MASS INDEX: 30.88 KG/M2 | SYSTOLIC BLOOD PRESSURE: 169 MMHG

## 2024-11-04 DIAGNOSIS — I43 CARDIOMYOPATHY IN DISEASES CLASSIFIED ELSEWHERE: ICD-10-CM

## 2024-11-04 DIAGNOSIS — I42.9 CARDIOMYOPATHY, UNSPECIFIED TYPE: ICD-10-CM

## 2024-11-04 LAB
AORTIC ARCH: 2.8 CM
ASCENDING AORTA: 4.4 CM
BH CV ECHO LEFT VENTRICLE GLOBAL LONGITUDINAL STRAIN: -16.8 %
BH CV ECHO MEAS - ACS: 2.7 CM
BH CV ECHO MEAS - AO MAX PG: 7 MMHG
BH CV ECHO MEAS - AO MEAN PG: 4 MMHG
BH CV ECHO MEAS - AO ROOT DIAM: 4.3 CM
BH CV ECHO MEAS - AO V2 MAX: 132 CM/SEC
BH CV ECHO MEAS - AO V2 VTI: 22.1 CM
BH CV ECHO MEAS - AVA(I,D): 2.6 CM2
BH CV ECHO MEAS - EDV(CUBED): 227 ML
BH CV ECHO MEAS - EDV(MOD-SP2): 145 ML
BH CV ECHO MEAS - EDV(MOD-SP4): 128 ML
BH CV ECHO MEAS - EF(MOD-BP): 45.5 %
BH CV ECHO MEAS - EF(MOD-SP2): 44.8 %
BH CV ECHO MEAS - EF(MOD-SP4): 45.3 %
BH CV ECHO MEAS - ESV(CUBED): 115.2 ML
BH CV ECHO MEAS - ESV(MOD-SP2): 80 ML
BH CV ECHO MEAS - ESV(MOD-SP4): 70 ML
BH CV ECHO MEAS - FS: 20.2 %
BH CV ECHO MEAS - IVS/LVPW: 1.08 CM
BH CV ECHO MEAS - IVSD: 1.4 CM
BH CV ECHO MEAS - LAT PEAK E' VEL: 8.9 CM/SEC
BH CV ECHO MEAS - LV DIASTOLIC VOL/BSA (35-75): 56.8 CM2
BH CV ECHO MEAS - LV MASS(C)D: 378.7 GRAMS
BH CV ECHO MEAS - LV MAX PG: 3.1 MMHG
BH CV ECHO MEAS - LV MEAN PG: 2 MMHG
BH CV ECHO MEAS - LV SYSTOLIC VOL/BSA (12-30): 31.1 CM2
BH CV ECHO MEAS - LV V1 MAX: 88 CM/SEC
BH CV ECHO MEAS - LV V1 VTI: 15.4 CM
BH CV ECHO MEAS - LVIDD: 6.1 CM
BH CV ECHO MEAS - LVIDS: 4.9 CM
BH CV ECHO MEAS - LVOT AREA: 3.8 CM2
BH CV ECHO MEAS - LVOT DIAM: 2.19 CM
BH CV ECHO MEAS - LVPWD: 1.3 CM
BH CV ECHO MEAS - MED PEAK E' VEL: 7.7 CM/SEC
BH CV ECHO MEAS - MR MAX PG: 95.1 MMHG
BH CV ECHO MEAS - MR MAX VEL: 487.7 CM/SEC
BH CV ECHO MEAS - MV A DUR: 0.09 SEC
BH CV ECHO MEAS - MV A MAX VEL: 51.9 CM/SEC
BH CV ECHO MEAS - MV DEC SLOPE: 461.3 CM/SEC2
BH CV ECHO MEAS - MV DEC TIME: 0.16 SEC
BH CV ECHO MEAS - MV E MAX VEL: 70.7 CM/SEC
BH CV ECHO MEAS - MV E/A: 1.36
BH CV ECHO MEAS - MV MAX PG: 3.1 MMHG
BH CV ECHO MEAS - MV MEAN PG: 1.46 MMHG
BH CV ECHO MEAS - MV P1/2T: 55 MSEC
BH CV ECHO MEAS - MV V2 VTI: 17.9 CM
BH CV ECHO MEAS - MVA(P1/2T): 4 CM2
BH CV ECHO MEAS - MVA(VTI): 3.2 CM2
BH CV ECHO MEAS - PA ACC TIME: 0.11 SEC
BH CV ECHO MEAS - PA V2 MAX: 74.3 CM/SEC
BH CV ECHO MEAS - PULM A REVS DUR: 0.09 SEC
BH CV ECHO MEAS - PULM A REVS VEL: 38.1 CM/SEC
BH CV ECHO MEAS - PULM DIAS VEL: 57.2 CM/SEC
BH CV ECHO MEAS - PULM S/D: 0.88
BH CV ECHO MEAS - PULM SYS VEL: 50.2 CM/SEC
BH CV ECHO MEAS - QP/QS: 0.83
BH CV ECHO MEAS - RV MAX PG: 1.14 MMHG
BH CV ECHO MEAS - RV V1 MAX: 53.5 CM/SEC
BH CV ECHO MEAS - RV V1 VTI: 11.6 CM
BH CV ECHO MEAS - RVOT DIAM: 2.28 CM
BH CV ECHO MEAS - SUP REN AO DIAM: 2.5 CM
BH CV ECHO MEAS - SV(LVOT): 57.8 ML
BH CV ECHO MEAS - SV(MOD-SP2): 65 ML
BH CV ECHO MEAS - SV(MOD-SP4): 58 ML
BH CV ECHO MEAS - SV(RVOT): 47.7 ML
BH CV ECHO MEAS - SVI(LVOT): 25.6 ML/M2
BH CV ECHO MEAS - SVI(MOD-SP2): 28.9 ML/M2
BH CV ECHO MEAS - SVI(MOD-SP4): 25.8 ML/M2
BH CV ECHO MEAS - TAPSE (>1.6): 1.83 CM
BH CV ECHO MEASUREMENTS AVERAGE E/E' RATIO: 8.52
BH CV XLRA - RV BASE: 3.5 CM
BH CV XLRA - RV LENGTH: 6.6 CM
BH CV XLRA - RV MID: 2.39 CM
BH CV XLRA - TDI S': 11.9 CM/SEC
LEFT ATRIUM VOLUME INDEX: 52.9 ML/M2
SINUS: 4.2 CM
STJ: 3.9 CM

## 2024-11-04 PROCEDURE — 93306 TTE W/DOPPLER COMPLETE: CPT | Performed by: INTERNAL MEDICINE

## 2024-11-04 PROCEDURE — 93306 TTE W/DOPPLER COMPLETE: CPT

## 2024-11-04 PROCEDURE — 93356 MYOCRD STRAIN IMG SPCKL TRCK: CPT

## 2024-11-04 PROCEDURE — 93356 MYOCRD STRAIN IMG SPCKL TRCK: CPT | Performed by: INTERNAL MEDICINE

## 2024-11-04 NOTE — TELEPHONE ENCOUNTER
Discussed results of echo with patient's sister. Will try to reach patient again to discuss starting atenolol.

## 2024-12-05 RX ORDER — FOLIC ACID 1 MG/1
TABLET ORAL
Qty: 90 TABLET | Refills: 1 | Status: SHIPPED | OUTPATIENT
Start: 2024-12-05

## 2024-12-10 DIAGNOSIS — Z87.39 H/O: GOUT: ICD-10-CM

## 2024-12-14 RX ORDER — PANTOPRAZOLE SODIUM 40 MG/1
40 TABLET, DELAYED RELEASE ORAL 2 TIMES DAILY
Qty: 180 TABLET | Refills: 1 | Status: SHIPPED | OUTPATIENT
Start: 2024-12-14

## 2024-12-14 RX ORDER — ALLOPURINOL 300 MG/1
300 TABLET ORAL DAILY
Qty: 90 TABLET | Refills: 2 | Status: SHIPPED | OUTPATIENT
Start: 2024-12-14

## 2025-01-14 RX ORDER — METOPROLOL SUCCINATE 25 MG/1
25 TABLET, EXTENDED RELEASE ORAL DAILY
Qty: 90 TABLET | Refills: 1 | Status: SHIPPED | OUTPATIENT
Start: 2025-01-14

## 2025-01-17 RX ORDER — ROSUVASTATIN CALCIUM 10 MG/1
10 TABLET, COATED ORAL DAILY
Qty: 90 TABLET | Refills: 1 | Status: SHIPPED | OUTPATIENT
Start: 2025-01-17

## 2025-03-20 DIAGNOSIS — I10 ESSENTIAL HYPERTENSION, BENIGN: ICD-10-CM

## 2025-03-20 NOTE — TELEPHONE ENCOUNTER
Caller: Mo Willoughby    Relationship: Self    Best call back number: [phone]     Requested Prescriptions:   Requested Prescriptions     Pending Prescriptions Disp Refills    lisinopril (PRINIVIL,ZESTRIL) 5 MG tablet 90 tablet 2     Sig: Take 1 tablet by mouth Daily.    lisinopril (PRINIVIL,ZESTRIL) 10 MG tablet 90 tablet 2     Sig: Take 1 tablet by mouth Daily.        Pharmacy where request should be sent:    [id number] [address] [facility] [address] [facility] [phone] [facility] [phone]  [phone]   Last office visit with prescribing clinician: 4/15/2024   Last telemedicine visit with prescribing clinician: Visit date not found   Next office visit with prescribing clinician: Visit date not found     Additional details provided by patient:     Does the patient have less than a 3 day supply:  [x] Yes  [] No    Would you like a call back once the refill request has been completed: [] Yes [] No    If the office needs to give you a call back, can they leave a voicemail: [] Yes [] No    Chi Lantigua Rep   03/20/25 15:23 EDT

## 2025-03-21 RX ORDER — LISINOPRIL 5 MG/1
5 TABLET ORAL DAILY
Qty: 90 TABLET | Refills: 2 | Status: SHIPPED | OUTPATIENT
Start: 2025-03-21

## 2025-03-21 RX ORDER — LISINOPRIL 10 MG/1
10 TABLET ORAL DAILY
Qty: 90 TABLET | Refills: 2 | Status: SHIPPED | OUTPATIENT
Start: 2025-03-21

## 2025-04-02 ENCOUNTER — OFFICE VISIT (OUTPATIENT)
Dept: FAMILY MEDICINE CLINIC | Facility: CLINIC | Age: 70
End: 2025-04-02
Payer: MEDICARE

## 2025-04-02 VITALS
WEIGHT: 231.4 LBS | HEIGHT: 72 IN | OXYGEN SATURATION: 98 % | DIASTOLIC BLOOD PRESSURE: 88 MMHG | BODY MASS INDEX: 31.34 KG/M2 | HEART RATE: 93 BPM | TEMPERATURE: 98.7 F | SYSTOLIC BLOOD PRESSURE: 138 MMHG

## 2025-04-02 DIAGNOSIS — M25.551 RIGHT HIP PAIN: Primary | ICD-10-CM

## 2025-04-02 DIAGNOSIS — I10 ESSENTIAL HYPERTENSION, BENIGN: ICD-10-CM

## 2025-04-02 RX ORDER — TADALAFIL 5 MG/1
1 TABLET ORAL DAILY
COMMUNITY
Start: 2025-01-29

## 2025-04-02 RX ORDER — CYCLOBENZAPRINE HCL 5 MG
5 TABLET ORAL NIGHTLY PRN
Qty: 10 TABLET | Refills: 0 | Status: SHIPPED | OUTPATIENT
Start: 2025-04-02 | End: 2025-04-12

## 2025-04-02 RX ORDER — METHYLPREDNISOLONE 4 MG/1
TABLET ORAL
Qty: 21 TABLET | Refills: 0 | Status: SHIPPED | OUTPATIENT
Start: 2025-04-02

## 2025-04-02 NOTE — PROGRESS NOTES
"Chief Complaint  Hip Pain (Right hip /Started a couple of weeks ago/Intense pain started Saturday after moving a ladder)    Subjective          Mo Willoughby presents to [facility]              History of Present Illness  The patient is a 69-year-old male who presents today for right hip pain.    He has been experiencing discomfort in his right hip for several weeks, which was exacerbated following an incident on Saturday afternoon where he moved a ladder. He reports difficulty in ambulation and sleep disturbances due to the pain. He does not recall any specific sounds such as a pop or click during the incident. The pain occasionally radiates down his leg and is accompanied by tingling sensations. He has tried various sleeping positions without relief. He has not sought any pharmacological intervention for the pain, including over-the-counter medications. He has a history of back pain, which was surgically addressed a few years ago.  He denies saddle anesthesia or incontinence of bowel or bladder.    He monitors his blood pressure at home, which typically measures around 130/70.  Blood pressure today is 138/88.          Objective   Vital Signs:  /88 (BP Location: Left arm, Patient Position: Sitting, Cuff Size: Adult)   Pulse 93   Temp 98.7 °F (37.1 °C) (Infrared)   Ht 182.9 cm (72\")   Wt 105 kg (231 lb 6.4 oz)   SpO2 98%   BMI 31.38 kg/m²   Estimated body mass index is 31.38 kg/m² as calculated from the following:    Height as of this encounter: 182.9 cm (72\").    Weight as of this encounter: 105 kg (231 lb 6.4 oz).            Physical Exam  Constitutional:       Appearance: Normal appearance.   HENT:      Head: Normocephalic.   Cardiovascular:      Rate and Rhythm: Normal rate and regular rhythm.      Heart sounds: Normal heart sounds.   Pulmonary:      Effort: Pulmonary effort is normal.      Breath sounds: Normal breath sounds.   Musculoskeletal:      Cervical back: " Neck supple.      Lumbar back: Negative right straight leg raise test and negative left straight leg raise test.      Right lower leg: No edema.      Left lower leg: No edema.      Comments: Positive right leg Celio test   Neurological:      Mental Status: He is alert and oriented to person, place, and time.   Psychiatric:         Mood and Affect: Mood normal.        Result Review :  The following data was reviewed by: YANIRA Castro on 04/02/2025:                Assessment and Plan   Diagnoses and all orders for this visit:    1. Right hip pain (Primary)  Assessment & Plan:  Negative right SLR test. Positive right Celio test.  An x-ray of both the lower back and hip will be ordered to ensure there are no changes in his back condition. A steroid pack will be prescribed to alleviate the pain, with the understanding that it may elevate his blood pressure. He is advised to monitor his blood pressure daily while on the steroid pack and to contact us if it consistently exceeds 140/90. He is also instructed to seek immediate medical attention at the ER if he experiences any numbness or tingling in the saddle area. Additionally, any loss of control over urination or bowel movements should prompt an immediate hospital visit.    Orders:  -     methylPREDNISolone (MEDROL) 4 MG dose pack; Take as directed on package instructions.  Dispense: 21 tablet; Refill: 0  -     XR Hip With or Without Pelvis 2 - 3 View Right; Future  -     XR Spine Lumbar 4+ View; Future  -     cyclobenzaprine (FLEXERIL) 5 MG tablet; Take 1 tablet by mouth At Night As Needed for Muscle Spasms for up to 10 days.  Dispense: 10 tablet; Refill: 0    2. Essential hypertension, benign  Assessment & Plan:  Hypertension is stable and controlled  Continue current treatment regimen.  Blood pressure will be reassessed in 1 month.             Patient agrees with plan of care and understands instructions. Call if worsening symptoms or any problems or concerns.      EMR Dragon/Transcription Disclaimer:  Much of this encounter note is an electronic transcription/translation of spoken language to printed text.  The electronic translation of spoken language may permit erroneous, or at times, nonsensical words or phrases to be inadvertently transcribed; Although I have reviewed the note for such errors, some may still exist.              Follow Up   Return in about 1 month (around 5/2/2025) for Recheck with Jenn .  Patient was given instructions and counseling regarding his condition or for health maintenance advice. Please see specific information pulled into the AVS if appropriate.     Patient or patient representative verbalized consent for the use of Ambient Listening during the visit with  YANIRA Castro for chart documentation. 4/11/2025  14:55 EDT

## 2025-04-03 ENCOUNTER — HOSPITAL ENCOUNTER (OUTPATIENT)
Dept: GENERAL RADIOLOGY | Facility: HOSPITAL | Age: 70
Discharge: HOME | End: 2025-04-03
Payer: MEDICARE

## 2025-04-03 DIAGNOSIS — M25.551 RIGHT HIP PAIN: ICD-10-CM

## 2025-04-03 PROCEDURE — 73502 X-RAY EXAM HIP UNI 2-3 VIEWS: CPT

## 2025-04-03 PROCEDURE — 72110 X-RAY EXAM L-2 SPINE 4/>VWS: CPT

## 2025-04-08 ENCOUNTER — TELEPHONE (OUTPATIENT)
Dept: FAMILY MEDICINE CLINIC | Facility: CLINIC | Age: 70
End: 2025-04-08
Payer: MEDICARE

## 2025-04-08 NOTE — TELEPHONE ENCOUNTER
Caller: Mo Willoughby    Relationship: Self    Best call back number: [phone]     What test was performed: XR     When was the test performed: 4/3    Where was the test performed: [facility]     Additional notes:

## 2025-04-08 NOTE — TELEPHONE ENCOUNTER
Caller: Mo Willoughby    Relationship: Self    Best call back number: [phone]         What test was performed: XRAYS    When was the test performed: 4/3/25    Where was the test performed: [facility]    Additional notes: PATIENT REQUESTING CALL BACK TO GO OVER TEST RESULTS.

## 2025-04-09 DIAGNOSIS — M25.551 RIGHT HIP PAIN: Primary | ICD-10-CM

## 2025-04-09 RX ORDER — MELOXICAM 7.5 MG/1
7.5-15 TABLET ORAL DAILY PRN
Qty: 15 TABLET | Refills: 0 | Status: SHIPPED | OUTPATIENT
Start: 2025-04-09

## 2025-04-09 NOTE — TELEPHONE ENCOUNTER
Meloxicam 15-day supply sent to pharmacy. Recommend following up with PCP if pain is worsening or no improvement.     Patient informed. Office visit moved up to 4/30

## 2025-04-09 NOTE — TELEPHONE ENCOUNTER
Caller: Mo Willoughby    Relationship: Self    Best call back number: [phone]    Caller requesting test results:     What test was performed: XRAYS    When was the test performed: 04/03/2025    Where was the test performed:     Additional notes: PATIENT REQUESTING CALLBACK TO DISCUSS RESULTS.

## 2025-04-09 NOTE — TELEPHONE ENCOUNTER
Patient informed. States he is having a hard time walking, any medication you can prescribe to help with pain? Result note sent back re: ortho.

## 2025-04-10 PROBLEM — M16.11 ARTHRITIS OF RIGHT HIP: Status: ACTIVE | Noted: 2025-04-10

## 2025-04-11 PROBLEM — M25.551 RIGHT HIP PAIN: Status: ACTIVE | Noted: 2025-04-11

## 2025-04-11 NOTE — ASSESSMENT & PLAN NOTE
Negative right SLR test. Positive right Celio test.  An x-ray of both the lower back and hip will be ordered to ensure there are no changes in his back condition. A steroid pack will be prescribed to alleviate the pain, with the understanding that it may elevate his blood pressure. He is advised to monitor his blood pressure daily while on the steroid pack and to contact us if it consistently exceeds 140/90. He is also instructed to seek immediate medical attention at the ER if he experiences any numbness or tingling in the saddle area. Additionally, any loss of control over urination or bowel movements should prompt an immediate hospital visit.

## 2025-04-14 ENCOUNTER — TELEPHONE (OUTPATIENT)
Dept: FAMILY MEDICINE CLINIC | Facility: CLINIC | Age: 70
End: 2025-04-14
Payer: MEDICARE

## 2025-04-14 NOTE — TELEPHONE ENCOUNTER
Please let patient know that he would need to be seen in office for back pain and/or medication.  Thank you

## 2025-04-14 NOTE — TELEPHONE ENCOUNTER
Caller: Mo Willoughby    Relationship: Self    Best call back number:[phone]    What medication are you requesting:   GABAPENTIN- 300 MG    What are your current symptoms: BACK PAIN    How long have you been experiencing symptoms: ONGOING    Have you had these symptoms before:    [x] Yes  [] No    Have you been treated for these symptoms before:   [x] Yes  [] No    If a prescription is needed, what is your preferred pharmacy and phone number: [id number] [place], [place] - [address] AT [facility] [address] [facility] [phone] [facility] [phone]      Additional notes:  PATIENT IS GOING TO SEE SURGEON ON 5.2, BUT THEY WON'T PRESCRIBE ANYTHING UNTIL HE DOES.     HE HAS HAD THIS IN THE PAST AND IT HAS HELPED WITH THE PAIN.     PLEASE CALL TO CONFIRM OR DENY.

## 2025-04-15 ENCOUNTER — OFFICE VISIT (OUTPATIENT)
Dept: FAMILY MEDICINE CLINIC | Facility: CLINIC | Age: 70
End: 2025-04-15
Payer: MEDICARE

## 2025-04-15 VITALS
DIASTOLIC BLOOD PRESSURE: 90 MMHG | SYSTOLIC BLOOD PRESSURE: 150 MMHG | HEIGHT: 72 IN | TEMPERATURE: 98.7 F | BODY MASS INDEX: 30.99 KG/M2 | WEIGHT: 228.8 LBS | OXYGEN SATURATION: 100 % | HEART RATE: 85 BPM

## 2025-04-15 DIAGNOSIS — M51.16 HERNIATION OF LUMBAR INTERVERTEBRAL DISC WITH RADICULOPATHY: ICD-10-CM

## 2025-04-15 DIAGNOSIS — M48.061 SPINAL STENOSIS OF LUMBAR REGION WITHOUT NEUROGENIC CLAUDICATION: Primary | ICD-10-CM

## 2025-04-15 DIAGNOSIS — E78.2 MIXED HYPERLIPIDEMIA: ICD-10-CM

## 2025-04-15 RX ORDER — GABAPENTIN 300 MG/1
300 CAPSULE ORAL 2 TIMES DAILY PRN
Qty: 60 CAPSULE | Refills: 0 | Status: SHIPPED | OUTPATIENT
Start: 2025-04-15

## 2025-04-15 NOTE — PROGRESS NOTES
"Chief Complaint  Back Pain (Follow up-medication not helping/Dr. Levy recommends Gabapentin 300 mg but unable to get into their office until  5/2/Pain 10/10 with movement/)    Subjective        Mo Willoughby presents to [facility]  History of Present Illness  Patient Present to the office today for follow-up on chronic pain.  Patient is following neurosurgeon.  Patient has appointment on 5/2/2025.  He is reporting back pain 10 out of 10 with neuropathy pain going down his right leg.  When sitting patient is not having pain.  When he gets up to walk he then has discomfort.  Patient has been previously treated with gabapentin 300 mg twice daily as needed.  Patient did not have side effects from medication.  Discussed with patient I will treat for pain today and have this further evaluated with his neurosurgeon.  Blood pressure today is 150 out of 90.  Patient reports blood pressures normal readings at home today having increased pain.  With hypertension taking lisinopril metoprolol.            Extremity Pain     Back Pain        Objective   Vital Signs:  /90 (BP Location: Left arm, Patient Position: Sitting, Cuff Size: Adult)   Pulse 85   Temp 98.7 °F (37.1 °C)   Ht 182.9 cm (72.01\")   Wt 104 kg (228 lb 12.8 oz)   SpO2 100%   BMI 31.02 kg/m²   Estimated body mass index is 31.02 kg/m² as calculated from the following:    Height as of this encounter: 182.9 cm (72.01\").    Weight as of this encounter: 104 kg (228 lb 12.8 oz).    BMI is >= 30 and <35. (Class 1 Obesity). The following options were offered after discussion;: weight loss educational material (shared in after visit summary) and exercise counseling/recommendations      Physical Exam  Constitutional:       Appearance: Normal appearance.   HENT:      Head: Normocephalic.   Cardiovascular:      Rate and Rhythm: Normal rate and regular rhythm.   Pulmonary:      Effort: Pulmonary effort is normal. No respiratory " distress.      Breath sounds: Normal breath sounds. No wheezing.   Abdominal:      General: Abdomen is flat.      Palpations: Abdomen is soft. There is no mass.      Tenderness: There is no abdominal tenderness.      Hernia: No hernia is present.   Musculoskeletal:         General: Tenderness present.      Cervical back: Normal. No spasms or tenderness. Normal range of motion.      Thoracic back: Normal. No spasms or tenderness. Normal range of motion.      Lumbar back: Spasms and tenderness present. No edema or bony tenderness. Decreased range of motion. No scoliosis.   Neurological:      Mental Status: He is alert.        Result Review :  The following data was reviewed by: YANIRA Batista on 04/15/2025:    Data reviewed : Radiologic studies right hip xray, lumbar xray            Assessment and Plan   Diagnoses and all orders for this visit:    1. Spinal stenosis of lumbar region without neurogenic claudication (Primary)  -     gabapentin (NEURONTIN) 300 MG capsule; Take 1 capsule by mouth 2 (Two) Times a Day As Needed (neuropathy).  Dispense: 60 capsule; Refill: 0    2. Herniation of lumbar intervertebral disc with radiculopathy  -     gabapentin (NEURONTIN) 300 MG capsule; Take 1 capsule by mouth 2 (Two) Times a Day As Needed (neuropathy).  Dispense: 60 capsule; Refill: 0    3. Mixed hyperlipidemia    With chronic lumbar pain continue scheduled follow-up with neurosurgeon on 5/2/2025.  Restarting gabapentin therapy 300 mg twice daily as needed.      Side effects of all new and old medications reviewed with the patient -willing to accept all risks involved.  Advised to rto if no improvement or worsening of symptoms.  Patient instructed to  clinical summary at .        I spent 15 minutes caring for Mo on this date of service. This time includes time spent by me in the following activities:preparing for the visit, reviewing tests, obtaining and/or reviewing a separately obtained history,  performing a medically appropriate examination and/or evaluation , counseling and educating the patient/family/caregiver, ordering medications, tests, or procedures, documenting information in the medical record, independently interpreting results and communicating that information with the patient/family/caregiver, and care coordination  Follow Up   Return in about 6 months (around 10/15/2025) for Medicare Wellness.  Patient was given instructions and counseling regarding his condition or for health maintenance advice. Please see specific information pulled into the AVS if appropriate.

## 2025-04-15 NOTE — ASSESSMENT & PLAN NOTE
Hypertension is uncontrolled  Continue current treatment regimen.  Dietary sodium restriction.  Weight loss.  Regular aerobic exercise.  Ambulatory blood pressure monitoring.  Blood pressure will be reassessedat the next regular appointment.  Blood pressure elevated today.  Patient reports pain 10 out of 10.  Patient denies chest pain shortness of air patient to follow-up with me in 2 weeks

## 2025-04-16 DIAGNOSIS — M16.11 ARTHRITIS OF RIGHT HIP: ICD-10-CM

## 2025-04-16 DIAGNOSIS — M51.369 DEGENERATION OF INTERVERTEBRAL DISC OF LUMBAR REGION, UNSPECIFIED WHETHER PAIN PRESENT: Primary | ICD-10-CM

## 2025-04-23 DIAGNOSIS — M25.551 RIGHT HIP PAIN: ICD-10-CM

## 2025-04-23 RX ORDER — MELOXICAM 7.5 MG/1
TABLET ORAL
Qty: 15 TABLET | Refills: 0 | OUTPATIENT
Start: 2025-04-23

## 2025-05-01 NOTE — PROGRESS NOTES
"Subjective   Patient ID: Mo Willoughby is a 69 y.o. male is here today for follow-up complaints of low back and right hip pain.  He was seen by his PCP 4/2/2025.  He had x-rays and first prescribed a Medrol Dosepak as well as Flexeril.  Hip x-ray and  [facility]  Status post open right L3-5 decompression July 2023 with Dr. Perez.  Last seen in office November 13, 2023.    History of Present Illness    Patient reports been doing well until rather abruptly about 1 month ago after moving a ladder to his truck, he had onset of R hip pain. Pain has progressed to aman low back and down bilateral legs with standing, walking. He feels weak in his legs. He is requiring a walker to mobilize safely. Some numbness. No incontinence or saddle paresthesia. He has not been to any therapy at this time. He denies benefit from MDP, muscle relaxer. Gabapentin may be helping some.  He does admit to some progressive weakness in his left upper extremity over the last several months to year.  He denies swallow difficulty or weight loss although his wife feels that he has thinned out.    Non-smoker. History of atrial fibrillation, but no use of anticoagulants.  He is on gabapentin 300 mg p.o. twice daily.  No cancer history but has history of hypercalcemia from hyperparathyroidism.    The following portions of the patient's history were reviewed and updated as appropriate: allergies, current medications, past family history, past medical history, past social history, past surgical history, and problem list.    ROS:    Left hand weakness, bilateral lower extremity weakness, numbness, back and leg pain, imbalance, no falls, no swallowing difficulty    Objective     Vitals:    05/02/25 1125   BP: 140/70   Pulse: 81   Temp: 98 °F (36.7 °C)   SpO2: 98%   Weight: 105 kg (232 lb)   Height: 182 cm (71.65\")     Body mass index is 31.77 kg/m².      Physical Exam  Vitals reviewed.   Constitutional:       Comments: Chronically " ill-appearing male in no acute distress.  Pleasant and cooperative.   Eyes:      General: Lids are normal.      Extraocular Movements: Extraocular movements intact.   Pulmonary:      Effort: Pulmonary effort is normal.   Musculoskeletal:      Cervical back: Normal range of motion and neck supple. No pain with movement. Normal range of motion.      Lumbar back: No bony tenderness. Negative right straight leg raise test and negative left straight leg raise test.      Comments: No L'hermittes   Skin:     General: Skin is warm and dry.      Findings: No bruising.   Neurological:      General: No focal deficit present.      Coordination: Romberg sign positive.      Deep Tendon Reflexes:      Reflex Scores:       Tricep reflexes are 2+ on the right side and 3+ on the left side.       Bicep reflexes are 2+ on the right side and 3+ on the left side.       Brachioradialis reflexes are 2+ on the right side and 3+ on the left side.       Patellar reflexes are 3+ on the right side and 3+ on the left side.  Psychiatric:         Mood and Affect: Mood normal.         Thought Content: Thought content normal.       Neurological Exam  Mental Status  Awake, alert and oriented to person, place and time.    Cranial Nerves  CN III, IV, VI: Extraocular movements intact bilaterally. Normal lids and orbits bilaterally.  CN VII:  Right: There is no facial weakness.  CN IX, X: Palate elevates symmetrically  CN XII: Tongue has fasciculations.    Motor  Decreased muscle bulk throughout. Muscle wasting left upper extremity particularly the hand, supraclavicular. No fasciculations present. Normal muscle tone. No abnormal involuntary movements. Strength is 5/5 in all four extremities except as noted.  Left TA 4 -/5, left quad 4/5.    Sensory  Light touch is normal in upper and lower extremities.     Reflexes                                            Right                      Left  Brachioradialis                    2+                          3+  Biceps                                 2+                         3+  Triceps                                2+                         3+  Patellar                                3+                         3+    Right pathological reflexes: Vicente's absent. Ankle clonus absent.  Left pathological reflexes: Vicente's absent. Ankle clonus absent.    Gait  Casual gait: Unable to ambulate safely without walker. Wide stance. Heel walking abnormality: Left. Not assessed due to fall risk. Romberg is present.          Assessment & Plan   Independent Review of Radiographic Studies:      I personally reviewed the images from the following studies.    Lumbar x-rays revealed some left lateral translation of L2 on 3 which is stable to maybe slightly progressed from prior x-ray.  DDD at L1/2, L2/3 appears progressed from prior study in 2022.  There is stable anterolisthesis of L4 on 5.    Medical Decision Making:      Patient presents for evaluation of back and bilateral leg pain.  He states he lifted a ladder into his truck and after that he began to experience right hip pain.  This was an 1 month ago.  He saw his PCP and was prescribed Medrol Dosepak and muscle relaxant which did not offer him any relief.  He was also given gabapentin which he thinks may be helping some with the leg pain.  Unfortunately he has also noticed other symptoms that have progressively worsened including sense of weakness of his legs and fear of falling due to inability to ambulate without assistive device.  He reports imbalance and weakness of his left foot.  On exam in addition to these findings he has some quad weakness on the left and muscle wasting of at least his left hand if not arm and supraclavicular area.  Although his weight has been stable, he looks thin in his upper body.  He seems to have some very mild tongue fasciculations.  His palate raises normal and he denies any swallowing difficulties.  He denies incontinence or sensory  changes in his saddle area but he does note some numbness in his legs.  He denies neck pain or upper extremity pain but has noticed some weakness of his hand at least over the last few months.  I am concerned about significant cervical pathology or a progressive neuromuscular disease.  I think he will need an extensive workup with full neuro axis MRIs as well as possibly lumbar puncture if there are no spinal abnormalities seen with the assistance of neurology evaluation.  I implored the patient to let me direct admit him to the hospital today for further evaluation symptoms symptoms have been progressive but he is not willing to consider this today as it is derby we can here in [place] and he wants to spend it at home.  He states he will come to the hospital on Monday for admission and we will work on that.  He has been advised to not do any driving, exertional activity, lifting, being on elevated surfaces.  He is also advised to come immediately to the emergency room if he has any falls, progressive weakness, experiences new incontinence, new numbness, or any other concerning symptom.  His wife attended the appointment with him today and also attempted to get him to agree to admission today but was unable to.  She verbalizes understanding of the plan and red flag signs as well.  I will go ahead and place outpatient neurology urgent referral just to get the process started.  Dr. Perez is out of office today so I discussed the plan with on-call provider, Dr. England.  He agrees.    Diagnoses and all orders for this visit:    1. Acute bilateral low back pain with bilateral sciatica (Primary)    2. Foot drop, left  -     Ambulatory Referral to Neurology    3. Tongue fasciculation  -     Ambulatory Referral to Neurology    4. Imbalance  -     Ambulatory Referral to Neurology    5. Atrophy of muscle of multiple sites  -     Ambulatory Referral to Neurology      Return for Direct admission 5/5 for extensive work  "up.           Rohini Meza, APRN  05/02/2025   15:45 EDT    \"Dictated utilizing Dragon dictation\".      "

## 2025-05-02 ENCOUNTER — OFFICE VISIT (OUTPATIENT)
Dept: NEUROSURGERY | Facility: CLINIC | Age: 70
End: 2025-05-02
Payer: MEDICARE

## 2025-05-02 VITALS
TEMPERATURE: 98 F | BODY MASS INDEX: 31.42 KG/M2 | WEIGHT: 232 LBS | OXYGEN SATURATION: 98 % | HEART RATE: 81 BPM | DIASTOLIC BLOOD PRESSURE: 70 MMHG | HEIGHT: 72 IN | SYSTOLIC BLOOD PRESSURE: 140 MMHG

## 2025-05-02 DIAGNOSIS — M54.41 ACUTE BILATERAL LOW BACK PAIN WITH BILATERAL SCIATICA: Primary | ICD-10-CM

## 2025-05-02 DIAGNOSIS — M54.42 ACUTE BILATERAL LOW BACK PAIN WITH BILATERAL SCIATICA: Primary | ICD-10-CM

## 2025-05-02 DIAGNOSIS — M21.372 FOOT DROP, LEFT: ICD-10-CM

## 2025-05-02 DIAGNOSIS — R26.89 IMBALANCE: ICD-10-CM

## 2025-05-02 DIAGNOSIS — R25.3 TONGUE FASCICULATION: ICD-10-CM

## 2025-05-02 DIAGNOSIS — M62.59 ATROPHY OF MUSCLE OF MULTIPLE SITES: ICD-10-CM

## 2025-05-02 NOTE — Clinical Note
Just FYI- I am going to have him direct admitted Monday. He refused today. I did s/w Dr. England too. I made OP referral to neurology to get the ball rolling there if needed.

## 2025-05-05 ENCOUNTER — HOSPITAL ENCOUNTER (INPATIENT)
Facility: HOSPITAL | Age: 70
LOS: 15 days | Discharge: HOME | End: 2025-05-20
Attending: STUDENT IN AN ORGANIZED HEALTH CARE EDUCATION/TRAINING PROGRAM | Admitting: STUDENT IN AN ORGANIZED HEALTH CARE EDUCATION/TRAINING PROGRAM
Payer: MEDICARE

## 2025-05-05 DIAGNOSIS — K74.60 HEPATIC CIRRHOSIS, UNSPECIFIED HEPATIC CIRRHOSIS TYPE, UNSPECIFIED WHETHER ASCITES PRESENT: ICD-10-CM

## 2025-05-05 DIAGNOSIS — M51.05 HERNIATION OF THORACOLUMBAR INTERVERTEBRAL DISC WITH MYELOPATHY: ICD-10-CM

## 2025-05-05 DIAGNOSIS — M54.41 ACUTE BILATERAL LOW BACK PAIN WITH BILATERAL SCIATICA: ICD-10-CM

## 2025-05-05 DIAGNOSIS — M62.59 ATROPHY OF MUSCLE OF MULTIPLE SITES: ICD-10-CM

## 2025-05-05 DIAGNOSIS — M51.16 HERNIATION OF LUMBAR INTERVERTEBRAL DISC WITH RADICULOPATHY: ICD-10-CM

## 2025-05-05 DIAGNOSIS — R25.3 TONGUE FASCICULATION: ICD-10-CM

## 2025-05-05 DIAGNOSIS — M54.42 ACUTE BILATERAL LOW BACK PAIN WITH BILATERAL SCIATICA: ICD-10-CM

## 2025-05-05 DIAGNOSIS — R29.898 WEAKNESS OF LEFT LOWER EXTREMITY: Primary | ICD-10-CM

## 2025-05-05 LAB
ALBUMIN SERPL-MCNC: 4.1 G/DL (ref 3.5–5.2)
ALBUMIN/GLOB SERPL: 1.5 G/DL
ALP SERPL-CCNC: 81 U/L (ref 39–117)
ALT SERPL W P-5'-P-CCNC: 31 U/L (ref 1–41)
ANION GAP SERPL CALCULATED.3IONS-SCNC: 11.6 MMOL/L (ref 5–15)
AST SERPL-CCNC: 61 U/L (ref 1–40)
BASOPHILS # BLD AUTO: 0.02 10*3/MM3 (ref 0–0.2)
BASOPHILS NFR BLD AUTO: 0.5 % (ref 0–1.5)
BILIRUB SERPL-MCNC: 0.7 MG/DL (ref 0–1.2)
BUN SERPL-MCNC: 15 MG/DL (ref 8–23)
BUN/CREAT SERPL: 12.7 (ref 7–25)
CALCIUM SPEC-SCNC: 10.3 MG/DL (ref 8.6–10.5)
CHLORIDE SERPL-SCNC: 105 MMOL/L (ref 98–107)
CK SERPL-CCNC: 110 U/L (ref 20–200)
CO2 SERPL-SCNC: 22.4 MMOL/L (ref 22–29)
CREAT SERPL-MCNC: 1.18 MG/DL (ref 0.76–1.27)
CRP SERPL-MCNC: <0.3 MG/DL (ref 0–0.5)
DEPRECATED RDW RBC AUTO: 48.2 FL (ref 37–54)
EGFRCR SERPLBLD CKD-EPI 2021: 67.6 ML/MIN/1.73
EOSINOPHIL # BLD AUTO: 0.13 10*3/MM3 (ref 0–0.4)
EOSINOPHIL NFR BLD AUTO: 3.2 % (ref 0.3–6.2)
ERYTHROCYTE [DISTWIDTH] IN BLOOD BY AUTOMATED COUNT: 13.1 % (ref 12.3–15.4)
FOLATE SERPL-MCNC: >20 NG/ML (ref 4.78–24.2)
GLOBULIN UR ELPH-MCNC: 2.7 GM/DL
GLUCOSE SERPL-MCNC: 85 MG/DL (ref 65–99)
HCT VFR BLD AUTO: 37.9 % (ref 37.5–51)
HGB BLD-MCNC: 12.6 G/DL (ref 13–17.7)
HIV 1+2 AB+HIV1 P24 AG SERPL QL IA: NORMAL
IMM GRANULOCYTES # BLD AUTO: 0 10*3/MM3 (ref 0–0.05)
IMM GRANULOCYTES NFR BLD AUTO: 0 % (ref 0–0.5)
LYMPHOCYTES # BLD AUTO: 1.04 10*3/MM3 (ref 0.7–3.1)
LYMPHOCYTES NFR BLD AUTO: 25.8 % (ref 19.6–45.3)
MAGNESIUM SERPL-MCNC: 1.2 MG/DL (ref 1.6–2.4)
MCH RBC QN AUTO: 34.1 PG (ref 26.6–33)
MCHC RBC AUTO-ENTMCNC: 33.2 G/DL (ref 31.5–35.7)
MCV RBC AUTO: 102.4 FL (ref 79–97)
MONOCYTES # BLD AUTO: 0.44 10*3/MM3 (ref 0.1–0.9)
MONOCYTES NFR BLD AUTO: 10.9 % (ref 5–12)
NEUTROPHILS NFR BLD AUTO: 2.4 10*3/MM3 (ref 1.7–7)
NEUTROPHILS NFR BLD AUTO: 59.6 % (ref 42.7–76)
NRBC BLD AUTO-RTO: 0 /100 WBC (ref 0–0.2)
PLATELET # BLD AUTO: 97 10*3/MM3 (ref 140–450)
PMV BLD AUTO: 10.3 FL (ref 6–12)
POTASSIUM SERPL-SCNC: 4.5 MMOL/L (ref 3.5–5.2)
PROCALCITONIN SERPL-MCNC: 0.16 NG/ML (ref 0–0.25)
PROT SERPL-MCNC: 6.8 G/DL (ref 6–8.5)
RBC # BLD AUTO: 3.7 10*6/MM3 (ref 4.14–5.8)
RPR SER QL: NORMAL
SODIUM SERPL-SCNC: 139 MMOL/L (ref 136–145)
TSH SERPL DL<=0.05 MIU/L-ACNC: 0.66 UIU/ML (ref 0.27–4.2)
VIT B12 BLD-MCNC: 1934 PG/ML (ref 211–946)
WBC NRBC COR # BLD AUTO: 4.03 10*3/MM3 (ref 3.4–10.8)

## 2025-05-05 PROCEDURE — 83735 ASSAY OF MAGNESIUM: CPT | Performed by: INTERNAL MEDICINE

## 2025-05-05 PROCEDURE — 86140 C-REACTIVE PROTEIN: CPT | Performed by: INTERNAL MEDICINE

## 2025-05-05 PROCEDURE — G0432 EIA HIV-1/HIV-2 SCREEN: HCPCS | Performed by: STUDENT IN AN ORGANIZED HEALTH CARE EDUCATION/TRAINING PROGRAM

## 2025-05-05 PROCEDURE — 86592 SYPHILIS TEST NON-TREP QUAL: CPT | Performed by: STUDENT IN AN ORGANIZED HEALTH CARE EDUCATION/TRAINING PROGRAM

## 2025-05-05 PROCEDURE — 84145 PROCALCITONIN (PCT): CPT | Performed by: INTERNAL MEDICINE

## 2025-05-05 PROCEDURE — 99221 1ST HOSP IP/OBS SF/LOW 40: CPT | Performed by: NURSE PRACTITIONER

## 2025-05-05 PROCEDURE — 99222 1ST HOSP IP/OBS MODERATE 55: CPT | Performed by: STUDENT IN AN ORGANIZED HEALTH CARE EDUCATION/TRAINING PROGRAM

## 2025-05-05 PROCEDURE — 82550 ASSAY OF CK (CPK): CPT | Performed by: INTERNAL MEDICINE

## 2025-05-05 PROCEDURE — 85025 COMPLETE CBC W/AUTO DIFF WBC: CPT | Performed by: INTERNAL MEDICINE

## 2025-05-05 PROCEDURE — 82746 ASSAY OF FOLIC ACID SERUM: CPT | Performed by: STUDENT IN AN ORGANIZED HEALTH CARE EDUCATION/TRAINING PROGRAM

## 2025-05-05 PROCEDURE — 84443 ASSAY THYROID STIM HORMONE: CPT | Performed by: INTERNAL MEDICINE

## 2025-05-05 PROCEDURE — 82607 VITAMIN B-12: CPT | Performed by: STUDENT IN AN ORGANIZED HEALTH CARE EDUCATION/TRAINING PROGRAM

## 2025-05-05 PROCEDURE — 80053 COMPREHEN METABOLIC PANEL: CPT | Performed by: INTERNAL MEDICINE

## 2025-05-05 RX ORDER — PANTOPRAZOLE SODIUM 40 MG/1
40 TABLET, DELAYED RELEASE ORAL 2 TIMES DAILY
Status: DISCONTINUED | OUTPATIENT
Start: 2025-05-05 | End: 2025-05-20 | Stop reason: HOSPADM

## 2025-05-05 RX ORDER — ACETAMINOPHEN 325 MG/1
650 TABLET ORAL EVERY 4 HOURS PRN
Status: DISCONTINUED | OUTPATIENT
Start: 2025-05-05 | End: 2025-05-09 | Stop reason: SDUPTHER

## 2025-05-05 RX ORDER — SODIUM CHLORIDE 0.9 % (FLUSH) 0.9 %
10 SYRINGE (ML) INJECTION AS NEEDED
Status: DISCONTINUED | OUTPATIENT
Start: 2025-05-05 | End: 2025-05-12

## 2025-05-05 RX ORDER — SODIUM CHLORIDE 0.9 % (FLUSH) 0.9 %
10 SYRINGE (ML) INJECTION EVERY 12 HOURS SCHEDULED
Status: DISCONTINUED | OUTPATIENT
Start: 2025-05-05 | End: 2025-05-12

## 2025-05-05 RX ORDER — NITROGLYCERIN 0.4 MG/1
0.4 TABLET SUBLINGUAL
Status: DISCONTINUED | OUTPATIENT
Start: 2025-05-05 | End: 2025-05-20 | Stop reason: HOSPADM

## 2025-05-05 RX ORDER — ONDANSETRON 4 MG/1
4 TABLET, ORALLY DISINTEGRATING ORAL EVERY 6 HOURS PRN
Status: DISCONTINUED | OUTPATIENT
Start: 2025-05-05 | End: 2025-05-20 | Stop reason: HOSPADM

## 2025-05-05 RX ORDER — GABAPENTIN 300 MG/1
300 CAPSULE ORAL 2 TIMES DAILY
Status: DISCONTINUED | OUTPATIENT
Start: 2025-05-05 | End: 2025-05-06

## 2025-05-05 RX ORDER — MULTIVITAMIN WITH IRON
1000 TABLET ORAL DAILY
Status: DISCONTINUED | OUTPATIENT
Start: 2025-05-05 | End: 2025-05-20 | Stop reason: HOSPADM

## 2025-05-05 RX ORDER — ROSUVASTATIN CALCIUM 20 MG/1
10 TABLET, COATED ORAL DAILY
Status: DISCONTINUED | OUTPATIENT
Start: 2025-05-05 | End: 2025-05-20 | Stop reason: HOSPADM

## 2025-05-05 RX ORDER — ALLOPURINOL 300 MG/1
300 TABLET ORAL DAILY
Status: DISCONTINUED | OUTPATIENT
Start: 2025-05-05 | End: 2025-05-20 | Stop reason: HOSPADM

## 2025-05-05 RX ORDER — SODIUM CHLORIDE 9 MG/ML
40 INJECTION, SOLUTION INTRAVENOUS AS NEEDED
Status: DISCONTINUED | OUTPATIENT
Start: 2025-05-05 | End: 2025-05-12

## 2025-05-05 RX ORDER — FERROUS SULFATE 325(65) MG
325 TABLET ORAL
Status: DISCONTINUED | OUTPATIENT
Start: 2025-05-06 | End: 2025-05-20 | Stop reason: HOSPADM

## 2025-05-05 RX ORDER — METOPROLOL SUCCINATE 25 MG/1
25 TABLET, EXTENDED RELEASE ORAL DAILY
Status: DISCONTINUED | OUTPATIENT
Start: 2025-05-05 | End: 2025-05-20 | Stop reason: HOSPADM

## 2025-05-05 RX ORDER — ONDANSETRON 2 MG/ML
4 INJECTION INTRAMUSCULAR; INTRAVENOUS EVERY 6 HOURS PRN
Status: DISCONTINUED | OUTPATIENT
Start: 2025-05-05 | End: 2025-05-20 | Stop reason: HOSPADM

## 2025-05-05 RX ORDER — ACETAMINOPHEN 160 MG/5ML
650 SOLUTION ORAL EVERY 4 HOURS PRN
Status: DISCONTINUED | OUTPATIENT
Start: 2025-05-05 | End: 2025-05-09 | Stop reason: SDUPTHER

## 2025-05-05 RX ORDER — LISINOPRIL 10 MG/1
5 TABLET ORAL DAILY
Status: DISCONTINUED | OUTPATIENT
Start: 2025-05-05 | End: 2025-05-13

## 2025-05-05 RX ORDER — FOLIC ACID 1 MG/1
1000 TABLET ORAL DAILY
Status: DISCONTINUED | OUTPATIENT
Start: 2025-05-05 | End: 2025-05-20 | Stop reason: HOSPADM

## 2025-05-05 RX ORDER — ACETAMINOPHEN 650 MG/1
650 SUPPOSITORY RECTAL EVERY 4 HOURS PRN
Status: DISCONTINUED | OUTPATIENT
Start: 2025-05-05 | End: 2025-05-09 | Stop reason: SDUPTHER

## 2025-05-05 RX ORDER — GABAPENTIN 300 MG/1
300 CAPSULE ORAL 2 TIMES DAILY PRN
Status: DISCONTINUED | OUTPATIENT
Start: 2025-05-05 | End: 2025-05-07

## 2025-05-05 RX ADMIN — GABAPENTIN 300 MG: 300 CAPSULE ORAL at 21:19

## 2025-05-05 RX ADMIN — ROSUVASTATIN CALCIUM 10 MG: 20 TABLET, FILM COATED ORAL at 21:25

## 2025-05-05 RX ADMIN — PANTOPRAZOLE SODIUM 40 MG: 40 TABLET, DELAYED RELEASE ORAL at 21:20

## 2025-05-05 RX ADMIN — Medication 10 ML: at 21:25

## 2025-05-05 NOTE — CONSULTS
Neurology Consult Note    Consult Date: 5/5/2025    Referring MD: Phuong Villavicencio MD    Reason for Consult I have been asked to see the patient in neurological consultation to render advice and opinion regarding left leg weakness.    Mo Willoughby is a 69 y.o. right-handed white male with known diagnosis of essential hypertension, arthritis, lumbar spinal degenerative disc disease, hyperlipidemia presented to the hospital from neurosurgery office with concern for progressive weakness and falls.  Patient stated that about a month ago he was carrying a ladder then suddenly developed left leg weakness, he stated then he developed shooting pain down to his legs more on the left than the right.  With further history he stated that he was having upper extremity weakness more on his hands for over a year and gradually gotten worse to the point that he had difficulty with fine motor skills such as buttons and tying his shoes.  He stated that drinks 3-4 drinks daily since he was 23 years old.  He denied difficulty chewing or swallowing, denied double vision or facial weakness, denied family history of similar disease, denied urinary or fecal incontinence, he still able to feel his private area when he goes to the toilet.  On exam he had incomplete foot drop on the left, decreased light touch and vibration on the left leg up to the knee compared to the right, reflexes 3+ throughout, negative Vicente sign, negative Lhermitte sign, no clonus.  I appreciated muscle wasting on his hands and proximal lower extremities in a pattern concerning for muscular dystrophy.  I did not appreciate tongue or muscle fasciculation on my exam.    Past Medical History:   Diagnosis Date    Abnormal TSH     Arthritis     Ascending aortic aneurysm     Colon polyp 5/5/22    CRI (chronic renal insufficiency), stage 3 (moderate) 2016    from stage 3A to 4    DDD (degenerative disc disease), lumbar     ED (erectile dysfunction)     Erectile  dysfunction     Essential hypertension, benign     Folic acid deficiency     Gout     Hip pain, right     Hx of colonic polyp     Hypercalcemia 2015    as far back as data goes so likely pre-dates even this time    Hyperparathyroidism, unspecified 2016    as far back as data goes likely pre-dates even this date, likely multifactorial some primary and some secondary , w/ imaging from 10/16 showing possible L inferior adenoma    Iron deficiency anemia     Low back pain with bilateral sciatica     Mixed hyperlipidemia 02/27/2023    New onset atrial fibrillation 04/15/2024    Pancreatitis     Risk factors for obstructive sleep apnea     Spinal stenosis of lumbar region with neurogenic claudication     Syncope and collapse 03/28/2017       Exam  There were no vitals taken for this visit.  Gen: NAD, muscle wasting appreciated on his hands and proximal thighs, vitals reviewed  MS: oriented x3, recent/remote memory intact, normal attention/concentration, language intact, no neglect.  CN: visual acuity grossly normal, PERRL, EOMI, no facial droop, no dysarthria  Motor: 5-/5 on distal upper extremities, 5/5 on proximal upper extremities, 4+/5 proximal lower extremities, 5/5 distal right lower extremity, 3+/5 left foot dorsiflexion, 4 -/5 left foot plantarflexion, normal tone  Sensory exam: Decreased to light touch, temperature and vibration on the left lower extremity up to the knee normal upper extremities and the right leg.  Reflexes: 3+ throughout, plantars mute, negative Vicente sign, no clonus, negative Lhermitte sign      DATA:    Lab Results   Component Value Date    GLUCOSE 90 04/15/2024    CALCIUM 10.7 (H) 04/15/2024     (H) 04/15/2024    K 4.9 04/15/2024    CO2 20 04/15/2024     (H) 04/15/2024    BUN 13 04/15/2024    CREATININE 1.09 04/15/2024    EGFRIFAFRI 94 09/26/2016    EGFRIFNONA 65 02/07/2022    BCR 12 04/15/2024    ANIONGAP 9.3 06/29/2023     Lab Results   Component Value Date    WBC 5.2  "04/15/2024    HGB 13.6 04/15/2024    HCT 39.5 04/15/2024     (H) 04/15/2024     (L) 04/15/2024       Lab review: Above labs reviewed    Imaging review: No recent brain or spine images to review    Diagnoses:  -Generalized weakness with muscle wasting on hands and thighs etiology unclear could be muscular dystrophy versus vitamin deficiency versus myelopathy versus ALS versus alcohol related versus others.      PLAN:   - MRI brain, C, T, L-spine w wo ordered  - Check TSH, B12, folate, B6, thiamine, methylmalonic acid, CK, ESR, CRP, cryoglobulin, rheumatoid factor, HIV, RPR, copper, SPEP  - CT chest abdomen pelvis to rule out hidden malignancy  - Depending on the above labs and images, we might consider EMG/nerve conduction study sooner than later    Neurology will continue to follow and advice, thank you for the consult.    Medical Decision Making for this neurology consultation consists of the following:  Review of previous chart, including H/P, provider and nursing notes as applicable.  Review of medications and vital signs.  Review of previous labs, neuroimaging, and additional relevant diagnostics.   Interpretation of laboratory, imaging, and other diagnostic results  Discussion with other providers and family   Total face-to-face/floor time: 30-61 minutes.     \"Dictated utilizing Dragon dictation\".      "

## 2025-05-05 NOTE — H&P
Internal medicine history and physical  INTERNAL MEDICINE   [facility]       Patient Identification:  Name: Mo Willoughby  Age: 69 y.o.  Sex: male  :  1955  MRN: [id number]                   Primary Care Physician: Jenn Davison APRN                               Date of admission:2025    Chief Complaint: Recommended to be admitted directly by neurosurgery service from home for further evaluation of episodic activity related back pain, lower extremity weakness and left arm atrophy and tongue fasciculation.    History of Present Illness:   Patient is a 69-year-old male who has past medical history remarkable for hypertension, chronic kidney insufficiency, history of ascending aortic aneurysm aneurysm, gout, questionable history of atrial fibrillation for which she was on anticoagulation therapy but after review of his rhythm it was thought to be supraventricular ectopy and his anticoagulation therapy was discontinued in 2024, history of alcohol and drug use has been dealing with low back pain and right hip pain after attempting to lift up a ladder at work.  Patient was evaluated and managed by his primary care provider with Dosepak and muscle relaxant but continued to have progressive activity related discomfort in his back and progressive weakness.  Patient has had prior back surgery in  when he had L3 5 decompression.  Because of his progressive right hip and low back pain and weakness patient was seen by neurosurgery service on 2025 and was recommended to be hospitalized for further workup with neurology and neurosurgery service including full neuraxis MRI possibly lumbar puncture because of the concern for progressive primary degenerative neurological disease given the fact that he was noted to have muscular atrophy of his left hand and tongue fasciculation was noted.  Patient did not have any incontinence issues and though he is afraid that he will fall and unable  to balance himself.  Patient declined admission on 5/2/2025 because of the degree we can and agreed to come to the hospital today.  A service was contacted and patient admission was forwarded to me as a direct admission.  Patient denies any fever chills nausea vomiting diarrhea burning in urination frequency urgency and feels otherwise fine unless he moves and his back hurts.  When he is resting his back is fine.      Past Medical History:  Past Medical History:   Diagnosis Date    Abnormal TSH     Arthritis     Ascending aortic aneurysm     Colon polyp 5/5/22    CRI (chronic renal insufficiency), stage 3 (moderate) 2016    from stage 3A to 4    DDD (degenerative disc disease), lumbar     ED (erectile dysfunction)     Erectile dysfunction     Essential hypertension, benign     Folic acid deficiency     Gout     Hip pain, right     Hx of colonic polyp     Hypercalcemia 2015    as far back as data goes so likely pre-dates even this time    Hyperparathyroidism, unspecified 2016    as far back as data goes likely pre-dates even this date, likely multifactorial some primary and some secondary , w/ imaging from 10/16 showing possible L inferior adenoma    Iron deficiency anemia     Low back pain with bilateral sciatica     Mixed hyperlipidemia 02/27/2023    New onset atrial fibrillation 04/15/2024    Pancreatitis     Risk factors for obstructive sleep apnea     Spinal stenosis of lumbar region with neurogenic claudication     Syncope and collapse 03/28/2017     Past Surgical History:  Past Surgical History:   Procedure Laterality Date    CHOLECYSTECTOMY      CHOLECYSTECTOMY WITH INTRAOPERATIVE CHOLANGIOGRAM N/A 08/03/2018    Procedure: CHOLECYSTECTOMY LAPAROSCOPIC INTRAOPERATIVE CHOLANGIOGRAM;  Surgeon: Melina Kate MD;  Location: [facility];  Service: General    COLONOSCOPY      COLONOSCOPY N/A 10/03/2016    Procedure: COLONOSCOPY TO CECUM TO TERMINAL ILIUM WITH COLD BIOPSY POLYPECTOMY;  Surgeon: Benoit MONROE  MD Deng;  Location: [facility] ENDOSCOPY;  Service:     COLONOSCOPY N/A 05/05/2022    Procedure: COLONOSCOPY TO CECUM WITH COLD SNARE POLYPECTOMIES & RESOLUTION CLIP PLACEMENT X 2;  Surgeon: Benoit Mosley MD;  Location: [facility] ENDOSCOPY;  Service: Gastroenterology;  Laterality: N/A;  ANEMIA/POLYPS, HEMORRHOIDS     ENDOSCOPY N/A 05/05/2022    Procedure: ESOPHAGOGASTRODUODENOSCOPY WITH BX;  Surgeon: Benoit Mosley MD;  Location: [facility] ENDOSCOPY;  Service: Gastroenterology;  Laterality: N/A;  ANEMIA/PORTAL GASTROPATHY, EROSIVE GASTRITIS     EPIDURAL Right 12/07/2022    Procedure: LUMBAR/SACRAL TRANSFORAMINAL EPIDURAL Right L2-3 and right L4-5;  Surgeon: Isaura Torres MD;  Location: [facility];  Service: Pain Management;  Laterality: Right;    LIPOMA EXCISION      LUMBAR LAMINECTOMY DISCECTOMY DECOMPRESSION N/A 7/7/2023    Procedure: Open right sided lumbar decompression lumbar three/four, lumbar four/five;  Surgeon: Abel Perez MD;  Location: [facility] MAIN OR;  Service: Neurosurgery;  Laterality: N/A;    MEDIAL BRANCH BLOCK Right 01/23/2023    Procedure: LUMBAR MEDIAL BRANCH BLOCK RIGHT L3-L5 #1;  Surgeon: Isaura Torres MD;  Location: [facility];  Service: Pain Management;  Laterality: Right;    MEDIAL BRANCH BLOCK Right 02/13/2023    Procedure: LUMBAR MEDIAL BRANCH BLOCK RIGHT L3-L5 #1;  Surgeon: Isaura Torres MD;  Location: [facility];  Service: Pain Management;  Laterality: Right;    NERVE SURGERY Right 3/6/2023    Procedure: LUMBAR RADIOFREQUENCY ABLATION RIGHT L3-L5  72345, 34750;  Surgeon: Isaura Torres MD;  Location: [facility];  Service: Pain Management;  Laterality: Right;    SACROILIAC JOINT INJECTION Right 5/5/2023    Procedure: SACROILIAC JOINT INJECTION RIGHT 67625;  Surgeon: Isaura Torres MD;  Location: [facility];  Service: Pain Management;  Laterality: Right;    TONSILLECTOMY        Home Meds:  Medications Prior to Admission   Medication Sig Dispense  Refill Last Dose/Taking    allopurinol (ZYLOPRIM) 300 MG tablet Take 1 tablet by mouth Daily. 90 tablet 2     ferrous sulfate 325 (65 FE) MG tablet Take 1 tablet by mouth Daily With Breakfast.       folic acid (FOLVITE) 1 MG tablet TAKE 1 TABLET BY MOUTH DAILY 90 tablet 1     gabapentin (NEURONTIN) 300 MG capsule Take 1 capsule by mouth 2 (Two) Times a Day As Needed (neuropathy). 60 capsule 0     lisinopril (PRINIVIL,ZESTRIL) 10 MG tablet Take 1 tablet by mouth Daily. 90 tablet 2     lisinopril (PRINIVIL,ZESTRIL) 5 MG tablet Take 1 tablet by mouth Daily. 90 tablet 2     metoprolol succinate XL (TOPROL-XL) 25 MG 24 hr tablet TAKE 1 TABLET BY MOUTH DAILY 90 tablet 1     pantoprazole (PROTONIX) 40 MG EC tablet Take 1 tablet by mouth 2 (Two) Times a Day. 180 tablet 1     rosuvastatin (CRESTOR) 10 MG tablet TAKE 1 TABLET BY MOUTH DAILY 90 tablet 1     tadalafil (CIALIS) 5 MG tablet Take 1 tablet by mouth Daily.       thiamine (VITAMIN B-1) 100 MG tablet  tablet Take 1 tablet by mouth Daily.       vitamin B-12 (CYANOCOBALAMIN) 1000 MCG tablet Take 1 tablet by mouth Daily. HOLD FOR SURGERY ONE WEEK PRIOR        Current Meds:   No current facility-administered medications for this encounter.  Allergies:  Allergies   Allergen Reactions    Zetia [Ezetimibe] Dizziness     Nausea, fatgue     Social History:   Social History     Tobacco Use    Smoking status: Never    Smokeless tobacco: Never   Substance Use Topics    Alcohol use: Yes     Comment: occasional      Family History:  Family History   Problem Relation Age of Onset    Hypertension Mother     Breast cancer Mother     Cancer Mother     Stroke Mother     Cancer Father     Liver cancer Father     Hypertension Father     Diabetes Father     Hypertension Sister     Heart attack Sister     Hypertension Brother     Cancer Brother     Hypertension Sister     Heart attack Sister     Learning disabilities Neg Hx           Review of Systems  See history of present illness and  past medical history.  Patient denies headache, dizziness, syncope, falls, trauma, change in vision, change in hearing, change in taste, changes in weight, changes in appetite, focal weakness, numbness, or paresthesia.  Patient denies chest pain, palpitations, dyspnea, orthopnea, PND, cough, sinus pressure, rhinorrhea, epistaxis, hemoptysis, nausea, vomiting,hematemesis, diarrhea, constipation or hematchezia.  Denies cold or heat intolerance, polydipsia, polyuria, polyphagia. Denies hematuria, pyuria, dysuria, hesitancy, frequency or urgency. Denies consumption of raw and under cooked meats foods or change in water source.  Denies fever, chills, sweats, night sweats.  Denies missing any routine medications. Remainder of ROS is negative.      Vitals:   There were no vitals taken for this visit.  I/O: No intake or output data in the 24 hours ending 05/05/25 1354  Exam:  Patient is examined using the personal protective equipment as per guidelines from infection control for this particular patient as enacted.  Hand washing was performed before and after patient interaction.  General Appearance:    Alert, cooperative, no distress, appears stated age   Head:    Normocephalic, without obvious abnormality, atraumatic   Eyes:    PERRL, conjunctiva/corneas clear, EOM's intact, both eyes   Ears:    Normal external ear canals, both ears   Nose:   Nares normal, septum midline, mucosa normal, no drainage    or sinus tenderness   Throat:   Lips, tongue, gums normal; oral mucosa pink and moist   Neck:   Supple   Back:     Symmetric, no curvature, ROM normal, no CVA tenderness   Lungs:     Clear to auscultation bilaterally, respirations unlabored   Chest Wall:    No tenderness or deformity    Heart:  S1-S2 regular   Abdomen:   Soft nontender   Extremities: Atrophy of the small muscles of the left hand noted   Pulses:   Pulses palpable in all extremities; symmetric all extremities   Skin:   Skin color normal, Skin is warm and dry,   no rashes or palpable lesions   Neurologic: Gait not tested cranial nerves within normal limits no focal deficits noted no obvious tongue fasciculations noted  strength in both upper extremities are intact.       Data Review:    Labs ordered  Assessment:  Active Hospital Problems    Diagnosis  POA    Acute bilateral low back pain with bilateral sciatica [M54.42, M54.41]  Yes    Tongue fasciculation [R25.3]  Yes    Foot drop, left [M21.372]  Yes    Atrophy of muscle of multiple sites [M62.59]  Yes    Imbalance [R26.89]  Yes    DDD (degenerative disc disease), lumbar [M51.369]  Yes    Essential hypertension, benign [I10]  Yes    CRI (chronic renal insufficiency), stage 3 (moderate) [N18.30]  Yes     from stage 3A to 4         Plan: As per neurosurgery recommendation    Check basic set of labs,   neurology consultation  Neurosurgery consultation  MRI of the cervical thoracic and lumbar spine  MRI of the brain  Continue his home regimen for his hypertension back pain and gout and dyslipidemia and continue with vitamin replacement.  Patient admits that he does not drink alcohol anymore on a regular basis but plan is to monitor and continue with thiamine.      Ochoa Hart MD   5/5/2025  13:54 EDT    Parts of this note may be an electronic transcription/translation of spoken language to printed text using the Dragon dictation system.

## 2025-05-05 NOTE — CONSULTS
NEUROSURGERY INPATIENT CONSULT:  Seen in office 5/2 with documentation below.     Cc:complaints of low back and right hip pain.  He was seen by his PCP 4/2/2025.  He had x-rays and first prescribed a Medrol Dosepak as well as Flexeril.  Hip x-ray and  XR SPINE LUMBAR 4-3-25@[facility].  Status post open right L3-5 decompression July 2023 with Dr. Perez.  Last seen in office November 13, 2023.     History of Present Illness     Patient reports been doing well until rather abruptly about 1 month ago after moving a ladder to his truck, he had onset of R hip pain. Pain has progressed to aman low back and down bilateral legs with standing, walking. He feels weak in his legs. He is requiring a walker to mobilize safely. Some numbness. No incontinence or saddle paresthesia. He has not been to any therapy at this time. He denies benefit from MDP, muscle relaxer. Gabapentin may be helping some.  He does admit to some progressive weakness in his left upper extremity over the last several months to year.  He denies swallow difficulty or weight loss although his wife feels that he has thinned out.     Non-smoker. History of atrial fibrillation, but no use of anticoagulants.  He is on gabapentin 300 mg p.o. twice daily.  No cancer history but has history of hypercalcemia from hyperparathyroidism.     The following portions of the patient's history were reviewed and updated as appropriate: allergies, current medications, past family history, past medical history, past social history, past surgical history, and problem list.    Past History:  Medical History: has a past medical history of Abnormal TSH, Arthritis, Ascending aortic aneurysm, Colon polyp (5/5/22), CRI (chronic renal insufficiency), stage 3 (moderate) (2016), DDD (degenerative disc disease), lumbar, ED (erectile dysfunction), Erectile dysfunction, Essential hypertension, benign, Folic acid deficiency, Gout, Hip pain, right, colonic polyp, Hypercalcemia (2015),  Hyperparathyroidism, unspecified (2016), Iron deficiency anemia, Low back pain with bilateral sciatica, Mixed hyperlipidemia (02/27/2023), New onset atrial fibrillation (04/15/2024), Pancreatitis, Risk factors for obstructive sleep apnea, Spinal stenosis of lumbar region with neurogenic claudication, and Syncope and collapse (03/28/2017).   Surgical History: has a past surgical history that includes Colonoscopy; Tonsillectomy; Colonoscopy (N/A, 10/03/2016); cholecystectomy with intraoperative cholangiogram (N/A, 08/03/2018); Lipoma Excision; Colonoscopy (N/A, 05/05/2022); Esophagogastroduodenoscopy (N/A, 05/05/2022); Epidural (Right, 12/07/2022); Medial Branch Block (Right, 01/23/2023); Medial Branch Block (Right, 02/13/2023); Cholecystectomy; Nerve Surgery (Right, 3/6/2023); Sacroiliac joint injection (Right, 5/5/2023); and lumbar laminectomy discectomy decompression (N/A, 7/7/2023).   Family History: family history includes Breast cancer in his mother; Cancer in his brother, father, and mother; Diabetes in his father; Heart attack in his sister and sister; Hypertension in his brother, father, mother, sister, and sister; Liver cancer in his father; Stroke in his mother.   Social History: reports that he has never smoked. He has never used smokeless tobacco. He reports current alcohol use. He reports that he does not use drugs.    Medications Prior to Admission   Medication Sig Dispense Refill Last Dose/Taking    allopurinol (ZYLOPRIM) 300 MG tablet Take 1 tablet by mouth Daily. 90 tablet 2 5/5/2025    ferrous sulfate 325 (65 FE) MG tablet Take 1 tablet by mouth Daily With Breakfast.   5/5/2025    folic acid (FOLVITE) 1 MG tablet TAKE 1 TABLET BY MOUTH DAILY 90 tablet 1 5/5/2025    gabapentin (NEURONTIN) 300 MG capsule Take 1 capsule by mouth 2 (Two) Times a Day As Needed (neuropathy). 60 capsule 0 5/5/2025    lisinopril (PRINIVIL,ZESTRIL) 10 MG tablet Take 1 tablet by mouth Daily. 90 tablet 2 5/5/2025    lisinopril  (PRINIVIL,ZESTRIL) 5 MG tablet Take 1 tablet by mouth Daily. 90 tablet 2 5/5/2025    pantoprazole (PROTONIX) 40 MG EC tablet Take 1 tablet by mouth 2 (Two) Times a Day. 180 tablet 1 5/5/2025    rosuvastatin (CRESTOR) 10 MG tablet TAKE 1 TABLET BY MOUTH DAILY 90 tablet 1 5/4/2025    tadalafil (CIALIS) 5 MG tablet Take 1 tablet by mouth Daily.   5/5/2025    thiamine (VITAMIN B-1) 100 MG tablet  tablet Take 1 tablet by mouth Daily.   5/5/2025    vitamin B-12 (CYANOCOBALAMIN) 1000 MCG tablet Take 1 tablet by mouth Daily. HOLD FOR SURGERY ONE WEEK PRIOR   5/5/2025      Allergies: Zetia [ezetimibe]       ROS:     Left hand weakness, bilateral lower extremity weakness, numbness, back and leg pain, imbalance, no falls, no swallowing difficulty     Objective  Temp:  [98.2 °F (36.8 °C)] 98.2 °F (36.8 °C)  Heart Rate:  [100] 100  Resp:  [16] 16  BP: (153)/(97) 153/97  5/5:  Temp:  [98.2 °F (36.8 °C)] 98.2 °F (36.8 °C)  Heart Rate:  [100] 100  Resp:  [16] 16  BP: (153)/(97) 153/97  There is no height or weight on file to calculate BMI.       Physical Exam  Vitals reviewed.   Constitutional:       Comments: Chronically ill-appearing male in no acute distress.  Pleasant and cooperative.   Eyes:      General: Lids are normal.      Extraocular Movements: Extraocular movements intact.   Pulmonary:      Effort: Pulmonary effort is normal.   Musculoskeletal:      Cervical back: Normal range of motion and neck supple. No pain with movement. Normal range of motion.      Lumbar back: No bony tenderness. Negative right straight leg raise test and negative left straight leg raise test.      Comments: No L'hermittes   Skin:     General: Skin is warm and dry.      Findings: No bruising.   Neurological:      General: No focal deficit present.      Coordination: Romberg sign positive.      Deep Tendon Reflexes:      Reflex Scores:       Tricep reflexes are 2+ on the right side and 3+ on the left side.       Bicep reflexes are 2+ on the right  side and 3+ on the left side.       Brachioradialis reflexes are 2+ on the right side and 3+ on the left side.       Patellar reflexes are 3+ on the right side and 3+ on the left side.  Psychiatric:         Mood and Affect: Mood normal.         Thought Content: Thought content normal.         Neurological Exam  Mental Status  Awake, alert and oriented to person, place and time.     Cranial Nerves  CN III, IV, VI: Extraocular movements intact bilaterally. Normal lids and orbits bilaterally.  CN VII:  Right: There is no facial weakness.  CN IX, X: Palate elevates symmetrically  CN XII: Tongue has fasciculations.     Motor  Decreased muscle bulk throughout. Muscle wasting left upper extremity particularly the hand, supraclavicular. No fasciculations present. Normal muscle tone. No abnormal involuntary movements. Strength is 5/5 in all four extremities except as noted.  Left TA 4 -/5, left quad 4/5.     Sensory  Light touch is normal in upper and lower extremities.      Reflexes                                            Right                      Left  Brachioradialis                    2+                         3+  Biceps                                 2+                         3+  Triceps                                2+                         3+  Patellar                                3+                         3+     Right pathological reflexes: Vicente's absent. Ankle clonus absent.  Left pathological reflexes: Vicente's absent. Ankle clonus absent.     Gait  Casual gait: Unable to ambulate safely without walker. Wide stance. Heel walking abnormality: Left. Not assessed due to fall risk. Romberg is present.              Assessment & Plan  Independent Review of Radiographic Studies:      I personally reviewed the images from the following studies.     Lumbar x-rays 4/3/25 revealed some left lateral translation of L2 on 3 which is stable to maybe slightly progressed from prior x-ray.  DDD at L1/2, L2/3  appears progressed from prior study in 2022.  There is stable anterolisthesis of L4 on 5.     Medical Decision Making:       Patient presents for evaluation of back and bilateral leg pain.  He states he lifted a ladder into his truck and after that he began to experience right hip pain.  This was an 1 month ago.  He saw his PCP and was prescribed Medrol Dosepak and muscle relaxant which did not offer him any relief.  He was also given gabapentin which he thinks may be helping some with the leg pain.  Unfortunately he has also noticed other symptoms that have progressively worsened including sense of weakness of his legs and fear of falling due to inability to ambulate without assistive device.  He reports imbalance and weakness of his left foot.  On exam in addition to these findings he has some quad weakness on the left and muscle wasting of at least his left hand if not arm and supraclavicular area.  Although his weight has been stable, he looks thin in his upper body.  He seems to have some very mild tongue fasciculations.  His palate raises normal and he denies any swallowing difficulties.  He denies incontinence or sensory changes in his saddle area but he does note some numbness in his legs.  He denies neck pain or upper extremity pain but has noticed some weakness of his hand at least over the last few months.  I am concerned about significant cervical pathology or a progressive neuromuscular disease.  I think he will need an extensive workup with full neuro axis MRIs as well as possibly lumbar puncture if there are no spinal abnormalities seen with the assistance of neurology evaluation.  I implored the patient to let me direct admit him to the hospital today for further evaluation symptoms symptoms have been progressive but he is not willing to consider this today as it is derby we can here in [place] and he wants to spend it at home.  He states he will come to the hospital on Monday for admission and we  will work on that.  He has been advised to not do any driving, exertional activity, lifting, being on elevated surfaces.  He is also advised to come immediately to the emergency room if he has any falls, progressive weakness, experiences new incontinence, new numbness, or any other concerning symptom.  His wife attended the appointment with him today and also attempted to get him to agree to admission today but was unable to.  She verbalizes understanding of the plan and red flag signs as well.  I will go ahead and place outpatient neurology urgent referral just to get the process started.  Dr. Perez is out of office today so I discussed the plan with on-call provider, Dr. England.  He agrees.     Diagnoses and all orders for this visit:     1. Acute bilateral low back pain with bilateral sciatica (Primary)     2. Foot drop, left  -     Ambulatory Referral to Neurology     3. Tongue fasciculation  -     Ambulatory Referral to Neurology     4. Imbalance  -     Ambulatory Referral to Neurology     5. Atrophy of muscle of multiple sites  -     Ambulatory Referral to Neurology    Addendum 5/5:  Patient seen and examined today.  He reports no progressive issues over the weekend.  No incontinence.  No falls.  He has been seen by neurology service who has ordered multiple labs.  No changes to exam above except note right hand tremor with pronation and slower with finger-to-nose testing on the left.  Left triceps 4/5.  Will await full neural axis imaging.  Appreciate A admitting patient for workup and neurology service for evaluating as well.

## 2025-05-05 NOTE — PLAN OF CARE
Goal Outcome Evaluation:            Patient being worked up for back pain, difficulty with walking and balance.  Neurology consulted. Patient known to Neurosurgery service and was a direct admit

## 2025-05-06 ENCOUNTER — APPOINTMENT (OUTPATIENT)
Dept: CT IMAGING | Facility: HOSPITAL | Age: 70
End: 2025-05-06
Payer: MEDICARE

## 2025-05-06 ENCOUNTER — APPOINTMENT (OUTPATIENT)
Dept: MRI IMAGING | Facility: HOSPITAL | Age: 70
End: 2025-05-06
Payer: MEDICARE

## 2025-05-06 PROBLEM — M51.05: Status: ACTIVE | Noted: 2025-05-05

## 2025-05-06 LAB
CHROMATIN AB SERPL-ACNC: 14 IU/ML (ref 0–14)
ERYTHROCYTE [SEDIMENTATION RATE] IN BLOOD: 5 MM/HR (ref 0–20)

## 2025-05-06 PROCEDURE — A9577 INJ MULTIHANCE: HCPCS | Performed by: STUDENT IN AN ORGANIZED HEALTH CARE EDUCATION/TRAINING PROGRAM

## 2025-05-06 PROCEDURE — 85652 RBC SED RATE AUTOMATED: CPT | Performed by: STUDENT IN AN ORGANIZED HEALTH CARE EDUCATION/TRAINING PROGRAM

## 2025-05-06 PROCEDURE — 82595 ASSAY OF CRYOGLOBULIN: CPT | Performed by: STUDENT IN AN ORGANIZED HEALTH CARE EDUCATION/TRAINING PROGRAM

## 2025-05-06 PROCEDURE — 99233 SBSQ HOSP IP/OBS HIGH 50: CPT | Performed by: NURSE PRACTITIONER

## 2025-05-06 PROCEDURE — 83921 ORGANIC ACID SINGLE QUANT: CPT | Performed by: STUDENT IN AN ORGANIZED HEALTH CARE EDUCATION/TRAINING PROGRAM

## 2025-05-06 PROCEDURE — 84425 ASSAY OF VITAMIN B-1: CPT | Performed by: STUDENT IN AN ORGANIZED HEALTH CARE EDUCATION/TRAINING PROGRAM

## 2025-05-06 PROCEDURE — 99222 1ST HOSP IP/OBS MODERATE 55: CPT | Performed by: NURSE PRACTITIONER

## 2025-05-06 PROCEDURE — 72156 MRI NECK SPINE W/O & W/DYE: CPT

## 2025-05-06 PROCEDURE — 84165 PROTEIN E-PHORESIS SERUM: CPT | Performed by: STUDENT IN AN ORGANIZED HEALTH CARE EDUCATION/TRAINING PROGRAM

## 2025-05-06 PROCEDURE — 84207 ASSAY OF VITAMIN B-6: CPT | Performed by: STUDENT IN AN ORGANIZED HEALTH CARE EDUCATION/TRAINING PROGRAM

## 2025-05-06 PROCEDURE — 97535 SELF CARE MNGMENT TRAINING: CPT

## 2025-05-06 PROCEDURE — 99232 SBSQ HOSP IP/OBS MODERATE 35: CPT | Performed by: NURSE PRACTITIONER

## 2025-05-06 PROCEDURE — 72131 CT LUMBAR SPINE W/O DYE: CPT

## 2025-05-06 PROCEDURE — 70553 MRI BRAIN STEM W/O & W/DYE: CPT

## 2025-05-06 PROCEDURE — 25010000002 MAGNESIUM SULFATE 4 GM/100ML SOLUTION: Performed by: STUDENT IN AN ORGANIZED HEALTH CARE EDUCATION/TRAINING PROGRAM

## 2025-05-06 PROCEDURE — 72157 MRI CHEST SPINE W/O & W/DYE: CPT

## 2025-05-06 PROCEDURE — 84155 ASSAY OF PROTEIN SERUM: CPT | Performed by: STUDENT IN AN ORGANIZED HEALTH CARE EDUCATION/TRAINING PROGRAM

## 2025-05-06 PROCEDURE — 86431 RHEUMATOID FACTOR QUANT: CPT | Performed by: STUDENT IN AN ORGANIZED HEALTH CARE EDUCATION/TRAINING PROGRAM

## 2025-05-06 PROCEDURE — 72158 MRI LUMBAR SPINE W/O & W/DYE: CPT

## 2025-05-06 PROCEDURE — 72128 CT CHEST SPINE W/O DYE: CPT

## 2025-05-06 PROCEDURE — 97166 OT EVAL MOD COMPLEX 45 MIN: CPT

## 2025-05-06 PROCEDURE — 25510000002 GADOBENATE DIMEGLUMINE 529 MG/ML SOLUTION: Performed by: STUDENT IN AN ORGANIZED HEALTH CARE EDUCATION/TRAINING PROGRAM

## 2025-05-06 RX ORDER — MAGNESIUM SULFATE HEPTAHYDRATE 40 MG/ML
4 INJECTION, SOLUTION INTRAVENOUS ONCE
Status: COMPLETED | OUTPATIENT
Start: 2025-05-06 | End: 2025-05-06

## 2025-05-06 RX ADMIN — Medication 10 ML: at 21:22

## 2025-05-06 RX ADMIN — MAGNESIUM SULFATE HEPTAHYDRATE 4 G: 40 INJECTION, SOLUTION INTRAVENOUS at 09:33

## 2025-05-06 RX ADMIN — ALLOPURINOL 300 MG: 300 TABLET ORAL at 09:20

## 2025-05-06 RX ADMIN — METOPROLOL SUCCINATE 25 MG: 25 TABLET, EXTENDED RELEASE ORAL at 09:19

## 2025-05-06 RX ADMIN — LISINOPRIL 5 MG: 10 TABLET ORAL at 09:19

## 2025-05-06 RX ADMIN — ROSUVASTATIN CALCIUM 10 MG: 20 TABLET, FILM COATED ORAL at 21:20

## 2025-05-06 RX ADMIN — PANTOPRAZOLE SODIUM 40 MG: 40 TABLET, DELAYED RELEASE ORAL at 09:19

## 2025-05-06 RX ADMIN — PANTOPRAZOLE SODIUM 40 MG: 40 TABLET, DELAYED RELEASE ORAL at 21:20

## 2025-05-06 RX ADMIN — Medication 100 MG: at 09:19

## 2025-05-06 RX ADMIN — GADOBENATE DIMEGLUMINE 20 ML: 529 INJECTION, SOLUTION INTRAVENOUS at 05:26

## 2025-05-06 RX ADMIN — Medication 1000 MCG: at 09:19

## 2025-05-06 RX ADMIN — FOLIC ACID 1000 MCG: 1 TABLET ORAL at 09:19

## 2025-05-06 RX ADMIN — FERROUS SULFATE TAB 325 MG (65 MG ELEMENTAL FE) 325 MG: 325 (65 FE) TAB at 09:19

## 2025-05-06 NOTE — PROGRESS NOTES
DOS: 2025  NAME: Mo Willoughby   : 1955  PCP: Jenn Davison APRN    CC: weakness       Subjective: Pt seen in follow up, however the problem is new to me.  Patient lying in bed.  No new complaints.  Family ember at bedside    Objective:  Vital signs:   Vitals:    25 2327 25 0610 25 0700 25 1300   BP: 138/87 150/96 152/94 128/85   BP Location: Left arm Left arm Left arm Left arm   Patient Position: Lying Lying Lying Lying   Pulse: 90 90  88   Resp: 16 16 16 16   Temp: 98.1 °F (36.7 °C) 98.1 °F (36.7 °C) 98.4 °F (36.9 °C) 98.2 °F (36.8 °C)   TempSrc: Oral Oral Oral Oral   SpO2: 98% 98%     Weight:           Current Facility-Administered Medications:     acetaminophen (TYLENOL) tablet 650 mg, 650 mg, Oral, Q4H PRN **OR** acetaminophen (TYLENOL) 160 MG/5ML oral solution 650 mg, 650 mg, Oral, Q4H PRN **OR** acetaminophen (TYLENOL) suppository 650 mg, 650 mg, Rectal, Q4H PRN, Ochoa Hart MD    allopurinol (ZYLOPRIM) tablet 300 mg, 300 mg, Oral, Daily, Ochoa Hart MD, 300 mg at 25 0920    Calcium Replacement - Follow Nurse / BPA Driven Protocol, , Not Applicable, PRN, Lucy Amezcua DO    ferrous sulfate tablet 325 mg, 325 mg, Oral, Daily With Breakfast, Ochoa Hart MD, 325 mg at 25 0919    folic acid (FOLVITE) tablet 1,000 mcg, 1,000 mcg, Oral, Daily, Ochoa Hart MD, 1,000 mcg at 25 09    gabapentin (NEURONTIN) capsule 300 mg, 300 mg, Oral, BID PRN, Ochoa Hart MD    lisinopril (PRINIVIL,ZESTRIL) tablet 5 mg, 5 mg, Oral, Daily, Ochoa Hart MD, 5 mg at 25 0919    Magnesium Standard Dose Replacement - Follow Nurse / BPA Driven Protocol, , Not Applicable, PRN, Lucy Amezcua,     metoprolol succinate XL (TOPROL-XL) 24 hr tablet 25 mg, 25 mg, Oral, Daily, Ochoa Hart MD, 25 mg at 25 0919    nitroglycerin (NITROSTAT) SL tablet 0.4 mg, 0.4 mg, Sublingual, Q5 Min PRN, Ochoa Hart MD    ondansetron ODT (ZOFRAN-ODT) disintegrating tablet  4 mg, 4 mg, Oral, Q6H PRN **OR** ondansetron (ZOFRAN) injection 4 mg, 4 mg, Intravenous, Q6H PRN, Ochoa Hart MD    pantoprazole (PROTONIX) EC tablet 40 mg, 40 mg, Oral, BID, Ochoa Hart MD, 40 mg at 05/06/25 0919    Phosphorus Replacement - Follow Nurse / BPA Driven Protocol, , Not Applicable, PRN, Lucy Amezcua DO    Potassium Replacement - Follow Nurse / BPA Driven Protocol, , Not Applicable, PRN, Lucy Amezcua,     rosuvastatin (CRESTOR) tablet 10 mg, 10 mg, Oral, Daily, Ochoa Hart MD, 10 mg at 05/05/25 2125    sodium chloride 0.9 % flush 10 mL, 10 mL, Intravenous, Q12H, Ochoa Hart MD, 10 mL at 05/05/25 2125    sodium chloride 0.9 % flush 10 mL, 10 mL, Intravenous, PRN, Ochoa Hart MD    sodium chloride 0.9 % infusion 40 mL, 40 mL, Intravenous, PRN, Ochoa Hart MD    thiamine (VITAMIN B-1) tablet 100 mg, 100 mg, Oral, Daily, Ochoa Hart MD, 100 mg at 05/06/25 0919    vitamin B-12 (CYANOCOBALAMIN) tablet 1,000 mcg, 1,000 mcg, Oral, Daily, Ochoa Hart MD, 1,000 mcg at 05/06/25 0919    PRN meds    acetaminophen **OR** acetaminophen **OR** acetaminophen    Calcium Replacement - Follow Nurse / BPA Driven Protocol    gabapentin    Magnesium Standard Dose Replacement - Follow Nurse / BPA Driven Protocol    nitroglycerin    ondansetron ODT **OR** ondansetron    Phosphorus Replacement - Follow Nurse / BPA Driven Protocol    Potassium Replacement - Follow Nurse / BPA Driven Protocol    sodium chloride    sodium chloride    No current facility-administered medications on file prior to encounter.     Current Outpatient Medications on File Prior to Encounter   Medication Sig    allopurinol (ZYLOPRIM) 300 MG tablet Take 1 tablet by mouth Daily.    ferrous sulfate 325 (65 FE) MG tablet Take 1 tablet by mouth Daily With Breakfast.    folic acid (FOLVITE) 1 MG tablet TAKE 1 TABLET BY MOUTH DAILY    gabapentin (NEURONTIN) 300 MG capsule Take 1 capsule by mouth 2 (Two) Times a Day As Needed (neuropathy).     lisinopril (PRINIVIL,ZESTRIL) 10 MG tablet Take 1 tablet by mouth Daily.    lisinopril (PRINIVIL,ZESTRIL) 5 MG tablet Take 1 tablet by mouth Daily.    pantoprazole (PROTONIX) 40 MG EC tablet Take 1 tablet by mouth 2 (Two) Times a Day.    rosuvastatin (CRESTOR) 10 MG tablet TAKE 1 TABLET BY MOUTH DAILY    tadalafil (CIALIS) 5 MG tablet Take 1 tablet by mouth Daily.    thiamine (VITAMIN B-1) 100 MG tablet  tablet Take 1 tablet by mouth Daily.    vitamin B-12 (CYANOCOBALAMIN) 1000 MCG tablet Take 1 tablet by mouth Daily. HOLD FOR SURGERY ONE WEEK PRIOR       General appearance: NAD, alert and cooperative  HEENT: Normocephalic, atraumatic    Neurological:   MS: oriented x3, normal attention/concentration, language intact, no neglect  CN: visual fields full, EOMI, facial sensation equal, no facial droop, shoulder shrug equal, tongue midline  Motor: 5/5 in all 4 ext. Hand  bilaterally 5/5, atrophy of both hands, 4/5 plantar with left foot   Reflexes: hyperreflexic throughout, mute plantars   Sensory: diminished vibratory sensation of RLE   Coordination: Normal finger to nose test  Gait and station: deferred   Rapid alternating movements: normal finger to thumb tap    Laboratory results:  Lab Results   Component Value Date    TSH 0.658 05/05/2025     Lab Results   Component Value Date    MSULVZPI87 1,934 (H) 05/05/2025     Lab Results   Component Value Date    HGBA1C 5.7 04/27/2016     Lab Results   Component Value Date    GLUCOSE 85 05/05/2025    BUN 15 05/05/2025    CREATININE 1.18 05/05/2025    EGFRIFNONA 65 02/07/2022    EGFRIFAFRI 94 09/26/2016    BCR 12.7 05/05/2025    K 4.5 05/05/2025    CO2 22.4 05/05/2025    CALCIUM 10.3 05/05/2025    ALBUMIN 4.1 05/05/2025    AST 61 (H) 05/05/2025    ALT 31 05/05/2025     Lab Results   Component Value Date    WBC 4.03 05/05/2025    HGB 12.6 (L) 05/05/2025    HCT 37.9 05/05/2025    .4 (H) 05/05/2025    PLT 97 (L) 05/05/2025     Brief Urine Lab Results       None       "    No results found for: \"ACANTHNAEG\", \"AFBCX\", \"BPERTUSSISCX\", \"BLOODCX\"  No results found for: \"BCIDPCR\", \"CXREFLEX\", \"CSFCX\", \"CULTURETIS\"  No results found for: \"CULTURES\", \"HSVCX\", \"URCX\"  No results found for: \"EYECULTURE\", \"GCCX\", \"HSVCULTURE\", \"LABHSV\"  No results found for: \"LEGIONELLA\", \"MRSACX\", \"MUMPSCX\", \"MYCOPLASCX\"  No results found for: \"NOCARDIACX\", \"STOOLCX\"  No results found for: \"THROATCX\", \"UNSTIMCULT\", \"URINECX\", \"CULTURE\", \"VZVCULTUR\"  No results found for: \"VIRALCULTU\", \"WOUNDCX\"  Pain Management Panel          Latest Ref Rng & Units 5/2/2022   Pain Management Panel   Barbiturates Screen, Urine Negative Negative    Benzodiazepine Screen, Urine Negative Negative    Cocaine Screen, Urine Negative Positive    Methadone Screen , Urine Negative Negative      CK1 110  Folate >20.00  CRP <0.30  Sed rate 5  RF 14.0  RPR nonreactive  HIV antibodies nonreactive    Review and interpretation of imaging:  MRI Lumbar Spine With & Without Contrast  Result Date: 5/6/2025  Electronically signed by Rusty Arredondo MD on 05-06-25 at 0631    MRI Thoracic Spine With & Without Contrast  Result Date: 5/6/2025  Electronically signed by Rusty Arredondo MD on 05-06-25 at 0624    MRI Cervical Spine With & Without Contrast  Result Date: 5/6/2025  Electronically signed by Rusty Arredondo MD on 05-06-25 at 0621    MRI Brain With & Without Contrast  Result Date: 5/6/2025  Electronically signed by Karl Fisher MD on 05-06-25 at 0602      Impression/Assessment:  This is a 69-year-old male with past medical history of hyperlipidemia, lumbar spinal stenosis, alcohol dependency who presented to the hospital on 5/5/2025 with complaints of progressive weakness and recurrent falls.  He reports about a month ago he started to develop bad right hip pain after moving a ladder.  This progressed to  lower back pain.  He then started to feel weaker in both legs.  He also had shooting pain down both legs but worse on the left.  States " he started to use a walker over the last week.  Has had several falls because of the weakness.  Over the last year he reported noticing more weakness of his hands and having difficulty with fine motor skills like tying his shoes or buttoning his shirts.  There was some concern in the outpatient setting by neurosurgery YANIRA for mild tongue and right hand fasciculations.  She also noted a mild foot drop on the left.  He actually saw her Friday and recommended that he go to the ER for his progressive symptoms however the patient declined at the time.  She placed an urgent outpatient neurology referral and recommended for spinal imaging and MRI of the brain.    He tells me today he has not had any dysarthria or dysphagia.  No trouble breathing.  No numbness or tingling of his private area.  No issues with urinary or bowel incontinence or leakage.  No trouble with his vision.  He is a daily drinker and reported drinking at least 3-4 drinks daily since he was in his 20s.    He had his brain and spinal imaging done this morning.  He has a moderate to large central to left paracentral disc protrusion at T12-L1 producing a severe spinal stenosis with contact and displacement of the distal spinal cord.  No significant acute processes of the brain.  He has advanced degenerative disc disease at C3-C4 with severe spinal stenosis with effacement of the surrounding subarachnoid space and flattening of the spinal cord.    Pending studies: Cryoglobulin, MMA, Protein Elec + Interp, Vitamin B1, Vitamin B6, Copper    Diagnosis:  Lower greater than upper extremity weakness with gait ataxia and evidence of atrophy of the hands  T12-L1 large posterior disc protrusion with severe spinal stenosis  Cervical spinal degenerative disc disease  Alcohol dependency     Plan:  - Spoke with patient and his family member at bedside extensively about his MRI results. Neurosurgery YANIRA was able to be present as well. Likely plan for surgical  intervention of his T12/L1 findings.  I discussed that despite these findings with his muscle atrophy and muscle fasciculations of his hands we would still proceed with outpatient EMG/NCS in the next couple of months to give him time to recover.  He voiced understanding.  I have sent a message to our office to get both arranged.  - He has several labs still pending for neuropathy workup as stated above as well as CT C/A/P Dr. Herman recommended to look for underlying malignancy as contributor.  We will follow peripherally to review pending studies.    Case discussed with patient, family member at bedside, Rohini DOYLE, and Dr. Herman, and he agrees with plan above.     Addenda [id number] 5/8: Noted plans for surgery today. We will follow up on the rest of his lab work with his follow up.     YANIRA Duran

## 2025-05-06 NOTE — NURSING NOTE
Transport on floor to bring patient down to MRI for multiple body areas for approximately two (2) hours,  pt transported via wheelchair, pt off telemetry and central monitoring advised of situation.

## 2025-05-06 NOTE — PROGRESS NOTES
Name: Mo Willoughby ADMIT: 2025   : 1955  PCP: Jenn Davison APRN    MRN: [id number] LOS: 1 days   AGE/SEX: 69 y.o. male  ROOM: On license of [facility]     Subjective   Subjective   2025  Patient seen and examined at bedside, he does not appear to be in distress.  Still admits to left-sided weakness but states symptoms slightly improved.       Objective   Objective   Vital Signs  Temp:  [98.1 °F (36.7 °C)-98.4 °F (36.9 °C)] 98.4 °F (36.9 °C)  Heart Rate:  [] 90  Resp:  [16] 16  BP: (138-153)/(82-97) 152/94  SpO2:  [96 %-100 %] 98 %  on   ;   Device (Oxygen Therapy): room air  Body mass index is 32.6 kg/m².  Physical Exam  Constitutional:       General: He is not in acute distress.     Appearance: Normal appearance.   HENT:      Head: Normocephalic and atraumatic.      Nose: No congestion.      Mouth/Throat:      Pharynx: No oropharyngeal exudate.   Eyes:      General: No scleral icterus.  Cardiovascular:      Rate and Rhythm: Normal rate and regular rhythm.      Heart sounds: No murmur heard.     No friction rub. No gallop.   Pulmonary:      Effort: No respiratory distress.      Breath sounds: No wheezing, rhonchi or rales.   Abdominal:      General: There is no distension.      Tenderness: There is no abdominal tenderness. There is no guarding.   Musculoskeletal:         General: No swelling, deformity or signs of injury.      Cervical back: No rigidity.   Skin:     Coloration: Skin is not jaundiced.      Findings: No bruising or lesion.   Neurological:      Mental Status: He is alert.      Comments: Left arm and leg weakness, left foot drop       Results Review     I reviewed the patient's new clinical results.  Results from last 7 days   Lab Units 25  1540   WBC 10*3/mm3 4.03   HEMOGLOBIN g/dL 12.6*   PLATELETS 10*3/mm3 97*     Results from last 7 days   Lab Units 25  1540   SODIUM mmol/L 139   POTASSIUM mmol/L 4.5   CHLORIDE mmol/L 105   CO2 mmol/L 22.4   BUN mg/dL 15   CREATININE  "mg/dL 1.18   GLUCOSE mg/dL 85   EGFR mL/min/1.73 67.6     Results from last 7 days   Lab Units 05/05/25  1540   ALBUMIN g/dL 4.1   BILIRUBIN mg/dL 0.7   ALK PHOS U/L 81   AST (SGOT) U/L 61*   ALT (SGPT) U/L 31     Results from last 7 days   Lab Units 05/05/25  1540   CALCIUM mg/dL 10.3   ALBUMIN g/dL 4.1   MAGNESIUM mg/dL 1.2*     Results from last 7 days   Lab Units 05/05/25  1540   PROCALCITONIN ng/mL 0.16     No results found for: \"HGBA1C\", \"POCGLU\"    MRI Lumbar Spine With & Without Contrast  Result Date: 5/6/2025  Electronically signed by Rusty Arredondo MD on 05-06-25 at 0631    MRI Thoracic Spine With & Without Contrast  Result Date: 5/6/2025  Electronically signed by Rusty Arredondo MD on 05-06-25 at 0624    MRI Cervical Spine With & Without Contrast  Result Date: 5/6/2025  Electronically signed by Rusty Arredondo MD on 05-06-25 at 0621    MRI Brain With & Without Contrast  Result Date: 5/6/2025  Electronically signed by Karl Fisher MD on 05-06-25 at 0602      I have personally reviewed all medications:  Scheduled Medications  allopurinol, 300 mg, Oral, Daily  ferrous sulfate, 325 mg, Oral, Daily With Breakfast  folic acid, 1,000 mcg, Oral, Daily  lisinopril, 5 mg, Oral, Daily  metoprolol succinate XL, 25 mg, Oral, Daily  pantoprazole, 40 mg, Oral, BID  rosuvastatin, 10 mg, Oral, Daily  sodium chloride, 10 mL, Intravenous, Q12H  thiamine, 100 mg, Oral, Daily  vitamin B-12, 1,000 mcg, Oral, Daily    Infusions   Diet  NPO Diet NPO Type: Sips with Meds, Ice Chips    I have personally reviewed:  [x]  Laboratory   [x]  Microbiology   [x]  Radiology   [x]  EKG/Telemetry  [x]  Cardiology/Vascular   []  Pathology    []  Records       Assessment/Plan     Active Hospital Problems    Diagnosis  POA    **Weakness of left lower extremity [R29.898]  Yes    Acute bilateral low back pain with bilateral sciatica [M54.42, M54.41]  Yes    Tongue fasciculation [R25.3]  Yes    Foot drop, left [M21.372]  Yes    Atrophy of muscle " of multiple sites [M62.59]  Yes    Imbalance [R26.89]  Yes    DDD (degenerative disc disease), lumbar [M51.369]  Yes    Essential hypertension, benign [I10]  Yes    CRI (chronic renal insufficiency), stage 3 (moderate) [N18.30]  Yes      Resolved Hospital Problems   No resolved problems to display.       69 y.o. male admitted with Weakness of left lower extremity.    #Left-sided weakness  #Left foot drop    -MRI brain with and without contrast without evidence of ischemia or intracranial abnormality    - MRI cervical spine with and without contrast shows advanced degenerative disc disease at C3/4 level with disc osteophyte complex complicated by severe spinal stenosis with effacement of surrounding subarachnoid space and flattening of the spinal cord, moderate to severe bilateral for minimal stenosis, right greater than left, moderately advanced C4/5 disc disease and posterior disc osteophyte complex complicated by broad-based posterior disc bulge at C6/7 and small fluid signal spinal cord at this level that may represent syrinx or focal myelomalacia    - MRI thoracic spine with and without contrast shows a moderate to large posterior disc protrusion at T12/L1 producing severe spinal stenosis with contact and displacement of the distal spinal cord and crowding of the nerve roots    - MRI lumbar spine with and without contrast shows T12/L1 disc protrusion as above complicated by multilevel lumbar spondylosis with multilevel spinal Formento stenosis and associated grade 1 L4/5 anterolisthesis    - Labs ordered by neurology    - CT chest and CT A/P to rule out head malignancy ordered by neurology    - Neurology considering EMG/nerve conduction study    - Neurosurgery following    #Alcohol use disorder    -Thiamine and folic acid daily    -[facility] protocol    #CKD    -Creatinine 1.18, appears near base    -Renally dose meds    #Anemia    -Hemoglobin 12.6 on admission, baseline around 13.6    - No overt bleeding, follow  labs    #Thrombocytopenia    -Platelets 97K this morning, over the years has ranged from 92K to 147K    -No overt bleeding, monitor    -Suspect secondary to chronic alcohol use    #GERD    -Protonix 40 mg p.o. twice daily    #Hypertension    -Continue home lisinopril 5 mg p.o. daily    - Continue Toprol 25 mg p.o. daily    #Hyperlipidemia    -Crestor daily      SCDs for DVT prophylaxis.  Full code.  Discussed with patient.  Anticipate discharge home with HH vs SNU facility timing yet to be determined.  Expected discharge date/ time has not been documented.      Lucy Amezcua DO  [facility]  05/06/25  09:34 EDT

## 2025-05-06 NOTE — PLAN OF CARE
The pt was admitted to [facility] 2/2 to L side weakness. Pmhx significant for hypertension, chronic kidney insufficiency, history of lumbar disc herniation with L3-5 lumbar decompression in 7/2023, ascending aortic aneurysm aneurysm, gout, questionable history of atrial fibrillation. The pt reports living in a single level home with his significant other. He is independent at baseline but reports increased L side weakness, falls and balance deficits over the last week. Today, the pt completed bed mobility with SBA. S/S LBD. He stood with SBA + walker and mobilized into the bathroom with the same level of assistance. He returned to supine in the bed. The pt was visualized to have low tone in his L hand compared to R, specifically in his L scurggs space. He endorses impaired L hand coordination and droppage of items when completing in high level tasks in his home. He also demonstrated impaired LLE dorsi/plantar movements. JULIA recs pending, OT to continue following for d/c recommendations.

## 2025-05-06 NOTE — PLAN OF CARE
No No new acute issues this shift, pt AAO x4, pt continues to present with left sided upper and lower extremity weakness, pt denies any complaints of numbness or tingling to upper or lower extremities.  Pt up with assist x1 with walker to bathroom.  Pt underwent MRI of multiple parts of body this shift, pt tolerated well with no adverse side effects.  Pt awaiting MRI and blood work results and neurology and neurosurgery inputs to determine next appropriate steps.  Will continue to monitor and treat appropriately.      Goal Outcome Evaluation:  Plan of Care Reviewed With: patient        Progress: improving      Problem: Adult Inpatient Plan of Care  Goal: Plan of Care Review  Outcome: Progressing  Flowsheets (Taken 5/6/2025 [id number])  Progress: improving  Plan of Care Reviewed With: patient     Problem: Adult Inpatient Plan of Care  Goal: Patient-Specific Goal (Individualized)  Outcome: Progressing     Problem: Adult Inpatient Plan of Care  Goal: Readiness for Transition of Care  Outcome: Progressing     Problem: Comorbidity Management  Goal: Maintenance of Osteoarthritis Symptom Control  Outcome: Progressing

## 2025-05-06 NOTE — PAYOR COMM NOTE
"Mo Willoughby (69 y.o. Male)     ATTN: NURSE REVIEWER  RE: INITIAL INPT AUTH CLINICALS   REF: [id number]  PLS REPLY TO CHARLES FARLEY [phone] OR FAX# [phone]      Date of Birth   1955    Social Security Number       Address   [address] [place] [address]    Home Phone   [phone]    MRN   [id number]       Lutheran   Mandaeism    Marital Status   Single                            Admission Date   5/5/2025    Admission Type   Urgent    Admitting Provider   Phuong Villavicencio MD    Attending Provider   Lucy Amezcua DO    Department, Room/Bed   [facility] [address] [place], P593/1       Discharge Date       Discharge Disposition       Discharge Destination                                 Attending Provider: Lucy Amezcua DO    Allergies: Zetia [Ezetimibe]    Isolation: None   Infection: None   Code Status: CPR    Ht: 182 cm (71.65\")   Wt: 108 kg (238 lb 1.6 oz)    Admission Cmt: None   Principal Problem: Weakness of left lower extremity [R29.898]                   Active Insurance as of 5/5/2025       Primary Coverage       Payor Plan Insurance Group Employer/Plan Group    ANTHEM MEDICARE REPLACEMENT ANTHEM MEDICARE ADVANTAGE HMO [id number]       Payor Plan Address Payor Plan Phone Number Payor Plan Fax Number Effective Dates    [address] [phone]  3/1/2025 - None Entered    [facility] [id number]         Subscriber Name Subscriber Birth Date Member ID       MO WILLOUGHBY 1955 [id number]                     Emergency Contacts        (Rel.) Home Phone Work Phone Mobile Phone    Kendy Becker (Significant Other) [phone] -- [phone]    CARMENJAYDEN (Brother) [phone] -- [phone]    Mirtha Willoughby (Sister) [phone]                 History & Physical        Ochoa Hart MD at 05/05/25 1354          Internal medicine history and physical  INTERNAL MEDICINE   [facility]       Patient " Identification:  Name: Mo Willoughby  Age: 69 y.o.  Sex: male  :  1955  MRN: [id number]                   Primary Care Physician: Jenn Davison APRN                               Date of admission:2025    Chief Complaint: Recommended to be admitted directly by neurosurgery service from home for further evaluation of episodic activity related back pain, lower extremity weakness and left arm atrophy and tongue fasciculation.    History of Present Illness:   Patient is a 69-year-old male who has past medical history remarkable for hypertension, chronic kidney insufficiency, history of ascending aortic aneurysm aneurysm, gout, questionable history of atrial fibrillation for which she was on anticoagulation therapy but after review of his rhythm it was thought to be supraventricular ectopy and his anticoagulation therapy was discontinued in 2024, history of alcohol and drug use has been dealing with low back pain and right hip pain after attempting to lift up a ladder at work.  Patient was evaluated and managed by his primary care provider with Dosepak and muscle relaxant but continued to have progressive activity related discomfort in his back and progressive weakness.  Patient has had prior back surgery in  when he had L3 5 decompression.  Because of his progressive right hip and low back pain and weakness patient was seen by neurosurgery service on 2025 and was recommended to be hospitalized for further workup with neurology and neurosurgery service including full neuraxis MRI possibly lumbar puncture because of the concern for progressive primary degenerative neurological disease given the fact that he was noted to have muscular atrophy of his left hand and tongue fasciculation was noted.  Patient did not have any incontinence issues and though he is afraid that he will fall and unable to balance himself.  Patient declined admission on 2025 because of the degree we can and agreed to  come to the hospital today.  A service was contacted and patient admission was forwarded to me as a direct admission.  Patient denies any fever chills nausea vomiting diarrhea burning in urination frequency urgency and feels otherwise fine unless he moves and his back hurts.  When he is resting his back is fine.      Past Medical History:  Past Medical History:   Diagnosis Date    Abnormal TSH     Arthritis     Ascending aortic aneurysm     Colon polyp 5/5/22    CRI (chronic renal insufficiency), stage 3 (moderate) 2016    from stage 3A to 4    DDD (degenerative disc disease), lumbar     ED (erectile dysfunction)     Erectile dysfunction     Essential hypertension, benign     Folic acid deficiency     Gout     Hip pain, right     Hx of colonic polyp     Hypercalcemia 2015    as far back as data goes so likely pre-dates even this time    Hyperparathyroidism, unspecified 2016    as far back as data goes likely pre-dates even this date, likely multifactorial some primary and some secondary , w/ imaging from 10/16 showing possible L inferior adenoma    Iron deficiency anemia     Low back pain with bilateral sciatica     Mixed hyperlipidemia 02/27/2023    New onset atrial fibrillation 04/15/2024    Pancreatitis     Risk factors for obstructive sleep apnea     Spinal stenosis of lumbar region with neurogenic claudication     Syncope and collapse 03/28/2017     Past Surgical History:  Past Surgical History:   Procedure Laterality Date    CHOLECYSTECTOMY      CHOLECYSTECTOMY WITH INTRAOPERATIVE CHOLANGIOGRAM N/A 08/03/2018    Procedure: CHOLECYSTECTOMY LAPAROSCOPIC INTRAOPERATIVE CHOLANGIOGRAM;  Surgeon: Melian Kate MD;  Location: [facility] MAIN OR;  Service: General    COLONOSCOPY      COLONOSCOPY N/A 10/03/2016    Procedure: COLONOSCOPY TO CECUM TO TERMINAL ILIUM WITH COLD BIOPSY POLYPECTOMY;  Surgeon: Benoit Vilchis MD;  Location: [facility] ENDOSCOPY;  Service:     COLONOSCOPY N/A 05/05/2022    Procedure:  COLONOSCOPY TO CECUM WITH COLD SNARE POLYPECTOMIES & RESOLUTION CLIP PLACEMENT X 2;  Surgeon: Benoit Mosley MD;  Location: [facility] ENDOSCOPY;  Service: Gastroenterology;  Laterality: N/A;  ANEMIA/POLYPS, HEMORRHOIDS     ENDOSCOPY N/A 05/05/2022    Procedure: ESOPHAGOGASTRODUODENOSCOPY WITH BX;  Surgeon: Benoit Mosley MD;  [facility];  Service: Gastroenterology;  Laterality: N/A;  ANEMIA/PORTAL GASTROPATHY, EROSIVE GASTRITIS     EPIDURAL Right 12/07/2022    Procedure: LUMBAR/SACRAL TRANSFORAMINAL EPIDURAL Right L2-3 and right L4-5;  Surgeon: Isaura Torres MD;  Location: [facility] MAIN OR;  Service: Pain Management;  Laterality: Right;    LIPOMA EXCISION      LUMBAR LAMINECTOMY DISCECTOMY DECOMPRESSION N/A 7/7/2023    Procedure: Open right sided lumbar decompression lumbar three/four, lumbar four/five;  Surgeon: Abel Perez MD;  Location:  [facility] MAIN OR;  Service: Neurosurgery;  Laterality: N/A;    MEDIAL BRANCH BLOCK Right 01/23/2023    Procedure: LUMBAR MEDIAL BRANCH BLOCK RIGHT L3-L5 #1;  Surgeon: Isaura Torres MD;  Location: [facility] MAIN OR;  Service: Pain Management;  Laterality: Right;    MEDIAL BRANCH BLOCK Right 02/13/2023    Procedure: LUMBAR MEDIAL BRANCH BLOCK RIGHT L3-L5 #1;  Surgeon: Isaura Torres MD;  Location: [facility] MAIN [facility];  Service: Pain Management;  Laterality: Right;    NERVE SURGERY Right 3/6/2023    Procedure: LUMBAR RADIOFREQUENCY ABLATION RIGHT L3-L5  94800, 35895;  Surgeon: Isaura Torres MD;  Location: [facility];  Service: Pain Management;  Laterality: Right;    SACROILIAC JOINT INJECTION Right 5/5/2023    Procedure: SACROILIAC JOINT INJECTION RIGHT 87501;  Surgeon: Isaura Torres MD;  Location: [facility]IN [facility];  Service: Pain Management;  Laterality: Right;    TONSILLECTOMY        Home Meds:  Medications Prior to Admission   Medication Sig Dispense Refill Last Dose/Taking    allopurinol (ZYLOPRIM) 300 MG tablet Take 1 tablet by mouth Daily. 90  tablet 2     ferrous sulfate 325 (65 FE) MG tablet Take 1 tablet by mouth Daily With Breakfast.       folic acid (FOLVITE) 1 MG tablet TAKE 1 TABLET BY MOUTH DAILY 90 tablet 1     gabapentin (NEURONTIN) 300 MG capsule Take 1 capsule by mouth 2 (Two) Times a Day As Needed (neuropathy). 60 capsule 0     lisinopril (PRINIVIL,ZESTRIL) 10 MG tablet Take 1 tablet by mouth Daily. 90 tablet 2     lisinopril (PRINIVIL,ZESTRIL) 5 MG tablet Take 1 tablet by mouth Daily. 90 tablet 2     metoprolol succinate XL (TOPROL-XL) 25 MG 24 hr tablet TAKE 1 TABLET BY MOUTH DAILY 90 tablet 1     pantoprazole (PROTONIX) 40 MG EC tablet Take 1 tablet by mouth 2 (Two) Times a Day. 180 tablet 1     rosuvastatin (CRESTOR) 10 MG tablet TAKE 1 TABLET BY MOUTH DAILY 90 tablet 1     tadalafil (CIALIS) 5 MG tablet Take 1 tablet by mouth Daily.       thiamine (VITAMIN B-1) 100 MG tablet  tablet Take 1 tablet by mouth Daily.       vitamin B-12 (CYANOCOBALAMIN) 1000 MCG tablet Take 1 tablet by mouth Daily. HOLD FOR SURGERY ONE WEEK PRIOR        Current Meds:   No current facility-administered medications for this encounter.  Allergies:  Allergies   Allergen Reactions    Zetia [Ezetimibe] Dizziness     Nausea, fatgue     Social History:   Social History     Tobacco Use    Smoking status: Never    Smokeless tobacco: Never   Substance Use Topics    Alcohol use: Yes     Comment: occasional      Family History:  Family History   Problem Relation Age of Onset    Hypertension Mother     Breast cancer Mother     Cancer Mother     Stroke Mother     Cancer Father     Liver cancer Father     Hypertension Father     Diabetes Father     Hypertension Sister     Heart attack Sister     Hypertension Brother     Cancer Brother     Hypertension Sister     Heart attack Sister     Learning disabilities Neg Hx           Review of Systems  See history of present illness and past medical history.  Patient denies headache, dizziness, syncope, falls, trauma, change in vision,  change in hearing, change in taste, changes in weight, changes in appetite, focal weakness, numbness, or paresthesia.  Patient denies chest pain, palpitations, dyspnea, orthopnea, PND, cough, sinus pressure, rhinorrhea, epistaxis, hemoptysis, nausea, vomiting,hematemesis, diarrhea, constipation or hematchezia.  Denies cold or heat intolerance, polydipsia, polyuria, polyphagia. Denies hematuria, pyuria, dysuria, hesitancy, frequency or urgency. Denies consumption of raw and under cooked meats foods or change in water source.  Denies fever, chills, sweats, night sweats.  Denies missing any routine medications. Remainder of ROS is negative.      Vitals:   There were no vitals taken for this visit.  I/O: No intake or output data in the 24 hours ending 05/05/25 1354  Exam:  Patient is examined using the personal protective equipment as per guidelines from infection control for this particular patient as enacted.  Hand washing was performed before and after patient interaction.  General Appearance:    Alert, cooperative, no distress, appears stated age   Head:    Normocephalic, without obvious abnormality, atraumatic   Eyes:    PERRL, conjunctiva/corneas clear, EOM's intact, both eyes   Ears:    Normal external ear canals, both ears   Nose:   Nares normal, septum midline, mucosa normal, no drainage    or sinus tenderness   Throat:   Lips, tongue, gums normal; oral mucosa pink and moist   Neck:   Supple   Back:     Symmetric, no curvature, ROM normal, no CVA tenderness   Lungs:     Clear to auscultation bilaterally, respirations unlabored   Chest Wall:    No tenderness or deformity    Heart:  S1-S2 regular   Abdomen:   Soft nontender   Extremities: Atrophy of the small muscles of the left hand noted   Pulses:   Pulses palpable in all extremities; symmetric all extremities   Skin:   Skin color normal, Skin is warm and dry,  no rashes or palpable lesions   Neurologic: Gait not tested cranial nerves within normal limits no  focal deficits noted no obvious tongue fasciculations noted  strength in both upper extremities are intact.       Data Review:    Labs ordered  Assessment:  Active Hospital Problems    Diagnosis  POA    Acute bilateral low back pain with bilateral sciatica [M54.42, M54.41]  Yes    Tongue fasciculation [R25.3]  Yes    Foot drop, left [M21.372]  Yes    Atrophy of muscle of multiple sites [M62.59]  Yes    Imbalance [R26.89]  Yes    DDD (degenerative disc disease), lumbar [M51.369]  Yes    Essential hypertension, benign [I10]  Yes    CRI (chronic renal insufficiency), stage 3 (moderate) [N18.30]  Yes     from stage 3A to 4         Plan: As per neurosurgery recommendation    Check basic set of labs,   neurology consultation  Neurosurgery consultation  MRI of the cervical thoracic and lumbar spine  MRI of the brain  Continue his home regimen for his hypertension back pain and gout and dyslipidemia and continue with vitamin replacement.  Patient admits that he does not drink alcohol anymore on a regular basis but plan is to monitor and continue with thiamine.      Ochoa Hart MD   5/5/2025  13:54 EDT    Parts of this note may be an electronic transcription/translation of spoken language to printed text using the Dragon dictation system.      Electronically signed by Ochoa Hart MD at 05/06/25 0538       Facility-Administered Medications as of 5/6/2025   Medication Dose Route Frequency Provider Last Rate Last Admin    acetaminophen (TYLENOL) tablet 650 mg  650 mg Oral Q4H PRN Ochoa Hart MD        Or    acetaminophen (TYLENOL) 160 MG/5ML oral solution 650 mg  650 mg Oral Q4H PRN Ochoa Hart MD        Or    acetaminophen (TYLENOL) suppository 650 mg  650 mg Rectal Q4H PRN Ochoa Hart MD        allopurinol (ZYLOPRIM) tablet 300 mg  300 mg Oral Daily Ochoa Hart MD   300 mg at 05/06/25 0920    Calcium Replacement - Follow Nurse / BPA Driven Protocol   Not Applicable PRN Lucy mAezcua DO        ferrous sulfate  tablet 325 mg  325 mg Oral Daily With Breakfast Ochoa Hart MD   325 mg at 05/06/25 0919    folic acid (FOLVITE) tablet 1,000 mcg  1,000 mcg Oral Daily Ochoa Hart MD   1,000 mcg at 05/06/25 0919    gabapentin (NEURONTIN) capsule 300 mg  300 mg Oral BID PRN Ochoa Hatr MD        [COMPLETED] gadobenate dimeglumine (MULTIHANCE) injection 20 mL  20 mL Intravenous Once in imaging Lucy Amezcua DO   20 mL at 05/06/25 0526    lisinopril (PRINIVIL,ZESTRIL) tablet 5 mg  5 mg Oral Daily Ochoa Hart MD   5 mg at 05/06/25 0919    Magnesium Standard Dose Replacement - Follow Nurse / BPA Driven Protocol   Not Applicable PRN Lucy Amezcua DO        [COMPLETED] magnesium sulfate 4g/100mL (PREMIX) infusion  4 g Intravenous Once Lucy Amezcua DO   4 g at 05/06/25 0933    metoprolol succinate XL (TOPROL-XL) 24 hr tablet 25 mg  25 mg Oral Daily Ochoa Hart MD   25 mg at 05/06/25 0919    nitroglycerin (NITROSTAT) SL tablet 0.4 mg  0.4 mg Sublingual Q5 Min PRN Ochoa Hart MD        ondansetron ODT (ZOFRAN-ODT) disintegrating tablet 4 mg  4 mg Oral Q6H PRN Ochoa Hart MD        Or    ondansetron (ZOFRAN) injection 4 mg  4 mg Intravenous Q6H PRN Ochoa Hart MD        pantoprazole (PROTONIX) EC tablet 40 mg  40 mg Oral BID Ochoa Hart MD   40 mg at 05/06/25 0919    Phosphorus Replacement - Follow Nurse / BPA Driven Protocol   Not Applicable PRN Lucy Amezcua DO        Potassium Replacement - Follow Nurse / BPA Driven Protocol   Not Applicable PRN Lucy Amezcua DO        rosuvastatin (CRESTOR) tablet 10 mg  10 mg Oral Daily Ochoa Hart MD   10 mg at 05/05/25 2125    sodium chloride 0.9 % flush 10 mL  10 mL Intravenous Q12H Ochoa Hart MD   10 mL at 05/05/25 2125    sodium chloride 0.9 % flush 10 mL  10 mL Intravenous PRN Ochoa Hart MD        sodium chloride 0.9 % infusion 40 mL  40 mL Intravenous PRN Ochoa Hart MD        thiamine (VITAMIN B-1) tablet 100 mg  100 mg Oral Daily Ochoa Hart MD   100  mg at 05/06/25 0919    vitamin B-12 (CYANOCOBALAMIN) tablet 1,000 mcg  1,000 mcg Oral Daily Ochoa Hart MD   1,000 mcg at 05/06/25 0919     Lab Results (last 24 hours)       Procedure Component Value Units Date/Time    Cryoglobulin [[id number]] Collected: 05/06/25 0949    Specimen: Blood Updated: 05/06/25 1132    Rheumatoid Factor [[id number]]  (Normal) Collected: 05/06/25 0643    Specimen: Blood Updated: 05/06/25 0734     Rheumatoid Factor Quantitative 14.0 IU/mL     Sedimentation Rate [[id number]]  (Normal) Collected: 05/06/25 0643    Specimen: Blood Updated: 05/06/25 0720     Sed Rate 5 mm/hr     Vitamin B1, Whole Blood [[id number]] Collected: 05/06/25 0643    Specimen: Blood from Arm, Left Updated: 05/06/25 0704    Methylmalonic Acid, Serum [[id number]] Collected: 05/06/25 0644    Specimen: Blood Updated: 05/06/25 0703    Protein Elec + Interp, Serum [[id number]] Collected: 05/06/25 0643    Specimen: Blood Updated: 05/06/25 0703    Vitamin B6 [[id number]] Collected: 05/06/25 0643    Specimen: Blood Updated: 05/06/25 0659    RPR Qualitative with Reflex to Quant [[id number]]  (Normal) Collected: 05/05/25 1545    Specimen: Blood Updated: 05/05/25 1709     RPR Non-Reactive    Folate [[id number]]  (Normal) Collected: 05/05/25 1543    Specimen: Blood Updated: 05/05/25 1638     Folate >20.00 ng/mL     Narrative:      Results may be falsely increased if patient taking Biotin.      Vitamin B12 [[id number]]  (Abnormal) Collected: 05/05/25 1543    Specimen: Blood Updated: 05/05/25 1633     Vitamin B-12 1,934 pg/mL     Narrative:      Results may be falsely increased if patient taking Biotin.      HIV-1 & HIV-2 Antibodies [[id number]]  (Normal) Collected: 05/05/25 1543    Specimen: Blood Updated: 05/05/25 1633    Narrative:      The following orders were created for panel order HIV-1 & HIV-2 Antibodies.  Procedure                               Abnormality         Status                     ---------                                "-----------         ------                     HIV-1 / O / 2 Ag / Antibody[[id number]]  Normal              Final result                 Please view results for these tests on the individual orders.    HIV-1 / O / 2 Ag / Antibody [[id number]]  (Normal) Collected: 05/05/25 1543    Specimen: Blood Updated: 05/05/25 1633     HIV DUO Non-Reactive    Narrative:      The HIV antibody/antigen combo assay is a qualitative assay for HIV that includes the p24 antigen as well as antibodies to HIV types 1 and 2. This test is intended to be used as a screening assay in the diagnosis of HIV infection in patients over the age of 2.    Procalcitonin [[id number]]  (Normal) Collected: 05/05/25 1540    Specimen: Blood Updated: 05/05/25 1616     Procalcitonin 0.16 ng/mL     Narrative:      As a Marker for Sepsis (Non-Neonates):    1. <0.5 ng/mL represents a low risk of severe sepsis and/or septic shock.  2. >2 ng/mL represents a high risk of severe sepsis and/or septic shock.    As a Marker for Lower Respiratory Tract Infections that require antibiotic therapy:    PCT on Admission    Antibiotic Therapy       6-12 Hrs later    >0.5                Strongly Recommended  >0.25 - <0.5        Recommended   0.1 - 0.25          Discouraged              Remeasure/reassess PCT  <0.1                Strongly Discouraged     Remeasure/reassess PCT    As 28 day mortality risk marker: \"Change in Procalcitonin Result\" (>80% or <=80%) if Day 0 (or Day 1) and Day 4 values are available. Refer to http://www.[facility]-pct-calculator.com    Change in PCT <=80%  A decrease of PCT levels below or equal to 80% defines a positive change in PCT test result representing a higher risk for 28-day all-cause mortality of patients diagnosed with severe sepsis for septic shock.    Change in PCT >80%  A decrease of PCT levels of more than 80% defines a negative change in PCT result representing a lower risk for 28-day all-cause mortality of patients diagnosed with severe " sepsis or septic shock.       TSH [[id number]]  (Normal) Collected: 05/05/25 1540    Specimen: Blood Updated: 05/05/25 1616     TSH 0.658 uIU/mL     CK [[id number]]  (Normal) Collected: 05/05/25 1540    Specimen: Blood Updated: 05/05/25 1611     Creatine Kinase 110 U/L     Comprehensive Metabolic Panel [[id number]]  (Abnormal) Collected: 05/05/25 1540    Specimen: Blood Updated: 05/05/25 1611     Glucose 85 mg/dL      BUN 15 mg/dL      Creatinine 1.18 mg/dL      Sodium 139 mmol/L      Potassium 4.5 mmol/L      Chloride 105 mmol/L      CO2 22.4 mmol/L      Calcium 10.3 mg/dL      Total Protein 6.8 g/dL      Albumin 4.1 g/dL      ALT (SGPT) 31 U/L      AST (SGOT) 61 U/L      Alkaline Phosphatase 81 U/L      Total Bilirubin 0.7 mg/dL      Globulin 2.7 gm/dL      A/G Ratio 1.5 g/dL      BUN/Creatinine Ratio 12.7     Anion Gap 11.6 mmol/L      eGFR 67.6 mL/min/1.73     Narrative:      GFR Categories in Chronic Kidney Disease (CKD)              GFR Category          GFR (mL/min/1.73)    Interpretation  G1                    90 or greater        Normal or high (1)  G2                    60-89                Mild decrease (1)  G3a                   45-59                Mild to moderate decrease  G3b                   30-44                Moderate to severe decrease  G4                    15-29                Severe decrease  G5                    14 or less           Kidney failure    (1)In the absence of evidence of kidney disease, neither GFR category G1 or G2 fulfill the criteria for CKD.    eGFR calculation 2021 CKD-EPI creatinine equation, which does not include race as a factor    Magnesium [[id number]]  (Abnormal) Collected: 05/05/25 1540    Specimen: Blood Updated: 05/05/25 1611     Magnesium 1.2 mg/dL     C-reactive Protein [[id number]]  (Normal) Collected: 05/05/25 1540    Specimen: Blood Updated: 05/05/25 1611     C-Reactive Protein <0.30 mg/dL     CBC Auto Differential [[id number]]  (Abnormal) Collected: 05/05/25 1540     Specimen: Blood Updated: 05/05/25 1554     WBC 4.03 10*3/mm3      RBC 3.70 10*6/mm3      Hemoglobin 12.6 g/dL      Hematocrit 37.9 %      .4 fL      MCH 34.1 pg      MCHC 33.2 g/dL      RDW 13.1 %      RDW-SD 48.2 fl      MPV 10.3 fL      Platelets 97 10*3/mm3      Neutrophil % 59.6 %      Lymphocyte % 25.8 %      Monocyte % 10.9 %      Eosinophil % 3.2 %      Basophil % 0.5 %      Immature Grans % 0.0 %      Neutrophils, Absolute 2.40 10*3/mm3      Lymphocytes, Absolute 1.04 10*3/mm3      Monocytes, Absolute 0.44 10*3/mm3      Eosinophils, Absolute 0.13 10*3/mm3      Basophils, Absolute 0.02 10*3/mm3      Immature Grans, Absolute 0.00 10*3/mm3      nRBC 0.0 /100 WBC           Imaging Results (Last 24 Hours)       Procedure Component Value Units Date/Time    MRI Lumbar Spine With & Without Contrast [[id number]] Collected: 05/06/25 0632     Updated: 05/06/25 0632    Narrative:        Patient: PETER MORALES  Time Out: 06:31  Exam(s): MRI L SPINE W WO Contrast     EXAM:    MR Lumbar Spine Without and With Intravenous Contrast    CLINICAL HISTORY:     Reason for exam: lower extremities weakness with atrophy of muscles.    TECHNIQUE:    Magnetic resonance images of the lumbar spine without and with   intravenous contrast in multiple planes.    COMPARISON:    No relevant prior studies available.    FINDINGS:    Vertebrae:  There is advanced facet hypertrophy and grade 1   anterolisthesis at the L4-5 level.  There is moderate to severe left   foraminal stenosis and moderate right foraminal stenosis.  There is grade   1 anterolisthesis at L4-5 secondary to advanced facet arthrosis.  No   acute fracture.  No abnormal spinal cord, nerve root or epidural   enhancement identified.    Spinal cord:  Unremarkable.  Normal signal.  No abnormal enhancement.    Soft tissues:  Unremarkable.    Kidneys and ureters:  There is a simple left renal cyst.     DISCS SPINAL CANAL NEURAL FORAMINA:    T12-L1:  There is a moderate to  large central to left paracentral disc   protrusion at T12 L1 producing severe spinal stenosis with contact and   displacement of the distal spinal cord.  There is near complete   effacement of the surrounding subarachnoid space.    L1-L2:  There is a small circumferential disc bulge at the L1 L2 level.    No significant spinal stenosis or neural impingement.    L2-L3:  There is moderate L2-3 degenerative disc space narrowing and a   circumferential disc bulge.  There is moderate to severe bilateral   foraminal stenosis, right greater than left.  There is moderate spinal   stenosis at this level.    L3-L4:  There is a broad-based posterior disc bulge at L3-4 with a mild   relative spinal stenosis.  There is severe left foraminal stenosis with   impingement on the exiting left L3 nerve root.  There is mild to moderate   right foraminal stenosis.  There is moderate facet hypertrophy.    L4-L5:  See above.    L5-S1:  There is mild L5-S1 facet arthrosis.  There is moderate   bilateral foraminal stenosis.  No significant spinal stenosis.    IMPRESSION:       1.    There is a moderate to large central to left paracentral disc   protrusion at T12 L1 producing severe spinal stenosis with contact and   displacement of the distal spinal cord. There is near complete effacement   of the surrounding subarachnoid space.  2.  Multilevel lumbar spondylosis with multilevel spinal and foraminal   stenosis as described above.  There is associated grade 1 L4-5   anterolisthesis as described above.      Impression:          Electronically signed by Rusty Arredondo MD on 05-06-25 at 0631    MRI Thoracic Spine With & Without Contrast [[id number]] Collected: 05/06/25 0625     Updated: 05/06/25 0625    Narrative:        Patient: PETER MORALES  Time Out: 06:24  Exam(s): MRI T SPINE W WO Contrast     EXAM:    MR Thoracic Spine Without and With Intravenous Contrast    CLINICAL HISTORY:     Reason for exam: lower extremities weakness with atrophy  of muscles.    TECHNIQUE:    Magnetic resonance images of the thoracic spine without and with   intravenous contrast in multiple planes.    COMPARISON:    No relevant prior studies available.    FINDINGS:    Vertebrae:   No acute fracture or destructive marrow process.    Discs spinal canal neural foramina:  There is a moderate to large   posterior disc protrusion at T12 L1 producing severe spinal stenosis.    There is contact and displacement of the distal spinal cord and crowding   of the nerve roots.  Surgical consultation recommended.  There is mild   multilevel thoracic degenerative disc space narrowing.  There are small   posterior disc bulges noted throughout the thoracic spine.  No focal disc   protrusion identified.  No significant spinal stenosis, foraminal   stenosis or neural impingement identified.    Spinal cord:  Othereise unremarkable.  No abnormal spinal cord, nerve   root or epidural enhancement identified.    Soft tissues:  Unremarkable.    IMPRESSION:         There is a moderate to large posterior disc protrusion at T12 L1   producing severe spinal stenosis. There is contact and displacement of   the  distal spinal cord and crowding of the nerve roots. Surgical   consultation recommended.      Impression:          Electronically signed by Rusty Arredondo MD on 05-06-25 at 0624    MRI Cervical Spine With & Without Contrast [[id number]] Collected: 05/06/25 0622     Updated: 05/06/25 0622    Narrative:        Patient: PETER MORALES  Time Out: 06:21  Exam(s): MRI C SPINE W WO Contrast     EXAM:    MR Cervical Spine Without and With Intravenous Contrast    CLINICAL HISTORY:     Reason for exam: lower extremities weakness with atrophy of muscles.    TECHNIQUE:    Magnetic resonance images of the cervical spine without and with   intravenous contrast in multiple planes.    COMPARISON:    No relevant prior studies available.    FINDINGS:    Brain and extra-axial spaces:  No abnormal spinal cord, nerve root  or   epidural enhancement.    Vertebrae:  Unremarkable.  No acute fracture.    Spinal cord:  See below.      Soft tissues:  Unremarkable.     DISCS SPINAL CANAL NEURAL FORAMINA:    C2-C3:  There is mild right-sided facet hypertrophy and mild to   moderate right C2-3 foraminal stenosis.    C3-C4:  There is advanced degenerative disc disease at the C3-4 level   with a circumferential disc osteophyte complex.  There is severe spinal   stenosis with effacement of the surrounding subarachnoid space and   flattening of the spinal cord.  There is mild increased T2 signal in the   spinal cord at the C3-4 level.  There is Moderate to severe bilateral   foraminal stenosis, right greater than left.    C4-C5:  There is moderately advanced C4-5 degenerative disc disease and   a posterior disc osteophyte complex.  There is mild to moderate spinal   stenosis.  There is moderate bilateral foraminal stenosis.    C5-C6:  Unremarkable.  No significant disc disease.  No stenosis.    C6-C7:  There is a broad-based posterior disc bulge at C6-7.  There is   associated mild to moderate spinal stenosis and moderate bilateral   foraminal narrowing.  There is a small fluid signal noted in the spinal   cord at this level which may represent a focal short segment syrinx or   focal myelomalacia.  No abnormal enhancement identified.    C7-T1:  Unremarkable.  No significant disc disease.  No stenosis.    Other findings:  There is a broad-based posterior disc bulge.  There is   mild to moderate spinal stenosis and bilateral foraminal stenosis.    IMPRESSION:       1.  There is advanced degenerative disc disease at the C3-4 level with a   circumferential disc osteophyte complex. There is severe spinal stenosis   with effacement of the surrounding subarachnoid space and flattening of   the spinal cord. There is mild increased T2 signal in the spinal cord at   the C3-4 level. There is Moderate to severe bilateral foraminal stenosis,   right greater  than left.  2.  There is moderately advanced C4-5 degenerative disc disease and a   posterior disc osteophyte complex. There is mild to moderate spinal   stenosis. There is moderate bilateral foraminal stenosis.  3.  There is a broad-based posterior disc bulge at C6-7. There is   associated mild to moderate spinal stenosis and moderate bilateral   foraminal narrowing. There is a small fluid signal noted in the spinal   cord at this level which may represent a focal short segment syrinx or   focal myelomalacia. No abnormal enhancement identified.      Impression:          Electronically signed by Rusty Arredondo MD on 05-06-25 at 0621    MRI Brain With & Without Contrast [[id number]] Collected: 05/06/25 0602     Updated: 05/06/25 0602    Narrative:        Patient: PETER MORALES  Time Out: 06:02  Exam(s): MRI HEAD W WO Contrast     EXAM:    MR Head Without and With Intravenous Contrast    CLINICAL HISTORY:     Reason for exam: weakness with atrophy and balance.    TECHNIQUE:    Magnetic resonance images of the head brain without and with   intravenous contrast in multiple planes.    COMPARISON:    No relevant prior studies available.    FINDINGS:    Brain:  White matter nonspecific changes statistically likely due to   chronic microvascular ischemia.  Parenchymal structures otherwise   unremarkable.  No acute intracranial hemorrhage or mass effect.    Ventricles:  Unremarkable.  No ventriculomegaly.    Bones joints:  Unremarkable.  No acute fracture.    Sinuses:  Unremarkable as visualized.  No acute sinusitis.    Mastoid air cells:  Unremarkable as visualized.  No mastoid effusion.    Orbits:  Unremarkable as visualized.    IMPRESSION:       1.  No acute intracranial abnormality.  2.  See additional findings above.      Impression:          Electronically signed by Karl Fisher MD on 05-06-25 at 0602          ECG/EMG Results (last 24 hours)       Procedure Component Value Units Date/Time    Telemetry Scan  [[id number]] Resulted: 05/05/25     Updated: 05/05/25 1610          Orders (last 24 hrs)        Start     Ordered    05/07/25 0600  CBC (No Diff)  Daily       05/06/25 0947    05/07/25 0600  Basic Metabolic Panel  Daily       05/06/25 0947    05/06/25 1225  Diet: Cardiac, Diabetic; Healthy Heart (2-3 Na+); Consistent Carbohydrate; Fluid Consistency: Thin (IDDSI 0)  Diet Effective Now         05/06/25 1224    05/06/25 1225  CT Thoracic Spine Without Contrast  [facility]        [facility]    05/[facility]/25 1224    05/06/25 1225  CT Lumbar Spine Without Contrast  1 Time [facility]        [facility]    05/06/25 1224    05/06/25 1222  Inpatient Cardiology Consult  Once        Specialty:  Cardiology  Provider:  (Not yet assigned)    05/06/25 1221    05/06/25 0940  Initiate Alcohol Withdrawal Protocol  Once         05/06/25 0940    05/06/25 0940  Vital Signs  Per Hospital Policy/Protocol         05/06/25 0940    05/06/25 0940  Continuous Pulse Oximetry  Continuous         05/06/25 0940    05/06/25 0940  Obtain Baseline Clinical [place] Withdrawal Assessment - Ar (CIWA-Ar), Sedation Scale & Vital Signs  Once        Comments: Follow CIWA Scoring Reference Guide    05/06/25 0940    05/06/25 0940  Clinical [place] Withdrawal Assessment (CIWA-Ar)  Per Hospital Policy/Protocol         05/06/25 0940    05/06/25 0940  If CIWA-Ar Score Less Than 8 For 3 Consecutive Assessments, Monitor Every 4 Hours & Discontinue Assessment When CIWA-Ar Less Than 8 for 24 Hours  Per Hospital Policy/Protocol        Comments: Contact Provider to Restart Protocol if Any Symptoms Reappear    05/06/25 0940    05/06/25 0940  Seizure Precautions  Continuous         05/06/25 0940    05/06/25 0940  Notify Provider - Withdrawal  Continuous        Comments: Open Order Report to View Parameters Requiring Provider Notification    05/06/25 0940    05/06/25 0940  Notify Provider of Abnormal Lab Results  Continuous        Comments: Open  Order Report to View Parameters Requiring Provider Notification    05/06/25 0940    05/06/25 0940  Notify Provider - Vitals  Continuous        Comments: Open Order Report to View Parameters Requiring Provider Notification    05/06/25 0940    05/06/25 0940  Safety Precautions  Continuous         05/06/25 0940    05/06/25 0850  Cryoglobulin  Once         05/05/25 2142    05/06/25 0815  magnesium sulfate 4g/100mL (PREMIX) infusion  Once         05/06/25 0718    05/06/25 0800  ferrous sulfate tablet 325 mg  Daily With Breakfast         05/05/25 1410    05/06/25 0718  PT Consult: Eval & Treat Functional Mobility Below Baseline  Once        Comments: Reason Why PT Needed: new onset    05/06/25 0717    05/06/25 0718  OT Consult: Eval & Treat ADL Performance Below Baseline  Once        Comments: Reason Why ot Needed: new onset    05/06/25 0717    05/06/25 0717  Potassium Replacement - Follow Nurse / BPA Driven Protocol  As Needed         05/06/25 0717    05/06/25 0717  Magnesium Standard Dose Replacement - Follow Nurse / BPA Driven Protocol  As Needed         05/06/25 0717    05/06/25 0717  Phosphorus Replacement - Follow Nurse / BPA Driven Protocol  As Needed         05/06/25 0717    05/06/25 0717  Calcium Replacement - Follow Nurse / BPA Driven Protocol  As Needed         05/06/25 0717    05/06/25 0615  gadobenate dimeglumine (MULTIHANCE) injection 20 mL  Once in Imaging         05/06/25 0525    05/06/25 0600  Sedimentation Rate  Morning Draw         05/05/25 1515    05/06/25 0600  Methylmalonic Acid, Serum  Once         05/05/25 2142    05/06/25 0600  Protein Elec + Interp, Serum  Once         05/05/25 2142 05/06/25 0600  Rheumatoid Factor  Once         05/05/25 2142 05/06/25 0600  Vitamin B1, Whole Blood  Once         05/05/25 2142 05/06/25 0600  Vitamin B6  Once         05/05/25 2142 05/06/25 0001  NPO Diet NPO Type: Sips with Meds, Ice Chips  Diet Effective Midnight,   Status:  Canceled         05/05/25  1410 05/05/25 2100  pantoprazole (PROTONIX) EC tablet 40 mg  2 Times Daily         05/05/25 1410 05/05/25 2100  rosuvastatin (CRESTOR) tablet 10 mg  Daily         05/05/25 1410 05/05/25 2100  sodium chloride 0.9 % flush 10 mL  Every 12 Hours Scheduled         05/05/25 1410 05/05/25 2100  gabapentin (NEURONTIN) capsule 300 mg  2 Times Daily,   Status:  Discontinued         05/05/25 1941    05/05/25 1800  Oral Care  2 Times Daily       05/05/25 1410 05/05/25 1600  Vital Signs  Every 4 Hours       05/05/25 1410 05/05/25 1516  CT Chest With Contrast Diagnostic  1 Time Imaging         05/05/25 1515 05/05/25 1516  CT Abdomen Pelvis With & Without Contrast  1 Time Imaging         05/05/25 1515 05/05/25 1515  Rheumatoid Factor  Once,   Status:  Canceled         05/05/25 1515 05/05/25 1515  HIV-1 & HIV-2 Antibodies  Once         05/05/25 1515 05/05/25 1515  RPR Qualitative with Reflex to Quant  Once         05/05/25 1515 05/05/25 1515  Cryoglobulin  Once,   Status:  Canceled         05/05/25 1515 05/05/25 1515  HIV-1 / O / 2 Ag / Antibody  PROCEDURE ONCE         05/05/25 1515 05/05/25 1514  Vitamin B1, Whole Blood  Once,   Status:  Canceled         05/05/25 1515    05/05/25 1514  Vitamin B6  Once,   Status:  Canceled         05/05/25 1515 05/05/25 1514  Protein Elec + Interp, Serum  Once,   Status:  Canceled         05/05/25 1515    05/05/25 1500  vitamin B-12 (CYANOCOBALAMIN) tablet 1,000 mcg  Daily         05/05/25 1410 05/05/25 1500  thiamine (VITAMIN B-1) tablet 100 mg  Daily         05/05/25 1410 05/05/25 1500  folic acid (FOLVITE) tablet 1,000 mcg  Daily         05/05/25 1410 05/05/25 1500  allopurinol (ZYLOPRIM) tablet 300 mg  Daily         05/05/25 1410 05/05/25 1500  metoprolol succinate XL (TOPROL-XL) 24 hr tablet 25 mg  Daily         05/05/25 1410    05/05/25 1500  lisinopril (PRINIVIL,ZESTRIL) tablet 5 mg  Daily         05/05/25 1410 05/05/25 1437  Vitamin  "B12  Once         05/05/25 1436    05/05/25 1437  Folate  Once         05/05/25 1436    05/05/25 1437  Methylmalonic Acid, Serum  Once,   Status:  Canceled         05/05/25 1436    05/05/25 1410  Fall Precautions  Continuous         05/05/25 1410    05/05/25 1408  MRI Brain With & Without Contrast  1 Time Imaging         05/05/25 1410    05/05/25 1408  MRI Cervical Spine With & Without Contrast  1 Time Imaging         05/05/25 1410    05/05/25 1408  MRI Thoracic Spine With & Without Contrast  1 Time Imaging         05/05/25 1410    05/05/25 1408  MRI Lumbar Spine With & Without Contrast  1 Time Imaging         05/05/25 1410    05/05/25 1406  Bowel Regimen Not Indicated  Once         05/05/25 1410    05/05/25 1406  Inpatient Neurosurgery Consult  Once        Specialty:  Neurosurgery  Provider:  Abel Perez MD    05/05/25 1410    05/05/25 1405  ondansetron ODT (ZOFRAN-ODT) disintegrating tablet 4 mg  Every 6 Hours PRN        Placed in \"Or\" Linked Group    05/05/25 1410    05/05/25 1405  ondansetron (ZOFRAN) injection 4 mg  Every 6 Hours PRN        Placed in \"Or\" Linked Group    05/05/25 1410    05/05/25 1405  acetaminophen (TYLENOL) tablet 650 mg  Every 4 Hours PRN        Placed in \"Or\" Linked Group    05/05/25 1410    05/05/25 1405  acetaminophen (TYLENOL) 160 MG/5ML oral solution 650 mg  Every 4 Hours PRN        Placed in \"Or\" Linked Group    05/05/25 1410    05/05/25 1405  acetaminophen (TYLENOL) suppository 650 mg  Every 4 Hours PRN        Placed in \"Or\" Linked Group    05/05/25 1410    05/05/25 1405  Magnesium  STAT         05/05/25 1410    05/05/25 1405  Procalcitonin  STAT         05/05/25 1410    05/05/25 1405  TSH  STAT         05/05/25 1410    05/05/25 1404  C-reactive Protein  STAT         05/05/25 1410    05/05/25 1404  CK  STAT         05/05/25 1410    05/05/25 1404  CBC Auto Differential  STAT         05/05/25 1410    05/05/25 1404  Comprehensive Metabolic Panel  STAT         05/05/25 1410    " 05/05/25 1402  Code Status and Medical Interventions: CPR (Attempt to Resuscitate); Full Support  Continuous         05/05/25 1410    05/05/25 1402  Place Sequential Compression Device  Once         05/05/25 1410    05/05/25 1402  Maintain Sequential Compression Device  Continuous         05/05/25 1410    05/05/25 1402  Continuous Cardiac Monitoring  Continuous        Comments: Follow Standing Orders As Outlined in Process Instructions (Open Order Report to View Full Instructions)    05/05/25 1410    05/05/25 1402  Maintain IV Access  Continuous         05/05/25 1410    05/05/25 1402  Telemetry - Place Orders & Notify Provider of Results When Patient Experiences Acute Chest Pain, Dysrhythmia or Respiratory Distress  Continuous        Comments: Open Order Report to View Parameters Requiring Provider Notification    05/05/25 1410    05/05/25 1402  May Be Off Telemetry for Tests  Continuous         05/05/25 1410    05/05/25 1402  Diet: Cardiac; Healthy Heart (2-3 Na+); Fluid Consistency: Thin (IDDSI 0)  Diet Effective Now,   Status:  Canceled         05/05/25 1410    05/05/25 1402  Inpatient Neurology Consult General  Once        Specialty:  Neurology  Provider:  David Herman MD    05/05/25 1410    05/05/25 1401  nitroglycerin (NITROSTAT) SL tablet 0.4 mg  Every 5 Minutes PRN         05/05/25 1410    05/05/25 1401  Intake & Output  Every Shift       05/05/25 1410    05/05/25 1401  Weigh Patient  Once         05/05/25 1410    05/05/25 1401  Insert Peripheral IV  Once         05/05/25 1410    05/05/25 1401  Saline Lock & Maintain IV Access  Continuous,   Status:  Canceled         05/05/25 1410    05/05/25 1401  Inpatient Admission  Once         05/05/25 1410    05/05/25 1400  sodium chloride 0.9 % flush 10 mL  As Needed         05/05/25 1410    05/05/25 1400  sodium chloride 0.9 % infusion 40 mL  As Needed         05/05/25 1410    05/05/25 1355  gabapentin (NEURONTIN) capsule 300 mg  2 Times Daily PRN          25 1410    Unscheduled  Obtain Pre & Post Sedation Scores With Every Sedative Dose - Hold For POSS Greater Than 2 or RASS of -3 or Less  As Needed       25 0940                  Operative/Procedure Notes (last 24 hours)  Notes from 25 1232 through 25 1232   No notes of this type exist for this encounter.          Physician Progress Notes (last 24 hours)        Lucy Amezcua,  at 25 0934              Name: Mo Willoughby ADMIT: 2025   : 1955  PCP: Jenn Davison APRN    MRN: [id number] LOS: 1 days   AGE/SEX: 69 y.o. male  ROOM[facility]     Subjective   Subjective   2025  Patient seen and examined at bedside, he does not appear to be in distress.  Still admits to left-sided weakness but states symptoms slightly improved.      Objective   Objective   Vital Signs  Temp:  [98.1 °F (36.7 °C)-98.4 °F (36.9 °C)] 98.4 °F (36.9 °C)  Heart Rate:  [] 90  Resp:  [16] 16  BP: (138-153)/(82-97) 152/94  SpO2:  [96 %-100 %] 98 %  on   ;   Device (Oxygen Therapy): room air  Body mass index is 32.6 kg/m².  Physical Exam  Constitutional:       General: He is not in acute distress.     Appearance: Normal appearance.   HENT:      Head: Normocephalic and atraumatic.      Nose: No congestion.      Mouth/Throat:      Pharynx: No oropharyngeal exudate.   Eyes:      General: No scleral icterus.  Cardiovascular:      Rate and Rhythm: Normal rate and regular rhythm.      Heart sounds: No murmur heard.     No friction rub. No gallop.   Pulmonary:      Effort: No respiratory distress.      Breath sounds: No wheezing, rhonchi or rales.   Abdominal:      General: There is no distension.      Tenderness: There is no abdominal tenderness. There is no guarding.   Musculoskeletal:         General: No swelling, deformity or signs of injury.      Cervical back: No rigidity.   Skin:     Coloration: Skin is not jaundiced.      Findings: No bruising or lesion.   Neurological:      Mental Status: He  "is alert.      Comments: Left arm and leg weakness, left foot drop       Results Review     I reviewed the patient's new clinical results.  Results from last 7 days   Lab Units 05/05/25  1540   WBC 10*3/mm3 4.03   HEMOGLOBIN g/dL 12.6*   PLATELETS 10*3/mm3 97*     Results from last 7 days   Lab Units 05/05/25  1540   SODIUM mmol/L 139   POTASSIUM mmol/L 4.5   CHLORIDE mmol/L 105   CO2 mmol/L 22.4   BUN mg/dL 15   CREATININE mg/dL 1.18   GLUCOSE mg/dL 85   EGFR mL/min/1.73 67.6     Results from last 7 days   Lab Units 05/05/25  1540   ALBUMIN g/dL 4.1   BILIRUBIN mg/dL 0.7   ALK PHOS U/L 81   AST (SGOT) U/L 61*   ALT (SGPT) U/L 31     Results from last 7 days   Lab Units 05/05/25  1540   CALCIUM mg/dL 10.3   ALBUMIN g/dL 4.1   MAGNESIUM mg/dL 1.2*     Results from last 7 days   Lab Units 05/05/25  1540   PROCALCITONIN ng/mL 0.16     No results found for: \"HGBA1C\", \"POCGLU\"    MRI Lumbar Spine With & Without Contrast  Result Date: 5/6/2025  Electronically signed by Rusty Arredondo MD on 05-06-25 at 0631    MRI Thoracic Spine With & Without Contrast  Result Date: 5/6/2025  Electronically signed by Rusty Arredondo MD on 05-06-25 at 0624    MRI Cervical Spine With & Without Contrast  Result Date: 5/6/2025  Electronically signed by Rusty Arredondo MD on 05-06-25 at 0621    MRI Brain With & Without Contrast  Result Date: 5/6/2025  Electronically signed by Karl Fisher MD on 05-06-25 at 0602      I have personally reviewed all medications:  Scheduled Medications  allopurinol, 300 mg, Oral, Daily  ferrous sulfate, 325 mg, Oral, Daily With Breakfast  folic acid, 1,000 mcg, Oral, Daily  lisinopril, 5 mg, Oral, Daily  metoprolol succinate XL, 25 mg, Oral, Daily  pantoprazole, 40 mg, Oral, BID  rosuvastatin, 10 mg, Oral, Daily  sodium chloride, 10 mL, Intravenous, Q12H  thiamine, 100 mg, Oral, Daily  vitamin B-12, 1,000 mcg, Oral, Daily    Infusions   Diet  NPO Diet NPO Type: Sips with Meds, Ice Chips    I have personally " reviewed:  [x]  Laboratory   [x]  Microbiology   [x]  Radiology   [x]  EKG/Telemetry  [x]  Cardiology/Vascular   []  Pathology    []  Records      Assessment/Plan     Active Hospital Problems    Diagnosis  POA    **Weakness of left lower extremity [R29.898]  Yes    Acute bilateral low back pain with bilateral sciatica [M54.42, M54.41]  Yes    Tongue fasciculation [R25.3]  Yes    Foot drop, left [M21.372]  Yes    Atrophy of muscle of multiple sites [M62.59]  Yes    Imbalance [R26.89]  Yes    DDD (degenerative disc disease), lumbar [M51.369]  Yes    Essential hypertension, benign [I10]  Yes    CRI (chronic renal insufficiency), stage 3 (moderate) [N18.30]  Yes      Resolved Hospital Problems   No resolved problems to display.       69 y.o. male admitted with Weakness of left lower extremity.    #Left-sided weakness  #Left foot drop    -MRI brain with and without contrast without evidence of ischemia or intracranial abnormality    - MRI cervical spine with and without contrast shows advanced degenerative disc disease at C3/4 level with disc osteophyte complex complicated by severe spinal stenosis with effacement of surrounding subarachnoid space and flattening of the spinal cord, moderate to severe bilateral for minimal stenosis, right greater than left, moderately advanced C4/5 disc disease and posterior disc osteophyte complex complicated by broad-based posterior disc bulge at C6/7 and small fluid signal spinal cord at this level that may represent syrinx or focal myelomalacia    - MRI thoracic spine with and without contrast shows a moderate to large posterior disc protrusion at T12/L1 producing severe spinal stenosis with contact and displacement of the distal spinal cord and crowding of the nerve roots    - MRI lumbar spine with and without contrast shows T12/L1 disc protrusion as above complicated by multilevel lumbar spondylosis with multilevel spinal [place] stenosis and associated grade 1 L4/5  anterolisthesis    - Labs ordered by neurology    - CT chest and CT A/P to rule out head malignancy ordered by neurology    - Neurology considering EMG/nerve conduction study    - Neurosurgery following    #Alcohol use disorder    -Thiamine and folic acid daily    -[facility] protocol    #CKD    -Creatinine 1.18, appears near base    -Renally dose meds    #Anemia    -Hemoglobin 12.6 on admission, baseline around 13.6    - No overt bleeding, follow labs    #Thrombocytopenia    -Platelets 97K this morning, over the years has ranged from 92K to 147K    -No overt bleeding, monitor    -Suspect secondary to chronic alcohol use    #GERD    -Protonix 40 mg p.o. twice daily    #Hypertension    -Continue home lisinopril 5 mg p.o. daily    - Continue Toprol 25 mg p.o. daily    #Hyperlipidemia    -Crestor daily      SCDs for DVT prophylaxis.  Full code.  Discussed with patient.  Anticipate discharge home with HH vs SNU facility timing yet to be determined.  Expected discharge date/ time has not been documented.      Lucy Amezcua DO  [facility]  05/06/25  09:34 EDT     Electronically signed by Lucy Amezcua DO at 05/06/25 0946          Consult Notes (last 24 hours)        Rohini Meza, APRN at 05/05/25 1504        Consult Orders    1. Inpatient Neurosurgery Consult [[id number]] ordered by Ochoa Hart MD at 05/05/25 1410              Attestation signed by Abel Perez MD at 05/06/25 0811      I have reviewed this documentation and agree.                      NEUROSURGERY INPATIENT CONSULT:  Seen in office 5/2 with documentation below.     Cc:complaints of low back and right hip pain.  He was seen by his PCP 4/2/2025.  He had x-rays and first prescribed a Medrol Dosepak as well as Flexeril.  Hip x-ray and  XR SPINE LUMBAR 4-3-25@[facility].  Status post open right L3-5 decompression July 2023 with Dr. Perez.  Last seen in office November 13, 2023.     History of Present Illness     Patient  reports been doing well until rather abruptly about 1 month ago after moving a ladder to his truck, he had onset of R hip pain. Pain has progressed to aman low back and down bilateral legs with standing, walking. He feels weak in his legs. He is requiring a walker to mobilize safely. Some numbness. No incontinence or saddle paresthesia. He has not been to any therapy at this time. He denies benefit from MDP, muscle relaxer. Gabapentin may be helping some.  He does admit to some progressive weakness in his left upper extremity over the last several months to year.  He denies swallow difficulty or weight loss although his wife feels that he has thinned out.     Non-smoker. History of atrial fibrillation, but no use of anticoagulants.  He is on gabapentin 300 mg p.o. twice daily.  No cancer history but has history of hypercalcemia from hyperparathyroidism.     The following portions of the patient's history were reviewed and updated as appropriate: allergies, current medications, past family history, past medical history, past social history, past surgical history, and problem list.    Past History:  Medical History: has a past medical history of Abnormal TSH, Arthritis, Ascending aortic aneurysm, Colon polyp (5/5/22), CRI (chronic renal insufficiency), stage 3 (moderate) (2016), DDD (degenerative disc disease), lumbar, ED (erectile dysfunction), Erectile dysfunction, Essential hypertension, benign, Folic acid deficiency, Gout, Hip pain, right, colonic polyp, Hypercalcemia (2015), Hyperparathyroidism, unspecified (2016), Iron deficiency anemia, Low back pain with bilateral sciatica, Mixed hyperlipidemia (02/27/2023), New onset atrial fibrillation (04/15/2024), Pancreatitis, Risk factors for obstructive sleep apnea, Spinal stenosis of lumbar region with neurogenic claudication, and Syncope and collapse (03/28/2017).   Surgical History: has a past surgical history that includes Colonoscopy; Tonsillectomy; Colonoscopy  (N/A, 10/03/2016); cholecystectomy with intraoperative cholangiogram (N/A, 08/03/2018); Lipoma Excision; Colonoscopy (N/A, 05/05/2022); Esophagogastroduodenoscopy (N/A, 05/05/2022); Epidural (Right, 12/07/2022); Medial Branch Block (Right, 01/23/2023); Medial Branch Block (Right, 02/13/2023); Cholecystectomy; Nerve Surgery (Right, 3/6/2023); Sacroiliac joint injection (Right, 5/5/2023); and lumbar laminectomy discectomy decompression (N/A, 7/7/2023).   Family History: family history includes Breast cancer in his mother; Cancer in his brother, father, and mother; Diabetes in his father; Heart attack in his sister and sister; Hypertension in his brother, father, mother, sister, and sister; Liver cancer in his father; Stroke in his mother.   Social History: reports that he has never smoked. He has never used smokeless tobacco. He reports current alcohol use. He reports that he does not use drugs.    Medications Prior to Admission   Medication Sig Dispense Refill Last Dose/Taking    allopurinol (ZYLOPRIM) 300 MG tablet Take 1 tablet by mouth Daily. 90 tablet 2 5/5/2025    ferrous sulfate 325 (65 FE) MG tablet Take 1 tablet by mouth Daily With Breakfast.   5/5/2025    folic acid (FOLVITE) 1 MG tablet TAKE 1 TABLET BY MOUTH DAILY 90 tablet 1 5/5/2025    gabapentin (NEURONTIN) 300 MG capsule Take 1 capsule by mouth 2 (Two) Times a Day As Needed (neuropathy). 60 capsule 0 5/5/2025    lisinopril (PRINIVIL,ZESTRIL) 10 MG tablet Take 1 tablet by mouth Daily. 90 tablet 2 5/5/2025    lisinopril (PRINIVIL,ZESTRIL) 5 MG tablet Take 1 tablet by mouth Daily. 90 tablet 2 5/5/2025    pantoprazole (PROTONIX) 40 MG EC tablet Take 1 tablet by mouth 2 (Two) Times a Day. 180 tablet 1 5/5/2025    rosuvastatin (CRESTOR) 10 MG tablet TAKE 1 TABLET BY MOUTH DAILY 90 tablet 1 5/4/2025    tadalafil (CIALIS) 5 MG tablet Take 1 tablet by mouth Daily.   5/5/2025    thiamine (VITAMIN B-1) 100 MG tablet  tablet Take 1 tablet by mouth Daily.    5/5/2025    vitamin B-12 (CYANOCOBALAMIN) 1000 MCG tablet Take 1 tablet by mouth Daily. HOLD FOR SURGERY ONE WEEK PRIOR   5/5/2025      Allergies: Zetia [ezetimibe]       ROS:     Left hand weakness, bilateral lower extremity weakness, numbness, back and leg pain, imbalance, no falls, no swallowing difficulty     Objective  Temp:  [98.2 °F (36.8 °C)] 98.2 °F (36.8 °C)  Heart Rate:  [100] 100  Resp:  [16] 16  BP: (153)/(97) 153/97  5/5:  Temp:  [98.2 °F (36.8 °C)] 98.2 °F (36.8 °C)  Heart Rate:  [100] 100  Resp:  [16] 16  BP: (153)/(97) 153/97  There is no height or weight on file to calculate BMI.       Physical Exam  Vitals reviewed.   Constitutional:       Comments: Chronically ill-appearing male in no acute distress.  Pleasant and cooperative.   Eyes:      General: Lids are normal.      Extraocular Movements: Extraocular movements intact.   Pulmonary:      Effort: Pulmonary effort is normal.   Musculoskeletal:      Cervical back: Normal range of motion and neck supple. No pain with movement. Normal range of motion.      Lumbar back: No bony tenderness. Negative right straight leg raise test and negative left straight leg raise test.      Comments: No L'hermittes   Skin:     General: Skin is warm and dry.      Findings: No bruising.   Neurological:      General: No focal deficit present.      Coordination: Romberg sign positive.      Deep Tendon Reflexes:      Reflex Scores:       Tricep reflexes are 2+ on the right side and 3+ on the left side.       Bicep reflexes are 2+ on the right side and 3+ on the left side.       Brachioradialis reflexes are 2+ on the right side and 3+ on the left side.       Patellar reflexes are 3+ on the right side and 3+ on the left side.  Psychiatric:         Mood and Affect: Mood normal.         Thought Content: Thought content normal.         Neurological Exam  Mental Status  Awake, alert and oriented to person, place and time.     Cranial Nerves  CN III, IV, VI: Extraocular  movements intact bilaterally. Normal lids and orbits bilaterally.  CN VII:  Right: There is no facial weakness.  CN IX, X: Palate elevates symmetrically  CN XII: Tongue has fasciculations.     Motor  Decreased muscle bulk throughout. Muscle wasting left upper extremity particularly the hand, supraclavicular. No fasciculations present. Normal muscle tone. No abnormal involuntary movements. Strength is 5/5 in all four extremities except as noted.  Left TA 4 -/5, left quad 4/5.     Sensory  Light touch is normal in upper and lower extremities.      Reflexes                                            Right                      Left  Brachioradialis                    2+                         3+  Biceps                                 2+                         3+  Triceps                                2+                         3+  Patellar                                3+                         3+     Right pathological reflexes: Vicente's absent. Ankle clonus absent.  Left pathological reflexes: Vicente's absent. Ankle clonus absent.     Gait  Casual gait: Unable to ambulate safely without walker. Wide stance. Heel walking abnormality: Left. Not assessed due to fall risk. Romberg is present.              Assessment & Plan  Independent Review of Radiographic Studies:      I personally reviewed the images from the following studies.     Lumbar x-rays 4/3/25 revealed some left lateral translation of L2 on 3 which is stable to maybe slightly progressed from prior x-ray.  DDD at L1/2, L2/3 appears progressed from prior study in 2022.  There is stable anterolisthesis of L4 on 5.     Medical Decision Making:       Patient presents for evaluation of back and bilateral leg pain.  He states he lifted a ladder into his truck and after that he began to experience right hip pain.  This was an 1 month ago.  He saw his PCP and was prescribed Medrol Dosepak and muscle relaxant which did not offer him any relief.  He was also  given gabapentin which he thinks may be helping some with the leg pain.  Unfortunately he has also noticed other symptoms that have progressively worsened including sense of weakness of his legs and fear of falling due to inability to ambulate without assistive device.  He reports imbalance and weakness of his left foot.  On exam in addition to these findings he has some quad weakness on the left and muscle wasting of at least his left hand if not arm and supraclavicular area.  Although his weight has been stable, he looks thin in his upper body.  He seems to have some very mild tongue fasciculations.  His palate raises normal and he denies any swallowing difficulties.  He denies incontinence or sensory changes in his saddle area but he does note some numbness in his legs.  He denies neck pain or upper extremity pain but has noticed some weakness of his hand at least over the last few months.  I am concerned about significant cervical pathology or a progressive neuromuscular disease.  I think he will need an extensive workup with full neuro axis MRIs as well as possibly lumbar puncture if there are no spinal abnormalities seen with the assistance of neurology evaluation.  I implored the patient to let me direct admit him to the hospital today for further evaluation symptoms symptoms have been progressive but he is not willing to consider this today as it is derby we can here in [place] and he wants to spend it at home.  He states he will come to the hospital on Monday for admission and we will work on that.  He has been advised to not do any driving, exertional activity, lifting, being on elevated surfaces.  He is also advised to come immediately to the emergency room if he has any falls, progressive weakness, experiences new incontinence, new numbness, or any other concerning symptom.  His wife attended the appointment with him today and also attempted to get him to agree to admission today but was unable to.   She verbalizes understanding of the plan and red flag signs as well.  I will go ahead and place outpatient neurology urgent referral just to get the process started.  Dr. Perez is out of office today so I discussed the plan with on-call provider, Dr. England.  He agrees.     Diagnoses and all orders for this visit:     1. Acute bilateral low back pain with bilateral sciatica (Primary)     2. Foot drop, left  -     Ambulatory Referral to Neurology     3. Tongue fasciculation  -     Ambulatory Referral to Neurology     4. Imbalance  -     Ambulatory Referral to Neurology     5. Atrophy of muscle of multiple sites  -     Ambulatory Referral to Neurology    Addendum 5/5:  Patient seen and examined today.  He reports no progressive issues over the weekend.  No incontinence.  No falls.  He has been seen by neurology service who has ordered multiple labs.  No changes to exam above except note right hand tremor with pronation and slower with finger-to-nose testing on the left.  Left triceps 4/5.  Will await full neural axis imaging.  Appreciate A admitting patient for workup and neurology service for evaluating as well.      Electronically signed by Abel Perez MD at 05/06/25 0811       David Herman MD at 05/05/25 1456        Consult Orders    1. Inpatient Neurology Consult General [[id number]] ordered by Ochoa Hart MD at 05/05/25 1410                 Neurology Consult Note    Consult Date: 5/5/2025    Referring MD: Phuong Villavicencio MD    Reason for Consult I have been asked to see the patient in neurological consultation to render advice and opinion regarding left leg weakness.    Mo Willoughby is a 69 y.o. right-handed white male with known diagnosis of essential hypertension, arthritis, lumbar spinal degenerative disc disease, hyperlipidemia presented to the hospital from neurosurgery office with concern for progressive weakness and falls.  Patient stated that about a month ago he was carrying a  ladder then suddenly developed left leg weakness, he stated then he developed shooting pain down to his legs more on the left than the right.  With further history he stated that he was having upper extremity weakness more on his hands for over a year and gradually gotten worse to the point that he had difficulty with fine motor skills such as buttons and tying his shoes.  He stated that drinks 3-4 drinks daily since he was 23 years old.  He denied difficulty chewing or swallowing, denied double vision or facial weakness, denied family history of similar disease, denied urinary or fecal incontinence, he still able to feel his private area when he goes to the toilet.  On exam he had incomplete foot drop on the left, decreased light touch and vibration on the left leg up to the knee compared to the right, reflexes 3+ throughout, negative Vicente sign, negative Lhermitte sign, no clonus.  I appreciated muscle wasting on his hands and proximal lower extremities in a pattern concerning for muscular dystrophy.  I did not appreciate tongue or muscle fasciculation on my exam.    Past Medical History:   Diagnosis Date    Abnormal TSH     Arthritis     Ascending aortic aneurysm     Colon polyp 5/5/22    CRI (chronic renal insufficiency), stage 3 (moderate) 2016    from stage 3A to 4    DDD (degenerative disc disease), lumbar     ED (erectile dysfunction)     Erectile dysfunction     Essential hypertension, benign     Folic acid deficiency     Gout     Hip pain, right     Hx of colonic polyp     Hypercalcemia 2015    as far back as data goes so likely pre-dates even this time    Hyperparathyroidism, unspecified 2016    as far back as data goes likely pre-dates even this date, likely multifactorial some primary and some secondary , w/ imaging from 10/16 showing possible L inferior adenoma    Iron deficiency anemia     Low back pain with bilateral sciatica     Mixed hyperlipidemia 02/27/2023    New onset atrial fibrillation  04/15/2024    Pancreatitis     Risk factors for obstructive sleep apnea     Spinal stenosis of lumbar region with neurogenic claudication     Syncope and collapse 03/28/2017       Exam  There were no vitals taken for this visit.  Gen: NAD, muscle wasting appreciated on his hands and proximal thighs, vitals reviewed  MS: oriented x3, recent/remote memory intact, normal attention/concentration, language intact, no neglect.  CN: visual acuity grossly normal, PERRL, EOMI, no facial droop, no dysarthria  Motor: 5-/5 on distal upper extremities, 5/5 on proximal upper extremities, 4+/5 proximal lower extremities, 5/5 distal right lower extremity, 3+/5 left foot dorsiflexion, 4 -/5 left foot plantarflexion, normal tone  Sensory exam: Decreased to light touch, temperature and vibration on the left lower extremity up to the knee normal upper extremities and the right leg.  Reflexes: 3+ throughout, plantars mute, negative Vicente sign, no clonus, negative Lhermitte sign      DATA:    Lab Results   Component Value Date    GLUCOSE 90 04/15/2024    CALCIUM 10.7 (H) 04/15/2024     (H) 04/15/2024    K 4.9 04/15/2024    CO2 20 04/15/2024     (H) 04/15/2024    BUN 13 04/15/2024    CREATININE 1.09 04/15/2024    EGFRIFAFRI 94 09/26/2016    EGFRIFNONA 65 02/07/2022    BCR 12 04/15/2024    ANIONGAP 9.3 06/29/2023     Lab Results   Component Value Date    WBC 5.2 04/15/2024    HGB 13.6 04/15/2024    HCT 39.5 04/15/2024     (H) 04/15/2024     (L) 04/15/2024       Lab review: Above labs reviewed    Imaging review: No recent brain or spine images to review    Diagnoses:  -Generalized weakness with muscle wasting on hands and thighs etiology unclear could be muscular dystrophy versus vitamin deficiency versus myelopathy versus ALS versus alcohol related versus others.      PLAN:   - MRI brain, C, T, L-spine w wo ordered  - Check TSH, B12, folate, B6, thiamine, methylmalonic acid, CK, ESR, CRP, cryoglobulin,  "rheumatoid factor, HIV, RPR, copper, SPEP  - CT chest abdomen pelvis to rule out hidden malignancy  - Depending on the above labs and images, we might consider EMG/nerve conduction study sooner than later    Neurology will continue to follow and advice, thank you for the consult.    Medical Decision Making for this neurology consultation consists of the following:  Review of previous chart, including H/P, provider and nursing notes as applicable.  Review of medications and vital signs.  Review of previous labs, neuroimaging, and additional relevant diagnostics.   Interpretation of laboratory, imaging, and other diagnostic results  Discussion with other providers and family   Total face-to-face/floor time: 30-61 minutes.     \"Dictated utilizing Dragon dictation\".        Electronically signed by David Herman MD at 05/05/25 8311       "

## 2025-05-06 NOTE — THERAPY EVALUATION
Patient Name: Mo Willoughby  : 1955    MRN: [id number]                              Today's Date: 2025       Admit Date: 2025    Visit Dx: No diagnosis found.  Patient Active Problem List   Diagnosis    Erectile dysfunction    Gout    Hypercalcemia    Colon polyp    Sinus tachycardia    Pancreatitis    Other fatigue    Ascending aortic aneurysm    Generalized abdominal pain    Folic acid deficiency    Iron deficiency anemia    Acute bilateral low back pain without sciatica    Swelling of the testicles    Urinary frequency    Hyperparathyroidism, unspecified    CRI (chronic renal insufficiency), stage 3 (moderate)    Essential hypertension, benign    Spinal stenosis of lumbar region    Lumbar radiculitis    Lumbar facet arthropathy    Screening PSA (prostate specific antigen)    Mixed hyperlipidemia    Change in pigmented skin lesion of face    Pain in both testicles    Sacroiliitis    DDD (degenerative disc disease), lumbar    Spondylolisthesis at L4-L5 level    Herniation of lumbar intervertebral disc with radiculopathy    Spinal stenosis of lumbar region with radiculopathy    H/O: gout on allopurinol    Premature atrial contractions    Arthritis of right hip    Right hip pain    Imbalance    Foot drop, left    Atrophy of muscle of multiple sites    Tongue fasciculation    Acute bilateral low back pain with bilateral sciatica    Weakness of left lower extremity     Past Medical History:   Diagnosis Date    Abnormal TSH     Arthritis     Ascending aortic aneurysm     Colon polyp 22    CRI (chronic renal insufficiency), stage 3 (moderate) 2016    from stage 3A to 4    DDD (degenerative disc disease), lumbar     ED (erectile dysfunction)     Erectile dysfunction     Essential hypertension, benign     Folic acid deficiency     Gout     Hip pain, right     Hx of colonic polyp     Hypercalcemia     as far back as data goes so likely pre-dates even this time    Hyperparathyroidism, unspecified      as far back as data goes likely pre-dates even this date, likely multifactorial some primary and some secondary , w/ imaging from 10/16 showing possible L inferior adenoma    Iron deficiency anemia     Low back pain with bilateral sciatica     Mixed hyperlipidemia 02/27/2023    New onset atrial fibrillation 04/15/2024    Pancreatitis     Risk factors for obstructive sleep apnea     Spinal stenosis of lumbar region with neurogenic claudication     Syncope and collapse 03/28/2017     Past Surgical History:   Procedure Laterality Date    CHOLECYSTECTOMY      CHOLECYSTECTOMY WITH INTRAOPERATIVE CHOLANGIOGRAM N/A 08/03/2018    Procedure: CHOLECYSTECTOMY LAPAROSCOPIC INTRAOPERATIVE CHOLANGIOGRAM;  Surgeon: Melina Kate MD;  Location: [facility] MAIN OR;  Service: General    COLONOSCOPY      COLONOSCOPY N/A 10/03/2016    Procedure: COLONOSCOPY TO CECUM TO TERMINAL ILIUM WITH COLD BIOPSY POLYPECTOMY;  Surgeon: Benoit Vilchis MD;  Location: [facility] ENDOSCOPY;  Service:     COLONOSCOPY N/A 05/05/2022    Procedure: COLONOSCOPY TO CECUM WITH COLD SNARE POLYPECTOMIES & RESOLUTION CLIP PLACEMENT X 2;  Surgeon: Benoit Mosley MD;  Location: [facility] ENDOSCOPY;  Service: Gastroenterology;  Laterality: N/A;  ANEMIA/POLYPS, HEMORRHOIDS     ENDOSCOPY N/A 05/05/2022    Procedure: ESOPHAGOGASTRODUODENOSCOPY WITH BX;  Surgeon: Benoit Mosley MD;  Location: [facility] ENDOSCOPY;  Service: Gastroenterology;  Laterality: N/A;  ANEMIA/PORTAL GASTROPATHY, EROSIVE GASTRITIS     EPIDURAL Right 12/07/2022    Procedure: LUMBAR/SACRAL TRANSFORAMINAL EPIDURAL Right L2-3 and right L4-5;  Surgeon: Isaura Torres MD;  [place] [facility] [place];  Service: Pain Management;  Laterality: Right;    LIPOMA EXCISION      LUMBAR LAMINECTOMY DISCECTOMY DECOMPRESSION N/A 7/7/2023    Procedure: Open right sided lumbar decompression lumbar three/four, lumbar four/five;  Surgeon: Abel Perez MD;  Location: [facility] MAIN OR;  Service:  Neurosurgery;  Laterality: N/A;    MEDIAL BRANCH BLOCK Right 01/23/2023    Procedure: LUMBAR MEDIAL BRANCH BLOCK RIGHT L3-L5 #1;  Surgeon: Isaura Torres MD;  Location: [facility] MAIN [facility];  Service: Pain Management;  Laterality: Right;    MEDIAL BRANCH BLOCK Right 02/13/2023    Procedure: LUMBAR MEDIAL BRANCH BLOCK RIGHT L3-L5 #1;  Surgeon: Isaura Torres MD;  Location: [facility]IN [facility];  Service: Pain Management;  Laterality: Right;    NERVE SURGERY Right 3/6/2023    Procedure: LUMBAR RADIOFREQUENCY ABLATION RIGHT L3-L5  18097, 89340;  Surgeon: Isaura Torres MD;  Location: [facility]IN [facility];  Service: Pain Management;  Laterality: Right;    SACROILIAC JOINT INJECTION Right 5/5/2023    Procedure: SACROILIAC JOINT INJECTION RIGHT 57158;  Surgeon: Isaura Torres MD;  Location: [facility]IN [facility];  Service: Pain Management;  Laterality: Right;    TONSILLECTOMY        General Information       Row Name 05/06/25 0934          OT Time and Intention    Subjective Information complains of;weakness;fatigue  -RB     Document Type evaluation  -RB     Mode of Treatment individual therapy;occupational therapy  -RB     Patient Effort good  -RB       Row Name 05/06/25 0934          General Information    Patient Profile Reviewed yes  -RB     Prior Level of Function independent:;ADL's;transfer  -RB     Existing Precautions/Restrictions fall  -RB     Barriers to Rehab none identified  -RB       Row Name 05/06/25 0934          Living Environment    Current Living Arrangements home  -RB     People in Home significant other  -RB       Row Name 05/06/25 0934          Cognition    Orientation Status (Cognition) oriented x 4  -RB       Row Name 05/06/25 0934          Safety Issues/Impairments Affecting Functional Mobility    Safety Issues Affecting Function (Mobility) safety precaution awareness;safety precautions follow-through/compliance;insight into deficits/self-awareness;awareness of need for assistance  -RB     Impairments Affecting  Function (Mobility) balance;coordination;endurance/activity tolerance;strength;sensation/sensory awareness  -RB               User Key  (r) = Recorded By, (t) = Taken By, (c) = Cosigned By      Initials Name Provider Type    RB Shy Mcleod OTR/L, CSRS Occupational Therapist                     Mobility/ADL's       Row Name 05/06/25 0935          Bed Mobility    Bed Mobility supine-sit;sit-supine  -RB     Supine-Sit Dolores (Bed Mobility) standby assist  -RB     Sit-Supine Dolores (Bed Mobility) standby assist  -RB       Row Name 05/06/25 0935          Transfers    Transfers sit-stand transfer;stand-sit transfer  -RB       Row Name 05/06/25 0935          Sit-Stand Transfer    Sit-Stand Dolores (Transfers) standby assist  -RB     Assistive Device (Sit-Stand Transfers) walker, front-wheeled  -RB       Row Name 05/06/25 0935          Stand-Sit Transfer    Stand-Sit Dolores (Transfers) standby assist  -RB     Assistive Device (Stand-Sit Transfers) walker, front-wheeled  -RB       Row Name 05/06/25 0935          Functional Mobility    Functional Mobility- Ind. Level standby assist  -RB     Functional Mobility- Device walker, front-wheeled  -RB     Functional Mobility- Safety Issues step length decreased;balance decreased during turns  -RB       Row Name 05/06/25 0935          Activities of Daily Living    BADL Assessment/Intervention lower body dressing;toileting  -RB       Row Name 05/06/25 0935          Lower Body Dressing Assessment/Training    [place] Level (Lower Body Dressing) lower body dressing skills;set up  -RB     Position (Lower Body Dressing) edge of bed sitting  -RB       Row Name 05/06/25 0935          Toileting Assessment/Training    [place] Level (Toileting) toileting skills;standby assist  -RB     Position (Toileting) unsupported sitting;unsupported standing  -RB               User Key  (r) = Recorded By, (t) = Taken By, (c) = Cosigned By      Initials Name Provider  Type    RB Shy Mcleod OTR/L, CALLY Occupational Therapist                   Obj/Interventions       Row Name 05/06/25 0938          Sensory Assessment (Somatosensory)    Sensory Assessment The pt reports decreased sensation in his L hand and distal LLE  -RB       Row Name 05/06/25 0938          Vision Assessment/Intervention    Visual Impairment/Limitations WFL  -RB       Row Name 05/06/25 0938          Range of Motion Comprehensive    Comment, General Range of Motion BUE WFL, limited LLE plantar/dorsi movements  -RB       Row Name 05/06/25 0938          Strength Comprehensive (MMT)    Comment, General Manual Muscle Testing (MMT) Assessment L side mildly weaker than his R side  -RB       Row Name 05/06/25 0938          Motor Skills    Motor Skills coordination  -RB     Coordination fine motor deficit;minimal impairment  Low tone present in the pt's L hand, specifically his scruggs space. He endorses droppage of items in his L hand at home.  -RB       Row Name 05/06/25 0938          Balance    Comment, Balance SBA + walker, appears generally unsteady with impaired coordination mobilizing to the bathroom but no LOB appreciated. Of note, he recently had to use a walker due to falls.  -RB               User Key  (r) = Recorded By, (t) = Taken By, (c) = Cosigned By      Initials Name Provider Type    Shy Webster OTR/L, CALLY Occupational Therapist                   Goals/Plan       Row Name 05/06/25 0940          Bed Mobility Goal 1 (OT)    Activity/Assistive Device (Bed Mobility Goal 1, OT) bed mobility activities, all  -RB     [place] Level/Cues Needed (Bed Mobility Goal 1, OT) modified independence  -RB     Time Frame (Bed Mobility Goal 1, OT) short term goal (STG);2 weeks  -RB     Progress/Outcomes (Bed Mobility Goal 1, OT) new goal  -RB       Row Name 05/06/25 0940          Transfer Goal 1 (OT)    Activity/Assistive Device (Transfer Goal 1, OT) transfers, all  -RB     [place] Level/Cues Needed  (Transfer Goal 1, OT) modified independence  -RB     Time Frame (Transfer Goal 1, OT) short term goal (STG);2 weeks  -RB     Progress/Outcome (Transfer Goal 1, OT) new goal  -RB       Row Name 05/06/25 0940          Bathing Goal 1 (OT)    Activity/Device (Bathing Goal 1, OT) bathing skills, all  -RB     [place] Level/Cues Needed (Bathing Goal 1, OT) modified independence  -RB     Time Frame (Bathing Goal 1, OT) short term goal (STG);2 weeks  -RB     Progress/Outcomes (Bathing Goal 1, OT) new goal  -RB       Row Name 05/06/25 0940          Dressing Goal 1 (OT)    Activity/Device (Dressing Goal 1, OT) dressing skills, all  -RB     [place]/Cues Needed (Dressing Goal 1, OT) modified independence  -RB     Time Frame (Dressing Goal 1, OT) short term goal (STG);2 weeks  -RB     Progress/Outcome (Dressing Goal 1, OT) new goal  -RB       Row Name 05/06/25 0940          Toileting Goal 1 (OT)    Activity/Device (Toileting Goal 1, OT) toileting skills, all  -RB     [place] Level/Cues Needed (Toileting Goal 1, OT) modified independence  -RB     Time Frame (Toileting Goal 1, OT) short term goal (STG);2 weeks  -RB     Progress/Outcome (Toileting Goal 1, OT) new goal  -RB       Row Name 05/06/25 0940          Grooming Goal 1 (OT)    Activity/Device (Grooming Goal 1, OT) grooming skills, all  -RB     [place] (Grooming Goal 1, OT) modified independence  -RB     Time Frame (Grooming Goal 1, OT) short term goal (STG);2 weeks  -RB     Progress/Outcome (Grooming Goal 1, OT) new goal  -RB       Row Name 05/06/25 0940          Self-Feeding Goal 1 (OT)    Activity/Device (Self-Feeding Goal 1, OT) self-feeding skills, all  -RB     [place] Level/Cues Needed (Self-Feeding Goal 1, OT) modified independence  -RB     Time Frame (Self-Feeding Goal 1, OT) short term goal (STG);2 weeks  -RB     Progress/Outcomes (Self-Feeding Goal 1, OT) new goal  -RB       Row Name 05/06/25 0940          Therapy Assessment/Plan (OT)     Planned Therapy Interventions (OT) transfer/mobility retraining;strengthening exercise;ROM/therapeutic exercise;activity tolerance training;BADL retraining;functional balance retraining;occupation/activity based interventions;patient/caregiver education/training;neuromuscular control/coordination retraining  -RB               User Key  (r) = Recorded By, (t) = Taken By, (c) = Cosigned By      Initials Name Provider Type    RB Shy Mcleod, OTR/L, CSRS Occupational Therapist                   Clinical Impression       Row Name 05/06/25 0939          Pain Assessment    Pretreatment Pain Rating 0/10 - no pain  -RB     Posttreatment Pain Rating 0/10 - no pain  -RB       Row Name 05/06/25 0939          Plan of Care Review    Plan of Care Reviewed With patient  -RB     Progress improving  -RB     Outcome Evaluation The pt was admitted to [facility] 2/2 to L side weakness. Pmhx significant for hypertension, chronic kidney insufficiency, history of lumbar disc herniation with L3-5 lumbar decompression in 7/2023, ascending aortic aneurysm aneurysm, gout, questionable history of atrial fibrillation. The pt reports living in a single level home with his significant other. He is independent at baseline but reports increased L side weakness, falls and balance deficits over the last week. Today, the pt completed bed mobility with SBA. S/S LBD. He stood with SBA + walker and mobilized into the bathroom with the same level of assistance. He returned to supine in the bed. The pt was visualized to have low tone in his L hand compared to R, specifically in his L scruggs space. He endorses impaired L hand coordination and droppage of items when completing in high level tasks in his home. He also demonstrated impaired LLE dorsi/plantar movements. JULIA recs pending, OT to continue following for d/c recommendations.  -RB       Row Name 05/06/25 0939          Therapy Assessment/Plan (OT)    Rehab Potential (OT) good  -RB     Criteria for Skilled  Therapeutic Interventions Met (OT) skilled treatment is necessary  -RB     Therapy Frequency (OT) 5 times/wk  -RB       Row Name 05/06/25 0939          Therapy Plan Review/Discharge Plan (OT)    Anticipated Discharge Disposition (OT) other (see comments)  TBD pending progress and JULIA recs.  -RB       Row Name 05/06/25 0939          Positioning and Restraints    Pre-Treatment Position in bed  -RB     Post Treatment Position bed  -RB     In Bed notified nsg;supine;encouraged to call for assist;exit alarm on;call light within reach;SCD pump applied  -RB               User Key  (r) = Recorded By, (t) = Taken By, (c) = Cosigned By      Initials Name Provider Type    Shy Webster OTR/L, CALLY Occupational Therapist                   Outcome Measures       Row Name 05/06/25 0940          How much help from another is currently needed...    Putting on and taking off regular lower body clothing? 4  -RB     Bathing (including washing, rinsing, and drying) 4  -RB     Toileting (which includes using toilet bed pan or urinal) 4  -RB     Putting on and taking off regular upper body clothing 4  -RB     Taking care of personal grooming (such as brushing teeth) 4  -RB     Eating meals 4  -RB     AM-PAC 6 Clicks Score (OT) 24  -RB       Row Name 05/06/25 0940          Modified Ratna Scale    Modified [place] Scale 2 - Slight disability.  Unable to carry out all previous activities but able to look after own affairs without assistance.  -RB       Row Name 05/06/25 0940          Functional Assessment    Outcome Measure Options AM-PAC 6 Clicks Daily Activity (OT);Modified [place]  -RB               User Key  (r) = Recorded By, (t) = Taken By, (c) = Cosigned By      Initials Name Provider Type    Shy Webster OTR/L, HOWARDS Occupational Therapist                      OT Recommendation and Plan  Planned Therapy Interventions (OT): transfer/mobility retraining, strengthening exercise, ROM/therapeutic exercise, activity  tolerance training, BADL retraining, functional balance retraining, occupation/activity based interventions, patient/caregiver education/training, neuromuscular control/coordination retraining  Therapy Frequency (OT): 5 times/wk  Plan of Care Review  Plan of Care Reviewed With: patient  Progress: improving  Outcome Evaluation: The pt was admitted to [facility] 2/2 to L side weakness. Pmhx significant for hypertension, chronic kidney insufficiency, history of lumbar disc herniation with L3-5 lumbar decompression in 7/2023, ascending aortic aneurysm aneurysm, gout, questionable history of atrial fibrillation. The pt reports living in a single level home with his significant other. He is independent at baseline but reports increased L side weakness, falls and balance deficits over the last week. Today, the pt completed bed mobility with SBA. S/S LBD. He stood with SBA + walker and mobilized into the bathroom with the same level of assistance. He returned to supine in the bed. The pt was visualized to have low tone in his L hand compared to R, specifically in his L scruggs space. He endorses impaired L hand coordination and droppage of items when completing in high level tasks in his home. He also demonstrated impaired LLE dorsi/plantar movements. JULIA recs pending, OT to continue following for d/c recommendations.     Time Calculation:   Evaluation Complexity (OT)  Review Occupational Profile/Medical/Therapy History Complexity: expanded/moderate complexity  Assessment, Occupational Performance/Identification of Deficit Complexity: 3-5 performance deficits  Clinical Decision Making Complexity (OT): detailed assessment/moderate complexity  Overall Complexity of Evaluation (OT): moderate complexity     Time Calculation- OT       Row Name 05/06/25 0934             Time Calculation- OT    OT Start Time 0850  -RB      OT Stop Time 0920  -RB      OT Time Calculation (min) 30 min  -RB      Total Timed Code Minutes- OT 23 minute(s)  -RB       OT Received On 05/06/25  -RB      OT - Next Appointment 05/07/25  -RB      OT Goal Re-Cert Due Date 05/20/25  -RB         Timed Charges    32380 - OT Self Care/Mgmt Minutes 23  -RB         Untimed Charges    OT Eval/Re-eval Minutes 7  -RB         Total Minutes    Timed Charges Total Minutes 23  -RB      Untimed Charges Total Minutes 7  -RB       Total Minutes 30  -RB                User Key  (r) = Recorded By, (t) = Taken By, (c) = Cosigned By      Initials Name Provider Type    RB Shy Mcleod OTR/L, CSRS Occupational Therapist                  Therapy Charges for Today       Code Description Service Date Service Provider Modifiers Qty    76128434849 HC OT SELF CARE/MGMT/TRAIN EA 15 MIN 5/6/2025 Shy Mcleod OTR/L, CSRS GO 2    [id number] HC OT EVAL MOD COMPLEXITY 2 5/6/2025 Shy Mcleod OTR/L, CSRS GO 1                 Shy Shani, OTR/L, CSRS  5/6/2025

## 2025-05-06 NOTE — PROGRESS NOTES
[facility] CERVICAL PROGRESS NOTE      CC:weakness, imbalance      Subjective     Interval History:  had full neural axis MRIs.  No progressive issues.  Denies swallow difficulty, speech changes, still reporting imbalance, weakness in legs, left foot drop, difficulty with dexterity left upper extremity.  Denies saddle paresthesia or incontinence, difficulty voiding    ROS:  Constitutional: No fever, chills  Musculoskeletal: no neck pain, no back pain, no leg pain  GI: no swallow difficulties  Neuro: Bilateral lower extremity weakness, left upper extremity weakness, balance difficulties  : no difficulty voiding, no incontinence    Objective     Vital signs in last 24 hours:  Temp:  [98.1 °F (36.7 °C)-98.4 °F (36.9 °C)] 98.4 °F (36.9 °C)  Heart Rate:  [] 90  Resp:  [16] 16  BP: (138-153)/(82-97) 152/94    Intake/Output this shift:  No intake/output data recorded.    LABS:  Results from last 7 days   Lab Units 05/05/25  1540   WBC 10*3/mm3 4.03   HEMOGLOBIN g/dL 12.6*   HEMATOCRIT % 37.9   PLATELETS 10*3/mm3 97*     Results from last 7 days   Lab Units 05/05/25  1540   SODIUM mmol/L 139   POTASSIUM mmol/L 4.5   CHLORIDE mmol/L 105   CO2 mmol/L 22.4   BUN mg/dL 15   CREATININE mg/dL 1.18   CALCIUM mg/dL 10.3   BILIRUBIN mg/dL 0.7   ALK PHOS U/L 81   ALT (SGPT) U/L 31   AST (SGOT) U/L 61*   GLUCOSE mg/dL 85     Results from last 7 days   Lab Units 05/06/25  0643   SED RATE mm/hr 5     Results from last 7 days   Lab Units 05/05/25  1540   CRP mg/dL <0.30     Procalcitonin 0.16  HIV DUO-nonreactive  TSH 0.658  CK-110  RPR-nonreactive  Folate greater than 20  Vitamin [id number]  Magnesium 1.2    Vitamin B1, vitamin B6, protein Jacquelyn pheresis, cryoglobulin, methylmalonic acid pending    IMAGING STUDIES:  MRI brain-no acute abnormality  MRI full spine-multilevel degenerative changes, most predominant findings Severe canal stenosis at C3/4 with flattening of the cord.  Mild increased T2 signal in the cord at  this level with moderate to severe bilateral neuroforaminal narrowing there is mild to moderate canal stenosis at C4/5 and C6/7.  At the lumbar spine there is facet hypertrophy and anterolisthesis at L4 on 5.  Moderate to severe left foraminal narrowing at this level.  There is DDD with moderate canal stenosis and moderate to severe foraminal narrowing at L2/3, severe left foraminal narrowing at L3/4 resulting in impingement of the exiting left L3 nerve root.  In the thoracic spine there is no significant degenerative changes but there is a moderate to large posterior disc protrusion at T12/L1 resulting in severe spinal stenosis crowding the nerve roots and distal spinal cord displacement.    I personally viewed and interpreted the patient's MRI full spine.  Also reviewed by and discussed with Dr. Perez.    Meds reviewed/changed: Yes    Current Facility-Administered Medications:     acetaminophen (TYLENOL) tablet 650 mg, 650 mg, Oral, Q4H PRN **OR** acetaminophen (TYLENOL) 160 MG/5ML oral solution 650 mg, 650 mg, Oral, Q4H PRN **OR** acetaminophen (TYLENOL) suppository 650 mg, 650 mg, Rectal, Q4H PRN, Ochoa Hart MD    allopurinol (ZYLOPRIM) tablet 300 mg, 300 mg, Oral, Daily, Ochoa Hart MD, 300 mg at 05/06/25 0920    Calcium Replacement - Follow Nurse / BPA Driven Protocol, , Not Applicable, Goldie VERA Waitman, DO    ferrous sulfate tablet 325 mg, 325 mg, Oral, Daily With Breakfast, Ochoa Hart MD, 325 mg at 05/06/25 0919    folic acid (FOLVITE) tablet 1,000 mcg, 1,000 mcg, Oral, Daily, Ochoa Hart MD, 1,000 mcg at 05/06/25 0919    gabapentin (NEURONTIN) capsule 300 mg, 300 mg, Oral, BID PRN, Ochoa Hart MD    lisinopril (PRINIVIL,ZESTRIL) tablet 5 mg, 5 mg, Oral, Daily, Ochoa Hart MD, 5 mg at 05/06/25 0919    Magnesium Standard Dose Replacement - Follow Nurse / BPA Driven Protocol, , Not Applicable, Goldie VERA Waitman, DO    metoprolol succinate XL (TOPROL-XL) 24 hr tablet 25 mg, 25 mg,  Oral, Daily, Ochoa Hart MD, 25 mg at 05/06/25 0919    nitroglycerin (NITROSTAT) SL tablet 0.4 mg, 0.4 mg, Sublingual, Q5 Min PRN, Ochoa Hart MD    ondansetron ODT (ZOFRAN-ODT) disintegrating tablet 4 mg, 4 mg, Oral, Q6H PRN **OR** ondansetron (ZOFRAN) injection 4 mg, 4 mg, Intravenous, Q6H PRN, Ochoa Hart MD    pantoprazole (PROTONIX) EC tablet 40 mg, 40 mg, Oral, BID, Ochoa Hart MD, 40 mg at 05/06/25 0919    Phosphorus Replacement - Follow Nurse / BPA Driven Protocol, , Not Applicable, PRN, Lucy Amezcua DO    Potassium Replacement - Follow Nurse / BPA Driven Protocol, , Not Applicable, PRN, Lucy Amezcua DO    rosuvastatin (CRESTOR) tablet 10 mg, 10 mg, Oral, Daily, Ochoa Hart MD, 10 mg at 05/05/25 2125    sodium chloride 0.9 % flush 10 mL, 10 mL, Intravenous, Q12H, Ochoa Hart MD, 10 mL at 05/05/25 2125    sodium chloride 0.9 % flush 10 mL, 10 mL, Intravenous, PRN, Ochoa Hart MD    sodium chloride 0.9 % infusion 40 mL, 40 mL, Intravenous, PRN, Ochoa Hart MD    thiamine (VITAMIN B-1) tablet 100 mg, 100 mg, Oral, Daily, Ochoa Hrat MD, 100 mg at 05/06/25 0919    vitamin B-12 (CYANOCOBALAMIN) tablet 1,000 mcg, 1,000 mcg, Oral, Daily, Ochoa Hart MD, 1,000 mcg at 05/06/25 0919      Physical Exam:    General:   Awake, alert.  Resting in bed.  Pleasant and cooperative.  Motor:    Atrophy/muscle wasting left hand and forearm with weak left interosseous, triceps.  Left TA 4-/5, minor tremor right index finger  Reflexes:   3+ knee jerk bilaterally, no [place], no clonus  Sensation:   Intact to temperature sensation and light touch  Station and Gait:             Per OT note 5/6-completed bed mobility with SBA. S/S LBD. He stood with SBA + walker and mobilized into the bathroom with the same level of assistance. He returned to supine in the bed. The pt was visualized to have low tone in his L hand compared to R, specifically in his L scruggs space. He endorses impaired L hand coordination and  droppage of items when completing in high level tasks in his home. He also demonstrated impaired LLE dorsi/plantar movements.   Extremities:   SCD in place    Assessment & Plan     ASSESSMENT:      Weakness of left lower extremity    CRI (chronic renal insufficiency), stage 3 (moderate)    Essential hypertension, benign    DDD (degenerative disc disease), lumbar    Imbalance    Foot drop, left    Atrophy of muscle of multiple sites    Tongue fasciculation    Acute bilateral low back pain with bilateral sciatica    Patient with progressive gait dysfunction, left upper extremity muscle wasting and weakness completed full spine and brain MRIs.  Seen by neurology service who is also following closely with extensive lab workup and plans for outpatient EMG.  Imaging results reveal likely source of patient's acute gait issues is related to T12/L1 disc herniation resulting in severe canal stenosis.  He also has degenerative changes at the left L4/5 and C3/4 that are severe which could contribute to his left foot drop and left arm weakness although he denies any radicular pain.  Will obtain CT thoracic and lumbar spine Mazor protocol for surgical planning.  Anticipate for certain T12/L1 decompression and fusion during this hospitalization but may also need lumbar decompression fusion at L4/5.  Will need cervical stenosis addressed at a later date.  Will last cardiology to evaluate for surgical risk assessment.  Okay for diet today.    PLAN:   Anticipate T12/L1 decompression/discectomy and posterior fusion, possible decompression fusion at L4/5 during this hospitalization  will need cervical spine compression addressed at a later date as this is a chronic finding  CT thoracic and lumbar Mazor protocol  Cardiology eval for surgical risk assessment  Okay for diet today  Continue therapies  May need rehab at discharge  Agree with neurology plan for outpatient EMG study after recovered from spine surgery    I discussed the  "patient's findings and my recommendations with patient, family, and YANIRA Duran and Dr. Perez    During patient visit, I utilized appropriate personal protective equipment including gloves. Appropriate PPE was worn during the entire visit.  Hand hygiene was completed before and after.      LOS: 1 day       YANIRA Neves  5/6/2025  12:38 EDT    \"Dictated utilizing Dragon dictation\".      "

## 2025-05-07 ENCOUNTER — APPOINTMENT (OUTPATIENT)
Dept: CT IMAGING | Facility: HOSPITAL | Age: 70
End: 2025-05-07
Payer: MEDICARE

## 2025-05-07 LAB
ALBUMIN SERPL ELPH-MCNC: 3.2 G/DL (ref 2.9–4.4)
ALBUMIN/GLOB SERPL: 1.3 {RATIO} (ref 0.7–1.7)
ALPHA1 GLOB SERPL ELPH-MCNC: 0.2 G/DL (ref 0–0.4)
ALPHA2 GLOB SERPL ELPH-MCNC: 0.5 G/DL (ref 0.4–1)
ANION GAP SERPL CALCULATED.3IONS-SCNC: 7.4 MMOL/L (ref 5–15)
B-GLOBULIN SERPL ELPH-MCNC: 0.9 G/DL (ref 0.7–1.3)
BILIRUB UR QL STRIP: NEGATIVE
BUN SERPL-MCNC: 15 MG/DL (ref 8–23)
BUN/CREAT SERPL: 13.2 (ref 7–25)
CALCIUM SPEC-SCNC: 10.3 MG/DL (ref 8.6–10.5)
CHLORIDE SERPL-SCNC: 106 MMOL/L (ref 98–107)
CLARITY UR: CLEAR
CO2 SERPL-SCNC: 24.6 MMOL/L (ref 22–29)
COLOR UR: YELLOW
CREAT SERPL-MCNC: 1.14 MG/DL (ref 0.76–1.27)
DEPRECATED RDW RBC AUTO: 46.8 FL (ref 37–54)
EGFRCR SERPLBLD CKD-EPI 2021: 70.5 ML/MIN/1.73
ERYTHROCYTE [DISTWIDTH] IN BLOOD BY AUTOMATED COUNT: 12.9 % (ref 12.3–15.4)
GAMMA GLOB SERPL ELPH-MCNC: 0.9 G/DL (ref 0.4–1.8)
GLOBULIN SER CALC-MCNC: 2.5 G/DL (ref 2.2–3.9)
GLUCOSE SERPL-MCNC: 92 MG/DL (ref 65–99)
GLUCOSE UR STRIP-MCNC: NEGATIVE MG/DL
HCT VFR BLD AUTO: 33.5 % (ref 37.5–51)
HGB BLD-MCNC: 11.5 G/DL (ref 13–17.7)
HGB UR QL STRIP.AUTO: NEGATIVE
INR PPP: 1.29 (ref 0.9–1.1)
KETONES UR QL STRIP: NEGATIVE
LABORATORY COMMENT REPORT: ABNORMAL
LEUKOCYTE ESTERASE UR QL STRIP.AUTO: NEGATIVE
M PROTEIN SERPL ELPH-MCNC: ABNORMAL G/DL
MCH RBC QN AUTO: 34.4 PG (ref 26.6–33)
MCHC RBC AUTO-ENTMCNC: 34.3 G/DL (ref 31.5–35.7)
MCV RBC AUTO: 100.3 FL (ref 79–97)
NITRITE UR QL STRIP: NEGATIVE
PH UR STRIP.AUTO: 7.5 [PH] (ref 5–8)
PLATELET # BLD AUTO: 79 10*3/MM3 (ref 140–450)
PMV BLD AUTO: 10.3 FL (ref 6–12)
POTASSIUM SERPL-SCNC: 4.5 MMOL/L (ref 3.5–5.2)
PROT PATTERN SERPL ELPH-IMP: ABNORMAL
PROT SERPL-MCNC: 5.7 G/DL (ref 6–8.5)
PROT UR QL STRIP: NEGATIVE
PROTHROMBIN TIME: 16.1 SECONDS (ref 11.7–14.2)
QT INTERVAL: 406 MS
QTC INTERVAL: 469 MS
RBC # BLD AUTO: 3.34 10*6/MM3 (ref 4.14–5.8)
SODIUM SERPL-SCNC: 138 MMOL/L (ref 136–145)
SP GR UR STRIP: 1.03 (ref 1–1.03)
UROBILINOGEN UR QL STRIP: NORMAL
WBC NRBC COR # BLD AUTO: 3.72 10*3/MM3 (ref 3.4–10.8)

## 2025-05-07 PROCEDURE — 71260 CT THORAX DX C+: CPT

## 2025-05-07 PROCEDURE — 80048 BASIC METABOLIC PNL TOTAL CA: CPT | Performed by: STUDENT IN AN ORGANIZED HEALTH CARE EDUCATION/TRAINING PROGRAM

## 2025-05-07 PROCEDURE — 81003 URINALYSIS AUTO W/O SCOPE: CPT | Performed by: NURSE PRACTITIONER

## 2025-05-07 PROCEDURE — 93005 ELECTROCARDIOGRAM TRACING: CPT | Performed by: NURSE PRACTITIONER

## 2025-05-07 PROCEDURE — 85027 COMPLETE CBC AUTOMATED: CPT | Performed by: STUDENT IN AN ORGANIZED HEALTH CARE EDUCATION/TRAINING PROGRAM

## 2025-05-07 PROCEDURE — 85610 PROTHROMBIN TIME: CPT | Performed by: NURSE PRACTITIONER

## 2025-05-07 PROCEDURE — 99231 SBSQ HOSP IP/OBS SF/LOW 25: CPT | Performed by: NURSE PRACTITIONER

## 2025-05-07 PROCEDURE — 93010 ELECTROCARDIOGRAM REPORT: CPT | Performed by: INTERNAL MEDICINE

## 2025-05-07 PROCEDURE — 82525 ASSAY OF COPPER: CPT | Performed by: NURSE PRACTITIONER

## 2025-05-07 PROCEDURE — 74177 CT ABD & PELVIS W/CONTRAST: CPT

## 2025-05-07 PROCEDURE — 99232 SBSQ HOSP IP/OBS MODERATE 35: CPT | Performed by: INTERNAL MEDICINE

## 2025-05-07 PROCEDURE — 25510000001 IOPAMIDOL 61 % SOLUTION: Performed by: STUDENT IN AN ORGANIZED HEALTH CARE EDUCATION/TRAINING PROGRAM

## 2025-05-07 RX ORDER — GABAPENTIN 300 MG/1
300 CAPSULE ORAL EVERY 12 HOURS SCHEDULED
Status: DISCONTINUED | OUTPATIENT
Start: 2025-05-07 | End: 2025-05-20 | Stop reason: HOSPADM

## 2025-05-07 RX ORDER — IOPAMIDOL 612 MG/ML
100 INJECTION, SOLUTION INTRAVASCULAR
Status: COMPLETED | OUTPATIENT
Start: 2025-05-07 | End: 2025-05-07

## 2025-05-07 RX ADMIN — METOPROLOL SUCCINATE 25 MG: 25 TABLET, EXTENDED RELEASE ORAL at 08:53

## 2025-05-07 RX ADMIN — ALLOPURINOL 300 MG: 300 TABLET ORAL at 08:53

## 2025-05-07 RX ADMIN — LISINOPRIL 5 MG: 10 TABLET ORAL at 08:52

## 2025-05-07 RX ADMIN — PANTOPRAZOLE SODIUM 40 MG: 40 TABLET, DELAYED RELEASE ORAL at 20:33

## 2025-05-07 RX ADMIN — ACETAMINOPHEN 650 MG: 325 TABLET, FILM COATED ORAL at 20:33

## 2025-05-07 RX ADMIN — Medication 1000 MCG: at 08:52

## 2025-05-07 RX ADMIN — GABAPENTIN 300 MG: 300 CAPSULE ORAL at 20:33

## 2025-05-07 RX ADMIN — ROSUVASTATIN CALCIUM 10 MG: 20 TABLET, FILM COATED ORAL at 20:32

## 2025-05-07 RX ADMIN — Medication 10 ML: at 08:53

## 2025-05-07 RX ADMIN — Medication 10 ML: at 20:36

## 2025-05-07 RX ADMIN — GABAPENTIN 300 MG: 300 CAPSULE ORAL at 11:35

## 2025-05-07 RX ADMIN — ACETAMINOPHEN 650 MG: 325 TABLET, FILM COATED ORAL at 05:50

## 2025-05-07 RX ADMIN — FERROUS SULFATE TAB 325 MG (65 MG ELEMENTAL FE) 325 MG: 325 (65 FE) TAB at 08:53

## 2025-05-07 RX ADMIN — FOLIC ACID 1000 MCG: 1 TABLET ORAL at 08:52

## 2025-05-07 RX ADMIN — PANTOPRAZOLE SODIUM 40 MG: 40 TABLET, DELAYED RELEASE ORAL at 08:52

## 2025-05-07 RX ADMIN — GABAPENTIN 300 MG: 300 CAPSULE ORAL at 05:51

## 2025-05-07 RX ADMIN — IOPAMIDOL 100 ML: 612 INJECTION, SOLUTION INTRAVENOUS at 09:41

## 2025-05-07 RX ADMIN — Medication 100 MG: at 08:53

## 2025-05-07 NOTE — PROGRESS NOTES
Name: Mo Willoughby ADMIT: 2025   : 1955  PCP: Jenn Davison APRN    MRN: [id number] LOS: 2 days   AGE/SEX: 69 y.o. male  ROOM: [facility]     Subjective   Subjective   2025  Patient seen and examined at bedside, he does not appear to be in distress.  Still admits to left-sided weakness but states symptoms slightly improved.    2025  Patient states his symptoms have slightly improved though still present.  He is aware that plan is for surgery soon and he is agreeable.  He does asked that his gabapentin be made scheduled instead of as needed.       Objective   Objective   Vital Signs  Temp:  [98.1 °F (36.7 °C)-98.7 °F (37.1 °C)] 98.1 °F (36.7 °C)  Heart Rate:  [74-88] 74  Resp:  [16-20] 20  BP: (105-135)/(73-94) 121/86  SpO2:  [95 %-98 %] 95 %  on   ;   Device (Oxygen Therapy): room air  Body mass index is 32.6 kg/m².  Physical Exam  Constitutional:       General: He is not in acute distress.     Appearance: Normal appearance.   HENT:      Head: Normocephalic and atraumatic.      Nose: No congestion.      Mouth/Throat:      Pharynx: No oropharyngeal exudate.   Eyes:      General: No scleral icterus.  Cardiovascular:      Rate and Rhythm: Normal rate and regular rhythm.      Heart sounds: No murmur heard.     No friction rub. No gallop.   Pulmonary:      Effort: No respiratory distress.      Breath sounds: No wheezing, rhonchi or rales.   Abdominal:      General: There is no distension.      Tenderness: There is no abdominal tenderness. There is no guarding.   Musculoskeletal:         General: No swelling, deformity or signs of injury.      Cervical back: No rigidity.   Skin:     Coloration: Skin is not jaundiced.      Findings: No bruising or lesion.   Neurological:      Mental Status: He is alert.      Comments: Left arm and leg weakness, left foot drop       Results Review     I reviewed the patient's new clinical results.  Results from last 7 days   Lab Units 25  0585  "05/05/25  1540   WBC 10*3/mm3 3.72 4.03   HEMOGLOBIN g/dL 11.5* 12.6*   PLATELETS 10*3/mm3 79* 97*     Results from last 7 days   Lab Units 05/07/25  0516 05/05/25  1540   SODIUM mmol/L 138 139   POTASSIUM mmol/L 4.5 4.5   CHLORIDE mmol/L 106 105   CO2 mmol/L 24.6 22.4   BUN mg/dL 15 15   CREATININE mg/dL 1.14 1.18   GLUCOSE mg/dL 92 85   EGFR mL/min/1.73 70.5 67.6     Results from last 7 days   Lab Units 05/05/25  1540   ALBUMIN g/dL 4.1   BILIRUBIN mg/dL 0.7   ALK PHOS U/L 81   AST (SGOT) U/L 61*   ALT (SGPT) U/L 31     Results from last 7 days   Lab Units 05/07/25  0516 05/05/25  1540   CALCIUM mg/dL 10.3 10.3   ALBUMIN g/dL  --  4.1   MAGNESIUM mg/dL  --  1.2*     Results from last 7 days   Lab Units 05/05/25  1540   PROCALCITONIN ng/mL 0.16     No results found for: \"HGBA1C\", \"POCGLU\"    CT Lumbar Spine Without Contrast  Result Date: 5/7/2025  Electronically signed by Karl Fisher MD on 05-07-25 at 0233    CT Thoracic Spine Without Contrast  Result Date: 5/7/2025  Electronically signed by Karl Fisher MD on 05-07-25 at 0231    MRI Lumbar Spine With & Without Contrast  Result Date: 5/6/2025  Electronically signed by Rusty Arredondo MD on 05-06-25 at 0631    MRI Thoracic Spine With & Without Contrast  Result Date: 5/6/2025  Electronically signed by Rusty Arredondo MD on 05-06-25 at 0624    MRI Cervical Spine With & Without Contrast  Result Date: 5/6/2025  Electronically signed by Rusty Arredondo MD on 05-06-25 at 0621    MRI Brain With & Without Contrast  Result Date: 5/6/2025  Electronically signed by Karl Fisher MD on 05-06-25 at 0602      I have personally reviewed all medications:  Scheduled Medications  allopurinol, 300 mg, Oral, Daily  ferrous sulfate, 325 mg, Oral, Daily With Breakfast  folic acid, 1,000 mcg, Oral, Daily  lisinopril, 5 mg, Oral, Daily  metoprolol succinate XL, 25 mg, Oral, Daily  pantoprazole, 40 mg, Oral, BID  rosuvastatin, 10 mg, Oral, Daily  sodium chloride, 10 mL, Intravenous, " Q12H  thiamine, 100 mg, Oral, Daily  vitamin B-12, 1,000 mcg, Oral, Daily    Infusions   Diet  Diet: Cardiac, Diabetic; Healthy Heart (2-3 Na+); Consistent Carbohydrate; Fluid Consistency: Thin (IDDSI 0)    I have personally reviewed:  [x]  Laboratory   [x]  Microbiology   [x]  Radiology   [x]  EKG/Telemetry  [x]  Cardiology/Vascular   []  Pathology    []  Records       Assessment/Plan     Active Hospital Problems    Diagnosis  POA    **Weakness of left lower extremity [R29.898]  Yes    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       69 y.o. male admitted with Weakness of left lower extremity.    #Left-sided weakness  #Left foot drop    -MRI brain with and without contrast without evidence of ischemia or intracranial abnormality    - MRI cervical spine with and without contrast shows advanced degenerative disc disease at C3/4 level with disc osteophyte complex complicated by severe spinal stenosis with effacement of surrounding subarachnoid space and flattening of the spinal cord, moderate to severe bilateral for minimal stenosis, right greater than left, moderately advanced C4/5 disc disease and posterior disc osteophyte complex complicated by broad-based posterior disc bulge at C6/7 and small fluid signal spinal cord at this level that may represent syrinx or focal myelomalacia    - MRI thoracic spine with and without contrast shows a moderate to large posterior disc protrusion at T12/L1 producing severe spinal  stenosis with contact and displacement of the distal spinal cord and crowding of the nerve roots    - MRI lumbar spine with and without contrast shows T12/L1 disc protrusion as above complicated by multilevel lumbar spondylosis with multilevel spinal Formento stenosis and associated grade 1 L4/5 anterolisthesis    - Labs ordered by neurology    - CT chest and CT A/P to rule out head malignancy ordered by neurology    - Neurology considering EMG/nerve conduction study    - Neurosurgery following and planning surgery    - Cardiology consulted for cardiac clearance, they will check a preop EKG and if unremarkable recommend proceeding with nonelective surgery as acceptable cardiac risk    #Alcohol use disorder    -Thiamine and folic acid daily    -[facility] protocol    #CKD    -Creatinine 1.14, appears near base    -Renally dose meds    #Anemia    -Hemoglobin 11.5 this morning, baseline around 13.6    - No overt bleeding, follow labs    #Thrombocytopenia    -Platelets 79K this morning, over the years has ranged from 92K to 147K    -No overt bleeding, monitor    -Suspect secondary to chronic alcohol use    #GERD    -Protonix 40 mg p.o. twice daily    #Hypertension    -Continue home lisinopril 5 mg p.o. daily    - Continue Toprol 25 mg p.o. daily    #Hyperlipidemia    -Crestor daily      SCDs for DVT prophylaxis.  Full code.  Discussed with patient.  Anticipate discharge home with HH vs SNU facility timing yet to be determined.  Expected Discharge Date: 5/12/2025; Expected Discharge Time:       DO [facility]  05/07/25  09:37 EDT

## 2025-05-07 NOTE — PROGRESS NOTES
LOS: 2 days   Patient Care Team:  Jenn Davison APRN as PCP - General (Nurse Practitioner)    Chief Complaint: Weakness in legs, gait imbalance, left foot drop, left arm weakness    Subjective       Interval History: Still having difficulty with gait and balance.  Using rolling walker.  Complete spine MRI images reveal multiple areas of canal stenosis and nerve root compression.  Primary concern at this time is the severe stenosis at T12-L1 which is contributing to gait imbalance. This will need to be addressed surgically during this hospital stay to hopefully preserve some function in legs.     History taken from: patient chart    Objective        Vital Signs  Temp:  [98.1 °F (36.7 °C)-98.7 °F (37.1 °C)] 98.1 °F (36.7 °C)  Heart Rate:  [74-88] 74  Resp:  [16-20] 20  BP: (105-135)/(73-94) 121/86    Physical Exam:    AA&O x 3.   No sensory deficits.   Motor 5/5; except for left tibialis anterior and gastroc weakness 4/5.   Negative clonus. DTR's 2+     Results Review:     I reviewed the patient's new clinical results.  I reviewed the patient's new imaging results and agree with the interpretation.  Discussed with Dr. Perez and the patient      Results from last 7 days   Lab Units 05/07/25  0516 05/05/25  1540   WBC 10*3/mm3 3.72 4.03   HEMOGLOBIN g/dL 11.5* 12.6*   HEMATOCRIT % 33.5* 37.9   PLATELETS 10*3/mm3 79* 97*     Results from last 7 days   Lab Units 05/07/25  0516 05/05/25  1540   SODIUM mmol/L 138 139   POTASSIUM mmol/L 4.5 4.5   CHLORIDE mmol/L 106 105   CO2 mmol/L 24.6 22.4   BUN mg/dL 15 15   CREATININE mg/dL 1.14 1.18   GLUCOSE mg/dL 92 85   CALCIUM mg/dL 10.3 10.3     Results from last 7 days   Lab Units 05/07/25  1141   PROTIME Seconds 16.1*   INR  1.29*      Latest Reference Range & Units 05/07/25 14:40   Color, UA Yellow, Straw  Yellow   Appearance, UA Clear  Clear   Specific Gravity, UA 1.005 - 1.030  1.027   pH, UA 5.0 - 8.0  7.5   Glucose Negative  Negative   Ketones, UA Negative   Negative   Blood, UA Negative  Negative   Nitrite, UA Negative  Negative   Leukocytes, UA Negative  Negative   Protein, UA Negative  Negative   Bilirubin, UA Negative  Negative   Urobilinogen, UA 0.2 - 1.0 E.U./dL  1.0 E.U./dL     Assessment & Plan       Weakness of left lower extremity    CRI (chronic renal insufficiency), stage 3 (moderate)    Essential hypertension, benign    DDD (degenerative disc disease), lumbar    Imbalance    Foot drop, left    Atrophy of muscle of multiple sites    Tongue fasciculation    Acute bilateral low back pain with bilateral sciatica    Herniation of thoracolumbar intervertebral disc with myelopathy      Cardiology following for surgical risk assessment - patient cleared for OR  Check coags, urinalysis - see above  NPO after MN for OR tomorrow   Dr. Perez will see patient and discuss surgery today.     Gloria Workman, YANIRA  05/07/25  10:18 EDT

## 2025-05-07 NOTE — PLAN OF CARE
Problem: Adult Inpatient Plan of Care  Goal: Plan of Care Review  Outcome: Progressing     Problem: Adult Inpatient Plan of Care  Goal: Absence of Hospital-Acquired Illness or Injury  Intervention: Identify and Manage Fall Risk  Recent Flowsheet Documentation  Taken 5/7/2025 0400 by Itzel Willoughby RN  Safety Promotion/Fall Prevention:   room organization consistent   safety round/check completed   nonskid shoes/slippers when out of bed  Taken 5/7/2025 0200 by Itzel Willoughby RN  Safety Promotion/Fall Prevention:   room organization consistent   safety round/check completed   nonskid shoes/slippers when out of bed  Taken 5/7/2025 0000 by Itzel Willoughby RN  Safety Promotion/Fall Prevention:   safety round/check completed   room organization consistent   nonskid shoes/slippers when out of bed  Taken 5/6/2025 2010 by Itzel Willoughby RN  Safety Promotion/Fall Prevention:   safety round/check completed   room organization consistent   nonskid shoes/slippers when out of bed  Intervention: Prevent Skin Injury  Recent Flowsheet Documentation  Taken 5/7/2025 0400 by Itzel Willoughby RN  Body Position: position changed independently  Taken 5/7/2025 0200 by Itzel Willoughby RN  Body Position: position changed independently  Taken 5/7/2025 0000 by Itzel Willoughby RN  Body Position: position changed independently  Taken 5/6/2025 2010 by Itzel Willoughby RN  Body Position: position changed independently  Intervention: Prevent and Manage VTE (Venous Thromboembolism) Risk  Recent Flowsheet Documentation  Taken 5/7/2025 0000 by Itzel Willoughby RN  VTE Prevention/Management:   bilateral   SCDs (sequential compression devices) on  Taken 5/6/2025 2010 by Itzel Willoughby RN  VTE Prevention/Management:   bilateral   SCDs (sequential compression devices) on  Intervention: Prevent Infection  Recent Flowsheet Documentation  Taken 5/7/2025 0400 by Itzel Willoughby RN  Infection Prevention: single patient room provided  Taken  5/7/2025 0200 by Itzel Willoughby RN  Infection Prevention:   single patient room provided   rest/sleep promoted   personal protective equipment utilized   hand hygiene promoted  Taken 5/7/2025 0000 by Itzel Willoughby RN  Infection Prevention: single patient room provided  Taken 5/6/2025 2242 by Itzel Willoughby RN  Infection Prevention: single patient room provided  Taken 5/6/2025 2010 by Itzel Willoughby RN  Infection Prevention: single patient room provided  Goal: Optimal Comfort and Wellbeing  Intervention: Provide Person-Centered Care  Recent Flowsheet Documentation  Taken 5/7/2025 0000 by Itzel Willoughby RN  Trust Relationship/Rapport:   care explained   choices provided   emotional support provided  Taken 5/6/2025 2010 by Itzel Willoughby RN  Trust Relationship/Rapport:   care explained   choices provided   emotional support provided     Problem: Comorbidity Management  Goal: Maintenance of Osteoarthritis Symptom Control  Intervention: Maintain Osteoarthritis Symptom Control  Recent Flowsheet Documentation  Taken 5/7/2025 0400 by Itzel Willoughby RN  Activity Management: up ad allen  Medication Review/Management: medications reviewed  Taken 5/7/2025 0200 by Itzel Willoughby RN  Activity Management: up ad allen  Medication Review/Management: medications reviewed  Taken 5/7/2025 0000 by Itzel Willoughby RN  Activity Management: up ad allen  Taken 5/6/2025 2010 by Itzel Willoughby RN  Activity Management: up ad allen     Problem: Fatigue  Goal: Improved Activity Tolerance  Intervention: Promote Improved Energy  Recent Flowsheet Documentation  Taken 5/7/2025 0400 by Itzel Willoughby RN  Activity Management: up ad allen  Taken 5/7/2025 0200 by Itzel Willoughby RN  Activity Management: up ad allen  Taken 5/7/2025 0000 by Itzel Willoughby RN  Activity Management: up ad allen  Taken 5/6/2025 2010 by Itzel Willoughby RN  Activity Management: up ad allen     Problem: Fall Injury Risk  Goal: Absence of Fall and Fall-Related  Injury  Intervention: Identify and Manage Contributors  Recent Flowsheet Documentation  Taken 5/7/2025 0400 by Itzel Willoughby, RN  Medication Review/Management: medications reviewed  Taken 5/7/2025 0200 by Iztel Willoughby RN  Medication Review/Management: medications reviewed  Intervention: Promote Injury-Free Environment  Recent Flowsheet Documentation  Taken 5/7/2025 0400 by Itzel Willoughby RN  Safety Promotion/Fall Prevention:   room organization consistent   safety round/check completed   nonskid shoes/slippers when out of bed  Taken 5/7/2025 0200 by Itzel Willoughby RN  Safety Promotion/Fall Prevention:   room organization consistent   safety round/check completed   nonskid shoes/slippers when out of bed  Taken 5/7/2025 0000 by Itzel Willoughby RN  Safety Promotion/Fall Prevention:   safety round/check completed   room organization consistent   nonskid shoes/slippers when out of bed  Taken 5/6/2025 2010 by Itzel Willoughby RN  Safety Promotion/Fall Prevention:   safety round/check completed   room organization consistent   nonskid shoes/slippers when out of bed   Goal Outcome Evaluation:     No acute events overnight, VSS, A/ox4, CT of Lumbar/thoracic completed last night negative for anything acute, no complaints of pain or discomfort overnight

## 2025-05-07 NOTE — CONSULTS
Patient Name: Mo Willoughby  :1955  69 y.o.    Date of Admission: 2025  Date of Consultation:  25  Encounter Provider: YANIRA Gomez  Place of Service: [facility] CARDIOLOGY  Referring Provider: Phuong Villavicencio MD  Patient Care Team:  Jenn Davison APRN as PCP - General (Nurse Practitioner)      Chief complaint: LE weakness, altered gait    History of Present Illness: Mr. Willoughby is a 69 year old gentleman followed by Dr. Brown for hypertension and SVT. He was temporarily treated with apixaban due to concern for atrial fibrillation however further monitor and review by EP felt it was more consistent with PACs and SVT. Ascending aortic aneurysm noted on imaging has been stable .last evaluated by echo 2024 . 4.4 cm ascending aorta. CTA chest . No formal ischemic evaluation. He has had low normal LVEF. No heart failure.     He had an abrupt onset of back and leg pain. This has continued to progress to the point his gait is severely altered. He was admitted on  . Imaging noted T12/L1 disc herniation resulting in severe canal stenosis. Neurosurgery is recommending surgery later this week.     Up until a month ago - when above mentioned symptoms started - he was doing well without active cardiac symptoms. He was able to achieve 4 METs without difficulty.     Echo 2024      Left ventricular systolic function is low normal. Calculated left ventricular EF = 45.5%. Abnormal global longitudinal LV strain (GLS) = -16.8% with relative apical sparing.  Infiltrative cardiomyopathy is a differential diagnosis.    Left ventricular wall thickness is consistent with moderate concentric hypertrophy.    Left ventricular diastolic function is consistent with (grade I) impaired relaxation.    The left atrial cavity is severely dilated.    There is mild, bileaflet mitral valve thickening present.  There is mild to moderate mitral valve regurgitation  present.    Mild dilation of the aortic root is present. Aortic root = 4.3 cm Ascending aorta = 4.4 cm The inferior vena cava is normally sized.       Past Medical History:   Diagnosis Date    Abnormal TSH     Arthritis     Ascending aortic aneurysm     Colon polyp 5/5/22    CRI (chronic renal insufficiency), stage 3 (moderate) 2016    from stage 3A to 4    DDD (degenerative disc disease), lumbar     ED (erectile dysfunction)     Erectile dysfunction     Essential hypertension, benign     Folic acid deficiency     Gout     Hip pain, right     Hx of colonic polyp     Hypercalcemia 2015    as far back as data goes so likely pre-dates even this time    Hyperparathyroidism, unspecified 2016    as far back as data goes likely pre-dates even this date, likely multifactorial some primary and some secondary , w/ imaging from 10/16 showing possible L inferior adenoma    Iron deficiency anemia     Low back pain with bilateral sciatica     Mixed hyperlipidemia 02/27/2023    New onset atrial fibrillation 04/15/2024    Pancreatitis     Risk factors for obstructive sleep apnea     Spinal stenosis of lumbar region with neurogenic claudication     Syncope and collapse 03/28/2017       Past Surgical History:   Procedure Laterality Date    CHOLECYSTECTOMY      CHOLECYSTECTOMY WITH INTRAOPERATIVE CHOLANGIOGRAM N/A 08/03/2018    Procedure: CHOLECYSTECTOMY LAPAROSCOPIC INTRAOPERATIVE CHOLANGIOGRAM;  Surgeon: Melina Kate MD;  Location: [facility] MAIN OR;  Service: General    COLONOSCOPY      COLONOSCOPY N/A 10/03/2016    Procedure: COLONOSCOPY TO CECUM TO TERMINAL ILIUM WITH COLD BIOPSY POLYPECTOMY;  Surgeon: Benoit Vilchis MD;  Location: [facility] ENDOSCOPY;  Service:     COLONOSCOPY N/A 05/05/2022    Procedure: COLONOSCOPY TO CECUM WITH COLD SNARE POLYPECTOMIES & RESOLUTION CLIP PLACEMENT X 2;  Surgeon: Benoit Mosley MD;  Location: [facility] ENDOSCOPY;  Service: Gastroenterology;  Laterality: N/A;  ANEMIA/POLYPS, HEMORRHOIDS      ENDOSCOPY N/A 05/05/2022    Procedure: ESOPHAGOGASTRODUODENOSCOPY WITH BX;  Surgeon: Benoit Mosley MD;  Location: [facility] ENDOSCOPY;  Service: Gastroenterology;  Laterality: N/A;  ANEMIA/PORTAL GASTROPATHY, EROSIVE GASTRITIS     EPIDURAL Right 12/07/2022    Procedure: LUMBAR/SACRAL TRANSFORAMINAL EPIDURAL Right L2-3 and right L4-5;  Surgeon: Isaura Torres MD;  Location: [facility] MAIN [facility];  Service: Pain Management;  Laterality: Right;    LIPOMA EXCISION      LUMBAR LAMINECTOMY DISCECTOMY DECOMPRESSION N/A 7/7/2023    Procedure: Open right sided lumbar decompression lumbar three/four, lumbar four/five;  Surgeon: Abel Perez MD;  Location:  [facility] MAIN OR;  Service: Neurosurgery;  Laterality: N/A;    MEDIAL BRANCH BLOCK Right 01/23/2023    Procedure: LUMBAR MEDIAL BRANCH BLOCK RIGHT L3-L5 #1;  Surgeon: Isaura Torres MD;  Location: [facility]IN [facility];  Service: Pain Management;  Laterality: Right;    MEDIAL BRANCH BLOCK Right 02/13/2023    Procedure: LUMBAR MEDIAL BRANCH BLOCK RIGHT L3-L5 #1;  Surgeon: Isaura Torres MD;  Location: [facility];  Service: Pain Management;  Laterality: Right;    NERVE SURGERY Right 3/6/2023    Procedure: LUMBAR RADIOFREQUENCY ABLATION RIGHT L3-L5  84118, [id number];  Surgeon: Isaura Torres MD;  Location: [facility];  Service: Pain Management;  Laterality: Right;    SACROILIAC JOINT INJECTION Right 5/5/2023    Procedure: SACROILIAC JOINT INJECTION RIGHT 45296;  Surgeon: Isaura Torres MD;  Location: [facility];  Service: Pain Management;  Laterality: Right;    TONSILLECTOMY           Prior to Admission medications    Medication Sig Start Date End Date Taking? Authorizing Provider   allopurinol (ZYLOPRIM) 300 MG tablet Take 1 tablet by mouth Daily. 12/14/24  Yes Jenn Davison APRN   ferrous sulfate 325 (65 FE) MG tablet Take 1 tablet by mouth Daily With Breakfast.   Yes Provider, MD Milena   folic acid (FOLVITE) 1 MG tablet TAKE 1 TABLET BY MOUTH DAILY  12/5/24  Yes Jenn Davison APRN   gabapentin (NEURONTIN) 300 MG capsule Take 1 capsule by mouth 2 (Two) Times a Day As Needed (neuropathy). 4/15/25  Yes Jenn Davison APRN   lisinopril (PRINIVIL,ZESTRIL) 10 MG tablet Take 1 tablet by mouth Daily. 3/21/25  Yes Jenn Davison APRN   lisinopril (PRINIVIL,ZESTRIL) 5 MG tablet Take 1 tablet by mouth Daily. 3/21/25  Yes Jenn Davison APRN   pantoprazole (PROTONIX) 40 MG EC tablet Take 1 tablet by mouth 2 (Two) Times a Day. 12/14/24  Yes Jenn Davison APRN   rosuvastatin (CRESTOR) 10 MG tablet TAKE 1 TABLET BY MOUTH DAILY 1/17/25  Yes Jenn Davison APRN   tadalafil (CIALIS) 5 MG tablet Take 1 tablet by mouth Daily. 1/29/25  Yes ProvideriMlena MD   thiamine (VITAMIN B-1) 100 MG tablet  tablet Take 1 tablet by mouth Daily.   Yes Milena Price MD   vitamin B-12 (CYANOCOBALAMIN) 1000 MCG tablet Take 1 tablet by mouth Daily. HOLD FOR SURGERY ONE WEEK PRIOR   Yes ProviderMilena MD       Allergies   Allergen Reactions    Zetia [Ezetimibe] Dizziness     Nausea, fatgue       Social History     Socioeconomic History    Marital status: Single   Tobacco Use    Smoking status: Never    Smokeless tobacco: Never   Vaping Use    Vaping status: Never Used   Substance and Sexual Activity    Alcohol use: Yes     Comment: occasional    Drug use: No    Sexual activity: Yes     Partners: Female       Family History   Problem Relation Age of Onset    Hypertension Mother     Breast cancer Mother     Cancer Mother     Stroke Mother     Cancer Father     Liver cancer Father     Hypertension Father     Diabetes Father     Hypertension Sister     Heart attack Sister     Hypertension Brother     Cancer Brother     Hypertension Sister     Heart attack Sister     Learning disabilities Neg Hx        REVIEW OF SYSTEMS:   All systems reviewed.  Pertinent positives identified in HPI.  All other systems are negative.      Objective:     Vitals:     05/06/25 0700 05/06/25 1300 05/06/25 1808 05/06/25 2010   BP: 152/94 128/85 105/73 115/77   BP Location: Left arm Left arm Left arm Left arm   Patient Position: Lying Lying Lying Lying   Pulse:  88 80 75   Resp: 16 16 20 20   Temp: 98.4 °F (36.9 °C) 98.2 °F (36.8 °C) 98.7 °F (37.1 °C) 98.4 °F (36.9 °C)   TempSrc: Oral Oral Oral Oral   SpO2:   98% 97%   Weight:         Body mass index is 32.6 kg/m².    Physical Exam:  Constitutional: He is oriented to person, place, and time. He appears well-developed. He does not appear ill.   HENT:   Head: Normocephalic and atraumatic. Head is without contusion.   Right Ear: Hearing normal. No drainage.   Left Ear: Hearing normal. No drainage.   Nose: No nasal deformity. No epistaxis.   Eyes: Lids are normal. Right eye exhibits no exudate. Left eye exhibits no exudate.  Neck: No JVD present. Carotid bruit is not present. No tracheal deviation present. No thyroid mass and no thyromegaly present.   Cardiovascular: Normal rate, regular rhythm and normal heart sounds.    Pulses:       Posterior tibial pulses are 2+ on the right side, and 2+ on the left side.   Pulmonary/Chest: Effort normal and breath sounds normal.   Abdominal: Soft. Normal appearance and bowel sounds are normal. There is no tenderness.   Musculoskeletal: Normal range of motion.        Right shoulder: He exhibits no deformity.        Left shoulder: He exhibits no deformity.   Neurological: He is alert and oriented to person, place, and time. He has normal strength.   Skin: Skin is warm, dry and intact. No rash noted.   Psychiatric: He has a normal mood and affect. His behavior is normal. Thought content normal.   Vitals reviewed      Lab Review:     Results from last 7 days   Lab Units 05/05/25  1540   SODIUM mmol/L 139   POTASSIUM mmol/L 4.5   CHLORIDE mmol/L 105   CO2 mmol/L 22.4   BUN mg/dL 15   CREATININE mg/dL 1.18   CALCIUM mg/dL 10.3   BILIRUBIN mg/dL 0.7   ALK PHOS U/L 81   ALT (SGPT) U/L 31   AST (SGOT) U/L  61*   GLUCOSE mg/dL 85     Results from last 7 days   Lab Units 05/05/25  1540   CK TOTAL U/L 110     Results from last 7 days   Lab Units 05/05/25  1540   WBC 10*3/mm3 4.03   HEMOGLOBIN g/dL 12.6*   HEMATOCRIT % 37.9   PLATELETS 10*3/mm3 97*         Results from last 7 days   Lab Units 05/05/25  1540   MAGNESIUM mg/dL 1.2*               Current Facility-Administered Medications:     acetaminophen (TYLENOL) tablet 650 mg, 650 mg, Oral, Q4H PRN **OR** acetaminophen (TYLENOL) 160 MG/5ML oral solution 650 mg, 650 mg, Oral, Q4H PRN **OR** acetaminophen (TYLENOL) suppository 650 mg, 650 mg, Rectal, Q4H PRN, Ochoa Hart MD    allopurinol (ZYLOPRIM) tablet 300 mg, 300 mg, Oral, Daily, Ochoa Hart MD, 300 mg at 05/06/25 0920    Calcium Replacement - Follow Nurse / BPA Driven Protocol, , Not Applicable, PRN, Lucy Amezcua DO    ferrous sulfate tablet 325 mg, 325 mg, Oral, Daily With Breakfast, Ochoa Hart MD, 325 mg at 05/06/25 0919    folic acid (FOLVITE) tablet 1,000 mcg, 1,000 mcg, Oral, Daily, Ochoa Hart MD, 1,000 mcg at 05/06/25 0919    gabapentin (NEURONTIN) capsule 300 mg, 300 mg, Oral, BID PRN, Ochoa Hart MD    lisinopril (PRINIVIL,ZESTRIL) tablet 5 mg, 5 mg, Oral, Daily, Ochoa Hart MD, 5 mg at 05/06/25 0919    Magnesium Standard Dose Replacement - Follow Nurse / BPA Driven Protocol, , Not Applicable, PRNGoldie Waitman, DO    metoprolol succinate XL (TOPROL-XL) 24 hr tablet 25 mg, 25 mg, Oral, Daily, Ochoa Hart MD, 25 mg at 05/06/25 0919    nitroglycerin (NITROSTAT) SL tablet 0.4 mg, 0.4 mg, Sublingual, Q5 Min PRN, Ochoa Hart MD    ondansetron ODT (ZOFRAN-ODT) disintegrating tablet 4 mg, 4 mg, Oral, Q6H PRN **OR** ondansetron (ZOFRAN) injection 4 mg, 4 mg, Intravenous, Q6H PRN, Ochoa Hart MD    pantoprazole (PROTONIX) EC tablet 40 mg, 40 mg, Oral, BID, Ochoa Hart MD, 40 mg at 05/06/25 2120    Phosphorus Replacement - Follow Nurse / BPA Driven Protocol, , Not Applicable, PRN, Goldie,  DO Lucy    Potassium Replacement - Follow Nurse / BPA Driven Protocol, , Not Applicable, PRN, Lucy Amezcua DO    rosuvastatin (CRESTOR) tablet 10 mg, 10 mg, Oral, Daily, Ochoa Hart MD, 10 mg at 05/06/25 2120    sodium chloride 0.9 % flush 10 mL, 10 mL, Intravenous, Q12H, Ochoa Hart MD, 10 mL at 05/06/25 2122    sodium chloride 0.9 % flush 10 mL, 10 mL, Intravenous, PRN, Ochoa Hart MD    sodium chloride 0.9 % infusion 40 mL, 40 mL, Intravenous, PRN, Ochoa Hart MD    thiamine (VITAMIN B-1) tablet 100 mg, 100 mg, Oral, Daily, Ochoa Hart MD, 100 mg at 05/06/25 0919    vitamin B-12 (CYANOCOBALAMIN) tablet 1,000 mcg, 1,000 mcg, Oral, Daily, Ochoa Hart MD, 1,000 mcg at 05/06/25 0919    Assessment and Plan:       Active Hospital Problems    Diagnosis  POA    **Weakness of left lower extremity [R29.898]  Yes    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     T12/L1 disc herniation with severe spinal stenosis   Cervical spine degenerative disc disease  Muscle atrophy and muscle fasciculations of his hands neurology recommends outpatient EMG/NCS   Hypertension  pSVT , he stopped beta blocker on his own in the past due to LE edema.   Dilated ascending aorta 4.3 by echo 11/2024.   Low normal LVEF     No active cardiac issues. He had an echo in November that showed low normal LVEF . Had good functional capacity a month ago without symptoms of angina. No evidence of heart failure. Will check a preop EKG in  AM. If unremarkable - would proceed with non elective surgery at acceptable cardiac risk.     Nicolasa Cueva, YANIRA  05/06/25  22:12 EDT

## 2025-05-07 NOTE — PROGRESS NOTES
"[facility] Follow Up    Chief Complaint: Follow up SVT, aortic dilatation, pre-op evaluation    Interval History: No chest pain or shortness of breath.  Lower extremity pain is a bit better with gabapentin.    Objective:     Objective:  Temp:  [98.1 °F (36.7 °C)-98.7 °F (37.1 °C)] 98.2 °F (36.8 °C)  Heart Rate:  [75-88] 88  Resp:  [16-20] 20  BP: (105-135)/(73-94) 135/94     Intake/Output Summary (Last 24 hours) at 5/7/2025 0734  Last data filed at 5/6/2025 1700  Gross per 24 hour   Intake 720 ml   Output --   Net 720 ml     Body mass index is 32.6 kg/m².      05/05/25  1800   Weight: 108 kg (238 lb 1.6 oz)     Weight change:       Physical Exam:   General : Alert, cooperative, in no acute distress.  Neuro: Alert,cooperative and oriented.  Lungs: CTAB. Normal respiratory effort and rate.  CV: Regular rate and rhythm, normal S1 and S2, no murmurs, gallops or rubs.  ABD: Soft, nontender, nondistended. Positive bowel sounds.  Extr: No edema or cyanosis, moves all extremities.    Lab Review:   Results from last 7 days   Lab Units 05/07/25  0516 05/05/25  1540   SODIUM mmol/L 138 139   POTASSIUM mmol/L 4.5 4.5   CHLORIDE mmol/L 106 105   CO2 mmol/L 24.6 22.4   BUN mg/dL 15 15   CREATININE mg/dL 1.14 1.18   GLUCOSE mg/dL 92 85   CALCIUM mg/dL 10.3 10.3   AST (SGOT) U/L  --  61*   ALT (SGPT) U/L  --  31     Results from last 7 days   Lab Units 05/05/25  1540   CK TOTAL U/L 110     Results from last 7 days   Lab Units 05/07/25  0516 05/05/25  1540   WBC 10*3/mm3 3.72 4.03   HEMOGLOBIN g/dL 11.5* 12.6*   HEMATOCRIT % 33.5* 37.9   PLATELETS 10*3/mm3 79* 97*         Results from last 7 days   Lab Units 05/05/25  1540   MAGNESIUM mg/dL 1.2*           Invalid input(s): \"LDLCALC\"      Results from last 7 days   Lab Units 05/05/25  1540   TSH uIU/mL 0.658     I reviewed the patient's new clinical results.  I personally viewed and interpreted the patient's EKG  Current Medications:   Scheduled " Meds:allopurinol, 300 mg, Oral, Daily  ferrous sulfate, 325 mg, Oral, Daily With Breakfast  folic acid, 1,000 mcg, Oral, Daily  lisinopril, 5 mg, Oral, Daily  metoprolol succinate XL, 25 mg, Oral, Daily  pantoprazole, 40 mg, Oral, BID  rosuvastatin, 10 mg, Oral, Daily  sodium chloride, 10 mL, Intravenous, Q12H  thiamine, 100 mg, Oral, Daily  vitamin B-12, 1,000 mcg, Oral, Daily      Continuous Infusions:     Allergies:  Allergies   Allergen Reactions    Zetia [Ezetimibe] Dizziness     Nausea, fatgue       Assessment/Plan:     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  Neurology recommends outpatient EMG/NCS   Hypertension  Paroxysmal supraventricular tachycardia.  The patient had stopped the beta-blocker and is on in the past due to lower extremity swelling.  He is receiving beta-blocker significant issue.    Dilated ascending aorta.  4.3 by echo 11/2024.     -Preoperative EKG reviewed and appears unremarkable.  -Patient is okay from a cardiac standpoint to proceed with surgery tomorrow as planned.    Hazel Brown MD  05/07/25  07:34 EDT

## 2025-05-08 ENCOUNTER — ANESTHESIA (OUTPATIENT)
Dept: PERIOP | Facility: HOSPITAL | Age: 70
End: 2025-05-08
Payer: MEDICARE

## 2025-05-08 ENCOUNTER — APPOINTMENT (OUTPATIENT)
Dept: GENERAL RADIOLOGY | Facility: HOSPITAL | Age: 70
End: 2025-05-08
Payer: MEDICARE

## 2025-05-08 ENCOUNTER — ANESTHESIA EVENT (OUTPATIENT)
Dept: PERIOP | Facility: HOSPITAL | Age: 70
End: 2025-05-08
Payer: MEDICARE

## 2025-05-08 LAB
ABO GROUP BLD: NORMAL
ANION GAP SERPL CALCULATED.3IONS-SCNC: 10.3 MMOL/L (ref 5–15)
ANION GAP SERPL CALCULATED.3IONS-SCNC: 12.1 MMOL/L (ref 5–15)
BLD GP AB SCN SERPL QL: NEGATIVE
BUN SERPL-MCNC: 17 MG/DL (ref 8–23)
BUN SERPL-MCNC: 19 MG/DL (ref 8–23)
BUN/CREAT SERPL: 15 (ref 7–25)
BUN/CREAT SERPL: 16.8 (ref 7–25)
CALCIUM SPEC-SCNC: 10.2 MG/DL (ref 8.6–10.5)
CALCIUM SPEC-SCNC: 9.5 MG/DL (ref 8.6–10.5)
CHLORIDE SERPL-SCNC: 106 MMOL/L (ref 98–107)
CHLORIDE SERPL-SCNC: 106 MMOL/L (ref 98–107)
CO2 SERPL-SCNC: 19.9 MMOL/L (ref 22–29)
CO2 SERPL-SCNC: 22.7 MMOL/L (ref 22–29)
CREAT SERPL-MCNC: 1.13 MG/DL (ref 0.76–1.27)
CREAT SERPL-MCNC: 1.13 MG/DL (ref 0.76–1.27)
DEPRECATED RDW RBC AUTO: 46.1 FL (ref 37–54)
EGFRCR SERPLBLD CKD-EPI 2021: 71.2 ML/MIN/1.73
EGFRCR SERPLBLD CKD-EPI 2021: 71.2 ML/MIN/1.73
ERYTHROCYTE [DISTWIDTH] IN BLOOD BY AUTOMATED COUNT: 12.9 % (ref 12.3–15.4)
GLUCOSE SERPL-MCNC: 151 MG/DL (ref 65–99)
GLUCOSE SERPL-MCNC: 92 MG/DL (ref 65–99)
HCT VFR BLD AUTO: 32.1 % (ref 37.5–51)
HGB BLD-MCNC: 11 G/DL (ref 13–17.7)
MAGNESIUM SERPL-MCNC: 1.4 MG/DL (ref 1.6–2.4)
MCH RBC QN AUTO: 34.5 PG (ref 26.6–33)
MCHC RBC AUTO-ENTMCNC: 34.3 G/DL (ref 31.5–35.7)
MCV RBC AUTO: 100.6 FL (ref 79–97)
PLATELET # BLD AUTO: 83 10*3/MM3 (ref 140–450)
PMV BLD AUTO: 10.6 FL (ref 6–12)
POTASSIUM SERPL-SCNC: 4.1 MMOL/L (ref 3.5–5.2)
POTASSIUM SERPL-SCNC: 4.7 MMOL/L (ref 3.5–5.2)
RBC # BLD AUTO: 3.19 10*6/MM3 (ref 4.14–5.8)
RH BLD: POSITIVE
SODIUM SERPL-SCNC: 138 MMOL/L (ref 136–145)
SODIUM SERPL-SCNC: 139 MMOL/L (ref 136–145)
T&S EXPIRATION DATE: NORMAL
WBC NRBC COR # BLD AUTO: 3.7 10*3/MM3 (ref 3.4–10.8)

## 2025-05-08 PROCEDURE — 25010000002 SUGAMMADEX 200 MG/2ML SOLUTION: Performed by: STUDENT IN AN ORGANIZED HEALTH CARE EDUCATION/TRAINING PROGRAM

## 2025-05-08 PROCEDURE — 80048 BASIC METABOLIC PNL TOTAL CA: CPT | Performed by: STUDENT IN AN ORGANIZED HEALTH CARE EDUCATION/TRAINING PROGRAM

## 2025-05-08 PROCEDURE — 25010000002 GLYCOPYRROLATE 0.2 MG/ML SOLUTION

## 2025-05-08 PROCEDURE — 22853 INSJ BIOMECHANICAL DEVICE: CPT | Performed by: NEUROLOGICAL SURGERY

## 2025-05-08 PROCEDURE — 25010000002 ONDANSETRON PER 1 MG

## 2025-05-08 PROCEDURE — 22630 ARTHRD PST TQ 1NTRSPC LUM: CPT | Performed by: NEUROLOGICAL SURGERY

## 2025-05-08 PROCEDURE — 22630 ARTHRD PST TQ 1NTRSPC LUM: CPT | Performed by: SPECIALIST/TECHNOLOGIST, OTHER

## 2025-05-08 PROCEDURE — 25010000002 FENTANYL CITRATE (PF) 50 MCG/ML SOLUTION: Performed by: ANESTHESIOLOGY

## 2025-05-08 PROCEDURE — 00NY0ZZ RELEASE LUMBAR SPINAL CORD, OPEN APPROACH: ICD-10-PCS | Performed by: NEUROLOGICAL SURGERY

## 2025-05-08 PROCEDURE — C1713 ANCHOR/SCREW BN/BN,TIS/BN: HCPCS | Performed by: NEUROLOGICAL SURGERY

## 2025-05-08 PROCEDURE — 25010000002 MAGNESIUM SULFATE PER 500 MG OF MAGNESIUM

## 2025-05-08 PROCEDURE — 25010000002 ALBUMIN HUMAN 5% PER 50 ML: Performed by: STUDENT IN AN ORGANIZED HEALTH CARE EDUCATION/TRAINING PROGRAM

## 2025-05-08 PROCEDURE — 25810000003 SODIUM CHLORIDE 0.9 % SOLUTION 250 ML FLEX CONT

## 2025-05-08 PROCEDURE — 25010000002 CEFAZOLIN PER 500 MG: Performed by: NEUROLOGICAL SURGERY

## 2025-05-08 PROCEDURE — 22842 INSERT SPINE FIXATION DEVICE: CPT | Performed by: SPECIALIST/TECHNOLOGIST, OTHER

## 2025-05-08 PROCEDURE — 22614 ARTHRD PST TQ 1NTRSPC EA ADD: CPT | Performed by: NEUROLOGICAL SURGERY

## 2025-05-08 PROCEDURE — 86900 BLOOD TYPING SEROLOGIC ABO: CPT | Performed by: ANESTHESIOLOGY

## 2025-05-08 PROCEDURE — 86850 RBC ANTIBODY SCREEN: CPT | Performed by: ANESTHESIOLOGY

## 2025-05-08 PROCEDURE — 0SG00AJ FUSION OF LUMBAR VERTEBRAL JOINT WITH INTERBODY FUSION DEVICE, POSTERIOR APPROACH, ANTERIOR COLUMN, OPEN APPROACH: ICD-10-PCS | Performed by: NEUROLOGICAL SURGERY

## 2025-05-08 PROCEDURE — 0RBB0ZZ EXCISION OF THORACOLUMBAR VERTEBRAL DISC, OPEN APPROACH: ICD-10-PCS | Performed by: NEUROLOGICAL SURGERY

## 2025-05-08 PROCEDURE — 25010000002 PHENYLEPHRINE 10 MG/ML SOLUTION

## 2025-05-08 PROCEDURE — 63052 LAM FACETC/FRMT ARTHRD LUM 1: CPT | Performed by: NEUROLOGICAL SURGERY

## 2025-05-08 PROCEDURE — 25010000002 MIDAZOLAM PER 1 MG: Performed by: ANESTHESIOLOGY

## 2025-05-08 PROCEDURE — 85027 COMPLETE CBC AUTOMATED: CPT | Performed by: STUDENT IN AN ORGANIZED HEALTH CARE EDUCATION/TRAINING PROGRAM

## 2025-05-08 PROCEDURE — 22853 INSJ BIOMECHANICAL DEVICE: CPT | Performed by: SPECIALIST/TECHNOLOGIST, OTHER

## 2025-05-08 PROCEDURE — 22614 ARTHRD PST TQ 1NTRSPC EA ADD: CPT | Performed by: SPECIALIST/TECHNOLOGIST, OTHER

## 2025-05-08 PROCEDURE — 25810000003 LACTATED RINGERS PER 1000 ML: Performed by: ANESTHESIOLOGY

## 2025-05-08 PROCEDURE — 8E0W0CZ ROBOTIC ASSISTED PROCEDURE OF TRUNK REGION, OPEN APPROACH: ICD-10-PCS | Performed by: NEUROLOGICAL SURGERY

## 2025-05-08 PROCEDURE — P9041 ALBUMIN (HUMAN),5%, 50ML: HCPCS | Performed by: STUDENT IN AN ORGANIZED HEALTH CARE EDUCATION/TRAINING PROGRAM

## 2025-05-08 PROCEDURE — 25010000002 LIDOCAINE 2% SOLUTION

## 2025-05-08 PROCEDURE — 25010000002 CEFAZOLIN PER 500 MG: Performed by: NURSE ANESTHETIST, CERTIFIED REGISTERED

## 2025-05-08 PROCEDURE — 25010000002 HYDROMORPHONE 1 MG/ML SOLUTION

## 2025-05-08 PROCEDURE — 25010000002 PROPOFOL 200 MG/20ML EMULSION

## 2025-05-08 PROCEDURE — 25010000002 METHOCARBAMOL 1000 MG/10ML SOLUTION: Performed by: NEUROLOGICAL SURGERY

## 2025-05-08 PROCEDURE — 25010000002 FENTANYL CITRATE (PF) 50 MCG/ML SOLUTION

## 2025-05-08 PROCEDURE — 61783 SCAN PROC SPINAL: CPT | Performed by: NEUROLOGICAL SURGERY

## 2025-05-08 PROCEDURE — 25010000002 FAMOTIDINE 10 MG/ML SOLUTION: Performed by: ANESTHESIOLOGY

## 2025-05-08 PROCEDURE — 25010000002 DEXAMETHASONE SODIUM PHOSPHATE 20 MG/5ML SOLUTION

## 2025-05-08 PROCEDURE — 63052 LAM FACETC/FRMT ARTHRD LUM 1: CPT | Performed by: SPECIALIST/TECHNOLOGIST, OTHER

## 2025-05-08 PROCEDURE — 25010000002 PHENYLEPHRINE 10 MG/ML SOLUTION 5 ML VIAL

## 2025-05-08 PROCEDURE — 83735 ASSAY OF MAGNESIUM: CPT | Performed by: STUDENT IN AN ORGANIZED HEALTH CARE EDUCATION/TRAINING PROGRAM

## 2025-05-08 PROCEDURE — 76000 FLUOROSCOPY <1 HR PHYS/QHP: CPT

## 2025-05-08 PROCEDURE — 25010000002 ACETAMINOPHEN 10 MG/ML SOLUTION: Performed by: NURSE ANESTHETIST, CERTIFIED REGISTERED

## 2025-05-08 PROCEDURE — 0RGA0AJ FUSION OF THORACOLUMBAR VERTEBRAL JOINT WITH INTERBODY FUSION DEVICE, POSTERIOR APPROACH, ANTERIOR COLUMN, OPEN APPROACH: ICD-10-PCS | Performed by: NEUROLOGICAL SURGERY

## 2025-05-08 PROCEDURE — 22842 INSERT SPINE FIXATION DEVICE: CPT | Performed by: NEUROLOGICAL SURGERY

## 2025-05-08 PROCEDURE — 86901 BLOOD TYPING SEROLOGIC RH(D): CPT | Performed by: ANESTHESIOLOGY

## 2025-05-08 DEVICE — FLOSEAL WITH RECOTHROM - 10ML.
Type: IMPLANTABLE DEVICE | Site: SPINE LUMBAR | Status: FUNCTIONAL
Brand: FLOSEAL HEMOSTATIC MATRIX

## 2025-05-08 DEVICE — SCREW 54840046555 4.75 ATS MAS 6.5X55
Type: IMPLANTABLE DEVICE | Site: SPINE LUMBAR | Status: FUNCTIONAL
Brand: CD HORIZON® SPINAL SYSTEM

## 2025-05-08 DEVICE — MAGNETOS FLEX MATRIX  5.04CC 0.25-1MM MEDIUM
Type: IMPLANTABLE DEVICE | Site: SPINE LUMBAR | Status: FUNCTIONAL
Brand: MAGNETOS

## 2025-05-08 DEVICE — PUTTY DBF GRAFTON 3CC: Type: IMPLANTABLE DEVICE | Site: SPINE LUMBAR | Status: FUNCTIONAL

## 2025-05-08 DEVICE — SSC BONE WAX
Type: IMPLANTABLE DEVICE | Site: SPINE LUMBAR | Status: FUNCTIONAL
Brand: SSC BONE WAX

## 2025-05-08 DEVICE — ALLOGRFT DBM GRAFTON DS INJ FIBR 6CC: Type: IMPLANTABLE DEVICE | Site: SPINE LUMBAR | Status: FUNCTIONAL

## 2025-05-08 DEVICE — SPACER 6068073 CATALYFT PL SHORT 7MM
Type: IMPLANTABLE DEVICE | Site: SPINE LUMBAR | Status: FUNCTIONAL
Brand: CATALYFT PL EXPANDABLE INTERBODY SYSTEM

## 2025-05-08 DEVICE — HEMOST ABS SURGIFOAM SZ100 8X12 10MM: Type: IMPLANTABLE DEVICE | Site: SPINE LUMBAR | Status: FUNCTIONAL

## 2025-05-08 RX ORDER — FENTANYL CITRATE 50 UG/ML
INJECTION, SOLUTION INTRAMUSCULAR; INTRAVENOUS AS NEEDED
Status: DISCONTINUED | OUTPATIENT
Start: 2025-05-08 | End: 2025-05-08 | Stop reason: SURG

## 2025-05-08 RX ORDER — ONDANSETRON 4 MG/1
4 TABLET, ORALLY DISINTEGRATING ORAL EVERY 6 HOURS PRN
Status: DISCONTINUED | OUTPATIENT
Start: 2025-05-08 | End: 2025-05-20 | Stop reason: HOSPADM

## 2025-05-08 RX ORDER — MIDAZOLAM HYDROCHLORIDE 1 MG/ML
0.5 INJECTION, SOLUTION INTRAMUSCULAR; INTRAVENOUS
Status: DISCONTINUED | OUTPATIENT
Start: 2025-05-08 | End: 2025-05-08 | Stop reason: HOSPADM

## 2025-05-08 RX ORDER — BISACODYL 10 MG
10 SUPPOSITORY, RECTAL RECTAL DAILY PRN
Status: DISCONTINUED | OUTPATIENT
Start: 2025-05-08 | End: 2025-05-10

## 2025-05-08 RX ORDER — SODIUM CHLORIDE 0.9 % (FLUSH) 0.9 %
3 SYRINGE (ML) INJECTION EVERY 12 HOURS SCHEDULED
Status: DISCONTINUED | OUTPATIENT
Start: 2025-05-08 | End: 2025-05-08 | Stop reason: HOSPADM

## 2025-05-08 RX ORDER — PROMETHAZINE HYDROCHLORIDE 25 MG/1
25 TABLET ORAL ONCE AS NEEDED
Status: DISCONTINUED | OUTPATIENT
Start: 2025-05-08 | End: 2025-05-08 | Stop reason: HOSPADM

## 2025-05-08 RX ORDER — DIPHENHYDRAMINE HYDROCHLORIDE 50 MG/ML
12.5 INJECTION, SOLUTION INTRAMUSCULAR; INTRAVENOUS
Status: DISCONTINUED | OUTPATIENT
Start: 2025-05-08 | End: 2025-05-08 | Stop reason: HOSPADM

## 2025-05-08 RX ORDER — SODIUM CHLORIDE 9 MG/ML
40 INJECTION, SOLUTION INTRAVENOUS AS NEEDED
Status: DISCONTINUED | OUTPATIENT
Start: 2025-05-08 | End: 2025-05-20 | Stop reason: HOSPADM

## 2025-05-08 RX ORDER — METHOCARBAMOL 100 MG/ML
1000 INJECTION, SOLUTION INTRAMUSCULAR; INTRAVENOUS ONCE
Status: COMPLETED | OUTPATIENT
Start: 2025-05-08 | End: 2025-05-08

## 2025-05-08 RX ORDER — NALOXONE HCL 0.4 MG/ML
0.1 VIAL (ML) INJECTION
Status: DISCONTINUED | OUTPATIENT
Start: 2025-05-08 | End: 2025-05-20 | Stop reason: HOSPADM

## 2025-05-08 RX ORDER — PROMETHAZINE HYDROCHLORIDE 25 MG/1
25 SUPPOSITORY RECTAL ONCE AS NEEDED
Status: DISCONTINUED | OUTPATIENT
Start: 2025-05-08 | End: 2025-05-08 | Stop reason: HOSPADM

## 2025-05-08 RX ORDER — SODIUM CHLORIDE 0.9 % (FLUSH) 0.9 %
10 SYRINGE (ML) INJECTION EVERY 12 HOURS SCHEDULED
Status: DISCONTINUED | OUTPATIENT
Start: 2025-05-08 | End: 2025-05-20 | Stop reason: HOSPADM

## 2025-05-08 RX ORDER — DROPERIDOL 2.5 MG/ML
0.62 INJECTION, SOLUTION INTRAMUSCULAR; INTRAVENOUS
Status: DISCONTINUED | OUTPATIENT
Start: 2025-05-08 | End: 2025-05-08 | Stop reason: HOSPADM

## 2025-05-08 RX ORDER — FAMOTIDINE 10 MG/ML
20 INJECTION, SOLUTION INTRAVENOUS ONCE
Status: COMPLETED | OUTPATIENT
Start: 2025-05-08 | End: 2025-05-08

## 2025-05-08 RX ORDER — GLYCOPYRROLATE 0.2 MG/ML
INJECTION INTRAMUSCULAR; INTRAVENOUS AS NEEDED
Status: DISCONTINUED | OUTPATIENT
Start: 2025-05-08 | End: 2025-05-08 | Stop reason: SURG

## 2025-05-08 RX ORDER — TRANEXAMIC ACID 100 MG/ML
INJECTION, SOLUTION INTRAVENOUS AS NEEDED
Status: DISCONTINUED | OUTPATIENT
Start: 2025-05-08 | End: 2025-05-08 | Stop reason: SURG

## 2025-05-08 RX ORDER — ACETAMINOPHEN 160 MG/5ML
650 SOLUTION ORAL EVERY 4 HOURS PRN
Status: DISCONTINUED | OUTPATIENT
Start: 2025-05-08 | End: 2025-05-20 | Stop reason: HOSPADM

## 2025-05-08 RX ORDER — BISACODYL 5 MG/1
5 TABLET, DELAYED RELEASE ORAL DAILY PRN
Status: DISCONTINUED | OUTPATIENT
Start: 2025-05-08 | End: 2025-05-10

## 2025-05-08 RX ORDER — ONDANSETRON 2 MG/ML
4 INJECTION INTRAMUSCULAR; INTRAVENOUS EVERY 6 HOURS PRN
Status: DISCONTINUED | OUTPATIENT
Start: 2025-05-08 | End: 2025-05-20 | Stop reason: HOSPADM

## 2025-05-08 RX ORDER — EPHEDRINE SULFATE 50 MG/ML
5 INJECTION, SOLUTION INTRAVENOUS ONCE AS NEEDED
Status: DISCONTINUED | OUTPATIENT
Start: 2025-05-08 | End: 2025-05-08 | Stop reason: HOSPADM

## 2025-05-08 RX ORDER — SODIUM CHLORIDE 0.9 % (FLUSH) 0.9 %
10 SYRINGE (ML) INJECTION AS NEEDED
Status: DISCONTINUED | OUTPATIENT
Start: 2025-05-08 | End: 2025-05-20 | Stop reason: HOSPADM

## 2025-05-08 RX ORDER — ACETAMINOPHEN 325 MG/1
650 TABLET ORAL EVERY 4 HOURS PRN
Status: DISCONTINUED | OUTPATIENT
Start: 2025-05-08 | End: 2025-05-20 | Stop reason: HOSPADM

## 2025-05-08 RX ORDER — FLUMAZENIL 0.1 MG/ML
0.2 INJECTION INTRAVENOUS AS NEEDED
Status: DISCONTINUED | OUTPATIENT
Start: 2025-05-08 | End: 2025-05-08 | Stop reason: HOSPADM

## 2025-05-08 RX ORDER — HYDROMORPHONE HCL/0.9% NACL/PF 10 MG/50ML
PATIENT CONTROLLED ANALGESIA SYRINGE INTRAVENOUS CONTINUOUS
Status: DISCONTINUED | OUTPATIENT
Start: 2025-05-08 | End: 2025-05-10

## 2025-05-08 RX ORDER — FENTANYL CITRATE 50 UG/ML
50 INJECTION, SOLUTION INTRAMUSCULAR; INTRAVENOUS
Refills: 0 | Status: DISCONTINUED | OUTPATIENT
Start: 2025-05-08 | End: 2025-05-08 | Stop reason: HOSPADM

## 2025-05-08 RX ORDER — IPRATROPIUM BROMIDE AND ALBUTEROL SULFATE 2.5; .5 MG/3ML; MG/3ML
3 SOLUTION RESPIRATORY (INHALATION) ONCE AS NEEDED
Status: DISCONTINUED | OUTPATIENT
Start: 2025-05-08 | End: 2025-05-08 | Stop reason: HOSPADM

## 2025-05-08 RX ORDER — ACETAMINOPHEN 10 MG/ML
INJECTION, SOLUTION INTRAVENOUS AS NEEDED
Status: DISCONTINUED | OUTPATIENT
Start: 2025-05-08 | End: 2025-05-08 | Stop reason: SURG

## 2025-05-08 RX ORDER — DEXAMETHASONE SODIUM PHOSPHATE 4 MG/ML
INJECTION, SOLUTION INTRA-ARTICULAR; INTRALESIONAL; INTRAMUSCULAR; INTRAVENOUS; SOFT TISSUE AS NEEDED
Status: DISCONTINUED | OUTPATIENT
Start: 2025-05-08 | End: 2025-05-08 | Stop reason: SURG

## 2025-05-08 RX ORDER — HYDROCODONE BITARTRATE AND ACETAMINOPHEN 5; 325 MG/1; MG/1
1 TABLET ORAL ONCE AS NEEDED
Status: DISCONTINUED | OUTPATIENT
Start: 2025-05-08 | End: 2025-05-08 | Stop reason: HOSPADM

## 2025-05-08 RX ORDER — OXYCODONE AND ACETAMINOPHEN 7.5; 325 MG/1; MG/1
1 TABLET ORAL EVERY 4 HOURS PRN
Status: DISCONTINUED | OUTPATIENT
Start: 2025-05-08 | End: 2025-05-08 | Stop reason: HOSPADM

## 2025-05-08 RX ORDER — ROCURONIUM BROMIDE 10 MG/ML
INJECTION, SOLUTION INTRAVENOUS AS NEEDED
Status: DISCONTINUED | OUTPATIENT
Start: 2025-05-08 | End: 2025-05-08 | Stop reason: SURG

## 2025-05-08 RX ORDER — OXYCODONE HYDROCHLORIDE 5 MG/1
10 TABLET ORAL EVERY 4 HOURS PRN
Status: DISCONTINUED | OUTPATIENT
Start: 2025-05-08 | End: 2025-05-12

## 2025-05-08 RX ORDER — SODIUM CHLORIDE, SODIUM LACTATE, POTASSIUM CHLORIDE, CALCIUM CHLORIDE 600; 310; 30; 20 MG/100ML; MG/100ML; MG/100ML; MG/100ML
100 INJECTION, SOLUTION INTRAVENOUS CONTINUOUS
Status: DISCONTINUED | OUTPATIENT
Start: 2025-05-08 | End: 2025-05-09

## 2025-05-08 RX ORDER — HYDRALAZINE HYDROCHLORIDE 20 MG/ML
5 INJECTION INTRAMUSCULAR; INTRAVENOUS
Status: DISCONTINUED | OUTPATIENT
Start: 2025-05-08 | End: 2025-05-08 | Stop reason: HOSPADM

## 2025-05-08 RX ORDER — LABETALOL HYDROCHLORIDE 5 MG/ML
5 INJECTION, SOLUTION INTRAVENOUS
Status: DISCONTINUED | OUTPATIENT
Start: 2025-05-08 | End: 2025-05-08 | Stop reason: HOSPADM

## 2025-05-08 RX ORDER — ACETAMINOPHEN 500 MG
1000 TABLET ORAL ONCE
Status: COMPLETED | OUTPATIENT
Start: 2025-05-08 | End: 2025-05-08

## 2025-05-08 RX ORDER — PHENYLEPHRINE HYDROCHLORIDE 10 MG/ML
INJECTION INTRAVENOUS AS NEEDED
Status: DISCONTINUED | OUTPATIENT
Start: 2025-05-08 | End: 2025-05-08 | Stop reason: SURG

## 2025-05-08 RX ORDER — ALBUMIN HUMAN 50 G/1000ML
SOLUTION INTRAVENOUS CONTINUOUS PRN
Status: DISCONTINUED | OUTPATIENT
Start: 2025-05-08 | End: 2025-05-08 | Stop reason: SURG

## 2025-05-08 RX ORDER — FENTANYL CITRATE 50 UG/ML
50 INJECTION, SOLUTION INTRAMUSCULAR; INTRAVENOUS
Status: DISCONTINUED | OUTPATIENT
Start: 2025-05-08 | End: 2025-05-08 | Stop reason: HOSPADM

## 2025-05-08 RX ORDER — AMOXICILLIN 250 MG
2 CAPSULE ORAL 2 TIMES DAILY
Status: DISCONTINUED | OUTPATIENT
Start: 2025-05-08 | End: 2025-05-10

## 2025-05-08 RX ORDER — ATROPINE SULFATE 0.4 MG/ML
0.4 INJECTION, SOLUTION INTRAMUSCULAR; INTRAVENOUS; SUBCUTANEOUS ONCE AS NEEDED
Status: DISCONTINUED | OUTPATIENT
Start: 2025-05-08 | End: 2025-05-08 | Stop reason: HOSPADM

## 2025-05-08 RX ORDER — ACETAMINOPHEN 650 MG/1
650 SUPPOSITORY RECTAL EVERY 4 HOURS PRN
Status: DISCONTINUED | OUTPATIENT
Start: 2025-05-08 | End: 2025-05-20 | Stop reason: HOSPADM

## 2025-05-08 RX ORDER — POLYETHYLENE GLYCOL 3350 17 G/17G
17 POWDER, FOR SOLUTION ORAL DAILY PRN
Status: DISCONTINUED | OUTPATIENT
Start: 2025-05-08 | End: 2025-05-10

## 2025-05-08 RX ORDER — ONDANSETRON 2 MG/ML
4 INJECTION INTRAMUSCULAR; INTRAVENOUS ONCE AS NEEDED
Status: DISCONTINUED | OUTPATIENT
Start: 2025-05-08 | End: 2025-05-08 | Stop reason: HOSPADM

## 2025-05-08 RX ORDER — METHOCARBAMOL 750 MG/1
750 TABLET, FILM COATED ORAL 4 TIMES DAILY PRN
Status: DISCONTINUED | OUTPATIENT
Start: 2025-05-08 | End: 2025-05-20 | Stop reason: HOSPADM

## 2025-05-08 RX ORDER — MAGNESIUM SULFATE HEPTAHYDRATE 500 MG/ML
INJECTION, SOLUTION INTRAMUSCULAR; INTRAVENOUS AS NEEDED
Status: DISCONTINUED | OUTPATIENT
Start: 2025-05-08 | End: 2025-05-08 | Stop reason: SURG

## 2025-05-08 RX ORDER — HYDROMORPHONE HYDROCHLORIDE 1 MG/ML
0.5 INJECTION, SOLUTION INTRAMUSCULAR; INTRAVENOUS; SUBCUTANEOUS
Status: DISCONTINUED | OUTPATIENT
Start: 2025-05-08 | End: 2025-05-08 | Stop reason: HOSPADM

## 2025-05-08 RX ORDER — NALOXONE HCL 0.4 MG/ML
0.2 VIAL (ML) INJECTION AS NEEDED
Status: DISCONTINUED | OUTPATIENT
Start: 2025-05-08 | End: 2025-05-08 | Stop reason: HOSPADM

## 2025-05-08 RX ORDER — SODIUM CHLORIDE 0.9 % (FLUSH) 0.9 %
3-10 SYRINGE (ML) INJECTION AS NEEDED
Status: DISCONTINUED | OUTPATIENT
Start: 2025-05-08 | End: 2025-05-08 | Stop reason: HOSPADM

## 2025-05-08 RX ORDER — SODIUM CHLORIDE, SODIUM LACTATE, POTASSIUM CHLORIDE, CALCIUM CHLORIDE 600; 310; 30; 20 MG/100ML; MG/100ML; MG/100ML; MG/100ML
9 INJECTION, SOLUTION INTRAVENOUS CONTINUOUS
Status: DISCONTINUED | OUTPATIENT
Start: 2025-05-08 | End: 2025-05-09

## 2025-05-08 RX ORDER — SODIUM CHLORIDE 9 MG/ML
30 INJECTION, SOLUTION INTRAVENOUS CONTINUOUS PRN
Status: DISCONTINUED | OUTPATIENT
Start: 2025-05-08 | End: 2025-05-10

## 2025-05-08 RX ORDER — LIDOCAINE HYDROCHLORIDE 20 MG/ML
INJECTION, SOLUTION INFILTRATION; PERINEURAL AS NEEDED
Status: DISCONTINUED | OUTPATIENT
Start: 2025-05-08 | End: 2025-05-08 | Stop reason: SURG

## 2025-05-08 RX ORDER — BUPIVACAINE HYDROCHLORIDE AND EPINEPHRINE 2.5; 5 MG/ML; UG/ML
INJECTION, SOLUTION EPIDURAL; INFILTRATION; INTRACAUDAL; PERINEURAL AS NEEDED
Status: DISCONTINUED | OUTPATIENT
Start: 2025-05-08 | End: 2025-05-08 | Stop reason: HOSPADM

## 2025-05-08 RX ORDER — PROPOFOL 10 MG/ML
INJECTION, EMULSION INTRAVENOUS AS NEEDED
Status: DISCONTINUED | OUTPATIENT
Start: 2025-05-08 | End: 2025-05-08 | Stop reason: SURG

## 2025-05-08 RX ORDER — ONDANSETRON 2 MG/ML
INJECTION INTRAMUSCULAR; INTRAVENOUS AS NEEDED
Status: DISCONTINUED | OUTPATIENT
Start: 2025-05-08 | End: 2025-05-08 | Stop reason: SURG

## 2025-05-08 RX ADMIN — Medication 30 MG: at 17:55

## 2025-05-08 RX ADMIN — FENTANYL CITRATE 50 MCG: 50 INJECTION, SOLUTION INTRAMUSCULAR; INTRAVENOUS at 13:14

## 2025-05-08 RX ADMIN — PROPOFOL 200 MG: 10 INJECTION, EMULSION INTRAVENOUS at 14:51

## 2025-05-08 RX ADMIN — SUGAMMADEX 200 MG: 100 INJECTION, SOLUTION INTRAVENOUS at 19:42

## 2025-05-08 RX ADMIN — CEFAZOLIN 2 G: 2 INJECTION, POWDER, LYOPHILIZED, FOR SOLUTION INTRAVENOUS at 18:38

## 2025-05-08 RX ADMIN — PHENYLEPHRINE HYDROCHLORIDE 100 MCG: 10 INJECTION INTRAVENOUS at 16:04

## 2025-05-08 RX ADMIN — METOPROLOL SUCCINATE 25 MG: 25 TABLET, EXTENDED RELEASE ORAL at 08:29

## 2025-05-08 RX ADMIN — Medication: at 21:06

## 2025-05-08 RX ADMIN — PHENYLEPHRINE HYDROCHLORIDE 200 MCG: 10 INJECTION INTRAVENOUS at 15:37

## 2025-05-08 RX ADMIN — HYDROMORPHONE HYDROCHLORIDE 1 MG: 1 INJECTION, SOLUTION INTRAMUSCULAR; INTRAVENOUS; SUBCUTANEOUS at 15:34

## 2025-05-08 RX ADMIN — ACETAMINOPHEN 1000 MG: 500 TABLET, FILM COATED ORAL at 13:13

## 2025-05-08 RX ADMIN — LISINOPRIL 5 MG: 10 TABLET ORAL at 08:28

## 2025-05-08 RX ADMIN — ALBUMIN (HUMAN): 12.5 INJECTION, SOLUTION INTRAVENOUS at 19:53

## 2025-05-08 RX ADMIN — ROCURONIUM BROMIDE 10 MG: 10 INJECTION, SOLUTION INTRAVENOUS at 17:28

## 2025-05-08 RX ADMIN — PHENYLEPHRINE HYDROCHLORIDE 200 MCG: 10 INJECTION INTRAVENOUS at 15:18

## 2025-05-08 RX ADMIN — LIDOCAINE HYDROCHLORIDE 100 MG: 20 INJECTION, SOLUTION INFILTRATION; PERINEURAL at 14:51

## 2025-05-08 RX ADMIN — FENTANYL CITRATE 50 MCG: 50 INJECTION, SOLUTION INTRAMUSCULAR; INTRAVENOUS at 16:44

## 2025-05-08 RX ADMIN — PHENYLEPHRINE HYDROCHLORIDE 200 MCG: 10 INJECTION INTRAVENOUS at 15:59

## 2025-05-08 RX ADMIN — PANTOPRAZOLE SODIUM 40 MG: 40 TABLET, DELAYED RELEASE ORAL at 08:29

## 2025-05-08 RX ADMIN — ONDANSETRON 4 MG: 2 INJECTION, SOLUTION INTRAMUSCULAR; INTRAVENOUS at 19:42

## 2025-05-08 RX ADMIN — PHENYLEPHRINE HYDROCHLORIDE 200 MCG: 10 INJECTION INTRAVENOUS at 15:30

## 2025-05-08 RX ADMIN — ONDANSETRON 4 MG: 2 INJECTION, SOLUTION INTRAMUSCULAR; INTRAVENOUS at 15:01

## 2025-05-08 RX ADMIN — MAGNESIUM SULFATE HEPTAHYDRATE 2 G: 500 INJECTION, SOLUTION INTRAMUSCULAR; INTRAVENOUS at 14:47

## 2025-05-08 RX ADMIN — HYDROMORPHONE HYDROCHLORIDE 0.5 MG: 1 INJECTION, SOLUTION INTRAMUSCULAR; INTRAVENOUS; SUBCUTANEOUS at 19:00

## 2025-05-08 RX ADMIN — METHOCARBAMOL 1000 MG: 100 INJECTION INTRAMUSCULAR; INTRAVENOUS at 20:36

## 2025-05-08 RX ADMIN — GLYCOPYRROLATE 0.2 MG: 0.2 INJECTION INTRAMUSCULAR; INTRAVENOUS at 15:30

## 2025-05-08 RX ADMIN — PHENYLEPHRINE HYDROCHLORIDE 0.3 MCG/KG/MIN: 10 INJECTION, SOLUTION INTRAVENOUS at 15:59

## 2025-05-08 RX ADMIN — SODIUM CHLORIDE, POTASSIUM CHLORIDE, SODIUM LACTATE AND CALCIUM CHLORIDE: 600; 310; 30; 20 INJECTION, SOLUTION INTRAVENOUS at 14:44

## 2025-05-08 RX ADMIN — GABAPENTIN 300 MG: 300 CAPSULE ORAL at 08:29

## 2025-05-08 RX ADMIN — ROCURONIUM BROMIDE 10 MG: 10 INJECTION, SOLUTION INTRAVENOUS at 18:30

## 2025-05-08 RX ADMIN — PHENYLEPHRINE HYDROCHLORIDE 200 MCG: 10 INJECTION INTRAVENOUS at 15:03

## 2025-05-08 RX ADMIN — ACETAMINOPHEN 1000 MG: 1000 INJECTION INTRAVENOUS at 18:33

## 2025-05-08 RX ADMIN — FENTANYL CITRATE 50 MCG: 50 INJECTION, SOLUTION INTRAMUSCULAR; INTRAVENOUS at 14:51

## 2025-05-08 RX ADMIN — DEXAMETHASONE SODIUM PHOSPHATE 8 MG: 4 INJECTION, SOLUTION INTRAMUSCULAR; INTRAVENOUS at 14:58

## 2025-05-08 RX ADMIN — TRANEXAMIC ACID 1000 MG: 100 INJECTION INTRAVENOUS at 16:33

## 2025-05-08 RX ADMIN — MIDAZOLAM 0.5 MG: 1 INJECTION INTRAMUSCULAR; INTRAVENOUS at 13:15

## 2025-05-08 RX ADMIN — ROCURONIUM BROMIDE 50 MG: 10 INJECTION, SOLUTION INTRAVENOUS at 14:51

## 2025-05-08 RX ADMIN — FAMOTIDINE 20 MG: 10 INJECTION INTRAVENOUS at 13:17

## 2025-05-08 RX ADMIN — ALLOPURINOL 300 MG: 300 TABLET ORAL at 08:29

## 2025-05-08 RX ADMIN — SODIUM CHLORIDE 2000 MG: 900 INJECTION INTRAVENOUS at 14:38

## 2025-05-08 RX ADMIN — Medication 10 ML: at 08:37

## 2025-05-08 RX ADMIN — ROCURONIUM BROMIDE 100 MG: 10 INJECTION, SOLUTION INTRAVENOUS at 14:56

## 2025-05-08 NOTE — ANESTHESIA PREPROCEDURE EVALUATION
Anesthesia Evaluation     no history of anesthetic complications:   NPO Solid Status: > 8 hours  NPO Liquid Status: > 2 hours           Airway   Mallampati: II  Neck ROM: full  no difficulty expected  Dental - normal exam     Pulmonary - negative pulmonary ROS and normal exam   (-) COPD, asthma, sleep apnea, not a smoker    PE comment: nonlabored  Cardiovascular - normal exam  Exercise tolerance: good (4-7 METS)    Rhythm: regular  Rate: normal    (+) hypertension, valvular problems/murmurs MR, dysrhythmias Atrial Fib, PAC, Tachycardia, hyperlipidemia  (-) past MI, CAD, angina    ROS comment: Ascending aortic aneurysm      EKG:  - ABNORMAL ECG -  Sinus rhythm  Prolonged GA interval  Nonspecific  intraventricular conduction delay  Anterior  infarct, old  No change from prior tracing      Echo 11/4/24:  ·  Left ventricular systolic function is low normal. Calculated left ventricular EF = 45.5%. Abnormal global longitudinal LV strain (GLS) = -16.8% with relative apical sparing.  Infiltrative cardiomyopathy is a differential diagnosis.  ·  Left ventricular wall thickness is consistent with moderate concentric hypertrophy.  ·  Left ventricular diastolic function is consistent with (grade I) impaired relaxation.  ·  The left atrial cavity is severely dilated.  ·  There is mild, bileaflet mitral valve thickening present.  There is mild to moderate mitral valve regurgitation present.  ·  Mild dilation of the aortic root is present. Aortic root = 4.3 cm Ascending aorta = 4.4 cm The inferior vena cava is normally sized.         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    CODE STATUS:    Code Status (Patient has no pulse and is not breathing): CPR (Attempt to Resuscitate)  Medical Interventions (Patient has pulse or is breathing): Full Support

## 2025-05-08 NOTE — PLAN OF CARE
Problem: Adult Inpatient Plan of Care  Goal: Plan of Care Review  Outcome: Progressing     Problem: Adult Inpatient Plan of Care  Goal: Absence of Hospital-Acquired Illness or Injury  Intervention: Identify and Manage Fall Risk  Recent Flowsheet Documentation  Taken 5/8/2025 0400 by Itzel Willoughby RN  Safety Promotion/Fall Prevention:   safety round/check completed   room organization consistent  Taken 5/8/2025 0200 by Itzel Willoughby RN  Safety Promotion/Fall Prevention:   room organization consistent   safety round/check completed  Taken 5/8/2025 0000 by Itzel Willoughby RN  Safety Promotion/Fall Prevention:   safety round/check completed   room organization consistent   nonskid shoes/slippers when out of bed  Taken 5/7/2025 2200 by Itzel Willoughby RN  Safety Promotion/Fall Prevention:   safety round/check completed   room organization consistent  Taken 5/7/2025 2033 by Itzel Willoughby RN  Safety Promotion/Fall Prevention:   safety round/check completed   room organization consistent   nonskid shoes/slippers when out of bed  Intervention: Prevent Skin Injury  Recent Flowsheet Documentation  Taken 5/8/2025 0400 by Itzel Willoughby RN  Body Position: position changed independently  Taken 5/8/2025 0200 by Itzel Willoughby RN  Body Position: position changed independently  Taken 5/8/2025 0000 by Itzel Willoughby RN  Body Position: position changed independently  Taken 5/7/2025 2200 by Itzel Willoughby RN  Body Position: position changed independently  Taken 5/7/2025 2033 by Itzel Willoughby RN  Body Position: position changed independently  Intervention: Prevent and Manage VTE (Venous Thromboembolism) Risk  Recent Flowsheet Documentation  Taken 5/8/2025 0000 by Itzel Willoughby RN  VTE Prevention/Management:   bilateral   SCDs (sequential compression devices) on  Taken 5/7/2025 2033 by Itzel Willoughby RN  VTE Prevention/Management:   bilateral   SCDs (sequential compression devices) on  Intervention: Prevent  Infection  Recent Flowsheet Documentation  Taken 5/8/2025 0400 by Itzel Willoughby RN  Infection Prevention:   rest/sleep promoted   visitors restricted/screened   single patient room provided  Taken 5/8/2025 0200 by Itzel Willoughby RN  Infection Prevention:   rest/sleep promoted   single patient room provided  Taken 5/8/2025 0000 by Itzel Willoughby RN  Infection Prevention: single patient room provided  Taken 5/7/2025 2200 by Itzel Willoughby RN  Infection Prevention:   single patient room provided   rest/sleep promoted  Taken 5/7/2025 2033 by Itzel Willoughby RN  Infection Prevention: single patient room provided  Goal: Optimal Comfort and Wellbeing  Intervention: Monitor Pain and Promote Comfort  Recent Flowsheet Documentation  Taken 5/7/2025 2033 by Itzel Willoughby RN  Pain Management Interventions: pain medication given  Intervention: Provide Person-Centered Care  Recent Flowsheet Documentation  Taken 5/8/2025 0000 by Itzel Willoughby RN  Trust Relationship/Rapport:   care explained   choices provided   emotional support provided  Taken 5/7/2025 2033 by Itzel Willoughby RN  Trust Relationship/Rapport:   care explained   choices provided   emotional support provided     Problem: Comorbidity Management  Goal: Maintenance of Osteoarthritis Symptom Control  Intervention: Maintain Osteoarthritis Symptom Control  Recent Flowsheet Documentation  Taken 5/8/2025 0400 by Itzel Willoughby RN  Activity Management: up ad allen  Taken 5/8/2025 0200 by Itzel Willoughby RN  Activity Management: up ad allen  Taken 5/8/2025 0000 by Itzel Willoughby RN  Activity Management: up ad allen  Taken 5/7/2025 2200 by Itzel Willoughby RN  Activity Management: activity minimized  Taken 5/7/2025 2033 by Itzel Willoughby RN  Activity Management: up ad allen     Problem: Fatigue  Goal: Improved Activity Tolerance  Intervention: Promote Improved Energy  Recent Flowsheet Documentation  Taken 5/8/2025 0400 by Itzel Willoughby RN  Activity Management:  up ad allen  Taken 5/8/2025 0200 by Itzel Willoughby RN  Activity Management: up ad allen  Taken 5/8/2025 0000 by Itzel Willoughby RN  Activity Management: up ad allen  Taken 5/7/2025 2200 by Itzel Willoughby RN  Activity Management: activity minimized  Taken 5/7/2025 2033 by Itzel Willoughby RN  Activity Management: up ad allen     Problem: Fall Injury Risk  Goal: Absence of Fall and Fall-Related Injury  Intervention: Promote Injury-Free Environment  Recent Flowsheet Documentation  Taken 5/8/2025 0400 by Itzel Willoughby RN  Safety Promotion/Fall Prevention:   safety round/check completed   room organization consistent  Taken 5/8/2025 0200 by Itzel Willoughby RN  Safety Promotion/Fall Prevention:   room organization consistent   safety round/check completed  Taken 5/8/2025 0000 by Itzel Willoughby RN  Safety Promotion/Fall Prevention:   safety round/check completed   room organization consistent   nonskid shoes/slippers when out of bed  Taken 5/7/2025 2200 by Itzel Willoughby RN  Safety Promotion/Fall Prevention:   safety round/check completed   room organization consistent  Taken 5/7/2025 2033 by Itzel Willoughby RN  Safety Promotion/Fall Prevention:   safety round/check completed   room organization consistent   nonskid shoes/slippers when out of bed   Goal Outcome Evaluation:      VSS, chg clean linens this am, tylenol/gabapentin given at 9pm, no complaints of pain at this time, did apply 2 L NC overnight due to pt Sp02 dropping to the 80s, pt reports hx of sleep apnea

## 2025-05-08 NOTE — SIGNIFICANT NOTE
05/08/25 1306   OTHER   Discipline occupational therapist   Rehab Time/Intention   Session Not Performed other (see comments)  (Pt off floor for Lami 3-5 today. Will hold and f/u next service date)   Therapy Assessment/Plan (PT)   Criteria for Skilled Interventions Met (PT) yes;meets criteria   Recommendation   OT - Next Appointment 05/09/25

## 2025-05-08 NOTE — BRIEF OP NOTE
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]       Post-Op Diagnosis Codes:     * Weakness of left lower extremity [R29.898]     * Herniation of thoracolumbar intervertebral disc with myelopathy [M51.05]    Procedure(s):      Procedure(s):  Open thoracic twelve/lumbar one discectomy/decompression/interbody fusion with cages, pedicle screw/terry/crosslink fixation, thoracic twelve through lumbar two with Mazor robotic navigation              Surgeon(s):  Abel Perez MD    Anesthesia: General    Staff:   Cell Saver : Deisy Omalley  Circulator: Mason Sun RN  Radiology Technologist: Lona Ramos  Scrub Person: Sallie Nam; Carol Irwin; Trang Lawson  Vendor Representative: Aurea Albright; Joe Larose  Assistant: Meron Zambrano CSA  Assistant: Meron Zambrano CSA    Estimated Blood Loss: 600 mL    Urine Voided: 400 mL    Specimens:                None      Drains:   Urethral Catheter Non-latex 16 Fr. (Active)       Findings: severe cord compression due to central herniated disc      Complications: none    Assistant: Meron Zambrano CSA  was responsible for performing the following activities: Retraction, Suction, Irrigation, Suturing, Closing, and Placing Dressing and their skilled assistance was necessary for the success of this case.    Abel Perez MD     Date: 5/8/2025  Time: 19:03 EDT

## 2025-05-08 NOTE — ANESTHESIA PROCEDURE NOTES
Airway  Reason: elective    Date/Time: 5/8/2025 2:54 PM  Airway not difficult    General Information and Staff    Patient location during procedure: OR  CRNA/CAA: Christian Mello CRNA    Indications and Patient Condition  Indications for airway management: airway protection    Preoxygenated: yes  MILS not maintained throughout    Mask difficulty assessment: 2 - vent by mask + OA or adjuvant +/- NMBA    Final Airway Details    Final airway type: endotracheal airway      Successful airway: ETT  Cuffed: yes   Successful intubation technique: direct laryngoscopy  Adjuncts used in placement: anterior pressure/BURP  Endotracheal tube insertion site: oral  Blade: Jewel  Blade size: 4  ETT size (mm): 8.0  Cormack-Lehane Classification: grade I - full view of glottis  Placement verified by: chest auscultation and capnometry   Cuff volume (mL): 10  Measured from: teeth  ETT/EBT  to teeth (cm): 22  Number of attempts at approach: 1  Assessment: lips, teeth, and gum same as pre-op and atraumatic intubation

## 2025-05-08 NOTE — PROGRESS NOTES
Chart reviewed.  No events overnight.  Plans for surgery this afternoon noted.  Will see tomorrow postoperatively.

## 2025-05-08 NOTE — CASE MANAGEMENT/SOCIAL WORK
Discharge Planning Assessment  [facility]     Patient Name: Mo Willoughby  MRN: [id number]  Today's Date: 5/8/2025    Admit Date: 5/5/2025    Plan: Referral placed to [facility] ARF. Will need pre-cert.   Discharge Needs Assessment       Row Name 05/08/25 0910       Living Environment    People in Home significant other    Name(s) of People in Home Kendy Becker/significant other [phone]    Current Living Arrangements condominium    Potentially Unsafe Housing Conditions none    In the past 12 months has the electric, gas, oil, or water company threatened to shut off services in your home? No    Primary Care Provided by self    Family Caregiver if Needed sibling(s);significant other    Family Caregiver Names Kendy Becker/significant other [phone]  James Willoughby/brother [phone]    Quality of Family Relationships helpful;involved;supportive    Able to Return to Prior Arrangements other (see comments)  Plans discharge to ARF       Resource/Environmental Concerns    Resource/Environmental Concerns none    Transportation Concerns none       Transportation Needs    In the past 12 months, has lack of transportation kept you from medical appointments or from getting medications? no    In the past 12 months, has lack of transportation kept you from meetings, work, or from getting things needed for daily living? No       Food Insecurity    Within the past 12 months, you worried that your food would run out before you got the money to buy more. Never true    Within the past 12 months, the food you bought just didn't last and you didn't have money to get more. Never true       Transition Planning    Patient/Family Anticipates Transition to inpatient rehabilitation facility    Patient/Family Anticipated Services at Transition     Transportation Anticipated health plan transportation       Discharge Needs Assessment    Readmission Within the Last 30 Days no previous admission in last 30 days     Equipment Currently Used at Home walker, rolling    Concerns to be Addressed discharge planning    Anticipated Changes Related to Illness none    Equipment Needed After Discharge none    Discharge Facility/Level of Care Needs rehabilitation facility    Provided Post Acute Provider List? Yes    Post Acute Provider List Inpatient Rehab    Provided Post Acute Provider Quality & Resource List? Yes    Post Acute Provider Quality and Resource List Inpatient Rehab    Delivered To Patient    Method of Delivery In person    Patient's Choice of Community Agency(s) [facility]                   Discharge Plan       Row Name 05/08/25 0913       Plan    Plan Referral placed to [facility]. Will need pre-cert.    Patient/Family in Agreement with Plan yes    Plan Comments Spoke with patient at bedside to complete initial assessment. Confirmed information on facesheet. PCP: YANIRA Batista and Pharmacy: Yvonne ([facility]). Patient lives with significant other Kendy in a single level condominium with no steps to enter. Denies difficulty affording medications or transportations concerns. Patient is IADLs and uses rolling walker for mobility. Denies using HH/SNF in the past. Discussed discharge plans/needs. Provided patient choice list for ARF. Requested referral to [facility]. Will need pre-cert. Await call back regarding acceptance/bed availability. Continue to follow......[facility]                  Continued Care and Services - Admitted Since 5/5/2025       Destination       Service Provider Request Status Services Address Phone Fax Patient Preferred    [facility] -- [id number] [place] [id number] [phone] --                  Expected Discharge Date and Time       Expected Discharge Date Expected Discharge Time    May 12, 2025            Demographic Summary    No documentation.                   Functional Status    No documentation.                  Psychosocial    No documentation.                  Abuse/Neglect    No documentation.                  Legal    No documentation.                  Substance Abuse    No documentation.                  Patient Forms    No documentation.                     Kendy Henley RN

## 2025-05-08 NOTE — OP NOTE
Preoperative diagnosis: Acute central disc herniation T12/L1 with cord compression and progressive myelopathy    Postoperative diagnosis: Same as above    Procedures performed: T12-L1 laminectomy with bilateral L1 pediculectomies for bilateral discectomy, decompression of spinal cord, posterior interbody fusion with expandable Catalyft cages at T12-L1, pedicle screw fixation T12 and L2 bilaterally with [employer] robotic navigation, bilateral terry placement from T12 to L2 with crosslink, onlay fusion from L1 to L2    Spinal Surgery Levels Completed:2 Levels     Surgeon: Chris    First Assistant: Meron Zambrano  (She greatly assisted in the exposure, visualization of neural structures, control of bleeding, retraction, and closure of the incision. Her skilled assistance was necessary for the success of this case.)     Anesthesia: GET    EBL: 650 cc    Complications: none    Specimen sent: none    Drains: 15 F Hemovac epidural drain    Findings: severe cord compression from large central disc herniation    Postoperative condition: stable    Indications for the operation: The patient is a 69-year-old male whose had a previous lumbar decompression by me about 2 years ago.  He was lifting a ladder off of a truck about a week ago and had severe back pain followed by bilateral leg weakness.  He was found to have a large central T12-L1 herniated disc with cord compression.  He also had asymptomatic C3-C4 cervical stenosis and recurrent spinal stenosis at L4-L5 with a spondylolisthesis.  He was myelopathic with a sensory level to about T12 and was brought to the operating room for decompression and fusion at that level.  I felt that he would need to be fused because in order to take a sufficient amount of disc out to decompress the spinal cord,  I would have to destabilize the spine by taking both pedicles at L1.    Informed consent:  He understood that the goal of surgery the rest of his progressive myelopathy and hopefully  return of some spinal cord function such as strength and balance over time.  The risks include, but are not limited to, infection, hemorrhage requiring transfusion or reoperation, CSF leak requiring reoperation, incomplete relief of symptoms, psuedoarthrosis resulting in chronic low back pain, hardward problems requiring revision or removal, potential need for additional surgery in the future, stroke, paralysis, coma, and death. The patient agrees to proceed.      Details of the operation: After cardiac clearance, the patient was taken to the operating room and remained in his gurney in the supine position.  After induction of endotracheal ovation, he got 2 g of Kefzol as per the SCIP protocol.  A Phillips catheter was placed.  SCDs were placed.  Venous access was secured.  He was rolled over the prone position on a radiolucent Jose A spinal table.  All pressure points were padded including the brachial plexus.  We brought in the centimeters and marked at the incision from T11-L3 as well as the position of the left PSIS.  Lumbar region was then prepped and draped in the usual sterile fashion as well as the lower thoracic region.  We did a surgical timeout.  I injected initially 30 cc of quarter percent Marcaine with epinephrine into the paraspinous musculature from  T11-L3 as well as the left PSIS.  We eventually injected an additional 60 cc during the closure for a total of 90 cc.  A stab incision was made over the left PSIS and I placed a long stereotactic pin into the left PSIS.  I made a linear incision from T11-L3 with a #10 skin knife.  Hemostasis was obtained with monopolar cautery.  Dissection was taken all the way down to the thoracolumbar fascia which was divided to the left and to the right from T12-L2.  The lateral facets at T12-L1 and L1-L2 were exposed.  We then connected the patient to the Spruikor robot and did our registration CT to fluoroscopy pictures based on preoperative Mazor protocol CTs done of  the  thoracic and lumbar spine.  Using the "[employer]or robot we placed a pair of 6.5 x 50 mm screws at T12 using the transpedicular technique and a pair of 6.5 x 55 mm screws at L2 using a transpedicular technique.  Accuracy was excellent based on pictures and the registration protocol.  The robot was detached from the patient and the PSIS pin was removed.  The Leksell rongeur was used to remove the spinous processes at T12 and L1.  The microscope was draped and brought into the field.  Its use was essential to performance of micro neurosurgical technique.  Using a 5 mm round bur I did a complete laminectomy at T12-L1 exposing the central thecal sac.  We did  bilateral superior panniculectomy's at L1 to provide room to work lateral to the spinal cord and remove the disc.  During the exposure I exposed the T12 and L1 nerve roots.  The disc was incised with a 15 blade.  I had essentially did a complete facetectomy so I made enough room for cage placement without any retraction of the spinal cord.  I used a combination reverse-angle curettes, endplate scrapers, pituitaries, punches and even the drill itself and did a near complete discectomy working underneath the spinal column with reverse-angle curettes to completely decompress the cord and reduce the central portion of the disc herniation.  I measured out for a pair of 7 x 23 mm expandable Catalyft just.  Prior to that I placed demineralized bone matrix and laminar autograft anterior into the interspace and then using C-arm fluoroscopy I placed those cages and expanded them into place.  I filled the muscle with demineralized bone matrix.  Postplacement x-rays show good placement of the cages in the PA lateral view.  We placed 70 mm rods and tightened them into compression and used a cross-link at L1.  I decorticated the laminar surfaces at L1 and L2 and put MagnetOs laminar surfaces at L1-L2.  Demineralized bone matrix was placed within the disc spaces at T12-L1.  Leroy  irrigation and Xperience was used.  Floseal was used.  A 15 Croatian Hemovac epidural drain was placed and exit through separate stab incision secured the skin with 2-0 silk.  The fascia was reapproximated with 0 Vicryl interrupted suture.  Subcutaneous layer was closed with 2-0 interrupted Vicryl suture.  The skin was closed with a running 4-0 Vicryl subcuticular.  Steri-Strips and a charmaine dressing were placed.  The patient was rolled over the supine position and extubated taken recovery room in satisfactory condition.

## 2025-05-08 NOTE — DISCHARGE PLACEMENT REQUEST
"Mo Willoughby (69 y.o. Male)       Date of Birth   1955    Social Security Number       Address   [address] [address]    Home Phone   [phone]    MRN   [id number]       Sikh   Sikhism    Marital Status   Single                            Admission Date   5/5/2025    Admission Type   Urgent    Admitting Provider   Phuong Villavicencoi MD    Attending Provider   Lucy Amezcua DO    Department, Room/Bed   [address], P593/1       Discharge Date       Discharge Disposition       Discharge Destination                                 Attending Provider: Lucy Amezcua DO    Allergies: Zetia [Ezetimibe]    Isolation: None   Infection: None   Code Status: CPR    Ht: 182 cm (71.65\")   Wt: 108 kg (238 lb 1.6 oz)    Admission Cmt: None   Principal Problem: Weakness of left lower extremity [R29.898]                   Active Insurance as of 5/5/2025       Primary Coverage       Payor Plan Insurance Group Employer/Plan Group    ANTH MEDICARE REPLACEMENT [facility] MEDICARE ADVANTAGE HMO [id number]       Payor Plan Address Payor Plan Phone Number Payor Plan Fax Number Effective Dates    [address] [phone]  3/1/2025 - None Entered    [facility] [id number]         Subscriber Name Subscriber Birth Date Member ID       MO WILLOUGHBY 1955 [id number]                     Emergency Contacts        (Rel.) Home Phone Work Phone Mobile Phone    Kendy Becker (Significant Other) [phone] -- [phone]    JAYDEN WILLOUGHBY (Brother) [phone] -- [phone]    CaseMirtha (Sister) [phone]                "

## 2025-05-08 NOTE — ANESTHESIA PROCEDURE NOTES
Arterial Line      Patient reassessed immediately prior to procedure    Patient location during procedure: pre-op  Start time: 5/8/2025 1:15 PM  Stop Time:5/8/2025 1:16 PM       Line placed for hemodynamic monitoring and ABGs/Labs/ISTAT.  Performed By   Anesthesiologist: Denton Coleman MD   Preanesthetic Checklist  Completed: patient identified, IV checked, site marked, risks and benefits discussed, surgical consent, monitors and equipment checked, pre-op evaluation and timeout performed  Arterial Line Prep    Sterile Tech: cap, mask, gloves and sterile barriers  Prep: ChloraPrep  Patient monitoring: blood pressure monitoring, continuous pulse oximetry and EKG  Arterial Line Procedure   Laterality:left  Location:  radial artery  Catheter size: 20 G   Guidance: landmark technique and palpation technique  Number of attempts: 1  Successful placement: yes Images: still images not obtained  Post Assessment   Dressing Type: occlusive dressing applied, secured with tape and wrist guard applied.   Complications no  Circ/Move/Sens Assessment: unchanged.   Patient Tolerance: patient tolerated the procedure well with no apparent complications

## 2025-05-08 NOTE — PROGRESS NOTES
Name: Mo Willoughby ADMIT: 2025   : 1955  PCP: Jenn Davison APRN    MRN: [id number] LOS: 3 days   AGE/SEX: 69 y.o. male  ROOM: [facility]     Subjective   Subjective   2025  Patient seen and examined at bedside, he does not appear to be in distress.  Still admits to left-sided weakness but states symptoms slightly improved.    2025  Patient states his symptoms have slightly improved though still present.  He is aware that plan is for surgery soon and he is agreeable.  He does asked that his gabapentin be made scheduled instead of as needed.    2025  No overnight events, patient without distress.  Plan for surgery today.       Objective   Objective   Vital Signs  Temp:  [97.9 °F (36.6 °C)-98.6 °F (37 °C)] 98.2 °F (36.8 °C)  Heart Rate:  [69-83] 71  Resp:  [17-20] 17  BP: (114-131)/(76-87) 117/78  SpO2:  [97 %-100 %] 98 %  on   ;   Device (Oxygen Therapy): nasal cannula  Body mass index is 32.6 kg/m².  Physical Exam  Constitutional:       General: He is not in acute distress.     Appearance: Normal appearance.   HENT:      Head: Normocephalic and atraumatic.      Nose: No congestion.      Mouth/Throat:      Pharynx: No oropharyngeal exudate.   Eyes:      General: No scleral icterus.  Cardiovascular:      Rate and Rhythm: Normal rate and regular rhythm.      Heart sounds: No murmur heard.     No friction rub. No gallop.   Pulmonary:      Effort: No respiratory distress.      Breath sounds: No wheezing, rhonchi or rales.   Abdominal:      General: There is no distension.      Tenderness: There is no abdominal tenderness. There is no guarding.   Musculoskeletal:         General: No swelling, deformity or signs of injury.      Cervical back: No rigidity.   Skin:     Coloration: Skin is not jaundiced.      Findings: No bruising or lesion.   Neurological:      Mental Status: He is alert.      Comments: Left arm and leg weakness, left foot drop       Results Review     I reviewed the  "patient's new clinical results.  Results from last 7 days   Lab Units 05/08/25  0649 05/07/25  0516 05/05/25  1540   WBC 10*3/mm3 3.70 3.72 4.03   HEMOGLOBIN g/dL 11.0* 11.5* 12.6*   PLATELETS 10*3/mm3 83* 79* 97*     Results from last 7 days   Lab Units 05/08/25  0649 05/07/25  0516 05/05/25  1540   SODIUM mmol/L 139 138 139   POTASSIUM mmol/L 4.1 4.5 4.5   CHLORIDE mmol/L 106 106 105   CO2 mmol/L 22.7 24.6 22.4   BUN mg/dL 17 15 15   CREATININE mg/dL 1.13 1.14 1.18   GLUCOSE mg/dL 92 92 85   EGFR mL/min/1.73 71.2 70.5 67.6     Results from last 7 days   Lab Units 05/06/25  0643 05/05/25  1540   ALBUMIN g/dL 3.2 4.1   BILIRUBIN mg/dL  --  0.7   ALK PHOS U/L  --  81   AST (SGOT) U/L  --  61*   ALT (SGPT) U/L  --  31     Results from last 7 days   Lab Units 05/08/25  0649 05/07/25  0516 05/06/25  0643 05/05/25  1540   CALCIUM mg/dL 10.2 10.3  --  10.3   ALBUMIN g/dL  --   --  3.2 4.1   MAGNESIUM mg/dL  --   --   --  1.2*     Results from last 7 days   Lab Units 05/05/25  1540   PROCALCITONIN ng/mL 0.16     No results found for: \"HGBA1C\", \"POCGLU\"    CT Lumbar Spine Without Contrast  Result Date: 5/7/2025  Electronically signed by Karl Fisher MD on 05-07-25 at 0233    CT Thoracic Spine Without Contrast  Result Date: 5/7/2025  Electronically signed by Karl Fisher MD on 05-07-25 at 0231      I have personally reviewed all medications:  Scheduled Medications  allopurinol, 300 mg, Oral, Daily  ferrous sulfate, 325 mg, Oral, Daily With Breakfast  folic acid, 1,000 mcg, Oral, Daily  gabapentin, 300 mg, Oral, Q12H  lisinopril, 5 mg, Oral, Daily  metoprolol succinate XL, 25 mg, Oral, Daily  pantoprazole, 40 mg, Oral, BID  rosuvastatin, 10 mg, Oral, Daily  sodium chloride, 10 mL, Intravenous, Q12H  thiamine, 100 mg, Oral, Daily  vitamin B-12, 1,000 mcg, Oral, Daily    Infusions   Diet  NPO Diet NPO Type: Sips with Meds    I have personally reviewed:  [x]  Laboratory   [x]  Microbiology   [x]  Radiology   [x]  " EKG/Telemetry  [x]  Cardiology/Vascular   []  Pathology    []  Records       Assessment/Plan     Active Hospital Problems    Diagnosis  POA    **Weakness of left lower extremity [R29.898]  Yes    Herniation of thoracolumbar intervertebral disc with myelopathy [M51.05]  Yes    Acute bilateral low back pain with bilateral sciatica [M54.42, M54.41]  Yes    Tongue fasciculation [R25.3]  Yes    Foot drop, left [M21.372]  Yes    Atrophy of muscle of multiple sites [M62.59]  Yes    Imbalance [R26.89]  Yes    DDD (degenerative disc disease), lumbar [M51.369]  Yes    Essential hypertension, benign [I10]  Yes    CRI (chronic renal insufficiency), stage 3 (moderate) [N18.30]  Yes      Resolved Hospital Problems   No resolved problems to display.       69 y.o. male admitted with Weakness of left lower extremity.    #Left-sided weakness  #Left foot drop    -MRI brain with and without contrast without evidence of ischemia or intracranial abnormality    - MRI cervical spine with and without contrast shows advanced degenerative disc disease at C3/4 level with disc osteophyte complex complicated by severe spinal stenosis with effacement of surrounding subarachnoid space and flattening of the spinal cord, moderate to severe bilateral for minimal stenosis, right greater than left, moderately advanced C4/5 disc disease and posterior disc osteophyte complex complicated by broad-based posterior disc bulge at C6/7 and small fluid signal spinal cord at this level that may represent syrinx or focal myelomalacia    - MRI thoracic spine with and without contrast shows a moderate to large posterior disc protrusion at T12/L1 producing severe spinal stenosis with contact and displacement of the distal spinal cord and crowding of the nerve roots    - MRI lumbar spine with and without contrast shows T12/L1 disc protrusion as above complicated by multilevel lumbar spondylosis with multilevel spinal Formento stenosis and associated grade 1 L4/5  anterolisthesis    - Labs ordered by neurology    - CT chest and CT A/P to rule out head malignancy ordered by neurology.  No suspicious pulmonary lesion on CT.  Liver morphology consistent with cirrhosis, spleen enlarged since 2018, indeterminate renal cortical nodules for which renal protocol CT recommended for follow-up    - Neurology considering EMG/nerve conduction study    - Neurosurgery following and planning surgery today 5/8/2025    - Patient is okay from cardiac standpoint to proceed with surgery      #Alcohol use disorder  #Cirrhosis    -Thiamine and folic acid daily    -[facility] protocol    - CT A/P    #CKD    -Creatinine 1.14, appears near base    -Renally dose meds    #Anemia    -Hemoglobin 11.0 this morning, baseline around 13.6    - No overt bleeding, follow labs    #Thrombocytopenia    -Platelets 83K this morning, over the years has ranged from 92K to 147K    -No overt bleeding, monitor    -Suspect secondary to chronic alcohol use    #GERD    -Protonix 40 mg p.o. twice daily    #Hypertension    -Continue home lisinopril 5 mg p.o. daily    - Continue Toprol 25 mg p.o. daily    #Hyperlipidemia    -Crestor daily      SCDs for DVT prophylaxis.  Full code.  Discussed with patient.  Anticipate discharge home with HH vs SNU facility timing yet to be determined.  Expected Discharge Date: 5/12/2025; Expected Discharge Time:       DO [facility]  05/08/25  09:05 EDT

## 2025-05-09 ENCOUNTER — APPOINTMENT (OUTPATIENT)
Dept: GENERAL RADIOLOGY | Facility: HOSPITAL | Age: 70
End: 2025-05-09
Payer: MEDICARE

## 2025-05-09 LAB
ANION GAP SERPL CALCULATED.3IONS-SCNC: 9.4 MMOL/L (ref 5–15)
BASOPHILS # BLD AUTO: 0.01 10*3/MM3 (ref 0–0.2)
BASOPHILS # BLD AUTO: 0.01 10*3/MM3 (ref 0–0.2)
BASOPHILS NFR BLD AUTO: 0.1 % (ref 0–1.5)
BASOPHILS NFR BLD AUTO: 0.2 % (ref 0–1.5)
BUN SERPL-MCNC: 23 MG/DL (ref 8–23)
BUN/CREAT SERPL: 17 (ref 7–25)
CALCIUM SPEC-SCNC: 9.8 MG/DL (ref 8.6–10.5)
CHLORIDE SERPL-SCNC: 107 MMOL/L (ref 98–107)
CO2 SERPL-SCNC: 20.6 MMOL/L (ref 22–29)
CREAT SERPL-MCNC: 1.35 MG/DL (ref 0.76–1.27)
DEPRECATED RDW RBC AUTO: 47.2 FL (ref 37–54)
DEPRECATED RDW RBC AUTO: 50.2 FL (ref 37–54)
EGFRCR SERPLBLD CKD-EPI 2021: 57.5 ML/MIN/1.73
EOSINOPHIL # BLD AUTO: 0 10*3/MM3 (ref 0–0.4)
EOSINOPHIL # BLD AUTO: 0 10*3/MM3 (ref 0–0.4)
EOSINOPHIL NFR BLD AUTO: 0 % (ref 0.3–6.2)
EOSINOPHIL NFR BLD AUTO: 0 % (ref 0.3–6.2)
ERYTHROCYTE [DISTWIDTH] IN BLOOD BY AUTOMATED COUNT: 12.9 % (ref 12.3–15.4)
ERYTHROCYTE [DISTWIDTH] IN BLOOD BY AUTOMATED COUNT: 13.1 % (ref 12.3–15.4)
GLUCOSE SERPL-MCNC: 128 MG/DL (ref 65–99)
HCT VFR BLD AUTO: 32.5 % (ref 37.5–51)
HCT VFR BLD AUTO: 34.9 % (ref 37.5–51)
HGB BLD-MCNC: 11.1 G/DL (ref 13–17.7)
HGB BLD-MCNC: 11.6 G/DL (ref 13–17.7)
IMM GRANULOCYTES # BLD AUTO: 0.03 10*3/MM3 (ref 0–0.05)
IMM GRANULOCYTES # BLD AUTO: 0.04 10*3/MM3 (ref 0–0.05)
IMM GRANULOCYTES NFR BLD AUTO: 0.5 % (ref 0–0.5)
IMM GRANULOCYTES NFR BLD AUTO: 0.6 % (ref 0–0.5)
LYMPHOCYTES # BLD AUTO: 0.53 10*3/MM3 (ref 0.7–3.1)
LYMPHOCYTES # BLD AUTO: 0.67 10*3/MM3 (ref 0.7–3.1)
LYMPHOCYTES NFR BLD AUTO: 8.7 % (ref 19.6–45.3)
LYMPHOCYTES NFR BLD AUTO: 9.6 % (ref 19.6–45.3)
MAGNESIUM SERPL-MCNC: 2.3 MG/DL (ref 1.6–2.4)
MCH RBC QN AUTO: 34.5 PG (ref 26.6–33)
MCH RBC QN AUTO: 34.6 PG (ref 26.6–33)
MCHC RBC AUTO-ENTMCNC: 33.2 G/DL (ref 31.5–35.7)
MCHC RBC AUTO-ENTMCNC: 34.2 G/DL (ref 31.5–35.7)
MCV RBC AUTO: 100.9 FL (ref 79–97)
MCV RBC AUTO: 104.2 FL (ref 79–97)
MONOCYTES # BLD AUTO: 0.36 10*3/MM3 (ref 0.1–0.9)
MONOCYTES # BLD AUTO: 0.67 10*3/MM3 (ref 0.1–0.9)
MONOCYTES NFR BLD AUTO: 5.9 % (ref 5–12)
MONOCYTES NFR BLD AUTO: 9.6 % (ref 5–12)
NEUTROPHILS NFR BLD AUTO: 5.17 10*3/MM3 (ref 1.7–7)
NEUTROPHILS NFR BLD AUTO: 5.57 10*3/MM3 (ref 1.7–7)
NEUTROPHILS NFR BLD AUTO: 80.1 % (ref 42.7–76)
NEUTROPHILS NFR BLD AUTO: 84.7 % (ref 42.7–76)
NRBC BLD AUTO-RTO: 0 /100 WBC (ref 0–0.2)
PLATELET # BLD AUTO: 113 10*3/MM3 (ref 140–450)
PLATELET # BLD AUTO: 91 10*3/MM3 (ref 140–450)
PMV BLD AUTO: 9.9 FL (ref 6–12)
PMV BLD AUTO: 9.9 FL (ref 6–12)
POTASSIUM SERPL-SCNC: 4.3 MMOL/L (ref 3.5–5.2)
POTASSIUM SERPL-SCNC: 5.9 MMOL/L (ref 3.5–5.2)
RBC # BLD AUTO: 3.22 10*6/MM3 (ref 4.14–5.8)
RBC # BLD AUTO: 3.35 10*6/MM3 (ref 4.14–5.8)
SODIUM SERPL-SCNC: 137 MMOL/L (ref 136–145)
WBC NRBC COR # BLD AUTO: 6.1 10*3/MM3 (ref 3.4–10.8)
WBC NRBC COR # BLD AUTO: 6.96 10*3/MM3 (ref 3.4–10.8)

## 2025-05-09 PROCEDURE — 97162 PT EVAL MOD COMPLEX 30 MIN: CPT

## 2025-05-09 PROCEDURE — 94640 AIRWAY INHALATION TREATMENT: CPT

## 2025-05-09 PROCEDURE — 99024 POSTOP FOLLOW-UP VISIT: CPT

## 2025-05-09 PROCEDURE — 97168 OT RE-EVAL EST PLAN CARE: CPT

## 2025-05-09 PROCEDURE — 84132 ASSAY OF SERUM POTASSIUM: CPT | Performed by: STUDENT IN AN ORGANIZED HEALTH CARE EDUCATION/TRAINING PROGRAM

## 2025-05-09 PROCEDURE — 25010000002 CALCIUM GLUCONATE-NACL 1-0.675 GM/50ML-% SOLUTION: Performed by: STUDENT IN AN ORGANIZED HEALTH CARE EDUCATION/TRAINING PROGRAM

## 2025-05-09 PROCEDURE — 25010000002 ALBUMIN HUMAN 25% PER 50 ML: Performed by: STUDENT IN AN ORGANIZED HEALTH CARE EDUCATION/TRAINING PROGRAM

## 2025-05-09 PROCEDURE — 97530 THERAPEUTIC ACTIVITIES: CPT

## 2025-05-09 PROCEDURE — 25810000003 SODIUM CHLORIDE 0.9 % SOLUTION: Performed by: STUDENT IN AN ORGANIZED HEALTH CARE EDUCATION/TRAINING PROGRAM

## 2025-05-09 PROCEDURE — 93010 ELECTROCARDIOGRAM REPORT: CPT | Performed by: INTERNAL MEDICINE

## 2025-05-09 PROCEDURE — 72100 X-RAY EXAM L-S SPINE 2/3 VWS: CPT

## 2025-05-09 PROCEDURE — 94799 UNLISTED PULMONARY SVC/PX: CPT

## 2025-05-09 PROCEDURE — 83735 ASSAY OF MAGNESIUM: CPT | Performed by: NEUROLOGICAL SURGERY

## 2025-05-09 PROCEDURE — 85025 COMPLETE CBC W/AUTO DIFF WBC: CPT | Performed by: NEUROLOGICAL SURGERY

## 2025-05-09 PROCEDURE — 80048 BASIC METABOLIC PNL TOTAL CA: CPT | Performed by: NEUROLOGICAL SURGERY

## 2025-05-09 PROCEDURE — 93005 ELECTROCARDIOGRAM TRACING: CPT | Performed by: STUDENT IN AN ORGANIZED HEALTH CARE EDUCATION/TRAINING PROGRAM

## 2025-05-09 PROCEDURE — 94761 N-INVAS EAR/PLS OXIMETRY MLT: CPT

## 2025-05-09 PROCEDURE — P9047 ALBUMIN (HUMAN), 25%, 50ML: HCPCS | Performed by: STUDENT IN AN ORGANIZED HEALTH CARE EDUCATION/TRAINING PROGRAM

## 2025-05-09 PROCEDURE — 94760 N-INVAS EAR/PLS OXIMETRY 1: CPT

## 2025-05-09 PROCEDURE — 99232 SBSQ HOSP IP/OBS MODERATE 35: CPT | Performed by: INTERNAL MEDICINE

## 2025-05-09 PROCEDURE — 25010000002 MAGNESIUM SULFATE 2 GM/50ML SOLUTION: Performed by: NEUROLOGICAL SURGERY

## 2025-05-09 PROCEDURE — 25810000003 LACTATED RINGERS PER 1000 ML: Performed by: NEUROLOGICAL SURGERY

## 2025-05-09 RX ORDER — SODIUM CHLORIDE 9 MG/ML
75 INJECTION, SOLUTION INTRAVENOUS CONTINUOUS
Status: DISCONTINUED | OUTPATIENT
Start: 2025-05-09 | End: 2025-05-10

## 2025-05-09 RX ORDER — MIDODRINE HYDROCHLORIDE 5 MG/1
5 TABLET ORAL ONCE
Status: COMPLETED | OUTPATIENT
Start: 2025-05-09 | End: 2025-05-09

## 2025-05-09 RX ORDER — ALBUMIN (HUMAN) 12.5 G/50ML
12.5 SOLUTION INTRAVENOUS ONCE
Status: COMPLETED | OUTPATIENT
Start: 2025-05-09 | End: 2025-05-09

## 2025-05-09 RX ORDER — ALBUTEROL SULFATE 5 MG/ML
10 SOLUTION RESPIRATORY (INHALATION) ONCE
Status: COMPLETED | OUTPATIENT
Start: 2025-05-09 | End: 2025-05-09

## 2025-05-09 RX ORDER — CALCIUM GLUCONATE 98 MG/ML
1 INJECTION, SOLUTION INTRAVENOUS ONCE
Status: DISCONTINUED | OUTPATIENT
Start: 2025-05-09 | End: 2025-05-09

## 2025-05-09 RX ORDER — MIDODRINE HYDROCHLORIDE 5 MG/1
5 TABLET ORAL 3 TIMES DAILY PRN
Status: DISCONTINUED | OUTPATIENT
Start: 2025-05-09 | End: 2025-05-20 | Stop reason: HOSPADM

## 2025-05-09 RX ORDER — INDOMETHACIN 25 MG/1
50 CAPSULE ORAL ONCE
Status: COMPLETED | OUTPATIENT
Start: 2025-05-09 | End: 2025-05-09

## 2025-05-09 RX ORDER — MAGNESIUM SULFATE HEPTAHYDRATE 40 MG/ML
2 INJECTION, SOLUTION INTRAVENOUS
Status: COMPLETED | OUTPATIENT
Start: 2025-05-09 | End: 2025-05-09

## 2025-05-09 RX ORDER — CALCIUM GLUCONATE 20 MG/ML
1000 INJECTION, SOLUTION INTRAVENOUS ONCE
Status: COMPLETED | OUTPATIENT
Start: 2025-05-09 | End: 2025-05-09

## 2025-05-09 RX ADMIN — SODIUM CHLORIDE 75 ML/HR: 9 INJECTION, SOLUTION INTRAVENOUS at 09:39

## 2025-05-09 RX ADMIN — GABAPENTIN 300 MG: 300 CAPSULE ORAL at 00:31

## 2025-05-09 RX ADMIN — ALBUTEROL SULFATE 10 MG: 2.5 SOLUTION RESPIRATORY (INHALATION) at 07:57

## 2025-05-09 RX ADMIN — MAGNESIUM SULFATE IN WATER FOR 2 G: 40 INJECTION INTRAVENOUS at 04:10

## 2025-05-09 RX ADMIN — Medication 10 ML: at 21:08

## 2025-05-09 RX ADMIN — SENNOSIDES AND DOCUSATE SODIUM 2 TABLET: 50; 8.6 TABLET ORAL at 21:07

## 2025-05-09 RX ADMIN — MIDODRINE HYDROCHLORIDE 5 MG: 5 TABLET ORAL at 16:07

## 2025-05-09 RX ADMIN — ALBUMIN (HUMAN) 12.5 G: 0.25 INJECTION, SOLUTION INTRAVENOUS at 12:04

## 2025-05-09 RX ADMIN — PANTOPRAZOLE SODIUM 40 MG: 40 TABLET, DELAYED RELEASE ORAL at 08:48

## 2025-05-09 RX ADMIN — ALLOPURINOL 300 MG: 300 TABLET ORAL at 08:48

## 2025-05-09 RX ADMIN — MIDODRINE HYDROCHLORIDE 5 MG: 5 TABLET ORAL at 14:17

## 2025-05-09 RX ADMIN — Medication 10 ML: at 21:09

## 2025-05-09 RX ADMIN — PANTOPRAZOLE SODIUM 40 MG: 40 TABLET, DELAYED RELEASE ORAL at 21:07

## 2025-05-09 RX ADMIN — MAGNESIUM SULFATE IN WATER FOR 2 G: 40 INJECTION INTRAVENOUS at 06:40

## 2025-05-09 RX ADMIN — SENNOSIDES AND DOCUSATE SODIUM 2 TABLET: 50; 8.6 TABLET ORAL at 08:48

## 2025-05-09 RX ADMIN — SODIUM BICARBONATE 50 MEQ: 84 INJECTION INTRAVENOUS at 08:36

## 2025-05-09 RX ADMIN — SENNOSIDES AND DOCUSATE SODIUM 2 TABLET: 50; 8.6 TABLET ORAL at 00:31

## 2025-05-09 RX ADMIN — Medication 100 MG: at 08:48

## 2025-05-09 RX ADMIN — SODIUM ZIRCONIUM CYCLOSILICATE 10 G: 10 POWDER, FOR SUSPENSION ORAL at 09:41

## 2025-05-09 RX ADMIN — FOLIC ACID 1000 MCG: 1 TABLET ORAL at 08:48

## 2025-05-09 RX ADMIN — Medication 10 ML: at 08:53

## 2025-05-09 RX ADMIN — GABAPENTIN 300 MG: 300 CAPSULE ORAL at 08:48

## 2025-05-09 RX ADMIN — MAGNESIUM SULFATE IN WATER FOR 2 G: 40 INJECTION INTRAVENOUS at 02:12

## 2025-05-09 RX ADMIN — ROSUVASTATIN CALCIUM 10 MG: 20 TABLET, FILM COATED ORAL at 21:07

## 2025-05-09 RX ADMIN — Medication 1000 MCG: at 08:48

## 2025-05-09 RX ADMIN — GABAPENTIN 300 MG: 300 CAPSULE ORAL at 21:07

## 2025-05-09 RX ADMIN — SODIUM CHLORIDE, SODIUM LACTATE, POTASSIUM CHLORIDE, CALCIUM CHLORIDE 100 ML/HR: 20; 30; 600; 310 INJECTION, SOLUTION INTRAVENOUS at 02:11

## 2025-05-09 RX ADMIN — CALCIUM GLUCONATE 1000 MG: 20 INJECTION, SOLUTION INTRAVENOUS at 09:57

## 2025-05-09 RX ADMIN — FERROUS SULFATE TAB 325 MG (65 MG ELEMENTAL FE) 325 MG: 325 (65 FE) TAB at 08:48

## 2025-05-09 NOTE — PLAN OF CARE
Goal Outcome Evaluation:  Plan of Care Reviewed With: patient, spouse           Outcome Evaluation: Neuro exam stable. Patient BP low throughout shift. Provider aware, midodrine ordered PRN, 2 doses given. Pt asymptomatic. No other acute changes.

## 2025-05-09 NOTE — ANESTHESIA POSTPROCEDURE EVALUATION
Patient: Mo Willoughby    Procedure Summary       Date: 05/08/25 Room / Location: [facility] OR  / [facility] MA[facility] OR    Anesthesia Start: 1444 Anesthesia Stop: 2016    Procedure: Open thoracic twelve/lumbar one discectomy/decompression/possible interbody fusion with cages, pedicle screw/terry/crosslink fixation, thoracic twelve/lumbar one/two with Mazor robotic navigation (Spine Lumbar) Diagnosis:       Weakness of left lower extremity      Herniation of thoracolumbar intervertebral disc with myelopathy      (Weakness of left lower extremity [R29.898])      (Herniation of thoracolumbar intervertebral disc with myelopathy [M51.05])    Surgeons: Abel Perez MD Provider: Rony Jones MD    Anesthesia Type: general, Meghan ASA Status: 3            Anesthesia Type: general, Meghan    Vitals  Vitals Value Taken Time   /78 05/08/25 21:30   Temp 36.6 °C (97.8 °F) 05/08/25 20:17   Pulse 77 05/08/25 21:30   Resp 14 05/08/25 21:00   SpO2 100 % 05/08/25 21:30   Vitals shown include unfiled device data.        Post Anesthesia Care and Evaluation      Comments: Discharge criteria met per RN    Patient had 1 run of 20 beats of VT. Patient has a known history of SVT. Cardiology has been following for this admission.

## 2025-05-09 NOTE — PROGRESS NOTES
Name: Mo Willoughby ADMIT: 2025   : 1955  PCP: Jenn Davison APRN    MRN: [id number] LOS: 4 days   AGE/SEX: 69 y.o. male  ROOM: [facility]     Subjective   Subjective   2025  Patient seen and examined at bedside, he does not appear to be in distress.  Still admits to left-sided weakness but states symptoms slightly improved.    2025  Patient states his symptoms have slightly improved though still present.  He is aware that plan is for surgery soon and he is agreeable.  He does asked that his gabapentin be made scheduled instead of as needed.    2025  No overnight events, patient without distress.  Plan for surgery today.    2025  Patient underwent T12/L1 laminectomy with bilateral L1 pediculectomies, posterior interbody fusion with expandable cages at T12/L1, pedicle screw fixation T12 and L2 bilaterally, bilateral terry placement from T12-L2 without complication.  Currently on PCA pump and states he is doing well.       Objective   Objective   Vital Signs  Temp:  [97.7 °F (36.5 °C)-98.2 °F (36.8 °C)] 98.1 °F (36.7 °C)  Heart Rate:  [65-91] 65  Resp:  [12-21] 18  BP: ()/(55-96) 95/55  Arterial Line BP: (171)/(84) 171/84  SpO2:  [96 %-100 %] 100 %  on  Flow (L/min) (Oxygen Therapy):  [2-4] 4;   Device (Oxygen Therapy): nasal cannula  Body mass index is 32.6 kg/m².  Physical Exam  Constitutional:       General: He is not in acute distress.     Appearance: Normal appearance. He is obese.   HENT:      Head: Normocephalic and atraumatic.      Nose: No congestion.      Mouth/Throat:      Pharynx: No oropharyngeal exudate.   Eyes:      General: No scleral icterus.  Cardiovascular:      Rate and Rhythm: Normal rate and regular rhythm.      Heart sounds: No murmur heard.     No friction rub. No gallop.   Pulmonary:      Effort: No respiratory distress.      Breath sounds: No wheezing, rhonchi or rales.   Abdominal:      General: There is no distension.      Tenderness: There is no  "abdominal tenderness. There is no guarding.   Musculoskeletal:         General: No swelling, deformity or signs of injury.      Cervical back: No rigidity.   Skin:     Coloration: Skin is not jaundiced.      Findings: No bruising or lesion.   Neurological:      Mental Status: He is alert.      Comments: Left arm and leg weakness, left foot drop; patient states is improving       Results Review     I reviewed the patient's new clinical results.  Results from last 7 days   Lab Units 05/09/25 0546 05/09/25  0024 05/08/25 0649 05/07/25  0516   WBC 10*3/mm3 6.96 6.10 3.70 3.72   HEMOGLOBIN g/dL 11.1* 11.6* 11.0* 11.5*   PLATELETS 10*3/mm3 113* 91* 83* 79*     Results from last 7 days   Lab Units 05/09/25 0546 05/08/25 2113 05/08/25 0649 05/07/25  0516   SODIUM mmol/L 137 138 139 138   POTASSIUM mmol/L 5.9* 4.7 4.1 4.5   CHLORIDE mmol/L 107 106 106 106   CO2 mmol/L 20.6* 19.9* 22.7 24.6   BUN mg/dL 23 19 17 15   CREATININE mg/dL 1.35* 1.13 1.13 1.14   GLUCOSE mg/dL 128* 151* 92 92   EGFR mL/min/1.73 57.5* 71.2 71.2 70.5     Results from last 7 days   Lab Units 05/06/25 0643 05/05/25  1540   ALBUMIN g/dL 3.2 4.1   BILIRUBIN mg/dL  --  0.7   ALK PHOS U/L  --  81   AST (SGOT) U/L  --  61*   ALT (SGPT) U/L  --  31     Results from last 7 days   Lab Units 05/09/25  0546 05/08/25 2113 05/08/25 0649 05/07/25  0516 05/06/25 0643 05/05/25  1540   CALCIUM mg/dL 9.8 9.5 10.2 10.3  --  10.3   ALBUMIN g/dL  --   --   --   --  3.2 4.1   MAGNESIUM mg/dL 2.3 1.4*  --   --   --  1.2*     Results from last 7 days   Lab Units 05/05/25  1540   PROCALCITONIN ng/mL 0.16     No results found for: \"HGBA1C\", \"POCGLU\"    No radiology results for the last day      I have personally reviewed all medications:  Scheduled Medications  allopurinol, 300 mg, Oral, Daily  calcium gluconate, 1,000 mg, Intravenous, Once  ferrous sulfate, 325 mg, Oral, Daily With Breakfast  folic acid, 1,000 mcg, Oral, Daily  gabapentin, 300 mg, Oral, " Q12H  lisinopril, 5 mg, Oral, Daily  metoprolol succinate XL, 25 mg, Oral, Daily  pantoprazole, 40 mg, Oral, BID  rosuvastatin, 10 mg, Oral, Daily  senna-docusate sodium, 2 tablet, Oral, BID  sodium chloride, 10 mL, Intravenous, Q12H  sodium chloride, 10 mL, Intravenous, Q12H  sodium zirconium cyclosilicate, 10 g, Oral, Once  thiamine, 100 mg, Oral, Daily  vitamin B-12, 1,000 mcg, Oral, Daily    Infusions  HYDROmorphone HCl-NaCl,   Pharmacy Consult,   sodium chloride, 30 mL/hr  sodium chloride, 75 mL/hr    Diet  Diet: Regular/House; Fluid Consistency: Thin (IDDSI 0)    I have personally reviewed:  [x]  Laboratory   [x]  Microbiology   [x]  Radiology   [x]  EKG/Telemetry  [x]  Cardiology/Vascular   []  Pathology    []  Records       Assessment/Plan     Active Hospital Problems    Diagnosis  POA    **Weakness of left lower extremity [R29.898]  Yes    Herniation of thoracolumbar intervertebral disc with myelopathy [M51.05]  Yes    Acute bilateral low back pain with bilateral sciatica [M54.42, M54.41]  Yes    Tongue fasciculation [R25.3]  Yes    Foot drop, left [M21.372]  Yes    Atrophy of muscle of multiple sites [M62.59]  Yes    Imbalance [R26.89]  Yes    DDD (degenerative disc disease), lumbar [M51.369]  Yes    Essential hypertension, benign [I10]  Yes    CRI (chronic renal insufficiency), stage 3 (moderate) [N18.30]  Yes      Resolved Hospital Problems   No resolved problems to display.       69 y.o. male admitted with Weakness of left lower extremity.    #Left-sided weakness  #Left foot drop    -MRI brain with and without contrast without evidence of ischemia or intracranial abnormality    - MRI cervical spine with and without contrast shows advanced degenerative disc disease at C3/4 level with disc osteophyte complex complicated by severe spinal stenosis with effacement of surrounding subarachnoid space and flattening of the spinal cord, moderate to severe bilateral for minimal stenosis, right greater than left,  moderately advanced C4/5 disc disease and posterior disc osteophyte complex complicated by broad-based posterior disc bulge at C6/7 and small fluid signal spinal cord at this level that may represent syrinx or focal myelomalacia    - MRI thoracic spine with and without contrast shows a moderate to large posterior disc protrusion at T12/L1 producing severe spinal stenosis with contact and displacement of the distal spinal cord and crowding of the nerve roots    - MRI lumbar spine with and without contrast shows T12/L1 disc protrusion as above complicated by multilevel lumbar spondylosis with multilevel spinal Formento stenosis and associated grade 1 L4/5 anterolisthesis    - Labs ordered by neurology    - CT chest and CT A/P to rule out head malignancy ordered by neurology.  No suspicious pulmonary lesion on CT.  Liver morphology consistent with cirrhosis, spleen enlarged since 2018, indeterminate renal cortical nodules for which renal protocol CT recommended for follow-up    - Neurology considering EMG/nerve conduction study    - Neurosurgery following    - Patient underwent T12/L1 laminectomy with bilateral L1 pediculectomies, posterior interbody fusion with expandable cages at T12/L1, pedicle screw fixation T12 and L2 bilaterally, bilateral terry placement from T12-L2 without complication.  Surgery was done on 5/8/2025    - Currently on PCA pain pump, defer pain control neurosurgery, however patient is actually doing pretty well    - PT/OT      #Alcohol use disorder  #Cirrhosis    -Thiamine and folic acid daily    -[facility] protocol    - CT A/P shows that liver morphology is consistent with cirrhosis    #Paroxysmal SVT    - Patient currently tolerating Toprol 25 mg p.o. daily    - No intervention per cardiology    #CKD    -Creatinine slightly elevated at 1.35 this morning, most likely due to surgery, monitor    -Renally dose meds    #Hyperkalemia    - Potassium 5.9 this morning    - Patient given Lokelma, bicarb,  albuterol    - Recheck potassium in the afternoon    - Hold lisinopril    #Anemia    -Hemoglobin 11.1 this morning, baseline around 13.6    - No overt bleeding, follow labs    #Thrombocytopenia    -Platelets 113K this morning, over the years has ranged from 92K to 147K    -No overt bleeding, monitor    -Suspect secondary to chronic alcohol use    #GERD    -Protonix 40 mg p.o. twice daily    #Obesity    - BMI 33.6, complicates all aspects of care    #Hypertension    - Hold lisinopril due to hyperkalemia    - Continue Toprol 25 mg p.o. daily    #Hyperlipidemia    -Crestor daily      SCDs for DVT prophylaxis.  Full code.  Discussed with patient.  Anticipate discharge home with HH vs SNU facility timing yet to be determined.  Expected Discharge Date: 5/12/2025; Expected Discharge Time:       Lucy Amezcua DO  [facility]  05/09/25  09:39 EDT

## 2025-05-09 NOTE — THERAPY EVALUATION
Patient Name: Mo Willoughby  : 1955    MRN: [id number]                              Today's Date: 2025       Admit Date: 2025    Visit Dx:     ICD-10-CM ICD-9-CM   1. Weakness of left lower extremity  R29.898 729.89   2. Herniation of thoracolumbar intervertebral disc with myelopathy  M51.05 722.72   3. Tongue fasciculation  R25.3 781.0   4. Atrophy of muscle of multiple sites  M62.59 728.2     Patient Active Problem List   Diagnosis    Erectile dysfunction    Gout    Hypercalcemia    Colon polyp    Sinus tachycardia    Pancreatitis    Other fatigue    Ascending aortic aneurysm    Generalized abdominal pain    Folic acid deficiency    Iron deficiency anemia    Acute bilateral low back pain without sciatica    Swelling of the testicles    Urinary frequency    Hyperparathyroidism, unspecified    CRI (chronic renal insufficiency), stage 3 (moderate)    Essential hypertension, benign    Spinal stenosis of lumbar region    Lumbar radiculitis    Lumbar facet arthropathy    Screening PSA (prostate specific antigen)    Mixed hyperlipidemia    Change in pigmented skin lesion of face    Pain in both testicles    Sacroiliitis    DDD (degenerative disc disease), lumbar    Spondylolisthesis at L4-L5 level    Herniation of lumbar intervertebral disc with radiculopathy    Spinal stenosis of lumbar region with radiculopathy    H/O: gout on allopurinol    Premature atrial contractions    Arthritis of right hip    Right hip pain    Imbalance    Foot drop, left    Atrophy of muscle of multiple sites    Tongue fasciculation    Acute bilateral low back pain with bilateral sciatica    Weakness of left lower extremity    Herniation of thoracolumbar intervertebral disc with myelopathy     Past Medical History:   Diagnosis Date    Abnormal TSH     Arthritis     Ascending aortic aneurysm     Colon polyp 24    CRI (chronic renal insufficiency), stage 3 (moderate) 2016    from stage 3A to 4    DDD (degenerative disc  disease), lumbar     ED (erectile dysfunction)     Erectile dysfunction     Essential hypertension, benign     Folic acid deficiency     Gout     Hip pain, right     Hx of colonic polyp     Hypercalcemia 2015    as far back as data goes so likely pre-dates even this time    Hyperparathyroidism, unspecified 2016    as far back as data goes likely pre-dates even this date, likely multifactorial some primary and some secondary , w/ imaging from 10/16 showing possible L inferior adenoma    Iron deficiency anemia     Low back pain with bilateral sciatica     Mixed hyperlipidemia 02/27/2023    New onset atrial fibrillation 04/15/2024    Pancreatitis     Risk factors for obstructive sleep apnea     Spinal stenosis of lumbar region with neurogenic claudication     Syncope and collapse 03/28/2017     Past Surgical History:   Procedure Laterality Date    CHOLECYSTECTOMY      CHOLECYSTECTOMY WITH INTRAOPERATIVE CHOLANGIOGRAM N/A 08/03/2018    Procedure: CHOLECYSTECTOMY LAPAROSCOPIC INTRAOPERATIVE CHOLANGIOGRAM;  Surgeon: Melina Kate MD;  Location: [facility] MAIN OR;  Service: General    COLONOSCOPY      COLONOSCOPY N/A 10/03/2016    Procedure: COLONOSCOPY TO CECUM TO TERMINAL ILIUM WITH COLD BIOPSY POLYPECTOMY;  Surgeon: Benoit Vilchis MD;  Location: [facility] ENDOSCOPY;  Service:     COLONOSCOPY N/A 05/05/2022    Procedure: COLONOSCOPY TO CECUM WITH COLD SNARE POLYPECTOMIES & RESOLUTION CLIP PLACEMENT X 2;  Surgeon: Benoit Mosley MD;  Location: [facility] ENDOSCOPY;  Service: Gastroenterology;  Laterality: N/A;  ANEMIA/POLYPS, HEMORRHOIDS     ENDOSCOPY N/A 05/05/2022    Procedure: ESOPHAGOGASTRODUODENOSCOPY WITH BX;  Surgeon: Benoit Mosley MD;  Location: [facility] ENDOSCOPY;  Service: Gastroenterology;  Laterality: N/A;  ANEMIA/PORTAL GASTROPATHY, EROSIVE GASTRITIS     EPIDURAL Right 12/07/2022    Procedure: LUMBAR/SACRAL TRANSFORAMINAL EPIDURAL Right L2-3 and right L4-5;  Surgeon: Isaura Torres MD;   Location: [facility] MA[facility];  Service: Pain Management;  Laterality: Right;    LIPOMA EXCISION      LUMBAR DISCECTOMY FUSION INSTRUMENTATION N/A 5/8/2025    Procedure: Open thoracic twelve/lumbar one discectomy/decompression/possible interbody fusion with cages, pedicle screw/terry/crosslink fixation, thoracic twelve/lumbar one/two with Mazor robotic navigation;  Surgeon: Abel Perez MD;  Location: [facility] MAIN OR;  Service: Robotics - Neuro;  Laterality: N/A;    LUMBAR LAMINECTOMY DISCECTOMY DECOMPRESSION N/A 7/7/2023    Procedure: Open right sided lumbar decompression lumbar three/four, lumbar four/five;  Surgeon: Abel Perez MD;  Location:  [facility] MAIN OR;  Service: Neurosurgery;  Laterality: N/A;    MEDIAL BRANCH BLOCK Right 01/23/2023    Procedure: LUMBAR MEDIAL BRANCH BLOCK RIGHT L3-L5 #1;  Surgeon: Isaura Torres MD;  Location: [facility];  Service: Pain Management;  Laterality: Right;    MEDIAL BRANCH BLOCK Right 02/13/2023    Procedure: LUMBAR MEDIAL BRANCH BLOCK RIGHT L3-L5 #1;  Surgeon: Isaura Torres MD;  Location: [facility];  Service: Pain Management;  Laterality: Right;    NERVE SURGERY Right 3/6/2023    Procedure: LUMBAR RADIOFREQUENCY ABLATION RIGHT L3-L5  80990, 96149;  Surgeon: Isaura Torres MD;  Location: [facility];  Service: Pain Management;  Laterality: Right;    SACROILIAC JOINT INJECTION Right 5/5/2023    Procedure: SACROILIAC JOINT INJECTION RIGHT 03037;  Surgeon: Isaura Torres MD;  Location: [facility];  Service: Pain Management;  Laterality: Right;    TONSILLECTOMY        General Information       Row Name 05/09/25 0924          Physical Therapy Time and Intention    Document Type evaluation  -CW     Mode of Treatment physical therapy  -CW       Row Name 05/09/25 0924          General Information    Patient Profile Reviewed yes  -CW     Prior Level of Function transfer;all household mobility;bed mobility;independent:  uses walker, reports 2 recent falls  -CW      Existing Precautions/Restrictions fall;spinal;brace worn when out of bed;LSO  -CW     Barriers to Rehab previous functional deficit  -CW       Row Name 05/09/25 0924          Living Environment    Current Living Arrangements condominium  -CW     People in Home spouse  -CW       Row Name 05/09/25 0924          Stairs Within Home, Primary    Number of Stairs, Within Home, Primary none  -CW       Row Name 05/09/25 0924          Cognition    Orientation Status (Cognition) oriented x 4  -CW       Row Name 05/09/25 0924          Safety Issues/Impairments Affecting Functional Mobility    Impairments Affecting Function (Mobility) balance;endurance/activity tolerance;strength;pain  -CW               User Key  (r) = Recorded By, (t) = Taken By, (c) = Cosigned By      Initials Name Provider Type    Lisa Ma PT Physical Therapist                   Mobility       Row Name 05/09/25 0926          Bed Mobility    Bed Mobility supine-sit;sit-supine  -CW     Supine-Sit [place] (Bed Mobility) moderate assist (50% patient effort);1 person assist;verbal cues  -CW     Sit-Supine [place] (Bed Mobility) moderate assist (50% patient effort);1 person assist;verbal cues  -CW     Assistive Device (Bed Mobility) bed rails;head of bed elevated  -CW     Comment, (Bed Mobility) via log roll technique  -CW       Row Name 05/09/25 0926          Transfers    Comment, (Transfers) LSO donned at EOB  -CW       Row Name 05/09/25 0926          Sit-Stand Transfer    Sit-Stand [place] (Transfers) minimum assist (75% patient effort);moderate assist (50% patient effort);2 person assist;verbal cues  -CW     Assistive Device (Sit-Stand Transfers) walker, front-wheeled  -CW     Comment, (Sit-Stand Transfer) x2 , mod Ax2 on initial trial, progressed to min A x2, bed slightly elevated  -CW       Row Name 05/09/25 0926          Gait/Stairs (Locomotion)    [place] Level (Gait) minimum assist (75% patient effort);2 person  assist;verbal cues  -CW     Assistive Device (Gait) walker, front-wheeled  -CW     Distance in Feet (Gait) 4  -CW     Deviations/Abnormal Patterns (Gait) isael decreased;gait speed decreased;stride length decreased  -CW     Bilateral Gait Deviations forward flexed posture;heel strike decreased  -CW     Comment, (Gait/Stairs) lateral steps towards HOB + 1 small step forward and back  -CW               User Key  (r) = Recorded By, (t) = Taken By, (c) = Cosigned By      Initials Name Provider Type    CW Lisa Cuellar PT Physical Therapist                   Obj/Interventions       Row Name 05/09/25 0930          Range of Motion Comprehensive    General Range of Motion bilateral lower extremity ROM WFL  -CW     Comment, General Range of Motion hx of L foot drop  -CW       Row Name 05/09/25 0930          Strength Comprehensive (MMT)    Comment, General Manual Muscle Testing (MMT) Assessment Generalized weakness BLE L > R  -CW       Row Name 05/09/25 0930          Motor Skills    Therapeutic Exercise other (see comments)  quad sets, glute sets with 3 second hold  -CW       Row Name 05/09/25 0930          Balance    Balance Assessment standing static balance;standing dynamic balance;sitting static balance  -CW     Static Sitting Balance standby assist  -CW     Position, Sitting Balance sitting edge of bed  -CW     Static Standing Balance minimal assist;2-person assist  -CW     Dynamic Standing Balance minimal assist;2-person assist  -CW     Position/Device Used, Standing Balance supported;walker, front-wheeled  -CW               User Key  (r) = Recorded By, (t) = Taken By, (c) = Cosigned By      Initials Name Provider Type    Lisa Ma PT Physical Therapist                   Goals/Plan       Row Name 05/09/25 0935          Bed Mobility Goal 1 (PT)    Activity/Assistive Device (Bed Mobility Goal 1, PT) bed mobility activities, all  -CW     [place] Level/Cues Needed (Bed Mobility Goal 1, PT) modified  independence  -CW     Time Frame (Bed Mobility Goal 1, PT) 2 weeks  -CW       Row Name 05/09/25 0935          Transfer Goal 1 (PT)    Activity/Assistive Device (Transfer Goal 1, PT) sit-to-stand/stand-to-sit;bed-to-chair/chair-to-bed  -CW     [place] Level/Cues Needed (Transfer Goal 1, PT) modified independence  -CW     Time Frame (Transfer Goal 1, PT) 2 weeks  -CW       Row Name 05/09/25 0935          Gait Training Goal 1 (PT)    Activity/Assistive Device (Gait Training Goal 1, PT) gait (walking locomotion);walker, rolling  -CW     [place] Level (Gait Training Goal 1, PT) standby assist  -CW     Distance (Gait Training Goal 1, PT) 100  -CW     Time Frame (Gait Training Goal 1, PT) 2 weeks  -CW       Row Name 05/09/25 0935          Therapy Assessment/Plan (PT)    Planned Therapy Interventions (PT) balance training;bed mobility training;gait training;ROM (range of motion);strengthening;transfer training;patient/family education;postural re-education  -CW               User Key  (r) = Recorded By, (t) = Taken By, (c) = Cosigned By      Initials Name Provider Type    CW Lisa Cuellar, PT Physical Therapist                   Clinical Impression       Row Name 05/09/25 0930          Pain    Pretreatment Pain Rating 6/10  -CW     Posttreatment Pain Rating 6/10  -CW     Pain Location back  -CW     Pain Management Interventions activity modification encouraged  -CW     Response to Pain Interventions activity participation with tolerable pain  -CW       Row Name 05/09/25 0930          Plan of Care Review    Plan of Care Reviewed With patient  -CW     Outcome Evaluation Pt is a 68 yo male seen for PT evaluation s/p T12-L1 discectomy, decompression and fusion. Pt to wear LSO for mobilization. Pt typically is independent with functional mobility however reports he recently began using a walker due to falls and weakness. He lives with his spouse in a single level home. Today, pt demo generalized wekaness, impaired  balance, decreased activity tolerance and general functional mobility deficits below his baseline. Reviewed spinal precautions and log roll technique for bed mobility- pt verbalized understanding. He completed bed mobility with mod A, STS with min to mod Ax2 and was able to take a few steps min A x2 using RW. Pt demo L>R LE weakness and relies heavily on use of UE in standing. Pt is a high falls risk and may benefit from acute rehab at [place]  -       Row Name 05/09/25 0930          Therapy Assessment/Plan (PT)    Rehab Potential (PT) good  -CW     Criteria for Skilled Interventions Met (PT) yes  -CW     Therapy Frequency (PT) 6 times/wk  -CW       Row Name 05/09/25 0930          Vital Signs    O2 Delivery Pre Treatment nasal cannula  -CW     O2 Delivery Intra Treatment nasal cannula  -CW     O2 Delivery Post Treatment nasal cannula  -CW       Row Name 05/09/25 0930          Positioning and Restraints    Pre-Treatment Position in bed  -CW     Post Treatment Position bed  -CW     In Bed notified nsg;call light within reach;encouraged to call for assist;exit alarm on;supine;side rails up x3;SCD pump applied  -CW               User Key  (r) = Recorded By, (t) = Taken By, (c) = Cosigned By      Initials Name Provider Type    CW Lisa Cuellar, PT Physical Therapist                   Outcome Measures       Row Name 05/09/25 0936 05/09/25 0830       How much help from another person do you currently need...    Turning from your back to your side while in flat bed without using bedrails? 2  -CW 3  -SP    Moving from lying on back to sitting on the side of a flat bed without bedrails? 2  -CW 2  -SP    Moving to and from a bed to a chair (including a wheelchair)? 2  -CW 2  -SP    Standing up from a chair using your arms (e.g., wheelchair, bedside chair)? 2  -CW 2  -SP    Climbing 3-5 steps with a railing? 1  -CW 1  -SP    To walk in hospital room? 2  -CW 2  -SP    AM-PAC 6 Clicks Score (PT) 11  -CW 12  -SP    Highest Level  of Mobility Goal 4 --> Transfer to chair/commode  -CW 4 --> Transfer to chair/commode  -SP      Row Name 05/09/25 0936          Functional Assessment    Outcome Measure Options AM-PAC 6 Clicks Basic Mobility (PT)  -CW               User Key  (r) = Recorded By, (t) = Taken By, (c) = Cosigned By      Initials Name Provider Type    CW Lisa Cuellar, PT Physical Therapist    Evy Lisa, RN Registered Nurse                                 Physical Therapy Education       Title: PT OT SLP Therapies (In Progress)       Topic: Physical Therapy (Done)       Point: Mobility training (Done)       Learning Progress Summary            Patient Acceptance, E, VU by CW at 5/9/2025 0936    Comment: spinal precautions                      Point: Home exercise program (Done)       Learning Progress Summary            Patient Acceptance, E, VU by CW at 5/9/2025 0936    Comment: spinal precautions                      Point: Body mechanics (Done)       Learning Progress Summary            Patient Acceptance, E, VU by CW at 5/9/2025 0936    Comment: spinal precautions                      Point: Precautions (Done)       Learning Progress Summary            Patient Acceptance, E, VU by CW at 5/9/2025 0936    Comment: spinal precautions                                      User Key       Initials Effective Dates Name Provider Type Discipline     12/13/22 -  Lisa Cuellar, PT Physical Therapist PT                  PT Recommendation and Plan  Planned Therapy Interventions (PT): balance training, bed mobility training, gait training, ROM (range of motion), strengthening, transfer training, patient/family education, postural re-education  Outcome Evaluation: Pt is a 70 yo male seen for PT evaluation s/p T12-L1 discectomy, decompression and fusion. Pt to wear LSO for mobilization. Pt typically is independent with functional mobility however reports he recently began using a walker due to falls and weakness. He lives with his  spouse in a single level home. Today, pt demo generalized wekaness, impaired balance, decreased activity tolerance and general functional mobility deficits below his baseline. Reviewed spinal precautions and log roll technique for bed mobility- pt verbalized understanding. He completed bed mobility with mod A, STS with min to mod Ax2 and was able to take a few steps min A x2 using RW. Pt demo L>R LE weakness and relies heavily on use of UE in standing. Pt is a high falls risk and [place] benefit from [place] rehab [place]     Time Calculation:         PT Charges       Row Name 05/09/25 0923             Time Calculation    Start Time 0810  -CW      Stop Time 0835  -CW      Time Calculation (min) 25 min  -CW      PT Received On 05/09/25  -CW      PT - Next Appointment 05/10/25  -CW      PT Goal Re-Cert Due Date 05/23/25  -CW         Time Calculation- PT    Total Timed Code Minutes- PT 15 minute(s)  -CW         Timed Charges    47081 - PT Therapeutic Activity Minutes 15  -CW         Total Minutes    Timed Charges Total Minutes 15  -CW       Total Minutes 15  -CW                User Key  (r) = Recorded By, (t) = Taken By, (c) = Cosigned By      Initials Name Provider Type    CW Lisa Cuellar, PT Physical Therapist                  Therapy Charges for Today       Code Description Service Date Service Provider Modifiers Qty    74817600747 HC PT EVAL MOD COMPLEXITY 3 5/9/2025 Lisa Cuellar, PT GP 1    60201087258 HC PT THERAPEUTIC ACT EA 15 MIN 5/9/2025 Lisa Cuellar, PT GP 1            PT G-Codes  Outcome Measure Options: AM-PAC 6 Clicks Basic Mobility (PT)  AM-PAC 6 Clicks Score (PT): 11  AM-PAC 6 Clicks Score (OT): 24  Modified [place] Scale: 2 - Slight disability.  Unable to carry out all previous activities but able to look after own affairs without assistance.  PT Discharge Summary  Anticipated Discharge Disposition (PT): inpatient rehabilitation facility    Lisa Cuellar PT  5/9/2025

## 2025-05-09 NOTE — PLAN OF CARE
Goal Outcome Evaluation:  Plan of Care Reviewed With: patient           Outcome Evaluation: Pt is a 70 yo male seen for PT evaluation s/p T12-L1 discectomy, decompression and fusion. Pt to wear LSO for mobilization. Pt typically is independent with functional mobility however reports he recently began using a walker due to falls and weakness. He lives with his spouse in a single level home. Today, pt demo generalized wekaness, impaired balance, decreased activity tolerance and general functional mobility deficits below his baseline. Reviewed spinal precautions and log roll technique for bed mobility- pt verbalized understanding. He completed bed mobility with mod A, STS with min to mod Ax2 and was able to take a few steps min A x2 using RW. Pt demo L>R LE weakness and relies heavily on use of UE in standing. Pt is a high falls risk and may benefit from acute rehab at [place]    Anticipated Discharge Disposition (PT): inpatient rehabilitation facility

## 2025-05-09 NOTE — THERAPY RE-EVALUATION
Patient Name: Mo Willoughby  : 1955    MRN: [id number]                              Today's Date: 2025       Admit Date: 2025    Visit Dx:     ICD-10-CM ICD-9-CM   1. Weakness of left lower extremity  R29.898 729.89   2. Herniation of thoracolumbar intervertebral disc with myelopathy  M51.05 722.72   3. Tongue fasciculation  R25.3 781.0   4. Atrophy of muscle of multiple sites  M62.59 728.2     Patient Active Problem List   Diagnosis    Erectile dysfunction    Gout    Hypercalcemia    Colon polyp    Sinus tachycardia    Pancreatitis    Other fatigue    Ascending aortic aneurysm    Generalized abdominal pain    Folic acid deficiency    Iron deficiency anemia    Acute bilateral low back pain without sciatica    Swelling of the testicles    Urinary frequency    Hyperparathyroidism, unspecified    CRI (chronic renal insufficiency), stage 3 (moderate)    Essential hypertension, benign    Spinal stenosis of lumbar region    Lumbar radiculitis    Lumbar facet arthropathy    Screening PSA (prostate specific antigen)    Mixed hyperlipidemia    Change in pigmented skin lesion of face    Pain in both testicles    Sacroiliitis    DDD (degenerative disc disease), lumbar    Spondylolisthesis at L4-L5 level    Herniation of lumbar intervertebral disc with radiculopathy    Spinal stenosis of lumbar region with radiculopathy    H/O: gout on allopurinol    Premature atrial contractions    Arthritis of right hip    Right hip pain    Imbalance    Foot drop, left    Atrophy of muscle of multiple sites    Tongue fasciculation    Acute bilateral low back pain with bilateral sciatica    Weakness of left lower extremity    Herniation of thoracolumbar intervertebral disc with myelopathy     Past Medical History:   Diagnosis Date    Abnormal TSH     Arthritis     Ascending aortic aneurysm     Colon polyp 24    CRI (chronic renal insufficiency), stage 3 (moderate) 2016    from stage 3A to 4    DDD (degenerative disc  disease), lumbar     ED (erectile dysfunction)     Erectile dysfunction     Essential hypertension, benign     Folic acid deficiency     Gout     Hip pain, right     Hx of colonic polyp     Hypercalcemia 2015    as far back as data goes so likely pre-dates even this time    Hyperparathyroidism, unspecified 2016    as far back as data goes likely pre-dates even this date, likely multifactorial some primary and some secondary , w/ imaging from 10/16 showing possible L inferior adenoma    Iron deficiency anemia     Low back pain with bilateral sciatica     Mixed hyperlipidemia 02/27/2023    New onset atrial fibrillation 04/15/2024    Pancreatitis     Risk factors for obstructive sleep apnea     Spinal stenosis of lumbar region with neurogenic claudication     Syncope and collapse 03/28/2017     Past Surgical History:   Procedure Laterality Date    CHOLECYSTECTOMY      CHOLECYSTECTOMY WITH INTRAOPERATIVE CHOLANGIOGRAM N/A 08/03/2018    Procedure: CHOLECYSTECTOMY LAPAROSCOPIC INTRAOPERATIVE CHOLANGIOGRAM;  Surgeon: Melina Kate MD;  Location: [facility] MAIN OR;  Service: General    COLONOSCOPY      COLONOSCOPY N/A 10/03/2016    Procedure: COLONOSCOPY TO CECUM TO TERMINAL ILIUM WITH COLD BIOPSY POLYPECTOMY;  Surgeon: Benoit Vilchis MD;  Location: [facility] ENDOSCOPY;  Service:     COLONOSCOPY N/A 05/05/2022    Procedure: COLONOSCOPY TO CECUM WITH COLD SNARE POLYPECTOMIES & RESOLUTION CLIP PLACEMENT X 2;  Surgeon: Benoit Mosley MD;  Location: [facility] ENDOSCOPY;  Service: Gastroenterology;  Laterality: N/A;  ANEMIA/POLYPS, HEMORRHOIDS     ENDOSCOPY N/A 05/05/2022    Procedure: ESOPHAGOGASTRODUODENOSCOPY WITH BX;  Surgeon: Benoit Mosley MD;  Location: [facility] ENDOSCOPY;  Service: Gastroenterology;  Laterality: N/A;  ANEMIA/PORTAL GASTROPATHY, EROSIVE GASTRITIS     EPIDURAL Right 12/07/2022    Procedure: LUMBAR/SACRAL TRANSFORAMINAL EPIDURAL Right L2-3 and right L4-5;  Surgeon: Isaura Torres MD;   Location: [facility] MAIN OR;  Service: Pain Management;  Laterality: Right;    LIPOMA EXCISION      LUMBAR DISCECTOMY FUSION INSTRUMENTATION N/A 5/8/2025    Procedure: Open thoracic twelve/lumbar one discectomy/decompression/possible interbody fusion with cages, pedicle screw/terry/crosslink fixation, thoracic twelve/lumbar one/two with Mazor robotic navigation;  Surgeon: Abel Perez MD;  Location: [facility] MAIN OR;  Service: Robotics - Neuro;  Laterality: N/A;    LUMBAR LAMINECTOMY DISCECTOMY DECOMPRESSION N/A 7/7/2023    Procedure: Open right sided lumbar decompression lumbar three/four, lumbar four/five;  Surgeon: Abel Perez MD;  Location: [facility] MAIN OR;  Service: Neurosurgery;  Laterality: N/A;    MEDIAL BRANCH BLOCK Right 01/23/2023    Procedure: LUMBAR MEDIAL BRANCH BLOCK RIGHT L3-L5 #1;  Surgeon: Isaura Torres MD;  Location: [facility] MAIN OR;  Service: Pain Management;  Laterality: Right;    MEDIAL BRANCH BLOCK Right 02/13/2023    Procedure: LUMBAR MEDIAL BRANCH BLOCK RIGHT L3-L5 #1;  Surgeon: Isaura Torres MD;  Location: [facility] MA[facility];  Service: Pain Management;  Laterality: Right;    NERVE SURGERY Right 3/6/2023    Procedure: LUMBAR RADIOFREQUENCY ABLATION RIGHT L3-L5  48910, 32897;  Surgeon: Isaura Torres MD;  Location: [facility];  Service: Pain Management;  Laterality: Right;    SACROILIAC JOINT INJECTION Right 5/5/2023    Procedure: SACROILIAC JOINT INJECTION RIGHT 29901;  Surgeon: Isaura Torres MD;  Location: [facility];  Service: Pain Management;  Laterality: Right;    TONSILLECTOMY        General Information       Row Name 05/09/25 1348          OT Time and Intention    Subjective Information complains of;pain;weakness  -BC     Document Type re-evaluation  -BC     Mode of Treatment occupational therapy;physical therapy;co-treatment  -BC     Patient Effort good  -BC       Row Name 05/09/25 1342          General Information    Patient Profile Reviewed yes  -BC     Existing  Precautions/Restrictions fall;spinal;brace worn when out of bed;LSO  -BC     Barriers to Rehab previous functional deficit  -BC       Row Name 05/09/25 1348          Living Environment    Current Living Arrangements condominium  -BC     People in Home spouse  -BC       Row Name 05/09/25 1348          Home Main Entrance    Stair Railings, Main Entrance none  -BC       Row Name 05/09/25 1348          Cognition    Orientation Status (Cognition) oriented x 4  -BC       Row Name 05/09/25 1348          Safety Issues/Impairments Affecting Functional Mobility    Safety Issues Affecting Function (Mobility) sequencing abilities  -BC     Impairments Affecting Function (Mobility) balance;endurance/activity tolerance;strength;pain  -BC     Comment, Safety Issues/Impairments (Mobility) PT/OT cotreatment medically appropriate and necessary due to patient acuity level, to maximize therapeutic benefit due to impaired act tolerance, and for safety of patient and staff. Treatment focused on progression of care and goals established in POC.  -BC               User Key  (r) = Recorded By, (t) = Taken By, (c) = Cosigned By      Initials Name Provider Type    BC Jessie Diop OT Occupational Therapist                     Mobility/ADL's       Row Name 05/09/25 1349          Bed Mobility    Bed Mobility supine-sit;sit-supine  -BC     Supine-Sit [place] (Bed Mobility) moderate assist (50% patient effort);1 person assist;verbal cues  -BC     Sit-Supine [place] (Bed Mobility) moderate assist (50% patient effort);1 person assist;verbal cues  -BC     Bed Mobility, Safety Issues decreased use of legs for bridging/pushing;decreased use of arms for pushing/pulling  -BC     Assistive Device (Bed Mobility) bed rails;head of bed elevated  -BC     Comment, (Bed Mobility) cues for techniques and increased time, completed logrolling  -BC       Row Name 05/09/25 1349          Transfers    Transfers sit-stand transfer;stand-sit transfer  -BC        Row Name 05/09/25 1349          Sit-Stand Transfer    Sit-Stand [place] (Transfers) minimum assist (75% patient effort);moderate assist (50% patient effort);verbal cues  -BC     Assistive Device (Sit-Stand Transfers) walker, front-wheeled  -BC     Comment, (Sit-Stand Transfer) X2 stands from EOB, mod AX2 initially stand, minAX2 2nd stand w/ height of bed slightly elevated  -BC       Row Name 05/09/25 1349          Stand-Sit Transfer    Stand-Sit [place] (Transfers) moderate assist (50% patient effort);2 person assist  -BC     Assistive Device (Stand-Sit Transfers) walker, front-wheeled  -BC       Row Name 05/09/25 1349          Functional Mobility    Functional Mobility- Comment took lateral to HOB steps, forward/backward steps AX2 for safety aman support ~Margaret provided on both sides but increased use of Arms to WB through walker initially.  -BC       Row Name 05/09/25 1349          Activities of Daily Living    BADL Assessment/Intervention lower body dressing;upper body dressing  -BC       Row Name 05/09/25 1349          Lower Body Dressing Assessment/Training    [place] Level (Lower Body Dressing) lower body dressing skills;don;socks;doff;dependent (less than 25% patient effort)  -BC     Position (Lower Body Dressing) edge of bed sitting  -BC       Row Name 05/09/25 1349          Upper Body Dressing Assessment/Training    [place] Level (Upper Body Dressing) doff;don;maximum assist (25% patient effort)  -BC     Position (Upper Body Dressing) edge of bed sitting;unsupported sitting  -BC     Comment, (Upper Body Dressing) adjust gown and help w/ temporary LSO brace doff/don  -BC               User Key  (r) = Recorded By, (t) = Taken By, (c) = Cosigned By      Initials Name Provider Type    Jessie Tse OT Occupational Therapist                   Obj/Interventions       Row Name 05/09/25 1356          Sensory Assessment (Somatosensory)    Sensory Assessment (Somatosensory) other (see  comments)  -BC     Sensory Assessment DiminishedL hand, LLE  -BC       Row Name 05/09/25 1356          Vision Assessment/Intervention    Visual Impairment/Limitations WFL  -BC       Row Name 05/09/25 1356          Range of Motion Comprehensive    Comment, General Range of Motion gen'd weakness, BUE/BLE appaers functional  -BC       Row Name 05/09/25 1356          Strength Comprehensive (MMT)    Comment, General Manual Muscle Testing (MMT) Assessment gen'd weakness w/ L sided weakness and L  strength slightly less than R.  -BC       Row Name 05/09/25 1356          Motor Skills    Coordination fine motor deficit;left;upper extremity;minimal impairment  Low tone present in the pt's L hand, specifically his scruggs space. Decreased finger coordination/use  -BC       Row Name 05/09/25 1356          Balance    Balance Assessment sitting static balance;sitting dynamic balance;standing static balance;standing dynamic balance  -BC     Static Sitting Balance standby assist  -BC     Dynamic Sitting Balance standby assist  -BC     Position, Sitting Balance sitting edge of bed  -BC     Static Standing Balance minimal assist;2-person assist  -BC     Dynamic Standing Balance minimal assist;2-person assist  -BC     Position/Device Used, Standing Balance supported;walker, front-wheeled  -BC     Comment, Balance AX2 aman support provided for safety  -BC               User Key  (r) = Recorded By, (t) = Taken By, (c) = Cosigned By      Initials Name Provider Type    BC Jessie Diop OT Occupational Therapist                   Goals/Plan       Row Name 05/09/25 7223          Bed Mobility Goal 1 (OT)    Activity/Assistive Device (Bed Mobility Goal 1, OT) bed mobility activities, all  -BC     [place] Level/Cues Needed (Bed Mobility Goal 1, OT) supervision required  -BC     Time Frame (Bed Mobility Goal 1, OT) short term goal (STG);2 weeks  -BC     Progress/Outcomes (Bed Mobility Goal 1, OT) goal revised this date  -BC       [place]  Name 05/09/25 1403          Transfer Goal 1 (OT)    Activity/Assistive Device (Transfer Goal 1, OT) sit-to-stand/stand-to-sit;bed-to-chair/chair-to-bed;toilet  -BC     [place] Level/Cues Needed (Transfer Goal 1, OT) supervision required  -BC     Time Frame (Transfer Goal 1, OT) short term goal (STG);2 weeks  -BC     Progress/Outcome (Transfer Goal 1, OT) goal revised this date  -BC       Row Name 05/09/25 1403          Bathing Goal 1 (OT)    Activity/Device (Bathing Goal 1, OT) bathing skills, all  -BC     [place] Level/Cues Needed (Bathing Goal 1, OT) minimum assist (75% or more patient effort)  -BC     Time Frame (Bathing Goal 1, OT) short term goal (STG);2 weeks  -BC     Progress/Outcomes (Bathing Goal 1, OT) goal revised this date  -BC       Row Name 05/09/25 1403          Dressing Goal 1 (OT)    Activity/Device (Dressing Goal 1, OT) upper body dressing;lower body dressing  -BC     [place]/Cues Needed (Dressing Goal 1, OT) minimum assist (75% or more patient effort)  -BC     Time Frame (Dressing Goal 1, OT) short term goal (STG);2 weeks  -BC     Progress/Outcome (Dressing Goal 1, OT) goal revised this date  -BC       Row Name 05/09/25 1403          Toileting Goal 1 (OT)    Activity/Device (Toileting Goal 1, OT) toileting skills, all;adjust/manage clothing;perform perineal hygiene  -BC     [place] Level/Cues Needed (Toileting Goal 1, OT) supervision required  -BC     Time Frame (Toileting Goal 1, OT) short term goal (STG);2 weeks  -BC     Progress/Outcome (Toileting Goal 1, OT) new goal  -BC       Row Name 05/09/25 1403          Grooming Goal 1 (OT)    Activity/Device (Grooming Goal 1, OT) grooming skills, all  -BC     [place] (Grooming Goal 1, OT) supervision required  -BC     Time Frame (Grooming Goal 1, OT) 2 weeks;short term goal (STG)  -BC     Strategies/Barriers (Grooming Goal 1, OT) standing sinkside for ADL IND  -BC     Progress/Outcome (Grooming Goal 1, OT) new goal  -BC        Row Name 05/09/25 1403          Self-Feeding Goal 1 (OT)    Activity/Device (Self-Feeding Goal 1, OT) self-feeding skills, all  -BC     [place] Level/Cues Needed (Self-Feeding Goal 1, OT) modified independence  -BC     Time Frame (Self-Feeding Goal 1, OT) short term goal (STG);2 weeks  -BC     Progress/Outcomes (Self-Feeding Goal 1, OT) goal revised this date  -BC       Row Name 05/09/25 1400          Problem Specific Goal 1 (OT)    Problem Specific Goal 1 (OT) Pt will increase ADL IND for 3 step ADL in room w/ walker and close sup A.  -BC     Time Frame (Problem Specific Goal 1, OT) 2 weeks;short term goal (STG)  -BC     Progress/Outcome (Problem Specific Goal 1, OT) new goal  -BC       Row Name 05/09/25 1400          Therapy Assessment/Plan (OT)    Planned Therapy Interventions (OT) activity tolerance training;BADL retraining;cognitive/visual perception retraining;functional balance retraining;neuromuscular control/coordination retraining;occupation/activity based interventions;patient/caregiver education/training;strengthening exercise;transfer/mobility retraining;passive ROM/stretching;ROM/therapeutic exercise  -BC               User Key  (r) = Recorded By, (t) = Taken By, (c) = Cosigned By      Initials Name Provider Type    BC Jessie Diop OT Occupational Therapist                   Clinical Impression       Row Name 05/09/25 7483          Pain Assessment    Pretreatment Pain Rating 6/10  -BC     Posttreatment Pain Rating 6/10  -BC     Pain Location back  -BC     Pain Side/Orientation bilateral;lower  -BC     Pain Management Interventions activity modification encouraged  -BC     Response to Pain Interventions activity participation with tolerable pain  -BC     Pre/Posttreatment Pain Comment PCA pump present w/ pt but did not need to utilize during tx.  -BC       Row Name 05/09/25 9347          Plan of Care Review    Plan of Care Reviewed With patient  -BC     Progress no change  -BC     Outcome  Evaluation Pt re-evaluated today following T12-L1 discectomy, decompression and fusion 5/8. Orders to ambulate 3x/day and LSO when OOB. Temporary LSO used today until fitted one arriving to room with no issues noted. Pt is (I) at baseline with new onset deficits w/ ADL function, transfer deficits, strength deficits, and decreased L hand strength, coordination and function. Pt also w/ pain limiting performance but tolerate OT re-evaluation well today. Pt was able to stand w/ AX2 for safety (min to mod) and completed lateral side steps, forward/backwards steps and seated EOB ADL engagement. Education on log rolling and spinal px's to which Pt v/u. Continue OT acure care skilled OT services to address deficits, improve ADL performance/function with recommending discharge to rehab when stable. Pt would benefit from AE/DME training, caregiver teaching as indicated, LSO brace don/practice with wearing, and ADL training.  -BC       Row Name 05/09/25 5032          Therapy Assessment/Plan (OT)    Rehab Potential (OT) good  -BC     Criteria for Skilled Therapeutic Interventions Met (OT) skilled treatment is necessary  -BC     Therapy Frequency (OT) 5 times/wk  -BC       Row Name 05/09/25 1357          Therapy Plan Review/Discharge Plan (OT)    Equipment Needs Upon Discharge (OT) other (see comments)  TBD DC plans/recs  -BC     Anticipated Discharge Disposition (OT) inpatient rehabilitation facility  -BC       Row Name 05/09/25 4465          Vital Signs    O2 Delivery Pre Treatment supplemental O2  -BC     O2 Delivery Intra Treatment room air  -BC     O2 Delivery Post Treatment supplemental O2  -BC     Pre Patient Position Supine  -BC     Intra Patient Position Standing  -BC     Post Patient Position Supine  -BC       Row Name 05/09/25 0024          Positioning and Restraints    Pre-Treatment Position in bed  -BC     Post Treatment Position bed  -BC     In Bed with nsg;call light within reach;encouraged to call for  assist;fowlers;notified nsg;SCD pump applied  -BC               User Key  (r) = Recorded By, (t) = Taken By, (c) = Cosigned By      Initials Name Provider Type    Jessie Tse OT Occupational Therapist                   Outcome Measures       Row Name 05/09/25 1404          How much help from another is currently needed...    Putting on and taking off regular lower body clothing? 1  -BC     Bathing (including washing, rinsing, and drying) 1  -BC     Toileting (which includes using toilet bed pan or urinal) 1  -BC     Putting on and taking off regular upper body clothing 2  -BC     Taking care of personal grooming (such as brushing teeth) 3  -BC     Eating meals 3  -BC     AM-PAC 6 Clicks Score (OT) 11  -BC       Row Name 05/09/25 0936 05/09/25 0830       How much help from another person do you currently need...    Turning from your back to your side while in flat bed without using bedrails? 2  -CW 3  -SP    Moving from lying on back to sitting on the side of a flat bed without bedrails? 2  -CW 2  -SP    Moving to and from a bed to a chair (including a wheelchair)? 2  -CW 2  -SP    Standing up from a chair using your arms (e.g., wheelchair, bedside chair)? 2  -CW 2  -SP    Climbing 3-5 steps with a railing? 1  -CW 1  -SP    To walk in hospital room? 2  -CW 2  -SP    AM-PAC 6 Clicks Score (PT) 11  -CW 12  -SP    Highest Level of Mobility Goal 4 --> Transfer to chair/commode  -CW 4 --> Transfer to chair/commode  -SP      Row Name 05/09/25 1404 05/09/25 0936       Functional Assessment    Outcome Measure Options AM-PAC 6 Clicks Daily Activity (OT)  -BC AM-PAC 6 Clicks Basic Mobility (PT)  -CW              User Key  (r) = Recorded By, (t) = Taken By, (c) = Cosigned By      Initials Name Provider Type    Lisa Ma, PT Physical Therapist    Jessie Tse OT Occupational Therapist    SP Evy Hidalgo, RN Registered Nurse                    Occupational Therapy Education       Title: PT OT SLP  Therapies (In Progress)       Topic: Occupational Therapy (In Progress)       Point: ADL training (Done)       Learning Progress Summary            Patient Acceptance, E, VU by BC at 5/9/2025 1404    Comment: LSO brace use/practice with don, safety w/ mobility, transfers w/ walker and future POC, spinal pxs                      Point: Precautions (Done)       Learning Progress Summary            Patient Acceptance, E, VU by BC at 5/9/2025 1404    Comment: LSO brace use/practice with don, safety w/ mobility, transfers w/ walker and future POC, spinal pxs                                      User Key       Initials Effective Dates Name Provider Type Discipline    BC 07/29/24 -  Jessie Diop, SHUKRI Occupational Therapist OT                  OT Recommendation and Plan  Planned Therapy Interventions (OT): activity tolerance training, BADL retraining, cognitive/visual perception retraining, functional balance retraining, neuromuscular control/coordination retraining, occupation/activity based interventions, patient/caregiver education/training, strengthening exercise, transfer/mobility retraining, passive ROM/stretching, ROM/therapeutic exercise  Therapy Frequency (OT): 5 times/wk  Plan of Care Review  Plan of Care Reviewed With: patient  Progress: no change  Outcome Evaluation: Pt re-evaluated today following T12-L1 discectomy, decompression and fusion 5/8. Orders to ambulate 3x/day and LSO when OOB. Temporary LSO used today until fitted one arriving to room with no issues noted. Pt is (I) at baseline with new onset deficits w/ ADL function, transfer deficits, strength deficits, and decreased L hand strength, coordination and function. Pt also w/ pain limiting performance but tolerate OT re-evaluation well today. Pt was able to stand w/ AX2 for safety (min to mod) and completed lateral side steps, forward/backwards steps and seated EOB ADL engagement. Education on log rolling and spinal px's to which Pt v/u. Continue OT  acure care skilled OT services to address deficits, improve ADL performance/function with recommending discharge to rehab when stable. Pt would benefit from AE/DME training, caregiver teaching as indicated, LSO brace don/practice with wearing, and ADL training.     Time Calculation:         Time Calculation- OT       Row Name 05/09/25 1405             Time Calculation- OT    OT Start Time 0810  -BC      OT Stop Time 0835  -BC      OT Time Calculation (min) 25 min  -BC      Total Timed Code Minutes- OT 15 minute(s)  -BC      OT Received On 05/09/25  -BC      OT - Next Appointment 05/12/25  -BC      OT Goal Re-Cert Due Date 05/23/25  -BC         Timed Charges    11809 - OT Therapeutic Activity Minutes 15  -BC         Total Minutes    Timed Charges Total Minutes 15  -BC       Total Minutes 15  -BC                User Key  (r) = Recorded By, (t) = Taken By, (c) = Cosigned By      Initials Name Provider Type    BC Jessie Diop, OT Occupational Therapist                  Therapy Charges for Today       Code Description Service Date Service Provider Modifiers Qty    [id number]  OT THERAPEUTIC ACT EA 15 MIN 5/9/2025 Jessie Diop OT GO 1    [id number]  OT RE-EVAL 2 5/9/2025 Jessie Diop OT GO 1                 Jessie Diop OT  5/9/2025

## 2025-05-09 NOTE — PROGRESS NOTES
[facility] THORACIC/LUMBAR NEUROSURGERY POSTOP NOTE      CC: POD #1 s/p open T12-L1 ectomy with bilateral L1 panniculectomy for bilateral discectomy, decompression of spinal cord, posterior interbody fusion with expandable catalyst cages at T12-L1, pedicle screw fixation T12 and L2 bilaterally with Mazor with bilateral terry placement from T12-L2 with cross-link and onlay fusion from L1-L2.      Subjective     Interval History: No acute issues overnight.  Vital signs stable and afebrile.  Postop lumbar x-ray stable today.  Reporting expected back pain-utilizing Dilaudid PCA pump minimally-on basal rate as well.      Objective     Vital signs in last 24 hours:  Temp:  [97.8 °F (36.6 °C)-98.1 °F (36.7 °C)] 98.1 °F (36.7 °C)  Heart Rate:  [] 81  Resp:  [12-18] 16  BP: ()/(54-90) 96/54    Intake/Output this shift:  I/O this shift:  In: -   Out: 90 [Drains:90]    Hemovac: 297 cc since placement yesterday    LABS:  .  Results from last 7 days   Lab Units 05/09/25  0546 05/09/25  0024 05/08/25  0649   WBC 10*3/mm3 6.96 6.10 3.70   HEMOGLOBIN g/dL 11.1* 11.6* 11.0*   HEMATOCRIT % 32.5* 34.9* 32.1*   PLATELETS 10*3/mm3 113* 91* 83*     .  Results from last 7 days   Lab Units 05/09/25  1029 05/09/25  0546   SODIUM mmol/L  --  137   POTASSIUM mmol/L 4.3 5.9*   CHLORIDE mmol/L  --  107   CO2 mmol/L  --  20.6*   BUN mg/dL  --  23   CREATININE mg/dL  --  1.35*   GLUCOSE mg/dL  --  128*   CALCIUM mg/dL  --  9.8         IMAGING STUDIES:  X-ray lumbar spine 5/9/2025:  Shows laminectomy at L1 with posterior plates and pedicle screws at T12 and L2 with cages at T12-L1.  No evidence of hardware malalignment or fracture.  I personally viewed and interpreted the patient's labs, imaging study, medications and chart.    Meds reviewed/changed: Yes    Current Facility-Administered Medications:     acetaminophen (TYLENOL) tablet 650 mg, 650 mg, Oral, Q4H PRN **OR** acetaminophen (TYLENOL) 160 MG/5ML oral solution 650 mg, 650 mg, Oral,  Q4H PRN **OR** acetaminophen (TYLENOL) suppository 650 mg, 650 mg, Rectal, Q4H PRN, Abel Perez MD    allopurinol (ZYLOPRIM) tablet 300 mg, 300 mg, Oral, Daily, Abel Perez MD, 300 mg at 05/09/25 0848    sennosides-docusate (PERICOLACE) 8.6-50 MG per tablet 2 tablet, 2 tablet, Oral, BID, 2 tablet at 05/09/25 0848 **AND** polyethylene glycol (MIRALAX) packet 17 g, 17 g, Oral, Daily PRN **AND** bisacodyl (DULCOLAX) EC tablet 5 mg, 5 mg, Oral, Daily PRN **AND** bisacodyl (DULCOLAX) suppository 10 mg, 10 mg, Rectal, Daily PRN, Abel Perez MD    Calcium Replacement - Follow Nurse / BPA Driven Protocol, , Not Applicable, PRN, Abel Perez MD    ferrous sulfate tablet 325 mg, 325 mg, Oral, Daily With Breakfast, Abel Perez MD, 325 mg at 05/09/25 0848    folic acid (FOLVITE) tablet 1,000 mcg, 1,000 mcg, Oral, Daily, Abel Perez MD, 1,000 mcg at 05/09/25 0848    gabapentin (NEURONTIN) capsule 300 mg, 300 mg, Oral, Q12H, Abel Perez MD, 300 mg at 05/09/25 0848    HYDROmorphone (DILAUDID) PCA 0.2 mg/ml 50 mL syringe, , Intravenous, Continuous, Abel Perez MD, Currently Infusing at 05/08/25 2110    [Held by provider] lisinopril (PRINIVIL,ZESTRIL) tablet 5 mg, 5 mg, Oral, Daily, Abel Perez MD, 5 mg at 05/08/25 0828    Magnesium Standard Dose Replacement - Follow Nurse / BPA Driven Protocol, , Not Applicable, PRN, Abel Perez MD    methocarbamol (ROBAXIN) tablet 750 mg, 750 mg, Oral, 4x Daily PRN, Abel Perez MD    metoprolol succinate XL (TOPROL-XL) 24 hr tablet 25 mg, 25 mg, Oral, Daily, Abel Perez MD, 25 mg at 05/08/25 0829    midodrine (PROAMATINE) tablet 5 mg, 5 mg, Oral, TID PRN, Lucy Amezcua DO    naloxone (NARCAN) injection 0.1 mg, 0.1 mg, Intravenous, Q5 Min PRN, Abel Perez MD    nitroglycerin (NITROSTAT) SL tablet 0.4 mg, 0.4 mg, Sublingual, Q5 Min PRN, Abel Perez MD    ondansetron ODT (ZOFRAN-ODT) disintegrating  tablet 4 mg, 4 mg, Oral, Q6H PRN **OR** ondansetron (ZOFRAN) injection 4 mg, 4 mg, Intravenous, Q6H PRN, Abel Perez MD    ondansetron ODT (ZOFRAN-ODT) disintegrating tablet 4 mg, 4 mg, Oral, Q6H PRN **OR** ondansetron (ZOFRAN) injection 4 mg, 4 mg, Intravenous, Q6H PRN, Abel Perez MD    oxyCODONE (ROXICODONE) immediate release tablet 10 mg, 10 mg, Oral, Q4H PRN, Abel Perez MD    pantoprazole (PROTONIX) EC tablet 40 mg, 40 mg, Oral, BID, Abel Perez MD, 40 mg at 05/09/25 0848    Pharmacy Consult, , Not Applicable, Continuous PRN, Abel Perez MD    Phosphorus Replacement - Follow Nurse / BPA Driven Protocol, , Not Applicable, PRN, Abel Perez MD    Potassium Replacement - Follow Nurse / BPA Driven Protocol, , Not Applicable, PRN, Abel ePrez MD    rosuvastatin (CRESTOR) tablet 10 mg, 10 mg, Oral, Daily, Abel Perez MD, 10 mg at 05/07/25 2032    sodium chloride 0.9 % flush 10 mL, 10 mL, Intravenous, Q12H, Abel Perez MD, 10 mL at 05/08/25 0837    sodium chloride 0.9 % flush 10 mL, 10 mL, Intravenous, PRN, Abel Perez MD    sodium chloride 0.9 % flush 10 mL, 10 mL, Intravenous, Q12H, Abel Perez MD, 10 mL at 05/09/25 0853    sodium chloride 0.9 % flush 10 mL, 10 mL, Intravenous, PRN, Abel Perez MD    sodium chloride 0.9 % infusion 40 mL, 40 mL, Intravenous, PRN, Abel Perez MD    sodium chloride 0.9 % infusion 40 mL, 40 mL, Intravenous, PRN, Abel Perez MD    sodium chloride 0.9 % infusion, 30 mL/hr, Intravenous, Continuous PRN, Abel Perez MD    sodium chloride 0.9 % infusion, 75 mL/hr, Intravenous, Continuous, Lucy Amezcua DO, Last Rate: 75 mL/hr at 05/09/25 0939, 75 mL/hr at 05/09/25 0939    thiamine (VITAMIN B-1) tablet 100 mg, 100 mg, Oral, Daily, Abel Perez MD, 100 mg at 05/09/25 0848    vitamin B-12 (CYANOCOBALAMIN) tablet 1,000 mcg, 1,000 mcg, Oral, Daily, Abel Perez MD, 1,000 mcg at  05/09/25 0848      Physical Exam:    General:   Awake, alert.  Back:    No brace yet.  Posterior thoracolumbar incision dressing clean dry and intact.  Abdomen:    NT/ND   Motor:     Right and left IP 4+ to 5 -/5  Right and left quad/hamstring, 4+/5  Right plantarflexion/dorsiflexion 5 -/5  Left plantarflexion 4+/5, dorsiflexion 3+/5 (history of foot drop)  Patient notes no changes to lower extremity strength after surgery.    no fasciculations, rigidity, spasticity, or abnormal movements.  Reflexes:   2+ in the lower extremities. no clonus  Sensation:   Normal to light touch aman LEs  Station and Gait:             Per PT note 5/9/2025:  Pt to wear LSO for mobilization. Pt typically is independent with functional mobility however reports he recently began using a walker due to falls and weakness. He lives with his spouse in a single level home. Today, pt demo generalized wekaness, impaired balance, decreased activity tolerance and general functional mobility deficits below his baseline. Reviewed spinal precautions and log roll technique for bed mobility- pt verbalized understanding. He completed bed mobility with mod A, STS with min to mod Ax2 and was able to take a few steps min A x2 using RW. Pt demo L>R LE weakness and relies heavily on use of UE in standing. 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    He is currently experiencing expected surgical site pain -he is on Dilaudid PCA pump with a basal rate but using pump minimally overnight.  He would like to keep the PCA pump 1 more day for breakthrough pain. Otherwise Mr. Willoughby does not note a lot of change with lower extremity strength.      Please continue pain regimen and utilize muscle relaxers as needed.  Please also utilize ice.  He will receive a LSO brace today which she will need to wear whenever up greater than 30 degrees.  Joseph drain in place will remain until postop day #7.      History of cord compression: Mobilize with therapies.  Rehab eval to Dr. Guerra pending        PLAN:         Up with TLSO brace  Bowel regimen  P.o. pain meds and muscle relaxers as needed  Continued Dilaudid PCA-likely DC tomorrow  Utilize ICE  Encourage use of incentive spirometer  SCD/DVT prophy  REHAB evaluation  Daily CBC/BMP    I discussed the patient's findings and my recommendations with patient and Dr. Perez.    During patient visit, I utilized appropriate personal protective equipment including mask and gloves.  Mask used was standard procedure mask. Appropriate PPE was worn during the entire visit.  Hand hygiene was completed before and after.      LOS: 4 days       David Pimentel, APRN  5/9/2025  13:59 EDT    \"Dictated utilizing Dragon dictation\".    "

## 2025-05-09 NOTE — PROGRESS NOTES
"[facility] Follow Up    Chief Complaint: Follow up PSVT    Interval History:     Objective:     Objective:  Temp:  [97.7 °F (36.5 °C)-98.2 °F (36.8 °C)] 98.1 °F (36.7 °C)  Heart Rate:  [69-91] 77  Resp:  [12-21] 16  BP: (101-131)/(74-96) 101/74  Arterial Line BP: (171)/(84) 171/84     Intake/Output Summary (Last 24 hours) at 5/9/2025 0741  Last data filed at 5/9/2025 0322  Gross per 24 hour   Intake 2670 ml   Output 1897 ml   Net 773 ml     Body mass index is 32.6 kg/m².      05/05/25  1800   Weight: 108 kg (238 lb 1.6 oz)     Weight change:       Physical Exam:   General : Alert, cooperative, in no acute distress.  Neuro: Alert,cooperative and oriented.  Lungs: CTAB. Normal respiratory effort and rate.  CV: Regular rate and rhythm, normal S1 and S2, no murmurs, gallops or rubs.  ABD: Soft, nontender, nondistended. Positive bowel sounds.  Extr: No edema or cyanosis, moves all extremities.    Lab Review:   Results from last 7 days   Lab Units 05/09/25  0546 05/08/25  2113 05/07/25  0516 05/05/25  1540   SODIUM mmol/L 137 138   < > 139   POTASSIUM mmol/L 5.9* 4.7   < > 4.5   CHLORIDE mmol/L 107 106   < > 105   CO2 mmol/L 20.6* 19.9*   < > 22.4   BUN mg/dL 23 19   < > 15   CREATININE mg/dL 1.35* 1.13   < > 1.18   GLUCOSE mg/dL 128* 151*   < > 85   CALCIUM mg/dL 9.8 9.5   < > 10.3   AST (SGOT) U/L  --   --   --  61*   ALT (SGPT) U/L  --   --   --  31    < > = values in this interval not displayed.     Results from last 7 days   Lab Units 05/05/25  1540   CK TOTAL U/L 110     Results from last 7 days   Lab Units 05/09/25  0546 05/09/25  0024   WBC 10*3/mm3 6.96 6.10   HEMOGLOBIN g/dL 11.1* 11.6*   HEMATOCRIT % 32.5* 34.9*   PLATELETS 10*3/mm3 113* 91*     Results from last 7 days   Lab Units 05/07/25  1141   INR  1.29*     Results from last 7 days   Lab Units 05/09/25  0546 05/08/25  2113   MAGNESIUM mg/dL 2.3 1.4*           Invalid input(s): \"LDLCALC\"      Results from last 7 days   Lab Units " 05/05/25  1540   TSH uIU/mL 0.658     I reviewed the patient's new clinical results.  I personally viewed and interpreted the patient's EKG  Current Medications:   Scheduled Meds:albuterol, 10 mg, Nebulization, Once  allopurinol, 300 mg, Oral, Daily  calcium gluconate, 1 g, Intravenous, Once  ferrous sulfate, 325 mg, Oral, Daily With Breakfast  folic acid, 1,000 mcg, Oral, Daily  gabapentin, 300 mg, Oral, Q12H  lisinopril, 5 mg, Oral, Daily  magnesium sulfate, 2 g, Intravenous, Q2H  metoprolol succinate XL, 25 mg, Oral, Daily  pantoprazole, 40 mg, Oral, BID  rosuvastatin, 10 mg, Oral, Daily  senna-docusate sodium, 2 tablet, Oral, BID  sodium bicarbonate, 50 mEq, Intravenous, Once  sodium chloride, 10 mL, Intravenous, Q12H  sodium chloride, 10 mL, Intravenous, Q12H  sodium zirconium cyclosilicate, 10 g, Oral, Once  thiamine, 100 mg, Oral, Daily  vitamin B-12, 1,000 mcg, Oral, Daily      Continuous Infusions:HYDROmorphone HCl-NaCl,   Pharmacy Consult,   sodium chloride, 30 mL/hr  sodium chloride, 75 mL/hr        Allergies:  Allergies   Allergen Reactions    Zetia [Ezetimibe] Dizziness     Nausea, fatgue       Assessment/Plan:     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  Neurology recommends outpatient EMG/NCS   Hypertension  Paroxysmal supraventricular tachycardia.  The patient had stopped the beta-blocker and is on in the past due to lower extremity swelling.  He is receiving beta-blocker significant issue.    Dilated ascending aorta.  4.3 by echo 11/2024.     -Doing well postoperatively.  Will sign off and see as scheduled in July.     Hazel Brown MD  05/09/25  07:41 EDT

## 2025-05-09 NOTE — PAYOR COMM NOTE
"Mo Willoughby (69 y.o. Male)     ATTN: NURSE REVIEWER  [facility]   REF: [id number]  PLS REPLY TO CHARLES FARLEY [phone] OR FAX# [phone]      Date of Birth   1955    Social Security Number       Address   [address] [facility] [address]    Home Phone       MRN   [id number]       [employer]ar[employer]   Oriental orthodox    Marital Status   Single                            Admission Date   5/5/2025    Admission Type   Urgent    Admitting Provider   Phuong Villavicencio MD    Attending Provider   Lucy Amezcua DO    Department, Room/Bed   [address], P593/1       Discharge Date       Discharge Disposition       Discharge Destination                                 Attending Provider: Lucy Amezcua DO    Allergies: Zetia [Ezetimibe]    Isolation: None   Infection: None   Code Status: CPR    Ht: 182 cm (71.65\")   Wt: 108 kg (238 lb 1.6 oz)    Admission Cmt: None   Principal Problem: Weakness of left lower extremity [R29.898]                   Active Insurance as of 5/5/2025       Primary Coverage       Payor Plan Insurance Group Employer/Plan Group    ANTHEM MEDICARE REPLACEMENT ANTHEM MEDICARE ADVANTAGE HMO [id number]       Payor Plan Address Payor Plan Phone Number Payor Plan Fax Number Effective Dates    [address] [phone]  3/1/2025 - None Entered    [facility] [id number]         Subscriber Name Subscriber Birth Date Member ID       MO WILLOUGHBY 1955 [id number]                     Emergency Contacts        (Rel.) Home Phone Work Phone Mobile Phone    Kendy Becker (Significant Other) [phone] -- [phone]    JAYDEN WILLOUGHBY (Brother) [phone] -- [phone]    CaseMirtha (Sister) [phone]              Facility-Administered Medications as of 5/9/2025   Medication Dose Route Frequency Provider Last Rate Last Admin    acetaminophen (TYLENOL) tablet 650 mg  650 mg Oral Q4H PRN Abel Perez MD "        Or    acetaminophen (TYLENOL) 160 MG/5ML oral solution 650 mg  650 mg Oral Q4H PRN Abel Perez MD        Or    acetaminophen (TYLENOL) suppository 650 mg  650 mg Rectal Q4H PRN Abel Perez MD        [COMPLETED] acetaminophen (TYLENOL) tablet 1,000 mg  1,000 mg Oral Once Denton Coleman MD   1,000 mg at 05/08/25 1313    [COMPLETED] albumin human 25 % IV SOLN 12.5 g  12.5 g Intravenous Once Lucy Amezcua DO   12.5 g at 05/09/25 1204    [COMPLETED] albuterol (PROVENTIL) nebulizer solution 0.5% 2.5 mg/0.5mL  10 mg Nebulization Once Lucy Amezcua DO   10 mg at 05/09/25 0757    allopurinol (ZYLOPRIM) tablet 300 mg  300 mg Oral Daily Abel Perez MD   300 mg at 05/09/25 0848    sennosides-docusate (PERICOLACE) 8.6-50 MG per tablet 2 tablet  2 tablet Oral BID Abel Perez MD   2 tablet at 05/09/25 0848    And    polyethylene glycol (MIRALAX) packet 17 g  17 g Oral Daily PRN Abel Perez MD        And    bisacodyl (DULCOLAX) EC tablet 5 mg  5 mg Oral Daily PRN Abel Perez MD        And    bisacodyl (DULCOLAX) suppository 10 mg  10 mg Rectal Daily PRN Abel Perez MD        [COMPLETED] calcium gluconate 1000 Mg/50ml 0.675% NaCl IV SOLN  1,000 mg Intravenous Once Lucy Amezcua DO   1,000 mg at 05/09/25 0957    Calcium Replacement - Follow Nurse / BPA Driven Protocol   Not Applicable PRN Abel Perez MD        [COMPLETED] ceFAZolin 2000 mg IVPB in 100 mL NS (MBP)  2,000 mg Intravenous Once Abel Perez MD   2,000 mg at 05/08/25 1438    [COMPLETED] famotidine (PEPCID) injection 20 mg  20 mg Intravenous Once Denton Coleman MD   20 mg at 05/08/25 1317    ferrous sulfate tablet 325 mg  325 mg Oral Daily With Breakfast Abel Perez MD   325 mg at 05/09/25 0848    folic acid (FOLVITE) tablet 1,000 mcg  1,000 mcg Oral Daily Abel Perez MD   1,000 mcg at 05/09/25 0848    gabapentin (NEURONTIN) capsule 300 mg  300 mg Oral Q12H  Abel Perez MD   300 mg at 05/09/25 0848    [COMPLETED] gadobenate dimeglumine (MULTIHANCE) injection 20 mL  20 mL Intravenous Once in imaging Lucy Amezcua DO   20 mL at 05/06/25 0526    HYDROmorphone (DILAUDID) PCA 0.2 mg/ml 50 mL syringe   Intravenous Continuous Abel Perez MD   Currently Infusing at 05/08/25 2110    [COMPLETED] iopamidol (ISOVUE-300) 61 % injection 100 mL  100 mL Intravenous Once in imaging Lucy Amezcua DO   100 mL at 05/07/25 0941    [Held by provider] lisinopril (PRINIVIL,ZESTRIL) tablet 5 mg  5 mg Oral Daily Abel Perez MD   5 mg at 05/08/25 0828    Magnesium Standard Dose Replacement - Follow Nurse / BPA Driven Protocol   Not Applicable PRN Abel Perez MD        [COMPLETED] magnesium sulfate 2g/50 mL (PREMIX) infusion  2 g Intravenous Q2H Abel Perez MD   2 g at 05/09/25 0640    [COMPLETED] magnesium sulfate 4g/100mL (PREMIX) infusion  4 g Intravenous Once Lucy Amezcua DO   4 g at 05/06/25 0933    [COMPLETED] Methocarbamol (ROBAXIN) injection 1,000 mg  1,000 mg Intravenous Once Abel Perez MD   1,000 mg at 05/08/25 2036    methocarbamol (ROBAXIN) tablet 750 mg  750 mg Oral 4x Daily PRN Abel Perez MD        metoprolol succinate XL (TOPROL-XL) 24 hr tablet 25 mg  25 mg Oral Daily Abel Perez MD   25 mg at 05/08/25 0829    midodrine (PROAMATINE) tablet 5 mg  5 mg Oral TID PRN Lucy Amezcua DO   5 mg at 05/09/25 1417    naloxone (NARCAN) injection 0.1 mg  0.1 mg Intravenous Q5 Min PRN Abel Perez MD        nitroglycerin (NITROSTAT) SL tablet 0.4 mg  0.4 mg Sublingual Q5 Min PRN Abel Perez MD        ondansetron ODT (ZOFRAN-ODT) disintegrating tablet 4 mg  4 mg Oral Q6H PRN Abel Perez MD        Or    ondansetron (ZOFRAN) injection 4 mg  4 mg Intravenous Q6H PRN Abel Perez MD        ondansetron ODT (ZOFRAN-ODT) disintegrating tablet 4 mg  4 mg Oral Q6H PRN Abel Perez MD        Or     ondansetron (ZOFRAN) injection 4 mg  4 mg Intravenous Q6H PRN Abel Perez MD        oxyCODONE (ROXICODONE) immediate release tablet 10 mg  10 mg Oral Q4H PRN Abel Perez MD        pantoprazole (PROTONIX) EC tablet 40 mg  40 mg Oral BID Abel Perez MD   40 mg at 05/09/25 0848    Pharmacy Consult   Not Applicable Continuous PRN Abel Perez MD        Phosphorus Replacement - Follow Nurse / BPA Driven Protocol   Not Applicable PRN Abel Perez MD        Potassium Replacement - Follow Nurse / BPA Driven Protocol   Not Applicable PRAbel Bach MD        rosuvastatin (CRESTOR) tablet 10 mg  10 mg Oral Daily Abel Perez MD   10 mg at 05/07/25 2032    [COMPLETED] sodium bicarbonate injection 8.4% 50 mEq  50 mEq Intravenous Once Lucy Amezcua,    50 mEq at 05/09/25 0836    sodium chloride 0.9 % flush 10 mL  10 mL Intravenous Q12H Abel Perez MD   10 mL at 05/08/25 0837    sodium chloride 0.9 % flush 10 mL  10 mL Intravenous PRN Abel Perez MD        sodium chloride 0.9 % flush 10 mL  10 mL Intravenous Q12H Abel Perez MD   10 mL at 05/09/25 0853    sodium chloride 0.9 % flush 10 mL  10 mL Intravenous PRN Abel Perez MD        sodium chloride 0.9 % infusion 40 mL  40 mL Intravenous PRN Abel Perez MD        sodium chloride 0.9 % infusion 40 mL  40 mL Intravenous PRN Abel Perez MD        sodium chloride 0.9 % infusion  30 mL/hr Intravenous Continuous PRN Abel Perez MD        sodium chloride 0.9 % infusion  75 mL/hr Intravenous Continuous Lucy Amezcua DO 75 mL/hr at 05/09/25 0939 75 mL/hr at 05/09/25 0939    [COMPLETED] sodium zirconium cyclosilicate (LOKELMA) packet 10 g  10 g Oral Once Lucy Amezcua, DO   10 g at 05/09/25 0941    thiamine (VITAMIN B-1) tablet 100 mg  100 mg Oral Daily Abel Perez MD   100 mg at 05/09/25 0848    vitamin B-12 (CYANOCOBALAMIN) tablet 1,000 mcg  1,000 mcg Oral Daily Chris,  MD Abel   1,000 mcg at 05/09/25 0848     Lab Results (last 24 hours)       Procedure Component Value Units Date/Time    Potassium [[id number]]  (Normal) Collected: 05/09/25 1029    Specimen: Blood Updated: 05/09/25 1120     Potassium 4.3 mmol/L     Magnesium [[id number]]  (Normal) Collected: 05/09/25 0546    Specimen: Blood Updated: 05/09/25 0654     Magnesium 2.3 mg/dL     Basic Metabolic Panel [[id number]]  (Abnormal) Collected: 05/09/25 0546    Specimen: Blood Updated: 05/09/25 0644     Glucose 128 mg/dL      BUN 23 mg/dL      Creatinine 1.35 mg/dL      Sodium 137 mmol/L      Potassium 5.9 mmol/L      Chloride 107 mmol/L      CO2 20.6 mmol/L      Calcium 9.8 mg/dL      BUN/Creatinine Ratio 17.0     Anion Gap 9.4 mmol/L      eGFR 57.5 mL/min/1.73     Narrative:      GFR Categories in Chronic Kidney Disease (CKD)              GFR Category          GFR (mL/min/1.73)    Interpretation  G1                    90 or greater        Normal or high (1)  G2                    60-89                Mild decrease (1)  G3a                   45-59                Mild to moderate decrease  G3b                   30-44                Moderate to severe decrease  G4                    15-29                Severe decrease  G5                    14 or less           Kidney failure    (1)In the absence of evidence of kidney disease, neither GFR category G1 or G2 fulfill the criteria for CKD.    eGFR calculation 2021 CKD-EPI creatinine equation, which does not include race as a factor    CBC & Differential [[id number]]  (Abnormal) Collected: 05/09/25 0546    Specimen: Blood Updated: 05/09/25 0605    Narrative:      The following orders were created for panel order CBC & Differential.  Procedure                               Abnormality         Status                     ---------                               -----------         ------                     CBC Auto Differential[[id number]]        Abnormal            Final result                  Please view results for these tests on the individual orders.    CBC Auto Differential [[id number]]  (Abnormal) Collected: 05/09/25 0546    Specimen: Blood Updated: 05/09/25 0605     WBC 6.96 10*3/mm3      RBC 3.22 10*6/mm3      Hemoglobin 11.1 g/dL      Hematocrit 32.5 %      .9 fL      MCH 34.5 pg      MCHC 34.2 g/dL      RDW 12.9 %      RDW-SD 47.2 fl      MPV 9.9 fL      Platelets 113 10*3/mm3      Neutrophil % 80.1 %      Lymphocyte % 9.6 %      Monocyte % 9.6 %      Eosinophil % 0.0 %      Basophil % 0.1 %      Immature Grans % 0.6 %      Neutrophils, Absolute 5.57 10*3/mm3      Lymphocytes, Absolute 0.67 10*3/mm3      Monocytes, Absolute 0.67 10*3/mm3      Eosinophils, Absolute 0.00 10*3/mm3      Basophils, Absolute 0.01 10*3/mm3      Immature Grans, Absolute 0.04 10*3/mm3     CBC & Differential [[id number]]  (Abnormal) Collected: 05/09/25 0024    Specimen: Blood Updated: 05/09/25 0055    Narrative:      The following orders were created for panel order CBC & Differential.  Procedure                               Abnormality         Status                     ---------                               -----------         ------                     CBC Auto Differential[[id number]]        Abnormal            Final result                 Please view results for these tests on the individual orders.    CBC Auto Differential [[id number]]  (Abnormal) Collected: 05/09/25 0024    Specimen: Blood Updated: 05/09/25 0055     WBC 6.10 10*3/mm3      RBC 3.35 10*6/mm3      Hemoglobin 11.6 g/dL      Hematocrit 34.9 %      .2 fL      MCH 34.6 pg      MCHC 33.2 g/dL      RDW 13.1 %      RDW-SD 50.2 fl      MPV 9.9 fL      Platelets 91 10*3/mm3      Neutrophil % 84.7 %      Lymphocyte % 8.7 %      Monocyte % 5.9 %      Eosinophil % 0.0 %      Basophil % 0.2 %      Immature Grans % 0.5 %      Neutrophils, Absolute 5.17 10*3/mm3      Lymphocytes, Absolute 0.53 10*3/mm3      Monocytes, Absolute 0.36 10*3/mm3       Eosinophils, Absolute 0.00 10*3/mm3      Basophils, Absolute 0.01 10*3/mm3      Immature Grans, Absolute 0.03 10*3/mm3      nRBC 0.0 /100 WBC     Basic Metabolic Panel [[id number]]  (Abnormal) Collected: 05/08/25 2113    Specimen: Blood Updated: 05/08/25 2139     Glucose 151 mg/dL      BUN 19 mg/dL      Creatinine 1.13 mg/dL      Sodium 138 mmol/L      Potassium 4.7 mmol/L      Chloride 106 mmol/L      CO2 19.9 mmol/L      Calcium 9.5 mg/dL      BUN/Creatinine Ratio 16.8     Anion Gap 12.1 mmol/L      eGFR 71.2 mL/min/1.73     Narrative:      GFR Categories in Chronic Kidney Disease (CKD)              GFR Category          GFR (mL/min/1.73)    Interpretation  G1                    90 or greater        Normal or high (1)  G2                    60-89                Mild decrease (1)  G3a                   45-59                Mild to moderate decrease  G3b                   30-44                Moderate to severe decrease  G4                    15-29                Severe decrease  G5                    14 or less           Kidney failure    (1)In the absence of evidence of kidney disease, neither GFR category G1 or G2 fulfill the criteria for CKD.    eGFR calculation 2021 CKD-EPI creatinine equation, which does not include race as a factor    Magnesium [[id number]]  (Abnormal) Collected: 05/08/25 2113    Specimen: Blood Updated: 05/08/25 2139     Magnesium 1.4 mg/dL           Imaging Results (Last 24 Hours)       Procedure Component Value Units Date/Time    XR Spine Lumbar 2 or 3 View [[id number]] Collected: 05/09/25 0927     Updated: 05/09/25 0934    Narrative:      3 VIEW LUMBAR SPINE     HISTORY: Recent multilevel fusion. Follow-up evaluation.     FINDINGS: The patient has had laminectomy of L1 combined with posterior  plates and pedicle screws at T12 and L2 as well as intervertebral cage  at T12-L1. The alignment appears satisfactory.     There is mild disc space narrowing at L4-5 associated with severe  facet  spurring and anterior subluxation of L5 4 measuring 11 mm and this is  unchanged from 4/3/2025.     There is prominent calcification of the abdominal aorta with aneurysmal  dilatation as also noted on the recent abdominal CT scan performed 2  days ago.     This report was finalized on 5/9/2025 9:31 AM by Dr. Cassius Pierre M.D  on Workstation: [id number]       FL C Arm During Surgery [[id number]] Resulted: 05/08/25 1852     Updated: 05/08/25 1852    Narrative:      This procedure was auto-finalized with no dictation required.          ECG/EMG Results (last 24 hours)       Procedure Component Value Units Date/Time    Telemetry Scan [[id number]] Resulted: 05/05/25     Updated: 05/09/25 0106    ECG 12 Lead Electrolyte Imbalance [[id number]] Collected: 05/09/25 0747     Updated: 05/09/25 0748     QT Interval 446 ms      QTC Interval 469 ms     Narrative:      HEART RATE=66  bpm  RR Qqqwwgfq=820  ms  CA Vldfkcvf=102  ms  P Horizontal Axis=10  deg  P Front Axis=35  deg  QRSD Mhdeargo=808  ms  QT Tbcjodij=729  ms  EQnX=701  ms  QRS Axis=-13  deg  T Wave Axis=17  deg  - ABNORMAL ECG -  Sinus rhythm  Incomplete left bundle branch block  Date and Time of Study:2025-05-09 07:47:52          Orders (last 24 hrs)        Start     Ordered    05/09/25 1407  midodrine (PROAMATINE) tablet 5 mg  3 Times Daily PRN         05/09/25 1407    05/09/25 1406  Target Arousal Level RASS -1 to -2  Continuous         05/09/25 1405    05/09/25 1405  Activity Order  Per Order Details        Comments: No lift, push, pull more than 5 pounds, no swimming, no bending or twisting. No exertional or impact activity- walking is OK. Wear brace when sitting higher than 30 degrees, standing, walking. OK to remove brace for showers.    05/09/25 1405    05/09/25 1100  Potassium  STAT         05/09/25 0726    05/09/25 1100  albumin human 25 % IV SOLN 12.5 g  Once         05/09/25 1006    05/09/25 0845  calcium gluconate 1000 Mg/50ml 0.675% NaCl IV SOLN  Once          05/09/25 0749    05/09/25 0815  sodium zirconium cyclosilicate (LOKELMA) packet 10 g  Once         05/09/25 0726    05/09/25 0815  calcium gluconate injection 1 g  Once,   Status:  Discontinued         05/09/25 0727 05/09/25 0815  sodium chloride 0.9 % infusion  Continuous         05/09/25 0727 05/09/25 0815  sodium bicarbonate injection 8.4% 50 mEq  Once         05/09/25 0729 05/09/25 0815  albuterol (PROVENTIL) nebulizer solution 0.5% 2.5 mg/0.5mL  Once         05/09/25 0729 05/09/25 0800  Up in Chair  3 Times Daily        Comments: With nursing assistance.    05/08/25 2246 05/09/25 0800  Ambulate Patient 3 times daily and PRN as tolerated.  3 Times Daily        Comments: PRN as tolerated.    05/08/25 2246 05/09/25 0729  ECG 12 Lead Electrolyte Imbalance  Once         05/09/25 0729 05/09/25 0602  Manual Differential  Once,   Status:  Canceled         05/09/25 0601    05/09/25 0600  Clear & Record PCA Settings  2x Daily PCA       05/08/25 2002 05/09/25 0600  Incentive Spirometry  Every 2 Hours While Awake       05/08/25 2246 05/09/25 0600  CBC Auto Differential  PROCEDURE ONCE         05/08/25 2246 05/09/25 0600  Magnesium  Morning Draw         05/09/25 0130    05/09/25 0230  magnesium sulfate 2g/50 mL (PREMIX) infusion  Every 2 Hours         05/09/25 0130    05/09/25 0000  Strict Intake and Output  Every 4 Hours       05/08/25 2246 05/09/25 0000  Empty Drain  Every 4 Hours       05/08/25 2246 05/09/25 0000  Bracing Equipment Communication Sitting at Edge of Bed; Spinal; Lumbo-Sacral (LSO); Not Applicable; When out of Bed  Once         05/08/25 2246 05/09/25 0000  Inpatient Physical Medicine Rehab Consult  Once        Specialty:  Physical Medicine and Rehabilitation  Provider:  Karl Guerra MD    05/08/25 2246 05/09/25 0000  Inpatient Case Management  Consult  Once        Provider:  (Not yet assigned)    05/08/25 2246 05/09/25 0000  PT  "Consult: Eval & Treat As Tolerated; Post Surgery Care  Once        Comments: Mobilizatoin with LSO    05/08/25 2246 05/09/25 0000  XR Spine Lumbar 2 or 3 View  1 Time Imaging         05/08/25 2246 05/08/25 2345  sodium chloride 0.9 % flush 10 mL  Every 12 Hours Scheduled         05/08/25 2246 05/08/25 2345  lactated ringers infusion  Continuous,   Status:  Discontinued         05/08/25 2246 05/08/25 2345  sennosides-docusate (PERICOLACE) 8.6-50 MG per tablet 2 tablet  2 Times Daily        Placed in \"And\" Linked Group    05/08/25 2246 05/08/25 2247  Code Status and Medical Interventions: CPR (Attempt to Resuscitate); Full Support  Continuous         05/08/25 2246 05/08/25 2247  Notify Provider of Changes in Neuro Status  Until Discontinued         05/08/25 2246 05/08/25 2247  Diet: Regular/House; Fluid Consistency: Thin (IDDSI 0)  Diet Effective Now         05/08/25 2246 05/08/25 2247  Insert Peripheral IV  Once         05/08/25 2246 05/08/25 2247  Saline Lock & Maintain IV Access  Continuous         05/08/25 2246 05/08/25 2247  Assess Respiratory Rate, Pain Score & Sedation Level Using Pasero Opioid-Sedation Scale (POSS)  Per Order Details        Comments: Assess Every 2 Hours x24 Hours, Then Every 4 Hours & PRN While Receiving PCA.    05/08/25 2246 05/08/25 2247  Notify Provider for Inadequate Pain Control, Excessive Sedation or Other Issues Regarding PCA Therapy  Continuous        Comments: Open Order Report to View Parameters Requiring Provider Notification    05/08/25 2246 05/08/25 2247  Use a Dedicated Line for PCA Infusion  Continuous         05/08/25 2246 05/08/25 2247  Continue Indwelling Urinary Catheter  Once        Placed in \"And\" Linked Group    05/08/25 2246 05/08/25 2247  Assess Need for Indwelling Urinary Catheter - Follow Removal Protocol  Continuous        Comments: Follow Protocol As Outlined in Process Instructions (Open Order Report to View Full " "Instructions)   Placed in \"And\" Linked Group    05/08/25 2246 05/08/25 2247  Oxygen Therapy- Nasal Cannula; Titrate 1-6 LPM Per SpO2; 90 - 95%  Continuous         05/08/25 2246 05/08/25 2247  Continuous Pulse Oximetry  Continuous,   Status:  Canceled         05/08/25 2246 05/08/25 2247  Initiate & Follow Hypercapnic Monitoring Guideline for Opioid Administration via EtCO2 and / or SpO2  Continuous,   Status:  Canceled        Comments: Follow Hypercapnic Monitoring Guideline As Outlined in Process Instructions (Open Order Report to View Full Instructions)    05/08/25 2246 05/08/25 2247  Opioid Administration - Document EtCO2 and / or SpO2 With Each Set of Vitals & Any Change in Patient Status  Per Order Details        Comments: With Each Set of Vitals & Any Change in Patient Status    05/08/25 2246 05/08/25 2247  Opioid Administration - Notify Provider Hypercapnic Monitoring  Continuous        Comments: Open Order Report to View Parameters Requiring Provider Notification    05/08/25 2246 05/08/25 2247  Opioid Administration - Continuous Pulse Oximetry (SpO2)  Continuous         05/08/25 2246 05/08/25 2247  Initiate & Follow Hypercapnic Monitoring Guideline for Opioid Administration via EtCO2 and / or SpO2  Continuous        Comments: Follow Hypercapnic Monitoring Guideline As Outlined in Process Instructions (Open Order Report to View Full Instructions)    05/08/25 2246 05/08/25 2247  Opioid Administration - Document EtCO2 and / or SpO2 With Each Set of Vitals & Any Change in Patient Status  Per Order Details        Comments: With Each Set of Vitals & Any Change in Patient Status    05/08/25 2246 05/08/25 2247  Opioid Administration - Notify Provider Hypercapnic Monitoring  Continuous        Comments: Open Order Report to View Parameters Requiring Provider Notification    05/08/25 2246 05/08/25 2247  Opioid Administration - Capnography  Continuous        Comments: If Patient Bed Does Not " "Have Capnography Monitoring Ability - Contact Provider for SpO2 Monitoring Order    05/08/25 2246 05/08/25 2247  CBC & Differential  Daily       05/08/25 2246 05/08/25 2247  CBC Auto Differential  PROCEDURE ONCE         05/08/25 2246 05/08/25 2246  sodium chloride 0.9 % flush 10 mL  As Needed         05/08/25 2246 05/08/25 2246  sodium chloride 0.9 % infusion 40 mL  As Needed         05/08/25 2246 05/08/25 2246  polyethylene glycol (MIRALAX) packet 17 g  Daily PRN        Placed in \"And\" Linked Group    05/08/25 2246 05/08/25 2246  bisacodyl (DULCOLAX) EC tablet 5 mg  Daily PRN        Placed in \"And\" Linked Group    05/08/25 2246 05/08/25 2246  bisacodyl (DULCOLAX) suppository 10 mg  Daily PRN        Placed in \"And\" Linked Group    05/08/25 2246 05/08/25 2246  ondansetron ODT (ZOFRAN-ODT) disintegrating tablet 4 mg  Every 6 Hours PRN        Placed in \"Or\" Linked Group    05/08/25 2246 05/08/25 2246  ondansetron (ZOFRAN) injection 4 mg  Every 6 Hours PRN        Placed in \"Or\" Linked Group    05/08/25 2246 05/08/25 2246  methocarbamol (ROBAXIN) tablet 750 mg  4 Times Daily PRN         05/08/25 2246 05/08/25 2246  Pharmacy Consult  Continuous PRN         05/08/25 2246 05/08/25 2246  naloxone (NARCAN) injection 0.1 mg  Every 5 Minutes PRN         05/08/25 2246 05/08/25 2246  sodium chloride 0.9 % infusion  Continuous PRN         05/08/25 2246 05/08/25 2246  Urinary Catheter Care  Every Shift      Placed in \"And\" Linked Group    05/08/25 2246 05/08/25 2246  acetaminophen (TYLENOL) tablet 650 mg  Every 4 Hours PRN        Placed in \"Or\" Linked Group    05/08/25 2246 05/08/25 2246  acetaminophen (TYLENOL) 160 MG/5ML oral solution 650 mg  Every 4 Hours PRN        Placed in \"Or\" Linked Group    05/08/25 2246    05/08/25 2246  acetaminophen (TYLENOL) suppository 650 mg  Every 4 Hours PRN        Placed in \"Or\" Linked Group    05/08/25 2246 05/08/25 2246  oxyCODONE (ROXICODONE) " immediate release tablet 10 mg  Every 4 Hours PRN         05/08/25 2246    05/08/25 2052  Basic Metabolic Panel  STAT         05/08/25 2052 05/08/25 2052  Magnesium  STAT         05/08/25 2052 05/08/25 2004  HYDROmorphone (DILAUDID) PCA 0.2 mg/ml 50 mL syringe  Continuous         05/08/25 2002 05/08/25 2003  POC Glucose Once  Once        Comments: Post op glucose check on all diabetic patients...Notify Anesthesia if blood sugar is less than 80 mg/dL or greater than 220 mg/dL.      05/08/25 2002 05/08/25 2003  Vital signs every 5 minutes for 15 minutes, every 15 minutes thereafter.  Once         05/08/25 2002 05/08/25 2003  Call Anesthesiologist for additional IV Fluid bolus for Hypotension/Tachycardia  Until Discontinued         05/08/25 2002 05/08/25 2003  Notify Anesthesia of Any Acute Changes in Patient Condition  Until Discontinued         05/08/25 2002 05/08/25 2003  Notify Anesthesia for Unrelieved Pain  Until Discontinued         05/08/25 2002 05/08/25 2003  Once DC criteria to floor met, follow surgeon's orders.  Until Discontinued         05/08/25 2002 05/08/25 2003  Discharge patient from PACU when discharge criteria is met.  Until Discontinued         05/08/25 2002 05/08/25 2003  Methocarbamol (ROBAXIN) injection 1,000 mg  Once         05/08/25 2001 05/08/25 2002  HYDROcodone-acetaminophen (NORCO) 5-325 MG per tablet 1 tablet  Once As Needed,   Status:  Discontinued         05/08/25 2002 05/08/25 2002  HYDROmorphone (DILAUDID) injection 0.5 mg  Every 5 Minutes PRN,   Status:  Discontinued         05/08/25 2002 05/08/25 2002  oxyCODONE-acetaminophen (PERCOCET) 7.5-325 MG per tablet 1 tablet  Every 4 Hours PRN,   Status:  Discontinued         05/08/25 2002 05/08/25 2002  fentaNYL citrate (PF) (SUBLIMAZE) injection 50 mcg  Every 5 Minutes PRN,   Status:  Discontinued         05/08/25 2002 05/08/25 2002  naloxone (NARCAN) injection 0.2 mg  As Needed,   Status:   "Discontinued         05/08/25 2002 05/08/25 2002  flumazenil (ROMAZICON) injection 0.2 mg  As Needed,   Status:  Discontinued         05/08/25 2002 05/08/25 2002  ondansetron (ZOFRAN) injection 4 mg  Once As Needed,   Status:  Discontinued         05/08/25 2002 05/08/25 2002  droperidol (INAPSINE) injection 0.625 mg  Every 20 Minutes PRN,   Status:  Discontinued        Placed in \"Or\" Linked Group    05/08/25 2002 05/08/25 2002  droperidol (INAPSINE) injection 0.625 mg  Every 20 Minutes PRN,   Status:  Discontinued        Placed in \"Or\" Linked Group    05/08/25 2002 05/08/25 2002  promethazine (PHENERGAN) suppository 25 mg  Once As Needed,   Status:  Discontinued        Placed in \"Or\" Linked Group    05/08/25 2002 05/08/25 2002  promethazine (PHENERGAN) tablet 25 mg  Once As Needed,   Status:  Discontinued        Placed in \"Or\" Linked Group    05/08/25 2002 05/08/25 2002  labetalol (NORMODYNE,TRANDATE) injection 5 mg  Every 5 Minutes PRN,   Status:  Discontinued         05/08/25 2002 05/08/25 2002  hydrALAZINE (APRESOLINE) injection 5 mg  Every 10 Minutes PRN,   Status:  Discontinued         05/08/25 2002 05/08/25 2002  ePHEDrine injection 5 mg  Once As Needed,   Status:  Discontinued         05/08/25 2002 05/08/25 2002  Atropine Sulfate (PF) injection 0.4 mg  Once As Needed,   Status:  Discontinued         05/08/25 2002 05/08/25 2002  diphenhydrAMINE (BENADRYL) injection 12.5 mg  Every 15 Minutes PRN,   Status:  Discontinued         05/08/25 2002 05/08/25 2002  ipratropium-albuterol (DUO-NEB) nebulizer solution 3 mL  Once As Needed,   Status:  Discontinued         05/08/25 2002 05/08/25 2002  Continuous Pulse Oximetry  Continuous,   Status:  Canceled         05/08/25 2001 05/08/25 2002  Assess Need for Indwelling Urinary Catheter - Follow Removal Protocol  Continuous,   Status:  Canceled        Comments: Follow Protocol As Outlined in Process Instructions (Open Order Report " to View Full Instructions)    05/08/25 2001 05/08/25 2002  Urinary Catheter Care  Every Shift,   Status:  Canceled       05/08/25 2001 05/08/25 2001  Oxygen Therapy- Nasal Cannula; Titrate 1-6 LPM Per SpO2; 90 - 95%  Continuous PRN,   Status:  Canceled       05/08/25 2001 05/08/25 1622  sodium chloride 1,000 mL with ceFAZolin 2 g irrigation  As Needed,   Status:  Discontinued         05/08/25 1622    05/08/25 1535  bupivacaine-EPINEPHrine PF (MARCAINE w/EPI) 0.25% -[id number] injection  As Needed,   Status:  Discontinued         05/08/25 1907    05/08/25 1501  Insert Indwelling Urinary Catheter  Once        Comments: Follow Protocol As Outlined in Process Instructions (Open Order Report to View Full Instructions)    05/08/25 1627    05/08/25 1444  thrombin (THROMBIN-JMI) spray kit  As Needed,   Status:  Discontinued         05/08/25 1622    05/08/25 1444  sterile water irrigation solution  As Needed,   Status:  Discontinued         05/08/25 1623    05/08/25 1334  ceFAZolin 2000 mg IVPB in 100 mL NS (MBP)  Once         05/08/25 1332    05/08/25 1253  sodium chloride 0.9 % flush 3 mL  Every 12 Hours Scheduled,   Status:  Discontinued         05/08/25 1251    05/08/25 1253  lactated ringers infusion  Continuous,   Status:  Discontinued         05/08/25 1251    05/08/25 1250  sodium chloride 0.9 % flush 3-10 mL  As Needed,   Status:  Discontinued         05/08/25 1251    05/08/25 1250  fentaNYL citrate (PF) (SUBLIMAZE) injection 50 mcg  Every 10 Minutes PRN,   Status:  Discontinued         05/08/25 1251    05/08/25 1250  midazolam (VERSED) injection 0.5 mg  Every 10 Minutes PRN,   Status:  Discontinued         05/08/25 1251    05/08/25 1250  midazolam (VERSED) injection 0.5 mg  Every 5 Minutes PRN,   Status:  Discontinued         05/08/25 1251    05/08/25 1250  fentaNYL citrate (PF) (SUBLIMAZE) injection 50 mcg  Every 5 Minutes PRN,   Status:  Discontinued         05/08/25 1251    05/08/25 1250  Vital Signs - Per  Anesthesia Protocol  As Needed,   Status:  Canceled       05/08/25 1251    05/08/25 1250  Oxygen Therapy- Nasal Cannula; Titrate 1-6 LPM Per SpO2; 90 - 95%  Continuous PRN,   Status:  Canceled       05/08/25 1251    05/08/25 1250  POC Glucose PRN  As Needed,   Status:  Canceled      Comments: For all diabetic patients. Notify Anesthesiologist for blood sugar > 180.      05/08/25 1251    05/07/25 1030  gabapentin (NEURONTIN) capsule 300 mg  Every 12 Hours Scheduled         05/07/25 0937    05/07/25 0600  CBC (No Diff)  Daily       05/06/25 0947    05/07/25 0600  Basic Metabolic Panel  Daily       05/06/25 0947    05/06/25 0800  ferrous sulfate tablet 325 mg  Daily With Breakfast         05/05/25 1410 05/06/25 0717  Potassium Replacement - Follow Nurse / BPA Driven Protocol  As Needed         05/06/25 0717    05/06/25 0717  Magnesium Standard Dose Replacement - Follow Nurse / BPA Driven Protocol  As Needed         05/06/25 0717    05/06/25 0717  Phosphorus Replacement - Follow Nurse / BPA Driven Protocol  As Needed         05/06/25 0717    05/06/25 0717  Calcium Replacement - Follow Nurse / BPA Driven Protocol  As Needed         05/06/25 0717    05/05/25 2100  pantoprazole (PROTONIX) EC tablet 40 mg  2 Times Daily         05/05/25 1410    05/05/25 2100  rosuvastatin (CRESTOR) tablet 10 mg  Daily         05/05/25 1410    05/05/25 2100  sodium chloride 0.9 % flush 10 mL  Every 12 Hours Scheduled         05/05/25 1410 05/05/25 1800  Oral Care  2 Times Daily       05/05/25 1410 05/05/25 1600  Vital Signs  Every 4 Hours       05/05/25 1410 05/05/25 1500  vitamin B-12 (CYANOCOBALAMIN) tablet 1,000 mcg  Daily         05/05/25 1410 05/05/25 1500  thiamine (VITAMIN B-1) tablet 100 mg  Daily         05/05/25 1410 05/05/25 1500  folic acid (FOLVITE) tablet 1,000 mcg  Daily         05/05/25 1410    05/05/25 1500  allopurinol (ZYLOPRIM) tablet 300 mg  Daily         05/05/25 1410 05/05/25 1500  metoprolol  "succinate XL (TOPROL-XL) 24 hr tablet 25 mg  Daily         05/05/25 1410 05/05/25 1500  [Held by provider]  lisinopril (PRINIVIL,ZESTRIL) tablet 5 mg  Daily        (On hold since today at 0949 until manually unheld; held by Lucy Amezcua DOHold Reason: Abnormal Labs)    05/05/25 1410 05/05/25 1405  ondansetron ODT (ZOFRAN-ODT) disintegrating tablet 4 mg  Every 6 Hours PRN        Placed in \"Or\" Linked Group    05/05/25 1410    05/05/25 1405  ondansetron (ZOFRAN) injection 4 mg  Every 6 Hours PRN        Placed in \"Or\" Linked Group    05/05/25 1410    05/05/25 1405  acetaminophen (TYLENOL) tablet 650 mg  Every 4 Hours PRN,   Status:  Discontinued        Placed in \"Or\" Linked Group    05/05/25 1410    05/05/25 1405  acetaminophen (TYLENOL) 160 MG/5ML oral solution 650 mg  Every 4 Hours PRN,   Status:  Discontinued        Placed in \"Or\" Linked Group    05/05/25 1410    05/05/25 1405  acetaminophen (TYLENOL) suppository 650 mg  Every 4 Hours PRN,   Status:  Discontinued        Placed in \"Or\" Linked Group    05/05/25 1410    05/05/25 1401  nitroglycerin (NITROSTAT) SL tablet 0.4 mg  Every 5 Minutes PRN         05/05/25 1410 05/05/25 1401  Intake & Output  Every Shift       05/05/25 1410 05/05/25 1400  sodium chloride 0.9 % flush 10 mL  As Needed         05/05/25 1410 05/05/25 1400  sodium chloride 0.9 % infusion 40 mL  As Needed         05/05/25 1410    Unscheduled  Obtain Pre & Post Sedation Scores With Every Sedative Dose - Hold For POSS Greater Than 2 or RASS of -3 or Less  As Needed       05/06/25 0940    Unscheduled  Apply Ice to Affected Area / Incision As Needed For Pain or Swelling  As Needed       05/08/25 2246                     Operative/Procedure Notes (last 24 hours)        Abel Perez MD at 05/08/25 1535          Preoperative diagnosis: Acute central disc herniation T12/L1 with cord compression and progressive myelopathy    Postoperative diagnosis: Same as above    Procedures " performed: T12-L1 laminectomy with bilateral L1 pediculectomies for bilateral discectomy, decompression of spinal cord, posterior interbody fusion with expandable Catalyft cages at T12-L1, pedicle screw fixation T12 and L2 bilaterally with Soshowiseor robotic navigation, bilateral terry placement from T12 to L2 with crosslink, onlay fusion from L1 to L2    Spinal Surgery Levels Completed:2 Levels     Surgeon: Chris    First Assistant: Meron Zambrano  (She greatly assisted in the exposure, visualization of neural structures, control of bleeding, retraction, and closure of the incision. Her skilled assistance was necessary for the success of this case.)     Anesthesia: GET    EBL: 650 cc    Complications: none    Specimen sent: none    Drains: 15 F Hemovac epidural drain    Findings: severe cord compression from large central disc herniation    Postoperative condition: stable    Indications for the operation: The patient is a 69-year-old male whose had a previous lumbar decompression by me about 2 years ago.  He was lifting a ladder off of a truck about a week ago and had severe back pain followed by bilateral leg weakness.  He was found to have a large central T12-L1 herniated disc with cord compression.  He also had asymptomatic C3-C4 cervical stenosis and recurrent spinal stenosis at L4-L5 with a spondylolisthesis.  He was myelopathic with a sensory level to about T12 and was brought to the operating room for decompression and fusion at that level.  I felt that he would need to be fused because in order to take a sufficient amount of disc out to decompress the spinal cord,  I would have to destabilize the spine by taking both pedicles at L1.    Informed consent:  He understood that the goal of surgery the rest of his progressive myelopathy and hopefully return of some spinal cord function such as strength and balance over time.  The risks include, but are not limited to, infection, hemorrhage requiring transfusion or  reoperation, CSF leak requiring reoperation, incomplete relief of symptoms, psuedoarthrosis resulting in chronic low back pain, hardward problems requiring revision or removal, potential need for additional surgery in the future, stroke, paralysis, coma, and death. The patient agrees to proceed.      Details of the operation: After cardiac clearance, the patient was taken to the operating room and remained in his gurney in the supine position.  After induction of endotracheal ovation, he got 2 g of Kefzol as per the SCIP protocol.  A Phillips catheter was placed.  SCDs were placed.  Venous access was secured.  He was rolled over the prone position on a radiolucent Jose A spinal table.  All pressure points were padded including the brachial plexus.  We brought in the centimeters and marked at the incision from T11-L3 as well as the position of the left PSIS.  Lumbar region was then prepped and draped in the usual sterile fashion as well as the lower thoracic region.  We did a surgical timeout.  I injected initially 30 cc of quarter percent Marcaine with epinephrine into the paraspinous musculature from  T11-L3 as well as the left PSIS.  We eventually injected an additional 60 cc during the closure for a total of 90 cc.  A stab incision was made over the left PSIS and I placed a long stereotactic pin into the left PSIS.  I made a linear incision from T11-L3 with a #10 skin knife.  Hemostasis was obtained with monopolar cautery.  Dissection was taken all the way down to the thoracolumbar fascia which was divided to the left and to the right from T12-L2.  The lateral facets at T12-L1 and L1-L2 were exposed.  We then connected the patient to the FreeBrieor robot and did our registration CT to fluoroscopy pictures based on preoperative Mazor protocol CTs done of the  thoracic and lumbar spine.  Using the Mazor robot we placed a pair of 6.5 x 50 mm screws at T12 using the transpedicular technique and a pair of 6.5 x 55 mm screws  at L2 using a transpedicular technique.  Accuracy was excellent based on pictures and the registration protocol.  The robot was detached from the patient and the PSIS pin was removed.  The Leksell rongeur was used to remove the spinous processes at T12 and L1.  The microscope was draped and brought into the field.  Its use was essential to performance of micro neurosurgical technique.  Using a 5 mm round bur I did a complete laminectomy at T12-L1 exposing the central thecal sac.  We did  bilateral superior panniculectomy's at L1 to provide room to work lateral to the spinal cord and remove the disc.  During the exposure I exposed the T12 and L1 nerve roots.  The disc was incised with a 15 blade.  I had essentially did a complete facetectomy so I made enough room for cage placement without any retraction of the spinal cord.  I used a combination reverse-angle curettes, endplate scrapers, pituitaries, punches and even the drill itself and did a near complete discectomy working underneath the spinal column with reverse-angle curettes to completely decompress the cord and reduce the central portion of the disc herniation.  I measured out for a pair of 7 x 23 mm expandable Catalyft just.  Prior to that I placed demineralized bone matrix and laminar autograft anterior into the interspace and then using C-arm fluoroscopy I placed those cages and expanded them into place.  I filled the muscle with demineralized bone matrix.  Postplacement x-rays show good placement of the cages in the PA lateral view.  We placed 70 mm rods and tightened them into compression and used a cross-link at L1.  I decorticated the laminar surfaces at L1 and L2 and put MagnetOs laminar surfaces at L1-L2.  Demineralized bone matrix was placed within the disc spaces at T12-L1.  Leroy irrigation and Xperience was used.  Floseal was used.  A 15 Khmer Hemovac epidural drain was placed and exit through separate stab incision secured the skin with 2-0  silk.  The fascia was reapproximated with 0 Vicryl interrupted suture.  Subcutaneous layer was closed with 2-0 interrupted Vicryl suture.  The skin was closed with a running 4-0 Vicryl subcuticular.  Steri-Strips and a charmaine dressing were placed.  The patient was rolled over the supine position and extubated taken recovery room in satisfactory condition.    Electronically signed by Abel Perez MD at 05/08/25 1918       Abel Perez MD at 05/08/25 1535          LUMBAR FUSION POSTERIOR AND INSTRUMENTATION WITH NEURO ROBOT 1-2 LEVELS  Progress Note    [facility] Case  5/8/2025    Pre-op Diagnosis:   Weakness of left lower extremity [R29.898]  Herniation of thoracolumbar intervertebral disc with myelopathy [M51.05]       Post-Op Diagnosis Codes:     * Weakness of left lower extremity [R29.898]     * Herniation of thoracolumbar intervertebral disc with myelopathy [M51.05]    Procedure(s):      Procedure(s):  Open thoracic twelve/lumbar one discectomy/decompression/interbody fusion with cages, pedicle screw/terry/crosslink fixation, thoracic twelve through lumbar two with Mazor robotic navigation              Surgeon(s):  Abel Perez MD    Anesthesia: General    Staff:   Cell Saver : Deisy Omalley  Circulator: Mason Sun RN  Radiology Technologist: Lona Ramos  Scrub Person: Sallie Nam; Carol Irwin; Trang Lawson  Vendor Representative: Chuck Albright Casey  Assistant: Meron Zambrano CSA  Assistant: Meron Zambrano CSA    Estimated Blood Loss: 600 mL    Urine Voided: 400 mL    Specimens:                None      Drains:   Urethral Catheter Non-latex 16 Fr. (Active)       Findings: severe cord compression due to central herniated disc      Complications: none    Assistant: Meron Zambrano CSA  was responsible for performing the following activities: Retraction, Suction, Irrigation, Suturing, Closing, and Placing Dressing and their skilled assistance was  necessary for the success of this case.    Able Perez MD     Date: 5/8/2025  Time: 19:03 EDT          Electronically signed by Abel Perez MD at 05/08/25 1904          Physician Progress Notes (last 24 hours)        David Pimentel, YANIRA at 05/09/25 1045          [facility] THORACIC/LUMBAR NEUROSURGERY POSTOP NOTE      CC: POD #1 s/p open T12-L1 ectomy with bilateral L1 panniculectomy for bilateral discectomy, decompression of spinal cord, posterior interbody fusion with expandable catalyst cages at T12-L1, pedicle screw fixation T12 and L2 bilaterally with Mazor with bilateral terry placement from T12-L2 with cross-link and onlay fusion from L1-L2.      Subjective     Interval History: No acute issues overnight.  Vital signs stable and afebrile.  Postop lumbar x-ray stable today.  Reporting expected back pain-utilizing Dilaudid PCA pump minimally-on basal rate as well.      Objective     Vital signs in last 24 hours:  Temp:  [97.8 °F (36.6 °C)-98.1 °F (36.7 °C)] 98.1 °F (36.7 °C)  Heart Rate:  [] 81  Resp:  [12-18] 16  BP: ()/(54-90) 96/54    Intake/Output this shift:  I/O this shift:  In: -   Out: 90 [Drains:90]    Hemovac: 297 cc since placement yesterday    LABS:  .  Results from last 7 days   Lab Units 05/09/25  0546 05/09/25  0024 05/08/25  0649   WBC 10*3/mm3 6.96 6.10 3.70   HEMOGLOBIN g/dL 11.1* 11.6* 11.0*   HEMATOCRIT % 32.5* 34.9* 32.1*   PLATELETS 10*3/mm3 113* 91* 83*     .  Results from last 7 days   Lab Units 05/09/25  1029 05/09/25  0546   SODIUM mmol/L  --  137   POTASSIUM mmol/L 4.3 5.9*   CHLORIDE mmol/L  --  107   CO2 mmol/L  --  20.6*   BUN mg/dL  --  23   CREATININE mg/dL  --  1.35*   GLUCOSE mg/dL  --  128*   CALCIUM mg/dL  --  9.8         IMAGING STUDIES:  X-ray lumbar spine 5/9/2025:  Shows laminectomy at L1 with posterior plates and pedicle screws at T12 and L2 with cages at T12-L1.  No evidence of hardware malalignment or fracture.  I personally viewed and  interpreted the patient's labs, imaging study, medications and chart.    Meds reviewed/changed: Yes    Current Facility-Administered Medications:     acetaminophen (TYLENOL) tablet 650 mg, 650 mg, Oral, Q4H PRN **OR** acetaminophen (TYLENOL) 160 MG/5ML oral solution 650 mg, 650 mg, Oral, Q4H PRN **OR** acetaminophen (TYLENOL) suppository 650 mg, 650 mg, Rectal, Q4H PRN, Abel Perez MD    allopurinol (ZYLOPRIM) tablet 300 mg, 300 mg, Oral, Daily, Abel Perez MD, 300 mg at 05/09/25 0848    sennosides-docusate (PERICOLACE) 8.6-50 MG per tablet 2 tablet, 2 tablet, Oral, BID, 2 tablet at 05/09/25 0848 **AND** polyethylene glycol (MIRALAX) packet 17 g, 17 g, Oral, Daily PRN **AND** bisacodyl (DULCOLAX) EC tablet 5 mg, 5 mg, Oral, Daily PRN **AND** bisacodyl (DULCOLAX) suppository 10 mg, 10 mg, Rectal, Daily PRN, Abel Perez MD    Calcium Replacement - Follow Nurse / BPA Driven Protocol, , Not Applicable, PRN, Abel Perez MD    ferrous sulfate tablet 325 mg, 325 mg, Oral, Daily With Breakfast, Abel Perez MD, 325 mg at 05/09/25 0848    folic acid (FOLVITE) tablet 1,000 mcg, 1,000 mcg, Oral, Daily, Abel Perez MD, 1,000 mcg at 05/09/25 0848    gabapentin (NEURONTIN) capsule 300 mg, 300 mg, Oral, Q12H, Abel Perez MD, 300 mg at 05/09/25 0848    HYDROmorphone (DILAUDID) PCA 0.2 mg/ml 50 mL syringe, , Intravenous, Continuous, Abel Perez MD, Currently Infusing at 05/08/25 2110    [Held by provider] lisinopril (PRINIVIL,ZESTRIL) tablet 5 mg, 5 mg, Oral, Daily, Abel Perez MD, 5 mg at 05/08/25 0828    Magnesium Standard Dose Replacement - Follow Nurse / BPA Driven Protocol, , Not Applicable, PRN, Abel Perez MD    methocarbamol (ROBAXIN) tablet 750 mg, 750 mg, Oral, 4x Daily PRN, Abel Perez MD    metoprolol succinate XL (TOPROL-XL) 24 hr tablet 25 mg, 25 mg, Oral, Daily, Abel Perez MD, 25 mg at 05/08/25 0829    midodrine (PROAMATINE) tablet 5  mg, 5 mg, Oral, TID PRN, Lucy Amezcua DO    naloxone (NARCAN) injection 0.1 mg, 0.1 mg, Intravenous, Q5 Min PRN, Abel Perez MD    nitroglycerin (NITROSTAT) SL tablet 0.4 mg, 0.4 mg, Sublingual, Q5 Min PRN, Abel Perez MD    ondansetron ODT (ZOFRAN-ODT) disintegrating tablet 4 mg, 4 mg, Oral, Q6H PRN **OR** ondansetron (ZOFRAN) injection 4 mg, 4 mg, Intravenous, Q6H PRN, Abel Perez MD    ondansetron ODT (ZOFRAN-ODT) disintegrating tablet 4 mg, 4 mg, Oral, Q6H PRN **OR** ondansetron (ZOFRAN) injection 4 mg, 4 mg, Intravenous, Q6H PRN, Abel Perez MD    oxyCODONE (ROXICODONE) immediate release tablet 10 mg, 10 mg, Oral, Q4H PRN, Abel Perez MD    pantoprazole (PROTONIX) EC tablet 40 mg, 40 mg, Oral, BID, Abel Perez MD, 40 mg at 05/09/25 0848    Pharmacy Consult, , Not Applicable, Continuous PRN, Abel Perez MD    Phosphorus Replacement - Follow Nurse / BPA Driven Protocol, , Not Applicable, PRN, Abel Perez MD    Potassium Replacement - Follow Nurse / BPA Driven Protocol, , Not Applicable, PRN, Abel Perez MD    rosuvastatin (CRESTOR) tablet 10 mg, 10 mg, Oral, Daily, Abel Perez MD, 10 mg at 05/07/25 2032    sodium chloride 0.9 % flush 10 mL, 10 mL, Intravenous, Q12H, Abel Perez MD, 10 mL at 05/08/25 0837    sodium chloride 0.9 % flush 10 mL, 10 mL, Intravenous, PRN, Abel Perez MD    sodium chloride 0.9 % flush 10 mL, 10 mL, Intravenous, Q12H, Abel Perez MD, 10 mL at 05/09/25 0853    sodium chloride 0.9 % flush 10 mL, 10 mL, Intravenous, PRN, Abel Perez MD    sodium chloride 0.9 % infusion 40 mL, 40 mL, Intravenous, PRN, Abel Perez MD    sodium chloride 0.9 % infusion 40 mL, 40 mL, Intravenous, PRN, Abel Perez MD    sodium chloride 0.9 % infusion, 30 mL/hr, Intravenous, Continuous PRN, Abel Perez MD    sodium chloride 0.9 % infusion, 75 mL/hr, Intravenous, Continuous, Lucy Amezcua,  DO, Last Rate: 75 mL/hr at 05/09/25 0939, 75 mL/hr at 05/09/25 0939    thiamine (VITAMIN B-1) tablet 100 mg, 100 mg, Oral, Daily, Abel Perez MD, 100 mg at 05/09/25 0848    vitamin B-12 (CYANOCOBALAMIN) tablet 1,000 mcg, 1,000 mcg, Oral, Daily, Abel Perez MD, 1,000 mcg at 05/09/25 0848      Physical Exam:    General:   Awake, alert.  Back:    No brace yet.  Posterior thoracolumbar incision dressing clean dry and intact.  Abdomen:    NT/ND   Motor:     Right and left IP 4+ to 5 -/5  Right and left quad/hamstring, 4+/5  Right plantarflexion/dorsiflexion 5 -/5  Left plantarflexion 4+/5, dorsiflexion 3+/5 (history of foot drop)  Patient notes no changes to lower extremity strength after surgery.    no fasciculations, rigidity, spasticity, or abnormal movements.  Reflexes:   2+ in the lower extremities. no clonus  Sensation:   Normal to light touch aman LEs  Station and Gait:             Per PT note 5/9/2025:  Pt to wear LSO for mobilization. Pt typically is independent with functional mobility however reports he recently began using a walker due to falls and weakness. He lives with his spouse in a single level home. Today, pt demo generalized wekaness, impaired balance, decreased activity tolerance and general functional mobility deficits below his baseline. Reviewed spinal precautions and log roll technique for bed mobility- pt verbalized understanding. He completed bed mobility with mod A, STS with min to mod Ax2 and was able to take a few steps min A x2 using RW. Pt demo L>R LE weakness and relies heavily on use of UE in standing. Pt is a high falls risk and may benefit from acute rehab at [facility]   Extremities:   Wearing SCD      Assessment & Plan     ASSESSMENT:      Weakness of left lower extremity    CRI (chronic renal insufficiency), stage 3 (moderate)    Essential hypertension, benign    DDD (degenerative disc disease), lumbar    Imbalance    Foot drop, left    Atrophy of muscle of multiple sites     "Tongue fasciculation    Acute bilateral low back pain with bilateral sciatica    Herniation of thoracolumbar intervertebral disc with myelopathy    POD #1 s/p open T12-L1 ectomy with bilateral L1 panniculectomy for bilateral discectomy, decompression of spinal cord, posterior interbody fusion with expandable catalyst cages at T12-L1, pedicle screw fixation T12 and L2 bilaterally with Mazor with bilateral terry placement from T12-L2 with cross-link and onlay fusion from L1-L2.    He is currently experiencing expected surgical site pain -he is on Dilaudid PCA pump with a basal rate but using pump minimally overnight.  He would like to keep the PCA pump 1 more day for breakthrough pain. Otherwise Mr. Willoughby does not note a lot of change with lower extremity strength.      Please continue pain regimen and utilize muscle relaxers as needed.  Please also utilize ice.  He will receive a LSO brace today which she will need to wear whenever up greater than 30 degrees.  Joseph drain in place will remain until postop day #7.      History of cord compression: Mobilize with therapies.  Rehab eval to Dr. Guerra pending        PLAN:         Up with TLSO brace  Bowel regimen  P.o. pain meds and muscle relaxers as needed  Continued Dilaudid PCA-likely DC tomorrow  Utilize ICE  Encourage use of incentive spirometer  SCD/DVT prophy  REHAB evaluation  Daily CBC/BMP    I discussed the patient's findings and my recommendations with patient and Dr. Perez.    During patient visit, I utilized appropriate personal protective equipment including mask and gloves.  Mask used was standard procedure mask. Appropriate PPE was worn during the entire visit.  Hand hygiene was completed before and after.      LOS: 4 days       YANIRA Fermin  5/9/2025  13:59 EDT    \"Dictated utilizing Dragon dictation\".      Electronically signed by David Pimentel APRN at 05/09/25 0560       Lucy Amezcua DO at 05/09/25 0597              Name: " Mo Willoughby ADMIT: 2025   : 1955  PCP: Jenn Davison APRN    MRN: [id number] LOS: 4 days   AGE/SEX: 69 y.o. male  ROOM: [facility]     Subjective   Subjective   2025  Patient seen and examined at bedside, he does not appear to be in distress.  Still admits to left-sided weakness but states symptoms slightly improved.    2025  Patient states his symptoms have slightly improved though still present.  He is aware that plan is for surgery soon and he is agreeable.  He does asked that his gabapentin be made scheduled instead of as needed.    2025  No overnight events, patient without distress.  Plan for surgery today.    2025  Patient underwent T12/L1 laminectomy with bilateral L1 pediculectomies, posterior interbody fusion with expandable cages at T12/L1, pedicle screw fixation T12 and L2 bilaterally, bilateral terry placement from T12-L2 without complication.  Currently on PCA pump and states he is doing well.      Objective   Objective   Vital Signs  Temp:  [97.7 °F (36.5 °C)-98.2 °F (36.8 °C)] 98.1 °F (36.7 °C)  Heart Rate:  [65-91] 65  Resp:  [12-21] 18  BP: ()/(55-96) 95/55  Arterial Line BP: (171)/(84) 171/84  SpO2:  [96 %-100 %] 100 %  on  Flow (L/min) (Oxygen Therapy):  [2-4] 4;   Device (Oxygen Therapy): nasal cannula  Body mass index is 32.6 kg/m².  Physical Exam  Constitutional:       General: He is not in acute distress.     Appearance: Normal appearance. He is obese.   HENT:      Head: Normocephalic and atraumatic.      Nose: No congestion.      Mouth/Throat:      Pharynx: No oropharyngeal exudate.   Eyes:      General: No scleral icterus.  Cardiovascular:      Rate and Rhythm: Normal rate and regular rhythm.      Heart sounds: No murmur heard.     No friction rub. No gallop.   Pulmonary:      Effort: No respiratory distress.      Breath sounds: No wheezing, rhonchi or rales.   Abdominal:      General: There is no distension.      Tenderness: There is no abdominal  "tenderness. There is no guarding.   Musculoskeletal:         General: No swelling, deformity or signs of injury.      Cervical back: No rigidity.   Skin:     Coloration: Skin is not jaundiced.      Findings: No bruising or lesion.   Neurological:      Mental Status: He is alert.      Comments: Left arm and leg weakness, left foot drop; patient states is improving       Results Review     I reviewed the patient's new clinical results.  Results from last 7 days   Lab Units 05/09/25 0546 05/09/25  0024 05/08/25 0649 05/07/25  0516   WBC 10*3/mm3 6.96 6.10 3.70 3.72   HEMOGLOBIN g/dL 11.1* 11.6* 11.0* 11.5*   PLATELETS 10*3/mm3 113* 91* 83* 79*     Results from last 7 days   Lab Units 05/09/25 0546 05/08/25 2113 05/08/25 0649 05/07/25  0516   SODIUM mmol/L 137 138 139 138   POTASSIUM mmol/L 5.9* 4.7 4.1 4.5   CHLORIDE mmol/L 107 106 106 106   CO2 mmol/L 20.6* 19.9* 22.7 24.6   BUN mg/dL 23 19 17 15   CREATININE mg/dL 1.35* 1.13 1.13 1.14   GLUCOSE mg/dL 128* 151* 92 92   EGFR mL/min/1.73 57.5* 71.2 71.2 70.5     Results from last 7 days   Lab Units 05/06/25 0643 05/05/25  1540   ALBUMIN g/dL 3.2 4.1   BILIRUBIN mg/dL  --  0.7   ALK PHOS U/L  --  81   AST (SGOT) U/L  --  61*   ALT (SGPT) U/L  --  31     Results from last 7 days   Lab Units 05/09/25 0546 05/08/25 2113 05/08/25 0649 05/07/25  0516 05/06/25 0643 05/05/25  1540   CALCIUM mg/dL 9.8 9.5 10.2 10.3  --  10.3   ALBUMIN g/dL  --   --   --   --  3.2 4.1   MAGNESIUM mg/dL 2.3 1.4*  --   --   --  1.2*     Results from last 7 days   Lab Units 05/05/25  1540   PROCALCITONIN ng/mL 0.16     No results found for: \"HGBA1C\", \"POCGLU\"    No radiology results for the last day      I have personally reviewed all medications:  Scheduled Medications  allopurinol, 300 mg, Oral, Daily  calcium gluconate, 1,000 mg, Intravenous, Once  ferrous sulfate, 325 mg, Oral, Daily With Breakfast  folic acid, 1,000 mcg, Oral, Daily  gabapentin, 300 mg, Oral, Q12H  lisinopril, 5 mg, " Oral, Daily  metoprolol succinate XL, 25 mg, Oral, Daily  pantoprazole, 40 mg, Oral, BID  rosuvastatin, 10 mg, Oral, Daily  senna-docusate sodium, 2 tablet, Oral, BID  sodium chloride, 10 mL, Intravenous, Q12H  sodium chloride, 10 mL, Intravenous, Q12H  sodium zirconium cyclosilicate, 10 g, Oral, Once  thiamine, 100 mg, Oral, Daily  vitamin B-12, 1,000 mcg, Oral, Daily    Infusions  HYDROmorphone HCl-NaCl,   Pharmacy Consult,   sodium chloride, 30 mL/hr  sodium chloride, 75 mL/hr    Diet  Diet: Regular/House; Fluid Consistency: Thin (IDDSI 0)    I have personally reviewed:  [x]  Laboratory   [x]  Microbiology   [x]  Radiology   [x]  EKG/Telemetry  [x]  Cardiology/Vascular   []  Pathology    []  Records      Assessment/Plan     Active Hospital Problems    Diagnosis  POA    **Weakness of left lower extremity [R29.898]  Yes    Herniation of thoracolumbar intervertebral disc with myelopathy [M51.05]  Yes    Acute bilateral low back pain with bilateral sciatica [M54.42, M54.41]  Yes    Tongue fasciculation [R25.3]  Yes    Foot drop, left [M21.372]  Yes    Atrophy of muscle of multiple sites [M62.59]  Yes    Imbalance [R26.89]  Yes    DDD (degenerative disc disease), lumbar [M51.369]  Yes    Essential hypertension, benign [I10]  Yes    CRI (chronic renal insufficiency), stage 3 (moderate) [N18.30]  Yes      Resolved Hospital Problems   No resolved problems to display.       69 y.o. male admitted with Weakness of left lower extremity.    #Left-sided weakness  #Left foot drop    -MRI brain with and without contrast without evidence of ischemia or intracranial abnormality    - MRI cervical spine with and without contrast shows advanced degenerative disc disease at C3/4 level with disc osteophyte complex complicated by severe spinal stenosis with effacement of surrounding subarachnoid space and flattening of the spinal cord, moderate to severe bilateral for minimal stenosis, right greater than left, moderately advanced C4/5  disc disease and posterior disc osteophyte complex complicated by broad-based posterior disc bulge at C6/7 and small fluid signal spinal cord at this level that may represent syrinx or focal myelomalacia    - MRI thoracic spine with and without contrast shows a moderate to large posterior disc protrusion at T12/L1 producing severe spinal stenosis with contact and displacement of the distal spinal cord and crowding of the nerve roots    - MRI lumbar spine with and without contrast shows T12/L1 disc protrusion as above complicated by multilevel lumbar spondylosis with multilevel spinal Formento stenosis and associated grade 1 L4/5 anterolisthesis    - Labs ordered by neurology    - CT chest and CT A/P to rule out head malignancy ordered by neurology.  No suspicious pulmonary lesion on CT.  Liver morphology consistent with cirrhosis, spleen enlarged since 2018, indeterminate renal cortical nodules for which renal protocol CT recommended for follow-up    - Neurology considering EMG/nerve conduction study    - Neurosurgery following    - Patient underwent T12/L1 laminectomy with bilateral L1 pediculectomies, posterior interbody fusion with expandable cages at T12/L1, pedicle screw fixation T12 and L2 bilaterally, bilateral terry placement from T12-L2 without complication.  Surgery was done on 5/8/2025    - Currently on PCA pain pump, defer pain control neurosurgery, however patient is actually doing pretty well    - PT/OT      #Alcohol use disorder  #Cirrhosis    -Thiamine and folic acid daily    -[facility] protocol    - CT A/P shows that liver morphology is consistent with cirrhosis    #Paroxysmal SVT    - Patient currently tolerating Toprol 25 mg p.o. daily    - No intervention per cardiology    #CKD    -Creatinine slightly elevated at 1.35 this morning, most likely due to surgery, monitor    -Renally dose meds    #Hyperkalemia    - Potassium 5.9 this morning    - Patient given Lokelma, bicarb, albuterol    - Recheck  potassium in the afternoon    - Hold lisinopril    #Anemia    -Hemoglobin 11.1 this morning, baseline around 13.6    - No overt bleeding, follow labs    #Thrombocytopenia    -Platelets 113K this morning, over the years has ranged from 92K to 147K    -No overt bleeding, monitor    -Suspect secondary to chronic alcohol use    #GERD    -Protonix 40 mg p.o. twice daily    #Obesity    - BMI 33.6, complicates all aspects of care    #Hypertension    - Hold lisinopril due to hyperkalemia    - Continue Toprol 25 mg p.o. daily    #Hyperlipidemia    -Crestor daily      SCDs for DVT prophylaxis.  Full code.  Discussed with patient.  Anticipate discharge home with HH vs SNU facility timing yet to be determined.  Expected Discharge Date: 5/12/2025; Expected Discharge Time:       Lucy Amezcua DO  [facility]  05/09/25  09:39 EDT     Electronically signed by Lucy Amezcua DO at 05/09/25 0950       Hazel Brown MD at 05/09/25 0741            [facility] Follow Up    Chief Complaint: Follow up PSVT    Interval History:     Objective:     Objective:  Temp:  [97.7 °F (36.5 °C)-98.2 °F (36.8 °C)] 98.1 °F (36.7 °C)  Heart Rate:  [69-91] 77  Resp:  [12-21] 16  BP: (101-131)/(74-96) 101/74  Arterial Line BP: (171)/(84) 171/84     Intake/Output Summary (Last 24 hours) at 5/9/2025 0741  Last data filed at 5/9/2025 0322  Gross per 24 hour   Intake 2670 ml   Output 1897 ml   Net 773 ml     Body mass index is 32.6 kg/m².      05/05/25  1800   Weight: 108 kg (238 lb 1.6 oz)     Weight change:       Physical Exam:   General : Alert, cooperative, in no acute distress.  Neuro: Alert,cooperative and oriented.  Lungs: CTAB. Normal respiratory effort and rate.  CV: Regular rate and rhythm, normal S1 and S2, no murmurs, gallops or rubs.  ABD: Soft, nontender, nondistended. Positive bowel sounds.  Extr: No edema or cyanosis, moves all extremities.    Lab Review:   Results from last 7 days   Lab Units  "05/09/25  0546 05/08/25  2113 05/07/25  0516 05/05/25  1540   SODIUM mmol/L 137 138   < > 139   POTASSIUM mmol/L 5.9* 4.7   < > 4.5   CHLORIDE mmol/L 107 106   < > 105   CO2 mmol/L 20.6* 19.9*   < > 22.4   BUN mg/dL 23 19   < > 15   CREATININE mg/dL 1.35* 1.13   < > 1.18   GLUCOSE mg/dL 128* 151*   < > 85   CALCIUM mg/dL 9.8 9.5   < > 10.3   AST (SGOT) U/L  --   --   --  61*   ALT (SGPT) U/L  --   --   --  31    < > = values in this interval not displayed.     Results from last 7 days   Lab Units 05/05/25  1540   CK TOTAL U/L 110     Results from last 7 days   Lab Units 05/09/25  0546 05/09/25  0024   WBC 10*3/mm3 6.96 6.10   HEMOGLOBIN g/dL 11.1* 11.6*   HEMATOCRIT % 32.5* 34.9*   PLATELETS 10*3/mm3 113* 91*     Results from last 7 days   Lab Units 05/07/25  1141   INR  1.29*     Results from last 7 days   Lab Units 05/09/25  0546 05/08/25  2113   MAGNESIUM mg/dL 2.3 1.4*           Invalid input(s): \"LDLCALC\"      Results from last 7 days   Lab Units 05/05/25  1540   TSH uIU/mL 0.658     I reviewed the patient's new clinical results.  I personally viewed and interpreted the patient's EKG  Current Medications:   Scheduled Meds:albuterol, 10 mg, Nebulization, Once  allopurinol, 300 mg, Oral, Daily  calcium gluconate, 1 g, Intravenous, Once  ferrous sulfate, 325 mg, Oral, Daily With Breakfast  folic acid, 1,000 mcg, Oral, Daily  gabapentin, 300 mg, Oral, Q12H  lisinopril, 5 mg, Oral, Daily  magnesium sulfate, 2 g, Intravenous, Q2H  metoprolol succinate XL, 25 mg, Oral, Daily  pantoprazole, 40 mg, Oral, BID  rosuvastatin, 10 mg, Oral, Daily  senna-docusate sodium, 2 tablet, Oral, BID  sodium bicarbonate, 50 mEq, Intravenous, Once  sodium chloride, 10 mL, Intravenous, Q12H  sodium chloride, 10 mL, Intravenous, Q12H  sodium zirconium cyclosilicate, 10 g, Oral, Once  thiamine, 100 mg, Oral, Daily  vitamin B-12, 1,000 mcg, Oral, Daily      Continuous Infusions:HYDROmorphone HCl-NaCl,   Pharmacy Consult,   sodium chloride, " 30 mL/hr  sodium chloride, 75 mL/hr        Allergies:  Allergies   Allergen Reactions    Zetia [Ezetimibe] Dizziness     Nausea, fatgue       Assessment/Plan:     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  Neurology recommends outpatient EMG/NCS   Hypertension  Paroxysmal supraventricular tachycardia.  The patient had stopped the beta-blocker and is on in the past due to lower extremity swelling.  He is receiving beta-blocker significant issue.    Dilated ascending aorta.  4.3 by echo 11/2024.     -Doing well postoperatively.  Will sign off and see as scheduled in July.     Hazel Brown MD  05/09/25  07:41 EDT    Electronically signed by Hazel Brown MD at 05/09/25 0889       Consult Notes (last 24 hours)  Notes from 05/08/25 1455 through 05/09/25 1455   No notes of this type exist for this encounter.

## 2025-05-10 LAB
ANION GAP SERPL CALCULATED.3IONS-SCNC: 12 MMOL/L (ref 5–15)
BASOPHILS # BLD AUTO: 0.06 10*3/MM3 (ref 0–0.2)
BASOPHILS NFR BLD AUTO: 0.7 % (ref 0–1.5)
BUN SERPL-MCNC: 37 MG/DL (ref 8–23)
BUN/CREAT SERPL: 13.5 (ref 7–25)
CALCIUM SPEC-SCNC: 9.2 MG/DL (ref 8.6–10.5)
CHLORIDE SERPL-SCNC: 102 MMOL/L (ref 98–107)
CO2 SERPL-SCNC: 21 MMOL/L (ref 22–29)
CREAT SERPL-MCNC: 2.74 MG/DL (ref 0.76–1.27)
DEPRECATED RDW RBC AUTO: 46.6 FL (ref 37–54)
EGFRCR SERPLBLD CKD-EPI 2021: 24.6 ML/MIN/1.73
EOSINOPHIL # BLD AUTO: 0.1 10*3/MM3 (ref 0–0.4)
EOSINOPHIL NFR BLD AUTO: 1.2 % (ref 0.3–6.2)
ERYTHROCYTE [DISTWIDTH] IN BLOOD BY AUTOMATED COUNT: 12.7 % (ref 12.3–15.4)
FERRITIN SERPL-MCNC: 239 NG/ML (ref 30–400)
GLUCOSE SERPL-MCNC: 105 MG/DL (ref 65–99)
HAPTOGLOB SERPL-MCNC: 64 MG/DL (ref 30–200)
HCT VFR BLD AUTO: 27.4 % (ref 37.5–51)
HGB BLD-MCNC: 9.3 G/DL (ref 13–17.7)
IMM GRANULOCYTES # BLD AUTO: 0.03 10*3/MM3 (ref 0–0.05)
IMM GRANULOCYTES NFR BLD AUTO: 0.4 % (ref 0–0.5)
IRON 24H UR-MRATE: 17 MCG/DL (ref 59–158)
IRON SATN MFR SERPL: 7 % (ref 20–50)
LDH SERPL-CCNC: 156 U/L (ref 135–225)
LYMPHOCYTES # BLD AUTO: 1.68 10*3/MM3 (ref 0.7–3.1)
LYMPHOCYTES NFR BLD AUTO: 20.4 % (ref 19.6–45.3)
MCH RBC QN AUTO: 34.3 PG (ref 26.6–33)
MCHC RBC AUTO-ENTMCNC: 33.9 G/DL (ref 31.5–35.7)
MCV RBC AUTO: 101.1 FL (ref 79–97)
METHYLMALONATE SERPL-SCNC: 179 NMOL/L (ref 0–378)
MONOCYTES # BLD AUTO: 1.49 10*3/MM3 (ref 0.1–0.9)
MONOCYTES NFR BLD AUTO: 18.1 % (ref 5–12)
NEUTROPHILS NFR BLD AUTO: 4.88 10*3/MM3 (ref 1.7–7)
NEUTROPHILS NFR BLD AUTO: 59.2 % (ref 42.7–76)
NRBC BLD AUTO-RTO: 0 /100 WBC (ref 0–0.2)
PLATELET # BLD AUTO: 96 10*3/MM3 (ref 140–450)
PMV BLD AUTO: 10.7 FL (ref 6–12)
POTASSIUM SERPL-SCNC: 4.8 MMOL/L (ref 3.5–5.2)
RBC # BLD AUTO: 2.71 10*6/MM3 (ref 4.14–5.8)
RETICS # AUTO: 0.05 10*6/MM3 (ref 0.02–0.13)
RETICS/RBC NFR AUTO: 1.96 % (ref 0.7–1.9)
SODIUM SERPL-SCNC: 135 MMOL/L (ref 136–145)
TIBC SERPL-MCNC: 261 MCG/DL (ref 298–536)
TRANSFERRIN SERPL-MCNC: 175 MG/DL (ref 200–360)
URATE SERPL-MCNC: 4.4 MG/DL (ref 3.4–7)
VIT B1 BLD-SCNC: 156.1 NMOL/L (ref 66.5–200)
WBC NRBC COR # BLD AUTO: 8.24 10*3/MM3 (ref 3.4–10.8)

## 2025-05-10 PROCEDURE — 84466 ASSAY OF TRANSFERRIN: CPT | Performed by: INTERNAL MEDICINE

## 2025-05-10 PROCEDURE — 83615 LACTATE (LD) (LDH) ENZYME: CPT | Performed by: INTERNAL MEDICINE

## 2025-05-10 PROCEDURE — 80048 BASIC METABOLIC PNL TOTAL CA: CPT | Performed by: NEUROLOGICAL SURGERY

## 2025-05-10 PROCEDURE — 99024 POSTOP FOLLOW-UP VISIT: CPT | Performed by: NURSE PRACTITIONER

## 2025-05-10 PROCEDURE — 83010 ASSAY OF HAPTOGLOBIN QUANT: CPT | Performed by: INTERNAL MEDICINE

## 2025-05-10 PROCEDURE — 83540 ASSAY OF IRON: CPT | Performed by: INTERNAL MEDICINE

## 2025-05-10 PROCEDURE — 82728 ASSAY OF FERRITIN: CPT | Performed by: INTERNAL MEDICINE

## 2025-05-10 PROCEDURE — 97530 THERAPEUTIC ACTIVITIES: CPT

## 2025-05-10 PROCEDURE — 84550 ASSAY OF BLOOD/URIC ACID: CPT | Performed by: INTERNAL MEDICINE

## 2025-05-10 PROCEDURE — 85045 AUTOMATED RETICULOCYTE COUNT: CPT | Performed by: INTERNAL MEDICINE

## 2025-05-10 PROCEDURE — 85025 COMPLETE CBC W/AUTO DIFF WBC: CPT | Performed by: NEUROLOGICAL SURGERY

## 2025-05-10 PROCEDURE — 25810000003 SODIUM CHLORIDE 0.9 % SOLUTION: Performed by: INTERNAL MEDICINE

## 2025-05-10 RX ORDER — AMOXICILLIN 250 MG
2 CAPSULE ORAL 2 TIMES DAILY
Status: DISCONTINUED | OUTPATIENT
Start: 2025-05-10 | End: 2025-05-20 | Stop reason: HOSPADM

## 2025-05-10 RX ORDER — POLYETHYLENE GLYCOL 3350 17 G/17G
17 POWDER, FOR SOLUTION ORAL DAILY
Status: DISCONTINUED | OUTPATIENT
Start: 2025-05-10 | End: 2025-05-20 | Stop reason: HOSPADM

## 2025-05-10 RX ORDER — SODIUM CHLORIDE 9 MG/ML
125 INJECTION, SOLUTION INTRAVENOUS CONTINUOUS
Status: ACTIVE | OUTPATIENT
Start: 2025-05-10 | End: 2025-05-11

## 2025-05-10 RX ORDER — BISACODYL 10 MG
10 SUPPOSITORY, RECTAL RECTAL DAILY PRN
Status: DISCONTINUED | OUTPATIENT
Start: 2025-05-10 | End: 2025-05-20 | Stop reason: HOSPADM

## 2025-05-10 RX ORDER — BISACODYL 5 MG/1
5 TABLET, DELAYED RELEASE ORAL DAILY PRN
Status: DISCONTINUED | OUTPATIENT
Start: 2025-05-10 | End: 2025-05-20 | Stop reason: HOSPADM

## 2025-05-10 RX ADMIN — MIDODRINE HYDROCHLORIDE 5 MG: 5 TABLET ORAL at 08:15

## 2025-05-10 RX ADMIN — FOLIC ACID 1000 MCG: 1 TABLET ORAL at 08:15

## 2025-05-10 RX ADMIN — GABAPENTIN 300 MG: 300 CAPSULE ORAL at 20:36

## 2025-05-10 RX ADMIN — Medication 10 ML: at 20:39

## 2025-05-10 RX ADMIN — SENNOSIDES AND DOCUSATE SODIUM 2 TABLET: 50; 8.6 TABLET ORAL at 20:36

## 2025-05-10 RX ADMIN — FERROUS SULFATE TAB 325 MG (65 MG ELEMENTAL FE) 325 MG: 325 (65 FE) TAB at 08:15

## 2025-05-10 RX ADMIN — PANTOPRAZOLE SODIUM 40 MG: 40 TABLET, DELAYED RELEASE ORAL at 20:36

## 2025-05-10 RX ADMIN — Medication 1000 MCG: at 08:15

## 2025-05-10 RX ADMIN — OXYCODONE HYDROCHLORIDE 10 MG: 5 TABLET ORAL at 19:06

## 2025-05-10 RX ADMIN — Medication 100 MG: at 08:15

## 2025-05-10 RX ADMIN — ROSUVASTATIN CALCIUM 10 MG: 20 TABLET, FILM COATED ORAL at 20:36

## 2025-05-10 RX ADMIN — PANTOPRAZOLE SODIUM 40 MG: 40 TABLET, DELAYED RELEASE ORAL at 08:15

## 2025-05-10 RX ADMIN — SODIUM CHLORIDE 125 ML/HR: 9 INJECTION, SOLUTION INTRAVENOUS at 15:26

## 2025-05-10 RX ADMIN — Medication 10 ML: at 08:17

## 2025-05-10 RX ADMIN — OXYCODONE HYDROCHLORIDE 10 MG: 5 TABLET ORAL at 23:29

## 2025-05-10 RX ADMIN — OXYCODONE HYDROCHLORIDE 10 MG: 5 TABLET ORAL at 14:00

## 2025-05-10 RX ADMIN — SENNOSIDES AND DOCUSATE SODIUM 2 TABLET: 50; 8.6 TABLET ORAL at 08:15

## 2025-05-10 RX ADMIN — ALLOPURINOL 300 MG: 300 TABLET ORAL at 08:15

## 2025-05-10 RX ADMIN — POLYETHYLENE GLYCOL 3350 17 G: 17 POWDER, FOR SOLUTION ORAL at 14:00

## 2025-05-10 RX ADMIN — GABAPENTIN 300 MG: 300 CAPSULE ORAL at 08:15

## 2025-05-10 NOTE — PLAN OF CARE
Goal Outcome Evaluation:  Plan of Care Reviewed With: patient           Outcome Evaluation: Patient with good participation in therapy and motivated to improve.  Required assist of 2 to come to stand; decreased control of bilateral knees hips with increased weightbearing through upper extremities.  Patient able to take a few steps forward and back, decreased control of step length and narrow base of support.  Instructed and encourage patient to perform ankle pumps, quad sets and glutes sets when able for lower extremity strengthening.  Recommend ongoing therapy with IRF at discharge.

## 2025-05-10 NOTE — PLAN OF CARE
Goal Outcome Evaluation:  Plan of Care Reviewed With: patient, spouse        Progress: improving  Outcome Evaluation: Neuro exam stable. PCA discontinued. Pain well controlled with PRN regimen. Patient's blood pressure improved since PCA disctoninued. Phillips removed, bladder scan 148ml.

## 2025-05-10 NOTE — PROGRESS NOTES
Name: Mo Willoughby ADMIT: 2025   : 1955  PCP: Jenn Davison APRN    MRN: [id number] LOS: 5 days   AGE/SEX: 69 y.o. male  ROOM: [facility]     Subjective   Subjective   Patient reports mid back pain.  No radiation of the back pain to the lower extremity.  No lower extremity tingling but weakness of the left lower extremity and left upper extremity.  Phillips catheter removed 1 hour ago still no urine.  No fever or chills.    Review of Systems  Cardiac vascular/respiratory.  No chest pain/no palpitations/no shortness of breath/no cough     Objective   Objective   Vital Signs  Temp:  [98 °F (36.7 °C)-99 °F (37.2 °C)] 98 °F (36.7 °C)  Heart Rate:  [] 101  Resp:  [16-20] 18  BP: ()/(42-58) 100/58  SpO2:  [94 %-100 %] 95 %  on  Flow (L/min) (Oxygen Therapy):  [1] 1;   Device (Oxygen Therapy): room air    Intake/Output Summary (Last 24 hours) at 5/10/2025 1407  Last data filed at 5/10/2025 0612  Gross per 24 hour   Intake --   Output 530 ml   Net -530 ml     Body mass index is 32.6 kg/m².      25  1800   Weight: 108 kg (238 lb 1.6 oz)     Physical Exam  General.  Middle-aged gentleman.  Obese.  Alert and oriented x 4.  In no apparent pain/distress/diaphoresis.  Normal mood and affect.  Eyes.  Pupils equal round and reactive.  Intact extraocular musculature.  No pallor or jaundice.  Oral cavity.  Moist mucous membrane.  Neck.  Supple.  No JVD.  No lymphadenopathy or thyromegaly  Cardio vascular.  Regular rate and rhythm and grade 2 systolic murmur.  Chest.  Clear to auscultation bilaterally with no added sounds.  Abdomen.  Soft lax.  No tenderness.  No organomegaly.  No guarding or rebound.    Extremities.  No clubbing/cyanosis/edema.  CNS.  Grade 4/5 weakness of the left upper extremity.  Left foot drop.      Results Review:      Results from last 7 days   Lab Units 05/10/25  0532 25  1029 25  0546 25  2113 25  0649 25  0516 25  1540   SODIUM mmol/L 135*  " --  137 138 139 138 139   POTASSIUM mmol/L 4.8 4.3 5.9* 4.7 4.1 4.5 4.5   CHLORIDE mmol/L 102  --  107 106 106 106 105   CO2 mmol/L 21.0*  --  20.6* 19.9* 22.7 24.6 22.4   BUN mg/dL 37*  --  23 19 17 15 15   CREATININE mg/dL 2.74*  --  1.35* 1.13 1.13 1.14 1.18   GLUCOSE mg/dL 105*  --  128* 151* 92 92 85   CALCIUM mg/dL 9.2  --  9.8 9.5 10.2 10.3 10.3   AST (SGOT) U/L  --   --   --   --   --   --  61*   ALT (SGPT) U/L  --   --   --   --   --   --  31     Estimated Creatinine Clearance: 33 mL/min (A) (by C-G formula based on SCr of 2.74 mg/dL (H)).          Results from last 7 days   Lab Units 05/05/25  1540   CK TOTAL U/L 110         Results from last 7 days   Lab Units 05/05/25  1540   TSH uIU/mL 0.658     Results from last 7 days   Lab Units 05/09/25  0546 05/08/25  2113 05/05/25  1540   MAGNESIUM mg/dL 2.3 1.4* 1.2*           Invalid input(s): \"LDLCALC\"  Results from last 7 days   Lab Units 05/10/25  0532 05/09/25  0546 05/09/25  0024 05/08/25  0649 05/07/25  0516 05/05/25  1540   WBC 10*3/mm3 8.24 6.96 6.10 3.70 3.72 4.03   HEMOGLOBIN g/dL 9.3* 11.1* 11.6* 11.0* 11.5* 12.6*   HEMATOCRIT % 27.4* 32.5* 34.9* 32.1* 33.5* 37.9   PLATELETS 10*3/mm3 96* 113* 91* 83* 79* 97*   MCV fL 101.1* 100.9* 104.2* 100.6* 100.3* 102.4*   MCH pg 34.3* 34.5* 34.6* 34.5* 34.4* 34.1*   MCHC g/dL 33.9 34.2 33.2 34.3 34.3 33.2   RDW % 12.7 12.9 13.1 12.9 12.9 13.1   RDW-SD fl 46.6 47.2 50.2 46.1 46.8 48.2   MPV fL 10.7 9.9 9.9 10.6 10.3 10.3   NEUTROPHIL % % 59.2 80.1* 84.7*  --   --  59.6   LYMPHOCYTE % % 20.4 9.6* 8.7*  --   --  25.8   MONOCYTES % % 18.1* 9.6 5.9  --   --  10.9   EOSINOPHIL % % 1.2 0.0* 0.0*  --   --  3.2   BASOPHIL % % 0.7 0.1 0.2  --   --  0.5   IMM GRAN % % 0.4 0.6* 0.5  --   --  0.0   NEUTROS ABS 10*3/mm3 4.88 5.57 5.17  --   --  2.40   LYMPHS ABS 10*3/mm3 1.68 0.67* 0.53*  --   --  1.04   MONOS ABS 10*3/mm3 1.49* 0.67 0.36  --   --  0.44   EOS ABS 10*3/mm3 0.10 0.00 0.00  --   --  0.13   BASOS ABS 10*3/mm3 " 0.06 0.01 0.01  --   --  0.02   IMMATURE GRANS (ABS) 10*3/mm3 0.03 0.04 0.03  --   --  0.00   NRBC /100 WBC 0.0  --  0.0  --   --  0.0     Results from last 7 days   Lab Units 05/07/25  1141   INR  1.29*         Results from last 7 days   Lab Units 05/05/25  1540   PROCALCITONIN ng/mL 0.16     Results from last 7 days   Lab Units 05/06/25  0643 05/05/25  1540   SED RATE mm/hr 5  --    CRP mg/dL  --  <0.30                 Results from last 7 days   Lab Units 05/07/25  1440   NITRITE UA  Negative           Imaging:  Imaging Results (Last 24 Hours)       ** No results found for the last 24 hours. 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           Left-sided weakness and left foot drop secondary to C/T/L-spine degenerative disc disease with severe spinal stenosis.  MRI of the C/T/L-spine noted.  MRI of the brain is negative.  CT scan of the chest without acute disease.  Status post neurology consult.  Status post neurosurgery consult and decompression surgery of thoracic and lumbar spine.  Physical therapy on board.  PCA pump DC'd.  Phillips catheter DC'd.  Monitor for urine output.  Ambulate with TLSO brace.  Liver cirrhosis and splenomegaly by abdominal CT.  Benign GI examination.  Will monitor liver function  test.  GI follow-up as an outpatient.  Acute kidney failure.  Mostly prerenal azotemia because of hypotension and volume depletion.  Plan hold ACE inhibitor.  IV fluid.  Check urine electrolytes and uric acid.  Monitor renal function.  History of paroxysmal SVT/dilated ascending aorta/hypotension In a patient with a history of hypertension hold ACE inhibitor's.  Currently in normal sinus rhythm.  Continue with Toprol and monitor blood pressure.  No evidence of angina or congestive heart failure  Hyperglycemia.  Check A1c  Postoperative anemia in a patient with a history of macrocytic anemia.  Baseline hemoglobin between 12 and 13 .   normal B12 and folate.  Will repeat anemia workup and monitor.  Patient hyperkalemia.  Resolved with stopping lisinopril   Hyperlipidemia.  Continue Crestor  DVT prophylaxis.  Sequential compression devices      Discussed my findings and plan of treatment with the patient/wife/nurse.  Disposition.  To be determined based on clinical course.  Home with home health versus SNF.        Sahra Luis MD  [facility]  05/10/25  14:07 EDT

## 2025-05-10 NOTE — PROGRESS NOTES
[facility] THORACIC/LUMBAR NEUROSURGERY POSTOP NOTE      CC: POD 2, sp open T12-L1 laminectomy with bilateral L1 pediculectomies for bilateral discectomy, decompression of spinal cord, posterior interbody fusion with expandable Catalyft cages at T12-L1, pedicle screw fixation T12 and L2 bilateral with Pockethernetor robotic navigation, bilateral terry placement from T12 to L2 with crosslink fusion from L1 to L2      Subjective     Interval History: No significant events overnight. Feels pain is improving       Objective     Vital signs in last 24 hours:  Temp:  [98.1 °F (36.7 °C)-99 °F (37.2 °C)] 99 °F (37.2 °C)  Heart Rate:  [] 104  Resp:  [16-20] 18  BP: ()/(49-57) 98/53    Intake/Output this shift:  No intake/output data recorded.    Hemovac drain 150 cc/12 hours     LABS:  Results from last 7 days   Lab Units 05/10/25  0532 05/09/25  0546 05/09/25  0024   WBC 10*3/mm3 8.24 6.96 6.10   HEMOGLOBIN g/dL 9.3* 11.1* 11.6*   HEMATOCRIT % 27.4* 32.5* 34.9*   PLATELETS 10*3/mm3 96* 113* 91*      Results from last 7 days   Lab Units 05/10/25  0532 05/09/25  1029 05/09/25  0546 05/08/25  2113 05/07/25  0516 05/05/25  1540   SODIUM mmol/L 135*  --  137 138   < > 139   POTASSIUM mmol/L 4.8 4.3 5.9* 4.7   < > 4.5   CHLORIDE mmol/L 102  --  107 106   < > 105   CO2 mmol/L 21.0*  --  20.6* 19.9*   < > 22.4   BUN mg/dL 37*  --  23 19   < > 15   CREATININE mg/dL 2.74*  --  1.35* 1.13   < > 1.18   CALCIUM mg/dL 9.2  --  9.8 9.5   < > 10.3   BILIRUBIN mg/dL  --   --   --   --   --  0.7   ALK PHOS U/L  --   --   --   --   --  81   ALT (SGPT) U/L  --   --   --   --   --  31   AST (SGOT) U/L  --   --   --   --   --  61*   GLUCOSE mg/dL 105*  --  128* 151*   < > 85    < > = values in this interval not displayed.      Results from last 7 days   Lab Units 05/09/25  0546 05/08/25 2113 05/05/25  1540   MAGNESIUM mg/dL 2.3 1.4* 1.2*        IMAGING STUDIES:  No new imaging     I personally viewed and interpreted the patient's chart.    Meds  reviewed/changed: Yes  Dilaudid PCA  Zyloprim 300mg daily   Ferrous sulfate 325mg daily  Folic acid 1000 mcg daily  Gabapentin 300mg every 12 hours  Lisinopril 5mg daily-on hold  Protonix 40mg BID  Crestor 10mg daily  Pericolace BID  Vitamin B1 100mg daily  Vitamin B12 1000 mcg daily      Physical Exam:    General:   Awake, alert.  Back:    ALONDRA dressing in place, intact; Drain with serosanguinous drainage   Abdomen:   NT/ND, -BM   Motor:   Right plantarflexion/dorsiflexion 5/5; Left Plantarflexion 4/5, left dorsiflexion 3/5 (history of foot drop)  Sensation:   Normal to light touch aman LEs  Station and Gait:             Per PT note, today patient demo generalized weakness, impaired balance, decreased activity tolerance and general functional mobility deficits below his baseline. Reviewed spinal precautions and log roll technique for bed mobility-patient verbalized understanding. He completed bed mobility with mod A, STS with min to mod Ax2 and was able to take a few steps min Ax2 using RW. Pt demo L>R weakness and relies heavily on use of UE in standing.   Extremities:   Wearing SCD      Assessment & Plan     ASSESSMENT:      Weakness of left lower extremity    CRI (chronic renal insufficiency), stage 3 (moderate)    Essential hypertension, benign    DDD (degenerative disc disease), lumbar    Imbalance    Foot drop, left    Atrophy of muscle of multiple sites    Tongue fasciculation    Acute bilateral low back pain with bilateral sciatica    Herniation of thoracolumbar intervertebral disc with myelopathy    The patient is POD 2, sp open T12-L1 laminectomy with bilateral L1 pediculectomies for bilateral discectomy, decompression of spinal cord, posterior interbody fusion with expandable Catalyft cages at T12-L1, pedicle screw fixation T12 and L2 bilateral with [employer] robotic navigation, bilateral terry placement from T12 to L2 with crosslink fusion from L1 to L2.    The patient reports some improvement with pain today.  He feels that he is making progress with therapy.    PLAN:   Keep ALONDRA dressing (7 days post-op)  Discontinue PCA  Discontinue F/C  Keep Drain  LSO whenever up greater than 30 degrees   Bowel regimen   CBC in am   Rehab evaluation pending     I discussed the patient's findings and my recommendations with patient         LOS: 5 days       Lyric Rhodes, APRN  5/10/2025  10:47 EDT

## 2025-05-10 NOTE — THERAPY TREATMENT NOTE
Patient Name: Mo Willoughby  : 1955    MRN: [id number]                              Today's Date: 5/10/2025       Admit Date: 2025    Visit Dx:     ICD-10-CM ICD-9-CM   1. Weakness of left lower extremity  R29.898 729.89   2. Herniation of thoracolumbar intervertebral disc with myelopathy  M51.05 722.72   3. Tongue fasciculation  R25.3 781.0   4. Atrophy of muscle of multiple sites  M62.59 728.2     Patient Active Problem List   Diagnosis    Erectile dysfunction    Gout    Hypercalcemia    Colon polyp    Sinus tachycardia    Pancreatitis    Other fatigue    Ascending aortic aneurysm    Generalized abdominal pain    Folic acid deficiency    Iron deficiency anemia    Acute bilateral low back pain without sciatica    Swelling of the testicles    Urinary frequency    Hyperparathyroidism, unspecified    CRI (chronic renal insufficiency), stage 3 (moderate)    Essential hypertension, benign    Spinal stenosis of lumbar region    Lumbar radiculitis    Lumbar facet arthropathy    Screening PSA (prostate specific antigen)    Mixed hyperlipidemia    Change in pigmented skin lesion of face    Pain in both testicles    Sacroiliitis    DDD (degenerative disc disease), lumbar    Spondylolisthesis at L4-L5 level    Herniation of lumbar intervertebral disc with radiculopathy    Spinal stenosis of lumbar region with radiculopathy    H/O: gout on allopurinol    Premature atrial contractions    Arthritis of right hip    Right hip pain    Imbalance    Foot drop, left    Atrophy of muscle of multiple sites    Tongue fasciculation    Acute bilateral low back pain with bilateral sciatica    Weakness of left lower extremity    Herniation of thoracolumbar intervertebral disc with myelopathy     Past Medical History:   Diagnosis Date    Abnormal TSH     Arthritis     Ascending aortic aneurysm     Colon polyp 24    CRI (chronic renal insufficiency), stage 3 (moderate) 2016    from stage 3A to 4    DDD (degenerative disc  disease), lumbar     ED (erectile dysfunction)     Erectile dysfunction     Essential hypertension, benign     Folic acid deficiency     Gout     Hip pain, right     Hx of colonic polyp     Hypercalcemia 2015    as far back as data goes so likely pre-dates even this time    Hyperparathyroidism, unspecified 2016    as far back as data goes likely pre-dates even this date, likely multifactorial some primary and some secondary , w/ imaging from 10/16 showing possible L inferior adenoma    Iron deficiency anemia     Low back pain with bilateral sciatica     Mixed hyperlipidemia 02/27/2023    New onset atrial fibrillation 04/15/2024    Pancreatitis     Risk factors for obstructive sleep apnea     Spinal stenosis of lumbar region with neurogenic claudication     Syncope and collapse 03/28/2017     Past Surgical History:   Procedure Laterality Date    CHOLECYSTECTOMY      CHOLECYSTECTOMY WITH INTRAOPERATIVE CHOLANGIOGRAM N/A 08/03/2018    Procedure: CHOLECYSTECTOMY LAPAROSCOPIC INTRAOPERATIVE CHOLANGIOGRAM;  Surgeon: Melina Kate MD;  Location: [facility] MAIN OR;  Service: General    COLONOSCOPY      COLONOSCOPY N/A 10/03/2016    Procedure: COLONOSCOPY TO CECUM TO TERMINAL ILIUM WITH COLD BIOPSY POLYPECTOMY;  Surgeon: Benoit Vilchis MD;  Location: [facility] ENDOSCOPY;  Service:     COLONOSCOPY N/A 05/05/2022    Procedure: COLONOSCOPY TO CECUM WITH COLD SNARE POLYPECTOMIES & RESOLUTION CLIP PLACEMENT X 2;  Surgeon: Benoit Mosley MD;  Location: [facility] ENDOSCOPY;  Service: Gastroenterology;  Laterality: N/A;  ANEMIA/POLYPS, HEMORRHOIDS     ENDOSCOPY N/A 05/05/2022    Procedure: ESOPHAGOGASTRODUODENOSCOPY WITH BX;  Surgeon: Benoit Mosley MD;  Location: [facility] ENDOSCOPY;  Service: Gastroenterology;  Laterality: N/A;  ANEMIA/PORTAL GASTROPATHY, EROSIVE GASTRITIS     EPIDURAL Right 12/07/2022    Procedure: LUMBAR/SACRAL TRANSFORAMINAL EPIDURAL Right L2-3 and right L4-5;  Surgeon: Isaura Torres MD;   Location: [facility] MAIN OR;  Service: Pain Management;  Laterality: Right;    LIPOMA EXCISION      LUMBAR DISCECTOMY FUSION INSTRUMENTATION N/A 5/8/2025    Procedure: Open thoracic twelve/lumbar one discectomy/decompression/possible interbody fusion with cages, pedicle screw/terry/crosslink fixation, thoracic twelve/lumbar one/two with Mazor robotic navigation;  Surgeon: Abel Perez MD;  Location: [facility] MAIN OR;  Service: Robotics - Neuro;  Laterality: N/A;    LUMBAR LAMINECTOMY DISCECTOMY DECOMPRESSION N/A 7/7/2023    Procedure: Open right sided lumbar decompression lumbar three/four, lumbar four/five;  Surgeon: Abel Perez MD;  Location:  [facility] MAIN OR;  Service: Neurosurgery;  Laterality: N/A;    MEDIAL BRANCH BLOCK Right 01/23/2023    Procedure: LUMBAR MEDIAL BRANCH BLOCK RIGHT L3-L5 #1;  Surgeon: Isaura Torres MD;  Location: [facility] MAIN [facility];  Service: Pain Management;  Laterality: Right;    MEDIAL BRANCH BLOCK Right 02/13/2023    Procedure: LUMBAR MEDIAL BRANCH BLOCK RIGHT L3-L5 #1;  Surgeon: Isaura Torres MD;  Location: [facility];  Service: Pain Management;  Laterality: Right;    NERVE SURGERY Right 3/6/2023    Procedure: LUMBAR RADIOFREQUENCY ABLATION RIGHT L3-L5  64109, 20513;  Surgeon: Isaura Torres MD;  Location: [facility];  Service: Pain Management;  Laterality: Right;    SACROILIAC JOINT INJECTION Right 5/5/2023    Procedure: SACROILIAC JOINT INJECTION RIGHT 01041;  Surgeon: Isaura Torres MD;  Location: [facility]IN [facility];  Service: Pain Management;  Laterality: Right;    TONSILLECTOMY        General Information       Row Name 05/10/25 1237          Physical Therapy Time and Intention    Document Type therapy note (daily note)  -LB     Mode of Treatment physical therapy  -LB       Row Name 05/10/25 1234          General Information    Patient Profile Reviewed yes  -LB     Existing Precautions/Restrictions fall;spinal;brace worn when out of bed;LSO  -LB       Row Name 05/10/25 1230           Cognition    Orientation Status (Cognition) oriented x 4  -LB       Row Name 05/10/25 1237          Safety Issues/Impairments Affecting Functional Mobility    Impairments Affecting Function (Mobility) balance;endurance/activity tolerance;strength;pain  -LB               User Key  (r) = Recorded By, (t) = Taken By, (c) = Cosigned By      Initials Name Provider Type    LB Norma Rondon, PT Physical Therapist                   Mobility       Row Name 05/10/25 1238          Bed Mobility    Supine-Sit [place] (Bed Mobility) verbal cues;moderate assist (50% patient effort)  -LB     Sit-Supine [place] (Bed Mobility) moderate assist (50% patient effort);1 person assist;verbal cues  -LB     Assistive Device (Bed Mobility) bed rails;head of bed elevated  -LB     Comment, (Bed Mobility) vc for technique  -LB       Row Name 05/10/25 1238          Sit-Stand Transfer    Sit-Stand [place] (Transfers) moderate assist (50% patient effort);2 person assist  bed elevated  -LB     Assistive Device (Sit-Stand Transfers) walker, front-wheeled  -LB       Row Name 05/10/25 1238          Gait/Stairs (Locomotion)    [place] Level (Gait) minimum assist (75% patient effort);moderate assist (50% patient effort)  -LB     Assistive Device (Gait) walker, front-wheeled  -LB     Distance in Feet (Gait) 4  After a few steps forward and back patient took 4 sidesteps up to the head of bed with assist of 2.  Verbal cues provided for sequencing.  Patient with decreased control of step length  -LB     Deviations/Abnormal Patterns (Gait) bilateral deviations;base of support, narrow;stride length decreased  Creased weightbearing through upper extremities  -LB     Bilateral Gait Deviations forward flexed posture;heel strike decreased  -LB     Left Sided Gait Deviations --  Decreased control of knee and hip  -LB               User Key  (r) = Recorded By, (t) = Taken By, (c) = Cosigned By      Initials Name Provider Type    LB  Norma Rondon, KAREN Physical Therapist                   Obj/Interventions       Row Name 05/10/25 1242          Balance    Dynamic Standing Balance minimal assist;moderate assist;2-person assist  -LB     Position/Device Used, Standing Balance walker, front-wheeled  -LB               User Key  (r) = Recorded By, (t) = Taken By, (c) = Cosigned By      Initials Name Provider Type    Norma Duncan, KAREN Physical Therapist                   Goals/Plan    No documentation.                  Clinical Impression       Row Name 05/10/25 1242          Pain    Pretreatment Pain Rating 5/10  -LB     Posttreatment Pain Rating 7/10  -LB     Pain Location back  -LB       Row Name 05/10/25 1242          Plan of Care Review    Plan of Care Reviewed With patient  -LB     Outcome Evaluation Patient with good participation in therapy and motivated to improve.  Required assist of 2 to come to stand; decreased control of bilateral knees hips with increased weightbearing through upper extremities.  Patient able to take a few steps forward and back, decreased control of step length and narrow base of support.  Instructed and encourage patient to perform ankle pumps, quad sets and glutes sets when able for lower extremity strengthening.  Recommend ongoing therapy with IR[facility] at discharge.  -LB               User Key  (r) = Recorded By, (t) = Taken By, (c) = Cosigned By      Initials Name Provider Type    Norma Duncan, KAREN Physical Therapist                   Outcome Measures       Row Name 05/10/25 1244          How much help from another person do you currently need...    Turning from your back to your side while in flat bed without using bedrails? 2  -LB     Moving from lying on back to sitting on the side of a flat bed without bedrails? 2  -LB     Moving to and from a bed to a chair (including a wheelchair)? 2  -LB     Standing up from a chair using your arms (e.g., wheelchair, bedside chair)? 2  -LB     Climbing 3-5 steps with a railing?  1  -LB     To walk in hospital room? 2  -LB     AM-PAC 6 Clicks Score (PT) 11  -LB     Highest Level of Mobility Goal 4 --> Transfer to chair/commode  -LB               User Key  (r) = Recorded By, (t) = Taken By, (c) = Cosigned By      Initials Name Provider Type    LB Norma Rondon, PT Physical Therapist                                 Physical Therapy Education       Title: PT OT SLP Therapies (In Progress)       Topic: Physical Therapy (Done)       Point: Mobility training (Done)       Learning Progress Summary            Patient Acceptance, E,TB, VU,NR by LB at 5/10/2025 1244    Acceptance, E, VU by CW at 5/9/2025 0936    Comment: spinal precautions                      Point: Home exercise program (Done)       Learning Progress Summary            Patient Acceptance, E, VU by CW at 5/9/2025 0936    Comment: spinal precautions                      Point: Body mechanics (Done)       Learning Progress Summary            Patient Acceptance, E, VU by CW at 5/9/2025 0936    Comment: spinal precautions                      Point: Precautions (Done)       Learning Progress Summary            Patient Acceptance, E, VU by CW at 5/9/2025 0936    Comment: spinal precautions                                      User Key       Initials Effective Dates Name Provider Type Discipline    LB 06/16/21 -  Norma Rondon, PT Physical Therapist PT    CW 12/13/22 -  Lisa Cuellar PT Physical Therapist PT                  PT Recommendation and Plan     Outcome Evaluation: Patient with good participation in therapy and motivated to improve.  Required assist of 2 to come to stand; decreased control of bilateral knees hips with increased weightbearing through upper extremities.  Patient able to take a few steps forward and back, decreased control of step length and narrow base of support.  Instructed and encourage patient to perform ankle pumps, quad sets and glutes sets when able for lower extremity strengthening.  Recommend ongoing  therapy with IR[facility] at discharge.     Time Calculation:         PT Charges       Row Name 05/10/25 1245             Time Calculation    Start Time 0923  -LB      Stop Time 0955  -LB      Time Calculation (min) 32 min  -LB      PT Received On 05/10/25  -LB      PT - Next Appointment 05/12/25  -LB                User Key  (r) = Recorded By, (t) = Taken By, (c) = Cosigned By      Initials Name Provider Type    Norma Duncan, PT Physical Therapist                  Therapy Charges for Today       Code Description Service Date Service Provider Modifiers Qty    [id number]  PT THERAPEUTIC ACT EA 15 MIN 5/10/2025 Norma Rondon, PT GP 2            PT G-Codes  Outcome Measure Options: AM-PAC 6 Clicks Daily Activity (OT)  AM-PAC 6 Clicks Score (PT): 11  AM-PAC 6 Clicks Score (OT): 11  Modified Ratna Scale: 2 - Slight disability.  Unable to carry out all previous activities but able to look after own affairs without assistance.       Norma Rondon, KAREN  5/10/2025

## 2025-05-11 ENCOUNTER — APPOINTMENT (OUTPATIENT)
Dept: GENERAL RADIOLOGY | Facility: HOSPITAL | Age: 70
End: 2025-05-11
Payer: MEDICARE

## 2025-05-11 ENCOUNTER — APPOINTMENT (OUTPATIENT)
Dept: CARDIOLOGY | Facility: HOSPITAL | Age: 70
End: 2025-05-11
Payer: MEDICARE

## 2025-05-11 LAB
ALBUMIN SERPL-MCNC: 3 G/DL (ref 3.5–5.2)
ALP SERPL-CCNC: 67 U/L (ref 39–117)
ALT SERPL W P-5'-P-CCNC: 15 U/L (ref 1–41)
ANION GAP SERPL CALCULATED.3IONS-SCNC: 11.4 MMOL/L (ref 5–15)
AST SERPL-CCNC: 33 U/L (ref 1–40)
BASOPHILS # BLD MANUAL: 0.05 10*3/MM3 (ref 0–0.2)
BASOPHILS NFR BLD MANUAL: 1 % (ref 0–1.5)
BH CV UPPER VENOUS LEFT AXILLARY AUGMENT: NORMAL
BH CV UPPER VENOUS LEFT AXILLARY COMPRESS: NORMAL
BH CV UPPER VENOUS LEFT AXILLARY PHASIC: NORMAL
BH CV UPPER VENOUS LEFT AXILLARY SPONT: NORMAL
BH CV UPPER VENOUS LEFT BASILIC FOREARM COMPRESS: NORMAL
BH CV UPPER VENOUS LEFT BASILIC UPPER COMPRESS: NORMAL
BH CV UPPER VENOUS LEFT BRACHIAL COMPRESS: NORMAL
BH CV UPPER VENOUS LEFT CEPHALIC FOREARM COLOR: 1
BH CV UPPER VENOUS LEFT CEPHALIC FOREARM COMPRESS: NORMAL
BH CV UPPER VENOUS LEFT CEPHALIC FOREARM THROMBUS: NORMAL
BH CV UPPER VENOUS LEFT CEPHALIC UPPER COLOR: 1
BH CV UPPER VENOUS LEFT CEPHALIC UPPER COMPRESS: NORMAL
BH CV UPPER VENOUS LEFT CEPHALIC UPPER THROMBUS: NORMAL
BH CV UPPER VENOUS LEFT INTERNAL JUGULAR AUGMENT: NORMAL
BH CV UPPER VENOUS LEFT INTERNAL JUGULAR COMPRESS: NORMAL
BH CV UPPER VENOUS LEFT INTERNAL JUGULAR PHASIC: NORMAL
BH CV UPPER VENOUS LEFT INTERNAL JUGULAR SPONT: NORMAL
BH CV UPPER VENOUS LEFT RADIAL COMPRESS: NORMAL
BH CV UPPER VENOUS LEFT SUBCLAVIAN AUGMENT: NORMAL
BH CV UPPER VENOUS LEFT SUBCLAVIAN COMPRESS: NORMAL
BH CV UPPER VENOUS LEFT SUBCLAVIAN PHASIC: NORMAL
BH CV UPPER VENOUS LEFT SUBCLAVIAN SPONT: NORMAL
BH CV UPPER VENOUS LEFT ULNAR COMPRESS: NORMAL
BH CV UPPER VENOUS RIGHT INTERNAL JUGULAR AUGMENT: NORMAL
BH CV UPPER VENOUS RIGHT INTERNAL JUGULAR COMPRESS: NORMAL
BH CV UPPER VENOUS RIGHT INTERNAL JUGULAR PHASIC: NORMAL
BH CV UPPER VENOUS RIGHT INTERNAL JUGULAR SPONT: NORMAL
BH CV UPPER VENOUS RIGHT SUBCLAVIAN AUGMENT: NORMAL
BH CV UPPER VENOUS RIGHT SUBCLAVIAN COMPRESS: NORMAL
BH CV UPPER VENOUS RIGHT SUBCLAVIAN PHASIC: NORMAL
BH CV UPPER VENOUS RIGHT SUBCLAVIAN SPONT: NORMAL
BH CV VAS PRELIMINARY FINDINGS SCRIPTING: 1
BILIRUB CONJ SERPL-MCNC: 0.3 MG/DL (ref 0–0.3)
BILIRUB INDIRECT SERPL-MCNC: 0.2 MG/DL
BILIRUB SERPL-MCNC: 0.5 MG/DL (ref 0–1.2)
BUN SERPL-MCNC: 41 MG/DL (ref 8–23)
BUN/CREAT SERPL: 17.7 (ref 7–25)
CALCIUM SPEC-SCNC: 8.8 MG/DL (ref 8.6–10.5)
CHLORIDE SERPL-SCNC: 106 MMOL/L (ref 98–107)
CO2 SERPL-SCNC: 18.6 MMOL/L (ref 22–29)
COPPER SERPL-MCNC: 58 UG/DL (ref 69–132)
CREAT SERPL-MCNC: 2.32 MG/DL (ref 0.76–1.27)
CREAT UR-MCNC: 134.9 MG/DL
DEPRECATED RDW RBC AUTO: 47.3 FL (ref 37–54)
EGFRCR SERPLBLD CKD-EPI 2021: 30 ML/MIN/1.73
EOSINOPHIL # BLD MANUAL: 0.27 10*3/MM3 (ref 0–0.4)
EOSINOPHIL NFR BLD MANUAL: 5.1 % (ref 0.3–6.2)
ERYTHROCYTE [DISTWIDTH] IN BLOOD BY AUTOMATED COUNT: 12.8 % (ref 12.3–15.4)
GLUCOSE SERPL-MCNC: 114 MG/DL (ref 65–99)
HBA1C MFR BLD: 4.8 % (ref 4.8–5.6)
HCT VFR BLD AUTO: 25.3 % (ref 37.5–51)
HGB BLD-MCNC: 8.4 G/DL (ref 13–17.7)
LYMPHOCYTES # BLD MANUAL: 1.03 10*3/MM3 (ref 0.7–3.1)
LYMPHOCYTES NFR BLD MANUAL: 15.2 % (ref 5–12)
MCH RBC QN AUTO: 33.7 PG (ref 26.6–33)
MCHC RBC AUTO-ENTMCNC: 33.2 G/DL (ref 31.5–35.7)
MCV RBC AUTO: 101.6 FL (ref 79–97)
MONOCYTES # BLD: 0.81 10*3/MM3 (ref 0.1–0.9)
NEUTROPHILS # BLD AUTO: 3.19 10*3/MM3 (ref 1.7–7)
NEUTROPHILS NFR BLD MANUAL: 59.6 % (ref 42.7–76)
NRBC BLD AUTO-RTO: 0 /100 WBC (ref 0–0.2)
OSMOLALITY UR: 415 MOSM/KG
PHOSPHATE SERPL-MCNC: 3.5 MG/DL (ref 2.5–4.5)
PLAT MORPH BLD: NORMAL
PLATELET # BLD AUTO: 65 10*3/MM3 (ref 140–450)
PMV BLD AUTO: 9.9 FL (ref 6–12)
POTASSIUM SERPL-SCNC: 4.3 MMOL/L (ref 3.5–5.2)
PROT SERPL-MCNC: 4.8 G/DL (ref 6–8.5)
RBC # BLD AUTO: 2.49 10*6/MM3 (ref 4.14–5.8)
RBC MORPH BLD: NORMAL
SODIUM SERPL-SCNC: 136 MMOL/L (ref 136–145)
SODIUM UR-SCNC: 57 MMOL/L
VARIANT LYMPHS NFR BLD MANUAL: 19.2 % (ref 19.6–45.3)
WBC MORPH BLD: NORMAL
WBC NRBC COR # BLD AUTO: 5.35 10*3/MM3 (ref 3.4–10.8)

## 2025-05-11 PROCEDURE — 93971 EXTREMITY STUDY: CPT | Performed by: STUDENT IN AN ORGANIZED HEALTH CARE EDUCATION/TRAINING PROGRAM

## 2025-05-11 PROCEDURE — 93971 EXTREMITY STUDY: CPT

## 2025-05-11 PROCEDURE — 72100 X-RAY EXAM L-S SPINE 2/3 VWS: CPT

## 2025-05-11 PROCEDURE — 85007 BL SMEAR W/DIFF WBC COUNT: CPT | Performed by: INTERNAL MEDICINE

## 2025-05-11 PROCEDURE — 82570 ASSAY OF URINE CREATININE: CPT | Performed by: INTERNAL MEDICINE

## 2025-05-11 PROCEDURE — 85025 COMPLETE CBC W/AUTO DIFF WBC: CPT | Performed by: INTERNAL MEDICINE

## 2025-05-11 PROCEDURE — 80076 HEPATIC FUNCTION PANEL: CPT | Performed by: INTERNAL MEDICINE

## 2025-05-11 PROCEDURE — 83036 HEMOGLOBIN GLYCOSYLATED A1C: CPT | Performed by: INTERNAL MEDICINE

## 2025-05-11 PROCEDURE — 99024 POSTOP FOLLOW-UP VISIT: CPT | Performed by: NURSE PRACTITIONER

## 2025-05-11 PROCEDURE — 84100 ASSAY OF PHOSPHORUS: CPT | Performed by: INTERNAL MEDICINE

## 2025-05-11 PROCEDURE — 80048 BASIC METABOLIC PNL TOTAL CA: CPT | Performed by: INTERNAL MEDICINE

## 2025-05-11 PROCEDURE — 83935 ASSAY OF URINE OSMOLALITY: CPT | Performed by: INTERNAL MEDICINE

## 2025-05-11 PROCEDURE — 84300 ASSAY OF URINE SODIUM: CPT | Performed by: INTERNAL MEDICINE

## 2025-05-11 RX ORDER — SODIUM CHLORIDE 9 MG/ML
125 INJECTION, SOLUTION INTRAVENOUS CONTINUOUS
Status: ACTIVE | OUTPATIENT
Start: 2025-05-11 | End: 2025-05-12

## 2025-05-11 RX ADMIN — SODIUM CHLORIDE 125 ML/HR: 9 INJECTION, SOLUTION INTRAVENOUS at 21:28

## 2025-05-11 RX ADMIN — OXYCODONE HYDROCHLORIDE 10 MG: 5 TABLET ORAL at 21:32

## 2025-05-11 RX ADMIN — GABAPENTIN 300 MG: 300 CAPSULE ORAL at 21:33

## 2025-05-11 RX ADMIN — FOLIC ACID 1000 MCG: 1 TABLET ORAL at 09:04

## 2025-05-11 RX ADMIN — Medication 10 ML: at 21:37

## 2025-05-11 RX ADMIN — ALLOPURINOL 300 MG: 300 TABLET ORAL at 09:04

## 2025-05-11 RX ADMIN — SENNOSIDES AND DOCUSATE SODIUM 2 TABLET: 50; 8.6 TABLET ORAL at 09:03

## 2025-05-11 RX ADMIN — PANTOPRAZOLE SODIUM 40 MG: 40 TABLET, DELAYED RELEASE ORAL at 21:33

## 2025-05-11 RX ADMIN — SENNOSIDES AND DOCUSATE SODIUM 2 TABLET: 50; 8.6 TABLET ORAL at 21:33

## 2025-05-11 RX ADMIN — SODIUM CHLORIDE 125 ML/HR: 9 INJECTION, SOLUTION INTRAVENOUS at 07:38

## 2025-05-11 RX ADMIN — Medication 10 ML: at 09:04

## 2025-05-11 RX ADMIN — METOPROLOL SUCCINATE 25 MG: 25 TABLET, EXTENDED RELEASE ORAL at 09:03

## 2025-05-11 RX ADMIN — POLYETHYLENE GLYCOL 3350 17 G: 17 POWDER, FOR SOLUTION ORAL at 09:03

## 2025-05-11 RX ADMIN — OXYCODONE HYDROCHLORIDE 10 MG: 5 TABLET ORAL at 09:31

## 2025-05-11 RX ADMIN — ROSUVASTATIN CALCIUM 10 MG: 20 TABLET, FILM COATED ORAL at 21:33

## 2025-05-11 RX ADMIN — FERROUS SULFATE TAB 325 MG (65 MG ELEMENTAL FE) 325 MG: 325 (65 FE) TAB at 09:04

## 2025-05-11 RX ADMIN — PANTOPRAZOLE SODIUM 40 MG: 40 TABLET, DELAYED RELEASE ORAL at 09:04

## 2025-05-11 RX ADMIN — OXYCODONE HYDROCHLORIDE 10 MG: 5 TABLET ORAL at 05:10

## 2025-05-11 RX ADMIN — Medication 100 MG: at 09:04

## 2025-05-11 RX ADMIN — GABAPENTIN 300 MG: 300 CAPSULE ORAL at 09:04

## 2025-05-11 RX ADMIN — Medication 1000 MCG: at 09:03

## 2025-05-11 NOTE — NURSING NOTE
Iv team called to place a piv. 20 G placed in RFA, assessed the LFA and old site noted reddened and tendered from old insertion site to almost the AC measuring 15cm cord. Reported to Florence Griffiths Md will need to evaluate LFA due to cord palpated 15cm with redness and discomfort reported during assessment.

## 2025-05-11 NOTE — PLAN OF CARE
A&O X4, MRI sheet completed and faxed, LUE noted SVT see scan. Order to place F/C. Up to BSC as passing mucoid feces. Max Assist X1 to sit on side of bed or get to BSC.  Bed alarm set, call bell in reach.    Problem: Adult Inpatient Plan of Care  Goal: Plan of Care Review  Outcome: Progressing  Goal: Absence of Hospital-Acquired Illness or Injury  Intervention: Identify and Manage Fall Risk  Description: Perform standard risk assessment on admission using a validated tool or comprehensive approach appropriate to the patient; reassess fall risk frequently, with change in status or transfer to another level of care.Communicate risk to interprofessional healthcare team; ensure fall risk visible cue.Determine need for increased observation, equipment and environmental modification, as well as use of supportive, nonskid footwear.Adjust safety measures to individual needs and identified risk factors.Reinforce the importance of active participation with fall risk prevention, safety, and physical activity with the patient and family.Perform regular intentional rounding to assess need for position change, pain assessment and personal needs, including assistance with toileting.  Recent Flowsheet Documentation  Taken 5/11/2025 1840 by Florence Thompson, RN  Safety Promotion/Fall Prevention:   activity supervised   assistive device/personal items within reach   clutter free environment maintained   fall prevention program maintained   gait belt   nonskid shoes/slippers when out of bed   safety round/check completed  Taken 5/11/2025 1630 by Florence Thompson, RN  Safety Promotion/Fall Prevention:   assistive device/personal items within reach   clutter free environment maintained   fall prevention program maintained   gait belt   nonskid shoes/slippers when out of bed   safety round/check completed  Taken 5/11/2025 1515 by Florence Thompson, RN  Safety Promotion/Fall Prevention:   gait belt   clutter free environment maintained    assistive device/personal items within reach   fall prevention program maintained   safety round/check completed   nonskid shoes/slippers when out of bed  Taken 5/11/2025 1400 by Florence Thompson, RN  Safety Promotion/Fall Prevention:   activity supervised   assistive device/personal items within reach   clutter free environment maintained   fall prevention program maintained   gait belt   nonskid shoes/slippers when out of bed   safety round/check completed  Taken 5/11/2025 1205 by Florence Thompson, RN  Safety Promotion/Fall Prevention:   activity supervised   assistive device/personal items within reach   clutter free environment maintained   fall prevention program maintained   gait belt   nonskid shoes/slippers when out of bed   safety round/check completed  Taken 5/11/2025 1005 by Florence Thompson, RN  Safety Promotion/Fall Prevention:   assistive device/personal items within reach   clutter free environment maintained   fall prevention program maintained   gait belt   nonskid shoes/slippers when out of bed   safety round/check completed  Taken 5/11/2025 0855 by Florence Thompson, RN  Safety Promotion/Fall Prevention:   toileting scheduled   room organization consistent   safety round/check completed   patient off unit   nonskid shoes/slippers when out of bed   clutter free environment maintained   assistive device/personal items within reach   activity supervised   fall prevention program maintained   gait belt  Intervention: Prevent Skin Injury  Description: Perform a screening for skin injury risk, such as pressure or moisture-associated skin damage on admission and at regular intervals throughout hospital stay.Keep all areas of skin (especially folds) clean and dry.Maintain adequate skin hydration.Relieve and redistribute pressure and protect bony prominences and skin at risk for injury; implement measures based on patient-specific risk factors.Match turning and repositioning schedule to clinical  condition.Encourage weight shift frequently; assist with reposition if unable to complete independently.Float heels off bed; avoid pressure on the Achilles tendon.Keep skin free from extended contact with medical devices.Optimize nutrition and hydration.Encourage functional activity and mobility, as early as tolerated.Use aids (e.g., slide boards, mechanical lift) during transfer.  Recent Flowsheet Documentation  Taken 5/11/2025 1840 by Florence Thompson RN  Body Position:   position changed independently   weight shifting  Taken 5/11/2025 1630 by Florence Thompson RN  Body Position: position changed independently  Taken 5/11/2025 1515 by Florence Thompson RN  Body Position:   position changed independently   turned   weight shifting  Taken 5/11/2025 1400 by Florence Thompson RN  Body Position: position changed independently  Taken 5/11/2025 1205 by Florence Thompson RN  Body Position: position changed independently  Taken 5/11/2025 1005 by Florence Thompson RN  Body Position:   position changed independently   weight shifting  Taken 5/11/2025 0855 by Florence Thompson RN  Body Position:   turned   weight shifting  Skin Protection:   incontinence pads utilized   transparent dressing maintained  Intervention: Prevent and Manage VTE (Venous Thromboembolism) Risk  Description: Assess for VTE (venous thromboembolism) risk.Promote early mobilization; encourage both active and passive leg exercises, if unable to ambulate.Initiate and maintain compression or other therapy, as indicated, based on identified risk in accordance with organizational protocol and provider order.Recognize the patient's individual risk for bleeding before initiating pharmacologic thromboprophylaxis.  Recent Flowsheet Documentation  Taken 5/11/2025 0855 by Florence Thompson RN  VTE Prevention/Management: SCDs (sequential compression devices) on  Intervention: Prevent Infection  Description: Maintain skin and mucous membrane integrity; promote hand, oral  and pulmonary hygiene.Optimize fluid balance, nutrition, sleep and glycemic control to maximize infection resistance.Identify potential sources of infection early to prevent or mitigate progression of infection (e.g., wound, lines, devices).Evaluate ongoing need for invasive devices; remove promptly when no longer indicated.Review vaccination status.  Recent Flowsheet Documentation  Taken 5/11/2025 1840 by Florence Thompson RN  Infection Prevention:   cohorting utilized   environmental surveillance performed   single patient room provided  Taken 5/11/2025 1630 by Florence Thompson RN  Infection Prevention:   cohorting utilized   environmental surveillance performed   single patient room provided  Taken 5/11/2025 1515 by Florence Thompson RN  Infection Prevention:   cohorting utilized   environmental surveillance performed   single patient room provided  Taken 5/11/2025 1400 by Florence Thompson RN  Infection Prevention:   cohorting utilized   environmental surveillance performed   single patient room provided  Taken 5/11/2025 1205 by Florence Thompson RN  Infection Prevention:   cohorting utilized   environmental surveillance performed   single patient room provided  Taken 5/11/2025 1005 by Florence Thompson RN  Infection Prevention:   cohorting utilized   environmental surveillance performed   single patient room provided  Taken 5/11/2025 0855 by Florence Thompson RN  Infection Prevention:   cohorting utilized   environmental surveillance performed   single patient room provided  Goal: Optimal Comfort and Wellbeing  Intervention: Monitor Pain and Promote Comfort  Description: Assess pain level, treatment efficacy and patient response at regular intervals using a consistent pain scale.Consider the presence and impact of preexisting chronic pain.Encourage patient and caregiver involvement in pain assessment, interventions and safety measures.Promote activity; balance with sleep and rest to enhance healing.  Recent Flowsheet  Documentation  Taken 5/11/2025 0931 by Florence Thompson, RN  Pain Management Interventions: pain medication given  Taken 5/11/2025 0855 by Florence Thompson RN  Pain Management Interventions: position adjusted  Intervention: Provide Person-Centered Care  Description: Use a family-focused approach to care; encourage support system presence and participation.Develop trust and rapport by proactively providing information, encouraging questions, addressing concerns and offering reassurance.Acknowledge emotional response to hospitalization.Recognize and utilize personal coping strategies and strengths; develop goals via shared decision-making.Honor spiritual and cultural preferences.  Recent Flowsheet Documentation  Taken 5/11/2025 0855 by Florence Thompson, RN  Trust Relationship/Rapport:   care explained   choices provided   thoughts/feelings acknowledged   reassurance provided   questions answered   Goal Outcome Evaluation:

## 2025-05-11 NOTE — PROGRESS NOTES
Name: Mo Willoughby ADMIT: 2025   : 1955  PCP: Jenn Davison APRN    MRN: [id number] LOS: 6 days   AGE/SEX: 69 y.o. male  ROOM: [facility]     Subjective   Subjective   Patient reports mid back pain.  No radiation of the back pain to the lower extremity.  No lower extremity tingling but weakness of the left lower extremity and left upper extremity.  Requiring In-N-Out cath after Phillips catheter removal.  Complaining of pain and swelling at the IV site of the left upper extremity.  Positive unsteadiness of the gait.    Review of Systems  Cardiac vascular/respiratory.  No chest pain/no palpitations/no shortness of breath/no cough     Objective   Objective   Vital Signs  Temp:  [98 °F (36.7 °C)-99.7 °F (37.6 °C)] 98 °F (36.7 °C)  Heart Rate:  [] 91  Resp:  [16-20] 18  BP: (112-122)/(58-69) 113/67  SpO2:  [93 %-97 %] 96 %  on   ;   Device (Oxygen Therapy): room air    Intake/Output Summary (Last 24 hours) at 2025 1532  Last data filed at 2025 1205  Gross per 24 hour   Intake 480 ml   Output 1280 ml   Net -800 ml     Body mass index is 32.6 kg/m².      25  1800   Weight: 108 kg (238 lb 1.6 oz)     Physical Exam  General.  Middle-aged gentleman.  Obese.  Alert and oriented x 4.  In no apparent pain/distress/diaphoresis.  Normal mood and affect.  Eyes.  Pupils equal round and reactive.  Intact extraocular musculature.  No pallor or jaundice.  Oral cavity.  Moist mucous membrane.  Neck.  Supple.  No JVD.  No lymphadenopathy or thyromegaly  Cardio vascular.  Regular rate and rhythm and grade 2 systolic murmur.  Chest.  Clear to auscultation bilaterally with no added sounds.  Abdomen.  Soft lax.  No tenderness.  No organomegaly.  No guarding or rebound.    Extremities.  No clubbing/cyanosis.  Redness and swelling of the left upper extremity  CNS.  Grade 4/5 weakness of the left upper extremity.  Left foot drop.      Results Review:      Results from last 7 days   Lab Units 25  0667  "05/10/25  0532 05/09/25  1029 05/09/25  0546 05/08/25  2113 05/08/25  0649 05/07/25  0516 05/05/25  1540   SODIUM mmol/L 136 135*  --  137 138 139 138 139   POTASSIUM mmol/L 4.3 4.8 4.3 5.9* 4.7 4.1 4.5 4.5   CHLORIDE mmol/L 106 102  --  107 106 106 106 105   CO2 mmol/L 18.6* 21.0*  --  20.6* 19.9* 22.7 24.6 22.4   BUN mg/dL 41* 37*  --  23 19 17 15 15   CREATININE mg/dL 2.32* 2.74*  --  1.35* 1.13 1.13 1.14 1.18   GLUCOSE mg/dL 114* 105*  --  128* 151* 92 92 85   CALCIUM mg/dL 8.8 9.2  --  9.8 9.5 10.2 10.3 10.3   AST (SGOT) U/L 33  --   --   --   --   --   --  61*   ALT (SGPT) U/L 15  --   --   --   --   --   --  31     Estimated Creatinine Clearance: 39 mL/min (A) (by C-G formula based on SCr of 2.32 mg/dL (H)).  Results from last 7 days   Lab Units 05/11/25  0638   HEMOGLOBIN A1C % 4.80         Results from last 7 days   Lab Units 05/05/25  1540   CK TOTAL U/L 110         Results from last 7 days   Lab Units 05/05/25  1540   TSH uIU/mL 0.658     Results from last 7 days   Lab Units 05/11/25  0638 05/09/25  0546 05/08/25 2113 05/05/25  1540   MAGNESIUM mg/dL  --  2.3 1.4* 1.2*   PHOSPHORUS mg/dL 3.5  --   --   --            Invalid input(s): \"LDLCALC\"  Results from last 7 days   Lab Units 05/11/25  0638 05/10/25  0532 05/09/25  0546 05/09/25  0024 05/08/25  0649 05/07/25  0516 05/05/25  1540   WBC 10*3/mm3 5.35 8.24 6.96 6.10 3.70 3.72 4.03   HEMOGLOBIN g/dL 8.4* 9.3* 11.1* 11.6* 11.0* 11.5* 12.6*   HEMATOCRIT % 25.3* 27.4* 32.5* 34.9* 32.1* 33.5* 37.9   PLATELETS 10*3/mm3 65* 96* 113* 91* 83* 79* 97*   MCV fL 101.6* 101.1* 100.9* 104.2* 100.6* 100.3* 102.4*   MCH pg 33.7* 34.3* 34.5* 34.6* 34.5* 34.4* 34.1*   MCHC g/dL 33.2 33.9 34.2 33.2 34.3 34.3 33.2   RDW % 12.8 12.7 12.9 13.1 12.9 12.9 13.1   RDW-SD fl 47.3 46.6 47.2 50.2 46.1 46.8 48.2   MPV fL 9.9 10.7 9.9 9.9 10.6 10.3 10.3   NEUTROPHIL % %  --  59.2 80.1* 84.7*  --   --  59.6   LYMPHOCYTE % %  --  20.4 9.6* 8.7*  --   --  25.8   MONOCYTES % %  --  " 18.1* 9.6 5.9  --   --  10.9   EOSINOPHIL % %  --  1.2 0.0* 0.0*  --   --  3.2   BASOPHIL % %  --  0.7 0.1 0.2  --   --  0.5   IMM GRAN % %  --  0.4 0.6* 0.5  --   --  0.0   NEUTROS ABS 10*3/mm3 3.19 4.88 5.57 5.17  --   --  2.40   LYMPHS ABS 10*3/mm3  --  1.68 0.67* 0.53*  --   --  1.04   MONOS ABS 10*3/mm3  --  1.49* 0.67 0.36  --   --  0.44   EOS ABS 10*3/mm3 0.27 0.10 0.00 0.00  --   --  0.13   BASOS ABS 10*3/mm3 0.05 0.06 0.01 0.01  --   --  0.02   IMMATURE GRANS (ABS) 10*3/mm3  --  0.03 0.04 0.03  --   --  0.00   NRBC /100 WBC 0.0 0.0  --  0.0  --   --  0.0     Results from last 7 days   Lab Units 05/07/25  1141   INR  1.29*         Results from last 7 days   Lab Units 05/05/25  1540   PROCALCITONIN ng/mL 0.16     Results from last 7 days   Lab Units 05/06/25  0643 05/05/25  1540   SED RATE mm/hr 5  --    CRP mg/dL  --  <0.30                 Results from last 7 days   Lab Units 05/07/25  1440   NITRITE UA  Negative     Results from last 7 days   Lab Units 05/11/25  0503 05/10/25  1728   SODIUM UR mmol/L 57  --    CREATININE UR mg/dL 134.9  --    OSMOLALITY UR mOsm/kg 415  --    URIC ACID mg/dL  --  4.4       Imaging:  Imaging Results (Last 24 Hours)       ** No results found for the last 24 hours. **               I reviewed the patient's new clinical results / labs / tests / procedures      Assessment/Plan     Active Hospital Problems    Diagnosis  POA    **Weakness of left lower extremity [R29.898]  Yes    Herniation of thoracolumbar intervertebral disc with myelopathy [M51.05]  Yes    Acute bilateral low back pain with bilateral sciatica [M54.42, M54.41]  Yes    Tongue fasciculation [R25.3]  Yes    Foot drop, left [M21.372]  Yes    Atrophy of muscle of multiple sites [M62.59]  Yes    Imbalance [R26.89]  Yes    DDD (degenerative disc disease), lumbar [M51.369]  Yes    Essential hypertension, benign [I10]  Yes    CRI (chronic renal insufficiency), stage 3 (moderate) [N18.30]  Yes      Resolved Hospital  Problems   No resolved problems to display.           Left-sided weakness and left foot drop secondary to C/T/L-spine degenerative disc disease with severe spinal stenosis.  MRI of the C/T/L-spine noted.  MRI of the brain is negative.  CT scan of the chest without acute disease.  Status post neurology consult.  Status post neurosurgery consult and decompression surgery of thoracic and lumbar spine.  Physical therapy on board.  PCA pump DC'd.    Ambulate with TLSO brace.  Neurosurgery repeating MRI of the lumbar spine to ensure no complications as the patient continues with weakness on the left lower extremity and unsteady gait.  I wonder if C-spine degenerative disc disease is causing his symptomatology of unsteadiness and left-sided weakness  Urinary retention.  Continue urinary retention protocol  Left upper extremity pain and swelling at an IV site.  Suspect superficial venous thrombosis.  Check venous ultrasound of the left upper extremity  Liver cirrhosis and splenomegaly by abdominal CT.  Benign GI examination.  Liver function test is normal.   GI follow-up as an outpatient.  Acute kidney failure/hyperkalemia.  Mostly prerenal azotemia because of hypotension and volume depletion.  Continue holding ACE inhibitor patient appears clinically euvolemic.  Continue  IV fluid.  Urine electrolytes not conclusive.  Uric acid is normal.  Improving renal function.  Follow-up hyperkalemia with holding ACE.  Continue to monitor.    History of paroxysmal SVT/dilated ascending aorta/hypotension In a patient with a history of hypertension improved hypotension with holding ACE.  Currently in normal sinus rhythm.  Continue with Toprol and monitor blood pressure.  No evidence of angina or congestive heart failure  Hyperglycemia.  A1c at 4.8  Postoperative anemia in a patient with a history of macrocytic anemia/thrombocytopenia.  Baseline hemoglobin between 12 and 13 anemia workup noted that suggestive of iron deficiency.  Will  continue p.o. iron.  Positive hemoglobin drop.  Positive platelets drop.  No active bleed.  Hyperlipidemia.  Continue Crestor  DVT prophylaxis.  Sequential compression devices      Discussed my findings and plan of treatment with the patient/wife/nurse.  Disposition.  To be determined based on clinical course.  Home with home health versus SNF.        Sahra Luis MD  [facility]  05/11/25  15:32 EDT

## 2025-05-11 NOTE — PROGRESS NOTES
[facility] THORACIC/LUMBAR NEUROSURGERY POSTOP NOTE      CC: POD 3, sp open T12-L1 laminectomy with bilateral L1 pediculectomies for bilateral discectomy, decompression of spinal cord, posterior interbody fusion with expandable Catalyft cages at T12-L1, pedicle screw fixation T12 and L2 bilateral with Compass-EOSor robotic navigation, bilateral terry placement from T12 to L2 with crosslink fusion from L1 to L2       Subjective     Interval History: No significant events overnight. Patient has required catheterization       Objective     Vital signs in last 24 hours:  Temp:  [98 °F (36.7 °C)-99.7 °F (37.6 °C)] 98 °F (36.7 °C)  Heart Rate:  [] 91  Resp:  [16-20] 18  BP: ()/(42-69) 113/67    Intake/Output this shift:  No intake/output data recorded.    TANIA 70 cc/12 hours     LABS:  Results from last 7 days   Lab Units 05/11/25  0638 05/10/25  0532 05/09/25  0546   WBC 10*3/mm3 5.35 8.24 6.96   HEMOGLOBIN g/dL 8.4* 9.3* 11.1*   HEMATOCRIT % 25.3* 27.4* 32.5*   PLATELETS 10*3/mm3 65* 96* 113*      Results from last 7 days   Lab Units 05/11/25  0638 05/10/25  0532 05/09/25  1029 05/09/25  0546 05/07/25  0516 05/05/25  1540   SODIUM mmol/L 136 135*  --  137   < > 139   POTASSIUM mmol/L 4.3 4.8 4.3 5.9*   < > 4.5   CHLORIDE mmol/L 106 102  --  107   < > 105   CO2 mmol/L 18.6* 21.0*  --  20.6*   < > 22.4   BUN mg/dL 41* 37*  --  23   < > 15   CREATININE mg/dL 2.32* 2.74*  --  1.35*   < > 1.18   CALCIUM mg/dL 8.8 9.2  --  9.8   < > 10.3   BILIRUBIN mg/dL 0.5  --   --   --   --  0.7   ALK PHOS U/L 67  --   --   --   --  81   ALT (SGPT) U/L 15  --   --   --   --  31   AST (SGOT) U/L 33  --   --   --   --  61*   GLUCOSE mg/dL 114* 105*  --  128*   < > 85    < > = values in this interval not displayed.      Results from last 7 days   Lab Units 05/09/25  0546 05/08/25  2113 05/05/25  1540   MAGNESIUM mg/dL 2.3 1.4* 1.2*      5/10/25 HGBA1C 4.8  5/10/25 Phosphorus 3.5  5/10/25 Ferritin 239  5/10/25 Iron 17, Iron Saturation 7,  Transferrin 175, TIBC 261    IMAGING STUDIES:  No new imaging     I personally viewed and interpreted the patient's chart.    Meds reviewed/changed: Yes    Zyloprim 300mg daily   Ferrous sulfate 325mg daily  Folic acid 1000 mcg daily  Gabapentin 300mg every 12 hours  Lisinopril 5mg daily-on hold  Toprol XL 25mg daily  Protonix 40mg BID  Crestor 10mg daily  Pericolace BID  Vitamin B1 100mg daily  Vitamin B12 1000 mcg daily  Oxycodone 10 mg every 4 hours prn-2 doses/12 hours     Physical Exam:      General:                                 Awake, alert.  Back:                                      ALONDRA dressing in place, intact; Drain with serosanguinous drainage   Abdomen:                              NT/ND, -BM   Motor:                                    RLE 5/5; Right plantarflexion/dorsiflexion 5/5; Left IP 4/5, Left quad and hamstring 4/5; Left Plantarflexion 4/5, left dorsiflexion 3/5 (history of foot drop)  Sensation:                              Normal to light touch aman LEs  Station and Gait:                   Per PT note, today patient demo generalized weakness, impaired balance, decreased activity tolerance and general functional mobility deficits below his baseline. Reviewed spinal precautions and log roll technique for bed mobility-patient verbalized understanding. He completed bed mobility with mod A, STS with min to mod Ax2 and was able to take a few steps min Ax2 using RW. Pt demo L>R weakness and relies heavily on use of UE in standing.   Extremities:                           Wearing SCD    Assessment & Plan     ASSESSMENT:      Weakness of left lower extremity    CRI (chronic renal insufficiency), stage 3 (moderate)    Essential hypertension, benign    DDD (degenerative disc disease), lumbar    Imbalance    Foot drop, left    Atrophy of muscle of multiple sites    Tongue fasciculation    Acute bilateral low back pain with bilateral sciatica    Herniation of thoracolumbar intervertebral disc with  myelopathy    The patient is POD 3, sp open T12-L1 laminectomy with bilateral L1 pediculectomies for bilateral discectomy, decompression of spinal cord, posterior interbody fusion with expandable Catalyft cages at T12-L1, pedicle screw fixation T12 and L2 bilateral with [employer] robotic navigation, bilateral terry placement from T12 to L2 with crosslink fusion from L1 to L2.     He has required straight catheterization since the F/C was discontinued yesterday. He says that he cannot tell that he has to void and feels that he can't go without catheter. He also has difficulty standing straight. He is unable to stand and pivot. However, BLE strength assessment has not changed. HGB continued to trend down and is 8.4 today. Discussed concerns with Dr. Pedraza. Will order Lumbar MRI and Xray to ensure alignment of hardware.     PLAN:   Lumbar MRI and Xray  Keep ALONDRA dressing (7 days post-op)  Replace F/C  Keep Drain  LSO whenever up greater than 30 degrees   Bowel regimen   CBC in am   Rehab evaluation pending       I discussed the patient's findings and my recommendations with patient and nursing staff and Dr. Pedraza.          LOS: 6 days       Lyric Rhodes, APRN  5/11/2025  10:02 EDT

## 2025-05-12 ENCOUNTER — APPOINTMENT (OUTPATIENT)
Dept: MRI IMAGING | Facility: HOSPITAL | Age: 70
End: 2025-05-12
Payer: MEDICARE

## 2025-05-12 ENCOUNTER — APPOINTMENT (OUTPATIENT)
Dept: GENERAL RADIOLOGY | Facility: HOSPITAL | Age: 70
End: 2025-05-12
Payer: MEDICARE

## 2025-05-12 LAB
ALBUMIN SERPL-MCNC: 2.8 G/DL (ref 3.5–5.2)
ANION GAP SERPL CALCULATED.3IONS-SCNC: 7.9 MMOL/L (ref 5–15)
BASOPHILS # BLD AUTO: 0.03 10*3/MM3 (ref 0–0.2)
BASOPHILS NFR BLD AUTO: 0.7 % (ref 0–1.5)
BUN SERPL-MCNC: 40 MG/DL (ref 8–23)
BUN/CREAT SERPL: 24.4 (ref 7–25)
CALCIUM SPEC-SCNC: 9.2 MG/DL (ref 8.6–10.5)
CHLORIDE SERPL-SCNC: 107 MMOL/L (ref 98–107)
CO2 SERPL-SCNC: 20.1 MMOL/L (ref 22–29)
CREAT SERPL-MCNC: 1.64 MG/DL (ref 0.76–1.27)
CRYOGLOB SER QL 1D COLD INC: NORMAL
DEPRECATED RDW RBC AUTO: 45.5 FL (ref 37–54)
EGFRCR SERPLBLD CKD-EPI 2021: 45.6 ML/MIN/1.73
EOSINOPHIL # BLD AUTO: 0.15 10*3/MM3 (ref 0–0.4)
EOSINOPHIL NFR BLD AUTO: 3.3 % (ref 0.3–6.2)
ERYTHROCYTE [DISTWIDTH] IN BLOOD BY AUTOMATED COUNT: 12.6 % (ref 12.3–15.4)
GLUCOSE SERPL-MCNC: 107 MG/DL (ref 65–99)
HCT VFR BLD AUTO: 23 % (ref 37.5–51)
HGB BLD-MCNC: 8.2 G/DL (ref 13–17.7)
IMM GRANULOCYTES # BLD AUTO: 0.02 10*3/MM3 (ref 0–0.05)
IMM GRANULOCYTES NFR BLD AUTO: 0.4 % (ref 0–0.5)
LYMPHOCYTES # BLD AUTO: 1.24 10*3/MM3 (ref 0.7–3.1)
LYMPHOCYTES NFR BLD AUTO: 27.1 % (ref 19.6–45.3)
MCH RBC QN AUTO: 35.8 PG (ref 26.6–33)
MCHC RBC AUTO-ENTMCNC: 35.7 G/DL (ref 31.5–35.7)
MCV RBC AUTO: 100.4 FL (ref 79–97)
MONOCYTES # BLD AUTO: 0.92 10*3/MM3 (ref 0.1–0.9)
MONOCYTES NFR BLD AUTO: 20.1 % (ref 5–12)
NEUTROPHILS NFR BLD AUTO: 2.21 10*3/MM3 (ref 1.7–7)
NEUTROPHILS NFR BLD AUTO: 48.4 % (ref 42.7–76)
PHOSPHATE SERPL-MCNC: 2.6 MG/DL (ref 2.5–4.5)
PLATELET # BLD AUTO: 64 10*3/MM3 (ref 140–450)
PLATELET # BLD AUTO: 94 10*3/MM3 (ref 140–450)
PLATELETS.RETICULATED NFR BLD AUTO: 4.7 % (ref 0.9–6.5)
PMV BLD AUTO: 10.2 FL (ref 6–12)
POTASSIUM SERPL-SCNC: 4.5 MMOL/L (ref 3.5–5.2)
PYRIDOXAL PHOS SERPL-MCNC: 2.2 UG/L (ref 3.4–65.2)
QT INTERVAL: 446 MS
QTC INTERVAL: 469 MS
RBC # BLD AUTO: 2.29 10*6/MM3 (ref 4.14–5.8)
SODIUM SERPL-SCNC: 135 MMOL/L (ref 136–145)
WBC NRBC COR # BLD AUTO: 4.57 10*3/MM3 (ref 3.4–10.8)

## 2025-05-12 PROCEDURE — 99024 POSTOP FOLLOW-UP VISIT: CPT | Performed by: NURSE PRACTITIONER

## 2025-05-12 PROCEDURE — 85025 COMPLETE CBC W/AUTO DIFF WBC: CPT | Performed by: INTERNAL MEDICINE

## 2025-05-12 PROCEDURE — P9100 PATHOGEN TEST FOR PLATELETS: HCPCS

## 2025-05-12 PROCEDURE — 72148 MRI LUMBAR SPINE W/O DYE: CPT

## 2025-05-12 PROCEDURE — P9035 PLATELET PHERES LEUKOREDUCED: HCPCS

## 2025-05-12 PROCEDURE — 36430 TRANSFUSION BLD/BLD COMPNT: CPT

## 2025-05-12 PROCEDURE — 80069 RENAL FUNCTION PANEL: CPT | Performed by: INTERNAL MEDICINE

## 2025-05-12 PROCEDURE — 74018 RADEX ABDOMEN 1 VIEW: CPT

## 2025-05-12 PROCEDURE — 25810000003 SODIUM CHLORIDE 0.9 % SOLUTION: Performed by: INTERNAL MEDICINE

## 2025-05-12 PROCEDURE — 85055 RETICULATED PLATELET ASSAY: CPT | Performed by: INTERNAL MEDICINE

## 2025-05-12 RX ORDER — TAMSULOSIN HYDROCHLORIDE 0.4 MG/1
0.4 CAPSULE ORAL DAILY
Status: DISCONTINUED | OUTPATIENT
Start: 2025-05-12 | End: 2025-05-20 | Stop reason: HOSPADM

## 2025-05-12 RX ORDER — HYDROCODONE BITARTRATE AND ACETAMINOPHEN 7.5; 325 MG/1; MG/1
1 TABLET ORAL EVERY 4 HOURS PRN
Refills: 0 | Status: DISPENSED | OUTPATIENT
Start: 2025-05-12 | End: 2025-05-17

## 2025-05-12 RX ORDER — SODIUM CHLORIDE 9 MG/ML
100 INJECTION, SOLUTION INTRAVENOUS CONTINUOUS
Status: DISCONTINUED | OUTPATIENT
Start: 2025-05-12 | End: 2025-05-13

## 2025-05-12 RX ADMIN — GABAPENTIN 300 MG: 300 CAPSULE ORAL at 20:32

## 2025-05-12 RX ADMIN — METHOCARBAMOL TABLETS 750 MG: 750 TABLET, COATED ORAL at 20:43

## 2025-05-12 RX ADMIN — SODIUM CHLORIDE 125 ML/HR: 9 INJECTION, SOLUTION INTRAVENOUS at 00:48

## 2025-05-12 RX ADMIN — FOLIC ACID 1000 MCG: 1 TABLET ORAL at 08:32

## 2025-05-12 RX ADMIN — METOPROLOL SUCCINATE 25 MG: 25 TABLET, EXTENDED RELEASE ORAL at 08:32

## 2025-05-12 RX ADMIN — SENNOSIDES AND DOCUSATE SODIUM 2 TABLET: 50; 8.6 TABLET ORAL at 20:32

## 2025-05-12 RX ADMIN — GABAPENTIN 300 MG: 300 CAPSULE ORAL at 08:31

## 2025-05-12 RX ADMIN — ALLOPURINOL 300 MG: 300 TABLET ORAL at 08:31

## 2025-05-12 RX ADMIN — FERROUS SULFATE TAB 325 MG (65 MG ELEMENTAL FE) 325 MG: 325 (65 FE) TAB at 08:31

## 2025-05-12 RX ADMIN — POLYETHYLENE GLYCOL 3350 17 G: 17 POWDER, FOR SOLUTION ORAL at 08:32

## 2025-05-12 RX ADMIN — Medication 100 MG: at 08:31

## 2025-05-12 RX ADMIN — PANTOPRAZOLE SODIUM 40 MG: 40 TABLET, DELAYED RELEASE ORAL at 20:32

## 2025-05-12 RX ADMIN — BISACODYL 10 MG: 10 SUPPOSITORY RECTAL at 02:10

## 2025-05-12 RX ADMIN — ROSUVASTATIN CALCIUM 10 MG: 20 TABLET, FILM COATED ORAL at 20:32

## 2025-05-12 RX ADMIN — SODIUM CHLORIDE 125 ML/HR: 9 INJECTION, SOLUTION INTRAVENOUS at 08:38

## 2025-05-12 RX ADMIN — PANTOPRAZOLE SODIUM 40 MG: 40 TABLET, DELAYED RELEASE ORAL at 08:31

## 2025-05-12 RX ADMIN — HYDROCODONE BITARTRATE AND ACETAMINOPHEN 1 TABLET: 7.5; 325 TABLET ORAL at 20:43

## 2025-05-12 RX ADMIN — OXYCODONE HYDROCHLORIDE 10 MG: 5 TABLET ORAL at 02:06

## 2025-05-12 RX ADMIN — Medication 10 ML: at 08:32

## 2025-05-12 RX ADMIN — SENNOSIDES AND DOCUSATE SODIUM 2 TABLET: 50; 8.6 TABLET ORAL at 08:31

## 2025-05-12 RX ADMIN — TAMSULOSIN HYDROCHLORIDE 0.4 MG: 0.4 CAPSULE ORAL at 18:40

## 2025-05-12 RX ADMIN — OXYCODONE HYDROCHLORIDE 10 MG: 5 TABLET ORAL at 08:38

## 2025-05-12 RX ADMIN — Medication 1000 MCG: at 08:31

## 2025-05-12 RX ADMIN — HYDROCODONE BITARTRATE AND ACETAMINOPHEN 1 TABLET: 7.5; 325 TABLET ORAL at 15:07

## 2025-05-12 NOTE — SIGNIFICANT NOTE
05/12/25 1327   OTHER   Discipline physical therapist   Rehab Time/Intention   Session Not Performed patient unavailable for treatment  (off the floor for stat MRI. Will follow up as time allows.)   Recommendation   PT - Next Appointment 05/13/25

## 2025-05-12 NOTE — CASE MANAGEMENT/SOCIAL WORK
Continued Stay Note  [facility]     Patient Name: Mo Willoughby  MRN: [id number]  Today's Date: 5/12/2025    Admit Date: 5/5/2025    Plan: Referral placed to [facility]. Will need pre-cert. Await call back regarding acceptance/bed availability.   Discharge Plan       Row Name 05/12/25 1732       Plan    Plan Referral placed to [facility]. Will need pre-cert. Await call back regarding acceptance/bed availability.    Patient/Family in Agreement with Plan yes    Plan Comments Discussed acute rehab options with patient at bedside. States he's interested in [facility]. Will need pre-cert. Await call back regarding acceptance/bed availability. Continue to follow....[facility]                   Discharge Codes    No documentation.                 Expected Discharge Date and Time       Expected Discharge Date Expected Discharge Time    May 15, 2025               Kendy Henley RN

## 2025-05-12 NOTE — PROGRESS NOTES
Name: Mo Willoughby ADMIT: 2025   : 1955  PCP: Jenn Davison APRN    MRN: [id number] LOS: 7 days   AGE/SEX: 69 y.o. male  [facility]     Subjective   Subjective   Patient reports mild mid back pain.  No radiation of the back pain to the lower extremity.  Reports improvement weakness of the of the left lower extremity and left upper extremity.  Could not pass urine spontaneously for the last 2 days after catheter removal and Phillips catheter was reinserted..  Resolved pain and improved redness and swelling at the IV site of the left upper extremity.     Review of Systems  Cardiac vascular/respiratory.  No chest pain/no palpitations/no shortness of breath/no cough     Objective   Objective   Vital Signs  Temp:  [98.1 °F (36.7 °C)-99.3 °F (37.4 °C)] 98.4 °F (36.9 °C)  Heart Rate:  [] 76  Resp:  [16-20] 20  BP: (105-129)/(61-76) 118/66  SpO2:  [94 %-98 %] 95 %  on   ;   Device (Oxygen Therapy): room air    Intake/Output Summary (Last 24 hours) at 2025 1554  Last data filed at 2025 1231  Gross per 24 hour   Intake 2200 ml   Output 2420 ml   Net -220 ml     Body mass index is 32.6 kg/m².      25  1800   Weight: 108 kg (238 lb 1.6 oz)     Physical Exam  General.  Middle-aged gentleman.  Obese.  Alert and oriented x 4.  In no apparent pain/distress/diaphoresis.  Normal mood and affect.  Eyes.  Pupils equal round and reactive.  Intact extraocular musculature.  No pallor or jaundice.  Oral cavity.  Moist mucous membrane.  Neck.  Supple.  No JVD.  No lymphadenopathy or thyromegaly  Cardio vascular.  Regular rate and rhythm and grade 2 systolic murmur.  Chest.  Clear to auscultation bilaterally with no added sounds.  Abdomen.  Soft lax.  No tenderness.  No organomegaly.  No guarding or rebound.    .  No CVA tenderness with clear urine in the Phillips bag.  Extremities.  No clubbing/cyanosis.  No redness and swelling of the left upper extremity  CNS.  Improved left dorsi flexion on the  "left lower extremity around grade 4/5 now.  Resolved left upper extremity weakness.      Results Review:      Results from last 7 days   Lab Units 05/12/25  0701 05/11/25  0638 05/10/25  0532 05/09/25  1029 05/09/25  0546 05/08/25  2113 05/08/25  0649 05/07/25  0516   SODIUM mmol/L 135* 136 135*  --  137 138 139 138   POTASSIUM mmol/L 4.5 4.3 4.8 4.3 5.9* 4.7 4.1 4.5   CHLORIDE mmol/L 107 106 102  --  107 106 106 106   CO2 mmol/L 20.1* 18.6* 21.0*  --  20.6* 19.9* 22.7 24.6   BUN mg/dL 40* 41* 37*  --  23 19 17 15   CREATININE mg/dL 1.64* 2.32* 2.74*  --  1.35* 1.13 1.13 1.14   GLUCOSE mg/dL 107* 114* 105*  --  128* 151* 92 92   CALCIUM mg/dL 9.2 8.8 9.2  --  9.8 9.5 10.2 10.3   AST (SGOT) U/L  --  33  --   --   --   --   --   --    ALT (SGPT) U/L  --  15  --   --   --   --   --   --      Estimated Creatinine Clearance: 55.2 mL/min (A) (by C-G formula based on SCr of 1.64 mg/dL (H)).  Results from last 7 days   Lab Units 05/11/25 0638   HEMOGLOBIN A1C % 4.80                         Results from last 7 days   Lab Units 05/12/25  0701 05/11/25  0638 05/09/25  0546 05/08/25  2113   MAGNESIUM mg/dL  --   --  2.3 1.4*   PHOSPHORUS mg/dL 2.6   < >  --   --     < > = values in this interval not displayed.           Invalid input(s): \"LDLCALC\"  Results from last 7 days   Lab Units 05/12/25  0701 05/11/25  0638 05/10/25  0532 05/09/25  0546 05/09/25  0024 05/08/25  0649 05/08/25  0649 05/07/25  0516   WBC 10*3/mm3 4.57 5.35 8.24 6.96 6.10  --  3.70 3.72   HEMOGLOBIN g/dL 8.2* 8.4* 9.3* 11.1* 11.6*  --  11.0* 11.5*   HEMATOCRIT % 23.0* 25.3* 27.4* 32.5* 34.9*  --  32.1* 33.5*   PLATELETS 10*3/mm3 64* 65* 96* 113* 91*  --  83* 79*   MCV fL 100.4* 101.6* 101.1* 100.9* 104.2*  --  100.6* 100.3*   MCH pg 35.8* 33.7* 34.3* 34.5* 34.6*  --  34.5* 34.4*   MCHC g/dL 35.7 33.2 33.9 34.2 33.2  --  34.3 34.3   RDW % 12.6 12.8 12.7 12.9 13.1  --  12.9 12.9   RDW-SD fl 45.5 47.3 46.6 47.2 50.2  --  46.1 46.8   MPV fL 10.2 9.9 10.7 9.9 " 9.9  --  10.6 10.3   NEUTROPHIL % % 48.4  --  59.2 80.1* 84.7*   < >  --   --    LYMPHOCYTE % % 27.1  --  20.4 9.6* 8.7*   < >  --   --    MONOCYTES % % 20.1*  --  18.1* 9.6 5.9   < >  --   --    EOSINOPHIL % % 3.3  --  1.2 0.0* 0.0*   < >  --   --    BASOPHIL % % 0.7  --  0.7 0.1 0.2   < >  --   --    IMM GRAN % % 0.4  --  0.4 0.6* 0.5   < >  --   --    NEUTROS ABS 10*3/mm3 2.21 3.19 4.88 5.57 5.17   < >  --   --    LYMPHS ABS 10*3/mm3 1.24  --  1.68 0.67* 0.53*   < >  --   --    MONOS ABS 10*3/mm3 0.92*  --  1.49* 0.67 0.36   < >  --   --    EOS ABS 10*3/mm3 0.15 0.27 0.10 0.00 0.00   < >  --   --    BASOS ABS 10*3/mm3 0.03 0.05 0.06 0.01 0.01   < >  --   --    IMMATURE GRANS (ABS) 10*3/mm3 0.02  --  0.03 0.04 0.03   < >  --   --    NRBC /100 WBC  --  0.0 0.0  --  0.0   < >  --   --     < > = values in this interval not displayed.     Results from last 7 days   Lab Units 05/07/25  1141   INR  1.29*               Results from last 7 days   Lab Units 05/06/25  0643   SED RATE mm/hr 5                 Results from last 7 days   Lab Units 05/07/25  1440   NITRITE UA  Negative     Results from last 7 days   Lab Units 05/11/25  0503 05/10/25  1728   SODIUM UR mmol/L 57  --    CREATININE UR mg/dL 134.9  --    OSMOLALITY UR mOsm/kg 415  --    URIC ACID mg/dL  --  4.4       Imaging:  Imaging Results (Last 24 Hours)       Procedure Component Value Units Date/Time    MRI Lumbar Spine Without Contrast [[id number]] Resulted: 05/12/25 1550     Updated: 05/12/25 1439    XR Abdomen KUB [[id number]] Collected: 05/12/25 0829     Updated: 05/12/25 0835    Narrative:      XR ABDOMEN KUB-     Clinical: Abdominal distention     FINDINGS: There is normal bowel gas pattern in typical stool burden.  There is a normal amount of gas demonstrated within the stomach. No  gross free intraperitoneal air. No soft tissue calcification, soft  tissue planes preserved.     CONCLUSION: Normal KUB.     This report was finalized on 5/12/2025 8:32 AM by  Dr. 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           Left-sided weakness and left foot drop secondary to C/T/L-spine degenerative disc disease with severe spinal stenosis.  MRI of the C/T/L-spine noted.  MRI of the brain is negative.  CT scan of the chest without acute disease.  Status post neurology consult.  Status post neurosurgery consult and decompression surgery of thoracic and lumbar spine.  Physical therapy on board.  PCA pump DC'd.    Ambulate with TLSO brace.  Neurosurgery repeating MRI of the lumbar spine to ensure no complications as the patient continues with weakness on the left lower extremity and unsteady gait.  I wonder if C-spine degenerative disc disease is causing his symptomatology of unsteadiness and left-sided weakness  Urinary retention.  Failing voiding trial and Phillips catheter was reinserted.  Will initiate Flomax.  Left upper extremity SVT..  Will avoid aspirin or Lovenox secondary to thrombocytopenia   liver cirrhosis and splenomegaly by abdominal CT.  Benign GI examination.  Liver function test is normal.   GI follow-up as an outpatient.  Acute kidney failure/hyperkalemia.  Mostly prerenal azotemia because of hypotension and  volume depletion.  Continue holding ACE inhibitor.  Patient appears clinically euvolemic.  Continue  IV fluid (decreased rate)..  Urine electrolytes not conclusive.  Uric acid is normal.  Improving renal function.  Resolved hyperkalemia with holding ACE.  Continue to monitor.    History of paroxysmal SVT/dilated ascending aorta/hypotension In a patient with a history of hypertension improved hypotension with holding ACE.  Currently in normal sinus rhythm.  Continue with Toprol and monitor blood pressure.  No evidence of angina or congestive heart failure  Hyperglycemia.  A1c at 4.8  Postoperative anemia in a patient with a history of macrocytic anemia/thrombocytopenia.  Baseline hemoglobin between 12 and 13.  Anemia workup noted that suggestive of iron deficiency.  Will continue p.o. iron.  Positive hemoglobin drop but now stabilizing..  Will transfuse packed RBC if Hb less than 8.  Baseline platelets between 100-140s.  Acute platelets drop is secondary to consumption after surgery.  Will give a single unit of platelets as neurosurgery is reluctant to remove the spinal Hemovac at this level of platelets.  Baseline thrombocytopenia is most likely secondary to hypersplenism..  No active bleed.  Will check IPF and consult hematology.  Hyperlipidemia.  Continue Crestor  DVT prophylaxis.  Sequential compression devices      Discussed my findings and plan of treatment with the patient/wife/nurse.  Disposition.  To be determined based on clinical course.  Will mostly need acute rehab or SNF      Sahra Luis MD  [facility]  05/12/25  15:54 EDT

## 2025-05-12 NOTE — PLAN OF CARE
A&O X 4, X2max assist, mucoid BM, no real stool, KUB completed this am negative. Platelet infusion completed, FU with Neuro on when to remove the ALONDRA drain and dressing with accordion suction bulb, they wanted to wait until after Plt infusion and recheck.   Plan for discharge will be SNF [facility] or may need [facility].   Bed alarm set, call bell in reach    Problem: Adult Inpatient Plan of Care  Goal: Plan of Care Review  Outcome: Progressing  Goal: Absence of Hospital-Acquired Illness or Injury  Intervention: Identify and Manage Fall Risk  Description: Perform standard risk assessment on admission using a validated tool or comprehensive approach appropriate to the patient; reassess fall risk frequently, with change in status or transfer to another level of care.Communicate risk to interprofessional healthcare team; ensure fall risk visible cue.Determine need for increased observation, equipment and environmental modification, as well as use of supportive, nonskid footwear.Adjust safety measures to individual needs and identified risk factors.Reinforce the importance of active participation with fall risk prevention, safety, and physical activity with the patient and family.Perform regular intentional rounding to assess need for position change, pain assessment and personal needs, including assistance with toileting.  Recent Flowsheet Documentation  Taken 5/12/2025 1835 by Florence Thompson, RN  Safety Promotion/Fall Prevention:   activity supervised   assistive device/personal items within reach   clutter free environment maintained   fall prevention program maintained   gait belt   nonskid shoes/slippers when out of bed   safety round/check completed  Taken 5/12/2025 1636 by Florence Thompson, RN  Safety Promotion/Fall Prevention:   activity supervised   assistive device/personal items within reach   clutter free environment maintained   fall prevention program maintained   gait belt   nonskid shoes/slippers when out  of bed   safety round/check completed  Taken 5/12/2025 1450 by Florence Thompson, RN  Safety Promotion/Fall Prevention:   activity supervised   assistive device/personal items within reach   clutter free environment maintained   fall prevention program maintained   gait belt   nonskid shoes/slippers when out of bed   safety round/check completed  Taken 5/12/2025 1231 by Florence Thompson, RN  Safety Promotion/Fall Prevention:   assistive device/personal items within reach   clutter free environment maintained   activity supervised   fall prevention program maintained   gait belt   nonskid shoes/slippers when out of bed   safety round/check completed  Taken 5/12/2025 1040 by Florence Thompson, RN  Safety Promotion/Fall Prevention:   activity supervised   assistive device/personal items within reach   clutter free environment maintained   fall prevention program maintained   gait belt   safety round/check completed   nonskid shoes/slippers when out of bed  Taken 5/12/2025 0838 by Florence Thompson, RN  Safety Promotion/Fall Prevention:   assistive device/personal items within reach   activity supervised   clutter free environment maintained   fall prevention program maintained   gait belt   nonskid shoes/slippers when out of bed   safety round/check completed  Intervention: Prevent Skin Injury  Description: Perform a screening for skin injury risk, such as pressure or moisture-associated skin damage on admission and at regular intervals throughout hospital stay.Keep all areas of skin (especially folds) clean and dry.Maintain adequate skin hydration.Relieve and redistribute pressure and protect bony prominences and skin at risk for injury; implement measures based on patient-specific risk factors.Match turning and repositioning schedule to clinical condition.Encourage weight shift frequently; assist with reposition if unable to complete independently.Float heels off bed; avoid pressure on the Achilles tendon.Keep skin free  from extended contact with medical devices.Optimize nutrition and hydration.Encourage functional activity and mobility, as early as tolerated.Use aids (e.g., slide boards, mechanical lift) during transfer.  Recent Flowsheet Documentation  Taken 5/12/2025 1835 by Florence Thompson RN  Body Position: position changed independently  Taken 5/12/2025 1636 by Florence Thompson RN  Body Position:   position changed independently   weight shifting  Taken 5/12/2025 1450 by Florence Thompson RN  Body Position:   position changed independently   weight shifting  Taken 5/12/2025 1231 by Florence Thompson RN  Body Position:   position changed independently   weight shifting  Taken 5/12/2025 1040 by Florence Thompson RN  Body Position:   turned   weight shifting  Taken 5/12/2025 0838 by Florence Thompson RN  Body Position:   position changed independently   weight shifting  Skin Protection:   transparent dressing maintained   incontinence pads utilized  Intervention: Prevent and Manage VTE (Venous Thromboembolism) Risk  Description: Assess for VTE (venous thromboembolism) risk.Promote early mobilization; encourage both active and passive leg exercises, if unable to ambulate.Initiate and maintain compression or other therapy, as indicated, based on identified risk in accordance with organizational protocol and provider order.Recognize the patient's individual risk for bleeding before initiating pharmacologic thromboprophylaxis.  Recent Flowsheet Documentation  Taken 5/12/2025 0838 by Florence Thompson RN  VTE Prevention/Management: SCDs (sequential compression devices) on  Intervention: Prevent Infection  Description: Maintain skin and mucous membrane integrity; promote hand, oral and pulmonary hygiene.Optimize fluid balance, nutrition, sleep and glycemic control to maximize infection resistance.Identify potential sources of infection early to prevent or mitigate progression of infection (e.g., wound, lines, devices).Evaluate ongoing  need for invasive devices; remove promptly when no longer indicated.Review vaccination status.  Recent Flowsheet Documentation  Taken 5/12/2025 1835 by Florence Thompson RN  Infection Prevention:   cohorting utilized   environmental surveillance performed   single patient room provided  Taken 5/12/2025 1636 by Florence Thompson RN  Infection Prevention:   cohorting utilized   environmental surveillance performed   equipment surfaces disinfected   single patient room provided  Taken 5/12/2025 1450 by Florence Thompson RN  Infection Prevention:   cohorting utilized   environmental surveillance performed   single patient room provided  Taken 5/12/2025 1231 by Florence Thompson RN  Infection Prevention:   cohorting utilized   environmental surveillance performed   single patient room provided  Taken 5/12/2025 1040 by Florence Thompson RN  Infection Prevention:   cohorting utilized   environmental surveillance performed   single patient room provided  Taken 5/12/2025 0838 by Florence Thompson RN  Infection Prevention:   cohorting utilized   environmental surveillance performed   single patient room provided  Goal: Optimal Comfort and Wellbeing  Intervention: Monitor Pain and Promote Comfort  Description: Assess pain level, treatment efficacy and patient response at regular intervals using a consistent pain scale.Consider the presence and impact of preexisting chronic pain.Encourage patient and caregiver involvement in pain assessment, interventions and safety measures.Promote activity; balance with sleep and rest to enhance healing.  Recent Flowsheet Documentation  Taken 5/12/2025 1507 by Florence Thompson RN  Pain Management Interventions: pain medication given  Taken 5/12/2025 0838 by Florence Thompson RN  Pain Management Interventions: pain medication given  Intervention: Provide Person-Centered Care  Description: Use a family-focused approach to care; encourage support system presence and participation.Develop trust and  rapport by proactively providing information, encouraging questions, addressing concerns and offering reassurance.Acknowledge emotional response to hospitalization.Recognize and utilize personal coping strategies and strengths; develop goals via shared decision-making.Honor spiritual and cultural preferences.  Recent Flowsheet Documentation  Taken 5/12/2025 0816 by Florence Thompson, RN  Trust Relationship/Rapport:   care explained   choices provided   thoughts/feelings acknowledged   reassurance provided   Goal Outcome Evaluation:

## 2025-05-12 NOTE — PROGRESS NOTES
[facility] NEUROSURGERY CERVICAL PROGRESS NOTE      CC: POD 4 open T12/L1 discectomy/decompression, PLIF with posterior Mazor instrumented fusion T12-L2    Subjective     Interval History: Required Phillips catheter replacement due to retention and reduced voiding sensation.  MRI lumbar pending.  Denies neck, arm, or leg pain.  Had KUB and lumbar x-rays.  Passed mucus but not stool from rectum yesterday per nursing.  Nursing states he stood and pivoted to bedside commode 3 times yesterday.    ROS:  Constitutional: No fever, chills  Musculoskeletal: no neck pain, no arm pain, no leg pain  Neuro: Bilateral lower extremity weakness, left upper extremity weakness, balance difficulties  : Urinary retention    Objective     Vital signs in last 24 hours:  Temp:  [98.1 °F (36.7 °C)-99.3 °F (37.4 °C)] 98.4 °F (36.9 °C)  Heart Rate:  [] 76  Resp:  [16-20] 20  BP: (105-129)/(61-76) 118/66    Intake/Output this shift:  I/O this shift:  In: 1480 [P.O.:480; I.V.:1000]  Out: -     TANIA- 270 cc x 24 hrs, 40 cc last 12 hours    LABS:  Results from last 7 days   Lab Units 05/12/25  0701 05/11/25  0638 05/10/25  0532   WBC 10*3/mm3 4.57 5.35 8.24   HEMOGLOBIN g/dL 8.2* 8.4* 9.3*   HEMATOCRIT % 23.0* 25.3* 27.4*   PLATELETS 10*3/mm3 64* 65* 96*     Results from last 7 days   Lab Units 05/12/25  0701 05/11/25  0638 05/10/25  0532 05/07/25  0516 05/05/25  1540   SODIUM mmol/L 135* 136 135*   < > 139   POTASSIUM mmol/L 4.5 4.3 4.8   < > 4.5   CHLORIDE mmol/L 107 106 102   < > 105   CO2 mmol/L 20.1* 18.6* 21.0*   < > 22.4   BUN mg/dL 40* 41* 37*   < > 15   CREATININE mg/dL 1.64* 2.32* 2.74*   < > 1.18   CALCIUM mg/dL 9.2 8.8 9.2   < > 10.3   BILIRUBIN mg/dL  --  0.5  --   --  0.7   ALK PHOS U/L  --  67  --   --  81   ALT (SGPT) U/L  --  15  --   --  31   AST (SGOT) U/L  --  33  --   --  61*   GLUCOSE mg/dL 107* 114* 105*   < > 85    < > = values in this interval not displayed.       IMAGING STUDIES:  Doppler UE:    Interpretation  Summary         Acute left upper extremity superficial thrombophlebitis noted in the cephalic (upper arm) and cephalic (forearm).    All other left sided vessels appear normal.    Lumbar x-rays reveal postoperative changes T12-L2 with instrumentation in good position no complicating features. There is stable anterolisthesis L4 on 5 as compared to preoperative x-ray    KUB: Normal KUB per report    I personally viewed and interpreted the patient's lumbar x-ray    Meds reviewed/changed: Yes  Gabapentin 300 mg p.o. twice daily  Robaxin-750 milligram p.o. 4 times daily as needed-none taken  Oxy IR 10 mg p.o. every 4 as needed-4 doses    Physical Exam:    General:   Awake, alert.  Resting in bed.  Pleasant and cooperative.  Back:    Midline lower thoracic upper lumbar Joseph dressing in place and working properly.  Hemovac drain to compression with serosanguineous output.  LSO brace at bedside  Motor:    Atrophy/muscle wasting left hand and forearm with weak left interosseous, triceps.  Left TA 4-/5, minor tremor right index finger  Reflexes:   3+ knee jerk bilaterally, no [place], no clonus  Sensation:   Intact to temperature sensation and light touch  Station and Gait:             Per PT note 5/10-Required assist of 2 to come to stand; decreased control of bilateral knees hips with increased weightbearing through upper extremities. Patient able to take a few steps forward and back, decreased control of step length and narrow base of support. Instructed and encourage patient to perform ankle pumps, quad sets and glutes sets when able for lower extremity strengthening. Recommend ongoing therapy with IRF at discharge.   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  His Phillips catheter was replaced over the weekend after being unable to void and he states he was able to feel the catheter placement and denies saddle paresthesia.  He is uncertain whether his strength has changed any since his presentation but he is still having trouble walking.  Nursing states he has been up to the bedside commode with them.  He had a mucoid bowel movement but no real stool yesterday.  KUB today was unremarkable.  Lumbar x-rays show stable hardware placement.  MRI lumbar spine ordered yesterday is still pending.  Due to low platelet count and low hemoglobin, agree with following up on MRI to ensure he has not developed some type of hematoma although I think his strength is generally the same as it was when I saw him preoperatively.  He does feel sensation over his genitalia and feels the catheter tug.  I suspect that his retention is sequela from the thoracic myelopathy and not some type of progressive issue with his cervical stenosis as he has no neck or arm pain.  His lumbar stenosis also is likely stable as he has not developed any lumbar radiculopathy or change in strength per my assessment.  I believe he will likely benefit from acute rehab, likely [facility] due to spinal cord injury program.  Still awaiting physical medicine and rehab eval.  Will stop the immediate release narcotic and begin Norco.  Encourage ice and muscle relaxant.  Hold on removing Hemovac for now due to low platelet count.    PLAN:   Await lumbar MRI-and to stat  Keep charmaine x 7 days (5/15)  Keep Hemovac, clamp and remove accordion for MRI  DC Oxy IR, begin [place]  Encourage ice and muscle relaxant.  Continue therapies and out of bed activity with brace  will need rehab at discharge-await  "PMR eval, likely benefit from [facility] [place] spinal cord injury program  SCDs for DVT prophylaxis  Hold on Lovenox/heparin d/t thrombocytopenia  Agree with neurology plan for outpatient EMG study after recovered from spine surgery    I discussed the patient's findings and my recommendations with patient, nursing staff, and Dr. Perez    During patient visit, I utilized appropriate personal protective equipment including gloves. Appropriate PPE was worn during the entire visit.  Hand hygiene was completed before and after.      LOS: 7 days       Rohini Meza, APRN  5/12/2025  12:24 EDT    \"Dictated utilizing Dragon dictation\".      "

## 2025-05-13 LAB
ALBUMIN SERPL-MCNC: 2.8 G/DL (ref 3.5–5.2)
ANION GAP SERPL CALCULATED.3IONS-SCNC: 8.6 MMOL/L (ref 5–15)
APTT PPP: 33.6 SECONDS (ref 22.7–35.4)
BASOPHILS # BLD AUTO: 0.02 10*3/MM3 (ref 0–0.2)
BASOPHILS NFR BLD AUTO: 0.7 % (ref 0–1.5)
BH BB BLOOD EXPIRATION DATE: NORMAL
BH BB BLOOD TYPE BARCODE: 6200
BH BB DISPENSE STATUS: NORMAL
BH BB PRODUCT CODE: NORMAL
BH BB UNIT NUMBER: NORMAL
BUN SERPL-MCNC: 32 MG/DL (ref 8–23)
BUN/CREAT SERPL: 25.8 (ref 7–25)
CALCIUM SPEC-SCNC: 9.3 MG/DL (ref 8.6–10.5)
CHLORIDE SERPL-SCNC: 107 MMOL/L (ref 98–107)
CO2 SERPL-SCNC: 19.4 MMOL/L (ref 22–29)
CREAT SERPL-MCNC: 1.24 MG/DL (ref 0.76–1.27)
DEPRECATED RDW RBC AUTO: 46.3 FL (ref 37–54)
EGFRCR SERPLBLD CKD-EPI 2021: 63.7 ML/MIN/1.73
EOSINOPHIL # BLD AUTO: 0.12 10*3/MM3 (ref 0–0.4)
EOSINOPHIL NFR BLD AUTO: 4 % (ref 0.3–6.2)
ERYTHROCYTE [DISTWIDTH] IN BLOOD BY AUTOMATED COUNT: 12.7 % (ref 12.3–15.4)
FIBRINOGEN PPP-MCNC: 494 MG/DL (ref 219–464)
GLUCOSE SERPL-MCNC: 100 MG/DL (ref 65–99)
HCT VFR BLD AUTO: 23.8 % (ref 37.5–51)
HGB BLD-MCNC: 8 G/DL (ref 13–17.7)
HGB RETIC QN AUTO: 32.3 PG (ref 29.8–36.1)
IMM GRANULOCYTES # BLD AUTO: 0.01 10*3/MM3 (ref 0–0.05)
IMM GRANULOCYTES NFR BLD AUTO: 0.3 % (ref 0–0.5)
IMM RETICS NFR: 12.2 % (ref 3–15.8)
INR PPP: 1.15 (ref 0.9–1.1)
LYMPHOCYTES # BLD AUTO: 0.93 10*3/MM3 (ref 0.7–3.1)
LYMPHOCYTES NFR BLD AUTO: 31 % (ref 19.6–45.3)
MCH RBC QN AUTO: 34.2 PG (ref 26.6–33)
MCHC RBC AUTO-ENTMCNC: 33.6 G/DL (ref 31.5–35.7)
MCV RBC AUTO: 101.7 FL (ref 79–97)
MONOCYTES # BLD AUTO: 0.55 10*3/MM3 (ref 0.1–0.9)
MONOCYTES NFR BLD AUTO: 18.3 % (ref 5–12)
NEUTROPHILS NFR BLD AUTO: 1.37 10*3/MM3 (ref 1.7–7)
NEUTROPHILS NFR BLD AUTO: 45.7 % (ref 42.7–76)
NRBC BLD AUTO-RTO: 0 /100 WBC (ref 0–0.2)
PHOSPHATE SERPL-MCNC: 2.2 MG/DL (ref 2.5–4.5)
PHOSPHATE SERPL-MCNC: 2.3 MG/DL (ref 2.5–4.5)
PLATELET # BLD AUTO: 79 10*3/MM3 (ref 140–450)
PMV BLD AUTO: 10.7 FL (ref 6–12)
POTASSIUM SERPL-SCNC: 4.5 MMOL/L (ref 3.5–5.2)
PROTHROMBIN TIME: 14.6 SECONDS (ref 11.7–14.2)
RBC # BLD AUTO: 2.34 10*6/MM3 (ref 4.14–5.8)
RETICS # AUTO: 0.04 10*6/MM3 (ref 0.02–0.13)
RETICS/RBC NFR AUTO: 1.41 % (ref 0.7–1.9)
SODIUM SERPL-SCNC: 135 MMOL/L (ref 136–145)
UNIT  ABO: NORMAL
UNIT  RH: NORMAL
WBC NRBC COR # BLD AUTO: 3 10*3/MM3 (ref 3.4–10.8)

## 2025-05-13 PROCEDURE — 85610 PROTHROMBIN TIME: CPT | Performed by: INTERNAL MEDICINE

## 2025-05-13 PROCEDURE — 85025 COMPLETE CBC W/AUTO DIFF WBC: CPT | Performed by: INTERNAL MEDICINE

## 2025-05-13 PROCEDURE — 99222 1ST HOSP IP/OBS MODERATE 55: CPT | Performed by: INTERNAL MEDICINE

## 2025-05-13 PROCEDURE — 85046 RETICYTE/HGB CONCENTRATE: CPT | Performed by: INTERNAL MEDICINE

## 2025-05-13 PROCEDURE — 63710000001 DIPHENHYDRAMINE PER 50 MG: Performed by: INTERNAL MEDICINE

## 2025-05-13 PROCEDURE — 80069 RENAL FUNCTION PANEL: CPT | Performed by: INTERNAL MEDICINE

## 2025-05-13 PROCEDURE — 85730 THROMBOPLASTIN TIME PARTIAL: CPT | Performed by: INTERNAL MEDICINE

## 2025-05-13 PROCEDURE — 25810000003 SODIUM CHLORIDE 0.9 % SOLUTION: Performed by: INTERNAL MEDICINE

## 2025-05-13 PROCEDURE — 25010000002 NA FERRIC GLUC CPLX PER 12.5 MG: Performed by: INTERNAL MEDICINE

## 2025-05-13 PROCEDURE — 97530 THERAPEUTIC ACTIVITIES: CPT

## 2025-05-13 PROCEDURE — 99024 POSTOP FOLLOW-UP VISIT: CPT | Performed by: NURSE PRACTITIONER

## 2025-05-13 PROCEDURE — 97530 THERAPEUTIC ACTIVITIES: CPT | Performed by: PHYSICAL THERAPIST

## 2025-05-13 PROCEDURE — 84100 ASSAY OF PHOSPHORUS: CPT | Performed by: INTERNAL MEDICINE

## 2025-05-13 PROCEDURE — 97116 GAIT TRAINING THERAPY: CPT | Performed by: PHYSICAL THERAPIST

## 2025-05-13 PROCEDURE — 85384 FIBRINOGEN ACTIVITY: CPT | Performed by: INTERNAL MEDICINE

## 2025-05-13 PROCEDURE — 97535 SELF CARE MNGMENT TRAINING: CPT

## 2025-05-13 RX ORDER — ACETAMINOPHEN 325 MG/1
325 TABLET ORAL DAILY
Status: COMPLETED | OUTPATIENT
Start: 2025-05-13 | End: 2025-05-14

## 2025-05-13 RX ORDER — DIPHENHYDRAMINE HCL 25 MG
25 CAPSULE ORAL DAILY
Status: COMPLETED | OUTPATIENT
Start: 2025-05-13 | End: 2025-05-14

## 2025-05-13 RX ADMIN — ROSUVASTATIN CALCIUM 10 MG: 20 TABLET, FILM COATED ORAL at 20:24

## 2025-05-13 RX ADMIN — METOPROLOL SUCCINATE 25 MG: 25 TABLET, EXTENDED RELEASE ORAL at 08:58

## 2025-05-13 RX ADMIN — DIPHENHYDRAMINE HYDROCHLORIDE 25 MG: 25 CAPSULE ORAL at 13:46

## 2025-05-13 RX ADMIN — PANTOPRAZOLE SODIUM 40 MG: 40 TABLET, DELAYED RELEASE ORAL at 08:57

## 2025-05-13 RX ADMIN — SODIUM PHOSPHATE, MONOBASIC, MONOHYDRATE AND SODIUM PHOSPHATE, DIBASIC, ANHYDROUS 15 MMOL: 142; 276 INJECTION, SOLUTION INTRAVENOUS at 12:34

## 2025-05-13 RX ADMIN — HYDROCODONE BITARTRATE AND ACETAMINOPHEN 1 TABLET: 7.5; 325 TABLET ORAL at 15:55

## 2025-05-13 RX ADMIN — SODIUM CHLORIDE 250 MG: 9 INJECTION, SOLUTION INTRAVENOUS at 17:03

## 2025-05-13 RX ADMIN — ACETAMINOPHEN 325 MG: 325 TABLET ORAL at 13:46

## 2025-05-13 RX ADMIN — Medication 100 MG: at 08:57

## 2025-05-13 RX ADMIN — PANTOPRAZOLE SODIUM 40 MG: 40 TABLET, DELAYED RELEASE ORAL at 20:25

## 2025-05-13 RX ADMIN — FOLIC ACID 1000 MCG: 1 TABLET ORAL at 08:58

## 2025-05-13 RX ADMIN — POLYETHYLENE GLYCOL 3350 17 G: 17 POWDER, FOR SOLUTION ORAL at 09:01

## 2025-05-13 RX ADMIN — HYDROCODONE BITARTRATE AND ACETAMINOPHEN 1 TABLET: 7.5; 325 TABLET ORAL at 20:25

## 2025-05-13 RX ADMIN — TAMSULOSIN HYDROCHLORIDE 0.4 MG: 0.4 CAPSULE ORAL at 08:58

## 2025-05-13 RX ADMIN — SODIUM CHLORIDE 100 ML/HR: 9 INJECTION, SOLUTION INTRAVENOUS at 06:22

## 2025-05-13 RX ADMIN — GABAPENTIN 300 MG: 300 CAPSULE ORAL at 20:24

## 2025-05-13 RX ADMIN — Medication 1000 MCG: at 08:57

## 2025-05-13 RX ADMIN — FERROUS SULFATE TAB 325 MG (65 MG ELEMENTAL FE) 325 MG: 325 (65 FE) TAB at 08:57

## 2025-05-13 RX ADMIN — Medication 10 ML: at 09:02

## 2025-05-13 RX ADMIN — SENNOSIDES AND DOCUSATE SODIUM 2 TABLET: 50; 8.6 TABLET ORAL at 08:59

## 2025-05-13 RX ADMIN — GABAPENTIN 300 MG: 300 CAPSULE ORAL at 08:57

## 2025-05-13 RX ADMIN — ALLOPURINOL 300 MG: 300 TABLET ORAL at 08:57

## 2025-05-13 NOTE — PLAN OF CARE
Goal Outcome Evaluation:  Plan of Care Reviewed With: patient, sibling        Progress: improving  Outcome Evaluation: Pt w/ significant improvement in performance and function today but still requires cues for spinal precautions, hands on assist for safety w/ mobility, and decreased ADL (I). Pt  completed short distances fxnl mobility w/ FWW and Min to mod AX1 +1 for line mgmt x3 reps today. Cont to rec IRF at DC to maximize ADL (I) and for home safety teaching.    Anticipated Discharge Disposition (OT): inpatient rehabilitation facility

## 2025-05-13 NOTE — PROGRESS NOTES
LOS: 8 days   Patient Care Team:  Jenn Davison APRN as PCP - General (Nurse Practitioner)    Chief Complaint:  POD 5 open T12/L1 discectomy/decompression, PLIF with posterior Mazor instrementation T12/L2 for large central disc herniation contributing to compression of conus, severe cord compression and myelopathy.      Subjective     Interval History: CCP following for acute rehab at [facility]. Still having difficulty with weight bearing due to left leg weakness and left foot drop. Phillips reinserted over weekend due to inability to void    History taken from: patient chart    Objective      Vital Signs  Temp:  [97.7 °F (36.5 °C)-98.4 °F (36.9 °C)] 98.1 °F (36.7 °C)  Heart Rate:  [77-85] 85  Resp:  [18-20] 20  BP: ()/(61-86) 150/80    Physical Exam:     AA&O x 3.   ALONDRA dressing intact; device functioning.   DC'd hemovac slowly; tolerated well. No leakage or bleeding at site. Insertion site closed easily with retained drained stitch provided at time of surgery.    SCD's in place. No calf swelling or tenderness on bilateral  palpation.      Results Review:     I reviewed the patient's new clinical results.  I reviewed the patient's new imaging results and agree with the interpretation.  Discussed with Dr. Perez and patient.     MRI LUMBAR SPINE WO CONTRAST 05/12/2025     Abnormal signal intensity posterior to the T12-L1 level. This mimics fluid signal on the T1 and T2 weighted images and narrows the spinal canal. The thecal sac is somewhat  obscured.     Results from last 7 days   Lab Units 05/13/25  0550 05/12/25 2129 05/12/25  0701 05/11/25  0638   WBC 10*3/mm3 3.00*  --  4.57 5.35   HEMOGLOBIN g/dL 8.0*  --  8.2* 8.4*   HEMATOCRIT % 23.8*  --  23.0* 25.3*   PLATELETS 10*3/mm3 79* 94* 64* 65*     Results from last 7 days   Lab Units 05/13/25  0550 05/12/25  0701 05/11/25  0638   SODIUM mmol/L 135* 135* 136   POTASSIUM mmol/L 4.5 4.5 4.3   CHLORIDE mmol/L 107 107 106   CO2 mmol/L 19.4*  20.1* 18.6*   BUN mg/dL 32* 40* 41*   CREATININE mg/dL 1.24 1.64* 2.32*   GLUCOSE mg/dL 100* 107* 114*   CALCIUM mg/dL 9.3 9.2 8.8       Assessment & Plan       Weakness of left lower extremity    CRI (chronic renal insufficiency), stage 3 (moderate)    Essential hypertension, benign    DDD (degenerative disc disease), lumbar    Imbalance    Foot drop, left    Atrophy of muscle of multiple sites    Tongue fasciculation    Acute bilateral low back pain with bilateral sciatica    Herniation of thoracolumbar intervertebral disc with myelopathy    POD 5 T12/L1 decompression, T12-L2 fusion for compression of conus and thoracic myelopathy. Concern for worsening post op weakness over the weekend with history of chronic thrombocytopenia. Stat MRI lumbar spine yesterday reviewed by and discussed with Dr. Perez. MRI revealed usual post op changes. No findings consistent with hematoma.     Thoracic myelopathy, left leg weakness and L foot drop.   Continue PT/OT. Acute rehab will be needed at discharge. Patient desires [facility] rehab.     Urinary retention pre-op conus medularis syndrome. Phillips in place. Bowels moved yesterday per patient.     Post op blood loss anemia; on oral iron.   Chronic thrombocyopenia; back to baseline; likely related to chronic alcohol use per internal medicine. Hematology evaluation today.     Gloria Workman, APRN  05/13/25  15:47 EDT

## 2025-05-13 NOTE — PROGRESS NOTES
Name: Mo Willoughby ADMIT: 2025   : 1955  PCP: Jenn Davison APRN    MRN: [id number] LOS: 8 days   AGE/SEX: 69 y.o. male  ROOM: [facility]     Subjective   Subjective   Back pain is controlled.  No radiation of the back pain to the lower extremity.  Reports no change in weakness of the of the left lower extremity and left upper extremity (improved however since admission)..  Could not pass urine spontaneously for the last 2 days after catheter removal and Phillips catheter was reinserted yesterday.  Clear urine in the Phillips bag...  Resolved pain and improved redness and swelling at the IV site of the left upper extremity.     Review of Systems  Cardiac vascular/respiratory.  No chest pain/no palpitations/no shortness of breath/no cough     Objective   Objective   Vital Signs  Temp:  [97.7 °F (36.5 °C)-98.4 °F (36.9 °C)] 97.7 °F (36.5 °C)  Heart Rate:  [76-82] 82  Resp:  [18-20] 20  BP: ()/(61-86) 131/86  SpO2:  [95 %-100 %] 98 %  on   ;   Device (Oxygen Therapy): room air    Intake/Output Summary (Last 24 hours) at 2025 1118  Last data filed at 2025 0730  Gross per 24 hour   Intake 474 ml   Output 2490 ml   Net -2016 ml     Body mass index is 32.6 kg/m².      25  1800   Weight: 108 kg (238 lb 1.6 oz)     Physical Exam  General.  Middle-aged gentleman.  Obese.  Alert and oriented x 4.  In no apparent pain/distress/diaphoresis.  Normal mood and affect.  Eyes.  Pupils equal round and reactive.  Intact extraocular musculature.  No pallor or jaundice.  Oral cavity.  Moist mucous membrane.  Neck.  Supple.  No JVD.  No lymphadenopathy or thyromegaly  Cardio vascular.  Regular rate and rhythm and grade 2 systolic murmur.  Chest.  Clear to auscultation bilaterally with no added sounds.  Abdomen.  Soft lax.  No tenderness.  No organomegaly.  No guarding or rebound.    .  No CVA tenderness with clear urine in the Phillips bag.  Extremities.  No clubbing/cyanosis.  No redness and swelling of  "the left upper extremity  Spine.  Hemovac in place with bloody fluid  CNS.  Improved left dorsi flexion on the left lower extremity around grade 4/5 now.  Grade 4/5 weakness of the left upper extremity    Results Review:      Results from last 7 days   Lab Units 05/13/25  0550 05/12/25  0701 05/11/25  0638 05/10/25  0532 05/09/25  1029 05/09/25  0546 05/08/25 2113 05/08/25 0649   SODIUM mmol/L 135* 135* 136 135*  --  137 138 139   POTASSIUM mmol/L 4.5 4.5 4.3 4.8 4.3 5.9* 4.7 4.1   CHLORIDE mmol/L 107 107 106 102  --  107 106 106   CO2 mmol/L 19.4* 20.1* 18.6* 21.0*  --  20.6* 19.9* 22.7   BUN mg/dL 32* 40* 41* 37*  --  23 19 17   CREATININE mg/dL 1.24 1.64* 2.32* 2.74*  --  1.35* 1.13 1.13   GLUCOSE mg/dL 100* 107* 114* 105*  --  128* 151* 92   CALCIUM mg/dL 9.3 9.2 8.8 9.2  --  9.8 9.5 10.2   AST (SGOT) U/L  --   --  33  --   --   --   --   --    ALT (SGPT) U/L  --   --  15  --   --   --   --   --      Estimated Creatinine Clearance: 73 mL/min (by C-G formula based on SCr of 1.24 mg/dL).  Results from last 7 days   Lab Units 05/11/25 0638   HEMOGLOBIN A1C % 4.80                         Results from last 7 days   Lab Units 05/13/25  0550 05/11/25  0638 05/09/25  0546 05/08/25 2113   MAGNESIUM mg/dL  --   --  2.3 1.4*   PHOSPHORUS mg/dL 2.2*   < >  --   --     < > = values in this interval not displayed.           Invalid input(s): \"LDLCALC\"  Results from last 7 days   Lab Units 05/13/25  0550 05/12/25 2129 05/12/25  0701 05/11/25  0638 05/10/25  0532 05/09/25  0546 05/09/25  0024 05/08/25  0649 05/08/25  0649   WBC 10*3/mm3 3.00*  --  4.57 5.35 8.24 6.96 6.10  --  3.70   HEMOGLOBIN g/dL 8.0*  --  8.2* 8.4* 9.3* 11.1* 11.6*  --  11.0*   HEMATOCRIT % 23.8*  --  23.0* 25.3* 27.4* 32.5* 34.9*  --  32.1*   PLATELETS 10*3/mm3 79* 94* 64* 65* 96* 113* 91*  --  83*   MCV fL 101.7*  --  100.4* 101.6* 101.1* 100.9* 104.2*  --  100.6*   MCH pg 34.2*  --  35.8* 33.7* 34.3* 34.5* 34.6*  --  34.5*   MCHC g/dL 33.6  --  35.7 " 33.2 33.9 34.2 33.2  --  34.3   RDW % 12.7  --  12.6 12.8 12.7 12.9 13.1  --  12.9   RDW-SD fl 46.3  --  45.5 47.3 46.6 47.2 50.2  --  46.1   MPV fL 10.7  --  10.2 9.9 10.7 9.9 9.9  --  10.6   NEUTROPHIL % % 45.7  --  48.4  --  59.2 80.1* 84.7*   < >  --    LYMPHOCYTE % % 31.0  --  27.1  --  20.4 9.6* 8.7*   < >  --    MONOCYTES % % 18.3*  --  20.1*  --  18.1* 9.6 5.9   < >  --    EOSINOPHIL % % 4.0  --  3.3  --  1.2 0.0* 0.0*   < >  --    BASOPHIL % % 0.7  --  0.7  --  0.7 0.1 0.2   < >  --    IMM GRAN % % 0.3  --  0.4  --  0.4 0.6* 0.5   < >  --    NEUTROS ABS 10*3/mm3 1.37*  --  2.21 3.19 4.88 5.57 5.17   < >  --    LYMPHS ABS 10*3/mm3 0.93  --  1.24  --  1.68 0.67* 0.53*   < >  --    MONOS ABS 10*3/mm3 0.55  --  0.92*  --  1.49* 0.67 0.36   < >  --    EOS ABS 10*3/mm3 0.12  --  0.15 0.27 0.10 0.00 0.00   < >  --    BASOS ABS 10*3/mm3 0.02  --  0.03 0.05 0.06 0.01 0.01   < >  --    IMMATURE GRANS (ABS) 10*3/mm3 0.01  --  0.02  --  0.03 0.04 0.03   < >  --    NRBC /100 WBC 0.0  --   --  0.0 0.0  --  0.0   < >  --     < > = values in this interval not displayed.     Results from last 7 days   Lab Units 05/07/25  1141   INR  1.29*                                 Results from last 7 days   Lab Units 05/07/25  1440   NITRITE UA  Negative     Results from last 7 days   Lab Units 05/11/25  0503 05/10/25  1728   SODIUM UR mmol/L 57  --    CREATININE UR mg/dL 134.9  --    OSMOLALITY UR mOsm/kg 415  --    URIC ACID mg/dL  --  4.4       Imaging:  Imaging Results (Last 24 Hours)       Procedure Component Value Units Date/Time    MRI Lumbar Spine Without Contrast [[id number]] Collected: 05/12/25 1620     Updated: 05/12/25 1620    Narrative:      MRI LUMBAR SPINE WO CONTRAST-05/12/2025     HISTORY: Recent T12-L2 fusion. Some lower extremity weakness with  difficulty voiding.     TECHNIQUE: Multiple noncontrast sagittal and axial images were obtained  through the lumbar spine.     FINDINGS: There are bilateral pedicle screws  fusing T12 and L1 with  support rods. These produce susceptibility artifact. Intervertebral disc  spacer is seen at the T12-L1 level.. The spinal cord appears to  terminate at the approximate T12-L1 interspace. This is better seen on  the preoperative study of 05/06/2025. The T12-L1 intervertebral disc  herniation seen at T12-L1 has been surgically repaired. On the current  study there is an ill-defined rind of abnormal signal intensity  posterior to the T12-L1 level. This mimics fluid signal on the T1 and T2  weighted images and narrows the spinal canal. The thecal sac is somewhat  obscured by this abnormal signal intensity such as on axial series 13  images 18 through 22. No abnormal fluid collections are otherwise seen.  Mild disc bulges are seen at L1-2, L2-3 and L3-4. There is  anterolisthesis of L4 on L5 with some uncovering of the intervertebral  disc and broad-based disc bulge.     The signal intensity of the bony structures appears relatively normal.  There is multilevel facet joint disease including an element of canal  stenosis at the L2-3, L3-4 and L4-5 levels.       Impression:      1. Postoperative changes from T12-L2 fusion with intervertebral disc  spacer at T12-L1.  2. On the current study there is moderate amount of abnormal soft  tissue/edema posteriorly at the level of the T12 interspace extending  into the spinal canal with canal narrowing. This demonstrates signal  intensity similar to fluid but is somewhat heterogeneous. This narrows  the spinal canal at this level. This is seen at the level of the conus.  3. Additional lower lumbar degenerative disease is seen similar to the  05/06/2025 study.  4. FINDINGS were discussed with the referring practitioner consider  short-term follow-up MRI of the lumbar spine for further evaluation if  clinically indicated.                     I reviewed the patient's new clinical results / labs / tests / procedures      Assessment/Plan     Active Hospital  Problems    Diagnosis  POA    **Weakness of left lower extremity [R29.898]  Yes    Herniation of thoracolumbar intervertebral disc with myelopathy [M51.05]  Yes    Acute bilateral low back pain with bilateral sciatica [M54.42, M54.41]  Yes    Tongue fasciculation [R25.3]  Yes    Foot drop, left [M21.372]  Yes    Atrophy of muscle of multiple sites [M62.59]  Yes    Imbalance [R26.89]  Yes    DDD (degenerative disc disease), lumbar [M51.369]  Yes    Essential hypertension, benign [I10]  Yes    CRI (chronic renal insufficiency), stage 3 (moderate) [N18.30]  Yes      Resolved Hospital Problems   No resolved problems to display.           Left-sided weakness and left foot drop secondary to C/T/L-spine degenerative disc disease with severe spinal stenosis.  MRI of the C/T/L-spine noted.  MRI of the brain is negative.  CT scan of the chest without acute disease.  Status post neurology consult.  Status post neurosurgery consult and decompression surgery of thoracic and lumbar spine.  Physical therapy on board.  PCA pump DC'd.    Ambulate with TLSO brace.  Repeat MRI shows soft tissue/edema at the level of T12.  Neurosurgery is following.  I wonder if C-spine degenerative disc disease is causing his symptomatology of unsteadiness and left-sided weakness  Urinary retention.  Failing voiding trial and Phillips catheter was reinserted.  Flomax initiated.  Left upper extremity SVT..  Will avoid aspirin or Lovenox secondary to thrombocytopenia   liver cirrhosis and splenomegaly by abdominal CT.  Benign GI examination.  Liver function test is normal.   GI follow-up as an outpatient.  Acute kidney failure/hyperkalemia.  Mostly prerenal azotemia because of hypotension and volume depletion.  Continue holding ACE inhibitor.  Patient appears clinically euvolemic.  Resolved acute kidney failure on IV fluid.  Will stop IV fluids.  Urine electrolytes nonconclusive.  Uric acid is normal.  Resolved hyperkalemia with holding ACE   History of  paroxysmal SVT/dilated ascending aorta/hypotension In a patient with a history of hypertension improved hypotension with holding ACE.  Currently in normal sinus rhythm.  Continue with Toprol and monitor blood pressure.  No evidence of angina or congestive heart failure  Hyperglycemia.  A1c at 4.8  Postoperative anemia in a patient with a history of macrocytic anemia/thrombocytopenia.  Baseline hemoglobin between 12 and 13.  Anemia workup noted that suggestive of iron deficiency.  Will continue p.o. iron.  Positive hemoglobin drop but now stabilizing..  Will transfuse packed RBC if Hb less than 8.  Baseline platelets between 100-140s.  Acute platelets drop is secondary to consumption after surgery.  Will give a single unit of platelets as neurosurgery is reluctant to remove the spinal Hemovac at this level of platelets.  Baseline thrombocytopenia is most likely secondary to hypersplenism..  No active bleed.  Normal IPF awaiting hematology oncology consult   hyperlipidemia.  Continue Crestor  DVT prophylaxis.  Sequential compression devices      Discussed my findings and plan of treatment with the patient/wife/nurse.  Disposition.  To be determined based on clinical course.  Will mostly need acute rehab or SNF      Sahra Luis MD  [facility]  05/13/25  11:18 EDT

## 2025-05-13 NOTE — PLAN OF CARE
Goal Outcome Evaluation:              Outcome Evaluation: Patient was in bed and agreeable to PT/OT cotreat.  He reports he has been up to the bedside commode with nursing staff earlier today.  Patient was educated on logrolling and completed bed mobility with contact-guard assist.  Back brace was donned sitting edge of bed therapist position brace but patient was able to close the brace and tighten the straps with verbal cues.  Pt stood multiple times with min to mod assist and RWX.  He ambulated several short bouts in the room with min to mod assist and rolling walker.  Patient remains slightly unsteady when turning and requires assist to turn walker at times.  Patient is progressing well.  Continue to recommend inpatient rehab to improve mobility and independence prior to returning home.    Anticipated Discharge Disposition (PT): inpatient rehabilitation facility

## 2025-05-13 NOTE — THERAPY TREATMENT NOTE
Patient Name: Mo Willoughby  : 1955    MRN: [id number]                              Today's Date: 2025       Admit Date: 2025    Visit Dx:     ICD-10-CM ICD-9-CM   1. Weakness of left lower extremity  R29.898 729.89   2. Herniation of thoracolumbar intervertebral disc with myelopathy  M51.05 722.72   3. Tongue fasciculation  R25.3 781.0   4. Atrophy of muscle of multiple sites  M62.59 728.2     Patient Active Problem List   Diagnosis    Erectile dysfunction    Gout    Hypercalcemia    Colon polyp    Sinus tachycardia    Pancreatitis    Other fatigue    Ascending aortic aneurysm    Generalized abdominal pain    Folic acid deficiency    Iron deficiency anemia    Acute bilateral low back pain without sciatica    Swelling of the testicles    Urinary frequency    Hyperparathyroidism, unspecified    CRI (chronic renal insufficiency), stage 3 (moderate)    Essential hypertension, benign    Spinal stenosis of lumbar region    Lumbar radiculitis    Lumbar facet arthropathy    Screening PSA (prostate specific antigen)    Mixed hyperlipidemia    Change in pigmented skin lesion of face    Pain in both testicles    Sacroiliitis    DDD (degenerative disc disease), lumbar    Spondylolisthesis at L4-L5 level    Herniation of lumbar intervertebral disc with radiculopathy    Spinal stenosis of lumbar region with radiculopathy    H/O: gout on allopurinol    Premature atrial contractions    Arthritis of right hip    Right hip pain    Imbalance    Foot drop, left    Atrophy of muscle of multiple sites    Tongue fasciculation    Acute bilateral low back pain with bilateral sciatica    Weakness of left lower extremity    Herniation of thoracolumbar intervertebral disc with myelopathy     Past Medical History:   Diagnosis Date    Abnormal TSH     Arthritis     Ascending aortic aneurysm     Colon polyp 24    CRI (chronic renal insufficiency), stage 3 (moderate) 2016    from stage 3A to 4    DDD (degenerative disc  disease), lumbar     ED (erectile dysfunction)     Erectile dysfunction     Essential hypertension, benign     Folic acid deficiency     Gout     Hip pain, right     Hx of colonic polyp     Hypercalcemia 2015    as far back as data goes so likely pre-dates even this time    Hyperparathyroidism, unspecified 2016    as far back as data goes likely pre-dates even this date, likely multifactorial some primary and some secondary , w/ imaging from 10/16 showing possible L inferior adenoma    Iron deficiency anemia     Low back pain with bilateral sciatica     Mixed hyperlipidemia 02/27/2023    New onset atrial fibrillation 04/15/2024    Pancreatitis     Risk factors for obstructive sleep apnea     Spinal stenosis of lumbar region with neurogenic claudication     Syncope and collapse 03/28/2017     Past Surgical History:   Procedure Laterality Date    CHOLECYSTECTOMY      CHOLECYSTECTOMY WITH INTRAOPERATIVE CHOLANGIOGRAM N/A 08/03/2018    Procedure: CHOLECYSTECTOMY LAPAROSCOPIC INTRAOPERATIVE CHOLANGIOGRAM;  Surgeon: Melina Kate MD;  Location: [facility] MAIN OR;  Service: General    COLONOSCOPY      COLONOSCOPY N/A 10/03/2016    Procedure: COLONOSCOPY TO CECUM TO TERMINAL ILIUM WITH COLD BIOPSY POLYPECTOMY;  Surgeon: Benoit Vilchis MD;  Location: [facility] ENDOSCOPY;  Service:     COLONOSCOPY N/A 05/05/2022    Procedure: COLONOSCOPY TO CECUM WITH COLD SNARE POLYPECTOMIES & RESOLUTION CLIP PLACEMENT X 2;  Surgeon: Benoit Mosley MD;  Location: [facility] ENDOSCOPY;  Service: Gastroenterology;  Laterality: N/A;  ANEMIA/POLYPS, HEMORRHOIDS     ENDOSCOPY N/A 05/05/2022    Procedure: ESOPHAGOGASTRODUODENOSCOPY WITH BX;  Surgeon: Benoit Mosley MD;  Location: [facility] ENDOSCOPY;  Service: Gastroenterology;  Laterality: N/A;  ANEMIA/PORTAL GASTROPATHY, EROSIVE GASTRITIS     EPIDURAL Right 12/07/2022    Procedure: LUMBAR/SACRAL TRANSFORAMINAL EPIDURAL Right L2-3 and right L4-5;  Surgeon: Isaura Torres MD;   Location[facility] MA[facility];  Service: Pain Management;  Laterality: Right;    LIPOMA EXCISION      LUMBAR DISCECTOMY FUSION INSTRUMENTATION N/A 5/8/2025    Procedure: Open thoracic twelve/lumbar one discectomy/decompression/possible interbody fusion with cages, pedicle screw/terry/crosslink fixation, thoracic twelve/lumbar one/two with Mazor robotic navigation;  Surgeon: Abel Perez MD;  Location: [facility] MAIN OR;  Service: Robotics - Neuro;  Laterality: N/A;    LUMBAR LAMINECTOMY DISCECTOMY DECOMPRESSION N/A 7/7/2023    Procedure: Open right sided lumbar decompression lumbar three/four, lumbar four/five;  Surgeon: Abel Perez MD;  Location[facility];  Service: Neurosurgery;  Laterality: N/A;    MEDIAL BRANCH BLOCK Right 01/23/2023    Procedure: LUMBAR MEDIAL BRANCH BLOCK RIGHT L3-L5 #1;  Surgeon: Isaura Torres MD;  Location: [facility];  Service: Pain Management;  Laterality: Right;    MEDIAL BRANCH BLOCK Right 02/13/2023    Procedure: LUMBAR MEDIAL BRANCH BLOCK RIGHT L3-L5 #1;  Surgeon: Isaura Torres MD;  Location: [facility];  Service: Pain Management;  Laterality: Right;    NERVE SURGERY Right 3/6/2023    Procedure: LUMBAR RADIOFREQUENCY ABLATION RIGHT L3-L5  16465, 60808;  Surgeon: Isaura Torres MD;  Location: [facility];  Service: Pain Management;  Laterality: Right;    SACROILIAC JOINT INJECTION Right 5/5/2023    Procedure: SACROILIAC JOINT INJECTION RIGHT 78141;  Surgeon: Isaura Torres MD;  Location: [facility];  Service: Pain Management;  Laterality: Right;    TONSILLECTOMY        General Information       Row Name 05/13/25 1646          Physical Therapy Time and Intention    Document Type therapy note (daily note)  -LUISA     Mode of Treatment co-treatment;physical therapy;occupational therapy  -       Row Name 05/13/25 9406          Safety Issues/Impairments Affecting Functional Mobility    Comment, Safety Issues/Impairments (Mobility) Co treatment medically  appropriate and necessary due to patient acuity level, activity tolerance and safety of patient and staff. Treatment is focusing on progression of care and goals established in the POC.  -               User Key  (r) = Recorded By, (t) = Taken By, (c) = Cosigned By      Initials Name Provider Type    Effie Eli PT Physical Therapist                   Mobility       Row Name 05/13/25 1646          Bed Mobility    Supine-Sit Boundary (Bed Mobility) contact guard;nonverbal cues (demo/gesture);verbal cues  -     Assistive Device (Bed Mobility) bed rails;head of bed elevated  -       Row Name 05/13/25 1646          Sit-Stand Transfer    Sit-Stand Boundary (Transfers) minimum assist (75% patient effort);moderate assist (50% patient effort);1 person assist;1 person to manage equipment  -     Assistive Device (Sit-Stand Transfers) walker, front-wheeled  -       Row Name 05/13/25 1646          Gait/Stairs (Locomotion)    Boundary Level (Gait) minimum assist (75% patient effort);moderate assist (50% patient effort);1 person to manage equipment  -     Assistive Device (Gait) walker, front-wheeled  -     Distance in Feet (Gait) 25  5,10,10 with seated rest between  -     Deviations/Abnormal Patterns (Gait) bilateral deviations;stride length decreased;base of support, narrow  -     Bilateral Gait Deviations forward flexed posture;heel strike decreased  -               User Key  (r) = Recorded By, (t) = Taken By, (c) = Cosigned By      Initials Name Provider Type    Effie Eli PT Physical Therapist                   Obj/Interventions    No documentation.                  Goals/Plan    No documentation.                  Clinical Impression       Row Name 05/13/25 1640          Pain    Pre/Posttreatment Pain Comment no pain reported  -       Row Name 05/13/25 9984          Plan of Care Review    Outcome Evaluation Patient was in bed and agreeable to PT/OT cotreat.   He reports he has been up to the bedside commode with nursing staff earlier today.  Patient was educated on logrolling and completed bed mobility with contact-guard assist.  Back brace was donned sitting edge of bed therapist position brace but patient was able to close the brace and tighten the straps with verbal cues.  Pt stood multiple times with min to mod assist and RWX.  He ambulated several short bouts in the room with min to mod assist and rolling walker.  Patient remains slightly unsteady when turning and requires assist to turn walker at times.  Patient is progressing well.  Continue to recommend inpatient rehab to improve mobility and independence prior to returning home.  -LUISA       Row Name 05/13/25 1648          Positioning and Restraints    Pre-Treatment Position in bed  -KH     Post Treatment Position chair  -LUISA     In Chair sitting;call light within reach;encouraged to call for assist;with family/caregiver;exit alarm on;notified [facility]               User Key  (r) = Recorded By, (t) = Taken By, (c) = Cosigned By      Initials Name Provider Type    Effie Eli, PT Physical Therapist                   Outcome Measures       Row Name 05/13/25 1652 05/13/25 0845       How much help from another person do you currently need...    Turning from your back to your side while in flat bed without using bedrails? 3  -KH 3  -SD    Moving from lying on back to sitting on the side of a flat bed without bedrails? 3  -LUISA 3  -SD    Moving to and from a bed to a chair (including a wheelchair)? 2  -LUISA 2  -SD    Standing up from a chair using your arms (e.g., wheelchair, bedside chair)? 3  -LUISA 3  -SD    Climbing 3-5 steps with a railing? 1  -KH 1  -SD    To walk in hospital room? 2  -KH 2  -SD    AM-PAC 6 Clicks Score (PT) 14  -KH 14  -SD    Highest Level of Mobility Goal Move to Chair/Commode-4  -KH Move to Chair/Commode-4  -SD      Row Name 05/13/25 1652 05/13/25 1550       Functional Assessment     Outcome Measure Options AM-PAC 6 Clicks Basic Mobility (PT)  -LUISA AM-PAC 6 Clicks Daily Activity (OT)  -BC              User Key  (r) = Recorded By, (t) = Taken By, (c) = Cosigned By      Initials Name Provider Type    Effie lEi, PT Physical Therapist    Aman Hogue, RN Registered Nurse    Jessie Tse OT Occupational Therapist                                 Physical Therapy Education       Title: PT OT SLP Therapies (In Progress)       Topic: Physical Therapy (Done)       Point: Mobility training (Done)       Learning Progress Summary            Patient Acceptance, E,TB, VU,NR by LB at 5/10/2025 1244    Acceptance, E, VU by CW at 5/9/2025 0936    Comment: spinal precautions                      Point: Home exercise program (Done)       Learning Progress Summary            Patient Acceptance, E, VU by CW at 5/9/2025 0936    Comment: spinal precautions                      Point: Body mechanics (Done)       Learning Progress Summary            Patient Acceptance, E, VU by CW at 5/9/2025 0936    Comment: spinal precautions                      Point: Precautions (Done)       Learning Progress Summary            Patient Acceptance, E, VU by CW at 5/9/2025 0936    Comment: spinal precautions                                      User Key       Initials Effective Dates Name Provider Type Discipline    LB 06/16/21 -  Norma Rondon PT Physical Therapist PT    CW 12/13/22 -  Lisa Cuellar PT Physical Therapist PT                  PT Recommendation and Plan     Outcome Evaluation: Patient was in bed and agreeable to PT/OT cotreat.  He reports he has been up to the bedside commode with nursing staff earlier today.  Patient was educated on logrolling and completed bed mobility with contact-guard assist.  Back brace was donned sitting edge of bed therapist position brace but patient was able to close the brace and tighten the straps with verbal cues.  Pt stood multiple times with min to  mod assist and RWX.  He ambulated several short bouts in the room with min to mod assist and rolling walker.  Patient remains slightly unsteady when turning and requires assist to turn walker at times.  Patient is progressing well.  Continue to recommend inpatient rehab to improve mobility and independence prior to returning home.     Time Calculation:         PT Charges       Row Name 05/13/25 1653             Time Calculation    Start Time 1406  -KH      Stop Time 1430  -KH      Time Calculation (min) 24 min  -KH      PT Received On 05/13/25  -      PT - Next Appointment 05/14/25  -         Time Calculation- PT    Total Timed Code Minutes- PT 24 minute(s)  -KH         Timed Charges    01105 - Gait Training Minutes  12  -KH      39460 - PT Therapeutic Activity Minutes 12  -KH         Total Minutes    Timed Charges Total Minutes 24  -KH       Total Minutes 24  -KH                User Key  (r) = Recorded By, (t) = Taken By, (c) = Cosigned By      Initials Name Provider Type    Effie Eli, PT Physical Therapist                  Therapy Charges for Today       Code Description Service Date Service Provider Modifiers Qty    48863200851 HC GAIT TRAINING EA 15 MIN 5/13/2025 Effie Ascencio, PT GP 1    15096930463 HC PT THERAPEUTIC ACT EA 15 MIN 5/13/2025 Effie Ascencio, PT GP 1            PT G-Codes  Outcome Measure Options: AM-PAC 6 Clicks Basic Mobility (PT)  AM-PAC 6 Clicks Score (PT): 14  AM-PAC 6 Clicks Score (OT): 13  Modified Elton Scale: 2 - Slight disability.  Unable to carry out all previous activities but able to look after own affairs without assistance.  PT Discharge Summary  Anticipated Discharge Disposition (PT): inpatient rehabilitation facility    Effie Ascencio, KAREN  5/13/2025

## 2025-05-13 NOTE — PLAN OF CARE
Goal Outcome Evaluation:      No change      Problem: Adult Inpatient Plan of Care  Goal: Plan of Care Review  Outcome: Progressing  Goal: Patient-Specific Goal (Individualized)  Outcome: Progressing  Goal: Absence of Hospital-Acquired Illness or Injury  Outcome: Progressing  Intervention: Identify and Manage Fall Risk  Recent Flowsheet Documentation  Taken 5/13/2025 0436 by Mike Waldrop RN  Safety Promotion/Fall Prevention:   safety round/check completed   room organization consistent   nonskid shoes/slippers when out of bed   fall prevention program maintained   clutter free environment maintained  Taken 5/13/2025 0243 by Mike Waldrop RN  Safety Promotion/Fall Prevention:   safety round/check completed   room organization consistent   nonskid shoes/slippers when out of bed   fall prevention program maintained   clutter free environment maintained  Taken 5/13/2025 0055 by Mike Waldrop RN  Safety Promotion/Fall Prevention:   safety round/check completed   room organization consistent   nonskid shoes/slippers when out of bed   fall prevention program maintained   clutter free environment maintained  Taken 5/12/2025 2213 by Mike Waldrop RN  Safety Promotion/Fall Prevention:   safety round/check completed   room organization consistent   nonskid shoes/slippers when out of bed   fall prevention program maintained   clutter free environment maintained  Taken 5/12/2025 2015 by Mike Waldrop RN  Safety Promotion/Fall Prevention:   safety round/check completed   room organization consistent   nonskid shoes/slippers when out of bed   fall prevention program maintained   clutter free environment maintained  Intervention: Prevent Skin Injury  Recent Flowsheet Documentation  Taken 5/13/2025 0436 by Mike Waldrop RN  Body Position:   position changed independently   weight shifting   left  Taken 5/13/2025 0243 by Mike Waldrop RN  Body Position:   weight shifting   right   position changed independently  Taken  5/13/2025 0055 by Mike Waldrop RN  Body Position:   weight shifting   position maintained  Taken 5/12/2025 2213 by Mike Waldrop RN  Body Position: sitting up in bed  Taken 5/12/2025 2015 by Mike Waldrop RN  Body Position: sitting up in bed  Intervention: Prevent and Manage VTE (Venous Thromboembolism) Risk  Recent Flowsheet Documentation  Taken 5/13/2025 0055 by Mike Waldrop RN  VTE Prevention/Management:   bilateral   SCDs (sequential compression devices) on  Taken 5/12/2025 2015 by Mike Waldrop RN  VTE Prevention/Management:   bilateral   SCDs (sequential compression devices) on  Intervention: Prevent Infection  Recent Flowsheet Documentation  Taken 5/13/2025 0436 by Mike Waldrop RN  Infection Prevention:   rest/sleep promoted   single patient room provided  Taken 5/13/2025 0243 by Mike Waldrop RN  Infection Prevention:   single patient room provided   rest/sleep promoted   hand hygiene promoted  Taken 5/13/2025 0055 by Mike Waldrop RN  Infection Prevention:   single patient room provided   rest/sleep promoted   hand hygiene promoted  Taken 5/12/2025 2213 by Mike Waldrop RN  Infection Prevention:   rest/sleep promoted   single patient room provided   hand hygiene promoted  Taken 5/12/2025 2015 by Mike Waldrop RN  Infection Prevention:   rest/sleep promoted   single patient room provided   hand hygiene promoted  Goal: Optimal Comfort and Wellbeing  Outcome: Progressing  Intervention: Provide Person-Centered Care  Recent Flowsheet Documentation  Taken 5/13/2025 0055 by Mike Waldrop RN  Trust Relationship/Rapport:   care explained   choices provided   thoughts/feelings acknowledged  Taken 5/12/2025 2015 by Mike Waldrop RN  Trust Relationship/Rapport:   care explained   thoughts/feelings acknowledged   choices provided  Goal: Readiness for Transition of Care  Outcome: Progressing     Problem: Comorbidity Management  Goal: Maintenance of Osteoarthritis Symptom Control  Outcome:  Progressing  Intervention: Maintain Osteoarthritis Symptom Control  Recent Flowsheet Documentation  Taken 5/13/2025 0436 by Mike Waldrop RN  Medication Review/Management:   medications reviewed   high-risk medications identified  Taken 5/13/2025 0243 by Mike Waldrop RN  Medication Review/Management:   medications reviewed   high-risk medications identified  Taken 5/13/2025 0055 by Mike Waldrop RN  Medication Review/Management:   medications reviewed   high-risk medications identified  Taken 5/12/2025 2213 by Mike Waldrop RN  Medication Review/Management:   medications reviewed   high-risk medications identified  Taken 5/12/2025 2015 by Mike Waldrop RN  Medication Review/Management:   medications reviewed   high-risk medications identified     Problem: Fatigue  Goal: Improved Activity Tolerance  Outcome: Progressing  Intervention: Promote Improved Energy  Recent Flowsheet Documentation  Taken 5/12/2025 2015 by Mike Waldrop RN  Environmental Support: calm environment promoted     Problem: Fall Injury Risk  Goal: Absence of Fall and Fall-Related Injury  Outcome: Progressing  Intervention: Identify and Manage Contributors  Recent Flowsheet Documentation  Taken 5/13/2025 0436 by Mike Waldrop RN  Medication Review/Management:   medications reviewed   high-risk medications identified  Taken 5/13/2025 0243 by Mike Waldrop RN  Medication Review/Management:   medications reviewed   high-risk medications identified  Taken 5/13/2025 0055 by Mike Waldrop RN  Medication Review/Management:   medications reviewed   high-risk medications identified  Taken 5/12/2025 2213 by Mike Waldrop RN  Medication Review/Management:   medications reviewed   high-risk medications identified  Taken 5/12/2025 2015 by Mike Waldrop RN  Medication Review/Management:   medications reviewed   high-risk medications identified  Intervention: Promote Injury-Free Environment  Recent Flowsheet Documentation  Taken 5/13/2025  0436 by Mike Waldrop RN  Safety Promotion/Fall Prevention:   safety round/check completed   room organization consistent   nonskid shoes/slippers when out of bed   fall prevention program maintained   clutter free environment maintained  Taken 5/13/2025 0243 by Mike Waldrop RN  Safety Promotion/Fall Prevention:   safety round/check completed   room organization consistent   nonskid shoes/slippers when out of bed   fall prevention program maintained   clutter free environment maintained  Taken 5/13/2025 0055 by Mike Waldrop RN  Safety Promotion/Fall Prevention:   safety round/check completed   room organization consistent   nonskid shoes/slippers when out of bed   fall prevention program maintained   clutter free environment maintained  Taken 5/12/2025 2213 by Mike Waldrop RN  Safety Promotion/Fall Prevention:   safety round/check completed   room organization consistent   nonskid shoes/slippers when out of bed   fall prevention program maintained   clutter free environment maintained  Taken 5/12/2025 2015 by Mike Waldrop RN  Safety Promotion/Fall Prevention:   safety round/check completed   room organization consistent   nonskid shoes/slippers when out of bed   fall prevention program maintained   clutter free environment maintained     Problem: Skin Injury Risk Increased  Goal: Skin Health and Integrity  Outcome: Progressing  Intervention: Optimize Skin Protection  Recent Flowsheet Documentation  Taken 5/13/2025 0436 by Mike Waldrop RN  Head of Bed (HOB) Positioning: HOB at 30 degrees  Taken 5/13/2025 0243 by Mike Waldrop RN  Head of Bed (HOB) Positioning: HOB at 30 degrees  Taken 5/13/2025 0055 by Mike Waldrop RN  Head of Bed (HOB) Positioning: HOB at 30 degrees  Taken 5/12/2025 2213 by Mike Waldrop RN  Head of Bed (HOB) Positioning: HOB at 30 degrees  Taken 5/12/2025 2015 by Mike Waldrop RN  Head of Bed (HOB) Positioning: HOB at 30 degrees

## 2025-05-13 NOTE — CASE MANAGEMENT/SOCIAL WORK
Continued Stay Note  [facility]     Patient Name: Mo Willoughby  MRN: [id number]  Today's Date: 5/13/2025    Admit Date: 5/5/2025    Plan: [facility] accepted pending pre-cert.   Discharge Plan       Row Name 05/13/25 1821       Plan    Plan [facility] accepted pending pre-cert.    Plan Comments Spoke with [facility]. States he's awaiting updated therapy notes to submit pre-cert. Message sent to PT/OT to notify.. Continue to follow......[facility]                   Discharge Codes    No documentation.                 Expected Discharge Date and Time       Expected Discharge Date Expected Discharge Time    May 16, 2025               Kendy Henley RN

## 2025-05-13 NOTE — PLAN OF CARE
Problem: Adult Inpatient Plan of Care  Goal: Plan of Care Review  Outcome: Progressing  Flowsheets (Taken 5/13/2025 1820)  Progress: improving  Plan of Care Reviewed With:   patient   family  Goal: Patient-Specific Goal (Individualized)  Outcome: Progressing  Goal: Absence of Hospital-Acquired Illness or Injury  Outcome: Progressing  Intervention: Identify and Manage Fall Risk  Recent Flowsheet Documentation  Taken 5/13/2025 1400 by Aman Garcia RN  Safety Promotion/Fall Prevention:   safety round/check completed   assistive device/personal items within reach  Taken 5/13/2025 1200 by Aman Garcia RN  Safety Promotion/Fall Prevention:   safety round/check completed   assistive device/personal items within reach  Taken 5/13/2025 1000 by Aman Garcia RN  Safety Promotion/Fall Prevention:   safety round/check completed   assistive device/personal items within reach  Taken 5/13/2025 0805 by Aman Garcia RN  Safety Promotion/Fall Prevention:   safety round/check completed   assistive device/personal items within reach  Intervention: Prevent Skin Injury  Recent Flowsheet Documentation  Taken 5/13/2025 0845 by Aman Garcia RN  Body Position:   supine   sitting up in bed  Intervention: Prevent Infection  Recent Flowsheet Documentation  Taken 5/13/2025 1400 by Aman Garcia RN  Infection Prevention:   personal protective equipment utilized   environmental surveillance performed  Taken 5/13/2025 1200 by Aman Garcia RN  Infection Prevention:   personal protective equipment utilized   environmental surveillance performed  Taken 5/13/2025 1000 by Aman Garcia RN  Infection Prevention:   personal protective equipment utilized   environmental surveillance performed  Taken 5/13/2025 0805 by Aman Garcia RN  Infection Prevention:   personal protective equipment utilized   environmental surveillance performed  Goal: Optimal Comfort and  Wellbeing  Outcome: Progressing  Goal: Readiness for Transition of Care  Outcome: Progressing   Goal Outcome Evaluation:  Plan of Care Reviewed With: patient, family        Progress: improving

## 2025-05-13 NOTE — THERAPY TREATMENT NOTE
Patient Name: Mo Willoughby  : 1955    MRN: [id number]                              Today's Date: 2025       Admit Date: 2025    Visit Dx:     ICD-10-CM ICD-9-CM   1. Weakness of left lower extremity  R29.898 729.89   2. Herniation of thoracolumbar intervertebral disc with myelopathy  M51.05 722.72   3. Tongue fasciculation  R25.3 781.0   4. Atrophy of muscle of multiple sites  M62.59 728.2     Patient Active Problem List   Diagnosis    Erectile dysfunction    Gout    Hypercalcemia    Colon polyp    Sinus tachycardia    Pancreatitis    Other fatigue    Ascending aortic aneurysm    Generalized abdominal pain    Folic acid deficiency    Iron deficiency anemia    Acute bilateral low back pain without sciatica    Swelling of the testicles    Urinary frequency    Hyperparathyroidism, unspecified    CRI (chronic renal insufficiency), stage 3 (moderate)    Essential hypertension, benign    Spinal stenosis of lumbar region    Lumbar radiculitis    Lumbar facet arthropathy    Screening PSA (prostate specific antigen)    Mixed hyperlipidemia    Change in pigmented skin lesion of face    Pain in both testicles    Sacroiliitis    DDD (degenerative disc disease), lumbar    Spondylolisthesis at L4-L5 level    Herniation of lumbar intervertebral disc with radiculopathy    Spinal stenosis of lumbar region with radiculopathy    H/O: gout on allopurinol    Premature atrial contractions    Arthritis of right hip    Right hip pain    Imbalance    Foot drop, left    Atrophy of muscle of multiple sites    Tongue fasciculation    Acute bilateral low back pain with bilateral sciatica    Weakness of left lower extremity    Herniation of thoracolumbar intervertebral disc with myelopathy     Past Medical History:   Diagnosis Date    Abnormal TSH     Arthritis     Ascending aortic aneurysm     Colon polyp 24    CRI (chronic renal insufficiency), stage 3 (moderate) 2016    from stage 3A to 4    DDD (degenerative disc  disease), lumbar     ED (erectile dysfunction)     Erectile dysfunction     Essential hypertension, benign     Folic acid deficiency     Gout     Hip pain, right     Hx of colonic polyp     Hypercalcemia 2015    as far back as data goes so likely pre-dates even this time    Hyperparathyroidism, unspecified 2016    as far back as data goes likely pre-dates even this date, likely multifactorial some primary and some secondary , w/ imaging from 10/16 showing possible L inferior adenoma    Iron deficiency anemia     Low back pain with bilateral sciatica     Mixed hyperlipidemia 02/27/2023    New onset atrial fibrillation 04/15/2024    Pancreatitis     Risk factors for obstructive sleep apnea     Spinal stenosis of lumbar region with neurogenic claudication     Syncope and collapse 03/28/2017     Past Surgical History:   Procedure Laterality Date    CHOLECYSTECTOMY      CHOLECYSTECTOMY WITH INTRAOPERATIVE CHOLANGIOGRAM N/A 08/03/2018    Procedure: CHOLECYSTECTOMY LAPAROSCOPIC INTRAOPERATIVE CHOLANGIOGRAM;  Surgeon: Melina Kate MD;  Location: [facility] MAIN OR;  Service: General    COLONOSCOPY      COLONOSCOPY N/A 10/03/2016    Procedure: COLONOSCOPY TO CECUM TO TERMINAL ILIUM WITH COLD BIOPSY POLYPECTOMY;  Surgeon: Benoit Vilchis MD;  Location: [facility] ENDOSCOPY;  Service:     COLONOSCOPY N/A 05/05/2022    Procedure: COLONOSCOPY TO CECUM WITH COLD SNARE POLYPECTOMIES & RESOLUTION CLIP PLACEMENT X 2;  Surgeon: Benoit Mosley MD;  Location: [facility] ENDOSCOPY;  Service: Gastroenterology;  Laterality: N/A;  ANEMIA/POLYPS, HEMORRHOIDS     ENDOSCOPY N/A 05/05/2022    Procedure: ESOPHAGOGASTRODUODENOSCOPY WITH BX;  Surgeon: Benoit Mosley MD;  Location: [facility] ENDOSCOPY;  Service: Gastroenterology;  Laterality: N/A;  ANEMIA/PORTAL GASTROPATHY, EROSIVE GASTRITIS     EPIDURAL Right 12/07/2022    Procedure: LUMBAR/SACRAL TRANSFORAMINAL EPIDURAL Right L2-3 and right L4-5;  Surgeon: Isaura Torres MD;   Location: [facility] MAIN OR;  Service: Pain Management;  Laterality: Right;    LIPOMA EXCISION      LUMBAR DISCECTOMY FUSION INSTRUMENTATION N/A 5/8/2025    Procedure: Open thoracic twelve/lumbar one discectomy/decompression/possible interbody fusion with cages, pedicle screw/terry/crosslink fixation, thoracic twelve/lumbar one/two with Mazor robotic navigation;  Surgeon: Abel Perez MD;  Location: [facility] MAIN OR;  Service: Robotics - Neuro;  Laterality: N/A;    LUMBAR LAMINECTOMY DISCECTOMY DECOMPRESSION N/A 7/7/2023    Procedure: Open right sided lumbar decompression lumbar three/four, lumbar four/five;  Surgeon: Abel Perez MD;  [facility];  Service: Neurosurgery;  Laterality: N/A;    MEDIAL BRANCH BLOCK Right 01/23/2023    Procedure: LUMBAR MEDIAL BRANCH BLOCK RIGHT L3-L5 #1;  Surgeon: Isaura Torres MD;  Location: [facility] MAIN OR;  Service: Pain Management;  Laterality: Right;    MEDIAL BRANCH BLOCK Right 02/13/2023    Procedure: LUMBAR MEDIAL BRANCH BLOCK RIGHT L3-L5 #1;  Surgeon: Isaura Torres MD;  Location: [facility] MAIN OR;  Service: Pain Management;  Laterality: Right;    NERVE SURGERY Right 3/6/2023    Procedure: LUMBAR RADIOFREQUENCY ABLATION RIGHT L3-L5  44402, 24473;  Surgeon: Isaura Torres MD;  Location: [facility] MAIN OR;  Service: Pain Management;  Laterality: Right;    SACROILIAC JOINT INJECTION Right 5/5/2023    Procedure: SACROILIAC JOINT INJECTION RIGHT 38669;  Surgeon: Isaura Torres MD;  Location: [facility] MAIN OR;  Service: Pain Management;  Laterality: Right;    TONSILLECTOMY        General Information       Row Name 05/13/25 1544          OT Time and Intention    Subjective Information no complaints  -BC     Document Type therapy note (daily note)  -BC     Mode of Treatment co-treatment;physical therapy;occupational therapy  -BC     Patient Effort excellent  -BC       Row Name 05/13/25 1544          General Information    Patient Profile Reviewed yes  -BC     Existing  Precautions/Restrictions fall;spinal;brace worn when out of bed;LSO  -BC       Row Name 05/13/25 1544          Cognition    Orientation Status (Cognition) oriented x 4  -BC       Row Name 05/13/25 1544          Safety Issues/Impairments Affecting Functional Mobility    Impairments Affecting Function (Mobility) balance;endurance/activity tolerance;strength;pain  -BC     Comment, Safety Issues/Impairments (Mobility) Cotx with PT/OT for safety w/ bed mobility, concern for tolerance to active participation and due to decreased endurance.  -BC               User Key  (r) = Recorded By, (t) = Taken By, (c) = Cosigned By      Initials Name Provider Type    BC Jessie Diop OT Occupational Therapist                     Mobility/ADL's       Row Name 05/13/25 1544          Bed Mobility    Bed Mobility supine-sit  -BC     Supine-Sit [place] (Bed Mobility) contact guard;nonverbal cues (demo/gesture);verbal cues  -BC     Bed Mobility, Safety Issues decreased use of legs for bridging/pushing;decreased use of arms for pushing/pulling  -BC     Assistive Device (Bed Mobility) bed rails;head of bed elevated  -BC     Comment, (Bed Mobility) min reminders for logrolling technique, CGA for mvmts with good teachback  -BC       Row Name 05/13/25 1544          Transfers    Transfers sit-stand transfer;stand-sit transfer;bed-chair transfer  -BC       Row Name 05/13/25 1544          Bed-Chair Transfer    Bed-Chair [place] (Transfers) moderate assist (50% patient effort);1 person assist;1 person to manage equipment;minimum assist (75% patient effort)  -BC     Assistive Device (Bed-Chair Transfers) walker, front-wheeled  -BC       Row Name 05/13/25 1544          Sit-Stand Transfer    Sit-Stand [place] (Transfers) minimum assist (75% patient effort);moderate assist (50% patient effort);1 person assist;1 person to manage equipment  -BC     Assistive Device (Sit-Stand Transfers) walker, front-wheeled  -BC     Comment, (Sit-Stand  Transfer) x4 stands onnce from EOB, 3x from chair. each rep improved with cues for hand placement  -BC       Row Name 05/13/25 1544          Stand-Sit Transfer    Stand-Sit [place] (Transfers) moderate assist (50% patient effort)  -BC     Assistive Device (Stand-Sit Transfers) walker, front-wheeled  -BC       Row Name 05/13/25 1544          Functional Mobility    Functional Mobility- Ind. Level minimum assist (75% patient effort);moderate assist (50% patient effort);1 person + 1 person to manage equipment  -BC     Functional Mobility- Device walker, front-wheeled  -BC     Functional Mobility- Comment x5 and 10 feet x3 reps  -BC     Patient was able to Ambulate yes  -BC       Row Name 05/13/25 1544          Activities of Daily Living    BADL Assessment/Intervention lower body dressing;upper body dressing  -BC       Row Name 05/13/25 1544          Lower Body Dressing Assessment/Training    [place] Level (Lower Body Dressing) lower body dressing skills;don;socks;doff;dependent (less than 25% patient effort);shoes/slippers  -BC     Position (Lower Body Dressing) edge of bed sitting  -BC     Comment, (Lower Body Dressing) unable to figure four with practiced mvmts today; rec AE for LBD next POC opportunity.  -BC       Row Name 05/13/25 1544          Upper Body Dressing Assessment/Training    [place] Level (Upper Body Dressing) doff;don;moderate assist (50% patient effort)  -BC     Position (Upper Body Dressing) edge of bed sitting;unsupported sitting  -BC     Comment, (Upper Body Dressing) LSO don mgmt/practice and gown mgmt, mod A and max cues for LSO use  -BC               User Key  (r) = Recorded By, (t) = Taken By, (c) = Cosigned By      Initials Name Provider Type    BC Jessie Diop OT Occupational Therapist                   Obj/Interventions       Row Name 05/13/25 1548          Balance    Static Sitting Balance standby assist  -BC     Dynamic Sitting Balance standby assist  -BC     Position,  Sitting Balance sitting edge of bed;unsupported  -BC     Static Standing Balance minimal assist;moderate assist;1-person assist;1 person to manage equipment  -BC     Dynamic Standing Balance minimal assist;moderate assist;1-person assist;1 person to manage equipment  -BC     Position/Device Used, Standing Balance supported;walker, front-wheeled  -BC               User Key  (r) = Recorded By, (t) = Taken By, (c) = Cosigned By      Initials Name Provider Type    Jessie Tse OT Occupational Therapist                   Goals/Plan    No documentation.                  Clinical Impression       Row Name 05/13/25 1549          Pain Assessment    Pre/Posttreatment Pain Comment did not report of any pain today  -BC       Row Name 05/13/25 1549          Plan of Care Review    Plan of Care Reviewed With patient;sibling  -BC     Progress improving  -BC     Outcome Evaluation Pt w/ significant improvement in performance and function today but still requires cues for spinal precautions, hands on assist for safety w/ mobility, and decreased ADL (I). Pt  completed short distances fxnl mobility w/ FWW and Min to mod AX1 +1 for line mgmt x3 reps today. Cont to rec IRF at DC to maximize ADL (I) and for home safety teaching.  -BC       Row Name 05/13/25 1549          Therapy Plan Review/Discharge Plan (OT)    Anticipated Discharge Disposition (OT) inpatient rehabilitation facility  -BC       Row Name 05/13/25 1549          Vital Signs    O2 Delivery Pre Treatment room air  -BC     O2 Delivery Intra Treatment room air  -BC     O2 Delivery Post Treatment room air  -BC     Pre Patient Position Supine  -BC     Intra Patient Position Standing  -BC     Post Patient Position Sitting  -BC       Row Name 05/13/25 1549          Positioning and Restraints    Pre-Treatment Position in bed  -BC     Post Treatment Position chair  -BC     In Chair sitting;call light within reach;encouraged to call for assist;exit alarm on;with  family/caregiver  -BC               User Key  (r) = Recorded By, (t) = Taken By, (c) = Cosigned By      Initials Name Provider Type    Jessie Tse OT Occupational Therapist                   Outcome Measures       Row Name 05/13/25 1550          How much help from another is currently needed...    Putting on and taking off regular lower body clothing? 2  -BC     Bathing (including washing, rinsing, and drying) 2  -BC     Toileting (which includes using toilet bed pan or urinal) 1  -BC     Putting on and taking off regular upper body clothing 2  -BC     Taking care of personal grooming (such as brushing teeth) 3  -BC     Eating meals 3  -BC     AM-PAC 6 Clicks Score (OT) 13  -BC       Row Name 05/13/25 0845          How much help from another person do you currently need...    Turning from your back to your side while in flat bed without using bedrails? 3  -SD     Moving from lying on back to sitting on the side of a flat bed without bedrails? 3  -SD     Moving to and from a bed to a chair (including a wheelchair)? 2  -SD     Standing up from a chair using your arms (e.g., wheelchair, bedside chair)? 3  -SD     Climbing 3-5 steps with a railing? 1  -SD     To walk in hospital room? 2  -SD     AM-PAC 6 Clicks Score (PT) 14  -SD     Highest Level of Mobility Goal Move to Chair/Commode-4  -SD       Row Name 05/13/25 1550          Functional Assessment    Outcome Measure Options AM-PAC 6 Clicks Daily Activity (OT)  -BC               User Key  (r) = Recorded By, (t) = Taken By, (c) = Cosigned By      Initials Name Provider Type    Aman Hogue, RN Registered Nurse    Jessie Tse OT Occupational Therapist                      OT Recommendation and Plan  Planned Therapy Interventions (OT): activity tolerance training, BADL retraining, cognitive/visual perception retraining, functional balance retraining, neuromuscular control/coordination retraining, occupation/activity based interventions,  patient/caregiver education/training, strengthening exercise, transfer/mobility retraining, passive ROM/stretching, ROM/therapeutic exercise  Therapy Frequency (OT): 5 times/wk  Plan of Care Review  Plan of Care Reviewed With: patient, sibling  Progress: improving  Outcome Evaluation: Pt w/ significant improvement in performance and function today but still requires cues for spinal precautions, hands on assist for safety w/ mobility, and decreased ADL (I). Pt  completed short distances fxnl mobility w/ FWW and Min to mod AX1 +1 for line mgmt x3 reps today. Cont to rec IRF at DC to maximize ADL (I) and for home safety teaching.     Time Calculation:         Time Calculation- OT       Row Name 05/13/25 1551             Time Calculation- OT    OT Start Time 1406  -BC      OT Stop Time 1430  -BC      OT Time Calculation (min) 24 min  -BC      Total Timed Code Minutes- OT 24 minute(s)  -BC      OT Received On 05/13/25  -BC      OT - Next Appointment 05/14/25  -BC         Timed Charges    74956 - OT Therapeutic Activity Minutes 14  -BC      62613 - OT Self Care/Mgmt Minutes 10  -BC         Total Minutes    Timed Charges Total Minutes 24  -BC       Total Minutes 24  -BC                User Key  (r) = Recorded By, (t) = Taken By, (c) = Cosigned By      Initials Name Provider Type    BC Jessie Diop OT Occupational Therapist                  Therapy Charges for Today       Code Description Service Date Service Provider Modifiers Qty    [id number]  OT THERAPEUTIC ACT EA 15 MIN 5/13/2025 Jessie Diop OT GO 1    [id number] HC OT SELF CARE/MGMT/TRAIN EA 15 MIN 5/13/2025 Jessie Diop OT GO 1                 Jessie Diop OT  5/13/2025

## 2025-05-13 NOTE — CONSULTS
.     REASON FOR CONSULTATION:       Acute on top of chronic thrombocytopenia     Provide an opinion on any further workup or treatment                             REQUESTING PHYSICIAN: Phuong Villavicencio MD  RECORDS OBTAINED:  Records of the patient's history including those from the electronic medical record were reviewed and summarized in detail.    HISTORY OF PRESENT ILLNESS:  The patient is a 69 y.o. year old male  who is here for follow-up with the above-mentioned history.    Admitted directly from the neurosurgery office.  Episodic activity related back pain lower extremity weakness and left arm atrophy and tongue fasciculation.  5/8/2025: Spine surgery with Dr. Perez.  He has cirrhosis and splenomegaly by abdominal CT.  Postoperative anemia.  Hospitalist stated baseline hemoglobin 12-13 workup suggestive of iron deficiency on oral iron patient platelets 100-1 40s acute drop in platelets likely due to consumption after surgery.  We were consulted    Denies bleeding.  States he has noticed improvement in function since the spine surgery.  States he has no prior knowledge of cirrhosis but notes intermittent heavy alcohol use for many years    Past Medical History:   Diagnosis Date    Abnormal TSH     Arthritis     Ascending aortic aneurysm     Colon polyp 5/5/22    CRI (chronic renal insufficiency), stage 3 (moderate) 2016    from stage 3A to 4    DDD (degenerative disc disease), lumbar     ED (erectile dysfunction)     Erectile dysfunction     Essential hypertension, benign     Folic acid deficiency     Gout     Hip pain, right     Hx of colonic polyp     Hypercalcemia 2015    as far back as data goes so likely pre-dates even this time    Hyperparathyroidism, unspecified 2016    as far back as data goes likely pre-dates even this date, likely multifactorial some primary and some secondary , w/ imaging from 10/16 showing possible L inferior adenoma    Iron deficiency anemia     Low back pain with bilateral  sciatica     Mixed hyperlipidemia 02/27/2023    New onset atrial fibrillation 04/15/2024    Pancreatitis     Risk factors for obstructive sleep apnea     Spinal stenosis of lumbar region with neurogenic claudication     Syncope and collapse 03/28/2017     Past Surgical History:   Procedure Laterality Date    CHOLECYSTECTOMY      CHOLECYSTECTOMY WITH INTRAOPERATIVE CHOLANGIOGRAM N/A 08/03/2018    Procedure: CHOLECYSTECTOMY LAPAROSCOPIC INTRAOPERATIVE CHOLANGIOGRAM;  Surgeon: Melina Kate MD;  Location: [facility] MAIN OR;  Service: General    COLONOSCOPY      COLONOSCOPY N/A 10/03/2016    Procedure: COLONOSCOPY TO CECUM TO TERMINAL ILIUM WITH COLD BIOPSY POLYPECTOMY;  Surgeon: Benoit Vilchis MD;  Location: [facility] ENDOSCOPY;  Service:     COLONOSCOPY N/A 05/05/2022    Procedure: COLONOSCOPY TO CECUM WITH COLD SNARE POLYPECTOMIES & RESOLUTION CLIP PLACEMENT X 2;  Surgeon: Benoit Mosley MD;  Location: [facility] ENDOSCOPY;  Service: Gastroenterology;  Laterality: N/A;  ANEMIA/POLYPS, HEMORRHOIDS     ENDOSCOPY N/A 05/05/2022    Procedure: ESOPHAGOGASTRODUODENOSCOPY WITH BX;  Surgeon: Benoit Mosley MD;  Location: [facility] ENDOSCOPY;  Service: Gastroenterology;  Laterality: N/A;  ANEMIA/PORTAL GASTROPATHY, EROSIVE GASTRITIS     EPIDURAL Right 12/07/2022    Procedure: LUMBAR/SACRAL TRANSFORAMINAL EPIDURAL Right L2-3 and right L4-5;  Surgeon: Isaura Torres MD;  Location: [facility] MAIN OR;  Service: Pain Management;  Laterality: Right;    LIPOMA EXCISION      LUMBAR DISCECTOMY FUSION INSTRUMENTATION N/A 5/8/2025    Procedure: Open thoracic twelve/lumbar one discectomy/decompression/possible interbody fusion with cages, pedicle screw/terry/crosslink fixation, thoracic twelve/lumbar one/two with Mazor robotic navigation;  Surgeon: Abel Perez MD;  Location: [facility] MAIN OR;  Service: Robotics - Neuro;  Laterality: N/A;    LUMBAR LAMINECTOMY DISCECTOMY DECOMPRESSION N/A 7/7/2023    Procedure: Open right sided  lumbar decompression lumbar three/four, lumbar four/five;  Surgeon: Abel Perez MD;  Location: [facility] MAIN OR;  Service: Neurosurgery;  Laterality: N/A;    MEDIAL BRANCH BLOCK Right 01/23/2023    Procedure: LUMBAR MEDIAL BRANCH BLOCK RIGHT L3-L5 #1;  Surgeon: Isaura Torres MD;  Location: [facility] MAIN OR;  Service: Pain Management;  Laterality: Right;    MEDIAL BRANCH BLOCK Right 02/13/2023    Procedure: LUMBAR MEDIAL BRANCH BLOCK RIGHT L3-L5 #1;  Surgeon: Isaura Torres MD;  Location: [facility] MAIN OR;  Service: Pain Management;  Laterality: Right;    NERVE SURGERY Right 3/6/2023    Procedure: LUMBAR RADIOFREQUENCY ABLATION RIGHT L3-L5  22073, [id number];  Surgeon: Isaura Torres MD;  Location: [facility] MAIN OR;  Service: Pain Management;  Laterality: Right;    SACROILIAC JOINT INJECTION Right 5/5/2023    Procedure: SACROILIAC JOINT INJECTION RIGHT 75673;  Surgeon: Isaura Torres MD;  Location: [facility] MAIN OR;  Service: Pain Management;  Laterality: Right;    TONSILLECTOMY         MEDICATIONS    Current Facility-Administered Medications:     acetaminophen (TYLENOL) tablet 650 mg, 650 mg, Oral, Q4H PRN **OR** acetaminophen (TYLENOL) 160 MG/5ML oral solution 650 mg, 650 mg, Oral, Q4H PRN **OR** acetaminophen (TYLENOL) suppository 650 mg, 650 mg, Rectal, Q4H PRN, Abel Perez MD    allopurinol (ZYLOPRIM) tablet 300 mg, 300 mg, Oral, Daily, Abel Perez MD, 300 mg at 05/13/25 0857    sennosides-docusate (PERICOLACE) 8.6-50 MG per tablet 2 tablet, 2 tablet, Oral, BID, 2 tablet at 05/13/25 0859 **AND** polyethylene glycol (MIRALAX) packet 17 g, 17 g, Oral, Daily, 17 g at 05/13/25 0901 **AND** bisacodyl (DULCOLAX) EC tablet 5 mg, 5 mg, Oral, Daily PRN **AND** bisacodyl (DULCOLAX) suppository 10 mg, 10 mg, Rectal, Daily PRN, Lyric Rhodes APRN, 10 mg at 05/12/25 0210    Calcium Replacement - Follow Nurse / BPA Driven Protocol, , Not Applicable, PRN, Abel Perez MD    ferrous sulfate tablet 325 mg,  325 mg, Oral, Daily With Breakfast, Abel Perez MD, 325 mg at 05/13/25 0857    folic acid (FOLVITE) tablet 1,000 mcg, 1,000 mcg, Oral, Daily, Abel Perez MD, 1,000 mcg at 05/13/25 0858    gabapentin (NEURONTIN) capsule 300 mg, 300 mg, Oral, Q12H, Abel Perez MD, 300 mg at 05/13/25 0857    HYDROcodone-acetaminophen (NORCO) 7.5-325 MG per tablet 1 tablet, 1 tablet, Oral, Q4H PRN, Rohini Meza, APRN, 1 tablet at 05/12/25 2043    [Held by provider] lisinopril (PRINIVIL,ZESTRIL) tablet 5 mg, 5 mg, Oral, Daily, Abel Perez MD, 5 mg at 05/08/25 0828    Magnesium Standard Dose Replacement - Follow Nurse / BPA Driven Protocol, , Not Applicable, PRN, Abel Perez MD    methocarbamol (ROBAXIN) tablet 750 mg, 750 mg, Oral, 4x Daily PRN, Abel Perez MD, 750 mg at 05/12/25 2043    metoprolol succinate XL (TOPROL-XL) 24 hr tablet 25 mg, 25 mg, Oral, Daily, Abel Perez MD, 25 mg at 05/13/25 0858    midodrine (PROAMATINE) tablet 5 mg, 5 mg, Oral, TID PRN, Lucy Amezcua DO, 5 mg at 05/10/25 0815    naloxone (NARCAN) injection 0.1 mg, 0.1 mg, Intravenous, Q5 Min PRN, Abel Perez MD    nitroglycerin (NITROSTAT) SL tablet 0.4 mg, 0.4 mg, Sublingual, Q5 Min PRN, Abel Perez MD    ondansetron ODT (ZOFRAN-ODT) disintegrating tablet 4 mg, 4 mg, Oral, Q6H PRN **OR** ondansetron (ZOFRAN) injection 4 mg, 4 mg, Intravenous, Q6H PRN, Abel Perez MD    ondansetron ODT (ZOFRAN-ODT) disintegrating tablet 4 mg, 4 mg, Oral, Q6H PRN **OR** ondansetron (ZOFRAN) injection 4 mg, 4 mg, Intravenous, Q6H PRN, Abel Perez MD    pantoprazole (PROTONIX) EC tablet 40 mg, 40 mg, Oral, BID, Abel Perez MD, 40 mg at 05/13/25 0857    Phosphorus Replacement - Follow Nurse / BPA Driven Protocol, , Not Applicable, PRN, Abel Perez MD    Potassium Replacement - Follow Nurse / BPA Driven Protocol, , Not Applicable, PRN, Abel Perez MD    rosuvastatin (CRESTOR) tablet  10 mg, 10 mg, Oral, Daily, Abel Perez MD, 10 mg at 05/12/25 2032    sodium chloride 0.9 % flush 10 mL, 10 mL, Intravenous, Q12H, Abel Perez MD, 10 mL at 05/13/25 0902    sodium chloride 0.9 % flush 10 mL, 10 mL, Intravenous, PRN, Abel Perez MD    sodium chloride 0.9 % infusion 40 mL, 40 mL, Intravenous, PRN, Abel Perez MD    sodium chloride 0.9 % infusion, 100 mL/hr, Intravenous, Continuous, Sahra Luis MD, Last Rate: 100 mL/hr at 05/13/25 0622, 100 mL/hr at 05/13/25 0622    sodium phosphates 15 mmol in 250 mL 0.9% sodium chloride IVPB, 15 mmol, Intravenous, Once, Sahra Luis MD    tamsulosin (FLOMAX) 24 hr capsule 0.4 mg, 0.4 mg, Oral, Daily, Sahra Luis MD, 0.4 mg at 05/13/25 0858    thiamine (VITAMIN B-1) tablet 100 mg, 100 mg, Oral, Daily, Abel Perez MD, 100 mg at 05/13/25 0857    vitamin B-12 (CYANOCOBALAMIN) tablet 1,000 mcg, 1,000 mcg, Oral, Daily, Abel Perez MD, 1,000 mcg at 05/13/25 0857    ALLERGIES:     Allergies   Allergen Reactions    Zetia [Ezetimibe] Dizziness     Nausea, fatgue       SOCIAL HISTORY:       Social History     Socioeconomic History    Marital status: Single   Tobacco Use    Smoking status: Never    Smokeless tobacco: Never   Vaping Use    Vaping status: Never Used   Substance and Sexual Activity    Alcohol use: Yes     Comment: occasional    Drug use: No    Sexual activity: Yes     Partners: Female         FAMILY HISTORY:  Family History   Problem Relation Age of Onset    Hypertension Mother     Breast cancer Mother     Cancer Mother     Stroke Mother     Cancer Father     Liver cancer Father     Hypertension Father     Diabetes Father     Hypertension Sister     Heart attack Sister     Hypertension Brother     Cancer Brother     Hypertension Sister     Heart attack Sister     Learning disabilities Neg Hx        REVIEW OF SYSTEMS:  Review of Systems   Constitutional:  Negative for activity change.   HENT:  Negative for  nosebleeds and trouble swallowing.    Respiratory:  Negative for shortness of breath and wheezing.    Cardiovascular:  Negative for chest pain and palpitations.   Gastrointestinal:  Negative for constipation, diarrhea and nausea.   Genitourinary:  Negative for dysuria and hematuria.   Musculoskeletal:  Negative for arthralgias and myalgias.   Neurological:  Negative for seizures and syncope.   Hematological:  Negative for adenopathy. Does not bruise/bleed easily.   Psychiatric/Behavioral:  Negative for confusion.               Vitals:    05/12/25 1935 05/12/25 2344 05/13/25 0246 05/13/25 0802   BP: 113/75 131/78 92/61 131/86   BP Location: Right arm Right arm Right arm Right arm   Patient Position: Lying Lying Lying Lying   Pulse: 78   82   Resp: 18 18 18 20   Temp: 98.2 °F (36.8 °C) 98.3 °F (36.8 °C) 97.7 °F (36.5 °C) 97.7 °F (36.5 °C)   TempSrc: Oral Oral Oral Oral   SpO2:   97% 98%   Weight:              No data to display               PHYSICAL EXAM:    CONSTITUTIONAL:  Vital signs reviewed.  No distress, looks comfortable.  EYES:  Conjunctivae and lids unremarkable.  PERRLA  EARS, NOSE, MOUTH, THROAT:  Ears and nose appear unremarkable.  Lips, teeth, gums appear unremarkable.  RESPIRATORY:  Normal respiratory effort.  Lungs clear to auscultation bilaterally.  CARDIOVASCULAR:  Normal S1, S2.  No murmurs, rubs or gallops.  No significant lower extremity edema.  GASTROINTESTINAL: Abdomen appears unremarkable.  Nontender.  No hepatomegaly.  No splenomegaly.  LYMPHATIC:  No cervical, supraclavicular, axillary lymphadenopathy.  NEURO: Cranial nerves 2-12 grossly intact.  No focal deficits.  Appears to have equal strength all 4 extremities.  MUSCULOSKELETAL:  Unremarkable digits/nails.  No cyanosis or clubbing.  No apparent joint deformities.  SKIN:  Warm.  No rashes.  PSYCHIATRIC:  Normal judgment and insight.  Normal mood and affect.     RECENT LABS:        WBC   Date Value Ref Range Status   05/13/2025 3.00 (L)  3.40 - 10.80 10*3/mm3 Final   05/12/2025 4.57 3.40 - 10.80 10*3/mm3 Final   05/11/2025 5.35 3.40 - 10.80 10*3/mm3 Final     Hemoglobin   Date Value Ref Range Status   05/13/2025 8.0 (L) 13.0 - 17.7 g/dL Final   05/12/2025 8.2 (L) 13.0 - 17.7 g/dL Final   05/11/2025 8.4 (L) 13.0 - 17.7 g/dL Final     Platelets   Date Value Ref Range Status   05/13/2025 79 (L) 140 - 450 10*3/mm3 Final   05/12/2025 94 (L) 140 - 450 10*3/mm3 Final   05/12/2025 64 (L) 140 - 450 10*3/mm3 Final   05/11/2025 65 (L) 140 - 450 10*3/mm3 Final       Assessment & Plan   Mo Willoughby [unfilled]   *Thrombocytopenia  4/ 2015-July/2018: -265  August 2018-April 2024: PLT  (mostly low)  5/5/2025 (admission)-5/10/2025   5/11/2025: PLT 65.  5/12/25: PLT 64.  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  No significant schistocytes to suggest TTP    *Anemia  Since 2022, Hb widely fluctuates, normal down to as low as 7.3  5/13/2025 (initial consult): Hb 8    *Suggestive of iron deficiency  5/10/2025: Ferritin normal at 239 (but recent significant spine surgery may have falsely elevated), 7% saturation  *Leukocytopenia  WBC normal through 5/12/2025 5/13/2025 (initial consult): WBC 3 (first low).  Ferrlecit 250 mg IV x 2 doses.    *Neutropenia  ANC normal through 5/12/2025 5/13/2025 (initial consult): ANC 1370 (first low).    *Macrocytosis  Could be due to liver disease    *Etiology of cytopenias:  Suspect related at least in part to   significant spine surgery 5/8/2025  Underlying cirrhosis and splenomegaly by imaging    *Cirrhosis and splenomegaly by imaging  Patient states no prior knowledge of cirrhosis but notes heavy alcohol intermittently for years    *Indeterminate (suspected complex cyst) bilateral  renal cortical nodules on CT 5/7/2025.  Radiologist recommends renal protocol CT (this can be done as an outpatient by PCP).    Plan  Agree with oral iron  Ferrlecit 250 mg IV x 2 doses.  PCP can follow-up as an outpatient indeterminate kidney lesions with the radiologist suspects are complex cysts.  Recommend transfusion support as needed  Check INR, PTT, fibrinogen and reticulated hemoglobin

## 2025-05-14 LAB
ALBUMIN SERPL-MCNC: 2.7 G/DL (ref 3.5–5.2)
ANION GAP SERPL CALCULATED.3IONS-SCNC: 8 MMOL/L (ref 5–15)
BASOPHILS # BLD AUTO: 0.02 10*3/MM3 (ref 0–0.2)
BASOPHILS NFR BLD AUTO: 0.7 % (ref 0–1.5)
BUN SERPL-MCNC: 25 MG/DL (ref 8–23)
BUN/CREAT SERPL: 21.4 (ref 7–25)
CALCIUM SPEC-SCNC: 9.2 MG/DL (ref 8.6–10.5)
CHLORIDE SERPL-SCNC: 109 MMOL/L (ref 98–107)
CO2 SERPL-SCNC: 19 MMOL/L (ref 22–29)
CREAT SERPL-MCNC: 1.17 MG/DL (ref 0.76–1.27)
DEPRECATED RDW RBC AUTO: 48.1 FL (ref 37–54)
EGFRCR SERPLBLD CKD-EPI 2021: 68.3 ML/MIN/1.73
EOSINOPHIL # BLD AUTO: 0.13 10*3/MM3 (ref 0–0.4)
EOSINOPHIL NFR BLD AUTO: 4.5 % (ref 0.3–6.2)
ERYTHROCYTE [DISTWIDTH] IN BLOOD BY AUTOMATED COUNT: 13.1 % (ref 12.3–15.4)
GLUCOSE SERPL-MCNC: 95 MG/DL (ref 65–99)
HCT VFR BLD AUTO: 24.4 % (ref 37.5–51)
HGB BLD-MCNC: 8.1 G/DL (ref 13–17.7)
IMM GRANULOCYTES # BLD AUTO: 0.01 10*3/MM3 (ref 0–0.05)
IMM GRANULOCYTES NFR BLD AUTO: 0.3 % (ref 0–0.5)
LYMPHOCYTES # BLD AUTO: 0.79 10*3/MM3 (ref 0.7–3.1)
LYMPHOCYTES NFR BLD AUTO: 27.5 % (ref 19.6–45.3)
MCH RBC QN AUTO: 33.9 PG (ref 26.6–33)
MCHC RBC AUTO-ENTMCNC: 33.2 G/DL (ref 31.5–35.7)
MCV RBC AUTO: 102.1 FL (ref 79–97)
MONOCYTES # BLD AUTO: 0.57 10*3/MM3 (ref 0.1–0.9)
MONOCYTES NFR BLD AUTO: 19.9 % (ref 5–12)
NEUTROPHILS NFR BLD AUTO: 1.35 10*3/MM3 (ref 1.7–7)
NEUTROPHILS NFR BLD AUTO: 47.1 % (ref 42.7–76)
PHOSPHATE SERPL-MCNC: 3.6 MG/DL (ref 2.5–4.5)
PLATELET # BLD AUTO: 96 10*3/MM3 (ref 140–450)
PMV BLD AUTO: 10.2 FL (ref 6–12)
POTASSIUM SERPL-SCNC: 4.4 MMOL/L (ref 3.5–5.2)
RBC # BLD AUTO: 2.39 10*6/MM3 (ref 4.14–5.8)
SODIUM SERPL-SCNC: 136 MMOL/L (ref 136–145)
WBC NRBC COR # BLD AUTO: 2.87 10*3/MM3 (ref 3.4–10.8)

## 2025-05-14 PROCEDURE — 85025 COMPLETE CBC W/AUTO DIFF WBC: CPT | Performed by: INTERNAL MEDICINE

## 2025-05-14 PROCEDURE — 99232 SBSQ HOSP IP/OBS MODERATE 35: CPT | Performed by: INTERNAL MEDICINE

## 2025-05-14 PROCEDURE — 97530 THERAPEUTIC ACTIVITIES: CPT

## 2025-05-14 PROCEDURE — 97535 SELF CARE MNGMENT TRAINING: CPT

## 2025-05-14 PROCEDURE — 63710000001 DIPHENHYDRAMINE PER 50 MG: Performed by: INTERNAL MEDICINE

## 2025-05-14 PROCEDURE — 25810000003 SODIUM CHLORIDE 0.9 % SOLUTION: Performed by: INTERNAL MEDICINE

## 2025-05-14 PROCEDURE — 25010000002 NA FERRIC GLUC CPLX PER 12.5 MG: Performed by: INTERNAL MEDICINE

## 2025-05-14 PROCEDURE — 80069 RENAL FUNCTION PANEL: CPT | Performed by: INTERNAL MEDICINE

## 2025-05-14 PROCEDURE — 99024 POSTOP FOLLOW-UP VISIT: CPT

## 2025-05-14 RX ADMIN — ROSUVASTATIN CALCIUM 10 MG: 20 TABLET, FILM COATED ORAL at 20:43

## 2025-05-14 RX ADMIN — GABAPENTIN 300 MG: 300 CAPSULE ORAL at 20:43

## 2025-05-14 RX ADMIN — GABAPENTIN 300 MG: 300 CAPSULE ORAL at 08:53

## 2025-05-14 RX ADMIN — TAMSULOSIN HYDROCHLORIDE 0.4 MG: 0.4 CAPSULE ORAL at 08:53

## 2025-05-14 RX ADMIN — FERROUS SULFATE TAB 325 MG (65 MG ELEMENTAL FE) 325 MG: 325 (65 FE) TAB at 08:53

## 2025-05-14 RX ADMIN — Medication 10 ML: at 09:01

## 2025-05-14 RX ADMIN — HYDROCODONE BITARTRATE AND ACETAMINOPHEN 1 TABLET: 7.5; 325 TABLET ORAL at 23:43

## 2025-05-14 RX ADMIN — PANTOPRAZOLE SODIUM 40 MG: 40 TABLET, DELAYED RELEASE ORAL at 20:43

## 2025-05-14 RX ADMIN — DIPHENHYDRAMINE HYDROCHLORIDE 25 MG: 25 CAPSULE ORAL at 08:53

## 2025-05-14 RX ADMIN — HYDROCODONE BITARTRATE AND ACETAMINOPHEN 1 TABLET: 7.5; 325 TABLET ORAL at 08:52

## 2025-05-14 RX ADMIN — SENNOSIDES AND DOCUSATE SODIUM 2 TABLET: 50; 8.6 TABLET ORAL at 08:52

## 2025-05-14 RX ADMIN — METHOCARBAMOL TABLETS 750 MG: 750 TABLET, COATED ORAL at 17:40

## 2025-05-14 RX ADMIN — Medication 100 MG: at 08:53

## 2025-05-14 RX ADMIN — METOPROLOL SUCCINATE 25 MG: 25 TABLET, EXTENDED RELEASE ORAL at 08:52

## 2025-05-14 RX ADMIN — HYDROCODONE BITARTRATE AND ACETAMINOPHEN 1 TABLET: 7.5; 325 TABLET ORAL at 17:40

## 2025-05-14 RX ADMIN — Medication 1000 MCG: at 08:52

## 2025-05-14 RX ADMIN — FOLIC ACID 1000 MCG: 1 TABLET ORAL at 08:52

## 2025-05-14 RX ADMIN — PANTOPRAZOLE SODIUM 40 MG: 40 TABLET, DELAYED RELEASE ORAL at 08:53

## 2025-05-14 RX ADMIN — ACETAMINOPHEN 325 MG: 325 TABLET ORAL at 08:52

## 2025-05-14 RX ADMIN — ALLOPURINOL 300 MG: 300 TABLET ORAL at 08:53

## 2025-05-14 RX ADMIN — SODIUM CHLORIDE 250 MG: 9 INJECTION, SOLUTION INTRAVENOUS at 10:24

## 2025-05-14 RX ADMIN — Medication 10 ML: at 08:59

## 2025-05-14 NOTE — PLAN OF CARE
Problem: Adult Inpatient Plan of Care  Goal: Plan of Care Review  Outcome: Progressing  Flowsheets (Taken 5/14/2025 1812)  Progress: improving  Plan of Care Reviewed With:   patient   spouse  Goal: Patient-Specific Goal (Individualized)  Outcome: Progressing  Goal: Absence of Hospital-Acquired Illness or Injury  Outcome: Progressing  Intervention: Identify and Manage Fall Risk  Recent Flowsheet Documentation  Taken 5/14/2025 1800 by Aman Garcia RN  Safety Promotion/Fall Prevention:   safety round/check completed   assistive device/personal items within reach  Taken 5/14/2025 1600 by Aman Garcia RN  Safety Promotion/Fall Prevention:   safety round/check completed   assistive device/personal items within reach  Taken 5/14/2025 1000 by Aman Garcia RN  Safety Promotion/Fall Prevention:   safety round/check completed   assistive device/personal items within reach  Taken 5/14/2025 0845 by Aman Garcia RN  Safety Promotion/Fall Prevention:   safety round/check completed   assistive device/personal items within reach  Intervention: Prevent Skin Injury  Recent Flowsheet Documentation  Taken 5/14/2025 0845 by Aman Garcia RN  Body Position: position changed independently  Skin Protection: incontinence pads utilized  Intervention: Prevent Infection  Recent Flowsheet Documentation  Taken 5/14/2025 1800 by Aman Garcia RN  Infection Prevention:   personal protective equipment utilized   environmental surveillance performed  Taken 5/14/2025 1600 by Aman Garcia RN  Infection Prevention:   personal protective equipment utilized   environmental surveillance performed  Taken 5/14/2025 1000 by Aman Garcia RN  Infection Prevention:   personal protective equipment utilized   environmental surveillance performed  Taken 5/14/2025 0845 by Aman Garcia RN  Infection Prevention:   personal protective equipment utilized   environmental surveillance  performed  Goal: Optimal Comfort and Wellbeing  Outcome: Progressing  Goal: Readiness for Transition of Care  Outcome: Progressing   Goal Outcome Evaluation:  Plan of Care Reviewed With: patient, spouse        Progress: improving        Patient A/O x4, forgetful. No neuro changes noted.Pain controlled well per MAR. Ice pack in brace x2 during shift. Use of TLSO brace for ambulation and sitting in chair. Had a large BM in pm, up to toilet. Phillips continues in place per JULIA.

## 2025-05-14 NOTE — PROGRESS NOTES
[facility] THORACIC/LUMBAR NEUROSURGERY POSTOP NOTE      CC: POD 6 open T12/L1 discectomy/decompression, PLIF with posterior Mazor instrementation T12/L2 for large central disc herniation contributing to compression of conus, severe cord compression and myelopathy.        Subjective     Interval History: No acute issues overnight.  Appears to be making progress.  [facility] excepted with pre-CERT pending.  Physical therapy notes the patient is progressing well.  Platelets trending upwards today.  Received platelets yesterday and continues oral iron today.  Oncology planning to follow-up in the outpatient setting.      Objective     Vital signs in last 24 hours:  Temp:  [97.2 °F (36.2 °C)-98.4 °F (36.9 °C)] 98.4 °F (36.9 °C)  Heart Rate:  [71-85] 82  Resp:  [18-20] 20  BP: (104-150)/(64-82) 129/79    Intake/Output this shift:  No intake/output data recorded.    Hemovac removed yesterday    LABS:  .  Results from last 7 days   Lab Units 05/14/25  0624 05/13/25  0550 05/12/25  2129 05/12/25  0701   WBC 10*3/mm3 2.87* 3.00*  --  4.57   HEMOGLOBIN g/dL 8.1* 8.0*  --  8.2*   HEMATOCRIT % 24.4* 23.8*  --  23.0*   PLATELETS 10*3/mm3 96* 79* 94* 64*     .  Results from last 7 days   Lab Units 05/14/25  0624   SODIUM mmol/L 136   POTASSIUM mmol/L 4.4   CHLORIDE mmol/L 109*   CO2 mmol/L 19.0*   BUN mg/dL 25*   CREATININE mg/dL 1.17   GLUCOSE mg/dL 95   CALCIUM mg/dL 9.2         IMAGING STUDIES:  No new neuroimaging    I personally viewed and interpreted the patient's labs, medications and chart.    Meds reviewed/changed: Yes    Current Facility-Administered Medications:     acetaminophen (TYLENOL) tablet 650 mg, 650 mg, Oral, Q4H PRN **OR** acetaminophen (TYLENOL) 160 MG/5ML oral solution 650 mg, 650 mg, Oral, Q4H PRN **OR** acetaminophen (TYLENOL) suppository 650 mg, 650 mg, Rectal, Q4H PRN, Abel Perez MD    allopurinol (ZYLOPRIM) tablet 300 mg, 300 mg, Oral, Daily, Abel Perez MD, 300 mg at 05/14/25  0853    sennosides-docusate (PERICOLACE) 8.6-50 MG per tablet 2 tablet, 2 tablet, Oral, BID, 2 tablet at 05/14/25 0852 **AND** polyethylene glycol (MIRALAX) packet 17 g, 17 g, Oral, Daily, 17 g at 05/13/25 0901 **AND** bisacodyl (DULCOLAX) EC tablet 5 mg, 5 mg, Oral, Daily PRN **AND** bisacodyl (DULCOLAX) suppository 10 mg, 10 mg, Rectal, Daily PRN, Lyric Rhodes APRN, 10 mg at 05/12/25 0210    Calcium Replacement - Follow Nurse / BPA Driven Protocol, , Not Applicable, PRN, Abel Perez MD    ferric gluconate (FERRLECIT) 250 MG in sodium chloride 0.9% 250 mL IVPB, 250 mg, Intravenous, Daily, Code, Yuri PLATT II, MD, Last Rate: 125 mL/hr at 05/14/25 1024, 250 mg at 05/14/25 1024    ferrous sulfate tablet 325 mg, 325 mg, Oral, Daily With Breakfast, Abel Perez MD, 325 mg at 05/14/25 0853    folic acid (FOLVITE) tablet 1,000 mcg, 1,000 mcg, Oral, Daily, Abel Perez MD, 1,000 mcg at 05/14/25 0852    gabapentin (NEURONTIN) capsule 300 mg, 300 mg, Oral, Q12H, Abel Perez MD, 300 mg at 05/14/25 0853    HYDROcodone-acetaminophen (NORCO) 7.5-325 MG per tablet 1 tablet, 1 tablet, Oral, Q4H PRN, Rohini Meza APRN, 1 tablet at 05/14/25 0852    Magnesium Standard Dose Replacement - Follow Nurse / BPA Driven Protocol, , Not Applicable, PRN, Abel Perez MD    methocarbamol (ROBAXIN) tablet 750 mg, 750 mg, Oral, 4x Daily PRN, Abel Perez MD, 750 mg at 05/12/25 2043    metoprolol succinate XL (TOPROL-XL) 24 hr tablet 25 mg, 25 mg, Oral, Daily, Abel Perez MD, 25 mg at 05/14/25 0852    midodrine (PROAMATINE) tablet 5 mg, 5 mg, Oral, TID PRN, Lucy Amezcua DO, 5 mg at 05/10/25 0815    naloxone (NARCAN) injection 0.1 mg, 0.1 mg, Intravenous, Q5 Min PRN, Abel Perez MD    nitroglycerin (NITROSTAT) SL tablet 0.4 mg, 0.4 mg, Sublingual, Q5 Min PRN, Abel Perez MD    ondansetron ODT (ZOFRAN-ODT) disintegrating tablet 4 mg, 4 mg, Oral, Q6H PRN **OR** ondansetron  (ZOFRAN) injection 4 mg, 4 mg, Intravenous, Q6H PRN, Abel Perez MD    ondansetron ODT (ZOFRAN-ODT) disintegrating tablet 4 mg, 4 mg, Oral, Q6H PRN **OR** ondansetron (ZOFRAN) injection 4 mg, 4 mg, Intravenous, Q6H PRN, Abel Perez MD    pantoprazole (PROTONIX) EC tablet 40 mg, 40 mg, Oral, BID, Abel Perez MD, 40 mg at 05/14/25 0853    Phosphorus Replacement - Follow Nurse / BPA Driven Protocol, , Not Applicable, PRN, Abel Perez MD    Potassium Replacement - Follow Nurse / BPA Driven Protocol, , Not Applicable, PRN, Abel Perez MD    rosuvastatin (CRESTOR) tablet 10 mg, 10 mg, Oral, Daily, Abel Perez MD, 10 mg at 05/13/25 2024    sodium chloride 0.9 % flush 10 mL, 10 mL, Intravenous, Q12H, Abel Perez MD, 10 mL at 05/14/25 0901    sodium chloride 0.9 % flush 10 mL, 10 mL, Intravenous, PRN, Abel Perez MD    sodium chloride 0.9 % infusion 40 mL, 40 mL, Intravenous, PRN, Abel Perez MD    tamsulosin (FLOMAX) 24 hr capsule 0.4 mg, 0.4 mg, Oral, Daily, Sahra Luis MD, 0.4 mg at 05/14/25 0853    thiamine (VITAMIN B-1) tablet 100 mg, 100 mg, Oral, Daily, Abel Perez MD, 100 mg at 05/14/25 0853    vitamin B-12 (CYANOCOBALAMIN) tablet 1,000 mcg, 1,000 mcg, Oral, Daily, Abel Perez MD, 1,000 mcg at 05/14/25 0852    Norco 7.5 mg every 4 hours as needed: 2 doses yesterday and 1 dose today as of 1107.    Physical Exam:    General:   Awake, alert.  Back:    Incision dressing clean dry and intact.   -SLR  Abdomen:    NT/ND   Motor:   Strength examined in bed BLE 5-/5 except for baseline left foot drop no fasciculations, rigidity, spasticity, or abnormal movements.  Reflexes:   1+ in the lower extremities. no clonus  Sensation:   Normal to light touch aman LEs  Station and Gait:             Per PT note 5/13/2025:  Patient was in bed and agreeable to PT/OT cotreat.  He reports he has been up to the bedside commode with nursing staff earlier today.   Patient was educated on logrolling and completed bed mobility with contact-guard assist.  Back brace was donned sitting edge of bed therapist position brace but patient was able to close the brace and tighten the straps with verbal cues.  Pt stood multiple times with min to mod assist and RWX.  He ambulated several short bouts in the room with min to mod assist and rolling walker.  Patient remains slightly unsteady when turning and requires assist to turn walker at times.  Patient is progressing well.  Continue to recommend inpatient rehab to improve mobility and independence prior to returning home.     Extremities:   Wearing SCD      Assessment & Plan     ASSESSMENT:      Weakness of left lower extremity    CRI (chronic renal insufficiency), stage 3 (moderate)    Essential hypertension, benign    DDD (degenerative disc disease), lumbar    Imbalance    Foot drop, left    Atrophy of muscle of multiple sites    Tongue fasciculation    Acute bilateral low back pain with bilateral sciatica    Herniation of thoracolumbar intervertebral disc with myelopathy  POD 6 open T12/L1 discectomy/decompression, PLIF with posterior Mazor instrementation T12/L2 for large central disc herniation contributing to compression of conus, severe cord compression and myelopathy.  Continues with postop weakness requiring inpatient acute rehab.    Thoracic myelopathy, left leg weakness and L foot drop.   Continue PT/OT. Acute rehab will be needed at discharge. [facility] with pre-CERT pending.    Urinary retention-preop conus medullaris syndrome.  Phillips in place due to retention.  Moving bowels.    Anemia and thrombocytopenia: Continues with thrombocytopenia although platelets elevated today.  Received 2 unit of platelets yesterday and continues oral iron.    Neurosurgery will continue to follow.    I discussed the patient's findings and my recommendations with patient, nursing staff, and   "Chris.    During patient visit, I utilized appropriate personal protective equipment including mask and gloves.  Mask used was standard procedure mask. Appropriate PPE was worn during the entire visit.  Hand hygiene was completed before and after.      LOS: 9 days       [facility], APRN  5/14/2025  11:04 EDT    \"Dictated utilizing Dragon dictation\".    "

## 2025-05-14 NOTE — PROGRESS NOTES
Name: Mo Willoughby ADMIT: 2025   : 1955  PCP: Jenn Davison APRN    MRN: [id number] LOS: 9 days   AGE/SEX: 69 y.o. male  ROOM: [facility]     Subjective   Subjective   Back pain is controlled.  No radiation of the back pain to the lower extremity.  Reports improvement in weakness of the of the left lower extremity.  No left upper extremity weakness today.  Could not pass urine spontaneously for the last 3 days after catheter removal and Phillips catheter was reinserted yesterday.  Clear urine in the Phillips bag...  Resolved pain and improved redness and swelling at the IV site of the left upper extremity.     Review of Systems  Cardiac vascular/respiratory.  No chest pain/no palpitations/no shortness of breath/no cough     Objective   Objective   Vital Signs  Temp:  [97.2 °F (36.2 °C)-98.8 °F (37.1 °C)] 98.8 °F (37.1 °C)  Heart Rate:  [73-82] 73  Resp:  [18-20] 20  BP: (104-129)/(64-84) 123/82  SpO2:  [97 %-99 %] 99 %  on   ;   Device (Oxygen Therapy): room air    Intake/Output Summary (Last 24 hours) at 2025 1836  Last data filed at 2025 1729  Gross per 24 hour   Intake --   Output 2000 ml   Net -2000 ml     Body mass index is 32.6 kg/m².      25  1800   Weight: 108 kg (238 lb 1.6 oz)     Physical Exam  General.  Middle-aged gentleman.  Obese.  Alert and oriented x 4.  In no apparent pain/distress/diaphoresis.  Normal mood and affect.  Eyes.  Pupils equal round and reactive.  Intact extraocular musculature.  No pallor or jaundice.  Oral cavity.  Moist mucous membrane.  Neck.  Supple.  No JVD.  No lymphadenopathy or thyromegaly  Cardio vascular.  Regular rate and rhythm and grade 2 systolic murmur.  Chest.  Clear to auscultation bilaterally with no added sounds.  Abdomen.  Soft lax.  No tenderness.  No organomegaly.  No guarding or rebound.    .  No CVA tenderness with clear urine in the Phillips bag.  Extremities.  No clubbing/cyanosis.  No redness and swelling of the left upper  "extremity  Spine.  Hemovac removed.  Dressed L-spine and thoracic spine wound.  CNS.  Improved left dorsi flexion on the left lower extremity around grade 4/5 now.  Normal power of the left upper extremity    Results Review:      Results from last 7 days   Lab Units 05/14/25 0624 05/13/25  0550 05/12/25  0701 05/11/25  0638 05/10/25  0532 05/09/25  1029 05/09/25  0546 05/08/25 2113   SODIUM mmol/L 136 135* 135* 136 135*  --  137 138   POTASSIUM mmol/L 4.4 4.5 4.5 4.3 4.8 4.3 5.9* 4.7   CHLORIDE mmol/L 109* 107 107 106 102  --  107 106   CO2 mmol/L 19.0* 19.4* 20.1* 18.6* 21.0*  --  20.6* 19.9*   BUN mg/dL 25* 32* 40* 41* 37*  --  23 19   CREATININE mg/dL 1.17 1.24 1.64* 2.32* 2.74*  --  1.35* 1.13   GLUCOSE mg/dL 95 100* 107* 114* 105*  --  128* 151*   CALCIUM mg/dL 9.2 9.3 9.2 8.8 9.2  --  9.8 9.5   AST (SGOT) U/L  --   --   --  33  --   --   --   --    ALT (SGPT) U/L  --   --   --  15  --   --   --   --      Estimated Creatinine Clearance: 77.4 mL/min (by C-G formula based on SCr of 1.17 mg/dL).  Results from last 7 days   Lab Units 05/11/25 0638   HEMOGLOBIN A1C % 4.80                         Results from last 7 days   Lab Units 05/14/25 0624 05/11/25  0638 05/09/25  0546 05/08/25 2113   MAGNESIUM mg/dL  --   --  2.3 1.4*   PHOSPHORUS mg/dL 3.6   < >  --   --     < > = values in this interval not displayed.           Invalid input(s): \"LDLCALC\"  Results from last 7 days   Lab Units 05/14/25 0624 05/13/25  0550 05/12/25 2129 05/12/25  0701 05/11/25  0638 05/10/25  0532 05/09/25  0546 05/09/25  0024   WBC 10*3/mm3 2.87* 3.00*  --  4.57 5.35 8.24 6.96 6.10   HEMOGLOBIN g/dL 8.1* 8.0*  --  8.2* 8.4* 9.3* 11.1* 11.6*   HEMATOCRIT % 24.4* 23.8*  --  23.0* 25.3* 27.4* 32.5* 34.9*   PLATELETS 10*3/mm3 96* 79* 94* 64* 65* 96* 113* 91*   MCV fL 102.1* 101.7*  --  100.4* 101.6* 101.1* 100.9* 104.2*   MCH pg 33.9* 34.2*  --  35.8* 33.7* 34.3* 34.5* 34.6*   MCHC g/dL 33.2 33.6  --  35.7 33.2 33.9 34.2 33.2   RDW % 13.1 " 12.7  --  12.6 12.8 12.7 12.9 13.1   RDW-SD fl 48.1 46.3  --  45.5 47.3 46.6 47.2 50.2   MPV fL 10.2 10.7  --  10.2 9.9 10.7 9.9 9.9   NEUTROPHIL % % 47.1 45.7  --  48.4  --  59.2 80.1* 84.7*   LYMPHOCYTE % % 27.5 31.0  --  27.1  --  20.4 9.6* 8.7*   MONOCYTES % % 19.9* 18.3*  --  20.1*  --  18.1* 9.6 5.9   EOSINOPHIL % % 4.5 4.0  --  3.3  --  1.2 0.0* 0.0*   BASOPHIL % % 0.7 0.7  --  0.7  --  0.7 0.1 0.2   IMM GRAN % % 0.3 0.3  --  0.4  --  0.4 0.6* 0.5   NEUTROS ABS 10*3/mm3 1.35* 1.37*  --  2.21 3.19 4.88 5.57 5.17   LYMPHS ABS 10*3/mm3 0.79 0.93  --  1.24  --  1.68 0.67* 0.53*   MONOS ABS 10*3/mm3 0.57 0.55  --  0.92*  --  1.49* 0.67 0.36   EOS ABS 10*3/mm3 0.13 0.12  --  0.15 0.27 0.10 0.00 0.00   BASOS ABS 10*3/mm3 0.02 0.02  --  0.03 0.05 0.06 0.01 0.01   IMMATURE GRANS (ABS) 10*3/mm3 0.01 0.01  --  0.02  --  0.03 0.04 0.03   NRBC /100 WBC  --  0.0  --   --  0.0 0.0  --  0.0     Results from last 7 days   Lab Units 05/13/25  1434   INR  1.15*   APTT seconds 33.6                                       Results from last 7 days   Lab Units 05/11/25  0503 05/10/25  1728   SODIUM UR mmol/L 57  --    CREATININE UR mg/dL 134.9  --    OSMOLALITY UR mOsm/kg 415  --    URIC ACID mg/dL  --  4.4       Imaging:  Imaging Results (Last 24 Hours)       ** No results found for the last 24 hours. **               I reviewed the patient's new clinical results / labs / tests / procedures      Assessment/Plan     Active Hospital Problems    Diagnosis  POA    **Weakness of left lower extremity [R29.898]  Yes    Herniation of thoracolumbar intervertebral disc with myelopathy [M51.05]  Yes    Acute bilateral low back pain with bilateral sciatica [M54.42, M54.41]  Yes    Tongue fasciculation [R25.3]  Yes    Foot drop, left [M21.372]  Yes    Atrophy of muscle of multiple sites [M62.59]  Yes    Imbalance [R26.89]  Yes    DDD (degenerative disc disease), lumbar [M51.369]  Yes    Essential hypertension, benign [I10]  Yes    CRI (chronic  renal insufficiency), stage 3 (moderate) [N18.30]  Yes      Resolved Hospital Problems   No resolved problems to display.           Left-sided weakness and left foot drop secondary to C/T/L-spine degenerative disc disease with severe spinal stenosis.  MRI of the C/T/L-spine noted.  MRI of the brain is negative.  CT scan of the chest without acute disease.  Status post neurology consult.  Status post neurosurgery consult and decompression surgery of thoracic and lumbar spine.  Physical therapy on board.  PCA pump DC'd.    Ambulate with TLSO brace.  Repeat MRI shows soft tissue/edema at the level of T12.  Neurosurgery is following.  I wonder if C-spine degenerative disc disease is causing his symptomatology of unsteadiness and left-sided weakness.  Improved symptomatology.  Urinary retention.  Failing voiding trial and Phillips catheter was reinserted.  Flomax initiated.  Voiding trial when ambulatory.  Left upper extremity SVT..  Will avoid aspirin or Lovenox secondary to thrombocytopenia   liver cirrhosis and splenomegaly by abdominal CT.  Benign GI examination.  Liver function test is normal.   GI follow-up as an outpatient.  Acute kidney failure/hyperkalemia.  Mostly prerenal azotemia because of hypotension and volume depletion.  Continue holding ACE inhibitor.  Patient appears clinically euvolemic.  Resolved acute kidney failure on IV fluid.  With DC'd.  Urine electrolytes nonconclusive.  Uric acid is normal.  Resolved hyperkalemia with holding ACE   History of paroxysmal SVT/dilated ascending aorta/hypotension In a patient with a history of hypertension improved hypotension with holding ACE.  Currently in normal sinus rhythm.  Continue with Toprol and monitor blood pressure.  No evidence of angina or congestive heart failure  Hyperglycemia.  A1c at 4.8  Postoperative anemia in a patient with a history of macrocytic anemia/thrombocytopenia.  Baseline hemoglobin between 12 and 13.  Anemia workup noted that suggestive  of iron deficiency.  Will continue p.o. iron.  Positive hemoglobin drop but now stabilizing..  Will transfuse packed RBC if Hb less than 8.  Baseline platelets between 100-140s.  Acute platelets drop is secondary to consumption after surgery.  Post platelets transfusion.  Baseline thrombocytopenia is most likely secondary to hypersplenism..  No active bleed.  Normal IPF.  Status post hematology oncology consultation.  No evidence of schistocytes to suggest TTP, no HIT.  Hematology oncology.  Hematology oncology started IV iron for the next 2 days and recommends transfusion as needed.    Hyperlipidemia.  Continue Crestor  DVT prophylaxis.  Sequential compression devices      Discussed my findings and plan of treatment with the patient/wife/nurse.  Disposition.   To SNF versus acute rehab once released by neurosurgery and arrangements are made.        Sahra Luis MD  [facility]  05/14/25  18:36 EDT

## 2025-05-14 NOTE — PROGRESS NOTES
REASON FOR FOLLOWUP/CHIEF COMPLAINT:  Cytopenias    HISTORY OF PRESENT ILLNESS:   No new issues overnight    Past Medical History, Past Surgical History, Social History, Family History have been reviewed and are without significant changes except as mentioned.    Review of Systems   Review of Systems   Constitutional:  Negative for activity change.   HENT:  Negative for nosebleeds and trouble swallowing.    Respiratory:  Negative for shortness of breath and wheezing.    Cardiovascular:  Negative for chest pain and palpitations.   Gastrointestinal:  Negative for constipation, diarrhea and nausea.   Genitourinary:  Negative for dysuria and hematuria.   Musculoskeletal:  Negative for arthralgias and myalgias.   Neurological:  Negative for seizures and syncope.   Hematological:  Negative for adenopathy. Does not bruise/bleed easily.   Psychiatric/Behavioral:  Negative for confusion.        Medications:  The current medication list was reviewed in the EMR    ALLERGIES:    Allergies   Allergen Reactions    Zetia [Ezetimibe] Dizziness     Nausea, fatgue              Vitals:    05/13/25 1912 05/13/25 2327 05/14/25 0333 05/14/25 0741   BP: 104/64 119/82 115/77 129/79   BP Location: Left arm Left arm Left arm Left arm   Patient Position: Lying Lying Lying Lying   Pulse: 75 73 79 82   Resp: 20 18 20 20   Temp: 98.1 °F (36.7 °C) 97.2 °F (36.2 °C) 98.1 °F (36.7 °C) 98.4 °F (36.9 °C)   TempSrc: Oral Oral Oral Oral   SpO2: 98% 98% 98% 97%   Weight:         Physical Exam    CONSTITUTIONAL:  Vital signs reviewed.  No distress, looks comfortable.  EYES:  Conjunctivae and lids unremarkable.  PERRLA  EARS, NOSE, MOUTH, THROAT:  Ears and nose appear unremarkable.  Lips, teeth, gums appear unremarkable.  RESPIRATORY:  Normal respiratory effort.  Lungs clear to auscultation bilaterally.  CARDIOVASCULAR:  Normal S1, S2.  No murmurs, rubs or gallops.  No significant lower extremity edema.  GASTROINTESTINAL: Abdomen appears  unremarkable.  Nontender.  No hepatomegaly.  No splenomegaly.  NEURO: Cranial nerves 2-12 grossly intact.  No focal deficits.  Appears to have equal strength all 4 extremities.  MUSCULOSKELETAL:  Unremarkable digits/nails.  No cyanosis or clubbing.  SKIN:  Warm.  No rashes.  PSYCHIATRIC:  Normal judgment and insight.  Normal mood and affect.         RECENT LABS:  WBC   Date Value Ref Range Status   05/14/2025 2.87 (L) 3.40 - 10.80 10*3/mm3 Final   05/13/2025 3.00 (L) 3.40 - 10.80 10*3/mm3 Final   05/12/2025 4.57 3.40 - 10.80 10*3/mm3 Final     Hemoglobin   Date Value Ref Range Status   05/14/2025 8.1 (L) 13.0 - 17.7 g/dL Final   05/13/2025 8.0 (L) 13.0 - 17.7 g/dL Final   05/12/2025 8.2 (L) 13.0 - 17.7 g/dL Final     Platelets   Date Value Ref Range Status   05/14/2025 96 (L) 140 - 450 10*3/mm3 Final   05/13/2025 79 (L) 140 - 450 10*3/mm3 Final   05/12/2025 94 (L) 140 - 450 10*3/mm3 Final   05/12/2025 64 (L) 140 - 450 10*3/mm3 Final       ASSESSMENT/PLAN:  Mo Willoughby P593/1     *Thrombocytopenia  4/ 2015-July/2018: -265  August 2018-April 2024: PLT  (mostly low)  5/5/2025 (admission)-5/10/2025   5/11/2025: PLT 65.  5/12/25: PLT 64.  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  No significant schistocytes to suggest TTP  Unremarkable: INR, PTT, fibrinogen  IPF 4.7  5/14/2025: PLT up to 96, within his baseline     *Anemia  Since 2022, Hb widely fluctuates, normal down to as low as 7.3  5/13/2025 (initial consult): Hb 8  5/14/2025: Hb 8.1     *Suggestive of iron deficiency  5/10/2025: Ferritin normal at 239 (but recent significant spine surgery may have falsely elevated), 7% saturation  *5/14/2025: Close #2 of 2 Ferrlecit 250 mg IV daily    *Leukocytopenia  WBC normal  through 5/12/2025 5/13/2025 (initial consult): WBC 3 (first low).   5/14/2025: WBC 2.9, suspect this is plateauing     *Neutropenia  ANC normal through 5/12/2025 5/13/2025 (initial consult): ANC 1370 (first low).  5/14/2025: ANC 1350, suspect this is plateauing     *Macrocytosis  Could be due to liver disease  .1     *Etiology of cytopenias:  Suspect related at least in part to   significant spine surgery 5/8/2025  Underlying cirrhosis and splenomegaly by imaging     *Cirrhosis and splenomegaly by imaging  Patient states no prior knowledge of cirrhosis but notes heavy alcohol intermittently for years     *Indeterminate (suspected complex cyst) bilateral renal cortical nodules on CT 5/7/2025.  Radiologist recommends renal protocol CT (this can be done as an outpatient by PCP).     Plan  Agree with oral iron  5/13/2025: Began Ferrlecit 250 mg IV x 2 doses.  PCP can follow-up as an outpatient indeterminate kidney lesions with the radiologist suspects are complex cysts.  Recommend transfusion support as needed  No follow-up planned in our office

## 2025-05-14 NOTE — PROGRESS NOTES
Nutrition Services    Patient Name: Mo Willoughby  YOB: 1955  MRN: [id number]  Admission date: 5/5/2025    PROGRESS NOTE      Encounter Information: LOS x9  POD 6 spinal discectomy/decompression        PO Diet: Diet: Regular/House; Fluid Consistency: Thin (IDDSI 0)   PO Supplements: n/a   PO Intake:  %       Current nutrition support: Oral diet    Nutrition support review: Tolerating diet, eating well        Weight: Weight: 108 kg (238 lb 1.6 oz) (05/05/25 1800)       Medications: reviewed   Labs: reviewed        GI Function:  last bowel movement: 5/13       Nutrition Intervention Updates: CTM oral intakes and encourage PO >75%       Results from last 7 days   Lab Units 05/14/25  0624 05/13/25  0550 05/12/25  0701 05/11/25  0638   SODIUM mmol/L 136 135* 135* 136   POTASSIUM mmol/L 4.4 4.5 4.5 4.3   CHLORIDE mmol/L 109* 107 107 106   CO2 mmol/L 19.0* 19.4* 20.1* 18.6*   BUN mg/dL 25* 32* 40* 41*   CREATININE mg/dL 1.17 1.24 1.64* 2.32*   CALCIUM mg/dL 9.2 9.3 9.2 8.8   BILIRUBIN mg/dL  --   --   --  0.5   ALK PHOS U/L  --   --   --  67   ALT (SGPT) U/L  --   --   --  15   AST (SGOT) U/L  --   --   --  33   GLUCOSE mg/dL 95 100* 107* 114*     Results from last 7 days   Lab Units 05/14/25  0624 05/10/25  0532 05/09/25  0546 05/09/25  0024 05/08/25  2113   MAGNESIUM mg/dL  --   --  2.3  --  1.4*   PHOSPHORUS mg/dL 3.6   < >  --   --   --    HEMOGLOBIN g/dL 8.1*   < > 11.1*   < >  --    HEMATOCRIT % 24.4*   < > 32.5*   < >  --     < > = values in this interval not displayed.     COVID19   Date Value Ref Range Status   05/02/2022 Not Detected Not Detected - Ref. Range Final     Lab Results   Component Value Date    HGBA1C 4.80 05/11/2025       RD to follow up per protocol.    Electronically signed by:  Lilia Capps RD  05/14/25 11:49 EDT

## 2025-05-14 NOTE — PLAN OF CARE
Goal Outcome Evaluation:  Plan of Care Reviewed With: patient, spouse        Progress: improving  Outcome Evaluation: Pt agreed to PT session, pt jeanine amb ~20ft rwx , min -2, pt required cues for posture and foot placement but did not require seated rest, just 2 standing rest breaks, jeanine LE exer in recliner after amb , pt stated LLE did not feel as weak this session and pain was tolerable, plans Acute RHB, pt motivated and wife present and supportive, will continue to progress distance as able    Anticipated Discharge Disposition (PT): inpatient rehabilitation facility

## 2025-05-14 NOTE — PLAN OF CARE
Goal Outcome Evaluation:  Plan of Care Reviewed With: patient, family        Progress: improving  Outcome Evaluation: Pt is perfoming ADLs and transfers with less assist needed today. He can be slightly impulsive and decreased safety awareness. He needs cues to maintain spinal precautions and wait to stand up until lines are managed on multiple occasions as well as reminder for importance from standing from toilet with help. Pt remains a falls risk and has LE weakness. Pt reports UE strength is improving. He is able to don his LSO after OT sets it up for him and manages straps with min difficulty/extra time. Continue to recommend rehab to improve safety and independence with ADLs prior to returning home. Pt remains below his baseline and has good potential for continued functional improvements.    Anticipated Discharge Disposition (OT): inpatient rehabilitation facility

## 2025-05-14 NOTE — THERAPY TREATMENT NOTE
Patient Name: Mo Willoughby  : 1955    MRN: [id number]                              Today's Date: 2025       Admit Date: 2025    Visit Dx:     ICD-10-CM ICD-9-CM   1. Weakness of left lower extremity  R29.898 729.89   2. Herniation of thoracolumbar intervertebral disc with myelopathy  M51.05 722.72   3. Tongue fasciculation  R25.3 781.0   4. Atrophy of muscle of multiple sites  M62.59 728.2     Patient Active Problem List   Diagnosis    Erectile dysfunction    Gout    Hypercalcemia    Colon polyp    Sinus tachycardia    Pancreatitis    Other fatigue    Ascending aortic aneurysm    Generalized abdominal pain    Folic acid deficiency    Iron deficiency anemia    Acute bilateral low back pain without sciatica    Swelling of the testicles    Urinary frequency    Hyperparathyroidism, unspecified    CRI (chronic renal insufficiency), stage 3 (moderate)    Essential hypertension, benign    Spinal stenosis of lumbar region    Lumbar radiculitis    Lumbar facet arthropathy    Screening PSA (prostate specific antigen)    Mixed hyperlipidemia    Change in pigmented skin lesion of face    Pain in both testicles    Sacroiliitis    DDD (degenerative disc disease), lumbar    Spondylolisthesis at L4-L5 level    Herniation of lumbar intervertebral disc with radiculopathy    Spinal stenosis of lumbar region with radiculopathy    H/O: gout on allopurinol    Premature atrial contractions    Arthritis of right hip    Right hip pain    Imbalance    Foot drop, left    Atrophy of muscle of multiple sites    Tongue fasciculation    Acute bilateral low back pain with bilateral sciatica    Weakness of left lower extremity    Herniation of thoracolumbar intervertebral disc with myelopathy     Past Medical History:   Diagnosis Date    Abnormal TSH     Arthritis     Ascending aortic aneurysm     Colon polyp 24    CRI (chronic renal insufficiency), stage 3 (moderate) 2016    from stage 3A to 4    DDD (degenerative disc  disease), lumbar     ED (erectile dysfunction)     Erectile dysfunction     Essential hypertension, benign     Folic acid deficiency     Gout     Hip pain, right     Hx of colonic polyp     Hypercalcemia 2015    as far back as data goes so likely pre-dates even this time    Hyperparathyroidism, unspecified 2016    as far back as data goes likely pre-dates even this date, likely multifactorial some primary and some secondary , w/ imaging from 10/16 showing possible L inferior adenoma    Iron deficiency anemia     Low back pain with bilateral sciatica     Mixed hyperlipidemia 02/27/2023    New onset atrial fibrillation 04/15/2024    Pancreatitis     Risk factors for obstructive sleep apnea     Spinal stenosis of lumbar region with neurogenic claudication     Syncope and collapse 03/28/2017     Past Surgical History:   Procedure Laterality Date    CHOLECYSTECTOMY      CHOLECYSTECTOMY WITH INTRAOPERATIVE CHOLANGIOGRAM N/A 08/03/2018    Procedure: CHOLECYSTECTOMY LAPAROSCOPIC INTRAOPERATIVE CHOLANGIOGRAM;  Surgeon: Melina Kate MD;  Location: [facility] MAIN OR;  Service: General    COLONOSCOPY      COLONOSCOPY N/A 10/03/2016    Procedure: COLONOSCOPY TO CECUM TO TERMINAL ILIUM WITH COLD BIOPSY POLYPECTOMY;  Surgeon: Benoit Vilchis MD;  Location: [facility] ENDOSCOPY;  Service:     COLONOSCOPY N/A 05/05/2022    Procedure: COLONOSCOPY TO CECUM WITH COLD SNARE POLYPECTOMIES & RESOLUTION CLIP PLACEMENT X 2;  Surgeon: Benoit Mosley MD;  Location: [facility] ENDOSCOPY;  Service: Gastroenterology;  Laterality: N/A;  ANEMIA/POLYPS, HEMORRHOIDS     ENDOSCOPY N/A 05/05/2022    Procedure: ESOPHAGOGASTRODUODENOSCOPY WITH BX;  Surgeon: Benoit Mosley MD;  Location: [facility] ENDOSCOPY;  Service: Gastroenterology;  Laterality: N/A;  ANEMIA/PORTAL GASTROPATHY, EROSIVE GASTRITIS     EPIDURAL Right 12/07/2022    Procedure: LUMBAR/SACRAL TRANSFORAMINAL EPIDURAL Right L2-3 and right L4-5;  Surgeon: Isaura Torres MD;   Location: [facility] MAIN OR;  Service: Pain Management;  Laterality: Right;    LIPOMA EXCISION      LUMBAR DISCECTOMY FUSION INSTRUMENTATION N/A 5/8/2025    Procedure: Open thoracic twelve/lumbar one discectomy/decompression/possible interbody fusion with cages, pedicle screw/terry/crosslink fixation, thoracic twelve/lumbar one/two with Mazor robotic navigation;  Surgeon: Abel Perez MD;  Location: [facility] MAIN OR;  Service: Robotics - Neuro;  Laterality: N/A;    LUMBAR LAMINECTOMY DISCECTOMY DECOMPRESSION N/A 7/7/2023    Procedure: Open right sided lumbar decompression lumbar three/four, lumbar four/five;  Surgeon: Abel Perez MD;  Location:  [facility] MAIN OR;  Service: Neurosurgery;  Laterality: N/A;    MEDIAL BRANCH BLOCK Right 01/23/2023    Procedure: LUMBAR MEDIAL BRANCH BLOCK RIGHT L3-L5 #1;  Surgeon: Isaura Torres MD;  Location: [facility] MAIN OR;  Service: Pain Management;  Laterality: Right;    MEDIAL BRANCH BLOCK Right 02/13/2023    Procedure: LUMBAR MEDIAL BRANCH BLOCK RIGHT L3-L5 #1;  Surgeon: Isaura Torres MD;  Location: [facility] MAIN OR;  Service: Pain Management;  Laterality: Right;    NERVE SURGERY Right 3/6/2023    Procedure: LUMBAR RADIOFREQUENCY ABLATION RIGHT L3-L5  83538, 84976;  Surgeon: Isaura Torres MD;  Location: [facility] MAIN [facility];  Service: Pain Management;  Laterality: Right;    SACROILIAC JOINT INJECTION Right 5/5/2023    Procedure: SACROILIAC JOINT INJECTION RIGHT 74299;  Surgeon: Isaura Torres MD;  Location: [facility] MAIN OR;  Service: Pain Management;  Laterality: Right;    TONSILLECTOMY        General Information       Row Name 05/14/25 1509          OT Time and Intention    Document Type therapy note (daily note)  -SM     Mode of Treatment occupational therapy;individual therapy  -SM     Patient Effort good  -SM       Row Name 05/14/25 150          General Information    Patient Profile Reviewed yes  -SM     Existing Precautions/Restrictions fall;spinal;brace worn when out of  bed;LSO  -[facility] Name 05/14/25 1509          Cognition    Orientation Status (Cognition) oriented x 4  -[facility] Name 05/14/25 1509          Safety Issues/Impairments Affecting Functional Mobility    Safety Issues Affecting Function (Mobility) safety precautions follow-through/compliance;safety precaution awareness  -     Impairments Affecting Function (Mobility) balance;endurance/activity tolerance;strength;pain  -               User Key  (r) = Recorded By, (t) = Taken By, (c) = Cosigned By      Initials Name Provider Type     Phuong Foster, BRIAN/L, CSRS Occupational Therapist                     Mobility/ADL's       [facility] Name 05/14/25 1510          Bed Mobility    Sit-Supine [place] (Bed Mobility) minimum assist (75% patient effort);verbal cues  -     Bed Mobility, Safety Issues decreased use of legs for bridging/pushing;decreased use of arms for pushing/pulling  -     Assistive Device (Bed Mobility) bed rails;head of bed elevated  -     Comment, (Bed Mobility) cues for log rolling, poor carryover of safe spinal precautions on first attempt  -SM       Row Name 05/14/25 1510          Transfers    Transfers toilet transfer  -SM       Row Name 05/14/25 1510          Sit-Stand Transfer    Sit-Stand [place] (Transfers) minimum assist (75% patient effort);verbal cues  -     Assistive Device (Sit-Stand Transfers) walker, front-wheeled  -SM       Row Name 05/14/25 1510          Toilet Transfer    [place] Level (Toilet Transfer) contact guard;verbal cues  -     Assistive Device (Toilet Transfer) walker, front-wheeled;grab bars/safety frame  -SM       Row Name 05/14/25 1510          Functional Mobility    Functional Mobility- Ind. Level minimum assist (75% patient effort)  -     Functional Mobility- Device walker, front-wheeled  -     Functional Mobility-Distance (Feet) --  10+8  -SM       Row Name 05/14/25 1510          Lower Body Dressing Assessment/Training    [place]  Level (Lower Body Dressing) don;doff;dependent (less than 25% patient effort);socks  -SM     Position (Lower Body Dressing) edge of bed sitting  -SM       Row Name 05/14/25 1510          Toileting Assessment/Training    [place] Level (Toileting) toileting skills;contact guard assist  -SM               User Key  (r) = Recorded By, (t) = Taken By, (c) = Cosigned By      Initials Name Provider Type     Phuong Foster, OTR/L, CSRS Occupational Therapist                   Obj/Interventions       Row Name 05/14/25 1511          Balance    Static Sitting Balance standby assist  -SM     Position, Sitting Balance sitting edge of bed  -SM     Static Standing Balance contact guard  -SM     Dynamic Standing Balance minimal assist  -SM     Position/Device Used, Standing Balance supported;walker, rolling  -SM               User Key  (r) = Recorded By, (t) = Taken By, (c) = Cosigned By      Initials Name Provider Type     Phuong Foster, OTR/L, CSRS Occupational Therapist                   Goals/Plan    No documentation.                  Clinical Impression       Row Name 05/14/25 1512          Pain Assessment    Pretreatment Pain Rating 0/10 - no pain  -SM     Posttreatment Pain Rating 0/10 - no pain  -SM       Row Name 05/14/25 1512          Plan of Care Review    Plan of Care Reviewed With patient;family  -     Progress improving  -     Outcome Evaluation Pt is perfoming ADLs and transfers with less assist needed today. He can be slightly impulsive and decreased safety awareness. He needs cues to maintain spinal precautions and wait to stand up until lines are managed on multiple occasions as well as reminder for importance from standing from toilet with help. Pt remains a falls risk and has LE weakness. Pt reports UE strength is improving. He is able to don his LSO after OT sets it up for him and manages straps with min difficulty/extra time. Continue to recommend rehab to improve safety and independence  with ADLs prior to returning home. Pt remains below his baseline and has good potential for continued functional improvements.  -       Row Name 05/14/25 1512          Therapy Plan Review/Discharge Plan (OT)    Anticipated Discharge Disposition (OT) inpatient rehabilitation facility  -       Row Name 05/14/25 1512          Positioning and Restraints    Pre-Treatment Position in bed  -SM     Post Treatment Position chair  -SM     In Chair reclined;call light within reach;encouraged to call for assist;exit alarm on;notified ns  -               User Key  (r) = Recorded By, (t) = Taken By, (c) = Cosigned By      Initials Name Provider Type     Phuong Foster, OTR/L, CSRS Occupational Therapist                   Outcome Measures       Row Name 05/14/25 1515          How much help from another is currently needed...    Putting on and taking off regular lower body clothing? 2  -SM     Bathing (including washing, rinsing, and drying) 2  -SM     Toileting (which includes using toilet bed pan or urinal) 3  -SM     Putting on and taking off regular upper body clothing 3  -SM     Taking care of personal grooming (such as brushing teeth) 3  -SM     Eating meals 4  -SM     AM-PAC 6 Clicks Score (OT) 17  -SM       Row Name 05/14/25 0845          How much help from another person do you currently need...    Turning from your back to your side while in flat bed without using bedrails? 4  -SD     Moving from lying on back to sitting on the side of a flat bed without bedrails? 3  -SD     Moving to and from a bed to a chair (including a wheelchair)? 2  -SD     Standing up from a chair using your arms (e.g., wheelchair, bedside chair)? 3  -SD     Climbing 3-5 steps with a railing? 2  -SD     To walk in hospital room? 2  -SD     AM-PAC 6 Clicks Score (PT) 16  -SD     Highest Level of Mobility Goal Stand (1 or More Minutes)-5  -SD       [facility] Name 05/14/25 1515          Functional Assessment    Outcome Measure Options AM-PAC 6  Clicks Daily Activity (OT)  -SM               User Key  (r) = Recorded By, (t) = Taken By, (c) = Cosigned By      Initials Name Provider Type    Aman Hogue, RN Registered Nurse    Phuong La OTR/L, CSRS Occupational Therapist                      OT Recommendation and Plan     Plan of Care Review  Plan of Care Reviewed With: patient, family  Progress: improving  Outcome Evaluation: Pt is perfoming ADLs and transfers with less assist needed today. He can be slightly impulsive and decreased safety awareness. He needs cues to maintain spinal precautions and wait to stand up until lines are managed on multiple occasions as well as reminder for importance from standing from toilet with help. Pt remains a falls risk and has LE weakness. Pt reports UE strength is improving. He is able to don his LSO after OT sets it up for him and manages straps with min difficulty/extra time. Continue to recommend rehab to improve safety and independence with ADLs prior to returning home. Pt remains below his baseline and has good potential for continued functional improvements.     Time Calculation:         Time Calculation- OT       Row Name 05/14/25 1516             Time Calculation- OT    OT Start Time 1434  -SM      OT Stop Time 1450  -SM      OT Time Calculation (min) 16 min  -SM      OT Received On 05/14/25  -SM         Timed Charges    44118 - OT Self Care/Mgmt Minutes 16  -SM         Total Minutes    Timed Charges Total Minutes 16  -SM       Total Minutes 16  -SM                User Key  (r) = Recorded By, (t) = Taken By, (c) = Cosigned By      Initials Name Provider Type    Phuong La OTR/L, CSRS Occupational Therapist                  Therapy Charges for Today       Code Description Service Date Service Provider Modifiers Qty    [id number]  OT SELF CARE/MGMT/TRAIN EA 15 MIN 5/14/2025 Phuong Foster OTR/L, CSRS GO 1                 JESSICA Mcdaniel CSRS  5/14/2025

## 2025-05-14 NOTE — PLAN OF CARE
Goal Outcome Evaluation:      No changes. Patient pain controlled, A&O x4, Room air with no neuro changes.      Problem: Adult Inpatient Plan of Care  Goal: Plan of Care Review  Outcome: Progressing  Goal: Patient-Specific Goal (Individualized)  Outcome: Progressing  Goal: Absence of Hospital-Acquired Illness or Injury  Outcome: Progressing  Intervention: Identify and Manage Fall Risk  Recent Flowsheet Documentation  Taken 5/14/2025 0216 by Mike Waldrop RN  Safety Promotion/Fall Prevention:   safety round/check completed   room organization consistent   nonskid shoes/slippers when out of bed   fall prevention program maintained   clutter free environment maintained  Taken 5/14/2025 0046 by Mike Waldrop RN  Safety Promotion/Fall Prevention:   nonskid shoes/slippers when out of bed   safety round/check completed   room organization consistent   fall prevention program maintained   clutter free environment maintained  Taken 5/13/2025 2211 by Mike Waldrop RN  Safety Promotion/Fall Prevention:   nonskid shoes/slippers when out of bed   safety round/check completed   room organization consistent   fall prevention program maintained   clutter free environment maintained  Taken 5/13/2025 2026 by Mike Waldrop RN  Safety Promotion/Fall Prevention:   safety round/check completed   room organization consistent   nonskid shoes/slippers when out of bed   fall prevention program maintained   clutter free environment maintained  Intervention: Prevent Skin Injury  Recent Flowsheet Documentation  Taken 5/14/2025 0216 by Mike Waldrop RN  Body Position:   weight shifting   left   position changed independently  Taken 5/14/2025 0046 by Mike Waldrop RN  Body Position:   sitting up in bed   tilted   position changed independently  Taken 5/13/2025 2211 by Mike Waldrop RN  Body Position:   weight shifting   position changed independently  Taken 5/13/2025 2026 by Mike Waldrop RN  Body Position:   sitting up in bed    position changed independently   weight shifting  Intervention: Prevent and Manage VTE (Venous Thromboembolism) Risk  Recent Flowsheet Documentation  Taken 5/14/2025 0046 by Mike Waldrop RN  VTE Prevention/Management:   bilateral   SCDs (sequential compression devices) on  Taken 5/13/2025 2026 by Mike Waldrop RN  VTE Prevention/Management:   bilateral   SCDs (sequential compression devices) on  Intervention: Prevent Infection  Recent Flowsheet Documentation  Taken 5/14/2025 0216 by Mike Waldrop RN  Infection Prevention:   single patient room provided   rest/sleep promoted  Taken 5/14/2025 0046 by Mike Waldrop RN  Infection Prevention:   single patient room provided   rest/sleep promoted  Taken 5/13/2025 2211 by Mike Waldrop RN  Infection Prevention:   single patient room provided   rest/sleep promoted  Taken 5/13/2025 2026 by Mike Waldrop RN  Infection Prevention:   single patient room provided   rest/sleep promoted   hand hygiene promoted  Goal: Optimal Comfort and Wellbeing  Outcome: Progressing  Intervention: Monitor Pain and Promote Comfort  Recent Flowsheet Documentation  Taken 5/13/2025 2025 by Mike Waldrop RN  Pain Management Interventions: pain medication given  Intervention: Provide Person-Centered Care  Recent Flowsheet Documentation  Taken 5/14/2025 0046 by Mike Waldrop RN  Trust Relationship/Rapport:   care explained   choices provided   thoughts/feelings acknowledged  Taken 5/13/2025 2026 by Mike Waldrop RN  Trust Relationship/Rapport:   care explained   choices provided   thoughts/feelings acknowledged  Goal: Readiness for Transition of Care  Outcome: Progressing     Problem: Comorbidity Management  Goal: Maintenance of Osteoarthritis Symptom Control  Outcome: Progressing  Intervention: Maintain Osteoarthritis Symptom Control  Recent Flowsheet Documentation  Taken 5/14/2025 0216 by Mike Waldrop RN  Medication Review/Management:   medications reviewed   high-risk  medications identified  Taken 5/14/2025 0046 by Mike Waldrop RN  Medication Review/Management:   medications reviewed   high-risk medications identified  Taken 5/13/2025 2211 by Mike Waldrop RN  Medication Review/Management:   medications reviewed   high-risk medications identified  Taken 5/13/2025 2026 by Mike Waldrop RN  Medication Review/Management:   medications reviewed   high-risk medications identified     Problem: Fatigue  Goal: Improved Activity Tolerance  Outcome: Progressing     Problem: Fall Injury Risk  Goal: Absence of Fall and Fall-Related Injury  Outcome: Progressing  Intervention: Identify and Manage Contributors  Recent Flowsheet Documentation  Taken 5/14/2025 0216 by Mike Waldrop RN  Medication Review/Management:   medications reviewed   high-risk medications identified  Taken 5/14/2025 0046 by Mike Waldrop RN  Medication Review/Management:   medications reviewed   high-risk medications identified  Taken 5/13/2025 2211 by Mike Waldrop RN  Medication Review/Management:   medications reviewed   high-risk medications identified  Taken 5/13/2025 2026 by Mike Waldrop RN  Medication Review/Management:   medications reviewed   high-risk medications identified  Intervention: Promote Injury-Free Environment  Recent Flowsheet Documentation  Taken 5/14/2025 0216 by Mike Waldrop RN  Safety Promotion/Fall Prevention:   safety round/check completed   room organization consistent   nonskid shoes/slippers when out of bed   fall prevention program maintained   clutter free environment maintained  Taken 5/14/2025 0046 by Mike Waldrop RN  Safety Promotion/Fall Prevention:   nonskid shoes/slippers when out of bed   safety round/check completed   room organization consistent   fall prevention program maintained   clutter free environment maintained  Taken 5/13/2025 2211 by Mike Waldrop RN  Safety Promotion/Fall Prevention:   nonskid shoes/slippers when out of bed   safety round/check  completed   room organization consistent   fall prevention program maintained   clutter free environment maintained  Taken 5/13/2025 2026 by Mike Waldrop RN  Safety Promotion/Fall Prevention:   safety round/check completed   room organization consistent   nonskid shoes/slippers when out of bed   fall prevention program maintained   clutter free environment maintained     Problem: Skin Injury Risk Increased  Goal: Skin Health and Integrity  Outcome: Progressing  Intervention: Optimize Skin Protection  Recent Flowsheet Documentation  Taken 5/14/2025 0216 by Mike Waldrop RN  Head of Bed (HOB) Positioning: HOB elevated  Taken 5/14/2025 0046 by Mike Waldrop RN  Head of Bed (HOB) Positioning: HOB elevated  Taken 5/13/2025 2211 by Mike Waldrop RN  Head of Bed (HOB) Positioning: HOB elevated  Taken 5/13/2025 2026 by Mike Waldrop RN  Head of Bed (HOB) Positioning: HOB at 30 degrees

## 2025-05-14 NOTE — THERAPY TREATMENT NOTE
Patient Name: Mo Willoughby  : 1955    MRN: [id number]                              Today's Date: 2025       Admit Date: 2025    Visit Dx:     ICD-10-CM ICD-9-CM   1. Weakness of left lower extremity  R29.898 729.89   2. Herniation of thoracolumbar intervertebral disc with myelopathy  M51.05 722.72   3. Tongue fasciculation  R25.3 781.0   4. Atrophy of muscle of multiple sites  M62.59 728.2     Patient Active Problem List   Diagnosis    Erectile dysfunction    Gout    Hypercalcemia    Colon polyp    Sinus tachycardia    Pancreatitis    Other fatigue    Ascending aortic aneurysm    Generalized abdominal pain    Folic acid deficiency    Iron deficiency anemia    Acute bilateral low back pain without sciatica    Swelling of the testicles    Urinary frequency    Hyperparathyroidism, unspecified    CRI (chronic renal insufficiency), stage 3 (moderate)    Essential hypertension, benign    Spinal stenosis of lumbar region    Lumbar radiculitis    Lumbar facet arthropathy    Screening PSA (prostate specific antigen)    Mixed hyperlipidemia    Change in pigmented skin lesion of face    Pain in both testicles    Sacroiliitis    DDD (degenerative disc disease), lumbar    Spondylolisthesis at L4-L5 level    Herniation of lumbar intervertebral disc with radiculopathy    Spinal stenosis of lumbar region with radiculopathy    H/O: gout on allopurinol    Premature atrial contractions    Arthritis of right hip    Right hip pain    Imbalance    Foot drop, left    Atrophy of muscle of multiple sites    Tongue fasciculation    Acute bilateral low back pain with bilateral sciatica    Weakness of left lower extremity    Herniation of thoracolumbar intervertebral disc with myelopathy     Past Medical History:   Diagnosis Date    Abnormal TSH     Arthritis     Ascending aortic aneurysm     Colon polyp 24    CRI (chronic renal insufficiency), stage 3 (moderate) 2016    from stage 3A to 4    DDD (degenerative disc  disease), lumbar     ED (erectile dysfunction)     Erectile dysfunction     Essential hypertension, benign     Folic acid deficiency     Gout     Hip pain, right     Hx of colonic polyp     Hypercalcemia 2015    as far back as data goes so likely pre-dates even this time    Hyperparathyroidism, unspecified 2016    as far back as data goes likely pre-dates even this date, likely multifactorial some primary and some secondary , w/ imaging from 10/16 showing possible L inferior adenoma    Iron deficiency anemia     Low back pain with bilateral sciatica     Mixed hyperlipidemia 02/27/2023    New onset atrial fibrillation 04/15/2024    Pancreatitis     Risk factors for obstructive sleep apnea     Spinal stenosis of lumbar region with neurogenic claudication     Syncope and collapse 03/28/2017     Past Surgical History:   Procedure Laterality Date    CHOLECYSTECTOMY      CHOLECYSTECTOMY WITH INTRAOPERATIVE CHOLANGIOGRAM N/A 08/03/2018    Procedure: CHOLECYSTECTOMY LAPAROSCOPIC INTRAOPERATIVE CHOLANGIOGRAM;  Surgeon: Melina Kate MD;  Location: [facility] MAIN OR;  Service: General    COLONOSCOPY      COLONOSCOPY N/A 10/03/2016    Procedure: COLONOSCOPY TO CECUM TO TERMINAL ILIUM WITH COLD BIOPSY POLYPECTOMY;  Surgeon: Benoit Vilchis MD;  Location: [facility] ENDOSCOPY;  Service:     COLONOSCOPY N/A 05/05/2022    Procedure: COLONOSCOPY TO CECUM WITH COLD SNARE POLYPECTOMIES & RESOLUTION CLIP PLACEMENT X 2;  Surgeon: Benoit Mosley MD;  Location: [facility] ENDOSCOPY;  Service: Gastroenterology;  Laterality: N/A;  ANEMIA/POLYPS, HEMORRHOIDS     ENDOSCOPY N/A 05/05/2022    Procedure: ESOPHAGOGASTRODUODENOSCOPY WITH BX;  Surgeon: Benoit Mosley MD;  Location: [facility] ENDOSCOPY;  Service: Gastroenterology;  Laterality: N/A;  ANEMIA/PORTAL GASTROPATHY, EROSIVE GASTRITIS     EPIDURAL Right 12/07/2022    Procedure: LUMBAR/SACRAL TRANSFORAMINAL EPIDURAL Right L2-3 and right L4-5;  Surgeon: Isaura Torres MD;   Location: [facility] MAIN OR;  Service: Pain Management;  Laterality: Right;    LIPOMA EXCISION      LUMBAR DISCECTOMY FUSION INSTRUMENTATION N/A 5/8/2025    Procedure: Open thoracic twelve/lumbar one discectomy/decompression/possible interbody fusion with cages, pedicle screw/terry/crosslink fixation, thoracic twelve/lumbar one/two with Mazor robotic navigation;  Surgeon: Abel Perez MD;  Location: [facility] MAIN OR;  Service: Robotics - Neuro;  Laterality: N/A;    LUMBAR LAMINECTOMY DISCECTOMY DECOMPRESSION N/A 7/7/2023    Procedure: Open right sided lumbar decompression lumbar three/four, lumbar four/five;  Surgeon: Abel Perez MD;  Location:  [facility] MAIN OR;  Service: Neurosurgery;  Laterality: N/A;    MEDIAL BRANCH BLOCK Right 01/23/2023    Procedure: LUMBAR MEDIAL BRANCH BLOCK RIGHT L3-L5 #1;  Surgeon: Isaura Torres MD;  Location: [facility]IN [facility];  Service: Pain Management;  Laterality: Right;    MEDIAL BRANCH BLOCK Right 02/13/2023    Procedure: LUMBAR MEDIAL BRANCH BLOCK RIGHT L3-L5 #1;  Surgeon: Isaura Torres MD;  Location: [facility]IN [facility];  Service: Pain Management;  Laterality: Right;    NERVE SURGERY Right 3/6/2023    Procedure: LUMBAR RADIOFREQUENCY ABLATION RIGHT L3-L5  47269, 45935;  Surgeon: Isaura Torres MD;  Location: [facility]IN [facility];  Service: Pain Management;  Laterality: Right;    SACROILIAC JOINT INJECTION Right 5/5/2023    Procedure: SACROILIAC JOINT INJECTION RIGHT 71380;  Surgeon: Isaura Torres MD;  Location: [facility]IN [facility];  Service: Pain Management;  Laterality: Right;    TONSILLECTOMY        General Information       Row Name 05/14/25 1713          Physical Therapy Time and Intention    Document Type therapy note (daily note)  -PAULO     Mode of Treatment individual therapy;physical therapy  -       Row Name 05/14/25 6193          General Information    Patient Profile Reviewed yes  -     Existing Precautions/Restrictions fall;LSO;brace worn when out of bed;spinal  -       Row  Name 05/14/25 1713          Cognition    Orientation Status (Cognition) oriented x 4  -       Row Name 05/14/25 1713          Safety Issues/Impairments Affecting Functional Mobility    Safety Issues Affecting Function (Mobility) impulsivity;insight into deficits/self-awareness;judgment;positioning of assistive device;problem-solving;safety precaution awareness;safety precautions follow-through/compliance  -     Impairments Affecting Function (Mobility) balance;coordination;endurance/activity tolerance;postural/trunk control;strength  -               User Key  (r) = Recorded By, (t) = Taken By, (c) = Cosigned By      Initials Name Provider Type    Lyric Pandya PTA Physical Therapist Assistant                   Mobility       Row Name 05/14/25 1714          Bed Mobility    Comment, (Bed Mobility) in chair  -       Row Name 05/14/25 1714          Sit-Stand Transfer    Sit-Stand Maries (Transfers) 2 person assist;minimum assist (75% patient effort);verbal cues;nonverbal cues (demo/gesture)  -     Assistive Device (Sit-Stand Transfers) walker, front-wheeled  -       Row Name 05/14/25 1714          Gait/Stairs (Locomotion)    Maries Level (Gait) 2 person assist;minimum assist (75% patient effort);verbal cues;nonverbal cues (demo/gesture)  -     Assistive Device (Gait) walker, front-wheeled  -     Distance in Feet (Gait) 20  did not require seated rest , but 2 standing rests to work on posture and foot placement, offered seated rest but declined need  -     Deviations/Abnormal Patterns (Gait) isael decreased;base of support, wide;festinating/shuffling;steppage;stride length decreased;weight shifting decreased  -     Bilateral Gait Deviations forward flexed posture;heel strike decreased  -     Comment, (Gait/Stairs) difficulty maintaining posture during turns, wider ROSE MARIE today, but narrowed w/turns  -               User Key  (r) = Recorded By, (t) = Taken By, (c) = Cosigned By       Initials Name Provider Type    Lyric Pandya PTA Physical Therapist Assistant                   Obj/Interventions       [facility] Name 05/14/25 1717          Motor Skills    Therapeutic Exercise --  QS, LAQs, seated MIP x10 reps  -JM       Row Name 05/14/25 1717          Balance    Comment, Balance slight LOB x2 on turns, but educ on posture and foot placement  -               User Key  (r) = Recorded By, (t) = Taken By, (c) = Cosigned By      Initials Name Provider Type    Lyric Pandya PTA Physical Therapist Assistant                   Goals/Plan    No documentation.                  Clinical Impression       Row Name 05/14/25 1718          Pain    Pretreatment Pain Rating 0/10 - no pain  -     Posttreatment Pain Rating 5/10  -     Pain Location back  -     Pain Side/Orientation medial  -     Pain Management Interventions exercise or physical activity utilized;positioning techniques utilized  -     Response to Pain Interventions activity participation with tolerable pain  -JM       Row Name 05/14/25 1718          Plan of Care Review    Plan of Care Reviewed With patient;spouse  -     Progress improving  -     Outcome Evaluation Pt agreed to PT session, pt jeanine amb ~20ft rwx , min -2, pt required cues for posture and foot placement but did not require seated rest, just 2 standing rest breaks, jeanine LE exer in recliner after amb , pt stated LLE did not feel as weak this session and pain was tolerable, plans Acute RHB, pt motivated and wife present and supportive, will continue to progress distance as able  -JM       Row Name 05/14/25 1718          Therapy Assessment/Plan (PT)    Rehab Potential (PT) good  -     Criteria for Skilled Interventions Met (PT) yes  -JM       Row Name 05/14/25 1718          Vital Signs    O2 Delivery Pre Treatment room air  -JM       Row Name 05/14/25 1718          Positioning and Restraints    Pre-Treatment Position sitting in chair/recliner  -     Post  Treatment Position chair  -     In Chair reclined;call light within reach;encouraged to call for assist;exit alarm on;with family/caregiver;notified nsg  -PAULO               User Key  (r) = Recorded By, (t) = Taken By, (c) = Cosigned By      Initials Name Provider Type    Lyric Pandya PTA Physical Therapist Assistant                   Outcome Measures       Row Name 05/14/25 1723 05/14/25 0845       How much help from another person do you currently need...    Turning from your back to your side while in flat bed without using bedrails? 3  -JM 4  -SD    Moving from lying on back to sitting on the side of a flat bed without bedrails? 3  -JM 3  -SD    Moving to and from a bed to a chair (including a wheelchair)? 3  -JM 2  -SD    Standing up from a chair using your arms (e.g., wheelchair, bedside chair)? 3  -PAULO 3  -SD    Climbing 3-5 steps with a railing? 1  -PAULO 2  -SD    To walk in hospital room? 3  -JM 2  -SD    AM-PAC 6 Clicks Score (PT) 16  - 16  -SD    Highest Level of Mobility Goal Stand (1 or More Minutes)-5  -JM Stand (1 or More Minutes)-5  -SD      Row Name 05/14/25 1515          Functional Assessment    Outcome Measure Options AM-PAC 6 Clicks Daily Activity (OT)  -               User Key  (r) = Recorded By, (t) = Taken By, (c) = Cosigned By      Initials Name Provider Type    Lyric Pandya PTA Physical Therapist Assistant    Aman Hogue, RN Registered Nurse    Phuong La, OTR/L, CSRS Occupational Therapist                                 Physical Therapy Education       Title: PT OT SLP Therapies (In Progress)       Topic: Physical Therapy (Done)       Point: Mobility training (Done)       Learning Progress Summary            Patient Eager, E,TB,D, VU,NR by PAULO at 5/14/2025 1724    Acceptance, E,TB, VU,NR by AMNA at 5/10/2025 1244    Acceptance, E, VU by RENEE at 5/9/2025 0936    Comment: spinal precautions   Family Eager, E,TB,D, VU,NR by PAULO at 5/14/2025 1724                       Point: Home exercise program (Done)       Learning Progress Summary            Patient Eager, E,TB,D, VU,NR by  at 5/14/2025 1724    Acceptance, E, VU by CW at 5/9/2025 0936    Comment: spinal precautions   Family Eager, E,TB,D, VU,NR by  at 5/14/2025 1724                      Point: Body mechanics (Done)       Learning Progress Summary            Patient Eager, E,TB,D, VU,NR by  at 5/14/2025 1724    Acceptance, E, VU by CW at 5/9/2025 0936    Comment: spinal precautions   Family Eager, E,TB,D, VU,NR by  at 5/14/2025 1724                      Point: Precautions (Done)       Learning Progress Summary            Patient Eager, E,TB,D, VU,NR by  at 5/14/2025 1724    Acceptance, E, VU by CW at 5/9/2025 0936    Comment: spinal precautions   Family Eager, E,TB,D, VU,NR by  at 5/14/2025 1724                                      User Key       Initials Effective Dates Name Provider Type Discipline     06/16/21 -  Norma Rondon, PT Physical Therapist PT     03/07/18 -  Lyric Alvarado PTA Physical Therapist Assistant PT     12/13/22 -  Lisa Cuellar PT Physical Therapist PT                  PT Recommendation and Plan     Progress: improving  Outcome Evaluation: Pt agreed to PT session, pt jeanine amb ~20ft rwx , min -2, pt required cues for posture and foot placement but did not require seated rest, just 2 standing rest breaks, jeanine LE exer in recliner after amb , pt stated LLE did not feel as weak this session and pain was tolerable, plans Acute RHB, pt motivated and wife present and supportive, will continue to progress distance as able     Time Calculation:         PT Charges       Row Name 05/14/25 1724             Time Calculation    Start Time 1554  -      Stop Time 1611  -      Time Calculation (min) 17 min  -      PT Received On 05/14/25  -      PT - Next Appointment 05/15/25  -                User Key  (r) = Recorded By, (t) = Taken By, (c) = Cosigned By      Initials Name  Provider Type     Lyric Alvarado PTA Physical Therapist Assistant                  Therapy Charges for Today       Code Description Service Date Service Provider Modifiers Qty    30968748893 HC PT THERAPEUTIC ACT EA 15 MIN 5/14/2025 Lyric Alvarado PTA GP 1    27599286914 HC PT THER SUPP EA 15 MIN 5/14/2025 Lyric Alvarado PTA GP 1            PT G-Codes  Outcome Measure Options: AM-PAC 6 Clicks Daily Activity (OT)  AM-PAC 6 Clicks Score (PT): 16  AM-PAC 6 Clicks Score (OT): 17  Modified Haverford Scale: 2 - Slight disability.  Unable to carry out all previous activities but able to look after own affairs without assistance.  PT Discharge Summary  Anticipated Discharge Disposition (PT): inpatient rehabilitation facility    Lyric Alvarado PTA  5/14/2025

## 2025-05-15 LAB
ALBUMIN SERPL-MCNC: 2.7 G/DL (ref 3.5–5.2)
ANION GAP SERPL CALCULATED.3IONS-SCNC: 6.7 MMOL/L (ref 5–15)
BASOPHILS # BLD AUTO: 0.04 10*3/MM3 (ref 0–0.2)
BASOPHILS NFR BLD AUTO: 1.1 % (ref 0–1.5)
BUN SERPL-MCNC: 20 MG/DL (ref 8–23)
BUN/CREAT SERPL: 19.4 (ref 7–25)
CALCIUM SPEC-SCNC: 9.6 MG/DL (ref 8.6–10.5)
CHLORIDE SERPL-SCNC: 112 MMOL/L (ref 98–107)
CO2 SERPL-SCNC: 20.3 MMOL/L (ref 22–29)
CREAT SERPL-MCNC: 1.03 MG/DL (ref 0.76–1.27)
DEPRECATED RDW RBC AUTO: 48.1 FL (ref 37–54)
EGFRCR SERPLBLD CKD-EPI 2021: 79.6 ML/MIN/1.73
EOSINOPHIL # BLD AUTO: 0.17 10*3/MM3 (ref 0–0.4)
EOSINOPHIL NFR BLD AUTO: 4.5 % (ref 0.3–6.2)
ERYTHROCYTE [DISTWIDTH] IN BLOOD BY AUTOMATED COUNT: 12.9 % (ref 12.3–15.4)
GLUCOSE SERPL-MCNC: 86 MG/DL (ref 65–99)
HCT VFR BLD AUTO: 25.8 % (ref 37.5–51)
HGB BLD-MCNC: 8.5 G/DL (ref 13–17.7)
IMM GRANULOCYTES # BLD AUTO: 0.01 10*3/MM3 (ref 0–0.05)
IMM GRANULOCYTES NFR BLD AUTO: 0.3 % (ref 0–0.5)
LYMPHOCYTES # BLD AUTO: 0.94 10*3/MM3 (ref 0.7–3.1)
LYMPHOCYTES NFR BLD AUTO: 24.8 % (ref 19.6–45.3)
MCH RBC QN AUTO: 34 PG (ref 26.6–33)
MCHC RBC AUTO-ENTMCNC: 32.9 G/DL (ref 31.5–35.7)
MCV RBC AUTO: 103.2 FL (ref 79–97)
MONOCYTES # BLD AUTO: 0.68 10*3/MM3 (ref 0.1–0.9)
MONOCYTES NFR BLD AUTO: 17.9 % (ref 5–12)
NEUTROPHILS NFR BLD AUTO: 1.95 10*3/MM3 (ref 1.7–7)
NEUTROPHILS NFR BLD AUTO: 51.4 % (ref 42.7–76)
NRBC BLD AUTO-RTO: 0 /100 WBC (ref 0–0.2)
PHOSPHATE SERPL-MCNC: 2.2 MG/DL (ref 2.5–4.5)
PHOSPHATE SERPL-MCNC: 2.4 MG/DL (ref 2.5–4.5)
PLATELET # BLD AUTO: 104 10*3/MM3 (ref 140–450)
PMV BLD AUTO: 10.2 FL (ref 6–12)
POTASSIUM SERPL-SCNC: 4.5 MMOL/L (ref 3.5–5.2)
RBC # BLD AUTO: 2.5 10*6/MM3 (ref 4.14–5.8)
SODIUM SERPL-SCNC: 139 MMOL/L (ref 136–145)
WBC NRBC COR # BLD AUTO: 3.79 10*3/MM3 (ref 3.4–10.8)

## 2025-05-15 PROCEDURE — 97110 THERAPEUTIC EXERCISES: CPT

## 2025-05-15 PROCEDURE — 25810000003 SODIUM CHLORIDE 0.9 % SOLUTION: Performed by: INTERNAL MEDICINE

## 2025-05-15 PROCEDURE — 99232 SBSQ HOSP IP/OBS MODERATE 35: CPT | Performed by: INTERNAL MEDICINE

## 2025-05-15 PROCEDURE — 99024 POSTOP FOLLOW-UP VISIT: CPT

## 2025-05-15 PROCEDURE — 84100 ASSAY OF PHOSPHORUS: CPT | Performed by: INTERNAL MEDICINE

## 2025-05-15 PROCEDURE — 80069 RENAL FUNCTION PANEL: CPT | Performed by: INTERNAL MEDICINE

## 2025-05-15 PROCEDURE — 97530 THERAPEUTIC ACTIVITIES: CPT

## 2025-05-15 PROCEDURE — 85025 COMPLETE CBC W/AUTO DIFF WBC: CPT | Performed by: INTERNAL MEDICINE

## 2025-05-15 RX ADMIN — FOLIC ACID 1000 MCG: 1 TABLET ORAL at 09:26

## 2025-05-15 RX ADMIN — Medication 100 MG: at 09:26

## 2025-05-15 RX ADMIN — SODIUM PHOSPHATE, MONOBASIC, MONOHYDRATE AND SODIUM PHOSPHATE, DIBASIC, ANHYDROUS 15 MMOL: 276; 142 INJECTION, SOLUTION INTRAVENOUS at 12:23

## 2025-05-15 RX ADMIN — METOPROLOL SUCCINATE 25 MG: 25 TABLET, EXTENDED RELEASE ORAL at 09:27

## 2025-05-15 RX ADMIN — HYDROCODONE BITARTRATE AND ACETAMINOPHEN 1 TABLET: 7.5; 325 TABLET ORAL at 09:32

## 2025-05-15 RX ADMIN — GABAPENTIN 300 MG: 300 CAPSULE ORAL at 09:26

## 2025-05-15 RX ADMIN — ROSUVASTATIN CALCIUM 10 MG: 20 TABLET, FILM COATED ORAL at 20:25

## 2025-05-15 RX ADMIN — PANTOPRAZOLE SODIUM 40 MG: 40 TABLET, DELAYED RELEASE ORAL at 20:25

## 2025-05-15 RX ADMIN — METHOCARBAMOL TABLETS 750 MG: 750 TABLET, COATED ORAL at 20:28

## 2025-05-15 RX ADMIN — FERROUS SULFATE TAB 325 MG (65 MG ELEMENTAL FE) 325 MG: 325 (65 FE) TAB at 09:26

## 2025-05-15 RX ADMIN — ALLOPURINOL 300 MG: 300 TABLET ORAL at 09:26

## 2025-05-15 RX ADMIN — Medication 1000 MCG: at 09:26

## 2025-05-15 RX ADMIN — GABAPENTIN 300 MG: 300 CAPSULE ORAL at 20:25

## 2025-05-15 RX ADMIN — Medication 10 ML: at 09:27

## 2025-05-15 RX ADMIN — PANTOPRAZOLE SODIUM 40 MG: 40 TABLET, DELAYED RELEASE ORAL at 09:26

## 2025-05-15 RX ADMIN — TAMSULOSIN HYDROCHLORIDE 0.4 MG: 0.4 CAPSULE ORAL at 09:26

## 2025-05-15 RX ADMIN — HYDROCODONE BITARTRATE AND ACETAMINOPHEN 1 TABLET: 7.5; 325 TABLET ORAL at 18:12

## 2025-05-15 NOTE — PLAN OF CARE
Goal Outcome Evaluation:      No changes.      Problem: Adult Inpatient Plan of Care  Goal: Plan of Care Review  Outcome: Progressing  Goal: Patient-Specific Goal (Individualized)  Outcome: Progressing  Goal: Absence of Hospital-Acquired Illness or Injury  Outcome: Progressing  Intervention: Identify and Manage Fall Risk  Recent Flowsheet Documentation  Taken 5/15/2025 0404 by Mike Waldrop RN  Safety Promotion/Fall Prevention:   safety round/check completed   room organization consistent   nonskid shoes/slippers when out of bed   fall prevention program maintained   clutter free environment maintained  Taken 5/15/2025 0242 by Mike Waldrop RN  Safety Promotion/Fall Prevention:   safety round/check completed   room organization consistent   nonskid shoes/slippers when out of bed   fall prevention program maintained   clutter free environment maintained  Taken 5/15/2025 0009 by Mike Waldrop RN  Safety Promotion/Fall Prevention:   safety round/check completed   room organization consistent   nonskid shoes/slippers when out of bed   fall prevention program maintained   clutter free environment maintained  Taken 5/14/2025 2206 by Mike Waldrop RN  Safety Promotion/Fall Prevention:   safety round/check completed   room organization consistent   nonskid shoes/slippers when out of bed   fall prevention program maintained   clutter free environment maintained  Taken 5/14/2025 2043 by Mike Waldrop RN  Safety Promotion/Fall Prevention:   safety round/check completed   room organization consistent   nonskid shoes/slippers when out of bed   fall prevention program maintained   clutter free environment maintained  Intervention: Prevent Skin Injury  Recent Flowsheet Documentation  Taken 5/15/2025 0404 by Mike Waldrop RN  Body Position:   sitting up in bed   upper extremity elevated   weight shifting   position changed independently  Taken 5/15/2025 0242 by Mike Waldrop RN  Body Position:   supine   weight  shifting   position changed independently  Taken 5/15/2025 0009 by Mike Waldrop RN  Body Position:   weight shifting   position changed independently  Taken 5/14/2025 2206 by Miek Waldrop RN  Body Position:   tilted   right  Taken 5/14/2025 2043 by Mike Waldrop RN  Body Position:   sitting up in bed   weight shifting   position changed independently  Intervention: Prevent and Manage VTE (Venous Thromboembolism) Risk  Recent Flowsheet Documentation  Taken 5/15/2025 0009 by Mike Waldrop RN  VTE Prevention/Management:   bilateral   SCDs (sequential compression devices) off  Taken 5/14/2025 2043 by Mike Waldrop RN  VTE Prevention/Management:   bilateral   SCDs (sequential compression devices) off  Intervention: Prevent Infection  Recent Flowsheet Documentation  Taken 5/15/2025 0404 by Mike Waldrop RN  Infection Prevention:   single patient room provided   rest/sleep promoted   hand hygiene promoted  Taken 5/15/2025 0242 by Mike Waldrop RN  Infection Prevention:   single patient room provided   rest/sleep promoted   hand hygiene promoted  Taken 5/15/2025 0009 by Mike Waldrop RN  Infection Prevention:   rest/sleep promoted   single patient room provided   hand hygiene promoted  Taken 5/14/2025 2206 by Mike Waldrop RN  Infection Prevention:   single patient room provided   rest/sleep promoted   hand hygiene promoted  Taken 5/14/2025 2043 by Mike Waldrop RN  Infection Prevention:   single patient room provided   rest/sleep promoted   hand hygiene promoted  Goal: Optimal Comfort and Wellbeing  Outcome: Progressing  Intervention: Provide Person-Centered Care  Recent Flowsheet Documentation  Taken 5/15/2025 0009 by Mike Waldrop RN  Trust Relationship/Rapport:   care explained   choices provided   thoughts/feelings acknowledged  Taken 5/14/2025 2043 by Mike Waldrop RN  Trust Relationship/Rapport:   care explained   choices provided   thoughts/feelings acknowledged  Goal: Readiness for  Transition of Care  Outcome: Progressing     Problem: Comorbidity Management  Goal: Maintenance of Osteoarthritis Symptom Control  Outcome: Progressing  Intervention: Maintain Osteoarthritis Symptom Control  Recent Flowsheet Documentation  Taken 5/15/2025 0404 by Mike Waldrop RN  Medication Review/Management:   medications reviewed   high-risk medications identified  Taken 5/15/2025 0242 by Mike Waldrop RN  Medication Review/Management:   medications reviewed   high-risk medications identified  Taken 5/15/2025 0009 by Mike Waldrop RN  Medication Review/Management:   medications reviewed   high-risk medications identified  Taken 5/14/2025 2206 by Mike Waldrop RN  Medication Review/Management:   medications reviewed   high-risk medications identified     Problem: Fatigue  Goal: Improved Activity Tolerance  Outcome: Progressing     Problem: Fall Injury Risk  Goal: Absence of Fall and Fall-Related Injury  Outcome: Progressing  Intervention: Identify and Manage Contributors  Recent Flowsheet Documentation  Taken 5/15/2025 0404 by Mike Waldrop RN  Medication Review/Management:   medications reviewed   high-risk medications identified  Taken 5/15/2025 0242 by Mike Waldrop RN  Medication Review/Management:   medications reviewed   high-risk medications identified  Taken 5/15/2025 0009 by Mike Waldrop RN  Medication Review/Management:   medications reviewed   high-risk medications identified  Taken 5/14/2025 2206 by Mike Waldrop RN  Medication Review/Management:   medications reviewed   high-risk medications identified  Intervention: Promote Injury-Free Environment  Recent Flowsheet Documentation  Taken 5/15/2025 0404 by Mike Waldrop RN  Safety Promotion/Fall Prevention:   safety round/check completed   room organization consistent   nonskid shoes/slippers when out of bed   fall prevention program maintained   clutter free environment maintained  Taken 5/15/2025 0242 by Mike Waldrop RN  Safety  Promotion/Fall Prevention:   safety round/check completed   room organization consistent   nonskid shoes/slippers when out of bed   fall prevention program maintained   clutter free environment maintained  Taken 5/15/2025 0009 by Mike Waldrop RN  Safety Promotion/Fall Prevention:   safety round/check completed   room organization consistent   nonskid shoes/slippers when out of bed   fall prevention program maintained   clutter free environment maintained  Taken 5/14/2025 2206 by Mike Waldrop RN  Safety Promotion/Fall Prevention:   safety round/check completed   room organization consistent   nonskid shoes/slippers when out of bed   fall prevention program maintained   clutter free environment maintained  Taken 5/14/2025 2043 by Mike Waldrop RN  Safety Promotion/Fall Prevention:   safety round/check completed   room organization consistent   nonskid shoes/slippers when out of bed   fall prevention program maintained   clutter free environment maintained     Problem: Skin Injury Risk Increased  Goal: Skin Health and Integrity  Outcome: Progressing  Intervention: Optimize Skin Protection  Recent Flowsheet Documentation  Taken 5/15/2025 0404 by Mike Waldrop RN  Head of Bed (HOB) Positioning: HOB elevated  Taken 5/15/2025 0242 by Mike Waldrop RN  Head of Bed (HOB) Positioning: HOB elevated  Taken 5/15/2025 0009 by Mike Waldrop RN  Head of Bed (HOB) Positioning: HOB elevated  Taken 5/14/2025 2206 by Mike Waldrop RN  Head of Bed (HOB) Positioning: HOB at 20-30 degrees  Taken 5/14/2025 2043 by Mike Waldrop RN  Head of Bed (HOB) Positioning: HOB at 20-30 degrees

## 2025-05-15 NOTE — CASE MANAGEMENT/SOCIAL WORK
Continued Stay Note  [facility]     Patient Name: oM Willoughby  MRN: [id number]  Today's Date: 5/15/2025    Admit Date: 5/5/2025    Plan: [facility] accepted pre-cert submitted 5/14/25.   Discharge Plan       Row Name 05/15/25 1708       Plan    Plan [facility] accepted pre-cert submitted 5/14/25.    Patient/Family in Agreement with Plan yes    Plan Comments [facility] accepted and pre-cert pending. Packet: office  Pharmacy: updated  Pre-cert: submitted 5/14/25  Transport: need to arrange. Continue to follow....[facility]                   Discharge Codes    No documentation.                 Expected Discharge Date and Time       Expected Discharge Date Expected Discharge Time    May 16, 2025               Kendy Henley RN

## 2025-05-15 NOTE — PLAN OF CARE
Goal Outcome Evaluation:  Plan of Care Reviewed With: patient        Progress: improving  Outcome Evaluation: Pt agreed to PT session, pt jeanine amb ~30ft w/close follow of chair, when pt fatigues ROSE MARIE narrows and L knee slight lack of ext-educ pt to lock in knees to prevent buckling, pt educ offered on donning brace and reminded him he needs to keep on when in chair, plans Acute rhb at [place], should progress well at Acute setting and wife supportive, 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, owns his own business    Anticipated Discharge Disposition (PT): inpatient rehabilitation facility

## 2025-05-15 NOTE — PROGRESS NOTES
[facility] THORACIC/LUMBAR NEUROSURGERY POSTOP NOTE      CC: POD 7 open T12/L1 discectomy/decompression, PLIF with posterior Mazor instrementation T12/L2 for large central disc herniation contributing to compression of conus, severe cord compression and myelopathy.        Subjective     Interval History: No acute issues overnight.  VSS and afebrile.  Hemoglobin/platelets trending up.  Making progress with PT.  Apparent plan for discharge to [facility] tomorrow.  Oncology planning to follow-up in the outpatient setting.      Objective     Vital signs in last 24 hours:  Temp:  [97.9 °F (36.6 °C)-98.8 °F (37.1 °C)] 98 °F (36.7 °C)  Heart Rate:  [73-89] 77  Resp:  [17-20] 18  BP: (103-144)/(66-92) 125/66    Intake/Output this shift:  No intake/output data recorded.        LABS:  .  Results from last 7 days   Lab Units 05/15/25  0705 05/14/25  0624 05/13/25  0550   WBC 10*3/mm3 3.79 2.87* 3.00*   HEMOGLOBIN g/dL 8.5* 8.1* 8.0*   HEMATOCRIT % 25.8* 24.4* 23.8*   PLATELETS 10*3/mm3 104* 96* 79*     .  Results from last 7 days   Lab Units 05/15/25  0705   SODIUM mmol/L 139   POTASSIUM mmol/L 4.5   CHLORIDE mmol/L 112*   CO2 mmol/L 20.3*   BUN mg/dL 20   CREATININE mg/dL 1.03   GLUCOSE mg/dL 86   CALCIUM mg/dL 9.6         IMAGING STUDIES:  No new neuroimaging    I personally viewed and interpreted the patient's labs, medications and chart.    Meds reviewed/changed: Yes    Current Facility-Administered Medications:     acetaminophen (TYLENOL) tablet 650 mg, 650 mg, Oral, Q4H PRN **OR** acetaminophen (TYLENOL) 160 MG/5ML oral solution 650 mg, 650 mg, Oral, Q4H PRN **OR** acetaminophen (TYLENOL) suppository 650 mg, 650 mg, Rectal, Q4H PRN, Abel Perez MD    allopurinol (ZYLOPRIM) tablet 300 mg, 300 mg, Oral, Daily, Abel Perez MD, 300 mg at 05/15/25 0926    sennosides-docusate (PERICOLACE) 8.6-50 MG per tablet 2 tablet, 2 tablet, Oral, BID, 2 tablet at 05/14/25 0852 **AND** polyethylene glycol (MIRALAX) packet  17 g, 17 g, Oral, Daily, 17 g at 05/13/25 0901 **AND** bisacodyl (DULCOLAX) EC tablet 5 mg, 5 mg, Oral, Daily PRN **AND** bisacodyl (DULCOLAX) suppository 10 mg, 10 mg, Rectal, Daily PRN, Lyric Rhodes APRN, 10 mg at 05/12/25 0210    Calcium Replacement - Follow Nurse / BPA Driven Protocol, , Not Applicable, PRN, Abel Perez MD    ferrous sulfate tablet 325 mg, 325 mg, Oral, Daily With Breakfast, Abel Perez MD, 325 mg at 05/15/25 0926    folic acid (FOLVITE) tablet 1,000 mcg, 1,000 mcg, Oral, Daily, Abel Perez MD, 1,000 mcg at 05/15/25 0926    gabapentin (NEURONTIN) capsule 300 mg, 300 mg, Oral, Q12H, Abel Perez MD, 300 mg at 05/15/25 0926    HYDROcodone-acetaminophen (NORCO) 7.5-325 MG per tablet 1 tablet, 1 tablet, Oral, Q4H PRN, Rohini Meza, APRN, 1 tablet at 05/15/25 0932    Magnesium Standard Dose Replacement - Follow Nurse / BPA Driven Protocol, , Not Applicable, PRN, Abel Perez MD    methocarbamol (ROBAXIN) tablet 750 mg, 750 mg, Oral, 4x Daily PRN, Abel Perez MD, 750 mg at 05/14/25 1740    metoprolol succinate XL (TOPROL-XL) 24 hr tablet 25 mg, 25 mg, Oral, Daily, Abel Perez MD, 25 mg at 05/15/25 0927    midodrine (PROAMATINE) tablet 5 mg, 5 mg, Oral, TID PRN, Lucy Amezcua DO, 5 mg at 05/10/25 0815    naloxone (NARCAN) injection 0.1 mg, 0.1 mg, Intravenous, Q5 Min PRN, Abel Perez MD    nitroglycerin (NITROSTAT) SL tablet 0.4 mg, 0.4 mg, Sublingual, Q5 Min PRN, Abel Perez MD    ondansetron ODT (ZOFRAN-ODT) disintegrating tablet 4 mg, 4 mg, Oral, Q6H PRN **OR** ondansetron (ZOFRAN) injection 4 mg, 4 mg, Intravenous, Q6H PRN, Abel Perez MD    ondansetron ODT (ZOFRAN-ODT) disintegrating tablet 4 mg, 4 mg, Oral, Q6H PRN **OR** ondansetron (ZOFRAN) injection 4 mg, 4 mg, Intravenous, Q6H PRN, Abel Perez MD    pantoprazole (PROTONIX) EC tablet 40 mg, 40 mg, Oral, BID, Abel Perez MD, 40 mg at 05/15/25 0990     Phosphorus Replacement - Follow Nurse / BPA Driven Protocol, , Not Applicable, PRN, Abel Perez MD    Potassium Replacement - Follow Nurse / BPA Driven Protocol, , Not Applicable, PRN, Abel Perez MD    rosuvastatin (CRESTOR) tablet 10 mg, 10 mg, Oral, Daily, Abel Perez MD, 10 mg at 05/14/25 2043    sodium chloride 0.9 % flush 10 mL, 10 mL, Intravenous, Q12H, Abel Perez MD, 10 mL at 05/15/25 0927    sodium chloride 0.9 % flush 10 mL, 10 mL, Intravenous, PRN, Abel Perez MD    sodium chloride 0.9 % infusion 40 mL, 40 mL, Intravenous, PRN, Abel Perez MD    sodium phosphates 15 mmol in 250 mL 0.9% sodium chloride IVPB, 15 mmol, Intravenous, Once, Sahra Luis MD    tamsulosin (FLOMAX) 24 hr capsule 0.4 mg, 0.4 mg, Oral, Daily, Sahra Luis MD, 0.4 mg at 05/15/25 0926    thiamine (VITAMIN B-1) tablet 100 mg, 100 mg, Oral, Daily, Abel Perez MD, 100 mg at 05/15/25 0926    vitamin B-12 (CYANOCOBALAMIN) tablet 1,000 mcg, 1,000 mcg, Oral, Daily, Abel Perez MD, 1,000 mcg at 05/15/25 0926    Norco 7.5 mg every 4 hours as needed: 3 doses yesterday and 1 dose today as of 1225  Robaxin-750 milligrams 4 times daily as needed: 1 dose yesterday, no doses today as of 1225  Physical Exam:    General:   Awake, alert.  Back:    Joseph dressing removed.  Thoracolumbar wound edges well-approximated without erythema, swelling or drainage.  Steri-Strips in place.    Abdomen:    NT/ND   Motor:   Strength examined in bed BLE 5-/5 except for baseline left foot drop no fasciculations, rigidity, spasticity, or abnormal movements.  Reflexes:   1+ in the lower extremities. no clonus  Sensation:     Baseline sensation issues unchanged     Station and Gait:             Per PT note 5/14/2025:   Pt agreed to PT session, pt jeanine amb ~20ft rwx , min -2, pt required cues for posture and foot placement but did not require seated rest, just 2 standing rest breaks, jeanine LE exer in recliner  after amb , pt stated LLE did not feel as weak this session and pain was tolerable, plans Acute RHB, pt motivated and wife present and supportive, will continue to progress distance as able        Extremities:   Wearing SCD      Assessment & Plan     ASSESSMENT:      Weakness of left lower extremity    CRI (chronic renal insufficiency), stage 3 (moderate)    Essential hypertension, benign    DDD (degenerative disc disease), lumbar    Imbalance    Foot drop, left    Atrophy of muscle of multiple sites    Tongue fasciculation    Acute bilateral low back pain with bilateral sciatica    Herniation of thoracolumbar intervertebral disc with myelopathy    POD 7 open T12/L1 discectomy/decompression, PLIF with posterior Mazor instrementation T12/L2 for large central disc herniation contributing to compression of conus, severe cord compression and myelopathy.  Continues with postop weakness requiring inpatient acute rehab.    Thoracic myelopathy, left leg weakness and L foot drop.   Continue PT/OT.  Plan for acute rehab with possible discharge tomorrow.  Accepted for [facility] rehab with pre-CERT pending.      Joseph wound VAC discontinued today.  His thoracolumbar wound is well-appearing.  Please continue daily dressing changes.    Urinary retention-preop conus medullaris syndrome.  Phillips in place due to retention.  Moving bowels.    Anemia and thrombocytopenia: Hemoglobin and platelets trending up.      Neurosurgery will continue to follow.    Postop urinary retention: Phillips catheter was initially removed due to retention had to be replaced.  I spoke with hospitalist team who agreed with placement of urology consult to provide recommendations prior to transitioning to rehab.    I discussed the patient's findings and my recommendations with patient, nursing staff, and Dr. Perez.    During patient visit, I utilized appropriate personal protective equipment including mask and gloves.  Mask used was standard  "procedure mask. Appropriate PPE was worn during the entire visit.  Hand hygiene was completed before and after.       Plan:  Continue daily dressing changes  Pain management  Mobilization  Rehab pending  Daily CBC  Urology consult        I discussed the plan with the patient, nursing staff, Dr. Perez and Dr. Luis.   LOS: 10 days       [facility], APRN  5/15/2025  12:19 EDT    \"Dictated utilizing Dragon dictation\".    "

## 2025-05-15 NOTE — PROGRESS NOTES
"        REASON FOR FOLLOWUP/CHIEF COMPLAINT:  Cytopenias    HISTORY OF PRESENT ILLNESS:   No new problems.    Past Medical History, Past Surgical History, Social History, Family History have been reviewed and are without significant changes except as mentioned.    Review of Systems   Review of Systems   Constitutional:  Negative for activity change.   HENT:  Negative for nosebleeds and trouble swallowing.    Respiratory:  Negative for shortness of breath and wheezing.    Cardiovascular:  Negative for chest pain and palpitations.   Gastrointestinal:  Negative for constipation, diarrhea and nausea.   Genitourinary:  Negative for dysuria and hematuria.   Musculoskeletal:  Negative for arthralgias and myalgias.   Neurological:  Negative for seizures and syncope.   Hematological:  Negative for adenopathy. Does not bruise/bleed easily.   Psychiatric/Behavioral:  Negative for confusion.        Medications:  The current medication list was reviewed in the EMR    ALLERGIES:    Allergies   Allergen Reactions    Zetia [Ezetimibe] Dizziness     Nausea, fatgue              Vitals:    05/14/25 2334 05/15/25 0339 05/15/25 0731 05/15/25 1100   BP: 144/92 126/80 103/73 125/66   BP Location:  Right arm Right arm Right arm   Patient Position:  Lying  Sitting   Pulse: 89  77    Resp: 17 18 18 18   Temp: 98.4 °F (36.9 °C) 98.1 °F (36.7 °C) 98 °F (36.7 °C) 98 °F (36.7 °C)   TempSrc: Oral Oral Oral Oral   SpO2: 98%      Weight:       Height:    182 cm (71.65\")     Physical Exam    CONSTITUTIONAL:  Vital signs reviewed.  No distress, looks comfortable.  EYES:  Conjunctivae and lids unremarkable.  PERRLA  EARS, NOSE, MOUTH, THROAT:  Ears and nose appear unremarkable.  Lips, teeth, gums appear unremarkable.  RESPIRATORY:  Normal respiratory effort.  Lungs clear to auscultation bilaterally.  CARDIOVASCULAR:  Normal S1, S2.  No murmurs, rubs or gallops.  No significant lower extremity edema.  GASTROINTESTINAL: Abdomen appears unremarkable.  " Nontender.  No hepatomegaly.  No splenomegaly.  NEURO: Cranial nerves 2-12 grossly intact.  No focal deficits.  Appears to have equal strength all 4 extremities.  MUSCULOSKELETAL:  Unremarkable digits/nails.  No cyanosis or clubbing.  SKIN:  Warm.  No rashes.  PSYCHIATRIC:  Normal judgment and insight.  Normal mood and affect.           RECENT LABS:  WBC   Date Value Ref Range Status   05/15/2025 3.79 3.40 - 10.80 10*3/mm3 Final   05/14/2025 2.87 (L) 3.40 - 10.80 10*3/mm3 Final   05/13/2025 3.00 (L) 3.40 - 10.80 10*3/mm3 Final     Hemoglobin   Date Value Ref Range Status   05/15/2025 8.5 (L) 13.0 - 17.7 g/dL Final   05/14/2025 8.1 (L) 13.0 - 17.7 g/dL Final   05/13/2025 8.0 (L) 13.0 - 17.7 g/dL Final     Platelets   Date Value Ref Range Status   05/15/2025 104 (L) 140 - 450 10*3/mm3 Final   05/14/2025 96 (L) 140 - 450 10*3/mm3 Final   05/13/2025 79 (L) 140 - 450 10*3/mm3 Final   05/12/2025 94 (L) 140 - 450 10*3/mm3 Final       ASSESSMENT/PLAN:  Mo Willoughby P593/1     *Thrombocytopenia  4/ 2015-July/2018: -265  August 2018-April 2024: PLT  (mostly low)  5/5/2025 (admission)-5/10/2025   5/11/2025: PLT 65.  5/12/25: PLT 64.  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  No significant schistocytes to suggest TTP  Unremarkable: INR, PTT, fibrinogen  IPF 4.7  5/14/2025: PLT up to 96, within his baseline  5/15/2025: , within baseline.     *Anemia  Since 2022, Hb widely fluctuates, normal down to as low as 7.3  5/13/2025 (initial consult): Hb 8  5/14/2025: Hb 8.1  Hb 8.5, better     *Suggestive of iron deficiency  5/10/2025: Ferritin normal at 239 (but recent significant spine surgery may have falsely elevated), 7% saturation  *5/14/2025: Close #2 of 2 Ferrlecit 250  mg IV daily    *Leukocytopenia  WBC normal through 5/12/2025 5/13/2025 (initial consult): WBC 3 (first low).   5/14/2025: WBC 2.9, suspect this is plateauing  5/15/2025: WBC 3.8, back to normal     *Neutropenia  ANC normal through 5/12/2025 5/13/2025 (initial consult): ANC 1370 (first low).  5/14/2025: ANC 1350, suspect this is plateauing  5/15/2025: ANC 1950, back to normal     *Macrocytosis  Could be due to liver disease       *Etiology of cytopenias:  Suspect related at least in part to   significant spine surgery 5/8/2025  Underlying cirrhosis and splenomegaly by imaging     *Cirrhosis and splenomegaly by imaging  Patient states no prior knowledge of cirrhosis but notes heavy alcohol intermittently for years     *Indeterminate (suspected complex cyst) bilateral renal cortical nodules on CT 5/7/2025.  Radiologist recommends renal protocol CT (this can be done as an outpatient by PCP).     Plan  Agree with oral iron  5/13/2025: Began Ferrlecit 250 mg IV x 2 doses.  PCP can follow-up as an outpatient indeterminate kidney lesions with the radiologist suspects are complex cysts.  Recommend transfusion support as needed  No follow-up planned in our office     I don't think I'm adding much at this point.  Will sign off.  Please call if I can be of any further assistance.  Thanks for the opportunity to help.

## 2025-05-15 NOTE — PROGRESS NOTES
Name: Mo Willoughby ADMIT: 2025   : 1955  PCP: Jenn Davison APRN    MRN: [id number] LOS: 10 days   AGE/SEX: 69 y.o. male  ROOM: [facility]     Subjective   Subjective   Patien feels well.  t  No overnight events.  Back pain is controlled.  No radiation of the back pain to the lower extremity.  Reports improvement in weakness of the of the left lower extremity.  No left upper extremity weakness today.  Could not pass urine spontaneously for the last 3 days after catheter removal and Phillips catheter was reinserted yesterday.  Clear urine in the Phillips bag...  Resolved pain and improved redness and swelling at the IV site of the left upper extremity.     Review of Systems  Cardiac vascular/respiratory.  No chest pain/no palpitations/no shortness of breath/no cough     Objective   Objective   Vital Signs  Temp:  [98 °F (36.7 °C)-98.4 °F (36.9 °C)] 98 °F (36.7 °C)  Heart Rate:  [77-89] 82  Resp:  [17-18] 18  BP: (103-144)/(66-92) 117/68  SpO2:  [98 %-99 %] 99 %  on   ;   Device (Oxygen Therapy): room air    Intake/Output Summary (Last 24 hours) at 5/15/2025 1653  Last data filed at 5/15/2025 1558  Gross per 24 hour   Intake --   Output 2415 ml   Net -2415 ml     Body mass index is 32.6 kg/m².      25  1800   Weight: 108 kg (238 lb 1.6 oz)     Physical Exam  General.  Middle-aged gentleman.  Obese.  Alert and oriented x 4.  In no apparent pain/distress/diaphoresis.  Normal mood and affect.  Eyes.  Pupils equal round and reactive.  Intact extraocular musculature.  No pallor or jaundice.  Oral cavity.  Moist mucous membrane.  Neck.  Supple.  No JVD.  No lymphadenopathy or thyromegaly  Cardio vascular.  Regular rate and rhythm and grade 2 systolic murmur.  Chest.  Clear to auscultation bilaterally with no added sounds.  Abdomen.  Soft lax.  No tenderness.  No organomegaly.  No guarding or rebound.    .  No CVA tenderness with clear urine in the Phillips bag.  Extremities.  No clubbing/cyanosis.  No  "redness and swelling of the left upper extremity  Spine.  Hemovac removed.  Dressed L-spine and thoracic spine wound.  CNS.  Improved left dorsi flexion on the left lower extremity around grade 4/5 now.  Normal power of the left upper extremity    Results Review:      Results from last 7 days   Lab Units 05/15/25  0705 05/14/25  0624 05/13/25  0550 05/12/25  0701 05/11/25  0638 05/10/25  0532 05/09/25  1029 05/09/25  0546   SODIUM mmol/L 139 136 135* 135* 136 135*  --  137   POTASSIUM mmol/L 4.5 4.4 4.5 4.5 4.3 4.8 4.3 5.9*   CHLORIDE mmol/L 112* 109* 107 107 106 102  --  107   CO2 mmol/L 20.3* 19.0* 19.4* 20.1* 18.6* 21.0*  --  20.6*   BUN mg/dL 20 25* 32* 40* 41* 37*  --  23   CREATININE mg/dL 1.03 1.17 1.24 1.64* 2.32* 2.74*  --  1.35*   GLUCOSE mg/dL 86 95 100* 107* 114* 105*  --  128*   CALCIUM mg/dL 9.6 9.2 9.3 9.2 8.8 9.2  --  9.8   AST (SGOT) U/L  --   --   --   --  33  --   --   --    ALT (SGPT) U/L  --   --   --   --  15  --   --   --      Estimated Creatinine Clearance: 87.9 mL/min (by C-G formula based on SCr of 1.03 mg/dL).  Results from last 7 days   Lab Units 05/11/25  0638   HEMOGLOBIN A1C % 4.80                         Results from last 7 days   Lab Units 05/15/25  0705 05/11/25  0638 05/09/25  0546 05/08/25  2113   MAGNESIUM mg/dL  --   --  2.3 1.4*   PHOSPHORUS mg/dL 2.2*   < >  --   --     < > = values in this interval not displayed.           Invalid input(s): \"LDLCALC\"  Results from last 7 days   Lab Units 05/15/25  0705 05/14/25  0624 05/13/25  0550 05/12/25  2129 05/12/25  0701 05/11/25  0638 05/10/25  0532 05/09/25  0546   WBC 10*3/mm3 3.79 2.87* 3.00*  --  4.57 5.35 8.24 6.96   HEMOGLOBIN g/dL 8.5* 8.1* 8.0*  --  8.2* 8.4* 9.3* 11.1*   HEMATOCRIT % 25.8* 24.4* 23.8*  --  23.0* 25.3* 27.4* 32.5*   PLATELETS 10*3/mm3 104* 96* 79* 94* 64* 65* 96* 113*   MCV fL 103.2* 102.1* 101.7*  --  100.4* 101.6* 101.1* 100.9*   MCH pg 34.0* 33.9* 34.2*  --  35.8* 33.7* 34.3* 34.5*   MCHC g/dL 32.9 33.2 " 33.6  --  35.7 33.2 33.9 34.2   RDW % 12.9 13.1 12.7  --  12.6 12.8 12.7 12.9   RDW-SD fl 48.1 48.1 46.3  --  45.5 47.3 46.6 47.2   MPV fL 10.2 10.2 10.7  --  10.2 9.9 10.7 9.9   NEUTROPHIL % % 51.4 47.1 45.7  --  48.4  --  59.2 80.1*   LYMPHOCYTE % % 24.8 27.5 31.0  --  27.1  --  20.4 9.6*   MONOCYTES % % 17.9* 19.9* 18.3*  --  20.1*  --  18.1* 9.6   EOSINOPHIL % % 4.5 4.5 4.0  --  3.3  --  1.2 0.0*   BASOPHIL % % 1.1 0.7 0.7  --  0.7  --  0.7 0.1   IMM GRAN % % 0.3 0.3 0.3  --  0.4  --  0.4 0.6*   NEUTROS ABS 10*3/mm3 1.95 1.35* 1.37*  --  2.21 3.19 4.88 5.57   LYMPHS ABS 10*3/mm3 0.94 0.79 0.93  --  1.24  --  1.68 0.67*   MONOS ABS 10*3/mm3 0.68 0.57 0.55  --  0.92*  --  1.49* 0.67   EOS ABS 10*3/mm3 0.17 0.13 0.12  --  0.15 0.27 0.10 0.00   BASOS ABS 10*3/mm3 0.04 0.02 0.02  --  0.03 0.05 0.06 0.01   IMMATURE GRANS (ABS) 10*3/mm3 0.01 0.01 0.01  --  0.02  --  0.03 0.04   NRBC /100 WBC 0.0  --  0.0  --   --  0.0 0.0  --      Results from last 7 days   Lab Units 05/13/25  1434   INR  1.15*   APTT seconds 33.6                                       Results from last 7 days   Lab Units 05/11/25  0503 05/10/25  1728   SODIUM UR mmol/L 57  --    CREATININE UR mg/dL 134.9  --    OSMOLALITY UR mOsm/kg 415  --    URIC ACID mg/dL  --  4.4       Imaging:  Imaging Results (Last 24 Hours)       ** No results found for the last 24 hours. **               I reviewed the patient's new clinical results / labs / tests / procedures      Assessment/Plan     Active Hospital Problems    Diagnosis  POA    **Weakness of left lower extremity [R29.898]  Yes    Herniation of thoracolumbar intervertebral disc with myelopathy [M51.05]  Yes    Acute bilateral low back pain with bilateral sciatica [M54.42, M54.41]  Yes    Tongue fasciculation [R25.3]  Yes    Foot drop, left [M21.372]  Yes    Atrophy of muscle of multiple sites [M62.59]  Yes    Imbalance [R26.89]  Yes    DDD (degenerative disc disease), lumbar [M51.369]  Yes    Essential  hypertension, benign [I10]  Yes    CRI (chronic renal insufficiency), stage 3 (moderate) [N18.30]  Yes      Resolved Hospital Problems   No resolved problems to display.           Left-sided weakness and left foot drop secondary to C/T/L-spine degenerative disc disease with severe spinal stenosis.  MRI of the C/T/L-spine noted.  MRI of the brain is negative.  CT scan of the chest without acute disease.  Status post neurology consult.  Status post neurosurgery consult and decompression surgery of thoracic and lumbar spine.  Physical therapy on board.  PCA pump DC'd.    Ambulate with TLSO brace.  Repeat MRI shows soft tissue/edema at the level of T12.  Neurosurgery is following.  I wonder if C-spine degenerative disc disease is causing his symptomatology of unsteadiness and left-sided weakness.  Improved symptomatology.  Neurosurgery okay with discharge.  Urinary retention.  Failing voiding trial and Phillips catheter was reinserted.  Flomax initiated.  Voiding trial when ambulatory.  Status post urology consultation.  Left upper extremity SVT..  Will avoid aspirin or Lovenox secondary to thrombocytopenia   liver cirrhosis and splenomegaly by abdominal CT.  Benign GI examination.  Liver function test is normal.   GI follow-up as an outpatient.  Acute kidney failure/hyperkalemia.  Mostly prerenal azotemia because of hypotension and volume depletion.  Continue holding ACE inhibitor.  Patient appears clinically euvolemic.  Resolved acute kidney failure on IV fluid.  With DC'd.  Urine electrolytes nonconclusive.  Uric acid is normal.  Resolved hyperkalemia with holding ACE   History of paroxysmal SVT/dilated ascending aorta/hypotension In a patient with a history of hypertension improved hypotension with holding ACE.  Currently in normal sinus rhythm.  Continue with Toprol and monitor blood pressure.  No evidence of angina or congestive heart failure  Hyperglycemia.  A1c at 4.8  Postoperative anemia in a patient with a  history of macrocytic anemia/thrombocytopenia.  Baseline hemoglobin between 12 and 13.  Anemia workup noted that suggestive of iron deficiency.  Will continue p.o. iron.  Positive hemoglobin drop but now stabilizing..  Will transfuse packed RBC if Hb less than 8.  Baseline platelets between 100-140s.  Acute platelets drop is secondary to consumption after surgery.  Post platelets transfusion.  Baseline thrombocytopenia is most likely secondary to hypersplenism..  No active bleed.  Normal IPF.  Status post hematology oncology consultation.  No evidence of schistocytes to suggest TTP, no HIT.  Hematology oncology.  Hematology oncology started IV iron for the next 2 days and recommends transfusion as needed.    Hyperlipidemia.  Continue Crestor  DVT prophylaxis.  Sequential compression devices      Discussed my findings and plan of treatment with the patient/wife/nurse.  Disposition.    acute rehab tomorrow if pre-CERT is obtained.      Sahra Luis MD  [facility]  05/15/25  16:53 EDT

## 2025-05-15 NOTE — THERAPY TREATMENT NOTE
Patient Name: Mo Willoughby  : 1955    MRN: [id number]                              Today's Date: 5/15/2025       Admit Date: 2025    Visit Dx:     ICD-10-CM ICD-9-CM   1. Weakness of left lower extremity  R29.898 729.89   2. Herniation of thoracolumbar intervertebral disc with myelopathy  M51.05 722.72   3. Tongue fasciculation  R25.3 781.0   4. Atrophy of muscle of multiple sites  M62.59 728.2     Patient Active Problem List   Diagnosis    Erectile dysfunction    Gout    Hypercalcemia    Colon polyp    Sinus tachycardia    Pancreatitis    Other fatigue    Ascending aortic aneurysm    Generalized abdominal pain    Folic acid deficiency    Iron deficiency anemia    Acute bilateral low back pain without sciatica    Swelling of the testicles    Urinary frequency    Hyperparathyroidism, unspecified    CRI (chronic renal insufficiency), stage 3 (moderate)    Essential hypertension, benign    Spinal stenosis of lumbar region    Lumbar radiculitis    Lumbar facet arthropathy    Screening PSA (prostate specific antigen)    Mixed hyperlipidemia    Change in pigmented skin lesion of face    Pain in both testicles    Sacroiliitis    DDD (degenerative disc disease), lumbar    Spondylolisthesis at L4-L5 level    Herniation of lumbar intervertebral disc with radiculopathy    Spinal stenosis of lumbar region with radiculopathy    H/O: gout on allopurinol    Premature atrial contractions    Arthritis of right hip    Right hip pain    Imbalance    Foot drop, left    Atrophy of muscle of multiple sites    Tongue fasciculation    Acute bilateral low back pain with bilateral sciatica    Weakness of left lower extremity    Herniation of thoracolumbar intervertebral disc with myelopathy     Past Medical History:   Diagnosis Date    Abnormal TSH     Arthritis     Ascending aortic aneurysm     Colon polyp 24    CRI (chronic renal insufficiency), stage 3 (moderate) 2016    from stage 3A to 4    DDD (degenerative disc  disease), lumbar     ED (erectile dysfunction)     Erectile dysfunction     Essential hypertension, benign     Folic acid deficiency     Gout     Hip pain, right     Hx of colonic polyp     Hypercalcemia 2015    as far back as data goes so likely pre-dates even this time    Hyperparathyroidism, unspecified 2016    as far back as data goes likely pre-dates even this date, likely multifactorial some primary and some secondary , w/ imaging from 10/16 showing possible L inferior adenoma    Iron deficiency anemia     Low back pain with bilateral sciatica     Mixed hyperlipidemia 02/27/2023    New onset atrial fibrillation 04/15/2024    Pancreatitis     Risk factors for obstructive sleep apnea     Spinal stenosis of lumbar region with neurogenic claudication     Syncope and collapse 03/28/2017     Past Surgical History:   Procedure Laterality Date    CHOLECYSTECTOMY      CHOLECYSTECTOMY WITH INTRAOPERATIVE CHOLANGIOGRAM N/A 08/03/2018    Procedure: CHOLECYSTECTOMY LAPAROSCOPIC INTRAOPERATIVE CHOLANGIOGRAM;  Surgeon: Melina Kate MD;  Location: [facility] MAIN OR;  Service: General    COLONOSCOPY      COLONOSCOPY N/A 10/03/2016    Procedure: COLONOSCOPY TO CECUM TO TERMINAL ILIUM WITH COLD BIOPSY POLYPECTOMY;  Surgeon: Benoit Vilchis MD;  Location: [facility] ENDOSCOPY;  Service:     COLONOSCOPY N/A 05/05/2022    Procedure: COLONOSCOPY TO CECUM WITH COLD SNARE POLYPECTOMIES & RESOLUTION CLIP PLACEMENT X 2;  Surgeon: Benoit Mosley MD;  Location: [facility] ENDOSCOPY;  Service: Gastroenterology;  Laterality: N/A;  ANEMIA/POLYPS, HEMORRHOIDS     ENDOSCOPY N/A 05/05/2022    Procedure: ESOPHAGOGASTRODUODENOSCOPY WITH BX;  Surgeon: Benoit Mosley MD;  Location: [facility] ENDOSCOPY;  Service: Gastroenterology;  Laterality: N/A;  ANEMIA/PORTAL GASTROPATHY, EROSIVE GASTRITIS     EPIDURAL Right 12/07/2022    Procedure: LUMBAR/SACRAL TRANSFORAMINAL EPIDURAL Right L2-3 and right L4-5;  Surgeon: Isaura Torres MD;   Location: [facility] MAIN [facility];  Service: Pain Management;  Laterality: Right;    LIPOMA EXCISION      LUMBAR DISCECTOMY FUSION INSTRUMENTATION N/A 5/8/2025    Procedure: Open thoracic twelve/lumbar one discectomy/decompression/possible interbody fusion with cages, pedicle screw/terry/crosslink fixation, thoracic twelve/lumbar one/two with Mazor robotic navigation;  Surgeon: Abel Perez MD;  Location: [facility] MAIN OR;  Service: Robotics - Neuro;  Laterality: N/A;    LUMBAR LAMINECTOMY DISCECTOMY DECOMPRESSION N/A 7/7/2023    Procedure: Open right sided lumbar decompression lumbar three/four, lumbar four/five;  Surgeon: Abel Perez MD;  Location:  [facility] MAIN OR;  Service: Neurosurgery;  Laterality: N/A;    MEDIAL BRANCH BLOCK Right 01/23/2023    Procedure: LUMBAR MEDIAL BRANCH BLOCK RIGHT L3-L5 #1;  Surgeon: Isaura Torres MD;  Location: [facility];  Service: Pain Management;  Laterality: Right;    MEDIAL BRANCH BLOCK Right 02/13/2023    Procedure: LUMBAR MEDIAL BRANCH BLOCK RIGHT L3-L5 #1;  Surgeon: Isaura Torres MD;  Location: [facility];  Service: Pain Management;  Laterality: Right;    NERVE SURGERY Right 3/6/2023    Procedure: LUMBAR RADIOFREQUENCY ABLATION RIGHT L3-L5  36486, 66961;  Surgeon: Isaura Torres MD;  Location: [facility];  Service: Pain Management;  Laterality: Right;    SACROILIAC JOINT INJECTION Right 5/5/2023    Procedure: SACROILIAC JOINT INJECTION RIGHT 25570;  Surgeon: Isarua Torres MD;  Location: [facility];  Service: Pain Management;  Laterality: Right;    TONSILLECTOMY        General Information       Row Name 05/15/25 1635          Physical Therapy Time and Intention    Document Type therapy note (daily note)  -     Mode of Treatment individual therapy;physical therapy  -       Row Name 05/15/25 0596          General Information    Patient Profile Reviewed yes  -     Existing Precautions/Restrictions fall;LSO;brace worn when out of bed;spinal  -       Row  Name 05/15/25 1632          Cognition    Orientation Status (Cognition) oriented x 4  -       Row Name 05/15/25 1632          Safety Issues/Impairments Affecting Functional Mobility    Impairments Affecting Function (Mobility) balance;coordination;endurance/activity tolerance;postural/trunk control;strength  -               User Key  (r) = Recorded By, (t) = Taken By, (c) = Cosigned By      Initials Name Provider Type    Lyric Pandya PTA Physical Therapist Assistant                   Mobility       Row Name 05/15/25 1632          Bed Mobility    Comment, (Bed Mobility) in chair  -       Row Name 05/15/25 1632          Transfers    Comment, (Transfers) LSO donned and educ pt that it must be on if MD KENDELL arrived and also educated pt  -       Row Name 05/15/25 1632          Sit-Stand Transfer    Sit-Stand Wetzel (Transfers) 2 person assist;minimum assist (75% patient effort);verbal cues;nonverbal cues (demo/gesture)  -     Assistive Device (Sit-Stand Transfers) walker, front-wheeled  -     Comment, (Sit-Stand Transfer) perf x3  -       Row Name 05/15/25 1632          Gait/Stairs (Locomotion)    Wetzel Level (Gait) 2 person assist;minimum assist (75% patient effort);verbal cues;nonverbal cues (demo/gesture)  -     Assistive Device (Gait) walker, front-wheeled  -     Distance in Feet (Gait) 35  -     Deviations/Abnormal Patterns (Gait) isael decreased;base of support, wide;festinating/shuffling;steppage;stride length decreased;weight shifting decreased  -     Bilateral Gait Deviations forward flexed posture;heel strike decreased  improved posture once cued, did raise rwx height also  -               User Key  (r) = Recorded By, (t) = Taken By, (c) = Cosigned By      Initials Name Provider Type    Lyric Pandya PTA Physical Therapist Assistant                   Obj/Interventions       Row Name 05/15/25 1634          Motor Skills    Therapeutic Exercise --  LAQS, seated  MIP, QS x10 reps  -               User Key  (r) = Recorded By, (t) = Taken By, (c) = Cosigned By      Initials Name Provider Type    Lyric Pandya PTA Physical Therapist Assistant                   Goals/Plan    No documentation.                  Clinical Impression       [facility] Name 05/15/25 1634          Pain    Pretreatment Pain Rating 5/10  -     Posttreatment Pain Rating 5/10  -     Pain Location back  -     Pain Management Interventions exercise or physical activity utilized  -     Response to Pain Interventions activity participation with tolerable pain  -[facility] Name 05/15/25 1634          Plan of Care Review    Plan of Care Reviewed With patient  -     Progress improving  -     Outcome Evaluation Pt agreed to PT session, pt jeanine amb ~30ft w/close follow of chair, when pt fatigues ROSE MARIE narrows and L knee slight lack of ext-educ pt to lock in knees to prevent buckling, pt educ offered on donning brace and reminded him he needs to keep on when in chair, plans Acute rhb at [place], should progress well at Acute setting and wife supportive, 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, owns his own business  -JM       Row Name 05/15/25 1634          Therapy Assessment/Plan (PT)    Rehab Potential (PT) good  -     Criteria for Skilled Interventions Met (PT) yes  -JM       Row Name 05/15/25 1634          Vital Signs    O2 Delivery Pre Treatment room air  -JM       Row Name 05/15/25 1634          Positioning and Restraints    Pre-Treatment Position sitting in chair/recliner  -     Post Treatment Position chair  -     In Chair reclined;call light within reach;encouraged to call for assist;exit alarm on;notified nsg;with brace  -               User Key  (r) = Recorded By, (t) = Taken By, (c) = Cosigned By      Initials Name Provider Type    Lyric Pandya PTA Physical Therapist Assistant                   Outcome Measures       Row Name 05/15/25 1639 05/15/25 0932       How much help  from another person do you currently need...    Turning from your back to your side while in flat bed without using bedrails? 3  -JM 3  -AB    Moving from lying on back to sitting on the side of a flat bed without bedrails? 2  -JM 3  -AB    Moving to and from a bed to a chair (including a wheelchair)? 3  -JM 3  -AB    Standing up from a chair using your arms (e.g., wheelchair, bedside chair)? 3  -JM 3  -AB    Climbing 3-5 steps with a railing? 1  -JM 2  -AB    To walk in hospital room? 3  -JM 3  -AB    AM-PAC 6 Clicks Score (PT) 15  -JM 17  -AB    Highest Level of Mobility Goal Move to Chair/Commode-4  -JM Stand (1 or More Minutes)-5  -AB              User Key  (r) = Recorded By, (t) = Taken By, (c) = Cosigned By      Initials Name Provider Type    AB Darren Reynoso RN Registered Nurse    Lyric Pandya PTA Physical Therapist Assistant                                 Physical Therapy Education       Title: PT OT SLP Therapies (In Progress)       Topic: Physical Therapy (Done)       Point: Mobility training (Done)       Learning Progress Summary            Patient Eager, E,TB,D, VU,NR by PAULO at 5/15/2025 1639    Eager, E,TB,D, VU,NR by PAULO at 5/14/2025 1724    Acceptance, E,TB, VU,NR by AMNA at 5/10/2025 1244    Acceptance, E, VU by RENEE at 5/9/2025 0936    Comment: spinal precautions   Family Eager, E,TB,D, VU,NR by PAULO at 5/14/2025 1724                      Point: Home exercise program (Done)       Learning Progress Summary            Patient Eager, E,TB,D, VU,NR by PAULO at 5/15/2025 1639    Eager, E,TB,D, VU,NR by PAULO at 5/14/2025 1724    Acceptance, E, VU by RENEE at 5/9/2025 0936    Comment: spinal precautions   Family Eager, E,TB,D, VU,NR by PAULO at 5/14/2025 1724                      Point: Body mechanics (Done)       Learning Progress Summary            Patient Eager, E,TB,D, VU,NR by PAULO at 5/15/2025 1639    Eager, E,TB,ABRAHAM, VU,NR by PAULO at 5/14/2025 1724    Valerie, CHARIS GRIFFITH by RENEE at 5/9/2025 0936    Comment: spinal  precautions   Family Eager, E,TB,D, VU,NR by  at 5/14/2025 1724                      Point: Precautions (Done)       Learning Progress Summary            Patient Eager, E,TB,D, VU,NR by  at 5/15/2025 1639    Eager, E,TB,D, VU,NR by  at 5/14/2025 1724    Acceptance, E, VU by  at 5/9/2025 0936    Comment: spinal precautions   Family Eager, E,TB,D, VU,NR by  at 5/14/2025 1724                                      User Key       Initials Effective Dates Name Provider Type Discipline     06/16/21 -  Norma Rondon, PT Physical Therapist PT     03/07/18 -  Lyric Alvarado PTA Physical Therapist Assistant PT    CW 12/13/22 -  Lisa Cuellar, PT Physical Therapist PT                  PT Recommendation and Plan     Progress: improving  Outcome Evaluation: Pt agreed to PT session, pt jeanine amb ~30ft w/close follow of chair, when pt fatigues ROSE MARIE narrows and L knee slight lack of ext-educ pt to lock in knees to prevent buckling, pt educ offered on donning brace and reminded him he needs to keep on when in chair, plans Acute rhb at [place], should progress well at Acute setting and wife supportive, 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, owns his own business     Time Calculation:         PT Charges       Row Name 05/15/25 1639             Time Calculation    Start Time 1051  -      Stop Time 1124  -      Time Calculation (min) 33 min  -      PT Received On 05/15/25  -      PT - Next Appointment 05/16/25  -                User Key  (r) = Recorded By, (t) = Taken By, (c) = Cosigned By      Initials Name Provider Type     Lyric Alvarado PTA Physical Therapist Assistant                  Therapy Charges for Today       Code Description Service Date Service Provider Modifiers Qty    16345089344 HC PT THERAPEUTIC ACT EA 15 MIN 5/14/2025 Lyric Alvarado PTA GP 1    60462296518 HC PT THER SUPP EA 15 MIN 5/14/2025 Lyric Alvarado PTA GP 1    53138367377 HC PT THERAPEUTIC ACT EA 15 MIN 5/15/2025 Christiano  MARY Gunter GP 1    53255506823  PT THER SUPP EA 15 MIN 5/15/2025 Lyric Alvarado PTA GP 2    24402205087 HC PT THER PROC EA 15 MIN 5/15/2025 Lyric Alvarado PTA GP 1            PT G-Codes  Outcome Measure Options: AM-PAC 6 Clicks Daily Activity (OT)  AM-PAC 6 Clicks Score (PT): 15  AM-PAC 6 Clicks Score (OT): 17  Modified Ratna Scale: 2 - Slight disability.  Unable to carry out all previous activities but able to look after own affairs without assistance.  PT Discharge Summary  Anticipated Discharge Disposition (PT): inpatient rehabilitation facility    Lyric Alvarado PTA  5/15/2025

## 2025-05-15 NOTE — CONSULTS
5/15/2025  14:43 EDT      [facility] Urinary Retention Protocol     Consult received for urinary retention.     Plan  - Post-operative urinary retention is common in hospitalized patient's due to the effects of recumbency, polypharmacy, and general anesthesia.  - Continue Flomax  - Recommend voiding trial early AM of day of discharge   - If unable to void and/or PVR >250 mL at time of discharge, replace indwelling de souza  - If the patient is discharged with an indwelling catheter, please arrange an outpatient follow-up in our [facility] in 1 week for voiding trial: [phone]  - Urology will sign off; please call with any questions/concerns or clinical change     YANIRA Grey  05/15/25  14:43 EDT

## 2025-05-15 NOTE — PLAN OF CARE
Goal Outcome Evaluation:  Plan of Care Reviewed With: patient        Progress: improving  Outcome Evaluation: VSS, no complaints of pain, urology consulted, ALONDRA dc'ed, TLSO when out of bed, call light in reach

## 2025-05-16 LAB
ALBUMIN SERPL-MCNC: 2.9 G/DL (ref 3.5–5.2)
ANION GAP SERPL CALCULATED.3IONS-SCNC: 9 MMOL/L (ref 5–15)
BASOPHILS # BLD AUTO: 0.03 10*3/MM3 (ref 0–0.2)
BASOPHILS NFR BLD AUTO: 0.6 % (ref 0–1.5)
BUN SERPL-MCNC: 16 MG/DL (ref 8–23)
BUN/CREAT SERPL: 15.5 (ref 7–25)
CALCIUM SPEC-SCNC: 9.8 MG/DL (ref 8.6–10.5)
CHLORIDE SERPL-SCNC: 107 MMOL/L (ref 98–107)
CO2 SERPL-SCNC: 21 MMOL/L (ref 22–29)
CREAT SERPL-MCNC: 1.03 MG/DL (ref 0.76–1.27)
DEPRECATED RDW RBC AUTO: 47.6 FL (ref 37–54)
EGFRCR SERPLBLD CKD-EPI 2021: 79.6 ML/MIN/1.73
EOSINOPHIL # BLD AUTO: 0.21 10*3/MM3 (ref 0–0.4)
EOSINOPHIL NFR BLD AUTO: 4.2 % (ref 0.3–6.2)
ERYTHROCYTE [DISTWIDTH] IN BLOOD BY AUTOMATED COUNT: 13 % (ref 12.3–15.4)
GLUCOSE SERPL-MCNC: 89 MG/DL (ref 65–99)
HCT VFR BLD AUTO: 25.5 % (ref 37.5–51)
HGB BLD-MCNC: 8.8 G/DL (ref 13–17.7)
IMM GRANULOCYTES # BLD AUTO: 0.01 10*3/MM3 (ref 0–0.05)
IMM GRANULOCYTES NFR BLD AUTO: 0.2 % (ref 0–0.5)
LYMPHOCYTES # BLD AUTO: 1.14 10*3/MM3 (ref 0.7–3.1)
LYMPHOCYTES NFR BLD AUTO: 23 % (ref 19.6–45.3)
MCH RBC QN AUTO: 35.5 PG (ref 26.6–33)
MCHC RBC AUTO-ENTMCNC: 34.5 G/DL (ref 31.5–35.7)
MCV RBC AUTO: 102.8 FL (ref 79–97)
MONOCYTES # BLD AUTO: 0.84 10*3/MM3 (ref 0.1–0.9)
MONOCYTES NFR BLD AUTO: 16.9 % (ref 5–12)
NEUTROPHILS NFR BLD AUTO: 2.73 10*3/MM3 (ref 1.7–7)
NEUTROPHILS NFR BLD AUTO: 55.1 % (ref 42.7–76)
NRBC BLD AUTO-RTO: 0 /100 WBC (ref 0–0.2)
PHOSPHATE SERPL-MCNC: 2.3 MG/DL (ref 2.5–4.5)
PLATELET # BLD AUTO: 110 10*3/MM3 (ref 140–450)
PMV BLD AUTO: 9.9 FL (ref 6–12)
POTASSIUM SERPL-SCNC: 4.2 MMOL/L (ref 3.5–5.2)
RBC # BLD AUTO: 2.48 10*6/MM3 (ref 4.14–5.8)
SODIUM SERPL-SCNC: 137 MMOL/L (ref 136–145)
WBC NRBC COR # BLD AUTO: 4.96 10*3/MM3 (ref 3.4–10.8)

## 2025-05-16 PROCEDURE — 80069 RENAL FUNCTION PANEL: CPT | Performed by: INTERNAL MEDICINE

## 2025-05-16 PROCEDURE — 99024 POSTOP FOLLOW-UP VISIT: CPT | Performed by: NURSE PRACTITIONER

## 2025-05-16 PROCEDURE — 97530 THERAPEUTIC ACTIVITIES: CPT

## 2025-05-16 PROCEDURE — 85025 COMPLETE CBC W/AUTO DIFF WBC: CPT | Performed by: INTERNAL MEDICINE

## 2025-05-16 PROCEDURE — 97535 SELF CARE MNGMENT TRAINING: CPT

## 2025-05-16 RX ADMIN — TAMSULOSIN HYDROCHLORIDE 0.4 MG: 0.4 CAPSULE ORAL at 08:29

## 2025-05-16 RX ADMIN — ROSUVASTATIN CALCIUM 10 MG: 20 TABLET, FILM COATED ORAL at 20:11

## 2025-05-16 RX ADMIN — FOLIC ACID 1000 MCG: 1 TABLET ORAL at 08:29

## 2025-05-16 RX ADMIN — Medication 10 ML: at 20:12

## 2025-05-16 RX ADMIN — ALLOPURINOL 300 MG: 300 TABLET ORAL at 08:30

## 2025-05-16 RX ADMIN — GABAPENTIN 300 MG: 300 CAPSULE ORAL at 08:29

## 2025-05-16 RX ADMIN — FERROUS SULFATE TAB 325 MG (65 MG ELEMENTAL FE) 325 MG: 325 (65 FE) TAB at 08:29

## 2025-05-16 RX ADMIN — METOPROLOL SUCCINATE 25 MG: 25 TABLET, EXTENDED RELEASE ORAL at 08:29

## 2025-05-16 RX ADMIN — Medication 10 ML: at 09:34

## 2025-05-16 RX ADMIN — PANTOPRAZOLE SODIUM 40 MG: 40 TABLET, DELAYED RELEASE ORAL at 08:30

## 2025-05-16 RX ADMIN — HYDROCODONE BITARTRATE AND ACETAMINOPHEN 1 TABLET: 7.5; 325 TABLET ORAL at 08:34

## 2025-05-16 RX ADMIN — PANTOPRAZOLE SODIUM 40 MG: 40 TABLET, DELAYED RELEASE ORAL at 20:11

## 2025-05-16 RX ADMIN — Medication 1000 MCG: at 08:29

## 2025-05-16 RX ADMIN — Medication 100 MG: at 08:30

## 2025-05-16 RX ADMIN — Medication 10 ML: at 09:35

## 2025-05-16 RX ADMIN — GABAPENTIN 300 MG: 300 CAPSULE ORAL at 20:11

## 2025-05-16 RX ADMIN — METHOCARBAMOL TABLETS 750 MG: 750 TABLET, COATED ORAL at 20:11

## 2025-05-16 NOTE — PLAN OF CARE
Problem: Adult Inpatient Plan of Care  Goal: Plan of Care Review  Outcome: Progressing  Flowsheets (Taken 5/16/2025 1725)  Progress: improving  Plan of Care Reviewed With:   patient   family  Goal: Patient-Specific Goal (Individualized)  Outcome: Progressing  Goal: Absence of Hospital-Acquired Illness or Injury  Outcome: Progressing  Intervention: Identify and Manage Fall Risk  Recent Flowsheet Documentation  Taken 5/16/2025 1402 by Aman Garcia RN  Safety Promotion/Fall Prevention:   safety round/check completed   assistive device/personal items within reach  Taken 5/16/2025 1200 by Aman Garcia RN  Safety Promotion/Fall Prevention:   safety round/check completed   assistive device/personal items within reach  Taken 5/16/2025 1000 by Aman Garcia RN  Safety Promotion/Fall Prevention:   safety round/check completed   assistive device/personal items within reach  Taken 5/16/2025 0800 by Aman Garcia RN  Safety Promotion/Fall Prevention:   safety round/check completed   assistive device/personal items within reach  Intervention: Prevent Skin Injury  Recent Flowsheet Documentation  Taken 5/16/2025 0945 by Aman Garcia RN  Body Position: position changed independently  Intervention: Prevent Infection  Recent Flowsheet Documentation  Taken 5/16/2025 1402 by Aman Garcia RN  Infection Prevention:   personal protective equipment utilized   environmental surveillance performed  Taken 5/16/2025 1200 by Aman Garcia RN  Infection Prevention:   personal protective equipment utilized   environmental surveillance performed  Taken 5/16/2025 1000 by Aman Garcia RN  Infection Prevention:   personal protective equipment utilized   environmental surveillance performed  Taken 5/16/2025 0800 by Aman Garcia RN  Infection Prevention:   personal protective equipment utilized   environmental surveillance performed  Goal: Optimal Comfort and  Wellbeing  Outcome: Progressing  Goal: Readiness for Transition of Care  Outcome: Progressing   Goal Outcome Evaluation:  Plan of Care Reviewed With: patient, family        Progress: improving        Patient A/O x4. Pain well controlled per MAR.Patient de souza d/c at am, post void residual urine 15 min later is 107 ml. Had a BM at pm. No incidents during shift.

## 2025-05-16 NOTE — CASE MANAGEMENT/SOCIAL WORK
Continued Stay Note  [facility]     Patient Name: Mo Willoughby  MRN: [id number]  Today's Date: 5/16/2025    Admit Date: 5/5/2025    Plan: [facility] accepted pre-cert submitted 5/14/25.   Discharge Plan       Row Name 05/16/25 1844       Plan    Plan [facility] accepted pre-cert submitted 5/14/25.    Patient/Family in Agreement with Plan yes    Plan Comments Spoke with patient at bedside to inform pre-cert is still pending. Packet: office  Pharmacy: updated  Pre-cert: submitted 5/14/25  Transport: need to arrange. Continue to follow....[facility]                   Discharge Codes    No documentation.                 Expected Discharge Date and Time       Expected Discharge Date Expected Discharge Time    May 19, 2025               Kendy Henley RN

## 2025-05-16 NOTE — DISCHARGE INSTRUCTIONS
[facility]rological [facility]  [address], [address]  [place] [address]  Phone:  [phone]  Fax:  [phone]    Dr. Abel Perez MD            Low Back Surgery Post-Operative Instructions  (Thoraco-Lumbar Discectomy and/or Decompression)        Paint incision with betadine and change bandage daily while in rehab. Leave dressing uncovered when incision healed in appearance or at day 14 following surgery. Check the cut (incision) made by the surgeon twice a day for signs of infection.  Some signs of infection may include:  A foul smelling, greenish or yellowish discharge form the wound.  Increased pain  Increased redness over the incision (operative) site  Skin edges separate  Flu-like symptoms (problems)  A temperature above 101.5° F (38.6° C) .    You may resume normal diet as you tolerate    No lifting overhead, although you may place your arms above your head.  DO NOT lift anything over five (5) pounds.  Walking is allowed and encouraged. There are no restrictions on sitting or climbing stairs, but refrain from overly strenuous activity.  You may notice that a constant position (standing or sitting) greater than 45 minutes may tend to make you more sore and therefore it is recommended that you change positions frequently. Do not drive until you are no longer requiring narcotic pain medications.    Walking is permitted.  You should wear your brace while out of bed to ambulate except to shower. Back off on activity if you have discomfort.  You may use stairs only once cleared by PT/OT to do so.     Refrain from any sexual activity until after your first evaluation at the office.     Back pain after surgery may worsen 2 to 3 days following surgery.  It should smooth out after the third day.  Continue the pain medication and muscle relaxers as prescribed.  You may also need to take a stool softener like Senokot-S if you develop constipation.    You may shower and pat the incision dry, but do not soak  your wound in water such as a swimming pool, hot tub or lake.   Avoid direct sunlight to the wound for at least three (3) months.  You may apply ice to the surgical site for 15 to 20 minutes each hour while awake for the first few days following surgery.  Simply put the ice in a plastic bag in place a towel between the bag of ice and your skin.    Your incision may begin to itch a few days after surgery.  This is a normal part of the healing process. Steri strips along the incision site will begin to loosen over time; allow them to fall off naturally.     You will leave the hospital with prescriptions for pain medicine and a muscle relaxer.  These medications must be taken as prescribed only.  A hospital policy prevents us from prescribing more than 6 days worth of pain medications, so please call into the office for refills if needed.  Please allow for 72 hours to refill the pain medication and note that it may not be possible to refill pain medications over the weekend, so please contact our office prior to the weekend. After your first week post surgery, If tolerable, please wean yourself from the narcotic pain medications slowly but it is important that you do not stop taking the narcotics all at once.    A small amount of bleeding from the incision during the first few days is not unusual.      You have a post-operative visit arranged in office May 23 at 10 am; please keep appointment call if you have a conflict at [phone]. We will discuss return to work at your first post-operative visit, but you can expect to be off of work for an average of 6 weeks.     Notify the office if there are any problems, such as:    Excessive back or leg pain   If you lose control of urine or bowel movements  Persistent temperature of 101.5° F (38.6° C) or greater that is not relieved by Tylenol.  Excessive bleeding, redness, heat, leakage of fluid, swelling or pus around the incision   If you develop difficulty breathing, have  chest pain or shortness of breath, call 911.  If you develop swelling in the calf or leg  Your pain is not controlled with medication  You seem to be getting worse rather than better    Thank you.

## 2025-05-16 NOTE — THERAPY TREATMENT NOTE
Patient Name: Mo Willoughby  : 1955    MRN: [id number]                              Today's Date: 2025       Admit Date: 2025    Visit Dx:     ICD-10-CM ICD-9-CM   1. Weakness of left lower extremity  R29.898 729.89   2. Herniation of thoracolumbar intervertebral disc with myelopathy  M51.05 722.72   3. Tongue fasciculation  R25.3 781.0   4. Atrophy of muscle of multiple sites  M62.59 728.2     Patient Active Problem List   Diagnosis    Erectile dysfunction    Gout    Hypercalcemia    Colon polyp    Sinus tachycardia    Pancreatitis    Other fatigue    Ascending aortic aneurysm    Generalized abdominal pain    Folic acid deficiency    Iron deficiency anemia    Acute bilateral low back pain without sciatica    Swelling of the testicles    Urinary frequency    Hyperparathyroidism, unspecified    CRI (chronic renal insufficiency), stage 3 (moderate)    Essential hypertension, benign    Spinal stenosis of lumbar region    Lumbar radiculitis    Lumbar facet arthropathy    Screening PSA (prostate specific antigen)    Mixed hyperlipidemia    Change in pigmented skin lesion of face    Pain in both testicles    Sacroiliitis    DDD (degenerative disc disease), lumbar    Spondylolisthesis at L4-L5 level    Herniation of lumbar intervertebral disc with radiculopathy    Spinal stenosis of lumbar region with radiculopathy    H/O: gout on allopurinol    Premature atrial contractions    Arthritis of right hip    Right hip pain    Imbalance    Foot drop, left    Atrophy of muscle of multiple sites    Tongue fasciculation    Acute bilateral low back pain with bilateral sciatica    Weakness of left lower extremity    Herniation of thoracolumbar intervertebral disc with myelopathy     Past Medical History:   Diagnosis Date    Abnormal TSH     Arthritis     Ascending aortic aneurysm     Colon polyp 24    CRI (chronic renal insufficiency), stage 3 (moderate) 2016    from stage 3A to 4    DDD (degenerative disc  disease), lumbar     ED (erectile dysfunction)     Erectile dysfunction     Essential hypertension, benign     Folic acid deficiency     Gout     Hip pain, right     Hx of colonic polyp     Hypercalcemia 2015    as far back as data goes so likely pre-dates even this time    Hyperparathyroidism, unspecified 2016    as far back as data goes likely pre-dates even this date, likely multifactorial some primary and some secondary , w/ imaging from 10/16 showing possible L inferior adenoma    Iron deficiency anemia     Low back pain with bilateral sciatica     Mixed hyperlipidemia 02/27/2023    New onset atrial fibrillation 04/15/2024    Pancreatitis     Risk factors for obstructive sleep apnea     Spinal stenosis of lumbar region with neurogenic claudication     Syncope and collapse 03/28/2017     Past Surgical History:   Procedure Laterality Date    CHOLECYSTECTOMY      CHOLECYSTECTOMY WITH INTRAOPERATIVE CHOLANGIOGRAM N/A 08/03/2018    Procedure: CHOLECYSTECTOMY LAPAROSCOPIC INTRAOPERATIVE CHOLANGIOGRAM;  Surgeon: Melina Kate MD;  Location: [facility] MAIN OR;  Service: General    COLONOSCOPY      COLONOSCOPY N/A 10/03/2016    Procedure: COLONOSCOPY TO CECUM TO TERMINAL ILIUM WITH COLD BIOPSY POLYPECTOMY;  Surgeon: Benoit Vilchis MD;  Location: [facility] ENDOSCOPY;  Service:     COLONOSCOPY N/A 05/05/2022    Procedure: COLONOSCOPY TO CECUM WITH COLD SNARE POLYPECTOMIES & RESOLUTION CLIP PLACEMENT X 2;  Surgeon: Benoit Mosley MD;  Location: [facility] ENDOSCOPY;  Service: Gastroenterology;  Laterality: N/A;  ANEMIA/POLYPS, HEMORRHOIDS     ENDOSCOPY N/A 05/05/2022    Procedure: ESOPHAGOGASTRODUODENOSCOPY WITH BX;  Surgeon: Benoit Mosley MD;  Location: [facility] ENDOSCOPY;  Service: Gastroenterology;  Laterality: N/A;  ANEMIA/PORTAL GASTROPATHY, EROSIVE GASTRITIS     EPIDURAL Right 12/07/2022    Procedure: LUMBAR/SACRAL TRANSFORAMINAL EPIDURAL Right L2-3 and right L4-5;  Surgeon: Isaura Torres MD;   Location: [facility] MAIN OR;  Service: Pain Management;  Laterality: Right;    LIPOMA EXCISION      LUMBAR DISCECTOMY FUSION INSTRUMENTATION N/A 5/8/2025    Procedure: Open thoracic twelve/lumbar one discectomy/decompression/possible interbody fusion with cages, pedicle screw/terry/crosslink fixation, thoracic twelve/lumbar one/two with Mazor robotic navigation;  Surgeon: Abel Perez MD;  Location: [facility] MAIN OR;  Service: Robotics - Neuro;  Laterality: N/A;    LUMBAR LAMINECTOMY DISCECTOMY DECOMPRESSION N/A 7/7/2023    Procedure: Open right sided lumbar decompression lumbar three/four, lumbar four/five;  Surgeon: Abel Perez MD;  Location: [facility] MAIN OR;  Service: Neurosurgery;  Laterality: N/A;    MEDIAL BRANCH BLOCK Right 01/23/2023    Procedure: LUMBAR MEDIAL BRANCH BLOCK RIGHT L3-L5 #1;  Surgeon: Isaura Torres MD;  Location: [facility] [place] MAIN OR;  Service: Pain Management;  Laterality: Right;    MEDIAL BRANCH BLOCK Right 02/13/2023    Procedure: LUMBAR MEDIAL BRANCH BLOCK RIGHT L3-L5 #1;  Surgeon: Isaura Torres MD;  Location: [facility] [place] MAIN OR;  Service: Pain Management;  Laterality: Right;    NERVE SURGERY Right 3/6/2023    Procedure: LUMBAR RADIOFREQUENCY ABLATION RIGHT L3-L5  11436, [id number];  Surgeon: Isaura Torres MD;  Location: [facility] [place] MAIN OR;  Service: Pain Management;  Laterality: Right;    SACROILIAC JOINT INJECTION Right 5/5/2023    Procedure: SACROILIAC JOINT INJECTION RIGHT 36765;  Surgeon: Isaura Torres MD;  Location: [facility] [place] MAIN OR;  Service: Pain Management;  Laterality: Right;    TONSILLECTOMY        General Information       Row Name 05/16/25 1238          OT Time and Intention    Subjective Information complains of;weakness;fatigue;pain  -KG     Document Type therapy note (daily note)  -KG     Mode of Treatment co-treatment;occupational therapy  cotreat appropriate due to pt acuity level and limited activity tolerance. Focus on higher level balance tasks which require 2 people for  safety.  -KG     Patient Effort good  -KG     Symptoms Noted During/After Treatment fatigue;increased pain  -KG       Row Name 05/16/25 1238          General Information    Patient Profile Reviewed yes  -KG     Existing Precautions/Restrictions fall;brace worn when out of bed;LSO  -KG       Row Name 05/16/25 1238          Safety Issues/Impairments Affecting Functional Mobility    Safety Issues Affecting Function (Mobility) impulsivity;insight into deficits/self-awareness;judgment;positioning of assistive device;problem-solving;sequencing abilities  -KG     Impairments Affecting Function (Mobility) balance;coordination;endurance/activity tolerance;pain;strength;range of motion (ROM)  -KG               User Key  (r) = Recorded By, (t) = Taken By, (c) = Cosigned By      Initials Name Provider Type    KG Hever Mccann OT Occupational Therapist                     Mobility/ADL's       Row Name 05/16/25 1239          Bed Mobility    Bed Mobility supine-sit;sit-supine  -KG     Supine-Sit [place] (Bed Mobility) standby assist  -KG     Sit-Supine [place] (Bed Mobility) minimum assist (75% patient effort)  -KG       Row Name 05/16/25 1239          Transfers    Transfers sit-stand transfer;stand-sit transfer  -KG       Row Name 05/16/25 1239          Sit-Stand Transfer    Sit-Stand [place] (Transfers) minimum assist (75% patient effort);2 person assist  -KG     Assistive Device (Sit-Stand Transfers) walker, front-wheeled  -KG       Row Name 05/16/25 1239          Stand-Sit Transfer    Stand-Sit [place] (Transfers) minimum assist (75% patient effort)  -KG       Row Name 05/16/25 1239          Functional Mobility    Functional Mobility- Ind. Level minimum assist (75% patient effort);1 person;2 person assist required  -KG     Functional Mobility- Device walker, front-wheeled  -KG     Functional Mobility- Comment from EOB to doorway, to bathroom, and then back to EOB -- unsteady throughout, flexed posture at  times, heavy reliance of UEs on RW  -KG       Row Name 05/16/25 1239          Activities of Daily Living    BADL Assessment/Intervention upper body dressing;grooming  -KG       Row Name 05/16/25 1239          Upper Body Dressing Assessment/Training    [place] Level (Upper Body Dressing) upper body dressing skills;minimum assist (75% patient effort)  donning LSO  -KG       Row Name 05/16/25 1239          Grooming Assessment/Training    [place] Level (Grooming) wash face, hands;minimum assist (75% patient effort);moderate assist (50% patient effort)  -KG     Position (Grooming) sink side;supported standing  -KG     Comment, (Grooming) assist for standing balance -- pt w/ mod<>max difficulty sustaining balance when attempting to take one or both hands off of RW to complete tasks  -KG               User Key  (r) = Recorded By, (t) = Taken By, (c) = Cosigned By      Initials Name Provider Type    Hever Ramírez OT Occupational Therapist                   Obj/Interventions       Row Name 05/16/25 1242          Balance    Balance Assessment sitting static balance;sitting dynamic balance;sit to stand dynamic balance;standing static balance;standing dynamic balance  -KG     Static Sitting Balance standby assist  -KG     Dynamic Sitting Balance standby assist  -KG     Position, Sitting Balance sitting edge of bed  -KG     Sit to Stand Dynamic Balance minimal assist;2-person assist  -KG     Static Standing Balance minimal assist;moderate assist  -KG     Dynamic Standing Balance minimal assist;moderate assist  -KG     Position/Device Used, Standing Balance supported;walker, front-wheeled;unsupported  -KG     Balance Interventions sitting;standing;sit to stand;supported;static;dynamic;occupation based/functional task  -KG               User Key  (r) = Recorded By, (t) = Taken By, (c) = Cosigned By      Initials Name Provider Type    Hever Ramírez OT Occupational Therapist                   Goals/Plan    No  documentation.                  Clinical Impression       Row Name 05/16/25 1242          Pain Assessment    Pretreatment Pain Rating 2/10  -KG     Posttreatment Pain Rating 6/10  -KG     Pain Location back  -KG     Pain Side/Orientation generalized  -KG     Pain Management Interventions positioning techniques utilized  -KG     Response to Pain Interventions intervention effective per patient report  -KG       Row Name 05/16/25 1242          Plan of Care Review    Plan of Care Reviewed With patient  -KG     Outcome Evaluation Pt seen this morning for OT treatment. Performed supine>sit w/ SBA via log rolling. Donned LSO w/ Min A. Performed STS w/ Min A x2 and functional mobility w/ Min A x1-2 w/ RW -- unsteady throughout, becoming forward flexed at times, and heavy reliance of UEs on RW. Pt completed sink side grooming/hygiene -- required Min<>Mod A for standing balance. Pt had mod<>max difficulty maintaining his balance when attempting to take one or both UEs off of RW. Pt also remains impulsive and requires cueing for safety throughout session. Pt continues to present w/ impaired strength, balance, activity tolerance, and coordination to perform near baseline. OT will f/u to address deficits.  -KG       Row Name 05/16/25 1242          Therapy Plan Review/Discharge Plan (OT)    Anticipated Discharge Disposition (OT) inpatient rehabilitation facility  -KG       Row Name 05/16/25 1242          Vital Signs    Pre Patient Position Supine  -KG     Intra Patient Position Standing  -KG     Post Patient Position Supine  -KG       Row Name 05/16/25 1242          Positioning and Restraints    Pre-Treatment Position in bed  -KG     Post Treatment Position bed  -KG     In Bed notified nsg;supine;call light within reach;encouraged to call for assist;exit alarm on  -KG               User Key  (r) = Recorded By, (t) = Taken By, (c) = Cosigned By      Initials Name Provider Type    KG Hever Mccann OT Occupational Therapist                    Outcome Measures       Row Name 05/16/25 0945 05/16/25 0049       How much help from another person do you currently need...    Turning from your back to your side while in flat bed without using bedrails? 3  -SD 3  -RF    Moving from lying on back to sitting on the side of a flat bed without bedrails? 3  -SD 2  -RF    Moving to and from a bed to a chair (including a wheelchair)? 3  -SD 3  -RF    Standing up from a chair using your arms (e.g., wheelchair, bedside chair)? 3  -SD 3  -RF    Climbing 3-5 steps with a railing? 1  -SD 1  -RF    To walk in hospital room? 3  -SD 3  -RF    AM-PAC 6 Clicks Score (PT) 16  -SD 15  -RF    Highest Level of Mobility Goal Stand (1 or More Minutes)-5  -SD Move to Chair/Commode-4  -RF              User Key  (r) = Recorded By, (t) = Taken By, (c) = Cosigned By      Initials Name Provider Type    SD Aman Garcia, RN Registered Nurse    Mike Lang RN Registered Nurse                      OT Recommendation and Plan     Plan of Care Review  Plan of Care Reviewed With: patient  Outcome Evaluation: Pt seen this morning for OT treatment. Performed supine>sit w/ SBA via log rolling. Donned LSO w/ Min A. Performed STS w/ Min A x2 and functional mobility w/ Min A x1-2 w/ RW -- unsteady throughout, becoming forward flexed at times, and heavy reliance of UEs on RW. Pt completed sink side grooming/hygiene -- required Min<>Mod A for standing balance. Pt had mod<>max difficulty maintaining his balance when attempting to take one or both UEs off of RW. Pt also remains impulsive and requires cueing for safety throughout session. Pt continues to present w/ impaired strength, balance, activity tolerance, and coordination to perform near baseline. OT will f/u to address deficits.     Time Calculation:         Time Calculation- OT       Row Name 05/16/25 4445             Time Calculation- OT    OT Start Time 0950  -KG      OT Stop Time 1013  -KG      OT Time Calculation (min)  23 min  -KG      Total Timed Code Minutes- OT 23 minute(s)  -KG      OT Received On 05/16/25  -KG      OT - Next Appointment 05/19/25  -KG         Timed Charges    43975 - OT Self Care/Mgmt Minutes 23  -KG         Total Minutes    Timed Charges Total Minutes 23  -KG       Total Minutes 23  -KG                User Key  (r) = Recorded By, (t) = Taken By, (c) = Cosigned By      Initials Name Provider Type    KG Hever Mccann OT Occupational Therapist                  Therapy Charges for Today       Code Description Service Date Service Provider Modifiers Qty    [id number] HC OT SELF CARE/MGMT/TRAIN EA 15 MIN 5/16/2025 Hever Mccann OT GO 2                 Hever Mccann OT  5/16/2025

## 2025-05-16 NOTE — THERAPY TREATMENT NOTE
Patient Name: Mo Willoughby  : 1955    MRN: [id number]                              Today's Date: 2025       Admit Date: 2025    Visit Dx:     ICD-10-CM ICD-9-CM   1. Weakness of left lower extremity  R29.898 729.89   2. Herniation of thoracolumbar intervertebral disc with myelopathy  M51.05 722.72   3. Tongue fasciculation  R25.3 781.0   4. Atrophy of muscle of multiple sites  M62.59 728.2     Patient Active Problem List   Diagnosis    Erectile dysfunction    Gout    Hypercalcemia    Colon polyp    Sinus tachycardia    Pancreatitis    Other fatigue    Ascending aortic aneurysm    Generalized abdominal pain    Folic acid deficiency    Iron deficiency anemia    Acute bilateral low back pain without sciatica    Swelling of the testicles    Urinary frequency    Hyperparathyroidism, unspecified    CRI (chronic renal insufficiency), stage 3 (moderate)    Essential hypertension, benign    Spinal stenosis of lumbar region    Lumbar radiculitis    Lumbar facet arthropathy    Screening PSA (prostate specific antigen)    Mixed hyperlipidemia    Change in pigmented skin lesion of face    Pain in both testicles    Sacroiliitis    DDD (degenerative disc disease), lumbar    Spondylolisthesis at L4-L5 level    Herniation of lumbar intervertebral disc with radiculopathy    Spinal stenosis of lumbar region with radiculopathy    H/O: gout on allopurinol    Premature atrial contractions    Arthritis of right hip    Right hip pain    Imbalance    Foot drop, left    Atrophy of muscle of multiple sites    Tongue fasciculation    Acute bilateral low back pain with bilateral sciatica    Weakness of left lower extremity    Herniation of thoracolumbar intervertebral disc with myelopathy     Past Medical History:   Diagnosis Date    Abnormal TSH     Arthritis     Ascending aortic aneurysm     Colon polyp 24    CRI (chronic renal insufficiency), stage 3 (moderate) 2016    from stage 3A to 4    DDD (degenerative disc  disease), lumbar     ED (erectile dysfunction)     Erectile dysfunction     Essential hypertension, benign     Folic acid deficiency     Gout     Hip pain, right     Hx of colonic polyp     Hypercalcemia 2015    as far back as data goes so likely pre-dates even this time    Hyperparathyroidism, unspecified 2016    as far back as data goes likely pre-dates even this date, likely multifactorial some primary and some secondary , w/ imaging from 10/16 showing possible L inferior adenoma    Iron deficiency anemia     Low back pain with bilateral sciatica     Mixed hyperlipidemia 02/27/2023    New onset atrial fibrillation 04/15/2024    Pancreatitis     Risk factors for obstructive sleep apnea     Spinal stenosis of lumbar region with neurogenic claudication     Syncope and collapse 03/28/2017     Past Surgical History:   Procedure Laterality Date    CHOLECYSTECTOMY      CHOLECYSTECTOMY WITH INTRAOPERATIVE CHOLANGIOGRAM N/A 08/03/2018    Procedure: CHOLECYSTECTOMY LAPAROSCOPIC INTRAOPERATIVE CHOLANGIOGRAM;  Surgeon: Melina Kate MD;  Location: [facility] MAIN OR;  Service: General    COLONOSCOPY      COLONOSCOPY N/A 10/03/2016    Procedure: COLONOSCOPY TO CECUM TO TERMINAL ILIUM WITH COLD BIOPSY POLYPECTOMY;  Surgeon: Benoit Vilchis MD;  Location: [facility] ENDOSCOPY;  Service:     COLONOSCOPY N/A 05/05/2022    Procedure: COLONOSCOPY TO CECUM WITH COLD SNARE POLYPECTOMIES & RESOLUTION CLIP PLACEMENT X 2;  Surgeon: Benoit Mosley MD;  Location: [facility] ENDOSCOPY;  Service: Gastroenterology;  Laterality: N/A;  ANEMIA/POLYPS, HEMORRHOIDS     ENDOSCOPY N/A 05/05/2022    Procedure: ESOPHAGOGASTRODUODENOSCOPY WITH BX;  Surgeon: Benoit Mosley MD;  Location: [facility] ENDOSCOPY;  Service: Gastroenterology;  Laterality: N/A;  ANEMIA/PORTAL GASTROPATHY, EROSIVE GASTRITIS     EPIDURAL Right 12/07/2022    Procedure: LUMBAR/SACRAL TRANSFORAMINAL EPIDURAL Right L2-3 and right L4-5;  Surgeon: Isaura Torres MD;   Location: [facility] MAIN OR;  Service: Pain Management;  Laterality: Right;    LIPOMA EXCISION      LUMBAR DISCECTOMY FUSION INSTRUMENTATION N/A 5/8/2025    Procedure: Open thoracic twelve/lumbar one discectomy/decompression/possible interbody fusion with cages, pedicle screw/terry/crosslink fixation, thoracic twelve/lumbar one/two with Mazor robotic navigation;  Surgeon: Abel Perez MD;  Location: [facility] MAIN OR;  Service: Robotics - Neuro;  Laterality: N/A;    LUMBAR LAMINECTOMY DISCECTOMY DECOMPRESSION N/A 7/7/2023    Procedure: Open right sided lumbar decompression lumbar three/four, lumbar four/five;  Surgeon: Abel Perez MD;  [facility];  Service: Neurosurgery;  Laterality: N/A;    MEDIAL BRANCH BLOCK Right 01/23/2023    Procedure: LUMBAR MEDIAL BRANCH BLOCK RIGHT L3-L5 #1;  Surgeon: Isaura Torres MD;  Location: [facility] MAIN [facility];  Service: Pain Management;  Laterality: Right;    MEDIAL BRANCH BLOCK Right 02/13/2023    Procedure: LUMBAR MEDIAL BRANCH BLOCK RIGHT L3-L5 #1;  Surgeon: Isaura Torres MD;  Location: [facility];  Service: Pain Management;  Laterality: Right;    NERVE SURGERY Right 3/6/2023    Procedure: LUMBAR RADIOFREQUENCY ABLATION RIGHT L3-L5  42276, [id number];  Surgeon: Isaura Torres MD;  Location: [facility];  Service: Pain Management;  Laterality: Right;    SACROILIAC JOINT INJECTION Right 5/5/2023    Procedure: SACROILIAC JOINT INJECTION RIGHT 58211;  Surgeon: Isaura Torres MD;  Location: [facility];  Service: Pain Management;  Laterality: Right;    TONSILLECTOMY        General Information       Row Name 05/16/25 1600          Physical Therapy Time and Intention    Document Type therapy note (daily note)  -PAULO     Mode of Treatment co-treatment;occupational therapy  cotreat appropriate due to pt acuity level and limited activity tolerance. Focus on higher level balance tasks which require 2 people for safety.  -       Row Name 05/16/25 1606          General  Information    Patient Profile Reviewed yes  -     Existing Precautions/Restrictions fall;brace worn when out of bed;LSO  -       Row Name 05/16/25 1601          Safety Issues/Impairments Affecting Functional Mobility    Impairments Affecting Function (Mobility) balance;coordination;endurance/activity tolerance;pain;strength;range of motion (ROM)  -     Comment, Safety Issues/Impairments (Mobility) PT/OT cotreatment appropriate due to pt acuity level and to maxmize therapeutic benefit and for safety of patient and staff  -               User Key  (r) = Recorded By, (t) = Taken By, (c) = Cosigned By      Initials Name Provider Type    Lyric Pandya PTA Physical Therapist Assistant                   Mobility       Row Name 05/16/25 1602          Bed Mobility    Bed Mobility supine-sit;sit-supine  -     Supine-Sit [place] (Bed Mobility) standby assist  -     Sit-Supine [place] (Bed Mobility) minimum assist (75% patient effort)  -     Assistive Device (Bed Mobility) bed rails;head of bed elevated  slight elevation of HOB, perf log roll w/o cues  -       Row Name 05/16/25 1602          Sit-Stand Transfer    Sit-Stand [place] (Transfers) minimum assist (75% patient effort);2 person assist  -     Assistive Device (Sit-Stand Transfers) walker, front-wheeled  -       Row Name 05/16/25 1602          Gait/Stairs (Locomotion)    [place] Level (Gait) 2 person assist;minimum assist (75% patient effort);contact guard;verbal cues  -     Assistive Device (Gait) walker, front-wheeled  -     Distance in Feet (Gait) 40  2 standing rests to complete, also stood at sink , but very weak and shaky requiring assist to balance pt with single hand activity  -     Deviations/Abnormal Patterns (Gait) isael decreased  -               User Key  (r) = Recorded By, (t) = Taken By, (c) = Cosigned By      Initials Name Provider Type    Lyric Pandya PTA Physical Therapist Assistant                    Obj/Interventions    No documentation.                  Goals/Plan    No documentation.                  Clinical Impression       Row Name 05/16/25 1722          Pain    Posttreatment Pain Rating 8/10  -     Pain Location back  -     Pre/Posttreatment Pain Comment once pt seated pt had incr pain  -       Row Name 05/16/25 1722          Plan of Care Review    Plan of Care Reviewed With patient  -     Outcome Evaluation Pt agreed to PT/OT session,  pt required SBA via log roll OOB W/ heavy use of rail needed; required min assist for LEs into bed, pt jeanine amb w/rwx ~40ft in total assist of 1-2, incr forw flex , 2nd person for safety due to incr fatigue level and wkness in LEs , pt cued to rely on UEs on rwx to support himself, also cued to tighten gluteals to aid in balance , pt plans Acute RHB at NH, will benefit from RHB setting to achieve maximum functional outcomes to return home w/fam  -       Row Name 05/16/25 1722          Therapy Assessment/Plan (PT)    Rehab Potential (PT) good  -     Criteria for Skilled Interventions Met (PT) yes  -       Row Name 05/16/25 1722          Vital Signs    O2 Delivery Pre Treatment room air  -       Row Name 05/16/25 1722          Positioning and Restraints    Pre-Treatment Position in bed  -     Post Treatment Position bed  -JM     In Bed fowlers;call light within reach;encouraged to call for assist;exit alarm on;side rails up x3;notified [facility]  -               User Key  (r) = Recorded By, (t) = Taken By, (c) = Cosigned By      Initials Name Provider Type    Lyric Pandya PTA Physical Therapist Assistant                   Outcome Measures       Row Name 05/16/25 1745 05/16/25 0945       How much help from another person do you currently need...    Turning from your back to your side while in flat bed without using bedrails? 3  -JM 3  -SD    Moving from lying on back to sitting on the side of a flat bed without bedrails? 2  - 3  -SD     Moving to and from a bed to a chair (including a wheelchair)? 3  -PAULO 3  -SD    Standing up from a chair using your arms (e.g., wheelchair, bedside chair)? 3  -PAULO 3  -SD    Climbing 3-5 steps with a railing? 1  -PAULO 1  -SD    To walk in hospital room? 3  -PAULO 3  -SD    AM-PAC 6 Clicks Score (PT) 15  -JM 16  -SD    Highest Level of Mobility Goal Move to Chair/Commode-4  -JM Stand (1 or More Minutes)-5  -SD              User Key  (r) = Recorded By, (t) = Taken By, (c) = Cosigned By      Initials Name Provider Type    Lyric Pandya PTA Physical Therapist Assistant    Aman Hogue RN Registered Nurse                                 Physical Therapy Education       Title: PT OT SLP Therapies (In Progress)       Topic: Physical Therapy (Done)       Point: Mobility training (Done)       Learning Progress Summary            Patient Eager, E,TB,D, VU,NR by PAULO at 5/16/2025 1746    Eager, E,TB,D, VU,NR by PAULO at 5/15/2025 1639    Eager, E,TB,D, VU,NR by PAULO at 5/14/2025 1724    Acceptance, E,TB, VU,NR by AMNA at 5/10/2025 1244    Acceptance, E, VU by RENEE at 5/9/2025 0936    Comment: spinal precautions   Family Eager, E,TB,D, VU,NR by PAULO at 5/14/2025 1724                      Point: Home exercise program (Done)       Learning Progress Summary            Patient Eager, E,TB,D, VU,NR by PAULO at 5/16/2025 1746    Eager, E,TB,D, VU,NR by PAULO at 5/15/2025 1639    Eager, E,TB,D, VU,NR by PAULO at 5/14/2025 1724    Acceptance, E, VU by RENEE at 5/9/2025 0936    Comment: spinal precautions   Family Eager, E,TB,D, VU,NR by PAULO at 5/14/2025 1724                      Point: Body mechanics (Done)       Learning Progress Summary            Patient Eager, E,TB,D, VU,NR by PAULO at 5/16/2025 1746    Eager, E,TB,D, VU,NR by PAULO at 5/15/2025 1639    GLADIS [place] TB, D, VU,NR by PAULO at 5/14/2025 1724    Valerie, CHARIS GRIFFITH by RENEE at 5/9/2025 0936    Comment: spinal precautions   Family GLADIS [place] TB, D, VU,NR by PAULO at 5/14/2025 1724                      Point:  Precautions (Done)       Learning Progress Summary            Patient Eager, E,TB,D, VU,NR by  at 5/16/2025 1746    Eager, E,TB,D, VU,NR by  at 5/15/2025 1639    Eager, E,TB,D, VU,NR by  at 5/14/2025 1724    Acceptance, E, VU by  at 5/9/2025 0936    Comment: spinal precautions   Family Eager, E,TB,D, VU,NR by  at 5/14/2025 1724                                      User Key       Initials Effective Dates Name Provider Type Discipline    LB 06/16/21 -  Norma Rondon, PT Physical Therapist PT     03/07/18 -  Lyric Avlarado PTA Physical Therapist Assistant PT    CW 12/13/22 -  Lisa Cuellar PT Physical Therapist PT                  PT Recommendation and Plan     Progress: improving  Outcome Evaluation: Pt agreed to PT/OT session,  pt required SBA via log roll OOB W/ heavy use of rail needed; required min assist for LEs into bed, pt jeanine amb w/rwx ~40ft in total assist of 1-2, incr forw flex , 2nd person for safety due to incr fatigue level and wkness in LEs , pt cued to rely on UEs on rwx to support himself, also cued to tighten gluteals to aid in balance , pt plans Acute RHB at [facility], will benefit from RHB setting to achieve maximum functional outcomes to return home w/fam     Time Calculation:         PT Charges       Row Name 05/16/25 1746             Time Calculation    Start Time 0950  -      Stop Time 1013  -      Time Calculation (min) 23 min  -      PT Received On 05/16/25  -      PT - Next Appointment 05/17/25  -                User Key  (r) = Recorded By, (t) = Taken By, (c) = Cosigned By      Initials Name Provider Type    Lyric Pandya PTA Physical Therapist Assistant                  Therapy Charges for Today       Code Description Service Date Service Provider Modifiers Qty    01299265512 HC PT THERAPEUTIC ACT EA 15 MIN 5/15/2025 Lyric Alvarado PTA GP 1    13781649430 HC PT THER SUPP EA 15 MIN 5/15/2025 Lyric Alvarado PTA GP 2    15925077310 HC PT THER PROC EA 15 MIN  5/15/2025 Lyric Avlarado PTA GP 1    98330957500 HC PT THERAPEUTIC ACT EA 15 MIN 5/16/2025 Lyric Alvarado PTA GP 2            PT G-Codes  Outcome Measure Options: AM-PAC 6 Clicks Daily Activity (OT)  AM-PAC 6 Clicks Score (PT): 15  AM-PAC 6 Clicks Score (OT): 17  Modified [place] Scale: 2 - Slight disability.  Unable to carry out all previous activities but able to look after own affairs without assistance.  PT Discharge Summary  Anticipated Discharge Disposition (PT): inpatient rehabilitation facility    Lyric Alvarado PTA  5/16/2025

## 2025-05-16 NOTE — PROGRESS NOTES
Name: Mo Willoughby ADMIT: 2025   : 1955  PCP: Jenn Davison APRN    MRN: [id number] LOS: 11 days   AGE/SEX: 69 y.o. male  ROOM: [facility]     Subjective   Subjective   Patien feels well.  Pains.  No overnight events.  Back pain is controlled.  No radiation of the back pain to the lower extremity.  Reports improvement in weakness of the of the left lower extremity.  No left upper extremity weakness today.  Phillips catheter removed today and the patient is able to pass urine freely without complaints.  No dysuria or hematuria.  No fever or chills.  Resolved pain and improved redness and swelling at the IV site of the left upper extremity.     Review of Systems  Cardiac vascular/respiratory.  No chest pain/no palpitations/no shortness of breath/no cough     Objective   Objective   Vital Signs  Temp:  [97.9 °F (36.6 °C)-98.4 °F (36.9 °C)] 98.4 °F (36.9 °C)  Heart Rate:  [71-82] 71  Resp:  [18-20] 18  BP: (110-142)/(68-91) 110/73  SpO2:  [96 %-99 %] 96 %  on   ;   Device (Oxygen Therapy): room air    Intake/Output Summary (Last 24 hours) at 2025 1611  Last data filed at 2025 0946  Gross per 24 hour   Intake 240 ml   Output 1350 ml   Net -1110 ml     Body mass index is 32.6 kg/m².      25  1800   Weight: 108 kg (238 lb 1.6 oz)     Physical Exam  General.  Middle-aged gentleman.  Obese.  Alert and oriented x 4.  In no apparent pain/distress/diaphoresis.  Normal mood and affect.  Eyes.  Pupils equal round and reactive.  Intact extraocular musculature.  No pallor or jaundice.  Oral cavity.  Moist mucous membrane.  Neck.  Supple.  No JVD.  No lymphadenopathy or thyromegaly  Cardio vascular.  Regular rate and rhythm and grade 2 systolic murmur.  Chest.  Clear to auscultation bilaterally with no added sounds.  Abdomen.  Soft lax.  No tenderness.  No organomegaly.  No guarding or rebound.    .  No CVA tenderness with clear urine in the Phillips bag.  Extremities.  No clubbing/cyanosis.  No  "redness and swelling of the left upper extremity  Spine.  Hemovac removed.  Dressed L-spine and thoracic spine wound.  CNS.  Improved left dorsi flexion on the left lower extremity around grade 4/5 now.  Normal power of the left upper extremity    Results Review:      Results from last 7 days   Lab Units 05/16/25  0759 05/15/25  0705 05/14/25 0624 05/13/25  0550 05/12/25  0701 05/11/25  0638 05/10/25  0532   SODIUM mmol/L 137 139 136 135* 135* 136 135*   POTASSIUM mmol/L 4.2 4.5 4.4 4.5 4.5 4.3 4.8   CHLORIDE mmol/L 107 112* 109* 107 107 106 102   CO2 mmol/L 21.0* 20.3* 19.0* 19.4* 20.1* 18.6* 21.0*   BUN mg/dL 16 20 25* 32* 40* 41* 37*   CREATININE mg/dL 1.03 1.03 1.17 1.24 1.64* 2.32* 2.74*   GLUCOSE mg/dL 89 86 95 100* 107* 114* 105*   CALCIUM mg/dL 9.8 9.6 9.2 9.3 9.2 8.8 9.2   AST (SGOT) U/L  --   --   --   --   --  33  --    ALT (SGPT) U/L  --   --   --   --   --  15  --      Estimated Creatinine Clearance: 87.9 mL/min (by C-G formula based on SCr of 1.03 mg/dL).  Results from last 7 days   Lab Units 05/11/25 0638   HEMOGLOBIN A1C % 4.80                         Results from last 7 days   Lab Units 05/16/25 0759   PHOSPHORUS mg/dL 2.3*           Invalid input(s): \"LDLCALC\"  Results from last 7 days   Lab Units 05/16/25  0759 05/15/25  0705 05/14/25 0624 05/13/25 0550 05/12/25 2129 05/12/25  0701 05/11/25  0638 05/10/25  0532   WBC 10*3/mm3 4.96 3.79 2.87* 3.00*  --  4.57 5.35 8.24   HEMOGLOBIN g/dL 8.8* 8.5* 8.1* 8.0*  --  8.2* 8.4* 9.3*   HEMATOCRIT % 25.5* 25.8* 24.4* 23.8*  --  23.0* 25.3* 27.4*   PLATELETS 10*3/mm3 110* 104* 96* 79* 94* 64* 65* 96*   MCV fL 102.8* 103.2* 102.1* 101.7*  --  100.4* 101.6* 101.1*   MCH pg 35.5* 34.0* 33.9* 34.2*  --  35.8* 33.7* 34.3*   MCHC g/dL 34.5 32.9 33.2 33.6  --  35.7 33.2 33.9   RDW % 13.0 12.9 13.1 12.7  --  12.6 12.8 12.7   RDW-SD fl 47.6 48.1 48.1 46.3  --  45.5 47.3 46.6   MPV fL 9.9 10.2 10.2 10.7  --  10.2 9.9 10.7   NEUTROPHIL % % 55.1 51.4 47.1 45.7  --  " 48.4  --  59.2   LYMPHOCYTE % % 23.0 24.8 27.5 31.0  --  27.1  --  20.4   MONOCYTES % % 16.9* 17.9* 19.9* 18.3*  --  20.1*  --  18.1*   EOSINOPHIL % % 4.2 4.5 4.5 4.0  --  3.3  --  1.2   BASOPHIL % % 0.6 1.1 0.7 0.7  --  0.7  --  0.7   IMM GRAN % % 0.2 0.3 0.3 0.3  --  0.4  --  0.4   NEUTROS ABS 10*3/mm3 2.73 1.95 1.35* 1.37*  --  2.21 3.19 4.88   LYMPHS ABS 10*3/mm3 1.14 0.94 0.79 0.93  --  1.24  --  1.68   MONOS ABS 10*3/mm3 0.84 0.68 0.57 0.55  --  0.92*  --  1.49*   EOS ABS 10*3/mm3 0.21 0.17 0.13 0.12  --  0.15 0.27 0.10   BASOS ABS 10*3/mm3 0.03 0.04 0.02 0.02  --  0.03 0.05 0.06   IMMATURE GRANS (ABS) 10*3/mm3 0.01 0.01 0.01 0.01  --  0.02  --  0.03   NRBC /100 WBC 0.0 0.0  --  0.0  --   --  0.0 0.0     Results from last 7 days   Lab Units 05/13/25  1434   INR  1.15*   APTT seconds 33.6                                       Results from last 7 days   Lab Units 05/11/25  0503 05/10/25  1728   SODIUM UR mmol/L 57  --    CREATININE UR mg/dL 134.9  --    OSMOLALITY UR mOsm/kg 415  --    URIC ACID mg/dL  --  4.4       Imaging:  Imaging Results (Last 24 Hours)       ** No results found for the last 24 hours. 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           Left-sided weakness and left foot drop secondary  to C/T/L-spine degenerative disc disease with severe spinal stenosis.  MRI of the C/T/L-spine noted.  MRI of the brain is negative.  CT scan of the chest without acute disease.  Status post neurology consult.  Status post neurosurgery consult and decompression surgery of thoracic and lumbar spine.  Physical therapy on board.  PCA pump DC'd.    Ambulate with TLSO brace.  Repeat MRI shows soft tissue/edema at the level of T12.  Neurosurgery is following.  I wonder if C-spine degenerative disc disease is causing his symptomatology of unsteadiness and left-sided weakness.  Improved symptomatology.  Neurosurgery okay with discharge.  Urinary retention.  Failed voiding trial before but now Phillips is removed and he is able to tolerate freely without complaint.  Continue Flomax.  Status post urology consult.   Left upper extremity SVT..  Will avoid aspirin or Lovenox secondary to thrombocytopenia   liver cirrhosis and splenomegaly by abdominal CT.  Benign GI examination.  Liver function test is normal.   GI follow-up as an outpatient.  Acute kidney failure/hyperkalemia.  Mostly prerenal azotemia because of hypotension and volume depletion.  Continue holding ACE inhibitor.  Patient appears clinically euvolemic.  Resolved acute kidney failure on IV fluid.  With DC'd.  Urine electrolytes nonconclusive.  Uric acid is normal.  Resolved hyperkalemia with holding ACE   History of paroxysmal SVT/dilated ascending aorta/hypotension In a patient with a history of hypertension improved hypotension with holding ACE.  Currently in normal sinus rhythm.  Continue with Toprol and monitor blood pressure.  No evidence of angina or congestive heart failure  Hyperglycemia.  A1c at 4.8  Postoperative anemia in a patient with a history of macrocytic anemia/thrombocytopenia.  Baseline hemoglobin between 12 and 13.  Anemia workup noted that suggestive of iron deficiency.  Will continue p.o. iron.  Positive hemoglobin drop but now stabilizing..   Will transfuse packed RBC if Hb less than 8.  Baseline platelets between 100-140s.  Acute platelets drop is secondary to consumption after surgery.  Post platelets transfusion.  Baseline thrombocytopenia is most likely secondary to hypersplenism..  No active bleed.  Normal IPF.  Status post hematology oncology consultation.  No evidence of schistocytes to suggest TTP, no HIT.  Hematology oncology.  Hematology oncology started IV iron for the next 2 days and recommends transfusion as needed.    Hyperlipidemia.  Continue Crestor  DVT prophylaxis.  Sequential compression devices      Discussed my findings and plan of treatment with the patient/wife/nurse.  Disposition.   For acute rehab anytime whenever pre-CERT is obtained..  Patient has a bed.    Sahra Luis MD  [facility]  05/16/25  16:11 EDT

## 2025-05-16 NOTE — PLAN OF CARE
Goal Outcome Evaluation:      No change      Problem: Adult Inpatient Plan of Care  Goal: Plan of Care Review  Outcome: Progressing  Goal: Patient-Specific Goal (Individualized)  Outcome: Progressing  Goal: Absence of Hospital-Acquired Illness or Injury  Outcome: Progressing  Intervention: Identify and Manage Fall Risk  Recent Flowsheet Documentation  Taken 5/16/2025 0432 by Mike Waldrop RN  Safety Promotion/Fall Prevention:   safety round/check completed   room organization consistent   nonskid shoes/slippers when out of bed   fall prevention program maintained   clutter free environment maintained  Taken 5/16/2025 0200 by Mike Waldrop RN  Safety Promotion/Fall Prevention:   safety round/check completed   room organization consistent   nonskid shoes/slippers when out of bed   fall prevention program maintained   clutter free environment maintained  Taken 5/16/2025 0049 by Mike Waldrop RN  Safety Promotion/Fall Prevention:   safety round/check completed   room organization consistent   nonskid shoes/slippers when out of bed   fall prevention program maintained   clutter free environment maintained  Taken 5/15/2025 2210 by Mike Waldrop RN  Safety Promotion/Fall Prevention:   safety round/check completed   room organization consistent   nonskid shoes/slippers when out of bed   clutter free environment maintained   fall prevention program maintained  Intervention: Prevent Skin Injury  Recent Flowsheet Documentation  Taken 5/16/2025 0432 by Mike Waldrop RN  Body Position:   weight shifting   position changed independently  Taken 5/16/2025 0200 by Mike Waldrop RN  Body Position:   supine   weight shifting   position changed independently  Taken 5/16/2025 0049 by Mike Waldrop RN  Body Position:   position changed independently   weight shifting  Taken 5/15/2025 2210 by Mike Waldrop RN  Body Position:   position changed independently   position maintained  Intervention: Prevent and Manage VTE  (Venous Thromboembolism) Risk  Recent Flowsheet Documentation  Taken 5/16/2025 0049 by Mike Waldrop RN  VTE Prevention/Management:   bilateral   SCDs (sequential compression devices) off   patient refused intervention  Intervention: Prevent Infection  Recent Flowsheet Documentation  Taken 5/16/2025 0432 by Mike Waldrop RN  Infection Prevention:   single patient room provided   rest/sleep promoted   hand hygiene promoted  Taken 5/16/2025 0200 by Mike Waldrop RN  Infection Prevention:   rest/sleep promoted   single patient room provided   hand hygiene promoted  Taken 5/16/2025 0049 by Mike Waldrop RN  Infection Prevention:   single patient room provided   rest/sleep promoted   hand hygiene promoted  Taken 5/15/2025 2210 by Mike Waldrop RN  Infection Prevention:   rest/sleep promoted   single patient room provided   hand hygiene promoted  Goal: Optimal Comfort and Wellbeing  Outcome: Progressing  Intervention: Provide Person-Centered Care  Recent Flowsheet Documentation  Taken 5/16/2025 0049 by Mike Waldrop RN  Trust Relationship/Rapport:   care explained   choices provided   thoughts/feelings acknowledged  Goal: Readiness for Transition of Care  Outcome: Progressing     Problem: Comorbidity Management  Goal: Maintenance of Osteoarthritis Symptom Control  Outcome: Progressing  Intervention: Maintain Osteoarthritis Symptom Control  Recent Flowsheet Documentation  Taken 5/16/2025 0432 by Mike Waldrop RN  Medication Review/Management:   medications reviewed   high-risk medications identified  Taken 5/16/2025 0200 by Mike Waldrop RN  Medication Review/Management:   medications reviewed   high-risk medications identified  Taken 5/16/2025 0049 by Mike Waldrop RN  Medication Review/Management:   medications reviewed   high-risk medications identified  Taken 5/15/2025 2210 by Mike Waldrop RN  Medication Review/Management:   medications reviewed   high-risk medications identified     Problem:  Fatigue  Goal: Improved Activity Tolerance  Outcome: Progressing     Problem: Fall Injury Risk  Goal: Absence of Fall and Fall-Related Injury  Outcome: Progressing  Intervention: Identify and Manage Contributors  Recent Flowsheet Documentation  Taken 5/16/2025 0432 by Mike Waldrop RN  Medication Review/Management:   medications reviewed   high-risk medications identified  Taken 5/16/2025 0200 by Mike Waldrop RN  Medication Review/Management:   medications reviewed   high-risk medications identified  Taken 5/16/2025 0049 by Mike Waldrop RN  Medication Review/Management:   medications reviewed   high-risk medications identified  Taken 5/15/2025 2210 by Mike Waldrop RN  Medication Review/Management:   medications reviewed   high-risk medications identified  Intervention: Promote Injury-Free Environment  Recent Flowsheet Documentation  Taken 5/16/2025 0432 by Mike Waldrop RN  Safety Promotion/Fall Prevention:   safety round/check completed   room organization consistent   nonskid shoes/slippers when out of bed   fall prevention program maintained   clutter free environment maintained  Taken 5/16/2025 0200 by Mike Waldrop RN  Safety Promotion/Fall Prevention:   safety round/check completed   room organization consistent   nonskid shoes/slippers when out of bed   fall prevention program maintained   clutter free environment maintained  Taken 5/16/2025 0049 by Mike Waldrop RN  Safety Promotion/Fall Prevention:   safety round/check completed   room organization consistent   nonskid shoes/slippers when out of bed   fall prevention program maintained   clutter free environment maintained  Taken 5/15/2025 2210 by Mike Waldrop RN  Safety Promotion/Fall Prevention:   safety round/check completed   room organization consistent   nonskid shoes/slippers when out of bed   clutter free environment maintained   fall prevention program maintained     Problem: Skin Injury Risk Increased  Goal: Skin Health and  Integrity  Outcome: Progressing  Intervention: Optimize Skin Protection  Recent Flowsheet Documentation  Taken 5/16/2025 0432 by Mike Waldrop RN  Head of Bed (HOB) Positioning: HOB at 20-30 degrees  Taken 5/16/2025 0200 by Mike Waldrop RN  Head of Bed (HOB) Positioning: HOB at 20-30 degrees  Taken 5/16/2025 0049 by Mike Waldrop RN  Head of Bed (HOB) Positioning: HOB at 20-30 degrees  Taken 5/15/2025 2210 by Mike Waldrop RN  Head of Bed (HOB) Positioning: HOB elevated

## 2025-05-16 NOTE — PLAN OF CARE
Goal Outcome Evaluation:  Plan of Care Reviewed With: patient        Progress: improving  Outcome Evaluation: Pt agreed to PT/OT session,  pt required SBA via log roll OOB W/ heavy use of rail needed; required min assist for LEs into bed, pt jeanine amb w/rwx ~40ft in total assist of 1-2, incr forw flex , 2nd person for safety due to incr fatigue level and wkness in LEs , pt cued to rely on UEs on rwx to support himself, also cued to tighten gluteals to aid in balance , pt plans Acute RHB at [place], will benefit from RHB setting to achieve maximum functional outcomes to return home w/fam    Anticipated Discharge Disposition (PT): inpatient rehabilitation facility

## 2025-05-16 NOTE — PROGRESS NOTES
LOS: 11 days   Patient Care Team:  Jenn Davison APRN as PCP - General (Nurse Practitioner)    Chief Complaint: POD 8 open T12/L1 discectomy/decompression, PLIF with posterior Mazor instrementation T12/L2 for large central disc herniation contributing to compression of conus, severe cord compression and myelopathy.     Subjective       Interval History: Feeling better overall. Accepted to [facility] pending insurance pre-cert submitted on 5/14. Ambulating ~ 30 ft with close follow up behind with chair due to significant fatigue and L knee buckling. AIR also recommended by PT and OT who has determined patient to be below baseline in regards to safety and independence with ADL's.  Voiding trial started her urology recommendations.     History taken from: patient chart    Objective        Vital Signs  Temp:  [97.9 °F (36.6 °C)-98.4 °F (36.9 °C)] 97.9 °F (36.6 °C)  Heart Rate:  [77-82] 81  Resp:  [18-20] 20  BP: (113-142)/(68-91) 142/80    Physical Exam:     AA&O x 3.   Still with left tibialis anterior weakness 3/5  Surgical optifoam dressing dry and intact.   No calf swelling or tenderness to bilateral palpation.     Results Review:     I reviewed the patient's new clinical results.    Results from last 7 days   Lab Units 05/16/25  0759 05/15/25  0705 05/14/25  0624   WBC 10*3/mm3 4.96 3.79 2.87*   HEMOGLOBIN g/dL 8.8* 8.5* 8.1*   HEMATOCRIT % 25.5* 25.8* 24.4*   PLATELETS 10*3/mm3 110* 104* 96*     Results from last 7 days   Lab Units 05/16/25  0759 05/15/25  0705 05/14/25  0624   SODIUM mmol/L 137 139 136   POTASSIUM mmol/L 4.2 4.5 4.4   CHLORIDE mmol/L 107 112* 109*   CO2 mmol/L 21.0* 20.3* 19.0*   BUN mg/dL 16 20 25*   CREATININE mg/dL 1.03 1.03 1.17   GLUCOSE mg/dL 89 86 95   CALCIUM mg/dL 9.8 9.6 9.2     Assessment & Plan       Weakness of left lower extremity    CRI (chronic renal insufficiency), stage 3 (moderate)    Essential hypertension, benign    DDD (degenerative disc disease),  lumbar    Imbalance    Foot drop, left    Atrophy of muscle of multiple sites    Tongue fasciculation    Acute bilateral low back pain with bilateral sciatica    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  POD 8 open T12/L1 decompression with discectomy, PLIF with posterior instrumentation T12-L2 with Mazor robot for central disc herniation contributing to severe cord compression, conus medullaris syndrome; ataxia and leg weakness with thoracic myelopathy.   Left leg foot weakness (foot drop) due to lumbar stenosis.     Thrombocytopenia -  platelets improved to 110K; Anemia (iron deficiency) both followed by Hematology - s/p two days of IV ferrlecit. Continue oral iron.    Hematology felt cytopenias r/t underlying cirrhosis and splenomegaly on imaging - pt admits to heavy alcohol use over the years. Hematology recommended no f/u signed off 5/15.     Rehab ready anytime from Neurosurgical standpoint. Please see attached post op instructions.   F/U Neurosurgery office 5/23/2025 at 10 AM  Will check back Monday if patient is still here.     Gloria Workman, YANIRA  05/16/25  11:17 EDT

## 2025-05-16 NOTE — PLAN OF CARE
Goal Outcome Evaluation:  Plan of Care Reviewed With: patient           Outcome Evaluation: Pt seen this morning for OT treatment. Performed supine>sit w/ SBA via log rolling. Donned LSO w/ Min A. Performed STS w/ Min A x2 and functional mobility w/ Min A x1-2 w/ RW -- unsteady throughout, becoming forward flexed at times, and heavy reliance of UEs on RW. Pt completed sink side grooming/hygiene -- required Min<>Mod A for standing balance. Pt had mod<>max difficulty maintaining his balance when attempting to take one or both UEs off of RW. Pt also remains impulsive and requires cueing for safety throughout session. Pt continues to present w/ impaired strength, balance, activity tolerance, and coordination to perform near baseline. OT will f/u to address deficits.    Anticipated Discharge Disposition (OT): inpatient rehabilitation facility

## 2025-05-17 LAB
ALBUMIN SERPL-MCNC: 2.8 G/DL (ref 3.5–5.2)
ANION GAP SERPL CALCULATED.3IONS-SCNC: 9.6 MMOL/L (ref 5–15)
BASOPHILS # BLD AUTO: 0.02 10*3/MM3 (ref 0–0.2)
BASOPHILS NFR BLD AUTO: 0.5 % (ref 0–1.5)
BUN SERPL-MCNC: 18 MG/DL (ref 8–23)
BUN/CREAT SERPL: 18.4 (ref 7–25)
CALCIUM SPEC-SCNC: 9.9 MG/DL (ref 8.6–10.5)
CHLORIDE SERPL-SCNC: 108 MMOL/L (ref 98–107)
CO2 SERPL-SCNC: 20.4 MMOL/L (ref 22–29)
CREAT SERPL-MCNC: 0.98 MG/DL (ref 0.76–1.27)
DEPRECATED RDW RBC AUTO: 49 FL (ref 37–54)
EGFRCR SERPLBLD CKD-EPI 2021: 84.5 ML/MIN/1.73
EOSINOPHIL # BLD AUTO: 0.17 10*3/MM3 (ref 0–0.4)
EOSINOPHIL NFR BLD AUTO: 3.9 % (ref 0.3–6.2)
ERYTHROCYTE [DISTWIDTH] IN BLOOD BY AUTOMATED COUNT: 13.1 % (ref 12.3–15.4)
GLUCOSE SERPL-MCNC: 93 MG/DL (ref 65–99)
HCT VFR BLD AUTO: 25.3 % (ref 37.5–51)
HGB BLD-MCNC: 8.3 G/DL (ref 13–17.7)
IMM GRANULOCYTES # BLD AUTO: 0.02 10*3/MM3 (ref 0–0.05)
IMM GRANULOCYTES NFR BLD AUTO: 0.5 % (ref 0–0.5)
LYMPHOCYTES # BLD AUTO: 0.88 10*3/MM3 (ref 0.7–3.1)
LYMPHOCYTES NFR BLD AUTO: 20.4 % (ref 19.6–45.3)
MCH RBC QN AUTO: 34 PG (ref 26.6–33)
MCHC RBC AUTO-ENTMCNC: 32.8 G/DL (ref 31.5–35.7)
MCV RBC AUTO: 103.7 FL (ref 79–97)
MONOCYTES # BLD AUTO: 0.68 10*3/MM3 (ref 0.1–0.9)
MONOCYTES NFR BLD AUTO: 15.8 % (ref 5–12)
NEUTROPHILS NFR BLD AUTO: 2.54 10*3/MM3 (ref 1.7–7)
NEUTROPHILS NFR BLD AUTO: 58.9 % (ref 42.7–76)
NRBC BLD AUTO-RTO: 0 /100 WBC (ref 0–0.2)
PHOSPHATE SERPL-MCNC: 2.4 MG/DL (ref 2.5–4.5)
PLATELET # BLD AUTO: 108 10*3/MM3 (ref 140–450)
PMV BLD AUTO: 10.2 FL (ref 6–12)
POTASSIUM SERPL-SCNC: 4.3 MMOL/L (ref 3.5–5.2)
RBC # BLD AUTO: 2.44 10*6/MM3 (ref 4.14–5.8)
SODIUM SERPL-SCNC: 138 MMOL/L (ref 136–145)
WBC NRBC COR # BLD AUTO: 4.31 10*3/MM3 (ref 3.4–10.8)

## 2025-05-17 PROCEDURE — 80069 RENAL FUNCTION PANEL: CPT | Performed by: INTERNAL MEDICINE

## 2025-05-17 PROCEDURE — 85025 COMPLETE CBC W/AUTO DIFF WBC: CPT | Performed by: INTERNAL MEDICINE

## 2025-05-17 RX ORDER — HYDROCODONE BITARTRATE AND ACETAMINOPHEN 7.5; 325 MG/1; MG/1
1 TABLET ORAL EVERY 4 HOURS PRN
Refills: 0 | Status: DISCONTINUED | OUTPATIENT
Start: 2025-05-17 | End: 2025-05-19

## 2025-05-17 RX ADMIN — GABAPENTIN 300 MG: 300 CAPSULE ORAL at 20:16

## 2025-05-17 RX ADMIN — METHOCARBAMOL TABLETS 750 MG: 750 TABLET, COATED ORAL at 15:29

## 2025-05-17 RX ADMIN — Medication 10 ML: at 20:18

## 2025-05-17 RX ADMIN — SENNOSIDES AND DOCUSATE SODIUM 2 TABLET: 50; 8.6 TABLET ORAL at 20:16

## 2025-05-17 RX ADMIN — PANTOPRAZOLE SODIUM 40 MG: 40 TABLET, DELAYED RELEASE ORAL at 08:50

## 2025-05-17 RX ADMIN — ALLOPURINOL 300 MG: 300 TABLET ORAL at 08:50

## 2025-05-17 RX ADMIN — PANTOPRAZOLE SODIUM 40 MG: 40 TABLET, DELAYED RELEASE ORAL at 20:16

## 2025-05-17 RX ADMIN — HYDROCODONE BITARTRATE AND ACETAMINOPHEN 1 TABLET: 7.5; 325 TABLET ORAL at 04:16

## 2025-05-17 RX ADMIN — TAMSULOSIN HYDROCHLORIDE 0.4 MG: 0.4 CAPSULE ORAL at 08:50

## 2025-05-17 RX ADMIN — ROSUVASTATIN CALCIUM 10 MG: 20 TABLET, FILM COATED ORAL at 20:16

## 2025-05-17 RX ADMIN — FOLIC ACID 1000 MCG: 1 TABLET ORAL at 08:50

## 2025-05-17 RX ADMIN — GABAPENTIN 300 MG: 300 CAPSULE ORAL at 08:50

## 2025-05-17 RX ADMIN — HYDROCODONE BITARTRATE AND ACETAMINOPHEN 1 TABLET: 7.5; 325 TABLET ORAL at 15:40

## 2025-05-17 RX ADMIN — Medication 1000 MCG: at 08:50

## 2025-05-17 RX ADMIN — Medication 10 ML: at 08:51

## 2025-05-17 RX ADMIN — METOPROLOL SUCCINATE 25 MG: 25 TABLET, EXTENDED RELEASE ORAL at 08:50

## 2025-05-17 RX ADMIN — FERROUS SULFATE TAB 325 MG (65 MG ELEMENTAL FE) 325 MG: 325 (65 FE) TAB at 08:50

## 2025-05-17 RX ADMIN — METHOCARBAMOL TABLETS 750 MG: 750 TABLET, COATED ORAL at 04:16

## 2025-05-17 RX ADMIN — Medication 100 MG: at 08:50

## 2025-05-17 RX ADMIN — HYDROCODONE BITARTRATE AND ACETAMINOPHEN 1 TABLET: 7.5; 325 TABLET ORAL at 20:16

## 2025-05-17 NOTE — PLAN OF CARE
Goal Outcome Evaluation:  Plan of Care Reviewed With: patient        Progress: improving  Outcome Evaluation: A&O x4, VSS, assist x1 walker & LSO when OOB, barrier cream to coccyx, dressing on thoracic C/D/I, PRN pain meds and muscle relaxers given, plan for [facility] D/C 5/17 however patient's wife was admitted to hospital and patient would now like to D/C home with home health & PT, plan of care onging, call ight with in reach, bed alarm on

## 2025-05-17 NOTE — PROGRESS NOTES
Name: Mo Willoughby ADMIT: 2025   : 1955  PCP: Jenn Davison APRN    MRN: [id number] LOS: 12 days   AGE/SEX: 69 y.o. male  ROOM: [facility]     Subjective   Subjective   Back pain fairly well controlled with po pain meds.  Has had BM and urinating well.  Reports continued improvement in lower extremity strength.   Upper extremity weakness has improved.      Review of Systems   Respiratory:  Negative for cough, chest tightness and shortness of breath.    Cardiovascular:  Positive for leg swelling. Negative for chest pain.   Gastrointestinal:  Negative for abdominal distention, abdominal pain, constipation, diarrhea, nausea and vomiting.   Musculoskeletal:  Positive for back pain and gait problem.   Psychiatric/Behavioral:  Negative for agitation and confusion.         Objective   Objective   Vital Signs  Temp:  [97.9 °F (36.6 °C)-98.5 °F (36.9 °C)] 98.4 °F (36.9 °C)  Heart Rate:  [73-89] 74  Resp:  [16] 16  BP: (103-144)/(62-92) 103/62  SpO2:  [96 %-100 %] 96 %  on  Flow (L/min) (Oxygen Therapy):  [2] 2;   Device (Oxygen Therapy): room air  No intake or output data in the 24 hours ending 25 1421    Body mass index is 32.6 kg/m².      25  1800   Weight: 108 kg (238 lb 1.6 oz)     Physical Exam  Vitals and nursing note reviewed.   Eyes:      General: No scleral icterus.  Cardiovascular:      Rate and Rhythm: Normal rate and regular rhythm.      Pulses: Normal pulses.      Heart sounds: Normal heart sounds.   Pulmonary:      Effort: Pulmonary effort is normal.      Breath sounds: Normal breath sounds.   Abdominal:      General: Bowel sounds are normal. There is no distension.      Palpations: Abdomen is soft.      Tenderness: There is no abdominal tenderness.   Musculoskeletal:      Left lower leg: Edema (1+ edema LLE, varicosities around ankle pedal area noted.) present.   Skin:     General: Skin is warm and dry.   Neurological:      Mental Status: He is oriented to person, place, and  time. Mental status is at baseline.      Motor: Weakness present.      Comments: Left Plantar Flexion 4/5 Dorsiflexion 4/5 ,  right dorsi/plantar flexion both 5/5. Dressing to Tspine and Lspine in place.    Psychiatric:         Mood and Affect: Mood normal.         Behavior: Behavior normal.         Thought Content: Thought content normal.         Judgment: Judgment normal.           Current Facility-Administered Medications:     acetaminophen (TYLENOL) tablet 650 mg, 650 mg, Oral, Q4H PRN **OR** acetaminophen (TYLENOL) 160 MG/5ML oral solution 650 mg, 650 mg, Oral, Q4H PRN **OR** acetaminophen (TYLENOL) suppository 650 mg, 650 mg, Rectal, Q4H PRN, Abel Perez MD    allopurinol (ZYLOPRIM) tablet 300 mg, 300 mg, Oral, Daily, Abel Perez MD, 300 mg at 05/17/25 0850    sennosides-docusate (PERICOLACE) 8.6-50 MG per tablet 2 tablet, 2 tablet, Oral, BID, 2 tablet at 05/14/25 0852 **AND** polyethylene glycol (MIRALAX) packet 17 g, 17 g, Oral, Daily, 17 g at 05/13/25 0901 **AND** bisacodyl (DULCOLAX) EC tablet 5 mg, 5 mg, Oral, Daily PRN **AND** bisacodyl (DULCOLAX) suppository 10 mg, 10 mg, Rectal, Daily PRN, Lyric Rhodes APRN, 10 mg at 05/12/25 0210    Calcium Replacement - Follow Nurse / BPA Driven Protocol, , Not Applicable, PRN, Abel Perez MD    ferrous sulfate tablet 325 mg, 325 mg, Oral, Daily With Breakfast, Abel Perez MD, 325 mg at 05/17/25 0850    folic acid (FOLVITE) tablet 1,000 mcg, 1,000 mcg, Oral, Daily, Abel Perez MD, 1,000 mcg at 05/17/25 0850    gabapentin (NEURONTIN) capsule 300 mg, 300 mg, Oral, Q12H, Abel Perez MD, 300 mg at 05/17/25 0850    HYDROcodone-acetaminophen (NORCO) 7.5-325 MG per tablet 1 tablet, 1 tablet, Oral, Q4H PRN, Lisa Nina, APRN, 1 tablet at 05/17/25 1540    Magnesium Standard Dose Replacement - Follow Nurse / BPA Driven Protocol, , Not Applicable, PRN, Abel Perez MD    methocarbamol (ROBAXIN) tablet 750 mg, 750 mg,  Oral, 4x Daily PRN, Abel Perez MD, 750 mg at 05/17/25 1529    metoprolol succinate XL (TOPROL-XL) 24 hr tablet 25 mg, 25 mg, Oral, Daily, Abel Perez MD, 25 mg at 05/17/25 0850    midodrine (PROAMATINE) tablet 5 mg, 5 mg, Oral, TID PRN, Lucy Amezcua DO, 5 mg at 05/10/25 0815    naloxone (NARCAN) injection 0.1 mg, 0.1 mg, Intravenous, Q5 Min PRN, Abel Perez MD    nitroglycerin (NITROSTAT) SL tablet 0.4 mg, 0.4 mg, Sublingual, Q5 Min PRN, Abel Perez MD    ondansetron ODT (ZOFRAN-ODT) disintegrating tablet 4 mg, 4 mg, Oral, Q6H PRN **OR** ondansetron (ZOFRAN) injection 4 mg, 4 mg, Intravenous, Q6H PRN, Abel Perez MD    ondansetron ODT (ZOFRAN-ODT) disintegrating tablet 4 mg, 4 mg, Oral, Q6H PRN **OR** ondansetron (ZOFRAN) injection 4 mg, 4 mg, Intravenous, Q6H PRN, Abel Perez MD    pantoprazole (PROTONIX) EC tablet 40 mg, 40 mg, Oral, BID, Abel Perez MD, 40 mg at 05/17/25 0850    Phosphorus Replacement - Follow Nurse / BPA Driven Protocol, , Not Applicable, PRN, Abel Perez MD    Potassium Replacement - Follow Nurse / BPA Driven Protocol, , Not Applicable, PRN, Abel Perez MD    rosuvastatin (CRESTOR) tablet 10 mg, 10 mg, Oral, Daily, Abel Perez MD, 10 mg at 05/16/25 2011    sodium chloride 0.9 % flush 10 mL, 10 mL, Intravenous, Q12H, Abel Perez MD, 10 mL at 05/17/25 0851    sodium chloride 0.9 % flush 10 mL, 10 mL, Intravenous, PRN, Abel Perez MD    sodium chloride 0.9 % infusion 40 mL, 40 mL, Intravenous, PRN, Abel Perez MD    tamsulosin (FLOMAX) 24 hr capsule 0.4 mg, 0.4 mg, Oral, Daily, Sahra Luis MD, 0.4 mg at 05/17/25 0850    thiamine (VITAMIN B-1) tablet 100 mg, 100 mg, Oral, Daily, Abel Perez MD, 100 mg at 05/17/25 0850    vitamin B-12 (CYANOCOBALAMIN) tablet 1,000 mcg, 1,000 mcg, Oral, Daily, Abel Perez MD, 1,000 mcg at 05/17/25 0850    Results Review:      Results from last 7 days  "  Lab Units 05/17/25  0454 05/16/25  0759 05/15/25  0705 05/14/25  0624 05/13/25  0550 05/12/25  0701 05/11/25  0638   SODIUM mmol/L 138 137 139 136 135* 135* 136   POTASSIUM mmol/L 4.3 4.2 4.5 4.4 4.5 4.5 4.3   CHLORIDE mmol/L 108* 107 112* 109* 107 107 106   CO2 mmol/L 20.4* 21.0* 20.3* 19.0* 19.4* 20.1* 18.6*   BUN mg/dL 18 16 20 25* 32* 40* 41*   CREATININE mg/dL 0.98 1.03 1.03 1.17 1.24 1.64* 2.32*   GLUCOSE mg/dL 93 89 86 95 100* 107* 114*   CALCIUM mg/dL 9.9 9.8 9.6 9.2 9.3 9.2 8.8   AST (SGOT) U/L  --   --   --   --   --   --  33   ALT (SGPT) U/L  --   --   --   --   --   --  15     Estimated Creatinine Clearance: 92.4 mL/min (by C-G formula based on SCr of 0.98 mg/dL).  Results from last 7 days   Lab Units 05/11/25  0638   HEMOGLOBIN A1C % 4.80                         Results from last 7 days   Lab Units 05/17/25 0454   PHOSPHORUS mg/dL 2.4*           Invalid input(s): \"LDLCALC\"  Results from last 7 days   Lab Units 05/17/25  0454 05/16/25  0759 05/15/25  0705 05/14/25  0624 05/13/25  0550 05/12/25  2129 05/12/25  0701 05/11/25  0638 05/11/25  0638   WBC 10*3/mm3 4.31 4.96 3.79 2.87* 3.00*  --  4.57  --  5.35   HEMOGLOBIN g/dL 8.3* 8.8* 8.5* 8.1* 8.0*  --  8.2*  --  8.4*   HEMATOCRIT % 25.3* 25.5* 25.8* 24.4* 23.8*  --  23.0*  --  25.3*   PLATELETS 10*3/mm3 108* 110* 104* 96* 79* 94* 64*  --  65*   MCV fL 103.7* 102.8* 103.2* 102.1* 101.7*  --  100.4*  --  101.6*   MCH pg 34.0* 35.5* 34.0* 33.9* 34.2*  --  35.8*  --  33.7*   MCHC g/dL 32.8 34.5 32.9 33.2 33.6  --  35.7  --  33.2   RDW % 13.1 13.0 12.9 13.1 12.7  --  12.6  --  12.8   RDW-SD fl 49.0 47.6 48.1 48.1 46.3  --  45.5  --  47.3   MPV fL 10.2 9.9 10.2 10.2 10.7  --  10.2  --  9.9   NEUTROPHIL % % 58.9 55.1 51.4 47.1 45.7  --  48.4   < >  --    LYMPHOCYTE % % 20.4 23.0 24.8 27.5 31.0  --  27.1   < >  --    MONOCYTES % % 15.8* 16.9* 17.9* 19.9* 18.3*  --  20.1*   < >  --    EOSINOPHIL % % 3.9 4.2 4.5 4.5 4.0  --  3.3   < >  --    BASOPHIL % % 0.5 0.6 " 1.1 0.7 0.7  --  0.7   < >  --    IMM GRAN % % 0.5 0.2 0.3 0.3 0.3  --  0.4   < >  --    NEUTROS ABS 10*3/mm3 2.54 2.73 1.95 1.35* 1.37*  --  2.21   < > 3.19   LYMPHS ABS 10*3/mm3 0.88 1.14 0.94 0.79 0.93  --  1.24   < >  --    MONOS ABS 10*3/mm3 0.68 0.84 0.68 0.57 0.55  --  0.92*   < >  --    EOS ABS 10*3/mm3 0.17 0.21 0.17 0.13 0.12  --  0.15   < > 0.27   BASOS ABS 10*3/mm3 0.02 0.03 0.04 0.02 0.02  --  0.03   < > 0.05   IMMATURE GRANS (ABS) 10*3/mm3 0.02 0.01 0.01 0.01 0.01  --  0.02   < >  --    NRBC /100 WBC 0.0 0.0 0.0  --  0.0  --   --   --  0.0    < > = values in this interval not displayed.     Results from last 7 days   Lab Units 05/13/25  1434   INR  1.15*   APTT seconds 33.6                                       Results from last 7 days   Lab Units 05/11/25  0503 05/10/25  1728   SODIUM UR mmol/L 57  --    CREATININE UR mg/dL 134.9  --    OSMOLALITY UR mOsm/kg 415  --    URIC ACID mg/dL  --  4.4       Imaging:  Imaging Results (Last 24 Hours)       ** No results found for the last 24 hours. 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           Left-sided weakness and left foot drop secondary to C/T/L-spine degenerative disc disease with severe spinal stenosis.  MRI of the C/T/L-spine noted.  MRI  of the brain is negative.  CT scan of the chest without acute disease.  Status post neurology consult.  Status post neurosurgery consult and decompression surgery of thoracic and lumbar spine.  Physical therapy on board.  PCA pump DC'd.    Ambulate with TLSO brace.  Repeat MRI shows soft tissue/edema at the level of T12.  Neurosurgery is following.  I wonder if C-spine degenerative disc disease is causing his symptomatology of unsteadiness and left-sided weakness.  Improved symptomatology.  Neurosurgery okay with discharge to rehab.  PT worked with him 5/16 and he was 1-2 person max assist.  With recommendations for f/u with [facility] 5/23/25 at 10 am.    Urinary retention.  Failed voiding trial before but F/C is out and is voiding will.  urology did consulted 5/15 and recommended flomax and voiding trial with plans to f/u if discharged with indwelling cath and signed off  Left upper extremity Superficial VT.  Will avoid aspirin or Lovenox secondary to thrombocytopenia   liver cirrhosis and splenomegaly by abdominal CT.  Benign GI examination.  Liver function test is normal.   GI follow-up as an outpatient.  Acute kidney failure/hyperkalemia.  Mostly prerenal azotemia due to of hypotension and volume depletion.  BP's remain on the soft side. Continue holding ACE inhibitor.  Patient appears clinically euvolemic.  Resolved acute kidney failure w/ IV fluid now off fluids.  Renal function Stable.  Urine electrolytes nonconclusive.  Uric acid is normal.  Resolved hyperkalemia with the holding of ACE.   History of paroxysmal SVT/dilated ascending aorta/hypotension In a patient with a history of hypertension improved hypotension with holding ACE.  Currently in normal sinus rhythm.  Continue with Toprol and monitor blood pressure.  No evidence of angina or congestive heart failure  Hyperglycemia.  A1c at 4.8  Postoperative anemia in a patient with a history of macrocytic anemia/thrombocytopenia.  Baseline hemoglobin between 12  and 13.  Anemia workup suggestive of iron deficiency.  Will continue p.o. iron.  Positive hemoglobin drop but now stabilizing. Transfuse packed RBC if Hb less than 8.  Baseline platelets between 100-140s.  Acute platelets drop is secondary to consumption after surgery.  Post platelets transfusion.  Baseline thrombocytopenia is most likely secondary to hypersplenism..  No active bleed.  Normal IPF.  hematology oncology has seen in consult.  No evidence of schistocytes to suggest TTP, no HIT.  Hematology oncology started IV iron for the next 2 days and recommends transfusion as needed.    Hyperlipidemia.  Continue Crestor  DVT prophylaxis.  Sequential compression devices      Discussed my findings and plan of treatment with the patient and nurse.  Disposition.   For acute rehab anytime whenever pre-CERT is obtained and Patient has a bed.    YANIRA Leal  [facility]  05/17/25  14:21 EDT

## 2025-05-18 LAB
ALBUMIN SERPL-MCNC: 2.7 G/DL (ref 3.5–5.2)
ANION GAP SERPL CALCULATED.3IONS-SCNC: 9 MMOL/L (ref 5–15)
BASOPHILS # BLD AUTO: 0.04 10*3/MM3 (ref 0–0.2)
BASOPHILS NFR BLD AUTO: 1 % (ref 0–1.5)
BUN SERPL-MCNC: 16 MG/DL (ref 8–23)
BUN/CREAT SERPL: 15.4 (ref 7–25)
CALCIUM SPEC-SCNC: 9.8 MG/DL (ref 8.6–10.5)
CHLORIDE SERPL-SCNC: 108 MMOL/L (ref 98–107)
CO2 SERPL-SCNC: 20 MMOL/L (ref 22–29)
CREAT SERPL-MCNC: 1.04 MG/DL (ref 0.76–1.27)
DEPRECATED RDW RBC AUTO: 52.1 FL (ref 37–54)
EGFRCR SERPLBLD CKD-EPI 2021: 78.7 ML/MIN/1.73
EOSINOPHIL # BLD AUTO: 0.24 10*3/MM3 (ref 0–0.4)
EOSINOPHIL NFR BLD AUTO: 5.8 % (ref 0.3–6.2)
ERYTHROCYTE [DISTWIDTH] IN BLOOD BY AUTOMATED COUNT: 13.3 % (ref 12.3–15.4)
GLUCOSE SERPL-MCNC: 82 MG/DL (ref 65–99)
HCT VFR BLD AUTO: 25.9 % (ref 37.5–51)
HGB BLD-MCNC: 8.3 G/DL (ref 13–17.7)
IMM GRANULOCYTES # BLD AUTO: 0.01 10*3/MM3 (ref 0–0.05)
IMM GRANULOCYTES NFR BLD AUTO: 0.2 % (ref 0–0.5)
LYMPHOCYTES # BLD AUTO: 1.16 10*3/MM3 (ref 0.7–3.1)
LYMPHOCYTES NFR BLD AUTO: 28 % (ref 19.6–45.3)
MCH RBC QN AUTO: 34.4 PG (ref 26.6–33)
MCHC RBC AUTO-ENTMCNC: 32 G/DL (ref 31.5–35.7)
MCV RBC AUTO: 107.5 FL (ref 79–97)
MONOCYTES # BLD AUTO: 0.64 10*3/MM3 (ref 0.1–0.9)
MONOCYTES NFR BLD AUTO: 15.4 % (ref 5–12)
NEUTROPHILS NFR BLD AUTO: 2.06 10*3/MM3 (ref 1.7–7)
NEUTROPHILS NFR BLD AUTO: 49.6 % (ref 42.7–76)
PHOSPHATE SERPL-MCNC: 2.6 MG/DL (ref 2.5–4.5)
PLATELET # BLD AUTO: 111 10*3/MM3 (ref 140–450)
PMV BLD AUTO: 10.7 FL (ref 6–12)
POTASSIUM SERPL-SCNC: 4.3 MMOL/L (ref 3.5–5.2)
RBC # BLD AUTO: 2.41 10*6/MM3 (ref 4.14–5.8)
SODIUM SERPL-SCNC: 137 MMOL/L (ref 136–145)
WBC NRBC COR # BLD AUTO: 4.15 10*3/MM3 (ref 3.4–10.8)

## 2025-05-18 PROCEDURE — 85025 COMPLETE CBC W/AUTO DIFF WBC: CPT | Performed by: INTERNAL MEDICINE

## 2025-05-18 PROCEDURE — 94799 UNLISTED PULMONARY SVC/PX: CPT

## 2025-05-18 PROCEDURE — 80069 RENAL FUNCTION PANEL: CPT | Performed by: INTERNAL MEDICINE

## 2025-05-18 PROCEDURE — 97530 THERAPEUTIC ACTIVITIES: CPT

## 2025-05-18 RX ADMIN — METOPROLOL SUCCINATE 25 MG: 25 TABLET, EXTENDED RELEASE ORAL at 10:05

## 2025-05-18 RX ADMIN — FERROUS SULFATE TAB 325 MG (65 MG ELEMENTAL FE) 325 MG: 325 (65 FE) TAB at 10:05

## 2025-05-18 RX ADMIN — ALLOPURINOL 300 MG: 300 TABLET ORAL at 10:05

## 2025-05-18 RX ADMIN — METHOCARBAMOL TABLETS 750 MG: 750 TABLET, COATED ORAL at 00:08

## 2025-05-18 RX ADMIN — Medication 10 ML: at 20:42

## 2025-05-18 RX ADMIN — Medication 100 MG: at 10:05

## 2025-05-18 RX ADMIN — HYDROCODONE BITARTRATE AND ACETAMINOPHEN 1 TABLET: 7.5; 325 TABLET ORAL at 20:39

## 2025-05-18 RX ADMIN — METHOCARBAMOL TABLETS 750 MG: 750 TABLET, COATED ORAL at 10:05

## 2025-05-18 RX ADMIN — Medication 1000 MCG: at 10:05

## 2025-05-18 RX ADMIN — HYDROCODONE BITARTRATE AND ACETAMINOPHEN 1 TABLET: 7.5; 325 TABLET ORAL at 00:09

## 2025-05-18 RX ADMIN — METHOCARBAMOL TABLETS 750 MG: 750 TABLET, COATED ORAL at 16:55

## 2025-05-18 RX ADMIN — GABAPENTIN 300 MG: 300 CAPSULE ORAL at 20:39

## 2025-05-18 RX ADMIN — PANTOPRAZOLE SODIUM 40 MG: 40 TABLET, DELAYED RELEASE ORAL at 10:05

## 2025-05-18 RX ADMIN — HYDROCODONE BITARTRATE AND ACETAMINOPHEN 1 TABLET: 7.5; 325 TABLET ORAL at 10:05

## 2025-05-18 RX ADMIN — Medication 10 ML: at 10:07

## 2025-05-18 RX ADMIN — ROSUVASTATIN CALCIUM 10 MG: 20 TABLET, FILM COATED ORAL at 20:40

## 2025-05-18 RX ADMIN — HYDROCODONE BITARTRATE AND ACETAMINOPHEN 1 TABLET: 7.5; 325 TABLET ORAL at 15:45

## 2025-05-18 RX ADMIN — PANTOPRAZOLE SODIUM 40 MG: 40 TABLET, DELAYED RELEASE ORAL at 20:40

## 2025-05-18 RX ADMIN — METHOCARBAMOL TABLETS 750 MG: 750 TABLET, COATED ORAL at 22:44

## 2025-05-18 RX ADMIN — FOLIC ACID 1000 MCG: 1 TABLET ORAL at 10:05

## 2025-05-18 RX ADMIN — SENNOSIDES AND DOCUSATE SODIUM 2 TABLET: 50; 8.6 TABLET ORAL at 20:40

## 2025-05-18 RX ADMIN — GABAPENTIN 300 MG: 300 CAPSULE ORAL at 10:05

## 2025-05-18 RX ADMIN — TAMSULOSIN HYDROCHLORIDE 0.4 MG: 0.4 CAPSULE ORAL at 10:05

## 2025-05-18 NOTE — PROGRESS NOTES
Name: Mo Willoughby ADMIT: 2025   : 1955  PCP: Jenn Davison APRN    MRN: [id number] LOS: 13 days   AGE/SEX: 69 y.o. male  ROOM: [facility]     Subjective   Subjective   Back pain fairly well controlled with po pain meds.  Has had BM and urinating well.  Reports continued improvement in lower extremity strength. Walked with PT in hallway today and walker.  Left foot drop improved until last 10 feet began feeling more weakness.    Upper extremity weakness has improved.      Review of Systems   Respiratory:  Negative for cough, chest tightness and shortness of breath.    Cardiovascular:  Positive for leg swelling. Negative for chest pain.   Gastrointestinal:  Negative for abdominal distention, abdominal pain, constipation, diarrhea, nausea and vomiting.   Musculoskeletal:  Positive for back pain and gait problem.   Psychiatric/Behavioral:  Negative for agitation and confusion.         Objective   Objective   Vital Signs  Temp:  [97.9 °F (36.6 °C)-98.6 °F (37 °C)] 97.9 °F (36.6 °C)  Heart Rate:  [] 73  Resp:  [16] 16  BP: (100-143)/(55-89) 100/55  SpO2:  [94 %-99 %] 94 %  on   ;   Device (Oxygen Therapy): room air    Intake/Output Summary (Last 24 hours) at 2025 1517  Last data filed at 2025  Gross per 24 hour   Intake 480 ml   Output --   Net 480 ml       Body mass index is 32.6 kg/m².      25  1800   Weight: 108 kg (238 lb 1.6 oz)     Physical Exam  Vitals and nursing note reviewed.   Constitutional:       Appearance: Normal appearance. He is obese.   Eyes:      General: No scleral icterus.  Cardiovascular:      Rate and Rhythm: Normal rate and regular rhythm.      Pulses: Normal pulses.      Heart sounds: Normal heart sounds.   Pulmonary:      Effort: Pulmonary effort is normal.      Breath sounds: Normal breath sounds.   Abdominal:      General: Bowel sounds are normal. There is no distension.      Palpations: Abdomen is soft.      Tenderness: There is no abdominal  tenderness.   Musculoskeletal:      Left lower leg: Edema (1+ edema LLE, varicosities around ankle pedal area noted.) present.   Skin:     General: Skin is warm and dry.   Neurological:      Mental Status: He is alert and oriented to person, place, and time. Mental status is at baseline.      Motor: Weakness present.      Comments: Left Plantar Flexion 4/5 Dorsiflexion 4/5 ,  right dorsi/plantar flexion both 5/5. Dressing to Tspine and Lspine in place.    Psychiatric:         Mood and Affect: Mood normal.         Behavior: Behavior normal.         Thought Content: Thought content normal.         Judgment: Judgment normal.           Current Facility-Administered Medications:     acetaminophen (TYLENOL) tablet 650 mg, 650 mg, Oral, Q4H PRN **OR** acetaminophen (TYLENOL) 160 MG/5ML oral solution 650 mg, 650 mg, Oral, Q4H PRN **OR** acetaminophen (TYLENOL) suppository 650 mg, 650 mg, Rectal, Q4H PRN, Abel Perez MD    allopurinol (ZYLOPRIM) tablet 300 mg, 300 mg, Oral, Daily, Abel Perez MD, 300 mg at 05/18/25 1005    sennosides-docusate (PERICOLACE) 8.6-50 MG per tablet 2 tablet, 2 tablet, Oral, BID, 2 tablet at 05/17/25 2016 **AND** polyethylene glycol (MIRALAX) packet 17 g, 17 g, Oral, Daily, 17 g at 05/13/25 0901 **AND** bisacodyl (DULCOLAX) EC tablet 5 mg, 5 mg, Oral, Daily PRN **AND** bisacodyl (DULCOLAX) suppository 10 mg, 10 mg, Rectal, Daily PRN, Lyric Rhodes APRN, 10 mg at 05/12/25 0210    Calcium Replacement - Follow Nurse / BPA Driven Protocol, , Not Applicable, PRN, Abel Perez MD    ferrous sulfate tablet 325 mg, 325 mg, Oral, Daily With Breakfast, Abel Perez MD, 325 mg at 05/18/25 1005    folic acid (FOLVITE) tablet 1,000 mcg, 1,000 mcg, Oral, Daily, Abel Perez MD, 1,000 mcg at 05/18/25 1005    gabapentin (NEURONTIN) capsule 300 mg, 300 mg, Oral, Q12H, Abel Perez MD, 300 mg at 05/18/25 1005    HYDROcodone-acetaminophen (NORCO) 7.5-325 MG per tablet 1  tablet, 1 tablet, Oral, Q4H PRN, Lisa Nina, APRN, 1 tablet at 05/18/25 1005    Magnesium Standard Dose Replacement - Follow Nurse / BPA Driven Protocol, , Not Applicable, PRN, Abel Perez MD    methocarbamol (ROBAXIN) tablet 750 mg, 750 mg, Oral, 4x Daily PRN, Abel Perez MD, 750 mg at 05/18/25 1005    metoprolol succinate XL (TOPROL-XL) 24 hr tablet 25 mg, 25 mg, Oral, Daily, Abel Perez MD, 25 mg at 05/18/25 1005    midodrine (PROAMATINE) tablet 5 mg, 5 mg, Oral, TID PRN, Lucy Amezcua DO, 5 mg at 05/10/25 0815    naloxone (NARCAN) injection 0.1 mg, 0.1 mg, Intravenous, Q5 Min PRN, Abel Perez MD    nitroglycerin (NITROSTAT) SL tablet 0.4 mg, 0.4 mg, Sublingual, Q5 Min PRN, Abel Perez MD    ondansetron ODT (ZOFRAN-ODT) disintegrating tablet 4 mg, 4 mg, Oral, Q6H PRN **OR** ondansetron (ZOFRAN) injection 4 mg, 4 mg, Intravenous, Q6H PRN, Abel Perez MD    ondansetron ODT (ZOFRAN-ODT) disintegrating tablet 4 mg, 4 mg, Oral, Q6H PRN **OR** ondansetron (ZOFRAN) injection 4 mg, 4 mg, Intravenous, Q6H PRN, Abel Perez MD    pantoprazole (PROTONIX) EC tablet 40 mg, 40 mg, Oral, BID, Abel Perez MD, 40 mg at 05/18/25 1005    Phosphorus Replacement - Follow Nurse / BPA Driven Protocol, , Not Applicable, PRN, Abel Perez MD    Potassium Replacement - Follow Nurse / BPA Driven Protocol, , Not Applicable, PRN, Abel Perez MD    rosuvastatin (CRESTOR) tablet 10 mg, 10 mg, Oral, Daily, Abel Perez MD, 10 mg at 05/17/25 2016    sodium chloride 0.9 % flush 10 mL, 10 mL, Intravenous, Q12H, Abel Perez MD, 10 mL at 05/18/25 1007    sodium chloride 0.9 % flush 10 mL, 10 mL, Intravenous, PRN, Abel Perez MD    sodium chloride 0.9 % infusion 40 mL, 40 mL, Intravenous, PRN, Abel Perez MD    tamsulosin (FLOMAX) 24 hr capsule 0.4 mg, 0.4 mg, Oral, Daily, Sahra Luis MD, 0.4 mg at 05/18/25 1005    thiamine (VITAMIN B-1)  "tablet 100 mg, 100 mg, Oral, Daily, Abel Perez MD, 100 mg at 05/18/25 1005    vitamin B-12 (CYANOCOBALAMIN) tablet 1,000 mcg, 1,000 mcg, Oral, Daily, Abel Perez MD, 1,000 mcg at 05/18/25 1005    Results Review:      Results from last 7 days   Lab Units 05/18/25  0550 05/17/25  0454 05/16/25  0759 05/15/25  0705 05/14/25  0624 05/13/25  0550 05/12/25  0701   SODIUM mmol/L 137 138 137 139 136 135* 135*   POTASSIUM mmol/L 4.3 4.3 4.2 4.5 4.4 4.5 4.5   CHLORIDE mmol/L 108* 108* 107 112* 109* 107 107   CO2 mmol/L 20.0* 20.4* 21.0* 20.3* 19.0* 19.4* 20.1*   BUN mg/dL 16 18 16 20 25* 32* 40*   CREATININE mg/dL 1.04 0.98 1.03 1.03 1.17 1.24 1.64*   GLUCOSE mg/dL 82 93 89 86 95 100* 107*   CALCIUM mg/dL 9.8 9.9 9.8 9.6 9.2 9.3 9.2     Estimated Creatinine Clearance: 87.1 mL/min (by C-G formula based on SCr of 1.04 mg/dL).                            Results from last 7 days   Lab Units 05/18/25  0550   PHOSPHORUS mg/dL 2.6           Invalid input(s): \"LDLCALC\"  Results from last 7 days   Lab Units 05/18/25  0550 05/17/25  0454 05/16/25  0759 05/15/25  0705 05/14/25  0624 05/13/25  0550 05/12/25  2129 05/12/25  0701 05/12/25  0701   WBC 10*3/mm3 4.15 4.31 4.96 3.79 2.87* 3.00*  --   --  4.57   HEMOGLOBIN g/dL 8.3* 8.3* 8.8* 8.5* 8.1* 8.0*  --   --  8.2*   HEMATOCRIT % 25.9* 25.3* 25.5* 25.8* 24.4* 23.8*  --   --  23.0*   PLATELETS 10*3/mm3 111* 108* 110* 104* 96* 79* 94*  --  64*   MCV fL 107.5* 103.7* 102.8* 103.2* 102.1* 101.7*  --   --  100.4*   MCH pg 34.4* 34.0* 35.5* 34.0* 33.9* 34.2*  --   --  35.8*   MCHC g/dL 32.0 32.8 34.5 32.9 33.2 33.6  --   --  35.7   RDW % 13.3 13.1 13.0 12.9 13.1 12.7  --   --  12.6   RDW-SD fl 52.1 49.0 47.6 48.1 48.1 46.3  --   --  45.5   MPV fL 10.7 10.2 9.9 10.2 10.2 10.7  --   --  10.2   NEUTROPHIL % % 49.6 58.9 55.1 51.4 47.1 45.7  --   --  48.4   LYMPHOCYTE % % 28.0 20.4 23.0 24.8 27.5 31.0  --   --  27.1   MONOCYTES % % 15.4* 15.8* 16.9* 17.9* 19.9* 18.3*  --   --  20.1* "   EOSINOPHIL % % 5.8 3.9 4.2 4.5 4.5 4.0  --   --  3.3   BASOPHIL % % 1.0 0.5 0.6 1.1 0.7 0.7  --   --  0.7   IMM GRAN % % 0.2 0.5 0.2 0.3 0.3 0.3  --   --  0.4   NEUTROS ABS 10*3/mm3 2.06 2.54 2.73 1.95 1.35* 1.37*  --   --  2.21   LYMPHS ABS 10*3/mm3 1.16 0.88 1.14 0.94 0.79 0.93  --   --  1.24   MONOS ABS 10*3/mm3 0.64 0.68 0.84 0.68 0.57 0.55  --   --  0.92*   EOS ABS 10*3/mm3 0.24 0.17 0.21 0.17 0.13 0.12  --   --  0.15   BASOS ABS 10*3/mm3 0.04 0.02 0.03 0.04 0.02 0.02  --   --  0.03   IMMATURE GRANS (ABS) 10*3/mm3 0.01 0.02 0.01 0.01 0.01 0.01  --   --  0.02   NRBC /100 WBC  --  0.0 0.0 0.0  --  0.0  --    < >  --     < > = values in this interval not displayed.     Results from last 7 days   Lab Units 05/13/25  1434   INR  1.15*   APTT seconds 33.6                                               Imaging:  Imaging Results (Last 24 Hours)       ** No results found for the last 24 hours. 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       Left-sided weakness and left foot drop secondary to C/T/L-spine degenerative disc disease with severe spinal stenosis.  MRI of the C/T/L-spine noted.  MRI of the brain is negative.  CT scan of the chest without acute disease.  Status post neurology consult.  Status post  neurosurgery consult and decompression surgery of thoracic and lumbar spine.  Physical therapy on board.  PCA pump DC'd.    Ambulate with TLSO brace.  Repeat MRI shows soft tissue/edema at the level of T12.  Neurosurgery is following.  I wonder if C-spine degenerative disc disease is causing his symptomatology of unsteadiness and left-sided weakness.  Improved symptomatology.  Neurosurgery okay with discharge to rehab.  PT worked with him 5/16 and he was 1-2 person max assist. 5/18 This has improved and was 1 person assist today. JULIA recommends for f/u with JULIA 5/23/25 at 10 am.    Urinary retention.  Failed initial voiding trial, but  F/C is out and is now voiding well.  Urology consulted 5/15 and recommended flomax. F/u  with urology recommended needed if f/c needed at discharge.    Left upper extremity Superficial VT.  Will avoid aspirin or Lovenox secondary to thrombocytopenia   liver cirrhosis and splenomegaly by abdominal CT.  Benign GI examination.  Liver function test is normal.   GI follow-up as an outpatient.  Acute kidney injury/hyperkalemia.  Mostly prerenal azotemia due to of hypotension and volume depletion.  BP's remain on the soft side. Continue holding ACE inhibitor.  Patient appears clinically euvolemic.  Resolved acute kidney injury w/ IV fluid now off fluids.  Renal function Stable.   Uric acid is normal.  Resolved hyperkalemia with the holding of ACE.   History of paroxysmal SVT/dilated ascending aorta/hypotension In a patient with a history of hypertension improved hypotension with holding ACE.  Currently in normal sinus rhythm.  Continue with Toprol and monitor blood pressure.  No evidence of angina or congestive heart failure  Hyperglycemia.  A1c at 4.8, sugars have stablized.   Postoperative anemia in a patient with a history of macrocytic anemia/thrombocytopenia.  Baseline hemoglobin between 12 and 13.  Anemia workup suggestive of iron deficiency.  Will continue p.o. iron.  Positive  hemoglobin drop but now stabilizing. Transfuse packed RBC if Hb less than 8.  Baseline platelets between 100-140s.  Acute platelets drop is likely from consumption after surgery.  Post platelets transfusion.  Baseline thrombocytopenia is most likely secondary to hypersplenism..  No active bleed.  Normal IPF.  hematology oncology has seen in consult.  No evidence of schistocytes to suggest TTP, no HIT.  Hematology oncology started IV iron for the next 2 days and recommends transfusion as needed.    Hyperlipidemia.  Continue Crestor  DVT prophylaxis.  Sequential compression devices      Discussed my findings and plan of treatment with the patient and nurse.  Disposition.   For acute rehab anytime whenever pre-CERT is obtained and Patient has a bed.    YANIRA Leal  [facility]  05/18/25  15:17 EDT

## 2025-05-18 NOTE — PLAN OF CARE
Goal Outcome Evaluation:  Plan of Care Reviewed With: patient        Progress: improving  Outcome Evaluation: Pt increased with act jeanine, bed mobility, transfers, and amb safety and I with RWX    Anticipated Discharge Disposition (PT): inpatient rehabilitation facility                      PT washed hands before and after PT session.  PT wore proper PPE  the entire PT session.  Pt is not isolation

## 2025-05-19 ENCOUNTER — TELEPHONE (OUTPATIENT)
Dept: NEUROSURGERY | Facility: CLINIC | Age: 70
End: 2025-05-19
Payer: MEDICARE

## 2025-05-19 LAB
ALBUMIN SERPL-MCNC: 2.9 G/DL (ref 3.5–5.2)
ANION GAP SERPL CALCULATED.3IONS-SCNC: 7 MMOL/L (ref 5–15)
BASOPHILS # BLD AUTO: 0.04 10*3/MM3 (ref 0–0.2)
BASOPHILS NFR BLD AUTO: 0.9 % (ref 0–1.5)
BUN SERPL-MCNC: 16 MG/DL (ref 8–23)
BUN/CREAT SERPL: 13.8 (ref 7–25)
CALCIUM SPEC-SCNC: 9.9 MG/DL (ref 8.6–10.5)
CHLORIDE SERPL-SCNC: 106 MMOL/L (ref 98–107)
CO2 SERPL-SCNC: 23 MMOL/L (ref 22–29)
CREAT SERPL-MCNC: 1.16 MG/DL (ref 0.76–1.27)
DEPRECATED RDW RBC AUTO: 51.8 FL (ref 37–54)
EGFRCR SERPLBLD CKD-EPI 2021: 69 ML/MIN/1.73
EOSINOPHIL # BLD AUTO: 0.21 10*3/MM3 (ref 0–0.4)
EOSINOPHIL NFR BLD AUTO: 4.7 % (ref 0.3–6.2)
ERYTHROCYTE [DISTWIDTH] IN BLOOD BY AUTOMATED COUNT: 13.4 % (ref 12.3–15.4)
GLUCOSE SERPL-MCNC: 86 MG/DL (ref 65–99)
HCT VFR BLD AUTO: 25.3 % (ref 37.5–51)
HGB BLD-MCNC: 8.1 G/DL (ref 13–17.7)
IMM GRANULOCYTES # BLD AUTO: 0.02 10*3/MM3 (ref 0–0.05)
IMM GRANULOCYTES NFR BLD AUTO: 0.4 % (ref 0–0.5)
LYMPHOCYTES # BLD AUTO: 1.17 10*3/MM3 (ref 0.7–3.1)
LYMPHOCYTES NFR BLD AUTO: 26.2 % (ref 19.6–45.3)
MCH RBC QN AUTO: 33.9 PG (ref 26.6–33)
MCHC RBC AUTO-ENTMCNC: 32 G/DL (ref 31.5–35.7)
MCV RBC AUTO: 105.9 FL (ref 79–97)
MONOCYTES # BLD AUTO: 0.72 10*3/MM3 (ref 0.1–0.9)
MONOCYTES NFR BLD AUTO: 16.1 % (ref 5–12)
NEUTROPHILS NFR BLD AUTO: 2.31 10*3/MM3 (ref 1.7–7)
NEUTROPHILS NFR BLD AUTO: 51.7 % (ref 42.7–76)
PHOSPHATE SERPL-MCNC: 2.6 MG/DL (ref 2.5–4.5)
PLATELET # BLD AUTO: 115 10*3/MM3 (ref 140–450)
PMV BLD AUTO: 10.3 FL (ref 6–12)
POTASSIUM SERPL-SCNC: 4.3 MMOL/L (ref 3.5–5.2)
RBC # BLD AUTO: 2.39 10*6/MM3 (ref 4.14–5.8)
SODIUM SERPL-SCNC: 136 MMOL/L (ref 136–145)
WBC NRBC COR # BLD AUTO: 4.47 10*3/MM3 (ref 3.4–10.8)

## 2025-05-19 PROCEDURE — 85025 COMPLETE CBC W/AUTO DIFF WBC: CPT | Performed by: INTERNAL MEDICINE

## 2025-05-19 PROCEDURE — 80069 RENAL FUNCTION PANEL: CPT | Performed by: INTERNAL MEDICINE

## 2025-05-19 PROCEDURE — 99024 POSTOP FOLLOW-UP VISIT: CPT | Performed by: NURSE PRACTITIONER

## 2025-05-19 RX ORDER — HYDROCODONE BITARTRATE AND ACETAMINOPHEN 5; 325 MG/1; MG/1
1 TABLET ORAL EVERY 6 HOURS PRN
Refills: 0 | Status: DISCONTINUED | OUTPATIENT
Start: 2025-05-19 | End: 2025-05-20 | Stop reason: HOSPADM

## 2025-05-19 RX ADMIN — Medication 5 MG: at 22:41

## 2025-05-19 RX ADMIN — HYDROCODONE BITARTRATE AND ACETAMINOPHEN 1 TABLET: 5; 325 TABLET ORAL at 16:43

## 2025-05-19 RX ADMIN — PANTOPRAZOLE SODIUM 40 MG: 40 TABLET, DELAYED RELEASE ORAL at 20:48

## 2025-05-19 RX ADMIN — PANTOPRAZOLE SODIUM 40 MG: 40 TABLET, DELAYED RELEASE ORAL at 08:42

## 2025-05-19 RX ADMIN — METHOCARBAMOL TABLETS 750 MG: 750 TABLET, COATED ORAL at 16:44

## 2025-05-19 RX ADMIN — FERROUS SULFATE TAB 325 MG (65 MG ELEMENTAL FE) 325 MG: 325 (65 FE) TAB at 08:42

## 2025-05-19 RX ADMIN — METHOCARBAMOL TABLETS 750 MG: 750 TABLET, COATED ORAL at 08:47

## 2025-05-19 RX ADMIN — GABAPENTIN 300 MG: 300 CAPSULE ORAL at 20:48

## 2025-05-19 RX ADMIN — METHOCARBAMOL TABLETS 750 MG: 750 TABLET, COATED ORAL at 22:41

## 2025-05-19 RX ADMIN — ROSUVASTATIN CALCIUM 10 MG: 20 TABLET, FILM COATED ORAL at 20:48

## 2025-05-19 RX ADMIN — Medication 10 ML: at 08:43

## 2025-05-19 RX ADMIN — Medication 1000 MCG: at 08:42

## 2025-05-19 RX ADMIN — ALLOPURINOL 300 MG: 300 TABLET ORAL at 08:42

## 2025-05-19 RX ADMIN — HYDROCODONE BITARTRATE AND ACETAMINOPHEN 1 TABLET: 7.5; 325 TABLET ORAL at 00:59

## 2025-05-19 RX ADMIN — HYDROCODONE BITARTRATE AND ACETAMINOPHEN 1 TABLET: 5; 325 TABLET ORAL at 22:41

## 2025-05-19 RX ADMIN — FOLIC ACID 1000 MCG: 1 TABLET ORAL at 08:42

## 2025-05-19 RX ADMIN — Medication 10 ML: at 20:51

## 2025-05-19 RX ADMIN — METOPROLOL SUCCINATE 25 MG: 25 TABLET, EXTENDED RELEASE ORAL at 08:42

## 2025-05-19 RX ADMIN — HYDROCODONE BITARTRATE AND ACETAMINOPHEN 1 TABLET: 7.5; 325 TABLET ORAL at 08:47

## 2025-05-19 RX ADMIN — SENNOSIDES AND DOCUSATE SODIUM 2 TABLET: 50; 8.6 TABLET ORAL at 08:42

## 2025-05-19 RX ADMIN — POLYETHYLENE GLYCOL 3350 17 G: 17 POWDER, FOR SOLUTION ORAL at 08:42

## 2025-05-19 RX ADMIN — SENNOSIDES AND DOCUSATE SODIUM 2 TABLET: 50; 8.6 TABLET ORAL at 20:48

## 2025-05-19 RX ADMIN — TAMSULOSIN HYDROCHLORIDE 0.4 MG: 0.4 CAPSULE ORAL at 08:42

## 2025-05-19 RX ADMIN — GABAPENTIN 300 MG: 300 CAPSULE ORAL at 08:42

## 2025-05-19 RX ADMIN — Medication 100 MG: at 08:42

## 2025-05-19 NOTE — TELEPHONE ENCOUNTER
----- Message from Rohini Meza sent at 5/19/2025  5:11 PM EDT -----  Regarding: Change postop visit  Patient scheduled to see me 5/23.  Does not need that appointment.  Move it out 2 weeks with 1 of Dr. Perez's APCs or me please.  Thanks

## 2025-05-19 NOTE — NURSING NOTE
Pt refusing bed alarms and requesting to take himself to the bathroom. MEREDITH Stephenson notified

## 2025-05-19 NOTE — PROGRESS NOTES
[facility] CERVICAL PROGRESS NOTE      CC: POD 11 open T12/L1 discectomy/decompression, PLIF with posterior Mazor instrumented fusion T12-L2    Subjective     Interval History: Reports back pain manageable. Denies neck pain, arm or leg painStill weakness in his foot.  Anxious about decision regarding rehab.  States wife was admitted her all medical issues yesterday.  He is voiding without difficulty, having bowel movements.      ROS:  Constitutional: No fever, chills  Musculoskeletal: no neck pain, no arm pain, no leg pain  Neuro: Weakness, balance issues   : Voiding okay    Objective     Vital signs in last 24 hours:  Temp:  [98 °F (36.7 °C)-98.2 °F (36.8 °C)] 98 °F (36.7 °C)  Heart Rate:  [75-85] 84  Resp:  [16-18] 18  BP: (102-124)/(72-84) 102/80    Intake/Output this shift:  No intake/output data recorded.    LABS:  Results from last 7 days   Lab Units 05/19/25 0427 05/18/25  0550 05/17/25  0454   WBC 10*3/mm3 4.47 4.15 4.31   HEMOGLOBIN g/dL 8.1* 8.3* 8.3*   HEMATOCRIT % 25.3* 25.9* 25.3*   PLATELETS 10*3/mm3 115* 111* 108*     Results from last 7 days   Lab Units 05/19/25  0427 05/18/25  0550 05/17/25  0454   SODIUM mmol/L 136 137 138   POTASSIUM mmol/L 4.3 4.3 4.3   CHLORIDE mmol/L 106 108* 108*   CO2 mmol/L 23.0 20.0* 20.4*   BUN mg/dL 16 16 18   CREATININE mg/dL 1.16 1.04 0.98   CALCIUM mg/dL 9.9 9.8 9.9   GLUCOSE mg/dL 86 82 93       IMAGING STUDIES:  No new imaging    I personally viewed and interpreted the patient's chart    Meds reviewed/changed: Yes  Gabapentin 300 mg p.o. twice daily  Robaxin-750 milligram p.o. 4 times daily as needed- 4 doses  Norco 7.5mg PO q 4hrs PRN- 5 doses    Physical Exam:    General:   Awake, alert.  Resting in bed.  Pleasant and cooperative.  Back:    Midline lower thoracic upper lumbar with Optifoam dressing in place.  Midline incision well-approximated with Steri-Strips.  No redness drainage or swelling   motor:    Atrophy/muscle wasting left hand and forearm  with weak left interosseous, triceps.  Left TA 4-/5, left gastroc 4+/5  Station and Gait:             Per PT note 5/18-ambulated contact-guard assist with walker 60 feet  Extremities:   SCD in place    Assessment & Plan     ASSESSMENT:      Weakness of left lower extremity    CRI (chronic renal insufficiency), stage 3 (moderate)    Essential hypertension, benign    DDD (degenerative disc disease), lumbar    Imbalance    Foot drop, left    Atrophy of muscle of multiple sites    Tongue fasciculation    Acute bilateral low back pain with bilateral sciatica    Herniation of thoracolumbar intervertebral disc with myelopathy    Patient is 11 days status post T12/L1 decompression with T12-L2 fusion for thoracic myelopathy.  He he had repeat voiding trial and is doing well with low postvoid residuals.  He has tolerating diet and having bowel movements.  His initial request for pre-CERT to acute rehab was denied by insurance and [id number] is pending.  He is a bit frustrated with this as he wants just to get on with rehab.  He states his pain is fairly well-controlled and really only taking the Norco to help him sleep.  I would reduce his Norco dose and add melatonin for sleep.  Encouraged him to try the melatonin first.  His platelet count is stable but remains low as compared to normal numbers.  He is cleared for discharge from neurosurgery standpoint at any time.  He has scheduled follow-up in our office later this week but I am going to have that pushed out a couple of weeks as he is not needing to see us at that time.  If he discharges to rehab, he can make appointment to see us after his discharge.     PLAN:   Reduce Norco to 5 mg q 6 hrs  Add Melatonin for sleep  Encourage ice and muscle relaxant.  Continue therapies and out of bed activity with brace  Await insurance rehab decision  SCDs for DVT prophylaxis  Agree with neurology plan for outpatient EMG study after recovered from spine surgery  Follow-up after  "discharge from rehab or in 2 weeks if goes home with home health.    I discussed the patient's findings and my recommendations with patient and Dr. Perez    During patient visit, I utilized appropriate personal protective equipment including gloves. Appropriate PPE was worn during the entire visit.  Hand hygiene was completed before and after.      LOS: 14 days       Rohini Meza, APRN  5/19/2025  14:20 EDT    \"Dictated utilizing Dragon dictation\".      "

## 2025-05-19 NOTE — CASE MANAGEMENT/SOCIAL WORK
Continued Stay Note  [facility]     Patient Name: Mo Willoughby  MRN: [id number]  Today's Date: 5/19/2025    Admit Date: 5/5/2025    Plan: Plans discharge home with assist of family and home health. Referrals placed to [facility] and [facility] agencies. Await call back regarding acceptance/denial.   Discharge Plan       Row Name 05/19/25 1702       Plan    Plan Plans discharge home with assist of family and home health. Referrals placed to [facility] and [facility] agencies. Await call back regarding acceptance/denial.    Patient/Family in Agreement with Plan yes    Plan Comments Phyiscian Advisor reviewd case. States distance of ambulation with FWW and need for Margaret assist are considered safe for transition to home level services with maximized DME and HH. Does not have ongoing skilled RN or MD oversight needs. Informed patient at bedside. Provided patient choice list for HH. Plans discharge home with assist of family and home health. Referrals placed to [facility] and [facility] agencies. Await call back regarding acceptance/denial. No additional needs noted. Family to transport....[facility]                   Discharge Codes    No documentation.                 Expected Discharge Date and Time       Expected Discharge Date Expected Discharge Time    May 20, 2025               Kendy Henley RN

## 2025-05-19 NOTE — CASE MANAGEMENT/SOCIAL WORK
Continued Stay Note  [facility]     Patient Name: Mo Willoughby  MRN: [id number]  Today's Date: 5/19/2025    Admit Date: 5/5/2025    Plan: [facility] accepted pre-cert submitted 5/14/25. P2P offered 5/19/25. Await outcome of P2P.   Discharge Plan       Row Name 05/19/25 1031       Plan    Plan [facility] accepted pre-cert submitted 5/14/25. P2P offered 5/19/25. Await outcome of P2P.    Plan Comments Received message from [facility] stating P2P offered and needs to be completed before 5 pm today ([phone]) Member ID: [id number]. Await outcome of P2P.....[facility]                   Discharge Codes    No documentation.                 Expected Discharge Date and Time       Expected Discharge Date Expected Discharge Time    May 19, 2025               Kendy Henley RN

## 2025-05-19 NOTE — PLAN OF CARE
Pt aox4, NSR on monitor, standby ast w/ walker to bathroom, pt refusing bed alarms, insisting he can walk himself to the bathroom, pt waiting for rehab acceptance

## 2025-05-19 NOTE — PROGRESS NOTES
Name: Mo Willoughby ADMIT: 2025   : 1955  PCP: Jenn Davison APRN    MRN: [id number] LOS: 14 days   AGE/SEX: 69 y.o. male  ROOM: [facility]     Subjective   Subjective   Resting in bed. No family present. He states that his wife is also in the hospital. States pain is controlled at rest but does have some buttock pain from lying around. He denies any chest pain or dyspnea. Bm this morning and states she is urinating well. No nausea or vomiting. Tolerating a diet. He would like to go home with therapies if he cannot go to acute rehab. He is tired of waiting in the hospital for rehab at this point.     Objective   Objective   Vital Signs  Temp:  [97.9 °F (36.6 °C)-98.2 °F (36.8 °C)] 98 °F (36.7 °C)  Heart Rate:  [73-85] 84  Resp:  [16-18] 18  BP: (100-124)/(55-84) 124/84  SpO2:  [94 %-100 %] 99 %  on   ;   Device (Oxygen Therapy): room air  Body mass index is 32.6 kg/m².    Physical Exam  Vitals and nursing note reviewed.   Constitutional:       Appearance: He is ill-appearing. He is not toxic-appearing.   Cardiovascular:      Rate and Rhythm: Normal rate and regular rhythm.      Pulses: Normal pulses.   Pulmonary:      Effort: Pulmonary effort is normal. No respiratory distress.      Breath sounds: Normal breath sounds.   Abdominal:      General: Bowel sounds are normal. There is no distension.      Palpations: Abdomen is soft.      Tenderness: There is no abdominal tenderness.   Musculoskeletal:         General: Swelling (mild BLE) present. Normal range of motion.   Skin:     General: Skin is warm and dry.   Neurological:      Mental Status: He is alert and oriented to person, place, and time.      Motor: Weakness (LLE>RLE) present.   Psychiatric:         Mood and Affect: Mood normal.         Behavior: Behavior normal.       Results Review:       I reviewed the patient's new clinical results.  Results from last 7 days   Lab Units 25  0427 25  0550 25  0454 25  0759   WBC  "10*3/mm3 4.47 4.15 4.31 4.96   HEMOGLOBIN g/dL 8.1* 8.3* 8.3* 8.8*   PLATELETS 10*3/mm3 115* 111* 108* 110*     Results from last 7 days   Lab Units 05/19/25 0427 05/18/25  0550 05/17/25  0454 05/16/25  0759   SODIUM mmol/L 136 137 138 137   POTASSIUM mmol/L 4.3 4.3 4.3 4.2   CHLORIDE mmol/L 106 108* 108* 107   CO2 mmol/L 23.0 20.0* 20.4* 21.0*   BUN mg/dL 16 16 18 16   CREATININE mg/dL 1.16 1.04 0.98 1.03   GLUCOSE mg/dL 86 82 93 89   Estimated Creatinine Clearance: 78.1 mL/min (by C-G formula based on SCr of 1.16 mg/dL).  Results from last 7 days   Lab Units 05/19/25 0427 05/18/25  0550 05/17/25  0454 05/16/25  0759   ALBUMIN g/dL 2.9* 2.7* 2.8* 2.9*     Results from last 7 days   Lab Units 05/19/25 0427 05/18/25  0550 05/17/25  0454 05/16/25  0759   CALCIUM mg/dL 9.9 9.8 9.9 9.8   ALBUMIN g/dL 2.9* 2.7* 2.8* 2.9*   PHOSPHORUS mg/dL 2.6 2.6 2.4* 2.3*       No results found for: \"HGBA1C\", \"POCGLU\"    allopurinol, 300 mg, Oral, Daily  ferrous sulfate, 325 mg, Oral, Daily With Breakfast  folic acid, 1,000 mcg, Oral, Daily  gabapentin, 300 mg, Oral, Q12H  metoprolol succinate XL, 25 mg, Oral, Daily  pantoprazole, 40 mg, Oral, BID  senna-docusate sodium, 2 tablet, Oral, BID   And  polyethylene glycol, 17 g, Oral, Daily  rosuvastatin, 10 mg, Oral, Daily  sodium chloride, 10 mL, Intravenous, Q12H  tamsulosin, 0.4 mg, Oral, Daily  thiamine, 100 mg, Oral, Daily  vitamin B-12, 1,000 mcg, Oral, Daily       Diet: Regular/House; Fluid Consistency: Thin (IDDSI 0)       Assessment/Plan     Active Hospital Problems    Diagnosis  POA    **Weakness of left lower extremity [R29.898]  Yes    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     Mr. Willoughby is a 69-year-old male with a known history of CKD 3, DDD and hypertension that presented to the hospital as a direct admission under the direction of the neurosurgery service.  He was seen in their office on 5/2 for evaluation of back and bilateral leg pain after lifting a ladder into his truck about 1 month ago.  He was seen by his PCP who gave him a Medrol Dosepak and muscle relaxant without any relief.  He started having progressive worsening with sensation of weakness in his legs and was referred for hospital admission but declined to come until 5/5 secondary to Cory holiday.  He was ultimately admitted on 5/5 where imaging revealed T12/L1 disc herniation resulting in severe canal stenosis.  He was also found to have degenerative changes of the left L4/5 and T3/4 that were severe and could be contributing to his left foot drop and left arm weakness.  Complete spinal MRI revealed multiple areas of canal stenosis and nerve root compression with the primary concern at this time to be this severe stenosis at T12-L1 which was contributing to his gait imbalance.  He was ultimately taken for open T12-L1 laminectomy with bilateral L1 pediculectomies for bilateral discectomy, decompression of spinal cord, posterior interbody fusion with expandable catalyst cages at T12-L1, pedicle screw fixation T12 and L2 bilateral with Cvgram.meor robotic navigation, bilateral terry placement from T12-L2 with crossing fusion from L1-L2 neurosurgery.  He was complicated by urinary retention as well as thrombocytopenia.  Cardiology and neurology also saw this hospital stay.    Left-sided weakness and left foot drop secondary to C/T/L-spine degenerative disc disease with severe spinal stenosis.  MRI of the C/T/L-spine noted.  MRI  of the brain is negative.  CT scan of the chest without acute disease.  Status post neurology consult.  They recommended outpatient EMG/NCS in the next couple of months.  Status post neurosurgery consult and decompression surgery of thoracic and lumbar spine.  Physical therapy on board.  Plans for acute rehab and neurosurgery okay with this.  PCA pump DC'd.  Ambulate with TLSO brace.  Repeat MRI shows soft tissue/edema at the level of T12. JULIA recommends for f/u with JULIA 5/23/25 at 10 am.    Urinary retention.  Failed initial voiding trial, but  F/C is out and is now voiding well.  Urology consulted 5/15 and recommended flomax. F/u  with urology recommended needed if f/c needed at discharge.    Left upper extremity Superficial VT.  Will avoid aspirin or Lovenox secondary to thrombocytopenia   Liver cirrhosis and splenomegaly by abdominal CT.  Benign GI examination.  Liver function test is normal.   GI follow-up as an outpatient. Patient unaware of underlying cirrhosis but did have alcohol use in the past that was reported to be heavy.  Acute kidney injury/hyperkalemia.  Mostly prerenal azotemia due to of hypotension and volume depletion.  BP stable off  ACE inhibitor.  Patient appears clinically euvolemic.  Resolved acute kidney injury w/ IV fluid now off fluids.  Renal function Stable.  Uric acid is normal.  Resolved hyperkalemia with the holding of ACE.   History of paroxysmal SVT/dilated ascending aorta/hypotension In a patient with a history of hypertension. Currently in normal sinus rhythm.  Continue with Toprol and monitor blood pressure.  No evidence of angina or congestive heart failure. Cards followed and signed off with plans for outpatient follow up in July.  Hyperglycemia.  A1c at 4.8, sugars have stablized.   Postoperative anemia in a patient with a history of macrocytic anemia/thrombocytopenia.  Baseline hemoglobin between 12 and 13.  Anemia workup suggestive of iron deficiency.  Will continue p.o.  iron.  Positive hemoglobin drop but now stabilizing. Transfuse packed RBC if Hb less than 8.  Baseline platelets between 100-140s.  Acute platelets drop is likely from consumption after surgery.  Post platelets transfusion.  Baseline thrombocytopenia is most likely secondary to hypersplenism..  No active bleed.  Normal IPF.  Hematology oncology has seen in consult.  No evidence of schistocytes to suggest TTP, no HIT.  Hematology oncology ordered IV iron x 2 doses and signed off. No outpatient follow up planned.  Hyperlipidemia.  Continue Crestor    Discussed with patient and nursing staff as well as CCP.     Informed patient of the need to call for assistance with ambulating/going to the bathroom given recent surgery, etc unless nursing or PT felt he was appropriate for ad allen.     VTE  prevention: SCDs  Code status: Full code  Disposition: To acute rehab. P2P sent. Await determination for discharge.    YANIRA Flores  [facility]  05/19/25  09:50 EDT

## 2025-05-19 NOTE — CASE MANAGEMENT/SOCIAL WORK
Post-Acute Authorization Submission      Post Acute Pre-Cert Documentation  Post-Acute Authorization Type Submitted:: IRF  Date Post Acute Pre-Cert Inititated per Facility: 05/14/25  Accepting Facility: [facility]  Hospital Discharge Date Requested: 05/15/25  Response Received from Insurance?: P2P Requested  Response Communicated to::   Authorization Number:: PENDING [id number]              MANNY Gonzalez

## 2025-05-20 ENCOUNTER — READMISSION MANAGEMENT (OUTPATIENT)
Dept: CALL CENTER | Facility: HOSPITAL | Age: 70
End: 2025-05-20
Payer: MEDICARE

## 2025-05-20 VITALS
HEART RATE: 82 BPM | BODY MASS INDEX: 32.25 KG/M2 | RESPIRATION RATE: 18 BRPM | SYSTOLIC BLOOD PRESSURE: 120 MMHG | OXYGEN SATURATION: 100 % | TEMPERATURE: 98.1 F | WEIGHT: 238.1 LBS | DIASTOLIC BLOOD PRESSURE: 78 MMHG | HEIGHT: 72 IN

## 2025-05-20 LAB
ALBUMIN SERPL-MCNC: 2.9 G/DL (ref 3.5–5.2)
ANION GAP SERPL CALCULATED.3IONS-SCNC: 7 MMOL/L (ref 5–15)
BASOPHILS # BLD AUTO: 0.05 10*3/MM3 (ref 0–0.2)
BASOPHILS NFR BLD AUTO: 1.1 % (ref 0–1.5)
BUN SERPL-MCNC: 14 MG/DL (ref 8–23)
BUN/CREAT SERPL: 13.1 (ref 7–25)
CALCIUM SPEC-SCNC: 9.6 MG/DL (ref 8.6–10.5)
CHLORIDE SERPL-SCNC: 110 MMOL/L (ref 98–107)
CO2 SERPL-SCNC: 21 MMOL/L (ref 22–29)
CREAT SERPL-MCNC: 1.07 MG/DL (ref 0.76–1.27)
DEPRECATED RDW RBC AUTO: 52.8 FL (ref 37–54)
EGFRCR SERPLBLD CKD-EPI 2021: 76.1 ML/MIN/1.73
EOSINOPHIL # BLD AUTO: 0.23 10*3/MM3 (ref 0–0.4)
EOSINOPHIL NFR BLD AUTO: 4.9 % (ref 0.3–6.2)
ERYTHROCYTE [DISTWIDTH] IN BLOOD BY AUTOMATED COUNT: 13.3 % (ref 12.3–15.4)
GLUCOSE SERPL-MCNC: 90 MG/DL (ref 65–99)
HCT VFR BLD AUTO: 26.7 % (ref 37.5–51)
HGB BLD-MCNC: 8.4 G/DL (ref 13–17.7)
IMM GRANULOCYTES # BLD AUTO: 0 10*3/MM3 (ref 0–0.05)
IMM GRANULOCYTES NFR BLD AUTO: 0 % (ref 0–0.5)
LYMPHOCYTES # BLD AUTO: 1.16 10*3/MM3 (ref 0.7–3.1)
LYMPHOCYTES NFR BLD AUTO: 24.7 % (ref 19.6–45.3)
MCH RBC QN AUTO: 34 PG (ref 26.6–33)
MCHC RBC AUTO-ENTMCNC: 31.5 G/DL (ref 31.5–35.7)
MCV RBC AUTO: 108.1 FL (ref 79–97)
MONOCYTES # BLD AUTO: 0.66 10*3/MM3 (ref 0.1–0.9)
MONOCYTES NFR BLD AUTO: 14.1 % (ref 5–12)
NEUTROPHILS NFR BLD AUTO: 2.59 10*3/MM3 (ref 1.7–7)
NEUTROPHILS NFR BLD AUTO: 55.2 % (ref 42.7–76)
PHOSPHATE SERPL-MCNC: 2.6 MG/DL (ref 2.5–4.5)
PLATELET # BLD AUTO: 117 10*3/MM3 (ref 140–450)
PMV BLD AUTO: 10.2 FL (ref 6–12)
POTASSIUM SERPL-SCNC: 4.1 MMOL/L (ref 3.5–5.2)
RBC # BLD AUTO: 2.47 10*6/MM3 (ref 4.14–5.8)
SODIUM SERPL-SCNC: 138 MMOL/L (ref 136–145)
WBC NRBC COR # BLD AUTO: 4.69 10*3/MM3 (ref 3.4–10.8)

## 2025-05-20 PROCEDURE — 85025 COMPLETE CBC W/AUTO DIFF WBC: CPT | Performed by: INTERNAL MEDICINE

## 2025-05-20 PROCEDURE — 80069 RENAL FUNCTION PANEL: CPT | Performed by: INTERNAL MEDICINE

## 2025-05-20 RX ORDER — METHOCARBAMOL 750 MG/1
750 TABLET, FILM COATED ORAL 4 TIMES DAILY PRN
Qty: 30 TABLET | Refills: 0 | Status: SHIPPED | OUTPATIENT
Start: 2025-05-20

## 2025-05-20 RX ORDER — HYDROCODONE BITARTRATE AND ACETAMINOPHEN 5; 325 MG/1; MG/1
1 TABLET ORAL EVERY 6 HOURS PRN
Qty: 12 TABLET | Refills: 0 | Status: SHIPPED | OUTPATIENT
Start: 2025-05-20

## 2025-05-20 RX ORDER — TAMSULOSIN HYDROCHLORIDE 0.4 MG/1
0.4 CAPSULE ORAL DAILY
Qty: 30 CAPSULE | Refills: 0 | Status: SHIPPED | OUTPATIENT
Start: 2025-05-20

## 2025-05-20 RX ORDER — METOPROLOL SUCCINATE 25 MG/1
25 TABLET, EXTENDED RELEASE ORAL DAILY
Start: 2025-05-20

## 2025-05-20 RX ADMIN — Medication 100 MG: at 09:00

## 2025-05-20 RX ADMIN — PANTOPRAZOLE SODIUM 40 MG: 40 TABLET, DELAYED RELEASE ORAL at 09:00

## 2025-05-20 RX ADMIN — Medication 1000 MCG: at 08:59

## 2025-05-20 RX ADMIN — HYDROCODONE BITARTRATE AND ACETAMINOPHEN 1 TABLET: 5; 325 TABLET ORAL at 04:49

## 2025-05-20 RX ADMIN — METOPROLOL SUCCINATE 25 MG: 25 TABLET, EXTENDED RELEASE ORAL at 09:00

## 2025-05-20 RX ADMIN — SENNOSIDES AND DOCUSATE SODIUM 2 TABLET: 50; 8.6 TABLET ORAL at 08:59

## 2025-05-20 RX ADMIN — TAMSULOSIN HYDROCHLORIDE 0.4 MG: 0.4 CAPSULE ORAL at 09:00

## 2025-05-20 RX ADMIN — POLYETHYLENE GLYCOL 3350 17 G: 17 POWDER, FOR SOLUTION ORAL at 08:59

## 2025-05-20 RX ADMIN — FERROUS SULFATE TAB 325 MG (65 MG ELEMENTAL FE) 325 MG: 325 (65 FE) TAB at 08:59

## 2025-05-20 RX ADMIN — FOLIC ACID 1000 MCG: 1 TABLET ORAL at 09:00

## 2025-05-20 RX ADMIN — METHOCARBAMOL TABLETS 750 MG: 750 TABLET, COATED ORAL at 04:48

## 2025-05-20 RX ADMIN — Medication 10 ML: at 09:00

## 2025-05-20 RX ADMIN — ALLOPURINOL 300 MG: 300 TABLET ORAL at 08:59

## 2025-05-20 RX ADMIN — GABAPENTIN 300 MG: 300 CAPSULE ORAL at 08:59

## 2025-05-20 NOTE — DISCHARGE SUMMARY
Patient Name: Mo Willoughby  : 1955  MRN: [id number]    Date of Admission: 2025  Date of Discharge:  2025  Primary Care Physician: Jenn Davison APRN      Chief Complaint:   No chief complaint on file.      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        Hospital Course     Mr. Willoughby is a 69-year-old male with a known history of CKD 3, DDD and hypertension that presented to the hospital as a direct admission under the direction of the neurosurgery service.  He was seen in their office on  for evaluation of back and bilateral leg pain after lifting a ladder into his truck about 1 month ago.  He was seen by his PCP who gave him a Medrol Dosepak and muscle relaxant without any relief.  He started having progressive worsening with sensation of weakness in his legs and was referred for hospital admission but declined to come until  secondary to  holiday.  He was ultimately admitted on  when imaging revealed T12/L1 disc herniation resulting in severe canal stenosis.  He was also found to have degenerative changes of the left L4/5 and T3/4 that were severe and could be contributing to his left foot drop and left arm weakness.  Complete spinal MRI revealed multiple areas of canal stenosis and nerve root compression with the primary concern at this time to be this  severe stenosis at T12-L1 which was contributing to his gait imbalance.  All MRI reports as below.  The patient was taken to the OR on 5/8 with neurosurgery for Open thoracic twelve/lumbar one discectomy/decompression/interbody fusion with cages, pedicle screw/terry/crosslink fixation, thoracic twelve through lumbar two with Mazor robotic navigation.  He tolerated the procedure well.  Patient was cleared for discharge from a neurosurgical standpoint.  He has neurosurgery follow-up scheduled for 5/23.    Postoperative course was complicated by urinary retention.  Urology was consulted.  Phillips catheter was initially placed but the patient did have successful voiding trial prior to discharge home.  Tamsulosin was added to his medication regimen.  Hospital course was also complicated by anemia and thrombocytopenia.  Patient was found to be iron deficient.  Hematology was consulted for further evaluation.  The patient received IV iron.  Hemoglobin was stable prior to discharge home.  Would recommend a repeat CBC within 1 week at his PCP follow-up to ensure stability of hemoglobin and platelets. Cardiology did see the patient preoperatively for cardiac clearance.  An EKG was obtained but no additional workup was indicated.  Neurology was consulted on admission given the patient's weakness.  Given his muscle atrophy and fasciculations in his hands, they do recommend an outpatient EMG/nerve conduction study.  Neurology has sent a message to their clinic to get this arranged.  He will follow-up with neurology in clinic.  As part of their initial workup, patient did undergo CT chest/abdomen/pelvis.  This revealed evidence of cirrhotic morphology.  Patient did not have a previous diagnosis of cirrhosis, but does have a history of heavy alcohol consumption.  These findings were discussed with him and GI referral was placed at discharge.  He was also noted to have indeterminate renal cortical nodules.  The patient will need a  dedicated renal protocol CT.  Will defer this to the patient's primary care provider in the outpatient setting.  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  He will need 5-year follow-up for this.    Initial plan was for discharge to rehab, however this was not approved through patient's insurance.  He did improve while working with therapy.  He is being discharged with home health.  He was counseled extensively on reasons to return to the hospital and expressed understanding.  He is being discharged in satisfactory condition.  Neurosurgery follow-up scheduled.  Neurology to arrange follow-up in their clinic.  Recommend PCP follow-up within 1 week.    Day of Discharge     Subjective:  No acute events overnight.  Patient states he is feeling well today.  No chest pain or shortness of breath.  No significant back pain.  He is extremely eager for discharge home today.    Physical Exam:  Temp:  [98 °F (36.7 °C)-98.2 °F (36.8 °C)] 98.1 °F (36.7 °C)  Heart Rate:  [81-82] 82  Resp:  [18] 18  BP: (108-127)/(78-91) 120/78  Body mass index is 32.6 kg/m².  Physical Exam  General: Alert, no acute distress.  Sitting up in bed.  Answers questions appropriately.  ENT: No conjunctival injection or scleral icterus. Moist mucous membranes.   Neuro: Eyes open and moving in all directions, generalized weakness, face symmetric, no focal deficits.   Lungs: Clear to auscultation bilaterally. No wheeze or crackles. No distress.   Heart: RRR, no murmurs. No edema.  Abdomen: Soft, non-tender, non-distended. Normal bowel sounds.   Ext: Warm and well-perfused. No edema.   Skin: No rashes or lesions. IV site without swelling or erythema.     Consultants     Consult Orders (all) (From admission, onward)       Start     Ordered    05/15/25 1231  Inpatient Urology Consult  Once        Specialty:  Urology  Provider:  Tara Powers APRN    05/15/25 1230    05/12/25 1545  Hematology & Oncology Inpatient Consult  Once        Specialty:   Hematology and Oncology  Provider:  Yuri Nesbitt II, MD    05/12/25 1545    05/09/25 0000  Inpatient Physical Medicine Rehab Consult  Once        Specialty:  Physical Medicine and Rehabilitation  Provider:  Karl Guerra MD    05/08/25 2246    05/09/25 0000  Inpatient Case Management  Consult  Once        Provider:  (Not yet assigned)    05/08/25 2246    05/06/25 1222  Inpatient Cardiology Consult  Once        Specialty:  Cardiology  Provider:  Reed Galvin III, MD    05/06/25 1221    05/05/25 1406  Inpatient Neurosurgery Consult  Once        Specialty:  Neurosurgery  Provider:  Abel Perez MD    05/05/25 1410    05/05/25 1402  Inpatient Neurology Consult General  Once        Specialty:  Neurology  Provider:  David Herman MD    05/05/25 1410                  Procedures     Open thoracic twelve/lumbar one discectomy/decompression/possible interbody fusion with cages, pedicle screw/terry/crosslink fixation, thoracic twelve/lumbar one/two with Mazor robotic navigation    Imaging Results (All)       Procedure Component Value Units Date/Time    MRI Lumbar Spine Without Contrast [[id number]] Collected: 05/12/25 1620     Updated: 05/18/25 2351    Narrative:      MRI LUMBAR SPINE WO CONTRAST-05/12/2025     HISTORY: Recent T12-L2 fusion. Some lower extremity weakness with  difficulty voiding.     TECHNIQUE: Multiple noncontrast sagittal and axial images were obtained  through the lumbar spine.     FINDINGS: There are bilateral pedicle screws fusing T12 and L1 with  support rods. These produce susceptibility artifact. Intervertebral disc  spacer is seen at the T12-L1 level.. The spinal cord appears to  terminate at the approximate T12-L1 interspace. This is better seen on  the preoperative study of 05/06/2025. The T12-L1 intervertebral disc  herniation seen at T12-L1 has apparently been surgically repaired. There  still is a rind of soft tissue draped over the posterior aspect of  the  bulging T12-L1 disc space best seen on sagittal series 7 image 11 and  proton density sagittal series 8 image 10. This could represent some  residual disc material. On the current study there is an ill-defined  rind of abnormal signal intensity posterior to the T12-L1 level. This  mimics fluid signal on the T1 and T2 weighted images and narrows the  spinal canal. The thecal sac is somewhat obscured by this abnormal  signal intensity such as on axial series 13 images 18 through 22. No  abnormal fluid collections are otherwise seen. Mild disc bulges are seen  at L1-2, L2-3 and L3-4. There is anterolisthesis of L4 on L5 with some  uncovering of the intervertebral disc and broad-based disc bulge.     The signal intensity of the bony structures appears relatively normal.  There is multilevel facet joint disease including an element of canal  stenosis at the L2-3, L3-4 and L4-5 levels.       Impression:      1. Postoperative changes from T12-L2 fusion with intervertebral disc  spacer at T12-L1. There still may be some herniated disc material draped  over the posterior aspect of the T12-L1 disc as discussed above.  2. On the current study there is moderate amount of abnormal soft  tissue/edema posteriorly at the level of the T12 interspace extending  into the spinal canal with canal narrowing. This demonstrates signal  intensity similar to fluid but is somewhat heterogeneous. This is  suspected to be related to postoperative fluid collection. This narrows  the spinal canal at this level. This is seen at the level of the conus.  3. Additional lower lumbar degenerative disease is seen similar to the  05/06/2025 study.  4. FINDINGS were discussed with the referring practitioner consider  short-term follow-up MRI of the lumbar spine for further evaluation if  clinically indicated.        This report was finalized on 5/18/2025 11:48 PM by Dr. Lloyd Clarke M.D on Workstation: [id number]       XR Abdomen KUB  [[id number]] Collected: 05/12/25 0829     Updated: 05/12/25 0835    Narrative:      XR ABDOMEN KUB-     Clinical: Abdominal distention     FINDINGS: There is normal bowel gas pattern in typical stool burden.  There is a normal amount of gas demonstrated within the stomach. No  gross free intraperitoneal air. No soft tissue calcification, soft  tissue planes preserved.     CONCLUSION: Normal KUB.     This report was finalized on 5/12/2025 8:32 AM by Dr. Ramirez Garcia M.D  on Workstation: [id number]       XR Spine Lumbar AP & Lateral [[id number]] Collected: 05/11/25 1543     Updated: 05/11/25 1549    Narrative:      XR SPINE LUMBAR AP AND LATERAL-     HISTORY: T12-L2 fusion 5/8, BLE weakness; uanble to void x 24 hours;  R29.898-Other symptoms and signs involving the musculoskeletal system;  M51.05-Intervertebral disc disorders with myelopathy, thoracolumbar  region; R25.3-Fasciculation; M62.59-Muscle wasting and atrophy, not  elsewhere classified, multiple sites     COMPARISON: Lumbar spine 05/09/2025.     FINDINGS: There has been posterolateral instrumented fusion with  placement of rods and pedicle screws at T12-L2, and interbody spacer  placement at T12-L1. Posterior decompression. There is advanced  degenerative disc disease at L3-L4 where there is disc space narrowing.  Mild disc space narrowing at L4-L5 where there is approximately 12 mm  anterolisthesis L4 with respect to L5. Multilevel facet arthritis.       Impression:      Postoperative changes in the lumbar spine with similar  appearance to prior exam 05/09/2025.     This report was finalized on 5/11/2025 3:46 PM by Mark Denis M.D  on Workstation: [id number]       XR Spine Lumbar 2 or 3 View [[id number]] Collected: 05/09/25 0927     Updated: 05/09/25 0934    Narrative:      3 VIEW LUMBAR SPINE     HISTORY: Recent multilevel fusion. Follow-up evaluation.     FINDINGS: The patient has had laminectomy of L1 combined with posterior  plates and pedicle  screws at T12 and L2 as well as intervertebral cage  at T12-L1. The alignment appears satisfactory.     There is mild disc space narrowing at L4-5 associated with severe facet  spurring and anterior subluxation of L5 4 measuring 11 mm and this is  unchanged from 4/3/2025.     There is prominent calcification of the abdominal aorta with aneurysmal  dilatation as also noted on the recent abdominal CT scan performed 2  days ago.     This report was finalized on 5/9/2025 9:31 AM by Dr. Cassius Pierre M.D  on Workstation: [id number]       FL C Arm During Surgery [[id number]] Resulted: 05/08/25 1852     Updated: 05/08/25 1852    Narrative:      This procedure was auto-finalized with no dictation required.    CT Chest With Contrast Diagnostic [[id number]] Collected: 05/07/25 1156     Updated: 05/07/25 1248    Narrative:      CT CHEST W CONTRAST DIAGNOSTIC-, CT ABDOMEN PELVIS W CONTRAST-     Radiation dose reduction techniques were utilized, including automated  exposure control and exposure modulation based on body size.     CLINICAL INFORMATION: Rule out malignancy.     COMPARISON: CT of the chest dated 05/02/2022 and CT scan of the abdomen  and pelvis dated 07/31/2018.     FINDINGS: There is cardiac enlargement. The esophagus is normal.  Atherosclerotic calcification of the aorta, mildly dilated ascending  portion measuring approximately 4.2 cm in diameter. There are small  calcified mediastinal and bilateral hilar lymph nodes, no adenopathy. No  pleural effusion or acute airspace disease, there are fine linear areas  of scarring versus discoid atelectasis along each costophrenic sulcus.  No suspicious pulmonary lesion seen.     Partially within the field-of-view there is a lobulated fluid collection  anterior to the right shoulder joint bursa, measuring approximately 6 cm  in length and 2.4 cm in width and this could communicate with the joint  which demonstrates progressive degenerative change since the previous  2022  examination. Fluid collection was much smaller at that time.     No axillary adenopathy or chest wall lesion.     The liver has diminished in size since 2018, there is surface  irregularity and its morphologic appearance is compatible with  cirrhosis. Recanalization of the paraumbilical vein. No liver lesion  seen. No biliary duct dilatation status post cholecystectomy, the  pancreas is normal. There has been interval enlargement of the spleen  measuring approximately 13 cm in maximum diameter.     There are bilateral renal cortical nodules and several of these have the  appearance of simple cysts, others are indeterminate however probably  represent complex cysts. Renal protocol CT is the best means for further  evaluation. The largest indeterminate nodule measures approximately 3  cm. No renal calculus nor obstructive uropathy, the ureters are normal  in course and caliber to the urinary bladder which is moderately  distended. Small diverticulum arising from the dome of the bladder.  There is some perinephric fat stranding again demonstrated. The renal  cortical pattern of enhancement is symmetric and satisfactory.     The colon and small bowel have a normal appearance. The appendix is  normal. Stomach and duodenum within normal limits. Atherosclerotic  calcification of the aorta having a maximum infrarenal diameter of 3 cm.     There is bilateral significant hip joint degeneration with bilateral hip  effusions. No lytic or sclerotic bone lesion. Grade 1 anterolisthesis of  L4 on L5 without associated pars defect.     CONCLUSION: No suspicious pulmonary lesion is demonstrated. Liver  morphology is consistent with cirrhosis, there is recanalization of the  periumbilical vein. The spleen has enlarged since 2018. Indeterminate  renal cortical nodules, dedicated renal protocol CT would be the best  means for follow-up.              This report was finalized on 5/7/2025 12:45 PM by Dr. Ramirez Garcia M.D  on  Workstation: [id number]       CT Abdomen Pelvis With Contrast [[id number]] Collected: 05/07/25 1156     Updated: 05/07/25 1248    Narrative:      CT CHEST W CONTRAST DIAGNOSTIC-, CT ABDOMEN PELVIS W CONTRAST-     Radiation dose reduction techniques were utilized, including automated  exposure control and exposure modulation based on body size.     CLINICAL INFORMATION: Rule out malignancy.     COMPARISON: CT of the chest dated 05/02/2022 and CT scan of the abdomen  and pelvis dated 07/31/2018.     FINDINGS: There is cardiac enlargement. The esophagus is normal.  Atherosclerotic calcification of the aorta, mildly dilated ascending  portion measuring approximately 4.2 cm in diameter. There are small  calcified mediastinal and bilateral hilar lymph nodes, no adenopathy. No  pleural effusion or acute airspace disease, there are fine linear areas  of scarring versus discoid atelectasis along each costophrenic sulcus.  No suspicious pulmonary lesion seen.     Partially within the field-of-view there is a lobulated fluid collection  anterior to the right shoulder joint bursa, measuring approximately 6 cm  in length and 2.4 cm in width and this could communicate with the joint  which demonstrates progressive degenerative change since the previous  2022 examination. Fluid collection was much smaller at that time.     No axillary adenopathy or chest wall lesion.     The liver has diminished in size since 2018, there is surface  irregularity and its morphologic appearance is compatible with  cirrhosis. Recanalization of the paraumbilical vein. No liver lesion  seen. No biliary duct dilatation status post cholecystectomy, the  pancreas is normal. There has been interval enlargement of the spleen  measuring approximately 13 cm in maximum diameter.     There are bilateral renal cortical nodules and several of these have the  appearance of simple cysts, others are indeterminate however probably  represent complex cysts. Renal  protocol CT is the best means for further  evaluation. The largest indeterminate nodule measures approximately 3  cm. No renal calculus nor obstructive uropathy, the ureters are normal  in course and caliber to the urinary bladder which is moderately  distended. Small diverticulum arising from the dome of the bladder.  There is some perinephric fat stranding again demonstrated. The renal  cortical pattern of enhancement is symmetric and satisfactory.     The colon and small bowel have a normal appearance. The appendix is  normal. Stomach and duodenum within normal limits. Atherosclerotic  calcification of the aorta having a maximum infrarenal diameter of 3 cm.     There is bilateral significant hip joint degeneration with bilateral hip  effusions. No lytic or sclerotic bone lesion. Grade 1 anterolisthesis of  L4 on L5 without associated pars defect.     CONCLUSION: No suspicious pulmonary lesion is demonstrated. Liver  morphology is consistent with cirrhosis, there is recanalization of the  periumbilical vein. The spleen has enlarged since 2018. Indeterminate  renal cortical nodules, dedicated renal protocol CT would be the best  means for follow-up.              This report was finalized on 5/7/2025 12:45 PM by Dr. Ramirez Garcia M.D  on Workstation: [id number]       CT Thoracic Spine Without Contrast [[id number]] Collected: 05/07/25 0232     Updated: 05/07/25 0738    Narrative:        Patient: PETER MORALES  Time Out: 02:31  Exam(s): CT T SPINE     EXAM:    CT Thoracic Spine Without Intravenous Contrast    CLINICAL HISTORY:     Reason for exam: [facility] protocol for thoracic fusion.    TECHNIQUE:    Axial computed tomography images of the thoracic spine without   intravenous contrast with coronal and sagittal reformats.  CTDI is 20.98   mGy and DLP is 1184.2 mGy-cm.  This CT exam was performed according to   the principle of ALARA (As Low As Reasonably Achievable) by using one or   more of the following dose  reduction techniques: automated exposure   control, adjustment of the mA and or kV according to patient size, and or   use of iterative reconstruction technique.    COMPARISON:    No relevant prior studies available.    FINDINGS:    Vertebrae:  Thoracic vertebrae intact.  Degenerative facet findings.    Discs spinal canal neural foramina:  Noncritical osseous canal and   foraminal stenosis.  Degenerative disc findings.    Vasculature:  Vascular structures: Otherwise unremarkable.    Atherosclerotic arterial calcifications: moderate. No aortic aneurysm or   dissection. Arterial structures otherwise unremarkable.    Heart:  Cardiomegaly.  Moderate coronary artery calcifications.    Gallbladder and bile ducts:  Cholecystectomy.    Spleen:  Splenomegaly 14 cm.    Kidneys and ureters:  Benign renal cyst(s), no follow-up necessary.    IMPRESSION:       1.  No acute thoracic spine abnormality.  2.  See additional findings above.      Impression:          Electronically signed by Karl Fisher MD on 05-07-25 at 0231    CT Lumbar Spine Without Contrast [[id number]] Collected: 05/07/25 0234     Updated: 05/07/25 0738    Narrative:        Patient: PETER MORALES  Time Out: 02:33  Exam(s): CT L SPINE     EXAM:    CT Lumbar Spine Without Intravenous Contrast    CLINICAL HISTORY:     [facility] for exam[facility]    TECHNIQUE:    Axial computed tomography images of the lumbar spine without   intravenous contrast with coronal and sagittal reformats.  CTDI is 20.98   mGy and DLP is 1184.2 mGy-cm.  This CT exam was performed according to   the principle of ALARA (As Low As Reasonably Achievable) by using one or   more of the following dose reduction techniques: automated exposure   control, adjustment of the mA and or kV according to patient size, and or   use of iterative reconstruction technique.    COMPARISON:    MRI same day    FINDINGS:    Vertebrae:  Decompression L4-S1 posteriorly.   Degenerative   anterolisthesis L4 on L5 grade 1 or grade 2.  Lumbar vertebrae intact.    Degenerative facet findings.  Operative spine findings.    Discs spinal canal neural foramina:  Multilevel foraminal and canal   stenosis better described on MRI.  Degenerative disc findings.    Vasculature:  Abdominal aortic ectasia 2.9 cm.  Atherosclerotic   arterial calcifications: advanced. No aortic aneurysm or dissection.   Arterial structures otherwise unremarkable.    Kidneys and ureters:  Benign renal cyst(s), no follow-up necessary.    Bladder:  Probably distended bladder.    IMPRESSION:       1.  No acute lumbar spine abnormality.  2.  See report from same-day MRI lumbar spine.  3.  Recommend CT or MR in 5 years for aortic ectasia.  4.  Probably distended bladder.  5.  See additional findings above.      Impression:          Electronically signed by Karl Fisher MD on 05-07-25 at 0233    MRI Lumbar Spine With & Without Contrast [[id number]] Collected: 05/06/25 0632     Updated: 05/06/25 0632    Narrative:        Patient: PETER MORALES  Time Out: 06:31  Exam(s): MRI L SPINE W WO Contrast     EXAM:    MR Lumbar Spine Without and With Intravenous Contrast    CLINICAL HISTORY:     Reason for exam: lower extremities weakness with atrophy of muscles.    TECHNIQUE:    Magnetic resonance images of the lumbar spine without and with   intravenous contrast in multiple planes.    COMPARISON:    No relevant prior studies available.    FINDINGS:    Vertebrae:  There is advanced facet hypertrophy and grade 1   anterolisthesis at the L4-5 level.  There is moderate to severe left   foraminal stenosis and moderate right foraminal stenosis.  There is grade   1 anterolisthesis at L4-5 secondary to advanced facet arthrosis.  No   acute fracture.  No abnormal spinal cord, nerve root or epidural   enhancement identified.    Spinal cord:  Unremarkable.  Normal signal.  No abnormal enhancement.    Soft tissues:  Unremarkable.    Kidneys and  ureters:  There is a simple left renal cyst.     DISCS SPINAL CANAL NEURAL FORAMINA:    T12-L1:  There is a moderate to large central to left paracentral disc   protrusion at T12 L1 producing severe spinal stenosis with contact and   displacement of the distal spinal cord.  There is near complete   effacement of the surrounding subarachnoid space.    L1-L2:  There is a small circumferential disc bulge at the L1 L2 level.    No significant spinal stenosis or neural impingement.    L2-L3:  There is moderate L2-3 degenerative disc space narrowing and a   circumferential disc bulge.  There is moderate to severe bilateral   foraminal stenosis, right greater than left.  There is moderate spinal   stenosis at this level.    L3-L4:  There is a broad-based posterior disc bulge at L3-4 with a mild   relative spinal stenosis.  There is severe left foraminal stenosis with   impingement on the exiting left L3 nerve root.  There is mild to moderate   right foraminal stenosis.  There is moderate facet hypertrophy.    L4-L5:  See above.    L5-S1:  There is mild L5-S1 facet arthrosis.  There is moderate   bilateral foraminal stenosis.  No significant spinal stenosis.    IMPRESSION:       1.    There is a moderate to large central to left paracentral disc   protrusion at T12 L1 producing severe spinal stenosis with contact and   displacement of the distal spinal cord. There is near complete effacement   of the surrounding subarachnoid space.  2.  Multilevel lumbar spondylosis with multilevel spinal and foraminal   stenosis as described above.  There is associated grade 1 L4-5   anterolisthesis as described above.      Impression:          Electronically signed by Rusty Arredondo MD on 05-06-25 at 0631    MRI Thoracic Spine With & Without Contrast [[id number]] Collected: 05/06/25 0625     Updated: 05/06/25 0625    Narrative:        Patient: PETER MORALES  Time Out: 06:24  Exam(s): MRI T SPINE W WO Contrast     EXAM:    MR Thoracic Spine  Without and With Intravenous Contrast    CLINICAL HISTORY:     Reason for exam: lower extremities weakness with atrophy of muscles.    TECHNIQUE:    Magnetic resonance images of the thoracic spine without and with   intravenous contrast in multiple planes.    COMPARISON:    No relevant prior studies available.    FINDINGS:    Vertebrae:   No acute fracture or destructive marrow process.    Discs spinal canal neural foramina:  There is a moderate to large   posterior disc protrusion at T12 L1 producing severe spinal stenosis.    There is contact and displacement of the distal spinal cord and crowding   of the nerve roots.  Surgical consultation recommended.  There is mild   multilevel thoracic degenerative disc space narrowing.  There are small   posterior disc bulges noted throughout the thoracic spine.  No focal disc   protrusion identified.  No significant spinal stenosis, foraminal   stenosis or neural impingement identified.    Spinal cord:  Othereise unremarkable.  No abnormal spinal cord, nerve   root or epidural enhancement identified.    Soft tissues:  Unremarkable.    IMPRESSION:         There is a moderate to large posterior disc protrusion at T12 L1   producing severe spinal stenosis. There is contact and displacement of   the  distal spinal cord and crowding of the nerve roots. Surgical   consultation recommended.      Impression:          Electronically signed by Rusty Arredondo MD on 05-06-25 at 0624    MRI Cervical Spine With & Without Contrast [[id number]] Collected: 05/06/25 0622     Updated: 05/06/25 0622    Narrative:        Patient: PETER MORALES  Time Out: 06:21  Exam(s): MRI C SPINE W WO Contrast     EXAM:    MR Cervical Spine Without and With Intravenous Contrast    CLINICAL HISTORY:     Reason for exam: lower extremities weakness with atrophy of muscles.    TECHNIQUE:    Magnetic resonance images of the cervical spine without and with   intravenous contrast in multiple planes.    COMPARISON:     No relevant prior studies available.    FINDINGS:    Brain and extra-axial spaces:  No abnormal spinal cord, nerve root or   epidural enhancement.    Vertebrae:  Unremarkable.  No acute fracture.    Spinal cord:  See below.      Soft tissues:  Unremarkable.     DISCS SPINAL CANAL NEURAL FORAMINA:    C2-C3:  There is mild right-sided facet hypertrophy and mild to   moderate right C2-3 foraminal stenosis.    C3-C4:  There is advanced degenerative disc disease at the C3-4 level   with a circumferential disc osteophyte complex.  There is severe spinal   stenosis with effacement of the surrounding subarachnoid space and   flattening of the spinal cord.  There is mild increased T2 signal in the   spinal cord at the C3-4 level.  There is Moderate to severe bilateral   foraminal stenosis, right greater than left.    C4-C5:  There is moderately advanced C4-5 degenerative disc disease and   a posterior disc osteophyte complex.  There is mild to moderate spinal   stenosis.  There is moderate bilateral foraminal stenosis.    C5-C6:  Unremarkable.  No significant disc disease.  No stenosis.    C6-C7:  There is a broad-based posterior disc bulge at C6-7.  There is   associated mild to moderate spinal stenosis and moderate bilateral   foraminal narrowing.  There is a small fluid signal noted in the spinal   cord at this level which may represent a focal short segment syrinx or   focal myelomalacia.  No abnormal enhancement identified.    C7-T1:  Unremarkable.  No significant disc disease.  No stenosis.    Other findings:  There is a broad-based posterior disc bulge.  There is   mild to moderate spinal stenosis and bilateral foraminal stenosis.    IMPRESSION:       1.  There is advanced degenerative disc disease at the C3-4 level with a   circumferential disc osteophyte complex. There is severe spinal stenosis   with effacement of the surrounding subarachnoid space and flattening of   the spinal cord. There is mild increased T2  signal in the spinal cord at   the C3-4 level. There is Moderate to severe bilateral foraminal stenosis,   right greater than left.  2.  There is moderately advanced C4-5 degenerative disc disease and a   posterior disc osteophyte complex. There is mild to moderate spinal   stenosis. There is moderate bilateral foraminal stenosis.  3.  There is a broad-based posterior disc bulge at C6-7. There is   associated mild to moderate spinal stenosis and moderate bilateral   foraminal narrowing. There is a small fluid signal noted in the spinal   cord at this level which may represent a focal short segment syrinx or   focal myelomalacia. No abnormal enhancement identified.      Impression:          Electronically signed by Rusty Arredondo MD on 05-06-25 at 0621    MRI Brain With & Without Contrast [[id number]] Collected: 05/06/25 0602     Updated: 05/06/25 0602    Narrative:        Patient: PETER MORALES  Time Out: 06:02  Exam(s): MRI HEAD W WO Contrast     EXAM:    MR Head Without and With Intravenous Contrast    CLINICAL HISTORY:     Reason for exam: weakness with atrophy and balance.    TECHNIQUE:    Magnetic resonance images of the head brain without and with   intravenous contrast in multiple planes.    COMPARISON:    No relevant prior studies available.    FINDINGS:    Brain:  White matter nonspecific changes statistically likely due to   chronic microvascular ischemia.  Parenchymal structures otherwise   unremarkable.  No acute intracranial hemorrhage or mass effect.    Ventricles:  Unremarkable.  No ventriculomegaly.    Bones joints:  Unremarkable.  No acute fracture.    Sinuses:  Unremarkable as visualized.  No acute sinusitis.    Mastoid air cells:  Unremarkable as visualized.  No mastoid effusion.    Orbits:  Unremarkable as visualized.    IMPRESSION:       1.  No acute intracranial abnormality.  2.  See additional findings above.      Impression:          Electronically signed by Karl Fisher MD on 05-06-25 at  0602          Results for orders placed during the hospital encounter of 05/05/25    Duplex Venous Upper Extremity - Left CAR    Interpretation Summary    Acute left upper extremity superficial thrombophlebitis noted in the cephalic (upper arm) and cephalic (forearm).    All other left sided vessels appear normal.    Results for orders placed during the hospital encounter of 11/04/24    Adult Transthoracic Echo Complete w/ Color, Spectral and Contrast if Necessary Per Protocol    Interpretation Summary    Left ventricular systolic function is low normal. Calculated left ventricular EF = 45.5%. Abnormal global longitudinal LV strain (GLS) = -16.8% with relative apical sparing.  Infiltrative cardiomyopathy is a differential diagnosis.    Left ventricular wall thickness is consistent with moderate concentric hypertrophy.    Left ventricular diastolic function is consistent with (grade I) impaired relaxation.    The left atrial cavity is severely dilated.    There is mild, bileaflet mitral valve thickening present.  There is mild to moderate mitral valve regurgitation present.    Mild dilation of the aortic root is present. Aortic root = 4.3 cm Ascending aorta = 4.4 cm The inferior vena cava is normally sized.    Pertinent Labs     Results from last 7 days   Lab Units 05/20/25 0637 05/19/25 0427 05/18/25  0550 05/17/25  0454   WBC 10*3/mm3 4.69 4.47 4.15 4.31   HEMOGLOBIN g/dL 8.4* 8.1* 8.3* 8.3*   PLATELETS 10*3/mm3 117* 115* 111* 108*     Results from last 7 days   Lab Units 05/20/25  0637 05/19/25 0427 05/18/25  0550 05/17/25  0454   SODIUM mmol/L 138 136 137 138   POTASSIUM mmol/L 4.1 4.3 4.3 4.3   CHLORIDE mmol/L 110* 106 108* 108*   CO2 mmol/L 21.0* 23.0 20.0* 20.4*   BUN mg/dL 14 16 16 18   CREATININE mg/dL 1.07 1.16 1.04 0.98   GLUCOSE mg/dL 90 86 82 93   EGFR mL/min/1.73 76.1 69.0 78.7 84.5     Results from last 7 days   Lab Units 05/20/25  0637 05/19/25 0427 05/18/25  0550 05/17/25  0454   ALBUMIN g/dL  "2.9* 2.9* 2.7* 2.8*     Results from last 7 days   Lab Units 05/20/25  0637 05/19/25  0427 05/18/25  0550 05/17/25  0454   CALCIUM mg/dL 9.6 9.9 9.8 9.9   ALBUMIN g/dL 2.9* 2.9* 2.7* 2.8*   PHOSPHORUS mg/dL 2.6 2.6 2.6 2.4*               Invalid input(s): \"LDLCALC\"          Test Results Pending at Discharge     Pending Results       Procedure [Order ID] Specimen - Date/Time    Occult Blood X 1, Stool - Stool, Per Rectum [[id number]]     Specimen: Stool from Per Rectum               Discharge Details        Discharge Medications        New Medications        Instructions Start Date   HYDROcodone-acetaminophen 5-325 MG per tablet  Commonly known as: NORCO   1 tablet, Oral, Every 6 Hours PRN      methocarbamol 750 MG tablet  Commonly known as: ROBAXIN   750 mg, Oral, 4 Times Daily PRN      tamsulosin 0.4 MG capsule 24 hr capsule  Commonly known as: FLOMAX   0.4 mg, Oral, Daily             Continue These Medications        Instructions Start Date   allopurinol 300 MG tablet  Commonly known as: ZYLOPRIM   300 mg, Oral, Daily      ferrous sulfate 325 (65 FE) MG tablet   325 mg, Daily With Breakfast      folic acid 1 MG tablet  Commonly known as: FOLVITE   TAKE 1 TABLET BY MOUTH DAILY      gabapentin 300 MG capsule  Commonly known as: NEURONTIN   300 mg, Oral, 2 Times Daily PRN      metoprolol succinate XL 25 MG 24 hr tablet  Commonly known as: TOPROL-XL   25 mg, Oral, Daily      pantoprazole 40 MG EC tablet  Commonly known as: PROTONIX   40 mg, Oral, 2 Times Daily      rosuvastatin 10 MG tablet  Commonly known as: CRESTOR   10 mg, Oral, Daily      thiamine 100 MG tablet  tablet  Commonly known as: VITAMIN B-1   100 mg, Daily      vitamin B-12 1000 MCG tablet  Commonly known as: CYANOCOBALAMIN   1,000 mcg, Daily             Stop These Medications      lisinopril 10 MG tablet  Commonly known as: PRINIVIL,ZESTRIL     lisinopril 5 MG tablet  Commonly known as: PRINIVIL,ZESTRIL     tadalafil 5 MG tablet  Commonly known as: " ROXYLIS              Allergies   Allergen Reactions    Zetia [Ezetimibe] Dizziness     Nausea, fatgue       Discharge Disposition:  Home or Self Care      Discharge Diet:  Diet Order   Procedures    Diet: Regular/House; Fluid Consistency: Thin (IDDSI 0)       Discharge Activity:       CODE STATUS:    Code Status and Medical Interventions: CPR (Attempt to Resuscitate); Full Support   Ordered at: 05/08/25 6140     Code Status (Patient has no pulse and is not breathing):    CPR (Attempt to Resuscitate)     Medical Interventions (Patient has pulse or is breathing):    Full Support       Future Appointments   Date Time Provider Department Center   6/10/2025  3:00 PM Kyra Laws APRN [facility]   7/8/2025 11:15 AM Hazel Brown MD [id number]   8/11/2025 12:30 PM Jann Chawla MD [facility]   10/20/2025 10:00 AM Shannon Doran APRN [facility]   11/7/2025  9:00 AM Jordan Ram MD [facility]     Additional Instructions for the Follow-ups that You Need to Schedule       Ambulatory Referral to Home Health   As directed      Face to Face Visit Date: 5/20/2025   Follow-up provider for Plan of Care?: I treated the patient in an acute care facility and will not continue treatment after discharge.   Follow-up provider: SHANNON DORAN [[id number]]   Reason/Clinical Findings: Lumbar decompression   Describe mobility limitations that make leaving home difficult: Poor mobility   Nursing/Therapeutic Services Requested: Physical Therapy Occupational Therapy   Frequency: 1 Week 1               Contact information for follow-up providers       Shannon Doran APRN .    Specialty: Nurse Practitioner  Contact information:  [address] [address]  [phone]                       Contact information for after-discharge care       Home Medical Care       [facility] .    Service: Home Health Services  Contact information:  [address]  [facility] [address]  [phone]                                   Additional Instructions for the Follow-ups that You Need to Schedule       Ambulatory Referral to Home Health   As directed      Face to Face Visit Date: 5/20/2025   Follow-up provider for Plan of Care?: I treated the patient in an acute care facility and will not continue treatment after discharge.   Follow-up provider: SHANNON DORAN [[id number]]   Reason/Clinical Findings: Lumbar decompression   Describe mobility limitations that make leaving home difficult: Poor mobility   Nursing/Therapeutic Services Requested: Physical Therapy Occupational Therapy   Frequency: 1 Week 1            Time Spent on Discharge:  Greater than 30 minutes      Phuong Villavicencio MD  [facility]  05/20/25  13:33 EDT

## 2025-05-20 NOTE — OUTREACH NOTE
Prep Survey      Flowsheet Row Responses   [facility] patient discharged from? [place]   Is LACE score < 7 ? No   Eligibility [facility]   Date of Admission 05/05/25   Date of Discharge 05/20/25   Discharge Disposition Home-Health Care Sv   Discharge diagnosis Open thoracic twelve/lumbar one discectomy/decompression/possible interbody fusion with cages, pedicle screw/terry/crosslink fixation, thoracic twelve/lumbar one/two with Mazor robotic navigation   Does the patient have one of the following disease processes/diagnoses(primary or secondary)? General Surgery   Does the patient have Home health ordered? Yes   What [facility] the [facility] health [facility]   Prep survey completed? Yes            Meg ROSARIO - Registered Nurse

## 2025-05-20 NOTE — CASE MANAGEMENT/SOCIAL WORK
Continued Stay Note  [facility]     Patient Name: Mo Willoughby  MRN: [id number]  Today's Date: 5/20/2025    Admit Date: 5/5/2025    Plan: Plans discharge home with assist of family and [facility] .   Discharge Plan       Row Name 05/20/25 1012       Plan    Plan Plans discharge home with assist of family and [facility] HH.    Patient/Family in Agreement with Plan yes    Plan Comments Call placed to [place] . States able to accept. Patient plans discharge home with assist of family and [facility] HH. Family to transport....[facility]                   Discharge Codes    No documentation.                 Expected Discharge Date and Time       Expected Discharge Date Expected Discharge Time    May 20, 2025               Kendy Henley RN

## 2025-05-20 NOTE — PLAN OF CARE
Problem: Adult Inpatient Plan of Care  Goal: Plan of Care Review  Outcome: Progressing  Flowsheets (Taken 5/19/2025 2344)  Progress: no change  Plan of Care Reviewed With: patient  Goal: Patient-Specific Goal (Individualized)  Outcome: Progressing  Goal: Absence of Hospital-Acquired Illness or Injury  Outcome: Progressing  Intervention: Identify and Manage Fall Risk  Recent Flowsheet Documentation  Taken 5/19/2025 2301 by Vaishali Sims RN  Safety Promotion/Fall Prevention:   room organization consistent   safety round/check completed  Taken 5/19/2025 2200 by Vaishali Sims RN  Safety Promotion/Fall Prevention:   safety round/check completed   room organization consistent  Taken 5/19/2025 2105 by Vaishali Sims RN  Safety Promotion/Fall Prevention:   safety round/check completed   room organization consistent  Taken 5/19/2025 2045 by Vaishali Sims RN  Safety Promotion/Fall Prevention:   safety round/check completed   room organization consistent  Taken 5/19/2025 1944 by Vaishali Sims RN  Safety Promotion/Fall Prevention: activity supervised  Intervention: Prevent Skin Injury  Recent Flowsheet Documentation  Taken 5/19/2025 1944 by Vaishali Sims RN  Body Position:   weight shifting   position changed independently  Goal: Optimal Comfort and Wellbeing  Outcome: Progressing  Intervention: Provide Person-Centered Care  Recent Flowsheet Documentation  Taken 5/19/2025 1944 by Vaishali Sims RN  Trust Relationship/Rapport: care explained  Goal: Readiness for Transition of Care  Outcome: Progressing     Problem: Comorbidity Management  Goal: Maintenance of Osteoarthritis Symptom Control  Outcome: Progressing  Intervention: Maintain Osteoarthritis Symptom Control  Recent Flowsheet Documentation  Taken 5/19/2025 1944 by Vaishali Sims RN  Activity Management: activity encouraged  Medication Review/Management: medications reviewed     Problem: Fatigue  Goal: Improved Activity Tolerance  Outcome: Progressing  Intervention:  Promote Improved Energy  Recent Flowsheet Documentation  Taken 5/19/2025 1944 by Vaishali Sims RN  Activity Management: activity encouraged  Environmental Support: calm environment promoted  Sleep/Rest Enhancement: awakenings minimized     Problem: Fall Injury Risk  Goal: Absence of Fall and Fall-Related Injury  Outcome: Progressing  Intervention: Identify and Manage Contributors  Recent Flowsheet Documentation  Taken 5/19/2025 1944 by Vaishali Sims RN  Medication Review/Management: medications reviewed  Intervention: Promote Injury-Free Environment  Recent Flowsheet Documentation  Taken 5/19/2025 2301 by Vaishali Sims RN  Safety Promotion/Fall Prevention:   room organization consistent   safety round/check completed  Taken 5/19/2025 2200 by Vaishali Sims RN  Safety Promotion/Fall Prevention:   safety round/check completed   room organization consistent  Taken 5/19/2025 2105 by Vaishali Sims RN  Safety Promotion/Fall Prevention:   safety round/check completed   room organization consistent  Taken 5/19/2025 2045 by Vaishali Sims RN  Safety Promotion/Fall Prevention:   safety round/check completed   room organization consistent  Taken 5/19/2025 1944 by Vaishali Sims RN  Safety Promotion/Fall Prevention: activity supervised     Problem: Skin Injury Risk Increased  Goal: Skin Health and Integrity  Outcome: Progressing  Intervention: Optimize Skin Protection  Recent Flowsheet Documentation  Taken 5/19/2025 1944 by Vaishali Sims RN  Activity Management: activity encouraged  Pressure Reduction Techniques: frequent weight shift encouraged  Head of Bed (HOB) Positioning: HOB at 20-30 degrees  Pressure Reduction Devices: specialty bed utilized   Goal Outcome Evaluation:  Plan of Care Reviewed With: patient        Progress: no change

## 2025-05-21 ENCOUNTER — TELEPHONE (OUTPATIENT)
Dept: FAMILY MEDICINE CLINIC | Facility: CLINIC | Age: 70
End: 2025-05-21
Payer: MEDICARE

## 2025-05-21 ENCOUNTER — TRANSITIONAL CARE MANAGEMENT TELEPHONE ENCOUNTER (OUTPATIENT)
Dept: CALL CENTER | Facility: HOSPITAL | Age: 70
End: 2025-05-21
Payer: MEDICARE

## 2025-05-21 NOTE — CASE MANAGEMENT/SOCIAL WORK
Case Management Discharge Note      Final Note: Home with assist of family and [facility] HH per private auto    Provided Post Acute Provider List?: Yes  Post Acute Provider List: Inpatient Rehab  Provided Post Acute Provider Quality & Resource List?: Yes  Post Acute Provider Quality and Resource List: Inpatient Rehab  Delivered To: Patient  Method of Delivery: In person    Selected Continued Care - Discharged on 5/20/2025 Admission date: 5/5/2025 - Discharge disposition: Home or Self Care      Destination    No services have been selected for the patient.                Durable Medical Equipment    No services have been selected for the patient.                Dialysis/Infusion    No services have been selected for the patient.                Home Medical Care       Service Provider Services Address Phone Fax Patient Preferred    [facility] [address] [place] [address] [place] [address] [phone] --              Therapy    No services have been selected for the patient.                Community Resources    No services have been selected for the patient.                Community & DME    No services have been selected for the patient.                    Transportation Services  Private: Car    Final Discharge Disposition Code: 06 - home with home health care

## 2025-05-21 NOTE — OUTREACH NOTE
Call Center TCM Note      Flowsheet Row Responses   [facility] patient discharged from? [place]   Does the patient have one of the following disease processes/diagnoses(primary or secondary)? Cardiothoracic surgery  [spine surgery]   TCM attempt successful? Yes   Call start time 1024   Call end time 1030   Discharge diagnosis Open thoracic twelve/lumbar one discectomy/decompression/possible interbody fusion with cages, pedicle screw/terry/crosslink fixation, thoracic twelve/lumbar one/two with Mazor robotic navigation   Meds reviewed with patient/caregiver? Yes   Is the patient having any side effects they believe may be caused by any medication additions or changes? No   Does the patient have all medications related to this admission filled (includes all antibiotics, pain medications, cardiac medications, etc.) Yes   Is the patient taking all medications as directed (includes completed medication regime)? Yes   Does the patient have an appointment with their PCP within 7-14 days of discharge? No appointments available   Nursing Interventions Patient desires to follow up with specialty only, Routed TCM call to PCP office, Patient declined scheduling/rescheduling appointment at this time   What is the Home health agency?  [facility]   Has home health visited the patient within 72 hours of discharge? Yes   Psychosocial issues? No   Did the patient receive a copy of their discharge instructions? Yes   Nursing interventions Reviewed instructions with patient   What is the patient's perception of their health status since discharge? Improving   Nursing interventions Nurse provided patient education   Is the patient /caregiver able to teach back basic post-op care? Continue use of incentive spirometry at least 1 week post discharge, Shower daily, Use a clean wash cloth and antibacterial bar or liquid soap to clean incisions, Lifting as instructed by MD in discharge instructions,  Practice cough and deep breath every 4 hours while awake, Drive as instructed by MD in discharge instructions, No tub bath, swimming, or hot tub until instructed by MD, If the steri-strips are falling off, it is okay to remove them. (If applicable)   Is the patient/caregiver able to teach back signs and symptoms of incisional infection? Increased redness, swelling or pain at the incisonal site, Pus or odor from incision, Fever, Increased drainage or bleeding, Incisional warmth   Is the patient/caregiver able to teach back steps to recovery at home? Set small, achievable goals for return to baseline health, Practice good oral hygiene, Eat a well-balance diet, Rest and rebuild strength, gradually increase activity, Weigh daily, Make a list of questions for surgeon's appointment   Is the patient /caregiver able to teach back the importance of cardiac rehab? Yes   If the patient is a current smoker, are they able to teach back resources for cessation? Not a smoker   Is the patient/caregiver able to teach back the hierarchy of who to call/visit for symptoms/problems? PCP, Specialist, Home health nurse, Urgent Care, ED, 911 Yes   TCM call completed? Yes   Wrap up additional comments D/C DX: Disc herniation,  emergent T12-L1 spine sx - Pt managing pretty well. Good support. [facility] to follow. No quesitons at this time. First POST OP appt is 06/10/2025. Pt declines TCM APPT wtih PCP YANIRA Davison. He will callif sooner appt needed before scheduled ov 10/20/2025.   Call end time 1030            MAURA Griffiths Medical Assistant    5/21/2025, 10:56 EDT

## 2025-05-22 NOTE — CASE MANAGEMENT/SOCIAL WORK
Post-Acute Authorization Submission      Post Acute Pre-Cert Documentation  Post-Acute Authorization Type Submitted:: IRF  Date Post Acute Pre-Cert Inititated per Facility: 05/14/25  Date Post Acute Pre-Cert Completed: 05/20/25  Accepting Facility: [facility]  Hospital Discharge Date Requested: 05/15/25  Response Received from Insurance?: Denial  Action Taken by Requesting Facility:: P2P Not Completed  Response Communicated to::   Authorization Number:: DENIED [id number]  Post Acute Pre-Cert Initiated Comment: [facility] [employer]  # [phone]/FAX# [phone]              MANNY Gonzalez

## 2025-05-28 ENCOUNTER — TELEPHONE (OUTPATIENT)
Dept: FAMILY MEDICINE CLINIC | Facility: CLINIC | Age: 70
End: 2025-05-28
Payer: MEDICARE

## 2025-05-28 DIAGNOSIS — M51.16 HERNIATION OF LUMBAR INTERVERTEBRAL DISC WITH RADICULOPATHY: ICD-10-CM

## 2025-05-28 DIAGNOSIS — M48.061 SPINAL STENOSIS OF LUMBAR REGION WITHOUT NEUROGENIC CLAUDICATION: ICD-10-CM

## 2025-05-28 NOTE — TELEPHONE ENCOUNTER
Caller: EDVIN    Relationship: [facility]    Best call back number:[phone]     What orders are you requesting (i.e. lab or imaging): NEEDING VERBAL ORDER FOR P.T. ONE TIME A WEEK FOR NINE WEEKS.     Additional notes: PLEASE ADD DR KRYS MILES (SURGEON) ON PLAN OF CARE

## 2025-05-30 ENCOUNTER — TELEPHONE (OUTPATIENT)
Dept: NEUROSURGERY | Facility: CLINIC | Age: 70
End: 2025-05-30
Payer: MEDICARE

## 2025-05-30 DIAGNOSIS — M51.16 HERNIATION OF LUMBAR INTERVERTEBRAL DISC WITH RADICULOPATHY: ICD-10-CM

## 2025-05-30 DIAGNOSIS — M48.061 SPINAL STENOSIS OF LUMBAR REGION WITHOUT NEUROGENIC CLAUDICATION: ICD-10-CM

## 2025-05-30 RX ORDER — GABAPENTIN 300 MG/1
CAPSULE ORAL
Qty: 60 CAPSULE | OUTPATIENT
Start: 2025-05-30

## 2025-05-30 RX ORDER — GABAPENTIN 300 MG/1
300 CAPSULE ORAL 2 TIMES DAILY
Qty: 60 CAPSULE | Refills: 5 | Status: SHIPPED | OUTPATIENT
Start: 2025-05-30

## 2025-05-30 NOTE — TELEPHONE ENCOUNTER
Patient was advised of your message he is out of Gabapentin. His appointment is not until 6/10. Would you be able to give him enough to last until appointment with neurosurgeon?

## 2025-05-30 NOTE — TELEPHONE ENCOUNTER
Patient unable to come into office, he contacted the neurosurgeon office to see if they will fill Gabapentin.

## 2025-05-30 NOTE — TELEPHONE ENCOUNTER
Caller: PETER    Relationship: SELF    Best call back number: [phone]      Which medication are you concerned about: gabapentin (NEURONTIN) 300 MG capsule     Who prescribed you this medication: Jenn Davison APRN       What are your concerns: THE PATIENT IS OUT OF THIS MEDICATION & HIS PCP WILL NOT REFILL HIS PRESCRIPTION UNTIL SHE SEES HIM IN OFFICE AGAIN- HE CANNOT GET OUT OF THE HOUSE UNTIL 06/10/25 @ HIS POST-OP APPOINTMENT-       [id number] - [place], [place] - [address] [facility] [address] [facility] - [phone]  - [phone]   [address] [address]  Phone: [phone]  Fax: [phone]

## 2025-06-02 ENCOUNTER — READMISSION MANAGEMENT (OUTPATIENT)
Dept: CALL CENTER | Facility: HOSPITAL | Age: 70
End: 2025-06-02
Payer: MEDICARE

## 2025-06-02 NOTE — OUTREACH NOTE
General Surgery Week 2 Survey      Flowsheet Row Responses   [facility] facility patient discharged from? [place]   Does the patient have one of the following disease processes/diagnoses(primary or secondary)? Cardiothoracic surgery   Week 2 attempt successful? No   Unsuccessful attempts Attempt 1   Revoke Alfred PARKER - Registered Nurse

## 2025-06-09 NOTE — PROGRESS NOTES
Subjective   Patient ID: Mo Willoughby is a 69 y.o. male is here today for follow-up for Acute bilateral low back pain with bilateral sciatica.    Imaging    - MRI Lumbar Spine Without Contrast completed 05/12/2025    Pt had surgery with Dr Perez on 05/08/2025.   Surgery has improve his condition     History of Present Illness    He follows up in the office today for his first postoperative visit after undergoing T12-L1 laminectomy with bilateral L1 decompression and fusion and bilateral terry placement from T12-L2.  Preop, he was found to have a large central T12-L1 herniated disc with cord compression.  He also underwent previous lumbar decompression with Dr Perez 2 years ago.  He initially was hoping to go to acute rehab, but this was denied by insurance and he was discharged home with home health.  Today, the patient feels that he is getting better.  He is slowly getting stronger.  He does continue to ambulate with the use of his walker.  He states he does not trust his legs to walk without it.    Compliant wearing the LSO brace as directed.  He has had no issues with the now well-healed incision site.  PT is coming to his home and will be coming twice weekly.  He is off the narcotics and muscle relaxers but does continue to take the gabapentin.  So far, he is very pleased with his postoperative status.  He denies any low back or leg pain.  He does have a little achiness in the low back which is quite minimal.  He is eager to get stronger and to wean off of the walker.  He is hopeful to return to work in the next month or so.  He would like to resume driving.  He is having some issues with urinary incontinence at night with sleeping and is now wearing a brief.  He reports normal urinary function during the day.  He was started on Flomax during his hospitalization but would like to get off of it.    He presents with his wife.      The following portions of the patient's history were reviewed and updated  as appropriate: allergies, current medications, past family history, past medical history, past social history, past surgical history, and problem list.    Review of Systems   Constitutional:  Positive for fatigue. Negative for fever.   Eyes:  Negative for visual disturbance.   Gastrointestinal:  Negative for nausea and vomiting.   Genitourinary:  Positive for difficulty urinating.   Musculoskeletal:  Positive for back pain and gait problem. Negative for neck pain and neck stiffness.   Neurological:  Positive for weakness (pt reports in legs.). Negative for dizziness, light-headedness, numbness and headaches.   Psychiatric/Behavioral:  Negative for confusion and decreased concentration.        /88 (BP Location: Left arm, Patient Position: Sitting, Cuff Size: Adult)   Pulse 103   Temp 98.6 °F (37 °C) (Temporal)   SpO2 100%       Objective     Vitals:    06/10/25 1451   BP: 148/88   BP Location: Left arm   Patient Position: Sitting   Cuff Size: Adult   Pulse: 103   Temp: 98.6 °F (37 °C)   TempSrc: Temporal   SpO2: 100%   PainSc: 5    PainLoc: Back     There is no height or weight on file to calculate BMI.      Physical Exam  Vitals reviewed.   Constitutional:       Appearance: Normal appearance.   HENT:      Head: Atraumatic.   Pulmonary:      Effort: Pulmonary effort is normal.   Musculoskeletal:      Right lower leg: Edema present.      Left lower leg: Edema present.      Comments: Wearing a well-fitting LSO brace, strength equal and intact bilateral lower extremities with normal muscle bulk and tone   deferred lumbar range of motion eval     Skin:     General: Skin is warm and dry.      Comments: Well-healed midline lumbar and thoracic incision site, flat, normal surrounding skin, nontender   Neurological:      Mental Status: He is alert and oriented to person, place, and time.      GCS: GCS eye subscore is 4. GCS verbal subscore is 5. GCS motor subscore is 6.      Sensory: No sensory deficit.      Motor:  Weakness (Generalized) present. No tremor.      Gait: Gait abnormal (Slow purposeful and wide-based, using a walker).      Deep Tendon Reflexes:      Reflex Scores:       Patellar reflexes are 1+ on the right side and 1+ on the left side.       Achilles reflexes are 1+ on the right side and 1+ on the left side.     Comments: Gait is slow, purposeful and wide-based, using the rollator walker  Negative straight leg raise   Psychiatric:         Mood and Affect: Mood normal.       Neurological Exam  Mental Status  Alert. Oriented to person, place, and time.    Reflexes                                            Right                      Left  Patellar                                1+                         1+  Achilles                                1+                         1+    Coordination  No tremor    Gait   Abnormal gait (Slow purposeful and wide-based, using a walker).          Assessment & Plan   Independent Review of Radiographic Studies:      I personally reviewed the images from the following studies.    Postoperative lumbar x-rays and MRI reviewed    Medical Decision Making:      Follows up in office today for his first postoperative visit after undergoing the above-noted procedure with Dr Perez.  Exam as noted above, no red flags.  He is overall doing very well and is slowly getting his strength back.  His gait is still unstable and he will continue to mobilize with a walker as long as needed.  We will defer this to physical therapy.  He will keep wearing the LSO brace every day as directed, likely for a total of 3 months.  The incision site is healing well.  No bending or twisting at the waist and no lifting more than 10 to 15 pounds.  We will have him follow-up in 1 month with lumbar x-rays for further evaluation.  He will call with any questions or concerns.  He is fine to resume driving as he is off the narcotics and has normal leg strength when sitting.    Plan: Follow-up in 1 month with  lumbar x-rays    Diagnoses and all orders for this visit:    1. Degeneration of intervertebral disc of lumbar region without discogenic back pain or lower extremity pain (Primary)  -     XR Spine Lumbar 2 or 3 View; Future    2. Imbalance  -     XR Spine Lumbar 2 or 3 View; Future    3. Spinal stenosis of lumbar region without neurogenic claudication  -     XR Spine Lumbar 2 or 3 View; Future      No follow-ups on file.

## 2025-06-10 ENCOUNTER — OFFICE VISIT (OUTPATIENT)
Dept: NEUROSURGERY | Facility: CLINIC | Age: 70
End: 2025-06-10
Payer: MEDICARE

## 2025-06-10 VITALS
OXYGEN SATURATION: 100 % | HEART RATE: 103 BPM | SYSTOLIC BLOOD PRESSURE: 148 MMHG | DIASTOLIC BLOOD PRESSURE: 88 MMHG | TEMPERATURE: 98.6 F

## 2025-06-10 DIAGNOSIS — M48.061 SPINAL STENOSIS OF LUMBAR REGION WITHOUT NEUROGENIC CLAUDICATION: ICD-10-CM

## 2025-06-10 DIAGNOSIS — R26.89 IMBALANCE: ICD-10-CM

## 2025-06-10 DIAGNOSIS — M51.369 DEGENERATION OF INTERVERTEBRAL DISC OF LUMBAR REGION WITHOUT DISCOGENIC BACK PAIN OR LOWER EXTREMITY PAIN: Primary | ICD-10-CM

## 2025-06-10 PROCEDURE — 99024 POSTOP FOLLOW-UP VISIT: CPT | Performed by: NURSE PRACTITIONER

## 2025-06-11 ENCOUNTER — OUTSIDE FACILITY SERVICE (OUTPATIENT)
Dept: FAMILY MEDICINE CLINIC | Facility: CLINIC | Age: 70
End: 2025-06-11
Payer: MEDICARE

## 2025-06-11 PROCEDURE — G0180 MD CERTIFICATION HHA PATIENT: HCPCS | Performed by: NURSE PRACTITIONER

## 2025-06-11 RX ORDER — FOLIC ACID 1 MG/1
1000 TABLET ORAL DAILY
Qty: 90 TABLET | Refills: 1 | Status: SHIPPED | OUTPATIENT
Start: 2025-06-11

## 2025-06-11 RX ORDER — PANTOPRAZOLE SODIUM 40 MG/1
40 TABLET, DELAYED RELEASE ORAL 2 TIMES DAILY
Qty: 180 TABLET | Refills: 1 | Status: SHIPPED | OUTPATIENT
Start: 2025-06-11

## 2025-06-13 ENCOUNTER — TELEPHONE (OUTPATIENT)
Dept: FAMILY MEDICINE CLINIC | Facility: CLINIC | Age: 70
End: 2025-06-13
Payer: MEDICARE

## 2025-06-13 NOTE — TELEPHONE ENCOUNTER
Caller: MIC RAE    Relationship to patient:     Best call back number: [phone]    Patient is needing: PATIENT WAS SEEN TODAY AND HAS INCREASED LOWER LEG SWELLING. VSS, LUNGS CTA, RLE 2+, LLE 3+, ABD DISTENTION?, NO SOB AT REST, ANOX4

## 2025-06-17 ENCOUNTER — TELEPHONE (OUTPATIENT)
Dept: FAMILY MEDICINE CLINIC | Facility: CLINIC | Age: 70
End: 2025-06-17
Payer: MEDICARE

## 2025-06-17 NOTE — TELEPHONE ENCOUNTER
Patient has stopped lisinopril 5 and 10mg per [facility]  D/C instructions.    Pulse was 105/107 today  And BP is creeping back up. Please advise.    6-3-25  122/78  6-6-25  139/89  6-7-25  142/?  6-13-25 142/91  6-17-25 140/98

## 2025-06-18 ENCOUNTER — TELEPHONE (OUTPATIENT)
Dept: FAMILY MEDICINE CLINIC | Facility: CLINIC | Age: 70
End: 2025-06-18
Payer: MEDICARE

## 2025-06-19 ENCOUNTER — OFFICE VISIT (OUTPATIENT)
Dept: FAMILY MEDICINE CLINIC | Facility: CLINIC | Age: 70
End: 2025-06-19
Payer: MEDICARE

## 2025-06-19 VITALS
SYSTOLIC BLOOD PRESSURE: 134 MMHG | HEIGHT: 72 IN | DIASTOLIC BLOOD PRESSURE: 86 MMHG | WEIGHT: 229.8 LBS | OXYGEN SATURATION: 97 % | HEART RATE: 98 BPM | BODY MASS INDEX: 31.13 KG/M2 | TEMPERATURE: 98.9 F

## 2025-06-19 DIAGNOSIS — R30.0 DYSURIA: ICD-10-CM

## 2025-06-19 DIAGNOSIS — D64.9 ANEMIA, UNSPECIFIED TYPE: ICD-10-CM

## 2025-06-19 DIAGNOSIS — I10 ESSENTIAL HYPERTENSION, BENIGN: ICD-10-CM

## 2025-06-19 DIAGNOSIS — I77.819 AORTIC ECTASIA: ICD-10-CM

## 2025-06-19 DIAGNOSIS — N28.1 RENAL CYST: ICD-10-CM

## 2025-06-19 DIAGNOSIS — N40.1 BENIGN PROSTATIC HYPERPLASIA WITH NOCTURIA: ICD-10-CM

## 2025-06-19 DIAGNOSIS — M48.061 SPINAL STENOSIS OF LUMBAR REGION WITHOUT NEUROGENIC CLAUDICATION: ICD-10-CM

## 2025-06-19 DIAGNOSIS — R35.1 BENIGN PROSTATIC HYPERPLASIA WITH NOCTURIA: ICD-10-CM

## 2025-06-19 DIAGNOSIS — K74.60 CIRRHOSIS OF LIVER WITHOUT ASCITES, UNSPECIFIED HEPATIC CIRRHOSIS TYPE: ICD-10-CM

## 2025-06-19 DIAGNOSIS — M51.369 DEGENERATION OF INTERVERTEBRAL DISC OF LUMBAR REGION WITHOUT DISCOGENIC BACK PAIN OR LOWER EXTREMITY PAIN: Primary | ICD-10-CM

## 2025-06-19 RX ORDER — LISINOPRIL 5 MG/1
5 TABLET ORAL DAILY
Start: 2025-06-19

## 2025-06-19 RX ORDER — METOPROLOL SUCCINATE 25 MG/1
25 TABLET, EXTENDED RELEASE ORAL DAILY
Qty: 90 TABLET | Refills: 3 | Status: SHIPPED | OUTPATIENT
Start: 2025-06-19

## 2025-06-19 RX ORDER — TAMSULOSIN HYDROCHLORIDE 0.4 MG/1
0.4 CAPSULE ORAL DAILY
Qty: 30 CAPSULE | Refills: 2 | Status: SHIPPED | OUTPATIENT
Start: 2025-06-19

## 2025-06-19 RX ORDER — LISINOPRIL 10 MG/1
10 TABLET ORAL DAILY
Start: 2025-06-19

## 2025-06-19 NOTE — PROGRESS NOTES
Chief Complaint  Post-op Follow-up (May 8th )    Subjective          Mo Willoughby presents to [facility]                History of Present Illness  The patient is a 69-year-old male here today for a hospital follow-up visit. He is a patient of YANIRA Batista.     He was admitted to [facility] from 5/5/2025 to 5/20/2025.  He was a direct admit under the direction of neurosurgery.  He was seen in their office on 5 2 for evaluation of back and bilateral leg pain.  He was having progressive worsening of sensation with weakness in his legs and was referred to hospital admission but declined to come until 5/5/2025.  Imaging revealed T12/L1 disc herniation resulting in severe canal stenosis.  He was found to have degenerative changes of the left L4/5 and T3/4 that were severe and could contribute to left foot drop and left arm weakness.  Consistent pleat spinal MRI revealed multiple areas of canal stenosis and nerve root compression with the primary concern to be severe stenosis at T12-L1 which were contributing to gait imbalance.  He was taken to the OR on 5/8/2025 with neurosurgery for open thoracic 12/lumbar 1 discectomy/decompression/interbody fusion with cages, pedicle screw/terry/cross-link fixation, thoracic 12 through lumbar 2 with Kaitlin robotic navigation.  He tolerated the procedure well.  He was cleared for discharge from our surgery standpoint.    His postoperative course was complicated by urinary retention.  Urology was consulted and Phillips catheter was initially placed but patient did have successful voiding trial prior to discharge home.  Tamsulosin was added to his medicine regimen.  Hospital course was also complicated by anemia and thrombocytopenia.  He was found to be iron deficient.  Hematology was consulted and he received IV iron.  Hemoglobin was stable at discharge.    As part of the initial workup, patient underwent CT chest/abdomen/pelvis which revealed  cirrhotic morphology. Enlargement of spleen also seen. He did not have a previous diagnosis of cirrhosis but does have heavy alcohol consumption.  Findings were discussed with him and GI referral was placed at discharge.He was also noted to have indeterminate renal cortical nodules.  It was recommended to have dedicated renal protocol CT. Also noted to have aorta ectasia CT scan and will need 5-year follow-up for this.    He was discharged with home health and recommended to follow-up with PCP in 1 week.    He has follow-up with neurosurgery on 6/10/2025 he was instructed to continue mobilizing with walker, deferred to physical therapy.  Continue wearing LSO brace every day as directed, likely for a total of 3 months.  No bending twisting at the waist or lifting more than 10 to 15 pounds.  Recommended follow-up in 1 month with lumbar x-rays. He has an upcoming appointment with neurology in 08/2025 for EMG and nerve testing due to weakness. He reports leg weakness, which is improving, and does not experience any numbness or tingling. He is taking gabapentin as needed.     With hypertension, he reports that his blood pressure was slightly elevated during a recent home visit by a nurse.  Blood pressure today is 134/86.  He has been on a regimen of lisinopril 5 mg and 10 mg, which he took today. His metoprolol prescription was discontinued due to low blood pressure readings in the hospital.     He was prescribed Flomax following catheterization during his hospital stay but has not taken it for about a week. He reports no urinary issues, except for frequent urination at night. He also reports no bowel movement problems. He experiences a burning sensation during urination.    He has an appointment with GI in 08/2025 for cirrhosis, which was incidentally found. He consumes alcohol regularly, approximately two drinks per night, but believes he can abstain if necessary.     He has not experienced any falls, fever, or cough.  "He reports no issues with his surgical incisions, including drainage or redness. He has not noticed any abnormal bleeding or bruising.    SOCIAL HISTORY  He drinks alcohol, typically a couple of drinks a night.          Objective   Vital Signs:  /86 (BP Location: Left arm, Patient Position: Sitting, Cuff Size: Adult)   Pulse 98   Temp 98.9 °F (37.2 °C) (Infrared)   Ht 182 cm (71.65\")   Wt 104 kg (229 lb 12.8 oz)   SpO2 97%   BMI 31.47 kg/m²   Estimated body mass index is 31.47 kg/m² as calculated from the following:    Height as of this encounter: 182 cm (71.65\").    Weight as of this encounter: 104 kg (229 lb 12.8 oz).            Physical Exam  Constitutional:       Appearance: Normal appearance.   HENT:      Head: Normocephalic.   Cardiovascular:      Rate and Rhythm: Normal rate and regular rhythm.      Heart sounds: Normal heart sounds.   Pulmonary:      Effort: Pulmonary effort is normal.      Breath sounds: Normal breath sounds.   Musculoskeletal:      Cervical back: Neck supple.      Right lower leg: No edema.      Left lower leg: No edema.   Skin:     Comments: Healed incision site   Neurological:      Mental Status: He is alert and oriented to person, place, and time.   Psychiatric:         Mood and Affect: Mood normal.        Result Review :  The following data was reviewed by: YANIRA Castro on 06/19/2025:  Common labs          6/27/2025    07:46 6/27/2025    17:16 6/28/2025    06:08 6/29/2025    03:40   Common Labs   Glucose 97   86  94    BUN 19.0   19.0  21.0    Creatinine 1.12   1.05  1.14    Sodium 139   139  141    Potassium 3.5  4.2  3.8  3.8    Chloride 110   110  110    Calcium 9.2   9.2  9.1    WBC 7.13   5.27  4.63    Hemoglobin 9.5   9.4  9.4    Hematocrit 28.9   27.6  27.4    Platelets 76   78  89                Assessment and Plan   Diagnoses and all orders for this visit:    1. Degeneration of intervertebral disc of lumbar region without discogenic back pain or lower " extremity pain (Primary)  Assessment & Plan:  Continue follow-up with neurosurgery      2. Anemia, unspecified type  Assessment & Plan:  - Diagnosed with anemia and currently on an iron supplement regimen.  - A complete blood count (CBC) will be performed to monitor hemoglobin levels.  - Iron supplement to be taken once a day with breakfast.  - Blood count to be checked in the lab today.    Orders:  -     CBC w AUTO Differential    3. Renal cyst  Assessment & Plan:  - CT scan of the kidneys will be ordered to further evaluate the cysts.    Orders:  -     CT Abdomen Pelvis With Contrast; Future    4. Aortic ectasia  Assessment & Plan:  - A repeat CT scan of the chest will be scheduled in 5/2030 to monitor the size of the aorta.      5. Essential hypertension, benign  Assessment & Plan:  - Blood pressure readings are within the normal range today.  - Current regimen of lisinopril 5 mg and 10 mg will be continued.  - Continue to hold Metoprolol until further instructions are received from the cardiology department.      Orders:  -     lisinopril (PRINIVIL,ZESTRIL) 10 MG tablet; Take 1 tablet by mouth Daily.  -     lisinopril (PRINIVIL,ZESTRIL) 5 MG tablet; Take 1 tablet by mouth Daily.    6. Dysuria  Assessment & Plan:  - Experiencing frequent urination at night, which may indicate an enlarged prostate.  - A urinalysis will be conducted to rule out any potential urinary tract infection.  - Will resume tamsulosin medication to aid in reducing nighttime urination frequency.  - Medication sent to pharmacy.    Orders:  -     UA / M With / Rflx Culture([facility] ONLY) - Urine, Clean Catch    7. Benign prostatic hyperplasia with nocturia  -     tamsulosin (FLOMAX) 0.4 MG capsule 24 hr capsule; Take 1 capsule by mouth Daily.  Dispense: 30 capsule; Refill: 2    8. Spinal stenosis of lumbar region without neurogenic claudication    9. Cirrhosis of liver without ascites, unspecified hepatic cirrhosis type  Assessment & Plan:  -  Diagnosed with cirrhosis.  - Advised to abstain from alcohol consumption and limit Tylenol intake to no more than 2000 mg per day.  - Advised to avoid fried and fatty foods.  - Repeat CBC will be conducted to monitor platelet levels.      Other orders  -     Unable To Void  -     Specimen Status Report           Patient agrees with plan of care and understands instructions. Call if worsening symptoms or any problems or concerns.     EMR Dragon/Transcription Disclaimer:  Much of this encounter note is an electronic transcription/translation of spoken language to printed text.  The electronic translation of spoken language may permit erroneous, or at times, nonsensical words or phrases to be inadvertently transcribed; Although I have reviewed the note for such errors, some may still exist.      Follow-up  - Follow-up appointment with Jenn on 06/28/2025.            Follow Up   Return for Next scheduled follow up.  Patient was given instructions and counseling regarding his condition or for health maintenance advice. Please see specific information pulled into the AVS if appropriate.     Patient or patient representative verbalized consent for the use of Ambient Listening during the visit with  YANIRA Castro for chart documentation. 6/19/2025 13:30 EDT

## 2025-06-20 LAB
BASOPHILS # BLD AUTO: 0.1 X10E3/UL (ref 0–0.2)
BASOPHILS NFR BLD AUTO: 1 %
EOSINOPHIL # BLD AUTO: 0.3 X10E3/UL (ref 0–0.4)
EOSINOPHIL NFR BLD AUTO: 5 %
ERYTHROCYTE [DISTWIDTH] IN BLOOD BY AUTOMATED COUNT: 12.8 % (ref 11.6–15.4)
GLUCOSE UR QL STRIP: NORMAL
HCT VFR BLD AUTO: 36.2 % (ref 37.5–51)
HGB BLD-MCNC: 11.6 G/DL (ref 13–17.7)
IMM GRANULOCYTES # BLD AUTO: 0 X10E3/UL (ref 0–0.1)
IMM GRANULOCYTES NFR BLD AUTO: 0 %
KETONES UR QL STRIP: NORMAL
LYMPHOCYTES # BLD AUTO: 1.7 X10E3/UL (ref 0.7–3.1)
LYMPHOCYTES NFR BLD AUTO: 25 %
MCH RBC QN AUTO: 33.6 PG (ref 26.6–33)
MCHC RBC AUTO-ENTMCNC: 32 G/DL (ref 31.5–35.7)
MCV RBC AUTO: 105 FL (ref 79–97)
MONOCYTES # BLD AUTO: 0.9 X10E3/UL (ref 0.1–0.9)
MONOCYTES NFR BLD AUTO: 13 %
NEUTROPHILS # BLD AUTO: 3.9 X10E3/UL (ref 1.4–7)
NEUTROPHILS NFR BLD AUTO: 56 %
PH UR STRIP: NORMAL [PH]
PLATELET # BLD AUTO: 144 X10E3/UL (ref 150–450)
PROT UR QL STRIP: NORMAL
RBC # BLD AUTO: 3.45 X10E6/UL (ref 4.14–5.8)
SP GR UR STRIP: NORMAL
SPECIMEN STATUS: NORMAL
UNABLE TO VOID: NORMAL
WBC # BLD AUTO: 6.9 X10E3/UL (ref 3.4–10.8)

## 2025-06-24 ENCOUNTER — TELEPHONE (OUTPATIENT)
Dept: FAMILY MEDICINE CLINIC | Facility: CLINIC | Age: 70
End: 2025-06-24
Payer: MEDICARE

## 2025-06-24 NOTE — TELEPHONE ENCOUNTER
Spoke with patient whom informed me HR went down to 96 with no symptoms. Informed patient of PCP recommendation. Will keep appt with cardiology 7/8 and us 7/28.

## 2025-06-25 LAB
ALBUMIN SERPL-MCNC: 3.6 G/DL (ref 3.5–5.2)
ALBUMIN/GLOB SERPL: 1.2 G/DL
ALP SERPL-CCNC: 91 U/L (ref 39–117)
ALT SERPL W P-5'-P-CCNC: 15 U/L (ref 1–41)
ANION GAP SERPL CALCULATED.3IONS-SCNC: 12.2 MMOL/L (ref 5–15)
AST SERPL-CCNC: 30 U/L (ref 1–40)
BASOPHILS # BLD AUTO: 0.02 10*3/MM3 (ref 0–0.2)
BASOPHILS NFR BLD AUTO: 0.3 % (ref 0–1.5)
BILIRUB SERPL-MCNC: 1.1 MG/DL (ref 0–1.2)
BUN SERPL-MCNC: 18 MG/DL (ref 8–23)
BUN/CREAT SERPL: 13.4 (ref 7–25)
CALCIUM SPEC-SCNC: 10 MG/DL (ref 8.6–10.5)
CHLORIDE SERPL-SCNC: 102 MMOL/L (ref 98–107)
CO2 SERPL-SCNC: 21.8 MMOL/L (ref 22–29)
CREAT SERPL-MCNC: 1.34 MG/DL (ref 0.76–1.27)
D-LACTATE SERPL-SCNC: 1.2 MMOL/L (ref 0.5–2)
DEPRECATED RDW RBC AUTO: 49.9 FL (ref 37–54)
EGFRCR SERPLBLD CKD-EPI 2021: 58.1 ML/MIN/1.73
EOSINOPHIL # BLD AUTO: 0.02 10*3/MM3 (ref 0–0.4)
EOSINOPHIL NFR BLD AUTO: 0.3 % (ref 0.3–6.2)
ERYTHROCYTE [DISTWIDTH] IN BLOOD BY AUTOMATED COUNT: 13.1 % (ref 12.3–15.4)
GLOBULIN UR ELPH-MCNC: 2.9 GM/DL
GLUCOSE SERPL-MCNC: 124 MG/DL (ref 65–99)
HCT VFR BLD AUTO: 33.8 % (ref 37.5–51)
HGB BLD-MCNC: 10.9 G/DL (ref 13–17.7)
IMM GRANULOCYTES # BLD AUTO: 0.03 10*3/MM3 (ref 0–0.05)
IMM GRANULOCYTES NFR BLD AUTO: 0.4 % (ref 0–0.5)
LYMPHOCYTES # BLD AUTO: 0.46 10*3/MM3 (ref 0.7–3.1)
LYMPHOCYTES NFR BLD AUTO: 6.5 % (ref 19.6–45.3)
MCH RBC QN AUTO: 33.5 PG (ref 26.6–33)
MCHC RBC AUTO-ENTMCNC: 32.2 G/DL (ref 31.5–35.7)
MCV RBC AUTO: 104 FL (ref 79–97)
MONOCYTES # BLD AUTO: 1.01 10*3/MM3 (ref 0.1–0.9)
MONOCYTES NFR BLD AUTO: 14.2 % (ref 5–12)
NEUTROPHILS NFR BLD AUTO: 5.58 10*3/MM3 (ref 1.7–7)
NEUTROPHILS NFR BLD AUTO: 78.3 % (ref 42.7–76)
NRBC BLD AUTO-RTO: 0 /100 WBC (ref 0–0.2)
PLATELET # BLD AUTO: 95 10*3/MM3 (ref 140–450)
PMV BLD AUTO: 10 FL (ref 6–12)
POTASSIUM SERPL-SCNC: 4 MMOL/L (ref 3.5–5.2)
PROT SERPL-MCNC: 6.5 G/DL (ref 6–8.5)
RBC # BLD AUTO: 3.25 10*6/MM3 (ref 4.14–5.8)
SODIUM SERPL-SCNC: 136 MMOL/L (ref 136–145)
WBC NRBC COR # BLD AUTO: 7.12 10*3/MM3 (ref 3.4–10.8)

## 2025-06-25 PROCEDURE — 36415 COLL VENOUS BLD VENIPUNCTURE: CPT | Performed by: EMERGENCY MEDICINE

## 2025-06-25 PROCEDURE — 87077 CULTURE AEROBIC IDENTIFY: CPT | Performed by: EMERGENCY MEDICINE

## 2025-06-25 PROCEDURE — 0202U NFCT DS 22 TRGT SARS-COV-2: CPT | Performed by: PHYSICIAN ASSISTANT

## 2025-06-25 PROCEDURE — 87186 SC STD MICRODIL/AGAR DIL: CPT | Performed by: EMERGENCY MEDICINE

## 2025-06-25 PROCEDURE — 87154 CUL TYP ID BLD PTHGN 6+ TRGT: CPT | Performed by: EMERGENCY MEDICINE

## 2025-06-25 PROCEDURE — 99283 EMERGENCY DEPT VISIT LOW MDM: CPT

## 2025-06-25 PROCEDURE — 85025 COMPLETE CBC W/AUTO DIFF WBC: CPT | Performed by: EMERGENCY MEDICINE

## 2025-06-25 PROCEDURE — 83605 ASSAY OF LACTIC ACID: CPT | Performed by: EMERGENCY MEDICINE

## 2025-06-25 PROCEDURE — 80053 COMPREHEN METABOLIC PANEL: CPT | Performed by: EMERGENCY MEDICINE

## 2025-06-25 PROCEDURE — 87040 BLOOD CULTURE FOR BACTERIA: CPT | Performed by: EMERGENCY MEDICINE

## 2025-06-26 ENCOUNTER — HOSPITAL ENCOUNTER (EMERGENCY)
Facility: HOSPITAL | Age: 70
Discharge: HOME | DRG: 690 | End: 2025-06-26
Attending: EMERGENCY MEDICINE
Payer: MEDICARE

## 2025-06-26 ENCOUNTER — HOSPITAL ENCOUNTER (INPATIENT)
Facility: HOSPITAL | Age: 70
LOS: 3 days | Discharge: HOME | End: 2025-06-29
Attending: EMERGENCY MEDICINE | Admitting: HOSPITALIST
Payer: MEDICARE

## 2025-06-26 ENCOUNTER — APPOINTMENT (OUTPATIENT)
Dept: GENERAL RADIOLOGY | Facility: HOSPITAL | Age: 70
DRG: 690 | End: 2025-06-26
Payer: MEDICARE

## 2025-06-26 VITALS
HEIGHT: 72 IN | SYSTOLIC BLOOD PRESSURE: 122 MMHG | BODY MASS INDEX: 30.48 KG/M2 | HEART RATE: 110 BPM | DIASTOLIC BLOOD PRESSURE: 73 MMHG | RESPIRATION RATE: 16 BRPM | TEMPERATURE: 100 F | OXYGEN SATURATION: 96 % | WEIGHT: 225 LBS

## 2025-06-26 DIAGNOSIS — R50.9 FEVER IN ADULT: ICD-10-CM

## 2025-06-26 DIAGNOSIS — N39.0 ACUTE UTI: Primary | ICD-10-CM

## 2025-06-26 DIAGNOSIS — R78.81 BACTEREMIA: Primary | ICD-10-CM

## 2025-06-26 DIAGNOSIS — N39.0 URINARY TRACT INFECTION IN MALE: ICD-10-CM

## 2025-06-26 PROBLEM — B95.2 BACTEREMIA DUE TO ENTEROCOCCUS: Status: ACTIVE | Noted: 2025-06-26

## 2025-06-26 LAB
ANION GAP SERPL CALCULATED.3IONS-SCNC: 13.3 MMOL/L (ref 5–15)
B PARAPERT DNA SPEC QL NAA+PROBE: NOT DETECTED
B PERT DNA SPEC QL NAA+PROBE: NOT DETECTED
BACTERIA BLD CULT: ABNORMAL
BACTERIA UR QL AUTO: ABNORMAL /HPF
BASOPHILS # BLD AUTO: 0.03 10*3/MM3 (ref 0–0.2)
BASOPHILS NFR BLD AUTO: 0.4 % (ref 0–1.5)
BILIRUB UR QL STRIP: NEGATIVE
BOTTLE TYPE: ABNORMAL
BUN SERPL-MCNC: 20 MG/DL (ref 8–23)
BUN/CREAT SERPL: 14.5 (ref 7–25)
C PNEUM DNA NPH QL NAA+NON-PROBE: NOT DETECTED
CALCIUM SPEC-SCNC: 9.8 MG/DL (ref 8.6–10.5)
CHLORIDE SERPL-SCNC: 107 MMOL/L (ref 98–107)
CLARITY UR: ABNORMAL
CO2 SERPL-SCNC: 17.7 MMOL/L (ref 22–29)
COLOR UR: YELLOW
CREAT SERPL-MCNC: 1.38 MG/DL (ref 0.76–1.27)
D-LACTATE SERPL-SCNC: 1 MMOL/L (ref 0.5–2)
D-LACTATE SERPL-SCNC: 2.7 MMOL/L (ref 0.5–2)
DEPRECATED RDW RBC AUTO: 48.2 FL (ref 37–54)
EGFRCR SERPLBLD CKD-EPI 2021: 56 ML/MIN/1.73
EOSINOPHIL # BLD AUTO: 0 10*3/MM3 (ref 0–0.4)
EOSINOPHIL NFR BLD AUTO: 0 % (ref 0.3–6.2)
ERYTHROCYTE [DISTWIDTH] IN BLOOD BY AUTOMATED COUNT: 12.8 % (ref 12.3–15.4)
FLUAV SUBTYP SPEC NAA+PROBE: NOT DETECTED
FLUBV RNA ISLT QL NAA+PROBE: NOT DETECTED
GLUCOSE SERPL-MCNC: 116 MG/DL (ref 65–99)
GLUCOSE UR STRIP-MCNC: NEGATIVE MG/DL
HADV DNA SPEC NAA+PROBE: NOT DETECTED
HCOV 229E RNA SPEC QL NAA+PROBE: NOT DETECTED
HCOV HKU1 RNA SPEC QL NAA+PROBE: NOT DETECTED
HCOV NL63 RNA SPEC QL NAA+PROBE: NOT DETECTED
HCOV OC43 RNA SPEC QL NAA+PROBE: NOT DETECTED
HCT VFR BLD AUTO: 33.2 % (ref 37.5–51)
HGB BLD-MCNC: 11 G/DL (ref 13–17.7)
HGB UR QL STRIP.AUTO: ABNORMAL
HMPV RNA NPH QL NAA+NON-PROBE: NOT DETECTED
HPIV1 RNA ISLT QL NAA+PROBE: NOT DETECTED
HPIV2 RNA SPEC QL NAA+PROBE: NOT DETECTED
HPIV3 RNA NPH QL NAA+PROBE: NOT DETECTED
HPIV4 P GENE NPH QL NAA+PROBE: NOT DETECTED
HYALINE CASTS UR QL AUTO: ABNORMAL /LPF
IMM GRANULOCYTES # BLD AUTO: 0.02 10*3/MM3 (ref 0–0.05)
IMM GRANULOCYTES NFR BLD AUTO: 0.2 % (ref 0–0.5)
KETONES UR QL STRIP: ABNORMAL
LEUKOCYTE ESTERASE UR QL STRIP.AUTO: ABNORMAL
LYMPHOCYTES # BLD AUTO: 0.77 10*3/MM3 (ref 0.7–3.1)
LYMPHOCYTES NFR BLD AUTO: 9.1 % (ref 19.6–45.3)
M PNEUMO IGG SER IA-ACNC: NOT DETECTED
MCH RBC QN AUTO: 34.4 PG (ref 26.6–33)
MCHC RBC AUTO-ENTMCNC: 33.1 G/DL (ref 31.5–35.7)
MCV RBC AUTO: 103.8 FL (ref 79–97)
MONOCYTES # BLD AUTO: 1.09 10*3/MM3 (ref 0.1–0.9)
MONOCYTES NFR BLD AUTO: 12.9 % (ref 5–12)
NEUTROPHILS NFR BLD AUTO: 6.56 10*3/MM3 (ref 1.7–7)
NEUTROPHILS NFR BLD AUTO: 77.4 % (ref 42.7–76)
NITRITE UR QL STRIP: NEGATIVE
NRBC BLD AUTO-RTO: 0 /100 WBC (ref 0–0.2)
PH UR STRIP.AUTO: 6 [PH] (ref 5–8)
PLATELET # BLD AUTO: 94 10*3/MM3 (ref 140–450)
PMV BLD AUTO: 10.8 FL (ref 6–12)
POTASSIUM SERPL-SCNC: 4.1 MMOL/L (ref 3.5–5.2)
PROT UR QL STRIP: ABNORMAL
RBC # BLD AUTO: 3.2 10*6/MM3 (ref 4.14–5.8)
RBC # UR STRIP: ABNORMAL /HPF
REF LAB TEST METHOD: ABNORMAL
RHINOVIRUS RNA SPEC NAA+PROBE: NOT DETECTED
RSV RNA NPH QL NAA+NON-PROBE: NOT DETECTED
SARS-COV-2 RNA NPH QL NAA+NON-PROBE: NOT DETECTED
SODIUM SERPL-SCNC: 138 MMOL/L (ref 136–145)
SP GR UR STRIP: 1.01 (ref 1–1.03)
SQUAMOUS #/AREA URNS HPF: ABNORMAL /HPF
UROBILINOGEN UR QL STRIP: ABNORMAL
WBC # UR STRIP: ABNORMAL /HPF
WBC NRBC COR # BLD AUTO: 8.47 10*3/MM3 (ref 3.4–10.8)

## 2025-06-26 PROCEDURE — 25810000003 SODIUM CHLORIDE 0.9 % SOLUTION: Performed by: EMERGENCY MEDICINE

## 2025-06-26 PROCEDURE — 99285 EMERGENCY DEPT VISIT HI MDM: CPT

## 2025-06-26 PROCEDURE — 83605 ASSAY OF LACTIC ACID: CPT | Performed by: EMERGENCY MEDICINE

## 2025-06-26 PROCEDURE — 80048 BASIC METABOLIC PNL TOTAL CA: CPT | Performed by: EMERGENCY MEDICINE

## 2025-06-26 PROCEDURE — 87186 SC STD MICRODIL/AGAR DIL: CPT | Performed by: EMERGENCY MEDICINE

## 2025-06-26 PROCEDURE — 25010000002 AMPICILLIN PER 500 MG: Performed by: HOSPITALIST

## 2025-06-26 PROCEDURE — 87077 CULTURE AEROBIC IDENTIFY: CPT | Performed by: EMERGENCY MEDICINE

## 2025-06-26 PROCEDURE — 25810000003 SODIUM CHLORIDE 0.9 % SOLUTION: Performed by: HOSPITALIST

## 2025-06-26 PROCEDURE — 71046 X-RAY EXAM CHEST 2 VIEWS: CPT

## 2025-06-26 PROCEDURE — 25010000002 AMPICILLIN PER 500 MG: Performed by: EMERGENCY MEDICINE

## 2025-06-26 PROCEDURE — 36415 COLL VENOUS BLD VENIPUNCTURE: CPT

## 2025-06-26 PROCEDURE — 87086 URINE CULTURE/COLONY COUNT: CPT | Performed by: EMERGENCY MEDICINE

## 2025-06-26 PROCEDURE — 81001 URINALYSIS AUTO W/SCOPE: CPT | Performed by: EMERGENCY MEDICINE

## 2025-06-26 PROCEDURE — 85025 COMPLETE CBC W/AUTO DIFF WBC: CPT | Performed by: EMERGENCY MEDICINE

## 2025-06-26 RX ORDER — ACETAMINOPHEN 500 MG
1000 TABLET ORAL ONCE
Status: COMPLETED | OUTPATIENT
Start: 2025-06-26 | End: 2025-06-26

## 2025-06-26 RX ORDER — SODIUM CHLORIDE 9 MG/ML
100 INJECTION, SOLUTION INTRAVENOUS CONTINUOUS
Status: DISCONTINUED | OUTPATIENT
Start: 2025-06-26 | End: 2025-06-27

## 2025-06-26 RX ORDER — CEPHALEXIN 500 MG/1
500 CAPSULE ORAL ONCE
Status: DISCONTINUED | OUTPATIENT
Start: 2025-06-26 | End: 2025-06-26 | Stop reason: HOSPADM

## 2025-06-26 RX ORDER — GABAPENTIN 300 MG/1
300 CAPSULE ORAL 2 TIMES DAILY
Status: DISCONTINUED | OUTPATIENT
Start: 2025-06-26 | End: 2025-06-29 | Stop reason: HOSPADM

## 2025-06-26 RX ORDER — AMOXICILLIN 250 MG
2 CAPSULE ORAL 2 TIMES DAILY PRN
Status: DISCONTINUED | OUTPATIENT
Start: 2025-06-26 | End: 2025-06-29 | Stop reason: HOSPADM

## 2025-06-26 RX ORDER — SODIUM CHLORIDE 0.9 % (FLUSH) 0.9 %
10 SYRINGE (ML) INJECTION EVERY 12 HOURS SCHEDULED
Status: DISCONTINUED | OUTPATIENT
Start: 2025-06-26 | End: 2025-06-29 | Stop reason: HOSPADM

## 2025-06-26 RX ORDER — LISINOPRIL 10 MG/1
10 TABLET ORAL DAILY
Status: DISCONTINUED | OUTPATIENT
Start: 2025-06-27 | End: 2025-06-29 | Stop reason: HOSPADM

## 2025-06-26 RX ORDER — FOLIC ACID 1 MG/1
1000 TABLET ORAL DAILY
Status: DISCONTINUED | OUTPATIENT
Start: 2025-06-27 | End: 2025-06-29 | Stop reason: HOSPADM

## 2025-06-26 RX ORDER — SODIUM CHLORIDE 9 MG/ML
40 INJECTION, SOLUTION INTRAVENOUS AS NEEDED
Status: DISCONTINUED | OUTPATIENT
Start: 2025-06-26 | End: 2025-06-29 | Stop reason: HOSPADM

## 2025-06-26 RX ORDER — ONDANSETRON 4 MG/1
4 TABLET, ORALLY DISINTEGRATING ORAL EVERY 6 HOURS PRN
Status: DISCONTINUED | OUTPATIENT
Start: 2025-06-26 | End: 2025-06-29 | Stop reason: HOSPADM

## 2025-06-26 RX ORDER — TAMSULOSIN HYDROCHLORIDE 0.4 MG/1
0.4 CAPSULE ORAL DAILY
Status: DISCONTINUED | OUTPATIENT
Start: 2025-06-27 | End: 2025-06-29 | Stop reason: HOSPADM

## 2025-06-26 RX ORDER — CEPHALEXIN 500 MG/1
500 CAPSULE ORAL 2 TIMES DAILY
Qty: 14 CAPSULE | Refills: 0 | Status: SHIPPED | OUTPATIENT
Start: 2025-06-26 | End: 2025-06-29 | Stop reason: HOSPADM

## 2025-06-26 RX ORDER — BISACODYL 5 MG/1
5 TABLET, DELAYED RELEASE ORAL DAILY PRN
Status: DISCONTINUED | OUTPATIENT
Start: 2025-06-26 | End: 2025-06-29 | Stop reason: HOSPADM

## 2025-06-26 RX ORDER — BISACODYL 10 MG
10 SUPPOSITORY, RECTAL RECTAL DAILY PRN
Status: DISCONTINUED | OUTPATIENT
Start: 2025-06-26 | End: 2025-06-29 | Stop reason: HOSPADM

## 2025-06-26 RX ORDER — LISINOPRIL 5 MG/1
5 TABLET ORAL DAILY
Status: DISCONTINUED | OUTPATIENT
Start: 2025-06-27 | End: 2025-06-29 | Stop reason: HOSPADM

## 2025-06-26 RX ORDER — PANTOPRAZOLE SODIUM 40 MG/1
40 TABLET, DELAYED RELEASE ORAL 2 TIMES DAILY
Status: DISCONTINUED | OUTPATIENT
Start: 2025-06-27 | End: 2025-06-29 | Stop reason: HOSPADM

## 2025-06-26 RX ORDER — ENOXAPARIN SODIUM 100 MG/ML
40 INJECTION SUBCUTANEOUS DAILY
Status: DISCONTINUED | OUTPATIENT
Start: 2025-06-27 | End: 2025-06-29 | Stop reason: HOSPADM

## 2025-06-26 RX ORDER — SODIUM CHLORIDE 0.9 % (FLUSH) 0.9 %
10 SYRINGE (ML) INJECTION AS NEEDED
Status: DISCONTINUED | OUTPATIENT
Start: 2025-06-26 | End: 2025-06-29 | Stop reason: HOSPADM

## 2025-06-26 RX ORDER — MULTIVITAMIN WITH IRON
1000 TABLET ORAL DAILY
Status: DISCONTINUED | OUTPATIENT
Start: 2025-06-27 | End: 2025-06-29 | Stop reason: HOSPADM

## 2025-06-26 RX ORDER — POLYETHYLENE GLYCOL 3350 17 G/17G
17 POWDER, FOR SOLUTION ORAL DAILY PRN
Status: DISCONTINUED | OUTPATIENT
Start: 2025-06-26 | End: 2025-06-29 | Stop reason: HOSPADM

## 2025-06-26 RX ORDER — FERROUS SULFATE 325(65) MG
325 TABLET ORAL
Status: DISCONTINUED | OUTPATIENT
Start: 2025-06-27 | End: 2025-06-29 | Stop reason: HOSPADM

## 2025-06-26 RX ORDER — ROSUVASTATIN CALCIUM 10 MG/1
10 TABLET, COATED ORAL DAILY
Status: DISCONTINUED | OUTPATIENT
Start: 2025-06-27 | End: 2025-06-29 | Stop reason: HOSPADM

## 2025-06-26 RX ORDER — ACETAMINOPHEN 325 MG/1
650 TABLET ORAL EVERY 4 HOURS PRN
Status: DISCONTINUED | OUTPATIENT
Start: 2025-06-26 | End: 2025-06-29 | Stop reason: HOSPADM

## 2025-06-26 RX ORDER — ALLOPURINOL 100 MG/1
300 TABLET ORAL DAILY
Status: DISCONTINUED | OUTPATIENT
Start: 2025-06-27 | End: 2025-06-29 | Stop reason: HOSPADM

## 2025-06-26 RX ADMIN — SODIUM CHLORIDE 1000 ML: 9 INJECTION, SOLUTION INTRAVENOUS at 19:44

## 2025-06-26 RX ADMIN — AMPICILLIN SODIUM 2 G: 2 INJECTION, POWDER, FOR SOLUTION INTRAMUSCULAR; INTRAVENOUS at 22:06

## 2025-06-26 RX ADMIN — AMPICILLIN SODIUM 2000 MG: 2 INJECTION, POWDER, FOR SOLUTION INTRAMUSCULAR; INTRAVENOUS at 18:15

## 2025-06-26 RX ADMIN — GABAPENTIN 300 MG: 300 CAPSULE ORAL at 23:07

## 2025-06-26 RX ADMIN — Medication 10 ML: at 22:06

## 2025-06-26 RX ADMIN — SODIUM CHLORIDE 100 ML/HR: 9 INJECTION, SOLUTION INTRAVENOUS at 22:06

## 2025-06-26 RX ADMIN — ACETAMINOPHEN 1000 MG: 500 TABLET, FILM COATED ORAL at 18:05

## 2025-06-26 RX ADMIN — ACETAMINOPHEN 1000 MG: 500 TABLET, FILM COATED ORAL at 02:41

## 2025-06-26 NOTE — DISCHARGE INSTRUCTIONS
Follow-up with primary care provider.  Take the cephalexin as prescribed.  Return to emergency department for any worsening symptoms.

## 2025-06-26 NOTE — ED PROVIDER NOTES
EMERGENCY DEPARTMENT ENCOUNTER    Room Number:  18/18  PCP: Jenn Davison APRN  Historian: Patient      HPI:  Chief Complaint: Abnormal labs  A complete HPI/ROS/PMH/PSH/SH/FH are unobtainable due to: None    Context: oM Willoughby is a 69 y.o. male who presents to the ED via private vehicle c/o acute abnormal labs, was seen in the ER earlier this morning and diagnosed with UTI, had blood cultures drawn and was called and told that he had positive blood cultures and was told to return to the ER.  He reports feeling well overall, has had some urinary urgency recently.  Had a spinal surgery back in May and has some residual back discomfort but no abdominal pain, no diarrhea, no nausea or vomiting.      MEDICAL RECORD REVIEW    External (non-ED) record review: Chart reviewed epic shows patient with Enterococcus in his blood, was recommended to come back for ampicillin              PAST MEDICAL HISTORY  Active Ambulatory Problems     Diagnosis Date Noted    Erectile dysfunction     Gout     Hypercalcemia 11/15/2016    Colon polyp 11/15/2016    Sinus tachycardia 08/01/2018    Pancreatitis 08/01/2018    Other fatigue 05/02/2022    Ascending aortic aneurysm 05/02/2022    Generalized abdominal pain 05/03/2022    Folic acid deficiency 05/03/2022    Iron deficiency anemia 05/05/2022    Acute bilateral low back pain without sciatica 08/19/2022    Swelling of the testicles 08/19/2022    Urinary frequency 08/21/2022    Hyperparathyroidism, unspecified 2016    CRI (chronic renal insufficiency), stage 3 (moderate) 2016    Essential hypertension, benign 09/21/2022    Spinal stenosis of lumbar region 12/06/2022    Lumbar radiculitis 12/06/2022    Lumbar facet arthropathy 12/19/2022    Screening PSA (prostate specific antigen) 02/27/2023    Mixed hyperlipidemia 02/27/2023    Change in pigmented skin lesion of face 03/01/2023    Pain in both testicles 03/01/2023    Sacroiliitis 04/11/2023    DDD (degenerative disc disease),  lumbar 06/05/2023    Spondylolisthesis at L4-L5 level 06/05/2023    Herniation of lumbar intervertebral disc with radiculopathy 06/05/2023    Spinal stenosis of lumbar region with radiculopathy 07/07/2023    H/O: gout on allopurinol 04/15/2024    Premature atrial contractions 07/09/2024    Arthritis of right hip 04/10/2025    Right hip pain 04/11/2025    Imbalance 05/02/2025    Foot drop, left 05/02/2025    Atrophy of muscle of multiple sites 05/02/2025    Tongue fasciculation 05/02/2025    Acute bilateral low back pain with bilateral sciatica 05/02/2025    Weakness of left lower extremity 05/05/2025    Herniation of thoracolumbar intervertebral disc with myelopathy 05/05/2025     Resolved Ambulatory Problems     Diagnosis Date Noted    Hypertension     History of cellulitis 04/27/2016    Hyperparathyroidism with most recent intact PTH 95, calcium 11.3 with SPECT revealing question of a subcentimeter left inferior parathyroid adenoma 11/15/2016    H/O: gout on allopurinol 11/15/2016    Cholecystitis 07/31/2018    Acute kidney injury 08/01/2018    Elevated liver enzymes 05/02/2022    Cough 05/02/2022    Nausea 05/02/2022    Dizziness 05/02/2022    Chest pain 05/02/2022    Palpitations 05/03/2022    Anemia 05/03/2022    Hospital discharge follow-up 05/16/2022    Abnormal serum thyroid stimulating hormone (TSH) level 05/16/2022    Renal insufficiency 08/21/2022    New onset atrial fibrillation 04/15/2024     Past Medical History:   Diagnosis Date    Abnormal TSH     Arthritis     ED (erectile dysfunction)     Hip pain, right     Hx of colonic polyp     Low back pain with bilateral sciatica     Risk factors for obstructive sleep apnea     Spinal stenosis of lumbar region with neurogenic claudication     Syncope and collapse 03/28/2017         PAST SURGICAL HISTORY  Past Surgical History:   Procedure Laterality Date    BACK SURGERY  05/08/25    CHOLECYSTECTOMY      CHOLECYSTECTOMY WITH INTRAOPERATIVE CHOLANGIOGRAM N/A  08/03/2018    Procedure: CHOLECYSTECTOMY LAPAROSCOPIC INTRAOPERATIVE CHOLANGIOGRAM;  Surgeon: Melina Kate MD;  [facility];  Service: General    COLONOSCOPY      COLONOSCOPY N/A 10/03/2016    Procedure: COLONOSCOPY TO CECUM TO TERMINAL ILIUM WITH COLD BIOPSY POLYPECTOMY;  Surgeon: Benoit Vilchis MD;  Location: [facility] ENDOSCOPY;  Service:     COLONOSCOPY N/A 05/05/2022    Procedure: COLONOSCOPY TO CECUM WITH COLD SNARE POLYPECTOMIES & RESOLUTION CLIP PLACEMENT X 2;  Surgeon: Benoit Mosley MD;  Location: [facility] ENDOSCOPY;  Service: Gastroenterology;  Laterality: N/A;  ANEMIA/POLYPS, HEMORRHOIDS     ENDOSCOPY N/A 05/05/2022    Procedure: ESOPHAGOGASTRODUODENOSCOPY WITH BX;  Surgeon: Benoit Mosley MD;  Location: [facility] ENDOSCOPY;  Service: Gastroenterology;  Laterality: N/A;  ANEMIA/PORTAL GASTROPATHY, EROSIVE GASTRITIS     EPIDURAL Right 12/07/2022    Procedure: LUMBAR/SACRAL TRANSFORAMINAL EPIDURAL Right L2-3 and right L4-5;  Surgeon: Isaura Torres MD;  Location: [facility] [place] MAIN OR;  Service: Pain Management;  Laterality: Right;    LIPOMA EXCISION      LUMBAR DISCECTOMY FUSION INSTRUMENTATION N/A 05/08/2025    Procedure: Open thoracic twelve/lumbar one discectomy/decompression/possible interbody fusion with cages, pedicle screw/terry/crosslink fixation, thoracic twelve/lumbar one/two with Mazor robotic navigation;  Surgeon: Abel Perez MD;  Location: [facility] MAIN OR;  Service: Robotics - Neuro;  Laterality: N/A;    LUMBAR LAMINECTOMY DISCECTOMY DECOMPRESSION N/A 07/07/2023    Procedure: Open right sided lumbar decompression lumbar three/four, lumbar four/five;  Surgeon: Abel Perez MD;  Location: [facility] MAIN OR;  Service: Neurosurgery;  Laterality: N/A;    MEDIAL BRANCH BLOCK Right 01/23/2023    Procedure: LUMBAR MEDIAL BRANCH BLOCK RIGHT L3-L5 #1;  Surgeon: Isaura Torres MD;  Location: [facility] [place] MAIN OR;  Service: Pain Management;  Laterality: Right;    MEDIAL BRANCH  BLOCK Right 02/13/2023    Procedure: LUMBAR MEDIAL BRANCH BLOCK RIGHT L3-L5 #1;  Surgeon: Isaura Torres MD;  [facility]: [facility];  Service: Pain Management;  Laterality: Right;    NERVE SURGERY Right 03/06/2023    Procedure: LUMBAR RADIOFREQUENCY ABLATION RIGHT L3-L5  00050, 35310;  Surgeon: Isaura Torres MD;  Location: [facility] MAIN OR;  Service: Pain Management;  Laterality: Right;    SACROILIAC JOINT INJECTION Right 05/05/2023    Procedure: SACROILIAC JOINT INJECTION RIGHT 89108;  Surgeon: Isaura Torres MD;  Location: [facility];  Service: Pain Management;  Laterality: Right;    TONSILLECTOMY           FAMILY HISTORY  Family History   Problem Relation Age of Onset    Hypertension Mother     Breast cancer Mother     Cancer Mother     Stroke Mother     Cancer Father     Liver cancer Father     Hypertension Father     Diabetes Father     Hypertension Sister     Heart attack Sister     Hypertension Brother     Cancer Brother     Hypertension Sister     Heart attack Sister     Learning disabilities Neg Hx          SOCIAL HISTORY  Social History     Socioeconomic History    Marital status: Single   Tobacco Use    Smoking status: Never    Smokeless tobacco: Never   Vaping Use    Vaping status: Never Used   Substance and Sexual Activity    Alcohol use: Yes     Comment: occasional    Drug use: No    Sexual activity: Yes     Partners: Female         ALLERGIES  Zetia [ezetimibe]        REVIEW OF SYSTEMS  Review of Systems     All systems reviewed and negative except for those discussed in HPI.       PHYSICAL EXAM    I have reviewed the triage vital signs and nursing notes.    ED Triage Vitals   Temp Heart Rate Resp BP SpO2   06/26/25 1737 06/26/25 1737 06/26/25 1737 06/26/25 1741 06/26/25 1737   (!) 100.8 °F (38.2 °C) (!) 121 16 110/74 94 %      Temp src Heart Rate Source Patient Position BP Location FiO2 (%)   06/26/25 1737 06/26/25 1737 -- -- --   Oral Monitor          Physical Exam  General: No acute  distress, nontoxic  HEENT: Mucous membranes moist, atraumatic, EOMI  Neck: Full ROM  Pulm: Symmetric chest rise, nonlabored, lungs CTAB  Cardiovascular: Borderline mild tachycardia, intact distal pulses  GI: Soft, nontender, nondistended, no rebound, no guarding, bowel sounds present  MSK: Full ROM, no deformity  Skin: Warm, dry  Neuro: Awake, alert, oriented x 4, GCS 15, moving all extremities, no focal deficits  Psych: Calm, cooperative        LAB RESULTS  Recent Results (from the past 24 hours)   Comprehensive Metabolic Panel    Collection Time: 06/25/25 10:42 PM    Specimen: Arm, Left; Blood   Result Value Ref Range    Glucose 124 (H) 65 - 99 mg/dL    BUN 18.0 8.0 - 23.0 mg/dL    Creatinine 1.34 (H) 0.76 - 1.27 mg/dL    Sodium 136 136 - 145 mmol/L    Potassium 4.0 3.5 - 5.2 mmol/L    Chloride 102 98 - 107 mmol/L    CO2 21.8 (L) 22.0 - 29.0 mmol/L    Calcium 10.0 8.6 - 10.5 mg/dL    Total Protein 6.5 6.0 - 8.5 g/dL    Albumin 3.6 3.5 - 5.2 g/dL    ALT (SGPT) 15 1 - 41 U/L    AST (SGOT) 30 1 - 40 U/L    Alkaline Phosphatase 91 39 - 117 U/L    Total Bilirubin 1.1 0.0 - 1.2 mg/dL    Globulin 2.9 gm/dL    A/G Ratio 1.2 g/dL    BUN/Creatinine Ratio 13.4 7.0 - 25.0    Anion Gap 12.2 5.0 - 15.0 mmol/L    eGFR 58.1 (L) >60.0 mL/min/1.73   Lactic Acid, Plasma    Collection Time: 06/25/25 10:42 PM    Specimen: Arm, Left; Blood   Result Value Ref Range    Lactate 1.2 0.5 - 2.0 mmol/L   Blood Culture - Blood, Arm, Left    Collection Time: 06/25/25 10:42 PM    Specimen: Arm, Left; Blood   Result Value Ref Range    Blood Culture Abnormal Stain (C)     Gram Stain Aerobic Bottle Gram positive cocci in pairs (C)    CBC Auto Differential    Collection Time: 06/25/25 10:42 PM    Specimen: Arm, Left; Blood   Result Value Ref Range    WBC 7.12 3.40 - 10.80 10*3/mm3    RBC 3.25 (L) 4.14 - 5.80 10*6/mm3    Hemoglobin 10.9 (L) 13.0 - 17.7 g/dL    Hematocrit 33.8 (L) 37.5 - 51.0 %    .0 (H) 79.0 - 97.0 fL    MCH 33.5 (H) 26.6 -  33.0 pg    MCHC 32.2 31.5 - 35.7 g/dL    RDW 13.1 12.3 - 15.4 %    RDW-SD 49.9 37.0 - 54.0 fl    MPV 10.0 6.0 - 12.0 fL    Platelets 95 (L) 140 - 450 10*3/mm3    Neutrophil % 78.3 (H) 42.7 - 76.0 %    Lymphocyte % 6.5 (L) 19.6 - 45.3 %    Monocyte % 14.2 (H) 5.0 - 12.0 %    Eosinophil % 0.3 0.3 - 6.2 %    Basophil % 0.3 0.0 - 1.5 %    Immature Grans % 0.4 0.0 - 0.5 %    Neutrophils, Absolute 5.58 1.70 - 7.00 10*3/mm3    Lymphocytes, Absolute 0.46 (L) 0.70 - 3.10 10*3/mm3    Monocytes, Absolute 1.01 (H) 0.10 - 0.90 10*3/mm3    Eosinophils, Absolute 0.02 0.00 - 0.40 10*3/mm3    Basophils, Absolute 0.02 0.00 - 0.20 10*3/mm3    Immature Grans, Absolute 0.03 0.00 - 0.05 10*3/mm3    nRBC 0.0 0.0 - 0.2 /100 WBC   Blood Culture ID, PCR - Blood, Arm, Left    Collection Time: 06/25/25 10:42 PM    Specimen: Arm, Left; Blood   Result Value Ref Range    BCID, PCR (A) Negative by BCID PCR. Culture to Follow.     Enterococcus faecalis. Trell/B (vancomycin resistance gene) not detected. Identification by BCID2 PCR.    BOTTLE TYPE Aerobic Bottle    Respiratory Panel PCR w/COVID-19(SARS-CoV-2) MARILYNN/ANGELA/JUANPABLO/PAD/COR/ADAL In-House, NP Swab in UTM/VTM, 2 HR TAT - Swab, Nasopharynx    Collection Time: 06/25/25 11:54 PM    Specimen: Nasopharynx; Swab   Result Value Ref Range    ADENOVIRUS, PCR Not Detected Not Detected    Coronavirus 229E Not Detected Not Detected    Coronavirus HKU1 Not Detected Not Detected    Coronavirus NL63 Not Detected Not Detected    Coronavirus OC43 Not Detected Not Detected    COVID19 Not Detected Not Detected - Ref. Range    Human Metapneumovirus Not Detected Not Detected    Human Rhinovirus/Enterovirus Not Detected Not Detected    Influenza A PCR Not Detected Not Detected    Influenza B PCR Not Detected Not Detected    Parainfluenza Virus 1 Not Detected Not Detected    Parainfluenza Virus 2 Not Detected Not Detected    Parainfluenza Virus 3 Not Detected Not Detected    Parainfluenza Virus 4 Not Detected Not  Detected    RSV, PCR Not Detected Not Detected    Bordetella pertussis pcr Not Detected Not Detected    Bordetella parapertussis PCR Not Detected Not Detected    Chlamydophila pneumoniae PCR Not Detected Not Detected    Mycoplasma pneumo by PCR Not Detected Not Detected   Urinalysis With Culture If Indicated - Urine, Clean Catch    Collection Time: 06/26/25  2:52 AM    Specimen: Urine, Clean Catch   Result Value Ref Range    Color, UA Yellow Yellow, Straw    Appearance, UA Turbid (A) Clear    pH, UA 6.0 5.0 - 8.0    Specific Gravity, UA 1.014 1.005 - 1.030    Glucose, UA Negative Negative    Ketones, UA Trace (A) Negative    Bilirubin, UA Negative Negative    Blood, UA Moderate (2+) (A) Negative    Protein,  mg/dL (2+) (A) Negative    Leuk Esterase, UA Large (3+) (A) Negative    Nitrite, UA Negative Negative    Urobilinogen, UA 1.0 E.U./dL 0.2 - 1.0 E.U./dL   Urinalysis, Microscopic Only - Urine, Clean Catch    Collection Time: 06/26/25  2:52 AM    Specimen: Urine, Clean Catch   Result Value Ref Range    RBC, UA 0-2 None Seen, 0-2 /HPF    WBC, UA Too Numerous to Count (A) None Seen, 0-2 /HPF    Bacteria, UA 1+ (A) None Seen /HPF    Squamous Epithelial Cells, UA 3-6 (A) None Seen, 0-2 /HPF    Hyaline Casts, UA None Seen None Seen /LPF    Methodology Automated Microscopy        Ordered the above labs and independently interpreted results. My findings will be discussed in the medical decision making section below        RADIOLOGY  XR Chest 2 View  Result Date: 6/26/2025  PA AND LATERAL CHEST RADIOGRAPH  HISTORY: Fever  COMPARISON: May 2, 2022  FINDINGS: There is cardiomegaly. There is no vascular congestion. No pneumothorax, pleural effusion, or acute infiltrate is seen. Please note, lateral view is limited, due to patient positioning.      No acute findings.  This report was finalized on 6/26/2025 1:49 AM by Dr. Dagmar Buchanan M.D on Workstation: [employer]        Ordered the above noted radiological  studies.  Independently interpreted by me and my independent review of findings can be found in the ED Course.  See dictation for official radiology interpretation.      PROCEDURES    Procedures        MEDICATIONS GIVEN IN ER    Medications   ampicillin 2000 mg IVPB in 100 mL NS (MBP) (has no administration in time range)   acetaminophen (TYLENOL) tablet 1,000 mg (1,000 mg Oral Given 6/26/25 1805)         PROGRESS, DATA ANALYSIS, CONSULTS, AND MEDICAL DECISION MAKING    Please note that this section constitutes my independent interpretation of clinical data including lab results, radiology, EKG's.  This constitutes my independent professional opinion regarding differential diagnosis and management of this patient.  It may include any factors such as history from outside sources, review of external records, social determinants of health, management of medications, response to those treatments, and discussions with other providers.    Differential Diagnosis and Plan: Plan for repeat labs, start IV ampicillin and plan for hospitalization due to the bacteremia noted on blood cultures, well-appearing mild elevated temperature of 100.8, will give Tylenol, IV fluids.     Additional sources:  - Discussed/ obtained information from independent historians:       - (Social Determinants of Health): None     - Shared decision making:  Patient fully updated on and in agreement with the course and plan moving forward    ED Course as of 06/27/25 1019   Thu Jun 26, 202526, 2025 1843 WBC: 8.47 [DC]   1843 Hemoglobin(!): 11.0 [DC]   1843 Platelets(!): 94 [DC]   1846 Lactate(!!): 2.7 [DC]   1930 I discussed the case with Dr. Jones, hospitalist.  He will admit. [TD]      ED Course User Index  [DC] Harjit Mitchell MD  [TD] Case Grajeda II, MD       Hospitalization Considered?: yes    Orders Placed During This Visit:  Orders Placed This Encounter   Procedures    Basic Metabolic Panel    Lactic Acid, Plasma    CBC Auto Differential     CBC & Differential       Additional orders considered but not placed:      Independent interpretation of labs, radiology studies, and discussions with consultants: See ED Course        AS OF 18:11 EDT VITALS:    BP - 110/74  HR - 114  TEMP - (!) 100.8 °F (38.2 °C) (Oral)  02 SATS - 97%          DIAGNOSIS   Diagnosis Plan   1. Bacteremia        2. Fever in adult        3. Urinary tract infection in male                DISPOSITION  HOSPITALIZATION    Discussed treatment plan and reason for hospitalization with pt/family and hospitalizing physician.  Pt/family voiced understanding of the plan for hospitalization for further testing/treatment as needed.       Please note that portions of this document were completed with a voice recognition program.    Note Disclaimer: At [facility], we believe that sharing information builds trust and better relationships. You are receiving this note because you recently visited [facility]. It is possible you will see health information before a provider has talked with you about it. This kind of information can be easy to misunderstand. To help you fully understand what it means for your health, we urge you to discuss this note with your provider.                       Harjit Mitchell MD  06/27/25 1022

## 2025-06-26 NOTE — ED PROVIDER NOTES
I have personally performed a face-to-face diagnostic evaluation of the patient.  I have reviewed and agree with the care plan as outlined by NP/PA.  My findings are as follows:    HPI:  Patient is a 69 y.o. male who presents with complaints of fever, generalized malaise and fatigue.  He denies any headache.  No cough, congestion or runny nose.  No abdominal pain, nausea, vomiting or diarrhea.  No dysuria or hematuria.  Did have a back surgery on 5/8.  Has been following up and everything has been healing well.  No complaints of additional back pain, drainage from the incision      PE:  Physical Exam  Constitutional:       Appearance: Normal appearance.   HENT:      Head: Atraumatic.   Eyes:      Conjunctiva/sclera: Conjunctivae normal.   Cardiovascular:      Rate and Rhythm: Normal rate and regular rhythm.      Heart sounds: No murmur heard.  Pulmonary:      Effort: Pulmonary effort is normal. No respiratory distress.      Breath sounds: Normal breath sounds.   Abdominal:      Tenderness: There is no abdominal tenderness. There is no guarding or rebound.   Musculoskeletal:      Comments: Well-healed posterior midline surgical lumbar incision   Skin:     Capillary Refill: Capillary refill takes less than 2 seconds.   Neurological:      Mental Status: He is alert and oriented to person, place, and time.           MDM:  I provided a substantiate portion of the care of this patient. I personally performed the medical decision making.    Medical records are reviewed in [facility] Everywhere, if applicable      Recent Results (from the past 24 hours)   Comprehensive Metabolic Panel    Collection Time: 06/25/25 10:42 PM    Specimen: Arm, Left; Blood   Result Value Ref Range    Glucose 124 (H) 65 - 99 mg/dL    BUN 18.0 8.0 - 23.0 mg/dL    Creatinine 1.34 (H) 0.76 - 1.27 mg/dL    Sodium 136 136 - 145 mmol/L    Potassium 4.0 3.5 - 5.2 mmol/L    Chloride 102 98 - 107 mmol/L    CO2 21.8 (L) 22.0 - 29.0 mmol/L    Calcium  10.0 8.6 - 10.5 mg/dL    Total Protein 6.5 6.0 - 8.5 g/dL    Albumin 3.6 3.5 - 5.2 g/dL    ALT (SGPT) 15 1 - 41 U/L    AST (SGOT) 30 1 - 40 U/L    Alkaline Phosphatase 91 39 - 117 U/L    Total Bilirubin 1.1 0.0 - 1.2 mg/dL    Globulin 2.9 gm/dL    A/G Ratio 1.2 g/dL    BUN/Creatinine Ratio 13.4 7.0 - 25.0    Anion Gap 12.2 5.0 - 15.0 mmol/L    eGFR 58.1 (L) >60.0 mL/min/1.73   Lactic Acid, Plasma    Collection Time: 06/25/25 10:42 PM    Specimen: Arm, Left; Blood   Result Value Ref Range    Lactate 1.2 0.5 - 2.0 mmol/L   CBC Auto Differential    Collection Time: 06/25/25 10:42 PM    Specimen: Arm, Left; Blood   Result Value Ref Range    WBC 7.12 3.40 - 10.80 10*3/mm3    RBC 3.25 (L) 4.14 - 5.80 10*6/mm3    Hemoglobin 10.9 (L) 13.0 - 17.7 g/dL    Hematocrit 33.8 (L) 37.5 - 51.0 %    .0 (H) 79.0 - 97.0 fL    MCH 33.5 (H) 26.6 - 33.0 pg    MCHC 32.2 31.5 - 35.7 g/dL    RDW 13.1 12.3 - 15.4 %    RDW-SD 49.9 37.0 - 54.0 fl    MPV 10.0 6.0 - 12.0 fL    Platelets 95 (L) 140 - 450 10*3/mm3    Neutrophil % 78.3 (H) 42.7 - 76.0 %    Lymphocyte % 6.5 (L) 19.6 - 45.3 %    Monocyte % 14.2 (H) 5.0 - 12.0 %    Eosinophil % 0.3 0.3 - 6.2 %    Basophil % 0.3 0.0 - 1.5 %    Immature Grans % 0.4 0.0 - 0.5 %    Neutrophils, Absolute 5.58 1.70 - 7.00 10*3/mm3    Lymphocytes, Absolute 0.46 (L) 0.70 - 3.10 10*3/mm3    Monocytes, Absolute 1.01 (H) 0.10 - 0.90 10*3/mm3    Eosinophils, Absolute 0.02 0.00 - 0.40 10*3/mm3    Basophils, Absolute 0.02 0.00 - 0.20 10*3/mm3    Immature Grans, Absolute 0.03 0.00 - 0.05 10*3/mm3    nRBC 0.0 0.0 - 0.2 /100 WBC   Respiratory Panel PCR w/COVID-19(SARS-CoV-2) MARILYNN/ANGELA/JUANPABLO/PAD/COR/ADAL In-House, NP Swab in UT/VTM, 2 HR TAT - Swab, Nasopharynx    Collection Time: 06/25/25 11:54 PM    Specimen: Nasopharynx; Swab   Result Value Ref Range    ADENOVIRUS, PCR Not Detected Not Detected    Coronavirus 229E Not Detected Not Detected    Coronavirus HKU1 Not Detected Not Detected    Coronavirus NL63 Not  Detected Not Detected    Coronavirus OC43 Not Detected Not Detected    COVID19 Not Detected Not Detected - Ref. Range    Human Metapneumovirus Not Detected Not Detected    Human Rhinovirus/Enterovirus Not Detected Not Detected    Influenza A PCR Not Detected Not Detected    Influenza B PCR Not Detected Not Detected    Parainfluenza Virus 1 Not Detected Not Detected    Parainfluenza Virus 2 Not Detected Not Detected    Parainfluenza Virus 3 Not Detected Not Detected    Parainfluenza Virus 4 Not Detected Not Detected    RSV, PCR Not Detected Not Detected    Bordetella pertussis pcr Not Detected Not Detected    Bordetella parapertussis PCR Not Detected Not Detected    Chlamydophila pneumoniae PCR Not Detected Not Detected    Mycoplasma pneumo by PCR Not Detected Not Detected   Urinalysis With Culture If Indicated - Urine, Clean Catch    Collection Time: 06/26/25  2:52 AM    Specimen: Urine, Clean Catch   Result Value Ref Range    Color, UA Yellow Yellow, Straw    Appearance, UA Turbid (A) Clear    pH, UA 6.0 5.0 - 8.0    Specific Gravity, UA 1.014 1.005 - 1.030    Glucose, UA Negative Negative    Ketones, UA Trace (A) Negative    Bilirubin, UA Negative Negative    Blood, UA Moderate (2+) (A) Negative    Protein,  mg/dL (2+) (A) Negative    Leuk Esterase, UA Large (3+) (A) Negative    Nitrite, UA Negative Negative    Urobilinogen, UA 1.0 E.U./dL 0.2 - 1.0 E.U./dL   Urinalysis, Microscopic Only - Urine, Clean Catch    Collection Time: 06/26/25  2:52 AM    Specimen: Urine, Clean Catch   Result Value Ref Range    RBC, UA 0-2 None Seen, 0-2 /HPF    WBC, UA Too Numerous to Count (A) None Seen, 0-2 /HPF    Bacteria, UA 1+ (A) None Seen /HPF    Squamous Epithelial Cells, UA 3-6 (A) None Seen, 0-2 /HPF    Hyaline Casts, UA None Seen None Seen /LPF    Methodology Automated Microscopy      I have reviewed the above labs    XR Chest 2 View  Result Date: 6/26/2025  PA AND LATERAL CHEST RADIOGRAPH  HISTORY: Fever  COMPARISON:  May 2, 2022  FINDINGS: There is cardiomegaly. There is no vascular congestion. No pneumothorax, pleural effusion, or acute infiltrate is seen. Please note, lateral view is limited, due to patient positioning.      No acute findings.  This report was finalized on 6/26/2025 1:49 AM by Dr. Dagmar Buchanan M.D on Workstation: [id number]        My independent interpretation of the cxr: No acute process    This is an overall well-appearing 69-year-old male who is presenting today with complaints of fever.  He does not appear in distress and other vital signs are fairly unremarkable.  Patient did recently have back surgery.  However, the back incision is pristine looking with no tenderness to palpation, dehiscence drainage or other signs of infection.     The patient was broadly evaluated.  He was evaluated with a CMP, which demonstrated a mild increase creatinine, but otherwise is fairly benign.  CBC does not demonstrate any evidence of leukocytosis, and anemia is stable from previous.  A urinalysis was obtained, which does demonstrate pyuria and bacteriuria.  Patient is not really having any symptoms in the way of dysuria, hematuria, lower abdominal pain etc.  However, no other additional source of the fever has been identified.  A chest x-ray is negative.  I considered performing imaging of the low back to evaluate for an associated fluid collection which could be representative of postoperative infection.  However, given that he also has no symptoms in the low back and the incision is still well-healing and does not appear infected, I do not think imaging was warranted today.     Therefore, I think it is reasonable that we treat for urinary tract infection at this time.  He is going to monitor his symptoms at home, and if he develops any worsening or nonresolving fever the 48 to 72-hour arlene on antibiotics, he is to return to the emergency department for reevaluation.  If he develops any symptoms of infection in  his back, he is also instructed to represent.  He will otherwise closely follow-up with PCP.    Impression:  Final diagnoses:   Acute UTI   Fever in adult         Disposition:  ED Disposition       ED Disposition   Discharge    Condition   Stable    Comment   --                Jenn Seay MD  06/26/25 0414

## 2025-06-26 NOTE — ED PROVIDER NOTES
EMERGENCY DEPARTMENT ENCOUNTER  Room Number:  07/07  PCP: Jenn Davison APRN  Independent Historians: Historian: Patient      HPI:  Chief Complaint: had concerns including Fever.     A complete HPI/ROS/PMH/PSH/SH/FH are unobtainable due to: EM_Unobtainable History : None    Chronic or social conditions impacting patient care (Social Determinants of Health): None      Context: Mo Willoughby is a 69 y.o. male with a medical history of gout, erectile dysfunction, pancreatitis, aortic aneurysm, iron deficiency anemia, hypertension, lumbar stenosis, who presents to the ED c/o acute fever.  Patient developed fever earlier today with Tmax 102F.  Reports he was weak and fatigued today.  He took ibuprofen prior to arrival.  He did have spinal surgery on 5/8/2025.  Has not had any headache or bodyaches.  No nasal congestion, rhinorrhea, cough.  No vomiting or diarrhea.  No known sick contacts.  Has not spent excessive time outside.  No other systemic complaints at this time.      Review of prior external notes (non-ED) -and- Review of prior external test results outside of this encounter: Patient seen in office by PCP on 6/19/2025 for anemia.  Reviewed assessment and plan.  Will check CBC. Reviewed labs collected on 5/20/2025.  CBC with hemoglobin 8.4, renal function panel with creatinine 1.07.    Prescription drug monitoring program review:     EM_[place] : N/A    PAST MEDICAL HISTORY  Active Ambulatory Problems     Diagnosis Date Noted    Erectile dysfunction     Gout     Hypercalcemia 11/15/2016    Colon polyp 11/15/2016    Sinus tachycardia 08/01/2018    Pancreatitis 08/01/2018    Other fatigue 05/02/2022    Ascending aortic aneurysm 05/02/2022    Generalized abdominal pain 05/03/2022    Folic acid deficiency 05/03/2022    Iron deficiency anemia 05/05/2022    Acute bilateral low back pain without sciatica 08/19/2022    Swelling of the testicles 08/19/2022    Urinary frequency 08/21/2022    Hyperparathyroidism,  unspecified 2016    CRI (chronic renal insufficiency), stage 3 (moderate) 2016    Essential hypertension, benign 09/21/2022    Spinal stenosis of lumbar region 12/06/2022    Lumbar radiculitis 12/06/2022    Lumbar facet arthropathy 12/19/2022    Screening PSA (prostate specific antigen) 02/27/2023    Mixed hyperlipidemia 02/27/2023    Change in pigmented skin lesion of face 03/01/2023    Pain in both testicles 03/01/2023    Sacroiliitis 04/11/2023    DDD (degenerative disc disease), lumbar 06/05/2023    Spondylolisthesis at L4-L5 level 06/05/2023    Herniation of lumbar intervertebral disc with radiculopathy 06/05/2023    Spinal stenosis of lumbar region with radiculopathy 07/07/2023    H/O: gout on allopurinol 04/15/2024    Premature atrial contractions 07/09/2024    Arthritis of right hip 04/10/2025    Right hip pain 04/11/2025    Imbalance 05/02/2025    Foot drop, left 05/02/2025    Atrophy of muscle of multiple sites 05/02/2025    Tongue fasciculation 05/02/2025    Acute bilateral low back pain with bilateral sciatica 05/02/2025    Weakness of left lower extremity 05/05/2025    Herniation of thoracolumbar intervertebral disc with myelopathy 05/05/2025     Resolved Ambulatory Problems     Diagnosis Date Noted    Hypertension     History of cellulitis 04/27/2016    Hyperparathyroidism with most recent intact PTH 95, calcium 11.3 with SPECT revealing question of a subcentimeter left inferior parathyroid adenoma 11/15/2016    H/O: gout on allopurinol 11/15/2016    Cholecystitis 07/31/2018    Acute kidney injury 08/01/2018    Elevated liver enzymes 05/02/2022    Cough 05/02/2022    Nausea 05/02/2022    Dizziness 05/02/2022    Chest pain 05/02/2022    Palpitations 05/03/2022    Anemia 05/03/2022    Hospital discharge follow-up 05/16/2022    Abnormal serum thyroid stimulating hormone (TSH) level 05/16/2022    Renal insufficiency 08/21/2022    New onset atrial fibrillation 04/15/2024     Past Medical History:    Diagnosis Date    Abnormal TSH     Arthritis     ED (erectile dysfunction)     Hip pain, right     Hx of colonic polyp     Low back pain with bilateral sciatica     Risk factors for obstructive sleep apnea     Spinal stenosis of lumbar region with neurogenic claudication     Syncope and collapse 03/28/2017         PAST SURGICAL HISTORY  Past Surgical History:   Procedure Laterality Date    BACK SURGERY  05/08/25    CHOLECYSTECTOMY      CHOLECYSTECTOMY WITH INTRAOPERATIVE CHOLANGIOGRAM N/A 08/03/2018    Procedure: CHOLECYSTECTOMY LAPAROSCOPIC INTRAOPERATIVE CHOLANGIOGRAM;  Surgeon: Melina Kate MD;  Location: [facility] MAIN OR;  Service: General    COLONOSCOPY      COLONOSCOPY N/A 10/03/2016    Procedure: COLONOSCOPY TO CECUM TO TERMINAL ILIUM WITH COLD BIOPSY POLYPECTOMY;  Surgeon: Benoit Vilchis MD;  [facility];  Service:     COLONOSCOPY N/A 05/05/2022    Procedure: COLONOSCOPY TO CECUM WITH COLD SNARE POLYPECTOMIES & RESOLUTION CLIP PLACEMENT X 2;  Surgeon: Benoit Mosley MD;  Location: [facility] ENDOSCOPY;  Service: Gastroenterology;  Laterality: N/A;  ANEMIA/POLYPS, HEMORRHOIDS     ENDOSCOPY N/A 05/05/2022    Procedure: ESOPHAGOGASTRODUODENOSCOPY WITH BX;  Surgeon: Benoit Mosley MD;  Location: [facility] ENDOSCOPY;  Service: Gastroenterology;  Laterality: N/A;  ANEMIA/PORTAL GASTROPATHY, EROSIVE GASTRITIS     EPIDURAL Right 12/07/2022    Procedure: LUMBAR/SACRAL TRANSFORAMINAL EPIDURAL Right L2-3 and right L4-5;  Surgeon: Isaura Torres MD;  Location: [facility] MAIN OR;  Service: Pain Management;  Laterality: Right;    LIPOMA EXCISION      LUMBAR DISCECTOMY FUSION INSTRUMENTATION N/A 05/08/2025    Procedure: Open thoracic twelve/lumbar one discectomy/decompression/possible interbody fusion with cages, pedicle screw/terry/crosslink fixation, thoracic twelve/lumbar one/two with Mazor robotic navigation;  Surgeon: Abel Perez MD;  Location: [facility] MAIN OR;  Service: Robotics -  Neuro;  Laterality: N/A;    LUMBAR LAMINECTOMY DISCECTOMY DECOMPRESSION N/A 07/07/2023    Procedure: Open right sided lumbar decompression lumbar three/four, lumbar four/five;  Surgeon: Abel Perez MD;  Location: [facility] MAIN OR;  Service: Neurosurgery;  Laterality: N/A;    MEDIAL BRANCH BLOCK Right 01/23/2023    Procedure: LUMBAR MEDIAL BRANCH BLOCK RIGHT L3-L5 #1;  Surgeon: Isaura Torres MD;  Location: [facility] MAIN [facility];  Service: Pain Management;  Laterality: Right;    MEDIAL BRANCH BLOCK Right 02/13/2023    Procedure: LUMBAR MEDIAL BRANCH BLOCK RIGHT L3-L5 #1;  Surgeon: Isaura Torres MD;  Location: [facility] MAIN OR;  Service: Pain Management;  Laterality: Right;    NERVE SURGERY Right 03/06/2023    Procedure: LUMBAR RADIOFREQUENCY ABLATION RIGHT L3-L5  07025, 07588;  Surgeon: Isaura Torres MD;  Location: [facility] MAIN OR;  Service: Pain Management;  Laterality: Right;    SACROILIAC JOINT INJECTION Right 05/05/2023    Procedure: SACROILIAC JOINT INJECTION RIGHT 65606;  Surgeon: Isaura Torres MD;  Location: [facility] MAIN OR;  Service: Pain Management;  Laterality: Right;    TONSILLECTOMY           FAMILY HISTORY  Family History   Problem Relation Age of Onset    Hypertension Mother     Breast cancer Mother     Cancer Mother     Stroke Mother     Cancer Father     Liver cancer Father     Hypertension Father     Diabetes Father     Hypertension Sister     Heart attack Sister     Hypertension Brother     Cancer Brother     Hypertension Sister     Heart attack Sister     Learning disabilities Neg Hx          SOCIAL HISTORY  Social History     Socioeconomic History    Marital status: Single   Tobacco Use    Smoking status: Never    Smokeless tobacco: Never   Vaping Use    Vaping status: Never Used   Substance and Sexual Activity    Alcohol use: Yes     Comment: occasional    Drug use: No    Sexual activity: Yes     Partners: Female         ALLERGIES  Zetia [ezetimibe]      REVIEW OF SYSTEMS  Included in HPI  All  systems reviewed and negative except for those discussed in HPI.      PHYSICAL EXAM    I have reviewed the triage vital signs and nursing notes.    ED Triage Vitals [06/25/25 2231]   Temp Heart Rate Resp BP SpO2   99 °F (37.2 °C) 119 18 126/84 99 %      Temp src Heart Rate Source Patient Position BP Location FiO2 (%)   Oral Monitor Sitting Right arm --       Physical Exam  Constitutional:       General: He is not in acute distress.     Appearance: Normal appearance.   HENT:      Head: Normocephalic and atraumatic.      Nose: Nose normal.      Mouth/Throat:      Mouth: Mucous membranes are moist.   Eyes:      Conjunctiva/sclera: Conjunctivae normal.      Pupils: Pupils are equal, round, and reactive to light.   Cardiovascular:      Rate and Rhythm: Normal rate and regular rhythm.      Pulses: Normal pulses.      Heart sounds: Normal heart sounds.   Pulmonary:      Effort: Pulmonary effort is normal.      Breath sounds: Normal breath sounds.   Abdominal:      General: There is no distension.   Musculoskeletal:         General: Normal range of motion.      Cervical back: Normal range of motion and neck supple.   Skin:     General: Skin is warm.      Capillary Refill: Capillary refill takes less than 2 seconds.      Comments: Well-healing thoracolumbar incision   Neurological:      General: No focal deficit present.      Mental Status: He is alert and oriented to person, place, and time.   Psychiatric:         Mood and Affect: Mood normal.             LAB RESULTS  Recent Results (from the past 24 hours)   Comprehensive Metabolic Panel    Collection Time: 06/25/25 10:42 PM    Specimen: Arm, Left; Blood   Result Value Ref Range    Glucose 124 (H) 65 - 99 mg/dL    BUN 18.0 8.0 - 23.0 mg/dL    Creatinine 1.34 (H) 0.76 - 1.27 mg/dL    Sodium 136 136 - 145 mmol/L    Potassium 4.0 3.5 - 5.2 mmol/L    Chloride 102 98 - 107 mmol/L    CO2 21.8 (L) 22.0 - 29.0 mmol/L    Calcium 10.0 8.6 - 10.5 mg/dL    Total Protein 6.5 6.0 - 8.5  g/dL    Albumin 3.6 3.5 - 5.2 g/dL    ALT (SGPT) 15 1 - 41 U/L    AST (SGOT) 30 1 - 40 U/L    Alkaline Phosphatase 91 39 - 117 U/L    Total Bilirubin 1.1 0.0 - 1.2 mg/dL    Globulin 2.9 gm/dL    A/G Ratio 1.2 g/dL    BUN/Creatinine Ratio 13.4 7.0 - 25.0    Anion Gap 12.2 5.0 - 15.0 mmol/L    eGFR 58.1 (L) >60.0 mL/min/1.73   Lactic Acid, Plasma    Collection Time: 06/25/25 10:42 PM    Specimen: Arm, Left; Blood   Result Value Ref Range    Lactate 1.2 0.5 - 2.0 mmol/L   CBC Auto Differential    Collection Time: 06/25/25 10:42 PM    Specimen: Arm, Left; Blood   Result Value Ref Range    WBC 7.12 3.40 - 10.80 10*3/mm3    RBC 3.25 (L) 4.14 - 5.80 10*6/mm3    Hemoglobin 10.9 (L) 13.0 - 17.7 g/dL    Hematocrit 33.8 (L) 37.5 - 51.0 %    .0 (H) 79.0 - 97.0 fL    MCH 33.5 (H) 26.6 - 33.0 pg    MCHC 32.2 31.5 - 35.7 g/dL    RDW 13.1 12.3 - 15.4 %    RDW-SD 49.9 37.0 - 54.0 fl    MPV 10.0 6.0 - 12.0 fL    Platelets 95 (L) 140 - 450 10*3/mm3    Neutrophil % 78.3 (H) 42.7 - 76.0 %    Lymphocyte % 6.5 (L) 19.6 - 45.3 %    Monocyte % 14.2 (H) 5.0 - 12.0 %    Eosinophil % 0.3 0.3 - 6.2 %    Basophil % 0.3 0.0 - 1.5 %    Immature Grans % 0.4 0.0 - 0.5 %    Neutrophils, Absolute 5.58 1.70 - 7.00 10*3/mm3    Lymphocytes, Absolute 0.46 (L) 0.70 - 3.10 10*3/mm3    Monocytes, Absolute 1.01 (H) 0.10 - 0.90 10*3/mm3    Eosinophils, Absolute 0.02 0.00 - 0.40 10*3/mm3    Basophils, Absolute 0.02 0.00 - 0.20 10*3/mm3    Immature Grans, Absolute 0.03 0.00 - 0.05 10*3/mm3    nRBC 0.0 0.0 - 0.2 /100 WBC   Respiratory Panel PCR w/COVID-19(SARS-CoV-2) MARILYNN/ANGELA/JUANPABLO/PAD/COR/ADAL In-House, NP Swab in [facility], 2 HR TAT - Swab, Nasopharynx    Collection Time: 06/25/25 11:54 PM    Specimen: Nasopharynx; Swab   Result Value Ref Range    ADENOVIRUS, PCR Not Detected Not Detected    Coronavirus 229E Not Detected Not Detected    Coronavirus HKU1 Not Detected Not Detected    Coronavirus NL63 Not Detected Not Detected    Coronavirus OC43 Not Detected Not  Detected    COVID19 Not Detected Not Detected - Ref. Range    Human Metapneumovirus Not Detected Not Detected    Human Rhinovirus/Enterovirus Not Detected Not Detected    Influenza A PCR Not Detected Not Detected    Influenza B PCR Not Detected Not Detected    Parainfluenza Virus 1 Not Detected Not Detected    Parainfluenza Virus 2 Not Detected Not Detected    Parainfluenza Virus 3 Not Detected Not Detected    Parainfluenza Virus 4 Not Detected Not Detected    RSV, PCR Not Detected Not Detected    Bordetella pertussis pcr Not Detected Not Detected    Bordetella parapertussis PCR Not Detected Not Detected    Chlamydophila pneumoniae PCR Not Detected Not Detected    Mycoplasma pneumo by PCR Not Detected Not Detected   Urinalysis With Culture If Indicated - Urine, Clean Catch    Collection Time: 06/26/25  2:52 AM    Specimen: Urine, Clean Catch   Result Value Ref Range    Color, UA Yellow Yellow, Straw    Appearance, UA Turbid (A) Clear    pH, UA 6.0 5.0 - 8.0    Specific Gravity, UA 1.014 1.005 - 1.030    Glucose, UA Negative Negative    Ketones, UA Trace (A) Negative    Bilirubin, UA Negative Negative    Blood, UA Moderate (2+) (A) Negative    Protein,  mg/dL (2+) (A) Negative    Leuk Esterase, UA Large (3+) (A) Negative    Nitrite, UA Negative Negative    Urobilinogen, UA 1.0 E.U./dL 0.2 - 1.0 E.U./dL   Urinalysis, Microscopic Only - Urine, Clean Catch    Collection Time: 06/26/25  2:52 AM    Specimen: Urine, Clean Catch   Result Value Ref Range    RBC, UA 0-2 None Seen, 0-2 /HPF    WBC, UA Too Numerous to Count (A) None Seen, 0-2 /HPF    Bacteria, UA 1+ (A) None Seen /HPF    Squamous Epithelial Cells, UA 3-6 (A) None Seen, 0-2 /HPF    Hyaline Casts, UA None Seen None Seen /LPF    Methodology Automated Microscopy          RADIOLOGY  XR Chest 2 View  Result Date: 6/26/2025  PA AND LATERAL CHEST RADIOGRAPH  HISTORY: Fever  COMPARISON: May 2, 2022  FINDINGS: There is cardiomegaly. There is no vascular congestion.  No pneumothorax, pleural effusion, or acute infiltrate is seen. Please note, lateral view is limited, due to patient positioning.      No acute findings.  This report was finalized on 6/26/2025 1:49 AM by Dr. Dagmar Buchanan M.D on Workstation: [id number]          MEDICATIONS GIVEN IN ER  Medications   cephalexin (KEFLEX) capsule 500 mg (has no administration in time range)   acetaminophen (TYLENOL) tablet 1,000 mg (1,000 mg Oral Given 6/26/25 0241)         ORDERS PLACED DURING THIS VISIT:  Orders Placed This Encounter   Procedures    Blood Culture - Blood,    Blood Culture - Blood,    Respiratory Panel PCR w/COVID-19(SARS-CoV-2) MARILYNN/ANGELA/JUANPABLO/PAD/COR/ADAL In-House, NP Swab in UTM/VTM, 2 HR TAT - Swab, Nasopharynx    Urine Culture - Urine,    XR Chest 2 View    Comprehensive Metabolic Panel    Lactic Acid, Plasma    CBC Auto Differential    Urinalysis With Culture If Indicated - Urine, Clean Catch    Urinalysis, Microscopic Only - Urine, Clean Catch    CBC & Differential         OUTPATIENT MEDICATION MANAGEMENT:  Current Facility-Administered Medications Ordered in Epic   Medication Dose Route Frequency Provider Last Rate Last Admin    cephalexin (KEFLEX) capsule 500 mg  500 mg Oral Once Tali Rinaldi PA-C         Current Outpatient Medications Ordered in Epic   Medication Sig Dispense Refill    allopurinol (ZYLOPRIM) 300 MG tablet Take 1 tablet by mouth Daily. 90 tablet 2    cephalexin (KEFLEX) 500 MG capsule Take 1 capsule by mouth 2 (Two) Times a Day for 7 days. 14 capsule 0    ferrous sulfate 325 (65 FE) MG tablet Take 1 tablet by mouth Daily With Breakfast.      folic acid (FOLVITE) 1 MG tablet TAKE 1 TABLET BY MOUTH DAILY 90 tablet 1    gabapentin (NEURONTIN) 300 MG capsule Take 1 capsule by mouth 2 (Two) Times a Day. 60 capsule 5    lisinopril (PRINIVIL,ZESTRIL) 10 MG tablet Take 1 tablet by mouth Daily.      lisinopril (PRINIVIL,ZESTRIL) 5 MG tablet Take 1 tablet by mouth Daily.      metoprolol  succinate XL (TOPROL-XL) 25 MG 24 hr tablet TAKE 1 TABLET BY MOUTH DAILY 90 tablet 3    pantoprazole (PROTONIX) 40 MG EC tablet TAKE 1 TABLET BY MOUTH TWICE DAILY 180 tablet 1    rosuvastatin (CRESTOR) 10 MG tablet TAKE 1 TABLET BY MOUTH DAILY 90 tablet 1    tamsulosin (FLOMAX) 0.4 MG capsule 24 hr capsule Take 1 capsule by mouth Daily. 30 capsule 2    thiamine (VITAMIN B-1) 100 MG tablet  tablet Take 1 tablet by mouth Daily.      vitamin B-12 (CYANOCOBALAMIN) 1000 MCG tablet Take 1 tablet by mouth Daily. HOLD FOR SURGERY ONE WEEK PRIOR             PROGRESS, DATA ANALYSIS, CONSULTS, AND MEDICAL DECISION MAKING  All labs have been independently interpreted by me.  All radiology studies have been reviewed by me. All EKG's have been independently viewed and interpreted by me.  Discussion below represents my analysis of pertinent findings related to patient's condition, differential diagnosis, treatment plan and final disposition.    Differential diagnosis includes but is not limited to viral illness, gastroenteritis, sinusitis.        ED Course as of 06/26/25 0403   Thu Jun 26, 2025   0148 Chest x-ray independently interpreted by me as no pneumothorax [MP]   0148 WBC: 7.12 [MP]   0148 Hemoglobin(!): 10.9 [MP]   0148 BUN: 18.0 [MP]   0148 Creatinine(!): 1.34 [MP]   0310 Nitrite, UA: Negative [MP]   0310 Leukocytes, UA(!): Large (3+) [MP]   0310 Blood, UA(!): Moderate (2+) [MP]   0310 RBC, UA: 0-2 [MP]   0310 WBC, UA(!): Too Numerous to Count [MP]   0310 Bacteria, UA(!): 1+ [MP]   0310 Squamous Epithelial Cells, UA(!): 3-6 [MP]   0346 Reassessed patient and updated him on workup findings.  Will plan to treat suspected UTI with cephalexin.  Encouraged him to continue acetaminophen and ibuprofen as needed for fever.  Urine culture pending at this time.  Encouraged him to follow-up closely with primary care provider.  Discussed ED return precautions.  He is otherwise well-appearing, hemodynamically stable, and therefore  appropriate for discharge. [MP]      ED Course User Index  [MP] Tali Rinaldi PA-C             AS OF 04:03 EDT VITALS:    BP - 133/83  HR - 115  TEMP - (!) 103.1 °F (39.5 °C) (Oral)  O2 SATS - 98%    COMPLEXITY OF CARE  Admission was considered but after careful review of the patient's presentation, physical examination, diagnostic results, and response to treatment the patient may be safely discharged with outpatient follow-up.      DIAGNOSIS  Final diagnoses:   Acute UTI   Fever in adult         DISPOSITION  ED Disposition       ED Disposition   Discharge    Condition   Stable    Comment   --                Please note that portions of this document were completed with a voice recognition program.    Note Disclaimer: At [facility], we believe that sharing information builds trust and better relationships. You are receiving this note because you recently visited [facility]. It is possible you will see health information before a provider has talked with you about it. This kind of information can be easy to misunderstand. To help you fully understand what it means for your health, we urge you to discuss this note with your provider.     Tali Rinaldi PA-C  06/26/25 0404

## 2025-06-27 ENCOUNTER — APPOINTMENT (OUTPATIENT)
Dept: CARDIOLOGY | Facility: HOSPITAL | Age: 70
End: 2025-06-27
Payer: MEDICARE

## 2025-06-27 LAB
ANION GAP SERPL CALCULATED.3IONS-SCNC: 8.4 MMOL/L (ref 5–15)
AORTIC DIMENSIONLESS INDEX: 0.7 (DI)
ASCENDING AORTA: 4.3 CM
AV MEAN PRESS GRAD SYS DOP V1V2: 4.9 MMHG
AV VMAX SYS DOP: 155.4 CM/SEC
BH CV ECHO MEAS - ACS: 1.91 CM
BH CV ECHO MEAS - AO MAX PG: 9.7 MMHG
BH CV ECHO MEAS - AO ROOT DIAM: 4.5 CM
BH CV ECHO MEAS - AO V2 VTI: 23.4 CM
BH CV ECHO MEAS - AVA(I,D): 2.6 CM2
BH CV ECHO MEAS - EDV(CUBED): 239.8 ML
BH CV ECHO MEAS - EDV(MOD-SP2): 278 ML
BH CV ECHO MEAS - EDV(MOD-SP4): 216 ML
BH CV ECHO MEAS - EF(MOD-SP2): 50.4 %
BH CV ECHO MEAS - EF(MOD-SP4): 47.2 %
BH CV ECHO MEAS - ESV(CUBED): 109.7 ML
BH CV ECHO MEAS - ESV(MOD-SP2): 138 ML
BH CV ECHO MEAS - ESV(MOD-SP4): 114 ML
BH CV ECHO MEAS - FS: 22.9 %
BH CV ECHO MEAS - IVS/LVPW: 0.68 CM
BH CV ECHO MEAS - IVSD: 0.96 CM
BH CV ECHO MEAS - LAT PEAK E' VEL: 7.1 CM/SEC
BH CV ECHO MEAS - LV DIASTOLIC VOL/BSA (35-75): 96.8 CM2
BH CV ECHO MEAS - LV MASS(C)D: 330.4 GRAMS
BH CV ECHO MEAS - LV MAX PG: 3.9 MMHG
BH CV ECHO MEAS - LV MEAN PG: 2.03 MMHG
BH CV ECHO MEAS - LV SYSTOLIC VOL/BSA (12-30): 51.1 CM2
BH CV ECHO MEAS - LV V1 MAX: 98.5 CM/SEC
BH CV ECHO MEAS - LV V1 VTI: 16.4 CM
BH CV ECHO MEAS - LVIDD: 6.2 CM
BH CV ECHO MEAS - LVIDS: 4.8 CM
BH CV ECHO MEAS - LVOT AREA: 3.8 CM2
BH CV ECHO MEAS - LVOT DIAM: 2.19 CM
BH CV ECHO MEAS - LVPWD: 1.42 CM
BH CV ECHO MEAS - MED PEAK E' VEL: 3.6 CM/SEC
BH CV ECHO MEAS - MR MAX PG: 50.7 MMHG
BH CV ECHO MEAS - MR MAX VEL: 355.8 CM/SEC
BH CV ECHO MEAS - MV A DUR: 0.1 SEC
BH CV ECHO MEAS - MV A MAX VEL: 82.6 CM/SEC
BH CV ECHO MEAS - MV DEC TIME: 0.13 SEC
BH CV ECHO MEAS - MV E MAX VEL: 70 CM/SEC
BH CV ECHO MEAS - MV E/A: 0.85
BH CV ECHO MEAS - PA ACC TIME: 0.1 SEC
BH CV ECHO MEAS - PA V2 MAX: 129 CM/SEC
BH CV ECHO MEAS - PI END-D VEL: 127.6 CM/SEC
BH CV ECHO MEAS - PULM DIAS VEL: 38.8 CM/SEC
BH CV ECHO MEAS - PULM S/D: 0.83
BH CV ECHO MEAS - PULM SYS VEL: 32.3 CM/SEC
BH CV ECHO MEAS - QP/QS: 1.16
BH CV ECHO MEAS - RV MAX PG: 4.1 MMHG
BH CV ECHO MEAS - RV V1 MAX: 101.7 CM/SEC
BH CV ECHO MEAS - RV V1 VTI: 18.8 CM
BH CV ECHO MEAS - RVOT DIAM: 2.2 CM
BH CV ECHO MEAS - SV(LVOT): 61.7 ML
BH CV ECHO MEAS - SV(MOD-SP2): 140 ML
BH CV ECHO MEAS - SV(MOD-SP4): 102 ML
BH CV ECHO MEAS - SV(RVOT): 71.5 ML
BH CV ECHO MEAS - SVI(LVOT): 27.6 ML/M2
BH CV ECHO MEAS - SVI(MOD-SP2): 62.7 ML/M2
BH CV ECHO MEAS - SVI(MOD-SP4): 45.7 ML/M2
BH CV ECHO MEASUREMENTS AVERAGE E/E' RATIO: 13.08
BH CV XLRA - RV BASE: 3.1 CM
BH CV XLRA - RV MID: 2.21 CM
BH CV XLRA - TDI S': 20.5 CM/SEC
BUN SERPL-MCNC: 19 MG/DL (ref 8–23)
BUN/CREAT SERPL: 17 (ref 7–25)
CALCIUM SPEC-SCNC: 9.2 MG/DL (ref 8.6–10.5)
CHLORIDE SERPL-SCNC: 110 MMOL/L (ref 98–107)
CO2 SERPL-SCNC: 20.6 MMOL/L (ref 22–29)
CREAT SERPL-MCNC: 1.12 MG/DL (ref 0.76–1.27)
DEPRECATED RDW RBC AUTO: 49.9 FL (ref 37–54)
EGFRCR SERPLBLD CKD-EPI 2021: 72 ML/MIN/1.73
ERYTHROCYTE [DISTWIDTH] IN BLOOD BY AUTOMATED COUNT: 13.4 % (ref 12.3–15.4)
GLUCOSE SERPL-MCNC: 97 MG/DL (ref 65–99)
HCT VFR BLD AUTO: 28.9 % (ref 37.5–51)
HGB BLD-MCNC: 9.5 G/DL (ref 13–17.7)
LEFT ATRIUM VOLUME INDEX: 43.4 ML/M2
LV EF BIPLANE MOD: 46.7 %
MCH RBC QN AUTO: 33.6 PG (ref 26.6–33)
MCHC RBC AUTO-ENTMCNC: 32.9 G/DL (ref 31.5–35.7)
MCV RBC AUTO: 102.1 FL (ref 79–97)
PLATELET # BLD AUTO: 76 10*3/MM3 (ref 140–450)
PMV BLD AUTO: 10.5 FL (ref 6–12)
POTASSIUM SERPL-SCNC: 3.5 MMOL/L (ref 3.5–5.2)
POTASSIUM SERPL-SCNC: 4.2 MMOL/L (ref 3.5–5.2)
QT INTERVAL: 367 MS
QTC INTERVAL: 494 MS
RBC # BLD AUTO: 2.83 10*6/MM3 (ref 4.14–5.8)
SINUS: 4.4 CM
SODIUM SERPL-SCNC: 139 MMOL/L (ref 136–145)
STJ: 3.5 CM
WBC NRBC COR # BLD AUTO: 7.13 10*3/MM3 (ref 3.4–10.8)

## 2025-06-27 PROCEDURE — 84132 ASSAY OF SERUM POTASSIUM: CPT | Performed by: STUDENT IN AN ORGANIZED HEALTH CARE EDUCATION/TRAINING PROGRAM

## 2025-06-27 PROCEDURE — 93005 ELECTROCARDIOGRAM TRACING: CPT | Performed by: STUDENT IN AN ORGANIZED HEALTH CARE EDUCATION/TRAINING PROGRAM

## 2025-06-27 PROCEDURE — 93306 TTE W/DOPPLER COMPLETE: CPT | Performed by: INTERNAL MEDICINE

## 2025-06-27 PROCEDURE — 25510000001 PERFLUTREN 6.52 MG/ML SUSPENSION 2 ML VIAL: Performed by: HOSPITALIST

## 2025-06-27 PROCEDURE — 97162 PT EVAL MOD COMPLEX 30 MIN: CPT

## 2025-06-27 PROCEDURE — 80048 BASIC METABOLIC PNL TOTAL CA: CPT | Performed by: HOSPITALIST

## 2025-06-27 PROCEDURE — 99222 1ST HOSP IP/OBS MODERATE 55: CPT | Performed by: NURSE PRACTITIONER

## 2025-06-27 PROCEDURE — 36415 COLL VENOUS BLD VENIPUNCTURE: CPT | Performed by: HOSPITALIST

## 2025-06-27 PROCEDURE — G0545 PR INHERENT VISIT TO INPT: HCPCS | Performed by: INTERNAL MEDICINE

## 2025-06-27 PROCEDURE — 99223 1ST HOSP IP/OBS HIGH 75: CPT | Performed by: INTERNAL MEDICINE

## 2025-06-27 PROCEDURE — 85027 COMPLETE CBC AUTOMATED: CPT | Performed by: HOSPITALIST

## 2025-06-27 PROCEDURE — 25010000002 AMPICILLIN PER 500 MG: Performed by: HOSPITALIST

## 2025-06-27 PROCEDURE — 97530 THERAPEUTIC ACTIVITIES: CPT

## 2025-06-27 PROCEDURE — 25010000002 ENOXAPARIN PER 10 MG: Performed by: HOSPITALIST

## 2025-06-27 PROCEDURE — 93010 ELECTROCARDIOGRAM REPORT: CPT | Performed by: INTERNAL MEDICINE

## 2025-06-27 PROCEDURE — 93306 TTE W/DOPPLER COMPLETE: CPT

## 2025-06-27 RX ORDER — METOPROLOL TARTRATE 25 MG/1
25 TABLET, FILM COATED ORAL EVERY 12 HOURS SCHEDULED
Status: DISCONTINUED | OUTPATIENT
Start: 2025-06-27 | End: 2025-06-29 | Stop reason: HOSPADM

## 2025-06-27 RX ORDER — POTASSIUM CHLORIDE 1500 MG/1
40 TABLET, EXTENDED RELEASE ORAL EVERY 4 HOURS
Status: COMPLETED | OUTPATIENT
Start: 2025-06-27 | End: 2025-06-27

## 2025-06-27 RX ORDER — SODIUM CHLORIDE 9 MG/ML
100 INJECTION, SOLUTION INTRAVENOUS CONTINUOUS
Status: DISCONTINUED | OUTPATIENT
Start: 2025-06-27 | End: 2025-06-28

## 2025-06-27 RX ADMIN — ROSUVASTATIN CALCIUM 10 MG: 10 TABLET, FILM COATED ORAL at 09:49

## 2025-06-27 RX ADMIN — SODIUM CHLORIDE 100 ML/HR: 9 INJECTION, SOLUTION INTRAVENOUS at 14:46

## 2025-06-27 RX ADMIN — FERROUS SULFATE TAB 325 MG (65 MG ELEMENTAL FE) 325 MG: 325 (65 FE) TAB at 09:50

## 2025-06-27 RX ADMIN — ALLOPURINOL 300 MG: 100 TABLET ORAL at 09:50

## 2025-06-27 RX ADMIN — FOLIC ACID 1000 MCG: 1 TABLET ORAL at 09:50

## 2025-06-27 RX ADMIN — AMPICILLIN SODIUM 2 G: 2 INJECTION, POWDER, FOR SOLUTION INTRAMUSCULAR; INTRAVENOUS at 17:49

## 2025-06-27 RX ADMIN — AMPICILLIN SODIUM 2 G: 2 INJECTION, POWDER, FOR SOLUTION INTRAMUSCULAR; INTRAVENOUS at 01:57

## 2025-06-27 RX ADMIN — AMPICILLIN SODIUM 2 G: 2 INJECTION, POWDER, FOR SOLUTION INTRAMUSCULAR; INTRAVENOUS at 22:20

## 2025-06-27 RX ADMIN — PERFLUTREN 2 ML: 6.52 INJECTION, SUSPENSION INTRAVENOUS at 11:03

## 2025-06-27 RX ADMIN — Medication 10 ML: at 09:53

## 2025-06-27 RX ADMIN — AMPICILLIN SODIUM 2 G: 2 INJECTION, POWDER, FOR SOLUTION INTRAMUSCULAR; INTRAVENOUS at 14:03

## 2025-06-27 RX ADMIN — AMPICILLIN SODIUM 2 G: 2 INJECTION, POWDER, FOR SOLUTION INTRAMUSCULAR; INTRAVENOUS at 09:41

## 2025-06-27 RX ADMIN — ENOXAPARIN SODIUM 40 MG: 100 INJECTION SUBCUTANEOUS at 09:50

## 2025-06-27 RX ADMIN — GABAPENTIN 300 MG: 300 CAPSULE ORAL at 20:56

## 2025-06-27 RX ADMIN — POTASSIUM CHLORIDE 40 MEQ: 1500 TABLET, EXTENDED RELEASE ORAL at 09:49

## 2025-06-27 RX ADMIN — PANTOPRAZOLE SODIUM 40 MG: 40 TABLET, DELAYED RELEASE ORAL at 09:52

## 2025-06-27 RX ADMIN — TAMSULOSIN HYDROCHLORIDE 0.4 MG: 0.4 CAPSULE ORAL at 09:50

## 2025-06-27 RX ADMIN — AMPICILLIN SODIUM 2 G: 2 INJECTION, POWDER, FOR SOLUTION INTRAMUSCULAR; INTRAVENOUS at 06:36

## 2025-06-27 RX ADMIN — Medication 100 MG: at 09:50

## 2025-06-27 RX ADMIN — POTASSIUM CHLORIDE 40 MEQ: 1500 TABLET, EXTENDED RELEASE ORAL at 14:03

## 2025-06-27 RX ADMIN — ACETAMINOPHEN 650 MG: 325 TABLET, FILM COATED ORAL at 04:16

## 2025-06-27 RX ADMIN — METOPROLOL TARTRATE 25 MG: 25 TABLET, FILM COATED ORAL at 20:56

## 2025-06-27 RX ADMIN — PANTOPRAZOLE SODIUM 40 MG: 40 TABLET, DELAYED RELEASE ORAL at 20:56

## 2025-06-27 RX ADMIN — Medication 10 ML: at 20:56

## 2025-06-27 RX ADMIN — Medication 1000 MCG: at 09:49

## 2025-06-27 RX ADMIN — GABAPENTIN 300 MG: 300 CAPSULE ORAL at 09:49

## 2025-06-27 NOTE — THERAPY EVALUATION
Patient Name: Mo Willoughby  : 1955    MRN: [id number]                              Today's Date: 2025       Admit Date: 2025    Visit Dx:     ICD-10-CM ICD-9-CM   1. Bacteremia  R78.81 790.7   2. Fever in adult  R50.9 780.60   3. Urinary tract infection in male  N39.0 599.0     Patient Active Problem List   Diagnosis    Erectile dysfunction    Gout    Hypercalcemia    Colon polyp    Sinus tachycardia    Pancreatitis    OTF (acute kidney injury)    Other fatigue    Ascending aortic aneurysm    Generalized abdominal pain    Folic acid deficiency    Iron deficiency anemia    Acute bilateral low back pain without sciatica    Swelling of the testicles    Urinary frequency    Hyperparathyroidism, unspecified    Stage 3a chronic kidney disease    Essential hypertension, benign    Spinal stenosis of lumbar region    Lumbar radiculitis    Lumbar facet arthropathy    Screening PSA (prostate specific antigen)    Mixed hyperlipidemia    Change in pigmented skin lesion of face    Pain in both testicles    Sacroiliitis    DDD (degenerative disc disease), lumbar    Spondylolisthesis at L4-L5 level    Herniation of lumbar intervertebral disc with radiculopathy    Spinal stenosis of lumbar region with radiculopathy    H/O: gout on allopurinol    Premature atrial contractions    Arthritis of right hip    Right hip pain    Imbalance    Foot drop, left    Atrophy of muscle of multiple sites    Tongue fasciculation    Acute bilateral low back pain with bilateral sciatica    Weakness of left lower extremity    Herniation of thoracolumbar intervertebral disc with myelopathy    Bacteremia due to Enterococcus    Acute UTI (urinary tract infection)     Past Medical History:   Diagnosis Date    Abnormal TSH     Arthritis     Ascending aortic aneurysm     Colon polyp 24    CRI (chronic renal insufficiency), stage 3 (moderate) 2016    from stage 3A to 4    DDD (degenerative disc disease), lumbar     ED (erectile  dysfunction)     Erectile dysfunction     Essential hypertension, benign     Folic acid deficiency     Gout     Hip pain, right     Hx of colonic polyp     Hypercalcemia 2015    as far back as data goes so likely pre-dates even this time    Hyperparathyroidism, unspecified 2016    as far back as data goes likely pre-dates even this date, likely multifactorial some primary and some secondary , w/ imaging from 10/16 showing possible L inferior adenoma    Iron deficiency anemia     Low back pain with bilateral sciatica     Mixed hyperlipidemia 02/27/2023    New onset atrial fibrillation 04/15/2024    Pancreatitis     Risk factors for obstructive sleep apnea     Spinal stenosis of lumbar region with neurogenic claudication     Syncope and collapse 03/28/2017     Past Surgical History:   Procedure Laterality Date    BACK SURGERY  05/08/25    CHOLECYSTECTOMY      CHOLECYSTECTOMY WITH INTRAOPERATIVE CHOLANGIOGRAM N/A 08/03/2018    Procedure: CHOLECYSTECTOMY LAPAROSCOPIC INTRAOPERATIVE CHOLANGIOGRAM;  Surgeon: Melina Kate MD;  Location: [facility] MAIN OR;  Service: General    COLONOSCOPY      COLONOSCOPY N/A 10/03/2016    Procedure: COLONOSCOPY TO CECUM TO TERMINAL ILIUM WITH COLD BIOPSY POLYPECTOMY;  Surgeon: Benoit Vilchis MD;  Location: [facility] ENDOSCOPY;  Service:     COLONOSCOPY N/A 05/05/2022    Procedure: COLONOSCOPY TO CECUM WITH COLD SNARE POLYPECTOMIES & RESOLUTION CLIP PLACEMENT X 2;  Surgeon: Benoit Mosley MD;  Location: [facility] ENDOSCOPY;  Service: Gastroenterology;  Laterality: N/A;  ANEMIA/POLYPS, HEMORRHOIDS     ENDOSCOPY N/A 05/05/2022    Procedure: ESOPHAGOGASTRODUODENOSCOPY WITH BX;  Surgeon: Benoit Mosley MD;  Location: [facility] ENDOSCOPY;  Service: Gastroenterology;  Laterality: N/A;  ANEMIA/PORTAL GASTROPATHY, EROSIVE GASTRITIS     EPIDURAL Right 12/07/2022    Procedure: LUMBAR/SACRAL TRANSFORAMINAL EPIDURAL Right L2-3 and right L4-5;  Surgeon: Isaura Torres MD;  [place] [facility]  [place];  Service: Pain Management;  Laterality: Right;    LIPOMA EXCISION      LUMBAR DISCECTOMY FUSION INSTRUMENTATION N/A 05/08/2025    Procedure: Open thoracic twelve/lumbar one discectomy/decompression/possible interbody fusion with cages, pedicle screw/terry/crosslink fixation, thoracic twelve/lumbar one/two with Mazor robotic navigation;  Surgeon: Abel Perez MD;  Location: [facility] MAIN OR;  Service: Robotics - Neuro;  Laterality: N/A;    LUMBAR LAMINECTOMY DISCECTOMY DECOMPRESSION N/A 07/07/2023    Procedure: Open right sided lumbar decompression lumbar three/four, lumbar four/five;  Surgeon: Abel Perez MD;  Location: [facility] MAIN OR;  Service: Neurosurgery;  Laterality: N/A;    MEDIAL BRANCH BLOCK Right 01/23/2023    Procedure: LUMBAR MEDIAL BRANCH BLOCK RIGHT L3-L5 #1;  Surgeon: Isaura Torres MD;  Location: [facility] MAIN [facility];  Service: Pain Management;  Laterality: Right;    MEDIAL BRANCH BLOCK Right 02/13/2023    Procedure: LUMBAR MEDIAL BRANCH BLOCK RIGHT L3-L5 #1;  Surgeon: Isaura Torres MD;  Location: [facility] MAIN OR;  Service: Pain Management;  Laterality: Right;    NERVE SURGERY Right 03/06/2023    Procedure: LUMBAR RADIOFREQUENCY ABLATION RIGHT L3-L5  46720, 63111;  Surgeon: Isaura Torres MD;  Location: [facility] MAIN OR;  Service: Pain Management;  Laterality: Right;    SACROILIAC JOINT INJECTION Right 05/05/2023    Procedure: SACROILIAC JOINT INJECTION RIGHT 36370;  Surgeon: Isaura Torres MD;  Location: [facility] MAIN OR;  Service: Pain Management;  Laterality: Right;    TONSILLECTOMY        General Information       Row Name 06/27/25 1346          Physical Therapy Time and Intention    Document Type evaluation  -EJ     Mode of Treatment physical therapy  -EJ       Row Name 06/27/25 1346          General Information    Patient Profile Reviewed yes  -EJ     Prior Level of Function independent:;all household mobility;ADL's  uses Rwx since back sx in May  -EJ     Existing  Precautions/Restrictions spinal;TLSO  -EJ     Barriers to Rehab none identified  -EJ       Row Name 06/27/25 1346          Living Environment    Current Living Arrangements home  -EJ     People in Home spouse  -EJ       Row Name 06/27/25 1346          Home Main Entrance    Number of Stairs, Main Entrance none  -EJ       Row Name 06/27/25 1346          Cognition    Orientation Status (Cognition) oriented x 4  -EJ       Row Name 06/27/25 1346          Safety Issues/Impairments Affecting Functional Mobility    Impairments Affecting Function (Mobility) strength;endurance/activity tolerance  -EJ               User Key  (r) = Recorded By, (t) = Taken By, (c) = Cosigned By      Initials Name Provider Type    EJ Elsa Francois, PT Physical Therapist                   Mobility       Row Name 06/27/25 1347          Bed Mobility    Bed Mobility supine-sit;sit-supine  -EJ     Supine-Sit [place] (Bed Mobility) standby assist  -EJ     Sit-Supine [place] (Bed Mobility) standby assist  -EJ     Assistive Device (Bed Mobility) head of bed elevated  -EJ       Row Name 06/27/25 1347          Sit-Stand Transfer    Sit-Stand [place] (Transfers) supervision  -EJ     Assistive Device (Sit-Stand Transfers) walker, front-wheeled  -EJ       Row Name 06/27/25 1347          Gait/Stairs (Locomotion)    [place] Level (Gait) standby assist  -EJ     Assistive Device (Gait) walker, front-wheeled  -EJ     Distance in Feet (Gait) 80  -EJ     Deviations/Abnormal Patterns (Gait) isael decreased;gait speed decreased  -EJ     Bilateral Gait Deviations forward flexed posture  -EJ     Comment, (Gait/Stairs) slow pace, but steady  -EJ               User Key  (r) = Recorded By, (t) = Taken By, (c) = Cosigned By      Initials Name Provider Type    Elsa Escobedo, PT Physical Therapist                   Obj/Interventions       Row Name 06/27/25 1347          Range of Motion Comprehensive    General Range of Motion no range of  motion deficits identified  -       Row Name 06/27/25 1347          Strength Comprehensive (MMT)    Comment, General Manual Muscle Testing (MMT) Assessment generalized weakness  -       Row Name 06/27/25 1347          Balance    Balance Assessment standing static balance;standing dynamic balance  -     Static Standing Balance supervision  -     Dynamic Standing Balance standby assist  -EJ     Position/Device Used, Standing Balance walker, front-wheeled  -               User Key  (r) = Recorded By, (t) = Taken By, (c) = Cosigned By      Initials Name Provider Type    Elsa Escobedo, PT Physical Therapist                   Goals/Plan    No documentation.                  Clinical Impression       Row Name 06/27/25 1348          Pain    Pretreatment Pain Rating 0/10 - no pain  -     Posttreatment Pain Rating 0/10 - no pain  -       Row Name 06/27/25 1348          Plan of Care Review    Plan of Care Reviewed With patient  -     Outcome Evaluation Pt seen for PT eval this AM. He was admitted to [facility] on 6/26/25 w fever an fatigue. Pt w Enterococcus. He has hx of recent back sx on 5/8/25. At baseline, pt lives at home w his wife and reports independence w mobility and ADLs. He has been using Rwx for ambulation and reports HHPT has been coming out to his house. Today, Pt doing well. He reports he has been getting up in room and getting himself to the bathroom. Pt denies pain. He is able to perform bed mobility w SBA/supervision. He stood w supervision using rwx and ambulated approx 80 ft. Pt demos slow pace, but is steady w no LOB. He returned to bed at end of session w all needs in reach. Pt appears to be at or near his baseline mobility level. No further acute PT needs at this time. Anticipate pt to return home w family and HHPT upon DC.  -       Row Name 06/27/25 1348          Therapy Assessment/Plan (PT)    Criteria for Skilled Interventions Met (PT) no problems identified which require skilled  intervention  -EJ     Therapy Frequency (PT) evaluation only  -EJ       Row Name 06/27/25 1348          Positioning and Restraints    Pre-Treatment Position --  stretcher w transport  -EJ     Post Treatment Position bed  -EJ     In Bed notified nsg;supine;call light within reach;encouraged to call for assist  -EJ               User Key  (r) = Recorded By, (t) = Taken By, (c) = Cosigned By      Initials Name Provider Type    Elsa Escobedo, PT Physical Therapist                   Outcome Measures       Row Name 06/27/25 1352          How much help from another person do you currently need...    Turning from your back to your side while in flat bed without using bedrails? 4  -EJ     Moving from lying on back to sitting on the side of a flat bed without bedrails? 4  -EJ     Moving to and from a bed to a chair (including a wheelchair)? 4  -EJ     Standing up from a chair using your arms (e.g., wheelchair, bedside chair)? 4  -EJ     Climbing 3-5 steps with a railing? 3  -EJ     To walk in hospital room? 4  -EJ     AM-PAC 6 Clicks Score (PT) 23  -EJ               User Key  (r) = Recorded By, (t) = Taken By, (c) = Cosigned By      Initials Name Provider Type    Elsa Escobedo, PT Physical Therapist                                   PT Recommendation and Plan     Outcome Evaluation: Pt seen for PT eval this AM. He was admitted to [facility] on 6/26/25 w fever an fatigue. Pt w Enterococcus. He has hx of recent back sx on 5/8/25. At baseline, pt lives at home w his wife and reports independence w mobility and ADLs. He has been using Rwx for ambulation and reports HHPT has been coming out to his house. Today, Pt doing well. He reports he has been getting up in room and getting himself to the bathroom. Pt denies pain. He is able to perform bed mobility w SBA/supervision. He stood w supervision using rwx and ambulated approx 80 ft. Pt demos slow pace, but is steady w no LOB. He returned to bed at end of session w all  needs in reach. Pt appears to be at or near his baseline mobility level. No further acute PT needs at this time. Anticipate pt to return home w family and HHPT upon DC.     Time Calculation:         PT Charges       Row Name 06/27/25 1352             Time Calculation    Start Time 1104  -EJ      Stop Time 1115  -EJ      Time Calculation (min) 11 min  -EJ      PT Received On 06/27/25  -EJ         Time Calculation- PT    Total Timed Code Minutes- PT 8 minute(s)  -EJ                User Key  (r) = Recorded By, (t) = Taken By, (c) = Cosigned By      Initials Name Provider Type    EJ Elsa Francois, PT Physical Therapist                  Therapy Charges for Today       Code Description Service Date Service Provider Modifiers Qty    41843938518 HC PT EVAL MOD COMPLEXITY 3 6/27/2025 Elsa Francois, PT GP 1    42701855769 HC PT THERAPEUTIC ACT EA 15 MIN 6/27/2025 Elsa Francois, PT GP 1            PT G-Codes  AM-PAC 6 Clicks Score (PT): 23  PT Discharge Summary  Anticipated Discharge Disposition (PT): home with assist, home with home health    Elsa Francois, PT  6/27/2025

## 2025-06-27 NOTE — ED NOTES
"Nursing report ED to floor  Mo Willoughby  69 y.o.  male    HPI :  HPI  Stated Reason for Visit: fever, fatigue, called today for positive cultures  History Obtained From: patient, family    Chief Complaint  Chief Complaint   Patient presents with    Abnormal Lab       Admitting doctor:   Chet Jones MD    Admitting diagnosis:   The primary encounter diagnosis was Bacteremia. Diagnoses of Fever in adult and Urinary tract infection in male were also pertinent to this visit.    Code status:   Current Code Status       Date Active Code Status Order ID Comments User Context       Prior            Allergies:   Zetia [ezetimibe]    Isolation:   No active isolations    Intake and Output    Intake/Output Summary (Last 24 hours) at 6/26/2025 2006  Last data filed at 6/26/2025 1946  Gross per 24 hour   Intake 100 ml   Output --   Net 100 ml       Weight:       06/26/25 1821   Weight: 102 kg (225 lb)       Most recent vitals:   Vitals:    06/26/25 1755 06/26/25 1816 06/26/25 1821 06/26/25 2000   BP:  106/76  112/78   BP Location:    Right arm   Patient Position:    Lying   Pulse: 114 111     Resp: 18 18  18   Temp:    (!) 101.4 °F (38.6 °C)   TempSrc:    Oral   SpO2: 97% 97%     Weight:   102 kg (225 lb)    Height:   182.9 cm (72\")        Active LDAs/IV Access:   Lines, Drains & Airways       Active LDAs       Name Placement date Placement time Site Days    Peripheral IV 06/26/25 1815 20 G Anterior;Left;Proximal Forearm 06/26/25 1815  Forearm  less than 1                    Labs (abnormal labs have a star):   Labs Reviewed   BASIC METABOLIC PANEL - Abnormal; Notable for the following components:       Result Value    Glucose 116 (*)     Creatinine 1.38 (*)     CO2 17.7 (*)     eGFR 56.0 (*)     All other components within normal limits    Narrative:     GFR Categories in Chronic Kidney Disease (CKD)              GFR Category          GFR (mL/min/1.73)    Interpretation  G1                    90 or greater      "   Normal or high (1)  G2                    60-89                Mild decrease (1)  G3a                   45-59                Mild to moderate decrease  G3b                   30-44                Moderate to severe decrease  G4                    15-29                Severe decrease  G5                    14 or less           Kidney failure    (1)In the absence of evidence of kidney disease, neither GFR category G1 or G2 fulfill the criteria for CKD.    eGFR calculation 2021 CKD-EPI creatinine equation, which does not include race as a factor   LACTIC ACID, PLASMA - Abnormal; Notable for the following components:    Lactate 2.7 (*)     All other components within normal limits   CBC WITH AUTO DIFFERENTIAL - Abnormal; Notable for the following components:    RBC 3.20 (*)     Hemoglobin 11.0 (*)     Hematocrit 33.2 (*)     .8 (*)     MCH 34.4 (*)     Platelets 94 (*)     Neutrophil % 77.4 (*)     Lymphocyte % 9.1 (*)     Monocyte % 12.9 (*)     Eosinophil % 0.0 (*)     Monocytes, Absolute 1.09 (*)     All other components within normal limits   LACTIC ACID, REFLEX   CBC AND DIFFERENTIAL    Narrative:     The following orders were created for panel order CBC & Differential.  Procedure                               Abnormality         Status                     ---------                               -----------         ------                     CBC Auto Differential[[id number]]        Abnormal            Final result                 Please view results for these tests on the individual orders.       EKG:   No orders to display       Meds given in ED:   Medications   sodium chloride 0.9 % bolus 1,000 mL (1,000 mL Intravenous New Bag 6/26/25 1944)   ampicillin 2000 mg IVPB in 100 mL NS (MBP) (0 mg Intravenous Stopped 6/26/25 1946)   acetaminophen (TYLENOL) tablet 1,000 mg (1,000 mg Oral Given 6/26/25 1805)       Imaging results:  XR Chest 2 View  Result Date: 6/26/2025  No acute findings.  This report was  finalized on 6/26/2025 1:49 AM by Dr. Dagmar Buchanan M.D on Workstation: [id number]        Ambulatory status:   - with assist    Social issues:   Social History     Socioeconomic History    Marital status: Single   Tobacco Use    Smoking status: Never    Smokeless tobacco: Never   Vaping Use    Vaping status: Never Used   Substance and Sexual Activity    Alcohol use: Yes     Comment: occasional    Drug use: No    Sexual activity: Yes     Partners: Female       Peripheral Neurovascular  Peripheral Neurovascular (Adult)  Peripheral Neurovascular WDL: WDL    Neuro Cognitive  Neuro Cognitive (Adult)  Cognitive/Neuro/Behavioral WDL: WDL    Learning  Learning Assessment  Learning Readiness and Ability: no barriers identified    Respiratory  Respiratory  Airway WDL: WDL  Respiratory WDL  Respiratory WDL: WDL    Abdominal Pain       Pain Assessments  Pain (Adult)  (0-10) Pain Rating: Rest: 0  (0-10) Pain Rating: Activity: 0  Patient requested Medication Prescribed for Lower Pain Scale: No  Pain Location: other (see comments)  Pain Side/Orientation: other (see comments)  Response to Pain Interventions: interventions effective per patient    NIH Stroke Scale       Cheyanne Pereyra RN  06/26/25 20:06 EDT
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
## 2025-06-27 NOTE — PLAN OF CARE
Goal Outcome Evaluation:      Patient came from POD F around 5. No c/o pain. Heart rate remains irregular, asymptomatic. Up assist x 1, patient's walker and back brace at bedside, states that doesn't use back brace for short distances.

## 2025-06-27 NOTE — PLAN OF CARE
Goal Outcome Evaluation:  Plan of Care Reviewed With: patient           Outcome Evaluation: Pt seen for PT nirmalal this AM. He was admitted to [facility] on 6/26/25 w fever an fatigue. Pt w Enterococcus. He has hx of recent back sx on 5/8/25. At baseline, pt lives at home w his wife and reports independence w mobility and ADLs. He has been using Rwx for ambulation and reports HHPT has been coming out to his house. Today, Pt doing well. He reports he has been getting up in room and getting himself to the bathroom. Pt denies pain. He is able to perform bed mobility w SBA/supervision. He stood w supervision using rwx and ambulated approx 80 ft. Pt demos slow pace, but is steady w no LOB. He returned to bed at end of session w all needs in reach. Pt appears to be at or near his baseline mobility level. No further acute PT needs at this time. Anticipate pt to return home w family and HHPT upon DC.    Anticipated Discharge Disposition (PT): home with assist, home with home health

## 2025-06-27 NOTE — H&P
Patient Name:  Mo Willoughby  YOB: 1955  MRN:  [id number]  Admit Date:  6/26/2025  Patient Care Team:  Jenn Davison APRN as PCP - General (Nurse Practitioner)      Subjective   History Present Illness     Chief Complaint   Patient presents with   • Abnormal Lab       Mr. Willoughby is a 69 y.o. male with history of CKD, hypertension, spinal stenosis status post recent lumbar spine surgery in May who presented to the emergency room initially last night complaining of fevers and chills.  His temp has been as high as 103.  Patient has been having some trouble with urinary incontinence stating all the way back to before his back surgery and continues to use a brief at times.  He cannot really tell when his bladder is totally full.  He was not having any dysuria or frequency but apparently UA showed too numerous to count white cells and 1+ bacteria.  He was diagnosed with UTI and was discharged home with oral antibiotics.  However blood cultures have been done during the ER visit and turned positive for Enterococcus so patient was contacted to come back to the emergency room.  He has no new complaints since his discharge last night in fact his fevers have gone down some.  His wife has been admitted tonight as well to our service.    Review of Systems   Constitutional:  Positive for chills and fever.   HENT:  Negative for congestion and trouble swallowing.    Eyes:  Negative for visual disturbance.   Respiratory:  Negative for cough, chest tightness, shortness of breath and wheezing.    Cardiovascular:  Negative for chest pain and palpitations.   Gastrointestinal:  Negative for abdominal distention, abdominal pain, constipation, diarrhea, nausea and vomiting.   Genitourinary:  Positive for difficulty urinating. Negative for dysuria, frequency and urgency.   Musculoskeletal:  Negative for arthralgias.   Skin:  Negative for rash.   Neurological:  Negative for dizziness, weakness and headaches.    Psychiatric/Behavioral:  Negative for agitation and confusion.         Personal History     Past Medical History:   Diagnosis Date   • Abnormal TSH    • Arthritis    • Ascending aortic aneurysm    • Colon polyp 5/5/22   • CRI (chronic renal insufficiency), stage 3 (moderate) 2016    from stage 3A to 4   • DDD (degenerative disc disease), lumbar    • ED (erectile dysfunction)    • Erectile dysfunction    • Essential hypertension, benign    • Folic acid deficiency    • Gout    • Hip pain, right    • Hx of colonic polyp    • Hypercalcemia 2015    as far back as data goes so likely pre-dates even this time   • Hyperparathyroidism, unspecified 2016    as far back as data goes likely pre-dates even this date, likely multifactorial some primary and some secondary , w/ imaging from 10/16 showing possible L inferior adenoma   • Iron deficiency anemia    • Low back pain with bilateral sciatica    • Mixed hyperlipidemia 02/27/2023   • New onset atrial fibrillation 04/15/2024   • Pancreatitis    • Risk factors for obstructive sleep apnea    • Spinal stenosis of lumbar region with neurogenic claudication    • Syncope and collapse 03/28/2017     Past Surgical History:   Procedure Laterality Date   • BACK SURGERY  05/08/25   • CHOLECYSTECTOMY     • CHOLECYSTECTOMY WITH INTRAOPERATIVE CHOLANGIOGRAM N/A 08/03/2018    Procedure: CHOLECYSTECTOMY LAPAROSCOPIC INTRAOPERATIVE CHOLANGIOGRAM;  Surgeon: Melina Kate MD;  Location: [facility] MAIN OR;  Service: General   • COLONOSCOPY     • COLONOSCOPY N/A 10/03/2016    Procedure: COLONOSCOPY TO CECUM TO TERMINAL ILIUM WITH COLD BIOPSY POLYPECTOMY;  Surgeon: Benoit Vilchis MD;  Location: [facility] ENDOSCOPY;  Service:    • COLONOSCOPY N/A 05/05/2022    Procedure: COLONOSCOPY TO CECUM WITH COLD SNARE POLYPECTOMIES & RESOLUTION CLIP PLACEMENT X 2;  Surgeon: Benoit Mosley MD;  Location: [facility] ENDOSCOPY;  Service: Gastroenterology;  Laterality: N/A;  ANEMIA/POLYPS, HEMORRHOIDS    •  ENDOSCOPY N/A 05/05/2022    Procedure: ESOPHAGOGASTRODUODENOSCOPY WITH BX;  Surgeon: Benoit Mosley MD;  Location: [facility] ENDOSCOPY;  Service: Gastroenterology;  Laterality: N/A;  ANEMIA/PORTAL GASTROPATHY, EROSIVE GASTRITIS    • EPIDURAL Right 12/07/2022    Procedure: LUMBAR/SACRAL TRANSFORAMINAL EPIDURAL Right L2-3 and right L4-5;  Surgeon: Isaura Torres MD;  Location: [facility] MAIN OR;  Service: Pain Management;  Laterality: Right;   • LIPOMA EXCISION     • LUMBAR DISCECTOMY FUSION INSTRUMENTATION N/A 05/08/2025    Procedure: Open thoracic twelve/lumbar one discectomy/decompression/possible interbody fusion with cages, pedicle screw/terry/crosslink fixation, thoracic twelve/lumbar one/two with Mazor robotic navigation;  Surgeon: Abel Perez MD;  Location: [facility] MAIN OR;  Service: Robotics - Neuro;  Laterality: N/A;   • LUMBAR LAMINECTOMY DISCECTOMY DECOMPRESSION N/A 07/07/2023    Procedure: Open right sided lumbar decompression lumbar three/four, lumbar four/five;  Surgeon: Abel Perez MD;  Location: [facility] MAIN OR;  Service: Neurosurgery;  Laterality: N/A;   • MEDIAL BRANCH BLOCK Right 01/23/2023    Procedure: LUMBAR MEDIAL BRANCH BLOCK RIGHT L3-L5 #1;  Surgeon: Isaura Torres MD;  Location: [facility];  Service: Pain Management;  Laterality: Right;   • MEDIAL BRANCH BLOCK Right 02/13/2023    Procedure: LUMBAR MEDIAL BRANCH BLOCK RIGHT L3-L5 #1;  Surgeon: Isaura Torres MD;  Location: [facility];  Service: Pain Management;  Laterality: Right;   • NERVE SURGERY Right 03/06/2023    Procedure: LUMBAR RADIOFREQUENCY ABLATION RIGHT L3-L5  28803, 85127;  Surgeon: Isaura Torres MD;  Location: [facility];  Service: Pain Management;  Laterality: Right;   • SACROILIAC JOINT INJECTION Right 05/05/2023    Procedure: SACROILIAC JOINT INJECTION RIGHT 52807;  Surgeon: Isaura Torres MD;  Location: [facility];  Service: Pain Management;  Laterality: Right;   • TONSILLECTOMY       Family  History   Problem Relation Age of Onset   • Hypertension Mother    • Breast cancer Mother    • Cancer Mother    • Stroke Mother    • Cancer Father    • Liver cancer Father    • Hypertension Father    • Diabetes Father    • Hypertension Sister    • Heart attack Sister    • Hypertension Brother    • Cancer Brother    • Hypertension Sister    • Heart attack Sister    • Learning disabilities Neg Hx      Social History     Tobacco Use   • Smoking status: Never   • Smokeless tobacco: Never   Vaping Use   • Vaping status: Never Used   Substance Use Topics   • Alcohol use: Yes     Comment: occasional   • Drug use: No     Current Facility-Administered Medications on File Prior to Encounter   Medication Dose Route Frequency Provider Last Rate Last Admin   • [COMPLETED] acetaminophen (TYLENOL) tablet 1,000 mg  1,000 mg Oral Once Tali Rinaldi PA-C   1,000 mg at 06/26/25 0241   • [DISCONTINUED] cephalexin (KEFLEX) capsule 500 mg  500 mg Oral Once Tali Rinaldi PA-C         Current Outpatient Medications on File Prior to Encounter   Medication Sig Dispense Refill   • allopurinol (ZYLOPRIM) 300 MG tablet Take 1 tablet by mouth Daily. 90 tablet 2   • cephalexin (KEFLEX) 500 MG capsule Take 1 capsule by mouth 2 (Two) Times a Day for 7 days. 14 capsule 0   • ferrous sulfate 325 (65 FE) MG tablet Take 1 tablet by mouth Daily With Breakfast.     • folic acid (FOLVITE) 1 MG tablet TAKE 1 TABLET BY MOUTH DAILY 90 tablet 1   • gabapentin (NEURONTIN) 300 MG capsule Take 1 capsule by mouth 2 (Two) Times a Day. 60 capsule 5   • lisinopril (PRINIVIL,ZESTRIL) 10 MG tablet Take 1 tablet by mouth Daily.     • lisinopril (PRINIVIL,ZESTRIL) 5 MG tablet Take 1 tablet by mouth Daily.     • pantoprazole (PROTONIX) 40 MG EC tablet TAKE 1 TABLET BY MOUTH TWICE DAILY 180 tablet 1   • rosuvastatin (CRESTOR) 10 MG tablet TAKE 1 TABLET BY MOUTH DAILY 90 tablet 1   • tamsulosin (FLOMAX) 0.4 MG capsule 24 hr capsule Take 1 capsule by mouth Daily.  30 capsule 2   • thiamine (VITAMIN B-1) 100 MG tablet  tablet Take 1 tablet by mouth Daily.     • vitamin B-12 (CYANOCOBALAMIN) 1000 MCG tablet Take 1 tablet by mouth Daily. HOLD FOR SURGERY ONE WEEK PRIOR     • metoprolol succinate XL (TOPROL-XL) 25 MG 24 hr tablet TAKE 1 TABLET BY MOUTH DAILY 90 tablet 3     Allergies   Allergen Reactions   • Zetia [Ezetimibe] Dizziness     Nausea, fatgue       Objective    Objective     Vital Signs  Temp:  [98.1 °F (36.7 °C)-103.1 °F (39.5 °C)] 98.1 °F (36.7 °C)  Heart Rate:  [100-121] 104  Resp:  [16-20] 18  BP: (106-136)/(70-83) 107/70  SpO2:  [94 %-99 %] 99 %  on   ;   Device (Oxygen Therapy): room air  Body mass index is 30.52 kg/m².    Physical Exam  Vitals reviewed.   Constitutional:       General: He is not in acute distress.     Appearance: He is well-developed.   HENT:      Head: Normocephalic and atraumatic.      Mouth/Throat:      Pharynx: No oropharyngeal exudate.   Eyes:      General: No scleral icterus.  Neck:      Vascular: No JVD.   Cardiovascular:      Rate and Rhythm: Normal rate and regular rhythm.      Heart sounds: No murmur heard.  Pulmonary:      Effort: No respiratory distress.      Breath sounds: Normal breath sounds. No wheezing.   Abdominal:      General: Bowel sounds are normal. There is no distension.      Palpations: Abdomen is soft.      Tenderness: There is no abdominal tenderness.   Musculoskeletal:      Right lower leg: No edema.      Left lower leg: No edema.   Skin:     General: Skin is warm and dry.      Findings: No rash.   Neurological:      Mental Status: He is alert and oriented to person, place, and time.         Results Review:  I reviewed the patient's new clinical results.  I reviewed the patient's new imaging results and agree with the interpretation.  I reviewed the patient's other test results and agree with the interpretation  I personally viewed and interpreted the patient's EKG/Telemetry data  Discussed with ED provider.    Lab  Results (last 24 hours)       Procedure Component Value Units Date/Time    Respiratory Panel PCR w/COVID-19(SARS-CoV-2) MARILYNN/ANGELA/JUANPABLO/PAD/COR/ADAL In-House, NP Swab in UTM/VTM, 2 HR TAT - Swab, Nasopharynx [[id number]]  (Normal) Collected: 06/25/25 2354    Specimen: Swab from Nasopharynx Updated: 06/26/25 0050     ADENOVIRUS, PCR Not Detected     Coronavirus 229E Not Detected     Coronavirus HKU1 Not Detected     Coronavirus NL63 Not Detected     Coronavirus OC43 Not Detected     COVID19 Not Detected     Human Metapneumovirus Not Detected     Human Rhinovirus/Enterovirus Not Detected     Influenza A PCR Not Detected     Influenza B PCR Not Detected     Parainfluenza Virus 1 Not Detected     Parainfluenza Virus 2 Not Detected     Parainfluenza Virus 3 Not Detected     Parainfluenza Virus 4 Not Detected     RSV, PCR Not Detected     Bordetella pertussis pcr Not Detected     Bordetella parapertussis PCR Not Detected     Chlamydophila pneumoniae PCR Not Detected     Mycoplasma pneumo by PCR Not Detected    Narrative:      In the setting of a positive respiratory panel with a viral infection PLUS a negative procalcitonin without other underlying concern for bacterial infection, consider observing off antibiotics or discontinuation of antibiotics and continue supportive care. If the respiratory panel is positive for atypical bacterial infection (Bordetella pertussis, Chlamydophila pneumoniae, or Mycoplasma pneumoniae), consider antibiotic de-escalation to target atypical bacterial infection.    Urinalysis With Culture If Indicated - Urine, Clean Catch [[id number]]  (Abnormal) Collected: 06/26/25 0252    Specimen: Urine, Clean Catch Updated: 06/26/25 0308     Color, UA Yellow     Appearance, UA Turbid     pH, UA 6.0     Specific Gravity, UA 1.014     Glucose, UA Negative     Ketones, UA Trace     Bilirubin, UA Negative     Blood, UA Moderate (2+)     Protein,  mg/dL (2+)     Leuk Esterase, UA Large (3+)     Nitrite, UA  Negative     Urobilinogen, UA 1.0 E.U./dL    Narrative:      In absence of clinical symptoms, the presence of pyuria, bacteria, and/or nitrites on the urinalysis result does not correlate with infection.    Urinalysis, Microscopic Only - Urine, Clean Catch [[id number]]  (Abnormal) Collected: 06/26/25 0252    Specimen: Urine, Clean Catch Updated: 06/26/25 0308     RBC, UA 0-2 /HPF      WBC, UA Too Numerous to Count /HPF      Bacteria, UA 1+ /HPF      Squamous Epithelial Cells, UA 3-6 /HPF      Hyaline Casts, UA None Seen /LPF      Methodology Automated Microscopy    Urine Culture - Urine, Urine, Clean Catch [[id number]] Collected: 06/26/25 0252    Specimen: Urine, Clean Catch Updated: 06/26/25 0308    CBC & Differential [[id number]]  (Abnormal) Collected: 06/26/25 1814    Specimen: Blood Updated: 06/26/25 1835    Narrative:      The following orders were created for panel order CBC & Differential.  Procedure                               Abnormality         Status                     ---------                               -----------         ------                     CBC Auto Differential[[id number]]        Abnormal            Final result                 Please view results for these tests on the individual orders.    Basic Metabolic Panel [[id number]]  (Abnormal) Collected: 06/26/25 1814    Specimen: Blood from Hand, Left Updated: 06/26/25 1902     Glucose 116 mg/dL      BUN 20.0 mg/dL      Creatinine 1.38 mg/dL      Sodium 138 mmol/L      Potassium 4.1 mmol/L      Chloride 107 mmol/L      CO2 17.7 mmol/L      Calcium 9.8 mg/dL      BUN/Creatinine Ratio 14.5     Anion Gap 13.3 mmol/L      eGFR 56.0 mL/min/1.73     Narrative:      GFR Categories in Chronic Kidney Disease (CKD)              GFR Category          GFR (mL/min/1.73)    Interpretation  G1                    90 or greater        Normal or high (1)  G2                    60-89                Mild decrease (1)  G3a                   45-59                Mild  to moderate decrease  G3b                   30-44                Moderate to severe decrease  G4                    15-29                Severe decrease  G5                    14 or less           Kidney failure    (1)In the absence of evidence of kidney disease, neither GFR category G1 or G2 fulfill the criteria for CKD.    eGFR calculation 2021 CKD-EPI creatinine equation, which does not include race as a factor    Lactic Acid, Plasma [[id number]]  (Abnormal) Collected: 06/26/25 1814    Specimen: Blood Updated: 06/26/25 1844     Lactate 2.7 mmol/L     CBC Auto Differential [[id number]]  (Abnormal) Collected: 06/26/25 1814    Specimen: Blood Updated: 06/26/25 1835     WBC 8.47 10*3/mm3      RBC 3.20 10*6/mm3      Hemoglobin 11.0 g/dL      Hematocrit 33.2 %      .8 fL      MCH 34.4 pg      MCHC 33.1 g/dL      RDW 12.8 %      RDW-SD 48.2 fl      MPV 10.8 fL      Platelets 94 10*3/mm3      Neutrophil % 77.4 %      Lymphocyte % 9.1 %      Monocyte % 12.9 %      Eosinophil % 0.0 %      Basophil % 0.4 %      Immature Grans % 0.2 %      Neutrophils, Absolute 6.56 10*3/mm3      Lymphocytes, Absolute 0.77 10*3/mm3      Monocytes, Absolute 1.09 10*3/mm3      Eosinophils, Absolute 0.00 10*3/mm3      Basophils, Absolute 0.03 10*3/mm3      Immature Grans, Absolute 0.02 10*3/mm3      nRBC 0.0 /100 WBC     STAT Lactic Acid, Reflex [[id number]]  (Normal) Collected: 06/26/25 2111    Specimen: Blood from Hand, Right Updated: 06/26/25 2137     Lactate 1.0 mmol/L             Imaging Results (Last 24 Hours)       ** No results found for the last 24 hours. **                No orders to display        Assessment/Plan     Active Hospital Problems    Diagnosis  POA   • **Bacteremia due to Enterococcus [R78.81, B95.2]  Yes   • Acute UTI (urinary tract infection) [N39.0]  Yes   • Weakness of left lower extremity [R29.898]  Yes   • Foot drop, left [M21.372]  Yes   • OTF (acute kidney injury) [N17.9]  Yes   • Stage 3a chronic kidney  disease [N18.31]  Yes      Resolved Hospital Problems   No resolved problems to display.       Mr. Willoughby is a 69 y.o. male with history of CKD 3 and spinal stenosis status post recent lumbar spine surgery in May who presented to the ED last night complaining of fevers and chills, diagnosed with UTI but contacted to return to the emergency room tonight after blood cultures have turned positive for Enterococcus    Starting ampicillin per cultures.  Await final sensitivities.  Discussed with clinical pharmacist  Will consult infectious disease for recommendations especially in light of recent back surgery with hardware in place.  Ordered an echocardiogram to evaluate  He does have a mild OTF compared to his previous numbers.  Will hold his lisinopril.  May be having some urinary retention issues since the surgery and this was documented during the prior hospital stay as well  Thrombocytopenia noted but not really new from review of prior labs  Continue PT as he recovers from the recent surgery.  He was being followed by home health  Reviewed his home meds    VTE Prophylaxis -Lovenox but monitor platelets.  Code Status - Full code.       Chet Jones MD  [facility]  06/26/25  22:54 EDT

## 2025-06-27 NOTE — CONSULTS
Patient Name: Mo Willoughby  :1955  69 y.o.    Date of Admission: 2025  Date of Consultation:  25  Encounter Provider: YANIRA Gomez  Place of Service: [facility] CARDIOLOGY  Referring Provider: Chet Ramirez*  Patient Care Team:  Jenn Davison APRN as PCP - General (Nurse Practitioner)      Chief complaint: fever    History of Present Illness: Mr. Willoughby is a 69 year old gentleman with hypertension and SVT. He was temporarily treated with apixaban due to concern for atrial fibrillation however further monitor and review by EP felt it was more consistent with PACs and SVT. Ascending aortic aneurysm noted on imaging has been stable .last evaluated by echo 2024 . 4.4 cm ascending aorta. CTA chest . No formal ischemic evaluation. He has had low normal LVEF. No heart failure.     He was admitted in May with abrupt back and leg pain. He was admitted for disc herniation and required surgery. He did well from a cardiac standpoint. He tolerated beta blockade.  He was discharged on . .    He came to the emergency room yesterday with fever. He was weak and fatigue. Found to be bacteremic and septic. Id is following. He has a urinary tract infection. We were asked to see for tachycardia. There was concern for atrial fibrillation on tele. EKG appears to be sinus with PACs.     Echo 25    Left ventricular systolic function is low normal. Calculated left ventricular EF = 46.7%    Left ventricular wall thickness is consistent with moderate septal asymmetric hypertrophy.    Left ventricular diastolic function was indeterminate.    The left atrial cavity is mild to moderately dilated.    Moderate dilation of the aortic root is present. The aortic root measures 4.5 cm.    Echo 2024       Left ventricular systolic function is low normal. Calculated left ventricular EF = 45.5%. Abnormal global longitudinal LV strain (GLS) = -16.8%  with relative apical sparing.  Infiltrative cardiomyopathy is a differential diagnosis.    Left ventricular wall thickness is consistent with moderate concentric hypertrophy.    Left ventricular diastolic function is consistent with (grade I) impaired relaxation.    The left atrial cavity is severely dilated.    There is mild, bileaflet mitral valve thickening present.  There is mild to moderate mitral valve regurgitation present.    Mild dilation of the aortic root is present. Aortic root = 4.3 cm Ascending aorta = 4.4 cm The inferior vena cava is normally sized.     Past Medical History:   Diagnosis Date    Abnormal TSH     Arthritis     Ascending aortic aneurysm     Colon polyp 5/5/22    CRI (chronic renal insufficiency), stage 3 (moderate) 2016    from stage 3A to 4    DDD (degenerative disc disease), lumbar     ED (erectile dysfunction)     Erectile dysfunction     Essential hypertension, benign     Folic acid deficiency     Gout     Hip pain, right     Hx of colonic polyp     Hypercalcemia 2015    as far back as data goes so likely pre-dates even this time    Hyperparathyroidism, unspecified 2016    as far back as data goes likely pre-dates even this date, likely multifactorial some primary and some secondary , w/ imaging from 10/16 showing possible L inferior adenoma    Iron deficiency anemia     Low back pain with bilateral sciatica     Mixed hyperlipidemia 02/27/2023    New onset atrial fibrillation 04/15/2024    Pancreatitis     Risk factors for obstructive sleep apnea     Spinal stenosis of lumbar region with neurogenic claudication     Syncope and collapse 03/28/2017       Past Surgical History:   Procedure Laterality Date    BACK SURGERY  05/08/25    CHOLECYSTECTOMY      CHOLECYSTECTOMY WITH INTRAOPERATIVE CHOLANGIOGRAM N/A 08/03/2018    Procedure: CHOLECYSTECTOMY LAPAROSCOPIC INTRAOPERATIVE CHOLANGIOGRAM;  Surgeon: Melina Kate MD;  Location: [facility];  Service: General    COLONOSCOPY       COLONOSCOPY N/A 10/03/2016    Procedure: COLONOSCOPY TO CECUM TO TERMINAL ILIUM WITH COLD BIOPSY POLYPECTOMY;  Surgeon: Benoit Vilchis MD;  Location: [facility] ENDOSCOPY;  Service:     COLONOSCOPY N/A 05/05/2022    Procedure: COLONOSCOPY TO CECUM WITH COLD SNARE POLYPECTOMIES & RESOLUTION CLIP PLACEMENT X 2;  Surgeon: Benoit Mosley MD;  [facility];  Service: Gastroenterology;  Laterality: N/A;  ANEMIA/POLYPS, HEMORRHOIDS     ENDOSCOPY N/A 05/05/2022    Procedure: ESOPHAGOGASTRODUODENOSCOPY WITH BX;  Surgeon: Benoit Mosley MD;  Location: [facility] ENDOSCOPY;  Service: Gastroenterology;  Laterality: N/A;  ANEMIA/PORTAL GASTROPATHY, EROSIVE GASTRITIS     EPIDURAL Right 12/07/2022    Procedure: LUMBAR/SACRAL TRANSFORAMINAL EPIDURAL Right L2-3 and right L4-5;  Surgeon: Isaura Torres MD;  Location: [facility] MAIN [facility];  Service: Pain Management;  Laterality: Right;    LIPOMA EXCISION      LUMBAR DISCECTOMY FUSION INSTRUMENTATION N/A 05/08/2025    Procedure: Open thoracic twelve/lumbar one discectomy/decompression/possible interbody fusion with cages, pedicle screw/terry/crosslink fixation, thoracic twelve/lumbar one/two with Mazor robotic navigation;  Surgeon: Abel Perez MD;  Location: [facility] MAIN OR;  Service: Robotics - Neuro;  Laterality: N/A;    LUMBAR LAMINECTOMY DISCECTOMY DECOMPRESSION N/A 07/07/2023    Procedure: Open right sided lumbar decompression lumbar three/four, lumbar four/five;  Surgeon: Abel Perez MD;  [facility];  Service: Neurosurgery;  Laterality: N/A;    MEDIAL BRANCH BLOCK Right 01/23/2023    Procedure: LUMBAR MEDIAL BRANCH BLOCK RIGHT L3-L5 #1;  Surgeon: Isaura Torres MD;  Location: [facility];  Service: Pain Management;  Laterality: Right;    MEDIAL BRANCH BLOCK Right 02/13/2023    Procedure: LUMBAR MEDIAL BRANCH BLOCK RIGHT L3-L5 #1;  Surgeon: Isaura Torres MD;  Location: [facility];  Service: Pain Management;  Laterality: Right;     NERVE SURGERY Right 03/06/2023    Procedure: LUMBAR RADIOFREQUENCY ABLATION RIGHT L3-L5  68373, [id number];  Surgeon: Isaura Torres MD;  Location: [facility];  Service: Pain Management;  Laterality: Right;    SACROILIAC JOINT INJECTION Right 05/05/2023    Procedure: SACROILIAC JOINT INJECTION RIGHT 08836;  Surgeon: Isaura Torres MD;  Location: [facility];  Service: Pain Management;  Laterality: Right;    TONSILLECTOMY           Prior to Admission medications    Medication Sig Start Date End Date Taking? Authorizing Provider   allopurinol (ZYLOPRIM) 300 MG tablet Take 1 tablet by mouth Daily. 12/14/24  Yes Jenn Davison APRN   cephalexin (KEFLEX) 500 MG capsule Take 1 capsule by mouth 2 (Two) Times a Day for 7 days. 6/26/25 7/3/25 Yes Tali Rinaldi PA-C   ferrous sulfate 325 (65 FE) MG tablet Take 1 tablet by mouth Daily With Breakfast.   Yes ProviderMilena MD   folic acid (FOLVITE) 1 MG tablet TAKE 1 TABLET BY MOUTH DAILY 6/11/25  Yes Jenn Davison APRN   gabapentin (NEURONTIN) 300 MG capsule Take 1 capsule by mouth 2 (Two) Times a Day. 5/30/25  Yes Rohini Meza APRN   lisinopril (PRINIVIL,ZESTRIL) 10 MG tablet Take 1 tablet by mouth Daily. 6/19/25  Yes Chaya Swartz APRN   lisinopril (PRINIVIL,ZESTRIL) 5 MG tablet Take 1 tablet by mouth Daily. 6/19/25  Yes Chaya Swartz APRN   pantoprazole (PROTONIX) 40 MG EC tablet TAKE 1 TABLET BY MOUTH TWICE DAILY 6/11/25  Yes Jenn Davison APRN   rosuvastatin (CRESTOR) 10 MG tablet TAKE 1 TABLET BY MOUTH DAILY 1/17/25  Yes Jenn Daviosn APRN   tamsulosin (FLOMAX) 0.4 MG capsule 24 hr capsule Take 1 capsule by mouth Daily. 6/19/25  Yes Chaya Swartz APRN   thiamine (VITAMIN B-1) 100 MG tablet  tablet Take 1 tablet by mouth Daily.   Yes ProviderMilena MD   vitamin B-12 (CYANOCOBALAMIN) 1000 MCG tablet Take 1 tablet by mouth Daily. HOLD FOR SURGERY ONE WEEK PRIOR   Yes Provider, MD Milena   metoprolol  "succinate XL (TOPROL-XL) 25 MG 24 hr tablet TAKE 1 TABLET BY MOUTH DAILY 6/19/25   Hazel Brown MD       Allergies   Allergen Reactions    Zetia [Ezetimibe] Dizziness     Nausea, fatgue       Social History     Socioeconomic History    Marital status: Single   Tobacco Use    Smoking status: Never    Smokeless tobacco: Never   Vaping Use    Vaping status: Never Used   Substance and Sexual Activity    Alcohol use: Yes     Comment: occasional    Drug use: No    Sexual activity: Yes     Partners: Female       Family History   Problem Relation Age of Onset    Hypertension Mother     Breast cancer Mother     Cancer Mother     Stroke Mother     Cancer Father     Liver cancer Father     Hypertension Father     Diabetes Father     Hypertension Sister     Heart attack Sister     Hypertension Brother     Cancer Brother     Hypertension Sister     Heart attack Sister     Learning disabilities Neg Hx        REVIEW OF SYSTEMS:   All systems reviewed.  Pertinent positives identified in HPI.  All other systems are negative.      Objective:     Vitals:    06/27/25 0412 06/27/25 0636 06/27/25 0719 06/27/25 1103   BP: 136/88  138/97 125/89   BP Location: Right arm  Right arm    Patient Position: Lying  Lying    Pulse: 120  119 111   Resp: 20  26    Temp: (!) 102.7 °F (39.3 °C) 98.4 °F (36.9 °C) 98.8 °F (37.1 °C) 99.6 °F (37.6 °C)   TempSrc: Oral Oral Oral Oral   SpO2: 96%  98%    Weight:    102 kg (224 lb 13.9 oz)   Height:    182 cm (71.65\")     Body mass index is 30.79 kg/m².    Physical Exam:  Constitutional: He is oriented to person, place, and time. He appears well-developed. He does not appear ill.   HENT:   Head: Normocephalic and atraumatic. Head is without contusion.   Right Ear: Hearing normal. No drainage.   Left Ear: Hearing normal. No drainage.   Nose: No nasal deformity. No epistaxis.   Eyes: Lids are normal. Right eye exhibits no exudate. Left eye exhibits no exudate.  Neck: No JVD present. Carotid bruit is not " present. No tracheal deviation present. No thyroid mass and no thyromegaly present.   Cardiovascular: Normal rate, regular rhythm and normal heart sounds.    Pulses:       Posterior tibial pulses are 2+ on the right side, and 2+ on the left side.   Pulmonary/Chest: Effort normal and breath sounds normal.   Abdominal: Soft. Normal appearance and bowel sounds are normal. There is no tenderness.   Musculoskeletal: Normal range of motion.        Right shoulder: He exhibits no deformity.        Left shoulder: He exhibits no deformity.   Neurological: He is alert and oriented to person, place, and time. He has normal strength.   Skin: Skin is warm, dry and intact. No rash noted.   Psychiatric: He has a normal mood and affect. His behavior is normal. Thought content normal.   Vitals reviewed      Lab Review:     Results from last 7 days   Lab Units 06/27/25  0746 06/26/25  1814 06/25/25  2242   SODIUM mmol/L 139   < > 136   POTASSIUM mmol/L 3.5   < > 4.0   CHLORIDE mmol/L 110*   < > 102   CO2 mmol/L 20.6*   < > 21.8*   BUN mg/dL 19.0   < > 18.0   CREATININE mg/dL 1.12   < > 1.34*   CALCIUM mg/dL 9.2   < > 10.0   BILIRUBIN mg/dL  --   --  1.1   ALK PHOS U/L  --   --  91   ALT (SGPT) U/L  --   --  15   AST (SGOT) U/L  --   --  30   GLUCOSE mg/dL 97   < > 124*    < > = values in this interval not displayed.         Results from last 7 days   Lab Units 06/27/25  0746   WBC 10*3/mm3 7.13   HEMOGLOBIN g/dL 9.5*   HEMATOCRIT % 28.9*   PLATELETS 10*3/mm3 76*                     Current Facility-Administered Medications:     acetaminophen (TYLENOL) tablet 650 mg, 650 mg, Oral, Q4H PRN, Chet Jones MD, 650 mg at 06/27/25 0416    allopurinol (ZYLOPRIM) tablet 300 mg, 300 mg, Oral, Daily, Chet Jones MD, 300 mg at 06/27/25 0950    ampicillin 2000 mg IVPB in 100 mL NS (MBP), 2 g, Intravenous, Q4H, Chet Jones MD, Last Rate: 200 mL/hr at 06/27/25 1403, 2 g at 06/27/25 1403     sennosides-docusate (PERICOLACE) 8.6-50 MG per tablet 2 tablet, 2 tablet, Oral, BID PRN **AND** polyethylene glycol (MIRALAX) packet 17 g, 17 g, Oral, Daily PRN **AND** bisacodyl (DULCOLAX) EC tablet 5 mg, 5 mg, Oral, Daily PRN **AND** bisacodyl (DULCOLAX) suppository 10 mg, 10 mg, Rectal, Daily PRN, Chet Jones MD    Calcium Replacement - Follow Nurse / BPA Driven Protocol, , Not Applicable, PRN, Chet Jones MD    enoxaparin sodium (LOVENOX) syringe 40 mg, 40 mg, Subcutaneous, Daily, Chet Jones MD, 40 mg at 06/27/25 0950    ferrous sulfate tablet 325 mg, 325 mg, Oral, Daily With Breakfast, Chet Jones MD, 325 mg at 06/27/25 0950    folic acid (FOLVITE) tablet 1,000 mcg, 1,000 mcg, Oral, Daily, Chet Jones MD, 1,000 mcg at 06/27/25 0950    gabapentin (NEURONTIN) capsule 300 mg, 300 mg, Oral, BID, Chet Jones MD, 300 mg at 06/27/25 0949    [Held by provider] lisinopril (PRINIVIL,ZESTRIL) tablet 10 mg, 10 mg, Oral, Daily, Chet Jones MD    [Held by provider] lisinopril (PRINIVIL,ZESTRIL) tablet 5 mg, 5 mg, Oral, Daily, Chet Jones MD    Magnesium Standard Dose Replacement - Follow Nurse / BPA Driven Protocol, , Not Applicable, PRN, Chet Jones MD    metoprolol tartrate (LOPRESSOR) tablet 25 mg, 25 mg, Oral, Q12H, Nicolasa Cueva APRN    ondansetron ODT (ZOFRAN-ODT) disintegrating tablet 4 mg, 4 mg, Oral, Q6H PRN, Chet Jones MD    pantoprazole (PROTONIX) EC tablet 40 mg, 40 mg, Oral, BID, Chet Jones MD, 40 mg at 06/27/25 0952    Phosphorus Replacement - Follow Nurse / BPA Driven Protocol, , Not Applicable, PRN, Chet Jones MD    Potassium Replacement - Follow Nurse / BPA Driven Protocol, , Not Applicable, PRN, Chet Jones MD    rosuvastatin (CRESTOR) tablet 10 mg, 10 mg, Oral, Daily, Chet Jones MD, 10 mg at  06/27/25 0949    sodium chloride 0.9 % flush 10 mL, 10 mL, Intravenous, Q12H, Chet Jones MD, 10 mL at 06/27/25 0953    sodium chloride 0.9 % flush 10 mL, 10 mL, Intravenous, PRN, Chet Jones MD    sodium chloride 0.9 % infusion 40 mL, 40 mL, Intravenous, PRN, Chet Jones MD    sodium chloride 0.9 % infusion, 100 mL/hr, Intravenous, Continuous, Phuong Villavicencio MD    tamsulosin (FLOMAX) 24 hr capsule 0.4 mg, 0.4 mg, Oral, Daily, Chet Jones MD, 0.4 mg at 06/27/25 0950    thiamine (VITAMIN B-1) tablet 100 mg, 100 mg, Oral, Daily, Chet Jones MD, 100 mg at 06/27/25 0950    vitamin B-12 (CYANOCOBALAMIN) tablet 1,000 mcg, 1,000 mcg, Oral, Daily, Chet Jones MD, 1,000 mcg at 06/27/25 0949    Assessment and Plan:       Active Hospital Problems    Diagnosis  POA    **Bacteremia due to Enterococcus [R78.81, B95.2]  Yes    Acute UTI (urinary tract infection) [N39.0]  Yes    Weakness of left lower extremity [R29.898]  Yes    Foot drop, left [M21.372]  Yes    OTF (acute kidney injury) [N17.9]  Yes    Stage 3a chronic kidney disease [N18.31]  Yes      Resolved Hospital Problems   No resolved problems to display.     Bacteremia due to Enterococcus, likely  source. No evidence of vegetation on TTE.   Acute urinary tract infection  Tachycardia , EKG looks more consistent with sinus PACs. He has had questionable atrial fibrillation in the past but after review by EP and holter there was no definitive AF. Will continue to follow on tele. He did not feel well on apixaban. If AC indicated - he would want to try something different.   Hypertension , lisinopril on hold.   Chronic kidney disease   Recent spine surgery , 5/8/2025 Dr. Chris Olson     Will add a little beta blocker. BP is stable.   Hr should improve as he does.     Nicolasa Cueva, YANIRA  06/27/25  14:46 EDT

## 2025-06-27 NOTE — PLAN OF CARE
Goal Outcome Evaluation:  Plan of Care Reviewed With: patient        Progress: no change  Outcome Evaluation: All needs met. Rested well. Up w/ assist x 1 and walker. Regular diet. Temperature elevated, all other VSS. Oriented x 4. RA. SR/ST. IVF and IV antibiotics. PRN Tylenol. Plans for ECHO and ID consult today.

## 2025-06-27 NOTE — CONSULTS
"Referring Provider: Chet Jones MD  [id number]  [facility] [address]  [place] [address]    Reason for Consultation: Enterococcal bacteremia     History of present illness:  Mo Willoughby is a 69 y.o. who I am asked to evaluate and give opinion for \"Enterococcal bacteremia.\" History is obtained from the patient and review of the old medical records which I summarize/synthesize as follows: He presented to the emergency room yesterday afternoon with fever to 102F.  He had associated fatigue.  No alleviating factors.  No GI symptoms.  He underwent lumbar discectomy with fusion on 5/8/2025.  His postoperative course was complicated by urinary retention requiring Phillips catheter insertion.  He did pass his voiding trial prior to discharge.  He has not been having any issues with his back incision.    In the emergency room he was initially febrile to 100.8 F though his Tmax since then has been 102.7F.  He had associated tachycardia.  He was normotensive and on room air.  Labs notable for WBC 8, UA with too numerous to count WBCs and lactate 2.7 that improved to 1 with fluids.  ID has been consulted because his blood cultures are positive for Enterococcus faecalis in 1/2 sets.  He is currently on ampicillin 2 g every 4 hours.  He does not have any cardiac hardware.    He denies any current dysuria.  He says his main urinary symptom at home was incontinence.  He confirms that his back is doing well without any pain.  His incision is healed.  He tells me overall he feels quite well and was surprised to have to come into the hospital.    Past Medical History:   Diagnosis Date    Abnormal TSH     Arthritis     Ascending aortic aneurysm     Colon polyp 5/5/22    CRI (chronic renal insufficiency), stage 3 (moderate) 2016    from stage 3A to 4    DDD (degenerative disc disease), lumbar     ED (erectile dysfunction)     Erectile dysfunction     Essential hypertension, benign     Folic acid deficiency     Gout     Hip " pain, right     Hx of colonic polyp     Hypercalcemia 2015    as far back as data goes so likely pre-dates even this time    Hyperparathyroidism, unspecified 2016    as far back as data goes likely pre-dates even this date, likely multifactorial some primary and some secondary , w/ imaging from 10/16 showing possible L inferior adenoma    Iron deficiency anemia     Low back pain with bilateral sciatica     Mixed hyperlipidemia 02/27/2023    New onset atrial fibrillation 04/15/2024    Pancreatitis     Risk factors for obstructive sleep apnea     Spinal stenosis of lumbar region with neurogenic claudication     Syncope and collapse 03/28/2017       Past Surgical History:   Procedure Laterality Date    BACK SURGERY  05/08/25    CHOLECYSTECTOMY      CHOLECYSTECTOMY WITH INTRAOPERATIVE CHOLANGIOGRAM N/A 08/03/2018    Procedure: CHOLECYSTECTOMY LAPAROSCOPIC INTRAOPERATIVE CHOLANGIOGRAM;  Surgeon: Melina Kate MD;  Location: [facility] MAIN OR;  Service: General    COLONOSCOPY      COLONOSCOPY N/A 10/03/2016    Procedure: COLONOSCOPY TO CECUM TO TERMINAL ILIUM WITH COLD BIOPSY POLYPECTOMY;  Surgeon: Benoit Vilchis MD;  Location: [facility] ENDOSCOPY;  Service:     COLONOSCOPY N/A 05/05/2022    Procedure: COLONOSCOPY TO CECUM WITH COLD SNARE POLYPECTOMIES & RESOLUTION CLIP PLACEMENT X 2;  Surgeon: Benoit Mosley MD;  Location: [facility] ENDOSCOPY;  Service: Gastroenterology;  Laterality: N/A;  ANEMIA/POLYPS, HEMORRHOIDS     ENDOSCOPY N/A 05/05/2022    Procedure: ESOPHAGOGASTRODUODENOSCOPY WITH BX;  Surgeon: Benoit Mosley MD;  Location: [facility] ENDOSCOPY;  Service: Gastroenterology;  Laterality: N/A;  ANEMIA/PORTAL GASTROPATHY, EROSIVE GASTRITIS     EPIDURAL Right 12/07/2022    Procedure: LUMBAR/SACRAL TRANSFORAMINAL EPIDURAL Right L2-3 and right L4-5;  Surgeon: Isaura Torres MD;  [place] [facility] [place];  Service: Pain Management;  Laterality: Right;    LIPOMA EXCISION      LUMBAR DISCECTOMY FUSION  INSTRUMENTATION N/A 05/08/2025    Procedure: Open thoracic twelve/lumbar one discectomy/decompression/possible interbody fusion with cages, pedicle screw/terry/crosslink fixation, thoracic twelve/lumbar one/two with Mazor robotic navigation;  Surgeon: Abel Perez MD;  Location: [facility] MAIN OR;  Service: Robotics - Neuro;  Laterality: N/A;    LUMBAR LAMINECTOMY DISCECTOMY DECOMPRESSION N/A 07/07/2023    Procedure: Open right sided lumbar decompression lumbar three/four, lumbar four/five;  Surgeon: Abel Perez MD;  Location: [facility] MAIN OR;  Service: Neurosurgery;  Laterality: N/A;    MEDIAL BRANCH BLOCK Right 01/23/2023    Procedure: LUMBAR MEDIAL BRANCH BLOCK RIGHT L3-L5 #1;  Surgeon: Isaura Torres MD;  Location: [facility] MAIN OR;  Service: Pain Management;  Laterality: Right;    MEDIAL BRANCH BLOCK Right 02/13/2023    Procedure: LUMBAR MEDIAL BRANCH BLOCK RIGHT L3-L5 #1;  Surgeon: Isaura Torres MD;  Location: [facility] MAIN OR;  Service: Pain Management;  Laterality: Right;    NERVE SURGERY Right 03/06/2023    Procedure: LUMBAR RADIOFREQUENCY ABLATION RIGHT L3-L5  35627, [id number];  Surgeon: Isaura Torres MD;  Location: [facility] MAIN OR;  Service: Pain Management;  Laterality: Right;    SACROILIAC JOINT INJECTION Right 05/05/2023    Procedure: SACROILIAC JOINT INJECTION RIGHT [id number];  Surgeon: Isaura Torres MD;  Location: [facility] MAIN OR;  Service: Pain Management;  Laterality: Right;    TONSILLECTOMY         Social History:  Alcohol use  Lives in [place], [place]    Antibiotic allergies and intolerances:  None    Medications:    Current Facility-Administered Medications:     acetaminophen (TYLENOL) tablet 650 mg, 650 mg, Oral, Q4H PRN, Chet Jones MD, 650 mg at 06/27/25 0416    allopurinol (ZYLOPRIM) tablet 300 mg, 300 mg, Oral, Daily, Chet Jones MD    ampicillin 2000 mg IVPB in 100 mL NS (MBP), 2 g, Intravenous, Q4H, Chet Jones MD, Last Rate: 200 mL/hr at  06/27/25 0636, 2 g at 06/27/25 0636    sennosides-docusate (PERICOLACE) 8.6-50 MG per tablet 2 tablet, 2 tablet, Oral, BID PRN **AND** polyethylene glycol (MIRALAX) packet 17 g, 17 g, Oral, Daily PRN **AND** bisacodyl (DULCOLAX) EC tablet 5 mg, 5 mg, Oral, Daily PRN **AND** bisacodyl (DULCOLAX) suppository 10 mg, 10 mg, Rectal, Daily PRN, Chet Jones MD    Calcium Replacement - Follow Nurse / BPA Driven Protocol, , Not Applicable, PRN, Chet Jones MD    enoxaparin sodium (LOVENOX) syringe 40 mg, 40 mg, Subcutaneous, Daily, Chet Jones MD    ferrous sulfate tablet 325 mg, 325 mg, Oral, Daily With Breakfast, Chet Jones MD    folic acid (FOLVITE) tablet 1,000 mcg, 1,000 mcg, Oral, Daily, Chet Jones MD    gabapentin (NEURONTIN) capsule 300 mg, 300 mg, Oral, BID, Chet Jones MD, 300 mg at 06/26/25 2307    [Held by provider] lisinopril (PRINIVIL,ZESTRIL) tablet 10 mg, 10 mg, Oral, Daily, Chet Jones MD    [Held by provider] lisinopril (PRINIVIL,ZESTRIL) tablet 5 mg, 5 mg, Oral, Daily, Chet Jones MD    Magnesium Standard Dose Replacement - Follow Nurse / BPA Driven Protocol, , Not Applicable, PRN, Chet Jones MD    ondansetron ODT (ZOFRAN-ODT) disintegrating tablet 4 mg, 4 mg, Oral, Q6H PRN, Chet Jones MD    pantoprazole (PROTONIX) EC tablet 40 mg, 40 mg, Oral, BID, Chet Jones MD    Phosphorus Replacement - Follow Nurse / BPA Driven Protocol, , Not Applicable, PRN, Chet Jones MD    Potassium Replacement - Follow Nurse / BPA Driven Protocol, , Not Applicable, PRN, Chet Jones MD    rosuvastatin (CRESTOR) tablet 10 mg, 10 mg, Oral, Daily, Chet Jones MD    sodium chloride 0.9 % flush 10 mL, 10 mL, Intravenous, Q12H, Chet Jones MD, 10 mL at 06/26/25 2206    sodium chloride 0.9 % flush 10 mL, 10 mL,  Intravenous, PRN, Chet Jones MD    sodium chloride 0.9 % infusion 40 mL, 40 mL, Intravenous, PRN, Chet Jones MD    sodium chloride 0.9 % infusion, 100 mL/hr, Intravenous, Continuous, Chet Jones MD, Last Rate: 100 mL/hr at 06/26/25 2206, 100 mL/hr at 06/26/25 2206    tamsulosin (FLOMAX) 24 hr capsule 0.4 mg, 0.4 mg, Oral, Daily, Chet Jones MD    thiamine (VITAMIN B-1) tablet 100 mg, 100 mg, Oral, Daily, Chet Jones MD    vitamin B-12 (CYANOCOBALAMIN) tablet 1,000 mcg, 1,000 mcg, Oral, Daily, Chet Jones MD      Objective   Vital Signs   Temp:  [98.1 °F (36.7 °C)-102.7 °F (39.3 °C)] 98.8 °F (37.1 °C)  Heart Rate:  [100-121] 119  Resp:  [16-26] 26  BP: (106-138)/(70-97) 138/97    Physical Exam:   General: awake, alert, NAD, very nice, lying in bed  Eyes: no scleral icterus  ENT: no thrush  Cardiovascular: Tachycardic, no murmur  Respiratory: normal work of breathing without wheezing  GI: Abdomen is soft, not tender, not distended  :  no Phillips catheter  Neurological: Alert and oriented x 3  Psychiatric: Normal mood and affect   Vasc: PIV w/o erythema    Labs:     Lab Results   Component Value Date    WBC 8.47 06/26/2025    HGB 11.0 (L) 06/26/2025    HCT 33.2 (L) 06/26/2025    .8 (H) 06/26/2025    PLT 94 (L) 06/26/2025       Lab Results   Component Value Date    GLUCOSE 116 (H) 06/26/2025    CALCIUM 9.8 06/26/2025     06/26/2025    K 4.1 06/26/2025    CO2 17.7 (L) 06/26/2025     06/26/2025    BUN 20.0 06/26/2025    CREATININE 1.38 (H) 06/26/2025    EGFR 56.0 (L) 06/26/2025    BCR 14.5 06/26/2025    ANIONGAP 13.3 06/26/2025     Lab Results   Component Value Date    ALT 15 06/25/2025     Lab Results   Component Value Date    HGBA1C 4.80 05/11/2025       LA 2.7--->1  UA TNTC WBCs    Microbiology:  6/25 RPP: Negative  6/25 BCx: Enterococcus faecalis (not VRE) in 1/2 sets  6/26 UCX: Pending     Radiology:  CXR  personally reviewed and is negative for pneumonia    TTE ordered    Isolation:  No active isolations    ASSESSMENT/PLAN:  Enterococcus faecalis septicemia due to  source  History of urinary retention and Phillips catheter during recent hospital stay  Urinary incontinence  Status post lumbar discectomy and fusion  Obesity BMI 30  Alcohol use    For Enterococcus faecalis septicemia likely due to  source, continue ampicillin 2 g IV every 4 hours with duration TBD.  I will repeat a set of blood cultures in the morning to document sterility.  Agree with TTE though feel some reassurance since he does not have any cardiac devices in place.    Check BMP in the a.m. for close monitoring while on ampicillin.    If he continues to improve and there are no complications, I anticipate he will be able to go home on a course of oral antibiotics.    D/W Dr. Villavicencio.    ID will follow.    Addendum: TTE performed and did not show any evidence of vegetations.

## 2025-06-28 LAB
ANION GAP SERPL CALCULATED.3IONS-SCNC: 9.9 MMOL/L (ref 5–15)
BACTERIA SPEC AEROBE CULT: ABNORMAL
BACTERIA SPEC AEROBE CULT: ABNORMAL
BUN SERPL-MCNC: 19 MG/DL (ref 8–23)
BUN/CREAT SERPL: 18.1 (ref 7–25)
CALCIUM SPEC-SCNC: 9.2 MG/DL (ref 8.6–10.5)
CHLORIDE SERPL-SCNC: 110 MMOL/L (ref 98–107)
CO2 SERPL-SCNC: 19.1 MMOL/L (ref 22–29)
CREAT SERPL-MCNC: 1.05 MG/DL (ref 0.76–1.27)
DEPRECATED RDW RBC AUTO: 48 FL (ref 37–54)
EGFRCR SERPLBLD CKD-EPI 2021: 77.8 ML/MIN/1.73
ERYTHROCYTE [DISTWIDTH] IN BLOOD BY AUTOMATED COUNT: 13.4 % (ref 12.3–15.4)
FOLATE SERPL-MCNC: >20 NG/ML (ref 4.78–24.2)
GLUCOSE SERPL-MCNC: 86 MG/DL (ref 65–99)
GRAM STN SPEC: ABNORMAL
HCT VFR BLD AUTO: 27.6 % (ref 37.5–51)
HGB BLD-MCNC: 9.4 G/DL (ref 13–17.7)
ISOLATED FROM: ABNORMAL
MCH RBC QN AUTO: 34.1 PG (ref 26.6–33)
MCHC RBC AUTO-ENTMCNC: 34.1 G/DL (ref 31.5–35.7)
MCV RBC AUTO: 100 FL (ref 79–97)
PLATELET # BLD AUTO: 78 10*3/MM3 (ref 140–450)
PMV BLD AUTO: 11.2 FL (ref 6–12)
POTASSIUM SERPL-SCNC: 3.8 MMOL/L (ref 3.5–5.2)
RBC # BLD AUTO: 2.76 10*6/MM3 (ref 4.14–5.8)
SODIUM SERPL-SCNC: 139 MMOL/L (ref 136–145)
VIT B12 BLD-MCNC: 1770 PG/ML (ref 211–946)
WBC NRBC COR # BLD AUTO: 5.27 10*3/MM3 (ref 3.4–10.8)

## 2025-06-28 PROCEDURE — 25810000003 SODIUM CHLORIDE 0.9 % SOLUTION: Performed by: STUDENT IN AN ORGANIZED HEALTH CARE EDUCATION/TRAINING PROGRAM

## 2025-06-28 PROCEDURE — G0545 PR INHERENT VISIT TO INPT: HCPCS | Performed by: INTERNAL MEDICINE

## 2025-06-28 PROCEDURE — 87040 BLOOD CULTURE FOR BACTERIA: CPT | Performed by: INTERNAL MEDICINE

## 2025-06-28 PROCEDURE — 99232 SBSQ HOSP IP/OBS MODERATE 35: CPT | Performed by: STUDENT IN AN ORGANIZED HEALTH CARE EDUCATION/TRAINING PROGRAM

## 2025-06-28 PROCEDURE — 25010000002 AMPICILLIN PER 500 MG: Performed by: HOSPITALIST

## 2025-06-28 PROCEDURE — 85027 COMPLETE CBC AUTOMATED: CPT | Performed by: STUDENT IN AN ORGANIZED HEALTH CARE EDUCATION/TRAINING PROGRAM

## 2025-06-28 PROCEDURE — 99233 SBSQ HOSP IP/OBS HIGH 50: CPT | Performed by: INTERNAL MEDICINE

## 2025-06-28 PROCEDURE — 80048 BASIC METABOLIC PNL TOTAL CA: CPT | Performed by: STUDENT IN AN ORGANIZED HEALTH CARE EDUCATION/TRAINING PROGRAM

## 2025-06-28 PROCEDURE — 82607 VITAMIN B-12: CPT | Performed by: STUDENT IN AN ORGANIZED HEALTH CARE EDUCATION/TRAINING PROGRAM

## 2025-06-28 PROCEDURE — 82746 ASSAY OF FOLIC ACID SERUM: CPT | Performed by: STUDENT IN AN ORGANIZED HEALTH CARE EDUCATION/TRAINING PROGRAM

## 2025-06-28 PROCEDURE — 25010000002 ENOXAPARIN PER 10 MG: Performed by: HOSPITALIST

## 2025-06-28 RX ADMIN — METOPROLOL TARTRATE 25 MG: 25 TABLET, FILM COATED ORAL at 08:50

## 2025-06-28 RX ADMIN — AMPICILLIN SODIUM 2 G: 2 INJECTION, POWDER, FOR SOLUTION INTRAMUSCULAR; INTRAVENOUS at 22:27

## 2025-06-28 RX ADMIN — ROSUVASTATIN CALCIUM 10 MG: 10 TABLET, FILM COATED ORAL at 08:49

## 2025-06-28 RX ADMIN — GABAPENTIN 300 MG: 300 CAPSULE ORAL at 20:19

## 2025-06-28 RX ADMIN — METOPROLOL TARTRATE 25 MG: 25 TABLET, FILM COATED ORAL at 20:19

## 2025-06-28 RX ADMIN — ENOXAPARIN SODIUM 40 MG: 100 INJECTION SUBCUTANEOUS at 08:50

## 2025-06-28 RX ADMIN — TAMSULOSIN HYDROCHLORIDE 0.4 MG: 0.4 CAPSULE ORAL at 08:50

## 2025-06-28 RX ADMIN — Medication 100 MG: at 08:50

## 2025-06-28 RX ADMIN — AMPICILLIN SODIUM 2 G: 2 INJECTION, POWDER, FOR SOLUTION INTRAMUSCULAR; INTRAVENOUS at 06:11

## 2025-06-28 RX ADMIN — ALLOPURINOL 300 MG: 100 TABLET ORAL at 08:49

## 2025-06-28 RX ADMIN — Medication 10 ML: at 20:19

## 2025-06-28 RX ADMIN — AMPICILLIN SODIUM 2 G: 2 INJECTION, POWDER, FOR SOLUTION INTRAMUSCULAR; INTRAVENOUS at 14:02

## 2025-06-28 RX ADMIN — SODIUM CHLORIDE 100 ML/HR: 9 INJECTION, SOLUTION INTRAVENOUS at 06:12

## 2025-06-28 RX ADMIN — FERROUS SULFATE TAB 325 MG (65 MG ELEMENTAL FE) 325 MG: 325 (65 FE) TAB at 08:49

## 2025-06-28 RX ADMIN — AMPICILLIN SODIUM 2 G: 2 INJECTION, POWDER, FOR SOLUTION INTRAMUSCULAR; INTRAVENOUS at 11:51

## 2025-06-28 RX ADMIN — PANTOPRAZOLE SODIUM 40 MG: 40 TABLET, DELAYED RELEASE ORAL at 20:19

## 2025-06-28 RX ADMIN — PANTOPRAZOLE SODIUM 40 MG: 40 TABLET, DELAYED RELEASE ORAL at 08:50

## 2025-06-28 RX ADMIN — AMPICILLIN SODIUM 2 G: 2 INJECTION, POWDER, FOR SOLUTION INTRAMUSCULAR; INTRAVENOUS at 18:38

## 2025-06-28 RX ADMIN — Medication 1000 MCG: at 08:49

## 2025-06-28 RX ADMIN — GABAPENTIN 300 MG: 300 CAPSULE ORAL at 08:50

## 2025-06-28 RX ADMIN — FOLIC ACID 1000 MCG: 1 TABLET ORAL at 08:50

## 2025-06-28 RX ADMIN — AMPICILLIN SODIUM 2 G: 2 INJECTION, POWDER, FOR SOLUTION INTRAMUSCULAR; INTRAVENOUS at 01:54

## 2025-06-28 NOTE — PLAN OF CARE
Goal Outcome Evaluation:  Plan of Care Reviewed With: patient        Progress: no change  Outcome Evaluation: Patients vitals stable. patient denies pain. Patient tolerating IV antibiotics without difficulty. patient denies any complaints. Will continue to monitor.               Problem: Adult Inpatient Plan of Care  Goal: Plan of Care Review  Outcome: Progressing  Flowsheets (Taken 6/28/2025 0526)  Progress: no change  Outcome Evaluation: Patients vitals stable. patient denies pain. Patient tolerating IV antibiotics without difficulty. patient denies any complaints. Will continue to monitor.  Plan of Care Reviewed With: patient     Problem: Comorbidity Management  Goal: Blood Pressure in Desired Range  Outcome: Progressing  Intervention: Maintain Blood Pressure Management  Recent Flowsheet Documentation  Taken 6/27/2025 2056 by Anahy Jeter RN  Medication Review/Management: medications reviewed     Problem: Infection  Goal: Absence of Infection Signs and Symptoms  Outcome: Progressing  Goal: Absence of Infection Signs and Symptoms  Outcome: Progressing     Problem: Fall Injury Risk  Goal: Absence of Fall and Fall-Related Injury  Outcome: Progressing  Intervention: Identify and Manage Contributors  Recent Flowsheet Documentation  Taken 6/27/2025 2056 by Anahy Jeter RN  Medication Review/Management: medications reviewed  Intervention: Promote Injury-Free Environment  Recent Flowsheet Documentation  Taken 6/28/2025 0407 by Anahy Jeter RN  Safety Promotion/Fall Prevention: safety round/check completed  Taken 6/28/2025 0200 by Anahy Jeter RN  Safety Promotion/Fall Prevention: safety round/check completed  Taken 6/28/2025 0015 by Anahy Jeter RN  Safety Promotion/Fall Prevention: safety round/check completed  Taken 6/27/2025 2220 by Anahy Jeter RN  Safety Promotion/Fall Prevention: safety round/check completed  Taken 6/27/2025 2056 by Anahy Jeter RN  Safety Promotion/Fall  Prevention:   activity supervised   assistive device/personal items within reach   clutter free environment maintained   fall prevention program maintained   lighting adjusted   nonskid shoes/slippers when out of bed   safety round/check completed

## 2025-06-28 NOTE — PROGRESS NOTES
Patient Name: Mo Willoughby  Age/Sex: 69 y.o. male  : 1955  MRN: [id number]    Date of Admission: 2025  Date of Encounter Visit: 25  Encounter Provider: Jose Timmons MD   Place of Service: [facility] CARDIOLOGY    Admit Diagnosis: Bacteremia [R78.81]  Fever in adult [R50.9]  Urinary tract infection in male [N39.0]    Subjective:     60 cm gentleman past medical history of hypertension, SVT, previous concern for A-fib and was temporally treated with apixaban    Initially presented on 2025 with concerns of bacteremia due to Enterococcus, likely  source.  No evidence of vegetation on TTE    Cardiology consulted for tachycardia.  Identified to have sinus with PACs.  Review of previous workup showed no definitive A-fib.      Current Medications:    Current Facility-Administered Medications:     acetaminophen (TYLENOL) tablet 650 mg, 650 mg, Oral, Q4H PRN, Chet Jones MD, 650 mg at 25 0416    allopurinol (ZYLOPRIM) tablet 300 mg, 300 mg, Oral, Daily, Chet Jones MD, 300 mg at 25 0849    ampicillin 2000 mg IVPB in 100 mL NS (MBP), 2 g, Intravenous, Q4H, Chet Jones MD, Last Rate: 200 mL/hr at 25 0611, 2 g at 25 0611    sennosides-docusate (PERICOLACE) 8.6-50 MG per tablet 2 tablet, 2 tablet, Oral, BID PRN **AND** polyethylene glycol (MIRALAX) packet 17 g, 17 g, Oral, Daily PRN **AND** bisacodyl (DULCOLAX) EC tablet 5 mg, 5 mg, Oral, Daily PRN **AND** bisacodyl (DULCOLAX) suppository 10 mg, 10 mg, Rectal, Daily PRN, Chet Jones MD    Calcium Replacement - Follow Nurse / BPA Driven Protocol, , Not Applicable, PRN, Chet Jones MD    enoxaparin sodium (LOVENOX) syringe 40 mg, 40 mg, Subcutaneous, Daily, Chet Jones MD, 40 mg at 25 0850    ferrous sulfate tablet 325 mg, 325 mg, Oral, Daily With Breakfast, Chet Jones MD, 325 mg at  06/28/25 0849    folic acid (FOLVITE) tablet 1,000 mcg, 1,000 mcg, Oral, Daily, Chet Jones MD, 1,000 mcg at 06/28/25 0850    gabapentin (NEURONTIN) capsule 300 mg, 300 mg, Oral, BID, Chet Jones MD, 300 mg at 06/28/25 0850    [Held by provider] lisinopril (PRINIVIL,ZESTRIL) tablet 10 mg, 10 mg, Oral, Daily, Chet Jones MD    [Held by provider] lisinopril (PRINIVIL,ZESTRIL) tablet 5 mg, 5 mg, Oral, Daily, Chet Jones MD    Magnesium Standard Dose Replacement - Follow Nurse / BPA Driven Protocol, , Not Applicable, PRN, Chet Jones MD    metoprolol tartrate (LOPRESSOR) tablet 25 mg, 25 mg, Oral, Q12H, Nicolasa Cueva APRN, 25 mg at 06/28/25 0850    ondansetron ODT (ZOFRAN-ODT) disintegrating tablet 4 mg, 4 mg, Oral, Q6H PRN, Chet Jones MD    pantoprazole (PROTONIX) EC tablet 40 mg, 40 mg, Oral, BID, Chet Jones MD, 40 mg at 06/28/25 0850    Phosphorus Replacement - Follow Nurse / BPA Driven Protocol, , Not Applicable, PRN, Chet Jones MD    Potassium Replacement - Follow Nurse / BPA Driven Protocol, , Not Applicable, PRN, Chet Jones MD    rosuvastatin (CRESTOR) tablet 10 mg, 10 mg, Oral, Daily, Chet Jones MD, 10 mg at 06/28/25 0849    sodium chloride 0.9 % flush 10 mL, 10 mL, Intravenous, Q12H, Chet Jones MD, 10 mL at 06/27/25 2056    sodium chloride 0.9 % flush 10 mL, 10 mL, Intravenous, PRN, Chet Jones MD    sodium chloride 0.9 % infusion 40 mL, 40 mL, Intravenous, PRN, Chet Jones, MD    tamsulosin (FLOMAX) 24 hr capsule 0.4 mg, 0.4 mg, Oral, Daily, Chet Jones MD, 0.4 mg at 06/28/25 0850    thiamine (VITAMIN B-1) tablet 100 mg, 100 mg, Oral, Daily, Chet Jones MD, 100 mg at 06/28/25 0850    vitamin B-12 (CYANOCOBALAMIN) tablet 1,000 mcg, 1,000 mcg, Oral, Daily, Chet Jones,  MD, 1,000 mcg at 06/28/25 0849    Prior to Admission Medications:  Medications Prior to Admission   Medication Sig Dispense Refill Last Dose/Taking    allopurinol (ZYLOPRIM) 300 MG tablet Take 1 tablet by mouth Daily. 90 tablet 2 6/26/2025 Morning    cephalexin (KEFLEX) 500 MG capsule Take 1 capsule by mouth 2 (Two) Times a Day for 7 days. 14 capsule 0 6/26/2025 Morning    ferrous sulfate 325 (65 FE) MG tablet Take 1 tablet by mouth Daily With Breakfast.   6/26/2025 Morning    folic acid (FOLVITE) 1 MG tablet TAKE 1 TABLET BY MOUTH DAILY 90 tablet 1 6/26/2025 Morning    gabapentin (NEURONTIN) 300 MG capsule Take 1 capsule by mouth 2 (Two) Times a Day. 60 capsule 5 6/26/2025 Morning    lisinopril (PRINIVIL,ZESTRIL) 10 MG tablet Take 1 tablet by mouth Daily.   6/26/2025 Morning    lisinopril (PRINIVIL,ZESTRIL) 5 MG tablet Take 1 tablet by mouth Daily.   6/26/2025 Morning    pantoprazole (PROTONIX) 40 MG EC tablet TAKE 1 TABLET BY MOUTH TWICE DAILY 180 tablet 1 6/26/2025 Morning    rosuvastatin (CRESTOR) 10 MG tablet TAKE 1 TABLET BY MOUTH DAILY 90 tablet 1 6/26/2025 Morning    tamsulosin (FLOMAX) 0.4 MG capsule 24 hr capsule Take 1 capsule by mouth Daily. 30 capsule 2 6/25/2025 Bedtime    thiamine (VITAMIN B-1) 100 MG tablet  tablet Take 1 tablet by mouth Daily.   6/26/2025 Morning    vitamin B-12 (CYANOCOBALAMIN) 1000 MCG tablet Take 1 tablet by mouth Daily. HOLD FOR SURGERY ONE WEEK PRIOR   6/26/2025 Morning    metoprolol succinate XL (TOPROL-XL) 25 MG 24 hr tablet TAKE 1 TABLET BY MOUTH DAILY 90 tablet 3        Past Medical History     Past Medical History:   Diagnosis Date    Abnormal TSH     Arthritis     Ascending aortic aneurysm     Colon polyp 5/5/22    CRI (chronic renal insufficiency), stage 3 (moderate) 2016    from stage 3A to 4    DDD (degenerative disc disease), lumbar     ED (erectile dysfunction)     Erectile dysfunction     Essential hypertension, benign     Folic acid deficiency     Gout     Hip  pain, right     Hx of colonic polyp     Hypercalcemia 2015    as far back as data goes so likely pre-dates even this time    Hyperparathyroidism, unspecified 2016    as far back as data goes likely pre-dates even this date, likely multifactorial some primary and some secondary , w/ imaging from 10/16 showing possible L inferior adenoma    Iron deficiency anemia     Low back pain with bilateral sciatica     Mixed hyperlipidemia 02/27/2023    New onset atrial fibrillation 04/15/2024    Pancreatitis     Risk factors for obstructive sleep apnea     Spinal stenosis of lumbar region with neurogenic claudication     Syncope and collapse 03/28/2017     Past Surgical History:   Procedure Laterality Date    BACK SURGERY  05/08/25    CHOLECYSTECTOMY      CHOLECYSTECTOMY WITH INTRAOPERATIVE CHOLANGIOGRAM N/A 08/03/2018    Procedure: CHOLECYSTECTOMY LAPAROSCOPIC INTRAOPERATIVE CHOLANGIOGRAM;  Surgeon: Melina Kate MD;  Location: [facility] MAIN OR;  Service: General    COLONOSCOPY      COLONOSCOPY N/A 10/03/2016    Procedure: COLONOSCOPY TO CECUM TO TERMINAL ILIUM WITH COLD BIOPSY POLYPECTOMY;  Surgeon: Benoit Vilchis MD;  Location: [facility] ENDOSCOPY;  Service:     COLONOSCOPY N/A 05/05/2022    Procedure: COLONOSCOPY TO CECUM WITH COLD SNARE POLYPECTOMIES & RESOLUTION CLIP PLACEMENT X 2;  Surgeon: Benoit Mosley MD;  Location: [facility] ENDOSCOPY;  Service: Gastroenterology;  Laterality: N/A;  ANEMIA/POLYPS, HEMORRHOIDS     ENDOSCOPY N/A 05/05/2022    Procedure: ESOPHAGOGASTRODUODENOSCOPY WITH BX;  Surgeon: Benoit Mosley MD;  Location: [facility] ENDOSCOPY;  Service: Gastroenterology;  Laterality: N/A;  ANEMIA/PORTAL GASTROPATHY, EROSIVE GASTRITIS     EPIDURAL Right 12/07/2022    Procedure: LUMBAR/SACRAL TRANSFORAMINAL EPIDURAL Right L2-3 and right L4-5;  Surgeon: Isaura Torres MD;  Location: [facility] MAIN OR;  Service: Pain Management;  Laterality: Right;    LIPOMA EXCISION      LUMBAR DISCECTOMY FUSION  INSTRUMENTATION N/A 05/08/2025    Procedure: Open thoracic twelve/lumbar one discectomy/decompression/possible interbody fusion with cages, pedicle screw/terry/crosslink fixation, thoracic twelve/lumbar one/two with Mazor robotic navigation;  Surgeon: Abel Perez MD;  Location: [facility] MAIN OR;  Service: Robotics - Neuro;  Laterality: N/A;    LUMBAR LAMINECTOMY DISCECTOMY DECOMPRESSION N/A 07/07/2023    Procedure: Open right sided lumbar decompression lumbar three/four, lumbar four/five;  Surgeon: Abel Perez MD;  Location:  [facility] MAIN OR;  Service: Neurosurgery;  Laterality: N/A;    MEDIAL BRANCH BLOCK Right 01/23/2023    Procedure: LUMBAR MEDIAL BRANCH BLOCK RIGHT L3-L5 #1;  Surgeon: Isaura Torres MD;  Location: [facility];  Service: Pain Management;  Laterality: Right;    MEDIAL BRANCH BLOCK Right 02/13/2023    Procedure: LUMBAR MEDIAL BRANCH BLOCK RIGHT L3-L5 #1;  Surgeon: Isaura Torres MD;  Location: [facility];  Service: Pain Management;  Laterality: Right;    NERVE SURGERY Right 03/06/2023    Procedure: LUMBAR RADIOFREQUENCY ABLATION RIGHT L3-L5  09216, 62617;  Surgeon: Isaura Torres MD;  Location: [facility];  Service: Pain Management;  Laterality: Right;    SACROILIAC JOINT INJECTION Right 05/05/2023    Procedure: SACROILIAC JOINT INJECTION RIGHT 43291;  Surgeon: Isaura Torres MD;  Location: [facility];  Service: Pain Management;  Laterality: Right;    TONSILLECTOMY         Family History     Family History   Problem Relation Age of Onset    Hypertension Mother     Breast cancer Mother     Cancer Mother     Stroke Mother     Cancer Father     Liver cancer Father     Hypertension Father     Diabetes Father     Hypertension Sister     Heart attack Sister     Hypertension Brother     Cancer Brother     Hypertension Sister     Heart attack Sister     Learning disabilities Neg Hx          Social History     Social History     Socioeconomic History    Marital status: Single    Tobacco Use    Smoking status: Never    Smokeless tobacco: Never   Vaping Use    Vaping status: Never Used   Substance and Sexual Activity    Alcohol use: Yes     Comment: occasional    Drug use: No    Sexual activity: Yes     Partners: Female       Allergies:  Allergies   Allergen Reactions    Zetia [Ezetimibe] Dizziness     Nausea, fatgue            Objective:     Objective:  Temp:  [98.6 °F (37 °C)-100 °F (37.8 °C)] 100 °F (37.8 °C)  Heart Rate:  [] 96  Resp:  [16-18] 18  BP: (125-154)/(74-94) 129/84  No intake or output data in the 24 hours ending 06/28/25 1045  Body mass index is 30.79 kg/m².      06/26/25  1821 06/27/25  1103   Weight: 102 kg (225 lb) 102 kg (224 lb 13.9 oz)                 Physical Exam:   Gen: Well nourished; Alert  Skin: no visible rashes and no abnormalities with palpation  Eyes: no evidence of visual defects and normal sclera  Ears: no abnormalities.  Mouth: normal teeth and appropriately moist mucous membranes  Chest: clear to auscultation. There is normal respiratory effort and expansion.  CV: examination showed a regular rhythm. S1 and S2 were normal.   Abd: no visible distension.   Neurologic:Cranial nerves appear intact; Sensation normal; no localizing signs    Lab Review:     Results from last 7 days   Lab Units 06/28/25  0608 06/27/25  1716 06/27/25  0746 06/26/25  1814   SODIUM mmol/L 139  --  139 138   POTASSIUM mmol/L 3.8 4.2 3.5 4.1   CHLORIDE mmol/L 110*  --  110* 107   CO2 mmol/L 19.1*  --  20.6* 17.7*   BUN mg/dL 19.0  --  19.0 20.0   CREATININE mg/dL 1.05  --  1.12 1.38*   GLUCOSE mg/dL 86  --  97 116*   CALCIUM mg/dL 9.2  --  9.2 9.8           Results from last 7 days   Lab Units 06/28/25  0608   WBC 10*3/mm3 5.27   HEMOGLOBIN g/dL 9.4*   HEMATOCRIT % 27.6*   PLATELETS 10*3/mm3 78*                                   Imaging:    chest X-ray    Results for orders placed during the hospital encounter of 06/26/25    Adult Transthoracic Echo Complete W/ Cont if Necessary  Per Protocol    Interpretation Summary    Left ventricular systolic function is low normal. Calculated left ventricular EF = 46.7%    Left ventricular wall thickness is consistent with moderate septal asymmetric hypertrophy.    Left ventricular diastolic function was indeterminate.    The left atrial cavity is mild to moderately dilated.    Moderate dilation of the aortic root is present. The aortic root measures 4.5 cm.        I personally viewed and interpreted the patient's EKG/Telemetry data.    Assessment/ Plan:     Bacteremia due to Enterococcus    OTF (acute kidney injury)    Stage 3a chronic kidney disease    Foot drop, left    Weakness of left lower extremity    Acute UTI (urinary tract infection)      # Sinus tachycardia, PACs  - Patient remains in sinus with PACs.  - Will recommend outpatient ZIO 2 weeks.  Per my discussion with the patient today, patient wishes to be seen as an outpatient first and have this done as an outpatient.    Jose Timmons MD  [facility]  06/28/25  10:45 EDT

## 2025-06-28 NOTE — PROGRESS NOTES
Name: Mo Willoughby ADMIT: 2025   : 1955  PCP: Jenn Davison APRN    MRN: [id number] LOS: 2 days   AGE/SEX: 69 y.o. male  ROOM: [facility]     Subjective   Subjective   No acute events overnight.  Patient states he is feeling well today.  Denies any pain.  No shortness of breath.  Eager for discharge.  He was afebrile overnight.    Objective   Objective     Vital Signs  Temp:  [98.6 °F (37 °C)-100 °F (37.8 °C)] 100 °F (37.8 °C)  Heart Rate:  [] 96  Resp:  [16-18] 18  BP: (125-154)/(74-94) 129/84  SpO2:  [95 %-99 %] 99 %  on   ;   Device (Oxygen Therapy): room air  Body mass index is 30.79 kg/m².    Physical Exam  General: Alert, no acute distress.  Lying in bed.  Chronically ill-appearing.  ENT: No conjunctival injection or scleral icterus. Moist mucous membranes.   Neuro: Eyes open and moving in all directions, moving all extremities, face symmetric, no focal deficits.   Lungs: Clear to auscultation bilaterally. No wheeze or crackles. No distress.   Heart: RRR, no murmurs.   Abdomen: Soft, non-tender, non-distended. Normal bowel sounds.  Ext: Warm and well-perfused.  Skin: No rashes or lesions. IV site without swelling or erythema.     Results Review     I reviewed the patient's new clinical results:  Results from last 7 days   Lab Units 25  0608 25  0746 25  1814 25  2242   WBC 10*3/mm3 5.27 7.13 8.47 7.12   HEMOGLOBIN g/dL 9.4* 9.5* 11.0* 10.9*   PLATELETS 10*3/mm3 78* 76* 94* 95*     Results from last 7 days   Lab Units 25  0608 25  1716 25  0746 25  1814 25  2242   SODIUM mmol/L 139  --  139 138 136   POTASSIUM mmol/L 3.8 4.2 3.5 4.1 4.0   CHLORIDE mmol/L 110*  --  110* 107 102   CO2 mmol/L 19.1*  --  20.6* 17.7* 21.8*   BUN mg/dL 19.0  --  19.0 20.0 18.0   CREATININE mg/dL 1.05  --  1.12 1.38* 1.34*   GLUCOSE mg/dL 86  --  97 116* 124*   EGFR mL/min/1.73 77.8  --  72.0 56.0* 58.1*     Results from last 7 days   Lab Units  "06/25/25  2242   ALBUMIN g/dL 3.6   BILIRUBIN mg/dL 1.1   ALK PHOS U/L 91   AST (SGOT) U/L 30   ALT (SGPT) U/L 15     Results from last 7 days   Lab Units 06/28/25  0608 06/27/25  0746 06/26/25  1814 06/25/25  2242   CALCIUM mg/dL 9.2 9.2 9.8 10.0   ALBUMIN g/dL  --   --   --  3.6     Results from last 7 days   Lab Units 06/26/25  2111 06/26/25  1814 06/25/25  2242   LACTATE mmol/L 1.0 2.7* 1.2     No results found for: \"HGBA1C\", \"POCGLU\"    No radiology results for the last day      I have personally reviewed all medications:  Scheduled Medications  allopurinol, 300 mg, Oral, Daily  ampicillin, 2 g, Intravenous, Q4H  enoxaparin sodium, 40 mg, Subcutaneous, Daily  ferrous sulfate, 325 mg, Oral, Daily With Breakfast  folic acid, 1,000 mcg, Oral, Daily  gabapentin, 300 mg, Oral, BID  [Held by provider] lisinopril, 10 mg, Oral, Daily  [Held by provider] lisinopril, 5 mg, Oral, Daily  metoprolol tartrate, 25 mg, Oral, Q12H  pantoprazole, 40 mg, Oral, BID  rosuvastatin, 10 mg, Oral, Daily  sodium chloride, 10 mL, Intravenous, Q12H  tamsulosin, 0.4 mg, Oral, Daily  thiamine, 100 mg, Oral, Daily  vitamin B-12, 1,000 mcg, Oral, Daily    Infusions  sodium chloride, 100 mL/hr, Last Rate: 100 mL/hr (06/28/25 0612)    Diet  Diet: Regular/House; No Pork; Fluid Consistency: Thin (IDDSI 0)    No intake or output data in the 24 hours ending 06/28/25 1000      Assessment/Plan     Active Hospital Problems    Diagnosis  POA    **Bacteremia due to Enterococcus [R78.81, B95.2]  Yes    Acute UTI (urinary tract infection) [N39.0]  Yes    Weakness of left lower extremity [R29.898]  Yes    Foot drop, left [M21.372]  Yes    OTF (acute kidney injury) [N17.9]  Yes    Stage 3a chronic kidney disease [N18.31]  Yes      Resolved Hospital Problems   No resolved problems to display.       69 y.o. male with Bacteremia due to Enterococcus.    Enterococcus bacteremia  Urinary tract infection  - Blood culture positive for Enterococcus faecalis  - " Continue ampicillin  - ID consulted  - Echo with EF 46.7%, indeterminate diastolic function, no obvious vegetations  - Repeat blood cultures today to document sterility  - Plan for discharge home tomorrow on oral antibiotics if cultures no growth x 24 hours    CKD stage III  - Creatinine now consistent with baseline  - Discontinue IV fluids  - Avoid nephrotoxic agents including NSAIDs and contrast dyes  - Repeat BMP with morning labs    Atrial fibrillation with RVR  Hypertension  - Questionable history of A-fib but Holter monitor did not show definitive A-fib  - Continue metoprolol  -Holding home lisinopril  - HR persistently elevated and cardiology consulted.  [place] that EKG looked most consistent with PACs.  No plans for anticoagulation at this time.  Will follow-up with cardiology after discharge.  Heart rate much better today.    Thrombocytopenia  Anemia  -Hgb stable today 9.4, platelets stable at 78K  -Both cell counts are below baseline, suspect infection is contributing  - B12 and folate appropriate  - Repeat CBC with morning labs, transfuse for Hb less than 7    Spinal stenosis s/p lumbar spine surgery  - PT/OT consult      SCDs for DVT prophylaxis.  Full code.  Discussed with patient, nursing staff, and consulting provider.  Anticipate discharge home timing yet to be determined.      Phuong Villavicencio MD  [facility]  06/28/25  10:00 EDT

## 2025-06-28 NOTE — PROGRESS NOTES
ID NOTE    CC: f/u Enterococcal bacteremia     Subj: No acute events.  No fever.  He is tolerating ampicillin without any side effects.  He is very eager for discharge.  He denies any urinary symptoms.      Medications:    Current Facility-Administered Medications:     acetaminophen (TYLENOL) tablet 650 mg, 650 mg, Oral, Q4H PRN, Chet Jones MD, 650 mg at 06/27/25 0416    allopurinol (ZYLOPRIM) tablet 300 mg, 300 mg, Oral, Daily, Chet Jones MD, 300 mg at 06/27/25 0950    ampicillin 2000 mg IVPB in 100 mL NS (MBP), 2 g, Intravenous, Q4H, Chet Jones MD, Last Rate: 200 mL/hr at 06/28/25 0611, 2 g at 06/28/25 0611    sennosides-docusate (PERICOLACE) 8.6-50 MG per tablet 2 tablet, 2 tablet, Oral, BID PRN **AND** polyethylene glycol (MIRALAX) packet 17 g, 17 g, Oral, Daily PRN **AND** bisacodyl (DULCOLAX) EC tablet 5 mg, 5 mg, Oral, Daily PRN **AND** bisacodyl (DULCOLAX) suppository 10 mg, 10 mg, Rectal, Daily PRN, Chet Jones MD    Calcium Replacement - Follow Nurse / BPA Driven Protocol, , Not Applicable, PRN, Chet Jones MD    enoxaparin sodium (LOVENOX) syringe 40 mg, 40 mg, Subcutaneous, Daily, Chet Jones MD, 40 mg at 06/27/25 0950    ferrous sulfate tablet 325 mg, 325 mg, Oral, Daily With Breakfast, Chet Jones MD, 325 mg at 06/27/25 0950    folic acid (FOLVITE) tablet 1,000 mcg, 1,000 mcg, Oral, Daily, Chet Jones MD, 1,000 mcg at 06/27/25 0950    gabapentin (NEURONTIN) capsule 300 mg, 300 mg, Oral, BID, Chet Jones MD, 300 mg at 06/27/25 2056    [Held by provider] lisinopril (PRINIVIL,ZESTRIL) tablet 10 mg, 10 mg, Oral, Daily, Chet Jones MD    [Held by provider] lisinopril (PRINIVIL,ZESTRIL) tablet 5 mg, 5 mg, Oral, Daily, Chet Jones MD    Magnesium Standard Dose Replacement - Follow Nurse / BPA Driven Protocol, , Not Applicable, PRNRobert,  Chet Nugent MD    metoprolol tartrate (LOPRESSOR) tablet 25 mg, 25 mg, Oral, Q12H, Nicolasa Cueva, APRN, 25 mg at 06/27/25 2056    ondansetron ODT (ZOFRAN-ODT) disintegrating tablet 4 mg, 4 mg, Oral, Q6H PRN, Chet Jones MD    pantoprazole (PROTONIX) EC tablet 40 mg, 40 mg, Oral, BID, Chet Jones MD, 40 mg at 06/27/25 2056    Phosphorus Replacement - Follow Nurse / BPA Driven Protocol, , Not Applicable, PRN, Chet Jones MD    Potassium Replacement - Follow Nurse / BPA Driven Protocol, , Not Applicable, PRN, Chet Jones MD    rosuvastatin (CRESTOR) tablet 10 mg, 10 mg, Oral, Daily, Chet Jones MD, 10 mg at 06/27/25 0949    sodium chloride 0.9 % flush 10 mL, 10 mL, Intravenous, Q12H, Chet Jones MD, 10 mL at 06/27/25 2056    sodium chloride 0.9 % flush 10 mL, 10 mL, Intravenous, PRN, Chet Jones MD    sodium chloride 0.9 % infusion 40 mL, 40 mL, Intravenous, PRN, Chet Jones MD    sodium chloride 0.9 % infusion, 100 mL/hr, Intravenous, Continuous, Phuong Villavicencio MD, Last Rate: 100 mL/hr at 06/28/25 0612, 100 mL/hr at 06/28/25 0612    tamsulosin (FLOMAX) 24 hr capsule 0.4 mg, 0.4 mg, Oral, Daily, Chet Jones MD, 0.4 mg at 06/27/25 0950    thiamine (VITAMIN B-1) tablet 100 mg, 100 mg, Oral, Daily, Chet Jones MD, 100 mg at 06/27/25 0950    vitamin B-12 (CYANOCOBALAMIN) tablet 1,000 mcg, 1,000 mcg, Oral, Daily, Chet Jones MD, 1,000 mcg at 06/27/25 0949      Objective   Vital Signs   Temp:  [98.6 °F (37 °C)-100 °F (37.8 °C)] 100 °F (37.8 °C)  Heart Rate:  [] 96  Resp:  [16-18] 18  BP: (125-154)/(74-94) 129/84    Physical Exam:   General: awake, alert, NAD, very nice, lying in bed  Eyes: no scleral icterus  Cardiovascular: NR, no murmur  Respiratory: normal work of breathing without wheezing  GI: Abdomen is soft, not tender, not distended  :  no  Phillips catheter  Neurological: Alert and oriented x 3  Psychiatric: Normal mood and affect   Vasc: PIV w/o erythema    Labs:   CBC, BMP, and blood cultures reviewed today  Lab Results   Component Value Date    WBC 5.27 06/28/2025    HGB 9.4 (L) 06/28/2025    HCT 27.6 (L) 06/28/2025    .0 (H) 06/28/2025    PLT 78 (L) 06/28/2025       Lab Results   Component Value Date    GLUCOSE 86 06/28/2025    CALCIUM 9.2 06/28/2025     06/28/2025    K 3.8 06/28/2025    CO2 19.1 (L) 06/28/2025     (H) 06/28/2025    BUN 19.0 06/28/2025    CREATININE 1.05 06/28/2025    EGFR 77.8 06/28/2025    BCR 18.1 06/28/2025    ANIONGAP 9.9 06/28/2025     Lab Results   Component Value Date    ALT 15 06/25/2025     Lab Results   Component Value Date    HGBA1C 4.80 05/11/2025       LA 2.7--->1  UA TNTC WBCs    Microbiology:  6/25 RPP: Negative  6/25 BCx: Enterococcus faecalis (not VRE) in 1/2 sets (susceptible to ampicillin)  6/26 UCX: 100 K GPC  6/28 BCx: NGTD    Radiology:  TTE negative for vegetations    Isolation:  No active isolations    ASSESSMENT/PLAN:  Enterococcus faecalis septicemia due to  source  History of urinary retention and Phillips catheter during recent hospital stay  Urinary incontinence  Status post lumbar discectomy and fusion  Obesity BMI 30  Alcohol use    He is afebrile with a normal WBC.  His TTE was negative for vegetations.  I ordered a repeat blood culture this morning to document sterility.    While in the hospital, I recommend that he continue ampicillin 2 g IV every 4 hours.  At discharge, I plan to change him to amoxicillin 1 g p.o. every 8 hours with stop date 7/10/2025.  Please send this meds to bed.    I would like to make sure that his blood culture drawn today is negative at 24 hours tomorrow prior to discharge.    Case and plan D/W Dr. Villavicencio.    I will check on him again tomorrow and implement the above plan.

## 2025-06-29 ENCOUNTER — READMISSION MANAGEMENT (OUTPATIENT)
Dept: CALL CENTER | Facility: HOSPITAL | Age: 70
End: 2025-06-29
Payer: MEDICARE

## 2025-06-29 VITALS
DIASTOLIC BLOOD PRESSURE: 86 MMHG | TEMPERATURE: 98.1 F | OXYGEN SATURATION: 98 % | RESPIRATION RATE: 16 BRPM | HEART RATE: 102 BPM | HEIGHT: 72 IN | BODY MASS INDEX: 30.46 KG/M2 | WEIGHT: 224.87 LBS | SYSTOLIC BLOOD PRESSURE: 135 MMHG

## 2025-06-29 PROBLEM — N17.9 AKI (ACUTE KIDNEY INJURY): Status: RESOLVED | Noted: 2018-08-01 | Resolved: 2025-06-29

## 2025-06-29 LAB
ANION GAP SERPL CALCULATED.3IONS-SCNC: 11 MMOL/L (ref 5–15)
BUN SERPL-MCNC: 21 MG/DL (ref 8–23)
BUN/CREAT SERPL: 18.4 (ref 7–25)
CALCIUM SPEC-SCNC: 9.1 MG/DL (ref 8.6–10.5)
CHLORIDE SERPL-SCNC: 110 MMOL/L (ref 98–107)
CO2 SERPL-SCNC: 20 MMOL/L (ref 22–29)
CREAT SERPL-MCNC: 1.14 MG/DL (ref 0.76–1.27)
DEPRECATED RDW RBC AUTO: 48.9 FL (ref 37–54)
EGFRCR SERPLBLD CKD-EPI 2021: 70.5 ML/MIN/1.73
ERYTHROCYTE [DISTWIDTH] IN BLOOD BY AUTOMATED COUNT: 13.5 % (ref 12.3–15.4)
GLUCOSE SERPL-MCNC: 94 MG/DL (ref 65–99)
HCT VFR BLD AUTO: 27.4 % (ref 37.5–51)
HGB BLD-MCNC: 9.4 G/DL (ref 13–17.7)
MCH RBC QN AUTO: 34.2 PG (ref 26.6–33)
MCHC RBC AUTO-ENTMCNC: 34.3 G/DL (ref 31.5–35.7)
MCV RBC AUTO: 99.6 FL (ref 79–97)
PLATELET # BLD AUTO: 89 10*3/MM3 (ref 140–450)
PMV BLD AUTO: 11.5 FL (ref 6–12)
POTASSIUM SERPL-SCNC: 3.8 MMOL/L (ref 3.5–5.2)
RBC # BLD AUTO: 2.75 10*6/MM3 (ref 4.14–5.8)
SODIUM SERPL-SCNC: 141 MMOL/L (ref 136–145)
WBC NRBC COR # BLD AUTO: 4.63 10*3/MM3 (ref 3.4–10.8)

## 2025-06-29 PROCEDURE — G0545 PR INHERENT VISIT TO INPT: HCPCS | Performed by: INTERNAL MEDICINE

## 2025-06-29 PROCEDURE — 99232 SBSQ HOSP IP/OBS MODERATE 35: CPT | Performed by: INTERNAL MEDICINE

## 2025-06-29 PROCEDURE — 80048 BASIC METABOLIC PNL TOTAL CA: CPT | Performed by: STUDENT IN AN ORGANIZED HEALTH CARE EDUCATION/TRAINING PROGRAM

## 2025-06-29 PROCEDURE — 25010000002 AMPICILLIN PER 500 MG: Performed by: HOSPITALIST

## 2025-06-29 PROCEDURE — 25010000002 ENOXAPARIN PER 10 MG: Performed by: HOSPITALIST

## 2025-06-29 PROCEDURE — 85027 COMPLETE CBC AUTOMATED: CPT | Performed by: STUDENT IN AN ORGANIZED HEALTH CARE EDUCATION/TRAINING PROGRAM

## 2025-06-29 RX ORDER — AMOXICILLIN 500 MG/1
1000 CAPSULE ORAL EVERY 8 HOURS SCHEDULED
Qty: 72 CAPSULE | Refills: 0 | Status: SHIPPED | OUTPATIENT
Start: 2025-06-29 | End: 2025-06-29

## 2025-06-29 RX ORDER — AMOXICILLIN 250 MG/1
1000 CAPSULE ORAL EVERY 8 HOURS SCHEDULED
Status: DISCONTINUED | OUTPATIENT
Start: 2025-06-29 | End: 2025-06-29 | Stop reason: HOSPADM

## 2025-06-29 RX ORDER — AMOXICILLIN 500 MG/1
1000 CAPSULE ORAL EVERY 8 HOURS SCHEDULED
Qty: 72 CAPSULE | Refills: 0 | Status: SHIPPED | OUTPATIENT
Start: 2025-06-29 | End: 2025-07-11

## 2025-06-29 RX ADMIN — GABAPENTIN 300 MG: 300 CAPSULE ORAL at 08:16

## 2025-06-29 RX ADMIN — ALLOPURINOL 300 MG: 100 TABLET ORAL at 08:16

## 2025-06-29 RX ADMIN — FOLIC ACID 1000 MCG: 1 TABLET ORAL at 08:16

## 2025-06-29 RX ADMIN — METOPROLOL TARTRATE 25 MG: 25 TABLET, FILM COATED ORAL at 08:16

## 2025-06-29 RX ADMIN — PANTOPRAZOLE SODIUM 40 MG: 40 TABLET, DELAYED RELEASE ORAL at 08:16

## 2025-06-29 RX ADMIN — AMPICILLIN SODIUM 2 G: 2 INJECTION, POWDER, FOR SOLUTION INTRAMUSCULAR; INTRAVENOUS at 06:09

## 2025-06-29 RX ADMIN — ROSUVASTATIN CALCIUM 10 MG: 10 TABLET, FILM COATED ORAL at 08:16

## 2025-06-29 RX ADMIN — ENOXAPARIN SODIUM 40 MG: 100 INJECTION SUBCUTANEOUS at 08:16

## 2025-06-29 RX ADMIN — Medication 1000 MCG: at 08:16

## 2025-06-29 RX ADMIN — TAMSULOSIN HYDROCHLORIDE 0.4 MG: 0.4 CAPSULE ORAL at 08:16

## 2025-06-29 RX ADMIN — Medication 100 MG: at 08:16

## 2025-06-29 RX ADMIN — AMPICILLIN SODIUM 2 G: 2 INJECTION, POWDER, FOR SOLUTION INTRAMUSCULAR; INTRAVENOUS at 02:03

## 2025-06-29 RX ADMIN — FERROUS SULFATE TAB 325 MG (65 MG ELEMENTAL FE) 325 MG: 325 (65 FE) TAB at 08:16

## 2025-06-29 NOTE — OUTREACH NOTE
Prep Survey      Flowsheet Row Responses   [facility] patient discharged from? [facility]   Is LACE score < 7 ? No   Eligibility [facility]   Date of Admission 06/26/25   Date of Discharge 06/29/25   Discharge Disposition Home or Self Care   Discharge diagnosis Bacteremia due to Enterococcus   Does the patient have one of the following disease processes/diagnoses(primary or secondary)? Other   Does the patient have Home health ordered? No   Is there a DME ordered? No   Prep survey completed? Yes            SRIRAM MARES - Registered Nurse

## 2025-06-29 NOTE — DISCHARGE SUMMARY
Patient Name: Mo Willoughby  : 1955  MRN: [id number]    Date of Admission: 2025  Date of Discharge:  2025  Primary Care Physician: Jenn Davison APRN      Chief Complaint:   Abnormal Lab      Discharge Diagnoses     Active Hospital Problems    Diagnosis  POA    **Bacteremia due to Enterococcus [R78.81, B95.2]  Yes    Acute UTI (urinary tract infection) [N39.0]  Yes    Weakness of left lower extremity [R29.898]  Yes    Foot drop, left [M21.372]  Yes    Stage 3a chronic kidney disease [N18.31]  Yes      Resolved Hospital Problems    Diagnosis Date Resolved POA    OTF (acute kidney injury) [N17.9] 2025 Yes        Hospital Course     Mr. Willoughby is a 69 y.o. male with a history of CKD 3, hypertension, spinal stenosis status post recent lumbar surgery 1 month ago who presented with fevers and chills.  See H&P for details.  He had been evaluated in the ED, said to have a UTI and discharged on oral antibiotics but blood cultures came back positive for Enterococcus so he was contacted to return.  On return his blood cultures were repeated and have remained negative to date.  He was started on IV ampicillin and infectious disease was consulted.  Echocardiogram was done showing no obvious endocarditis.  They have made recommendations for antibiotics as of today and he will be discharged home as per his wishes.  He does have some chronic thrombocytopenia which is stable.      Day of Discharge     Subjective:  No events.  He feels good    Physical Exam:  Temp:  [98.1 °F (36.7 °C)-99.1 °F (37.3 °C)] 98.1 °F (36.7 °C)  Heart Rate:  [] 102  Resp:  [16-17] 16  BP: (115-135)/(81-87) 135/86  Body mass index is 30.79 kg/m².  Physical Exam  Vitals reviewed.   Constitutional:       General: He is not in acute distress.     Appearance: He is not ill-appearing.   Cardiovascular:      Rate and Rhythm: Normal rate.   Pulmonary:      Effort: No respiratory distress.   Skin:     General: Skin is warm and  dry.   Neurological:      Mental Status: He is alert.         Consultants     Consult Orders (all) (From admission, onward)       Start     Ordered    06/27/25 1349  Inpatient Cardiology Consult  Once        Specialty:  Cardiology  Provider:  Hazel Brown MD    06/27/25 1349    06/26/25 2114  Inpatient Infectious Diseases Consult  Once        Specialty:  Infectious Diseases  Provider:  Brain Pardo MD    06/26/25 2114 06/26/25 1852  [facility] (on-call MD unless specified) Details  Once,   Status:  Canceled        Specialty:  Hospitalist  Provider:  (Not yet assigned)    06/26/25 1852                  Procedures       Imaging Results (All)       None            Results for orders placed during the hospital encounter of 06/26/25    Adult Transthoracic Echo Complete W/ Cont if Necessary Per Protocol    Interpretation Summary    Left ventricular systolic function is low normal. Calculated left ventricular EF = 46.7%    Left ventricular wall thickness is consistent with moderate septal asymmetric hypertrophy.    Left ventricular diastolic function was indeterminate.    The left atrial cavity is mild to moderately dilated.    Moderate dilation of the aortic root is present. The aortic root measures 4.5 cm.    Pertinent Labs     Results from last 7 days   Lab Units 06/29/25  0340 06/28/25  0608 06/27/25  0746 06/26/25  1814   WBC 10*3/mm3 4.63 5.27 7.13 8.47   HEMOGLOBIN g/dL 9.4* 9.4* 9.5* 11.0*   PLATELETS 10*3/mm3 89* 78* 76* 94*     Results from last 7 days   Lab Units 06/29/25  0340 06/28/25  0608 06/27/25  1716 06/27/25  0746 06/26/25  1814   SODIUM mmol/L 141 139  --  139 138   POTASSIUM mmol/L 3.8 3.8 4.2 3.5 4.1   CHLORIDE mmol/L 110* 110*  --  110* 107   CO2 mmol/L 20.0* 19.1*  --  20.6* 17.7*   BUN mg/dL 21.0 19.0  --  19.0 20.0   CREATININE mg/dL 1.14 1.05  --  1.12 1.38*   GLUCOSE mg/dL 94 86  --  97 116*   EGFR mL/min/1.73 70.5 77.8  --  72.0 56.0*     Results from last 7 days   Lab Units  "06/25/25  2242   ALBUMIN g/dL 3.6   BILIRUBIN mg/dL 1.1   ALK PHOS U/L 91   AST (SGOT) U/L 30   ALT (SGPT) U/L 15     Results from last 7 days   Lab Units 06/29/25  0340 06/28/25  0608 06/27/25  0746 06/26/25  1814 06/25/25  2242   CALCIUM mg/dL 9.1 9.2 9.2 9.8 10.0   ALBUMIN g/dL  --   --   --   --  3.6               Invalid input(s): \"LDLCALC\"  Results from last 7 days   Lab Units 06/28/25  0608 06/26/25  0252 06/25/25  2246 06/25/25  2242   BLOODCX  No growth at 24 hours  --  No growth at 3 days Enterococcus faecalis*   URINECX   --  >100,000 CFU/mL Enterococcus faecalis*  --   --    BCIDPCR   --   --   --  Enterococcus faecalis. Trell/B (vancomycin resistance gene) not detected. Identification by BCID2 PCR.*     Results from last 7 days   Lab Units 06/25/25  2354   COVID19  Not Detected       Test Results Pending at Discharge     Pending Results       None              Discharge Details        Discharge Medications        New Medications        Instructions Start Date   amoxicillin 500 MG capsule  Commonly known as: AMOXIL   1,000 mg, Oral, Every 8 Hours Scheduled             Continue These Medications        Instructions Start Date   allopurinol 300 MG tablet  Commonly known as: ZYLOPRIM   300 mg, Oral, Daily      ferrous sulfate 325 (65 FE) MG tablet   325 mg, Daily With Breakfast      folic acid 1 MG tablet  Commonly known as: FOLVITE   1,000 mcg, Oral, Daily      gabapentin 300 MG capsule  Commonly known as: NEURONTIN   300 mg, Oral, 2 Times Daily      lisinopril 10 MG tablet  Commonly known as: PRINIVIL,ZESTRIL   10 mg, Oral, Daily      lisinopril 5 MG tablet  Commonly known as: PRINIVIL,ZESTRIL   5 mg, Oral, Daily      metoprolol succinate XL 25 MG 24 hr tablet  Commonly known as: TOPROL-XL   25 mg, Oral, Daily      pantoprazole 40 MG EC tablet  Commonly known as: PROTONIX   40 mg, Oral, 2 Times Daily      rosuvastatin 10 MG tablet  Commonly known as: CRESTOR   10 mg, Oral, Daily      tamsulosin 0.4 MG " capsule 24 hr capsule  Commonly known as: FLOMAX   0.4 mg, Oral, Daily      thiamine 100 MG tablet  tablet  Commonly known as: VITAMIN B-1   100 mg, Daily      vitamin B-12 1000 MCG tablet  Commonly known as: CYANOCOBALAMIN   1,000 mcg, Daily             Stop These Medications      cephalexin 500 MG capsule  Commonly known as: KEFLEX              Allergies   Allergen Reactions    Zetia [Ezetimibe] Dizziness     Nausea, fatgue       Discharge Disposition:  Home or Self Care      Discharge Diet:  No active diet order      Discharge Activity:       CODE STATUS:    Code Status and Medical Interventions: CPR (Attempt to Resuscitate); Full Support   Ordered at: 06/26/25 2114     Code Status (Patient has no pulse and is not breathing):    CPR (Attempt to Resuscitate)     Medical Interventions (Patient has pulse or is breathing):    Full Support       Future Appointments   Date Time Provider Department Center   7/8/2025 11:15 AM Hazel Brown MD [facility] [id number]   7/15/2025 11:15 AM Kyra Laws APRN [facility]   7/28/2025 11:00 AM Jenn Davison APRN [facility] PC FERN [facility]   7/29/2025  3:45 PM [facility] 2 [facility]   8/11/2025 12:30 PM Jann Chawla MD [facility] N [facility]   8/22/2025  1:00 PM Lashae Oneil PA-C [facility] GE EA [facility]   10/20/2025 10:00 AM Jenn Davison APRN [facility]   11/7/2025  9:00 AM Jordan Ram MD [facility]      Follow-up Information       Jenn Davison APRN Follow up in 1 week(s).    Specialty: Nurse Practitioner  Contact information:  [address] [place] [address]  [phone]                             Time Spent on Discharge:  Greater than 30 minutes      Chet Jones MD  [facility]  06/29/25  17:29 EDT

## 2025-06-29 NOTE — PROGRESS NOTES
ID NOTE    CC: f/u Enterococcal bacteremia     Subj: No acute events.  No fever.  No side effects from ampicillin.  WBC is normal.  The repeat blood culture drawn to document sterility is negative to date.      Medications:    Current Facility-Administered Medications:     acetaminophen (TYLENOL) tablet 650 mg, 650 mg, Oral, Q4H PRN, Chet Jones MD, 650 mg at 06/27/25 0416    allopurinol (ZYLOPRIM) tablet 300 mg, 300 mg, Oral, Daily, Chet Jones MD, 300 mg at 06/29/25 0816    amoxicillin (AMOXIL) capsule 1,000 mg, 1,000 mg, Oral, Q8H, Brain Pardo MD    sennosides-docusate (PERICOLACE) 8.6-50 MG per tablet 2 tablet, 2 tablet, Oral, BID PRN **AND** polyethylene glycol (MIRALAX) packet 17 g, 17 g, Oral, Daily PRN **AND** bisacodyl (DULCOLAX) EC tablet 5 mg, 5 mg, Oral, Daily PRN **AND** bisacodyl (DULCOLAX) suppository 10 mg, 10 mg, Rectal, Daily PRN, Chet Jones MD    Calcium Replacement - Follow Nurse / BPA Driven Protocol, , Not Applicable, PRN, Chet Jones MD    enoxaparin sodium (LOVENOX) syringe 40 mg, 40 mg, Subcutaneous, Daily, Chet Jones MD, 40 mg at 06/29/25 0816    ferrous sulfate tablet 325 mg, 325 mg, Oral, Daily With Breakfast, Chet Jones MD, 325 mg at 06/29/25 0816    folic acid (FOLVITE) tablet 1,000 mcg, 1,000 mcg, Oral, Daily, Chet Jones MD, 1,000 mcg at 06/29/25 0816    gabapentin (NEURONTIN) capsule 300 mg, 300 mg, Oral, BID, Chet Jones MD, 300 mg at 06/29/25 0816    [Held by provider] lisinopril (PRINIVIL,ZESTRIL) tablet 10 mg, 10 mg, Oral, Daily, Chet Jones MD    [Held by provider] lisinopril (PRINIVIL,ZESTRIL) tablet 5 mg, 5 mg, Oral, Daily, Chet Jones MD    Magnesium Standard Dose Replacement - Follow Nurse / BPA Driven Protocol, , Not Applicable, PRN, Chet Jones MD    metoprolol tartrate (LOPRESSOR) tablet 25  mg, 25 mg, Oral, Q12H, Nicolasa Cueva, APRN, 25 mg at 06/29/25 0816    ondansetron ODT (ZOFRAN-ODT) disintegrating tablet 4 mg, 4 mg, Oral, Q6H PRN, Chet Jones MD    pantoprazole (PROTONIX) EC tablet 40 mg, 40 mg, Oral, BID, Chet Jones MD, 40 mg at 06/29/25 0816    Phosphorus Replacement - Follow Nurse / BPA Driven Protocol, , Not Applicable, PRN, Chet Jones MD    Potassium Replacement - Follow Nurse / BPA Driven Protocol, , Not Applicable, PRN, Chet Jones MD    rosuvastatin (CRESTOR) tablet 10 mg, 10 mg, Oral, Daily, Chet Jones MD, 10 mg at 06/29/25 0816    sodium chloride 0.9 % flush 10 mL, 10 mL, Intravenous, Q12H, Chet Jones MD, 10 mL at 06/28/25 2019    sodium chloride 0.9 % flush 10 mL, 10 mL, Intravenous, PRN, Chet Jones MD    sodium chloride 0.9 % infusion 40 mL, 40 mL, Intravenous, PRN, Chet Jones MD    tamsulosin (FLOMAX) 24 hr capsule 0.4 mg, 0.4 mg, Oral, Daily, Chet Jones MD, 0.4 mg at 06/29/25 0816    thiamine (VITAMIN B-1) tablet 100 mg, 100 mg, Oral, Daily, Chet Jones MD, 100 mg at 06/29/25 0816    vitamin B-12 (CYANOCOBALAMIN) tablet 1,000 mcg, 1,000 mcg, Oral, Daily, Chet Jones MD, 1,000 mcg at 06/29/25 0816      Objective   Vital Signs   Temp:  [98.1 °F (36.7 °C)-99.9 °F (37.7 °C)] 98.1 °F (36.7 °C)  Heart Rate:  [] 102  Resp:  [16-18] 16  BP: ()/(71-87) 135/86    Physical Exam:   General: awake, alert, NAD, very nice, sitting up in bed  Eyes: no scleral icterus  Cardiovascular: Mild tachycardia  Respiratory: normal work of breathing on ambient air without wheezing  GI: Abdomen is soft, not tender, not distended  :  no Phillips catheter  Neurological: Alert and oriented x 3  Psychiatric: Normal mood and affect   Vasc: PIV w/o erythema    Labs:   CBC, BMP, and blood cultures reviewed today  Lab Results    Component Value Date    WBC 4.63 06/29/2025    HGB 9.4 (L) 06/29/2025    HCT 27.4 (L) 06/29/2025    MCV 99.6 (H) 06/29/2025    PLT 89 (L) 06/29/2025       Lab Results   Component Value Date    GLUCOSE 94 06/29/2025    CALCIUM 9.1 06/29/2025     06/29/2025    K 3.8 06/29/2025    CO2 20.0 (L) 06/29/2025     (H) 06/29/2025    BUN 21.0 06/29/2025    CREATININE 1.14 06/29/2025    EGFR 70.5 06/29/2025    BCR 18.4 06/29/2025    ANIONGAP 11.0 06/29/2025     Lab Results   Component Value Date    ALT 15 06/25/2025     Lab Results   Component Value Date    HGBA1C 4.80 05/11/2025       LA 2.7--->1  UA TNTC WBCs    Microbiology:  6/25 RPP: Negative  6/25 BCx: Enterococcus faecalis (not VRE) in 1/2 sets (susceptible to ampicillin)  6/26 UCX: 100 K GPC  6/28 BCx: NGTD    Prior radiology:  TTE negative for vegetations    Isolation:  No active isolations    ASSESSMENT/PLAN:  Enterococcus faecalis septicemia due to  source  History of urinary retention and Phillips catheter during recent hospital stay  Urinary incontinence  Status post lumbar discectomy and fusion  Obesity BMI 30  Alcohol use    He is afebrile with a normal WBC.  His TTE was negative for vegetations.  Repeat blood cultures drawn to document sterility are negative to date.    He will likely be getting discharged today so I have changed his antibiotic regimen  to amoxicillin 1 g p.o. every 8 hours with stop date 7/10/2025.  Please send this meds to bed.    D/W hospitalist.    Thank you for allowing me to be involved in the care of this patient. Infectious diseases will sign off at this time with antibiotics plan in place, but please call me at [phone] if any further ID questions or new ID concerns.     PAST MEDICAL HISTORY:  No pertinent past medical history

## 2025-06-29 NOTE — PLAN OF CARE
Problem: Adult Inpatient Plan of Care  Goal: Plan of Care Review  Outcome: Adequate for Care Transition  Goal: Patient-Specific Goal (Individualized)  Outcome: Adequate for Care Transition  Goal: Absence of Hospital-Acquired Illness or Injury  Outcome: Adequate for Care Transition  Goal: Optimal Comfort and Wellbeing  Outcome: Adequate for Care Transition  Goal: Readiness for Transition of Care  Outcome: Adequate for Care Transition     Problem: Comorbidity Management  Goal: Blood Pressure in Desired Range  Outcome: Adequate for Care Transition     Problem: Fall Injury Risk  Goal: Absence of Fall and Fall-Related Injury  Outcome: Adequate for Care Transition     Problem: Infection  Goal: Absence of Infection Signs and Symptoms  Outcome: Adequate for Care Transition  Goal: Absence of Infection Signs and Symptoms  Outcome: Adequate for Care Transition   Goal Outcome Evaluation:

## 2025-06-30 ENCOUNTER — TRANSITIONAL CARE MANAGEMENT TELEPHONE ENCOUNTER (OUTPATIENT)
Dept: CALL CENTER | Facility: HOSPITAL | Age: 70
End: 2025-06-30
Payer: MEDICARE

## 2025-06-30 LAB — BACTERIA SPEC AEROBE CULT: NORMAL

## 2025-06-30 NOTE — OUTREACH NOTE
Call Center TCM Note      Flowsheet Row Responses   [facility] patient discharged from? [place]   Does the patient have one of the following disease processes/diagnoses(primary or secondary)? Other   TCM attempt successful? Yes   Call start time 1315   Call end time 1320   Discharge diagnosis Bacteremia due to Enterococcus   Person spoke with today (if not patient) and relationship Patient   Medication alerts for this patient Amoxicillin   Meds reviewed with patient/caregiver? Yes   Is the patient having any side effects they believe may be caused by any medication additions or changes? No   Does the patient have all medications ordered at discharge? Yes   Prescription comments No questions or concerns with antibiotic.   Is the patient taking all medications as directed (includes completed medication regime)? Yes   Comments Assisted patient in a [facility] f/u appt with PCP. No appts available with YANIRA Batista within [facility] timeframe. Patient ok with seeing another provider. Appt scheduled with YANIRA Hogue on 7/7/25 at 1:00 PM.   Does the patient have an appointment with their PCP within 7-14 days of discharge? No   Nursing Interventions Assisted patient with making appointment per protocol   Has home health visited the patient within 72 hours of discharge? N/A   Psychosocial issues? No   Comments Patient states he is doing better. His spouse is in the hospital and hopefully being discharged today.   Did the patient receive a copy of their discharge instructions? Yes   Nursing interventions Reviewed instructions with patient   What is the patient's perception of their health status since discharge? Improving   Is the patient/caregiver able to teach back signs and symptoms related to disease process for when to call PCP? Yes   Is the patient/caregiver able to teach back signs and symptoms related to disease process for when to call 911? Yes   Is the patient/caregiver able to teach  back the hierarchy of who to call/visit for symptoms/problems? PCP, Specialist, Home health nurse, Urgent Care, ED, 911 Yes   If the patient is a current smoker, are they able to teach back resources for cessation? Not a smoker   TCM call completed? Yes   Wrap up additional comments Assisted patient in a [facility] f/u appt with PCP. No appts available with YANIRA Batista within TCM timeframe. Patient ok with seeing another provider. Appt scheduled with YANIRA Hogue on 7/7/25 at 1:00 PM.   Call end time 1320   Would this patient benefit from a Referral to Amb Social Work? No   Is the patient interested in additional calls from an ambulatory ? No            Yolanda MADDEN - Registered Nurse    6/30/2025, 13:23 EDT

## 2025-06-30 NOTE — CASE MANAGEMENT/SOCIAL WORK
Case Management Discharge Note    Final Note: Pt DC home prior to CCP completing screen. [facility]/CCP     Selected Continued Care - Discharged on 6/29/2025 Admission date: 6/26/2025 - Discharge disposition: Home or Self Care     Transportation Services  Transportation: Private Transportation  Private: Car    Final Discharge Disposition Code: 01 - home or self-care

## 2025-07-02 PROBLEM — R30.0 DYSURIA: Status: ACTIVE | Noted: 2025-07-02

## 2025-07-02 PROBLEM — K74.60 CIRRHOSIS OF LIVER WITHOUT ASCITES: Status: ACTIVE | Noted: 2025-07-02

## 2025-07-02 PROBLEM — N28.1 RENAL CYST: Status: ACTIVE | Noted: 2025-07-02

## 2025-07-02 PROBLEM — I77.819 AORTIC ECTASIA: Status: ACTIVE | Noted: 2022-05-02

## 2025-07-02 NOTE — ASSESSMENT & PLAN NOTE
- Blood pressure readings are within the normal range today.  - Current regimen of lisinopril 5 mg and 10 mg will be continued.  - Continue to hold Metoprolol until further instructions are received from the cardiology department.

## 2025-07-02 NOTE — ASSESSMENT & PLAN NOTE
- Experiencing frequent urination at night, which may indicate an enlarged prostate.  - A urinalysis will be conducted to rule out any potential urinary tract infection.  - Will resume tamsulosin medication to aid in reducing nighttime urination frequency.  - Medication sent to pharmacy.

## 2025-07-02 NOTE — ASSESSMENT & PLAN NOTE
- Diagnosed with anemia and currently on an iron supplement regimen.  - A complete blood count (CBC) will be performed to monitor hemoglobin levels.  - Iron supplement to be taken once a day with breakfast.  - Blood count to be checked in the lab today.

## 2025-07-02 NOTE — ASSESSMENT & PLAN NOTE
- Diagnosed with cirrhosis.  - Advised to abstain from alcohol consumption and limit Tylenol intake to no more than 2000 mg per day.  - Advised to avoid fried and fatty foods.  - Repeat CBC will be conducted to monitor platelet levels.

## 2025-07-03 LAB — BACTERIA SPEC AEROBE CULT: NORMAL

## 2025-07-03 NOTE — PAYOR COMM NOTE
"Mo Willoughby (70 y.o. Male)       ATTN: NURSE REVIEWER  RE: DISCHARGE SUMMARY   REF: [id number]  PLS REPLY TO CHARLES FARLEY [phone] OR FAX# [phone]        Date of Birth   1955    Social Security Number       Address   [address] [facility] [address]    Home Phone   [phone]    MRN   [id number]       Worship   [employer]    Marital Status   Single                            Admission Date   6/26/2025    Admission Type   Emergency    Admitting Provider   Chet Jones MD    Attending Provider       Department, Room/Bed   [address], P892/1       Discharge Date   6/29/2025    Discharge Disposition   Home or Self Care    Discharge Destination                                 Attending Provider: (none)   Allergies: Zetia [Ezetimibe]    Isolation: None   Infection: None   Code Status: Prior    Ht: 182 cm (71.65\")   Wt: 102 kg (224 lb 13.9 oz)    Admission Cmt: None   Principal Problem: Bacteremia due to Enterococcus [R78.81,B95.2]                   Active Insurance as of 6/26/2025       Primary Coverage       Payor Plan Insurance Group Employer/Plan Group    ANTHEM MEDICARE REPLACEMENT ANTHEM MEDICARE ADVANTAGE HMO [id number]       Payor Plan Address Payor Plan Phone Number Payor Plan Fax Number Effective Dates    [address] [phone]  3/1/2025 - None Entered    [facility] [address]         Subscriber Name Subscriber Birth Date Member ID       MO WILLOUGHBY 1955 [id number]               Secondary Coverage       Payor Plan Insurance Group Employer/Plan Group    [id number]        Payor Plan Address Payor Plan Phone Number Payor Plan Fax Number Effective Dates    [address]   1/1/2025 - None Entered    [facility] [id number]         Subscriber Name Subscriber Birth Date Member ID       MO WILLOUGHBY 1955 [id number]                     Emergency Contacts        (Rel.) Home Phone Work Phone " Mobile Phone    Kendy Becker (Significant Other) [phone] -- [phone]    JAYDEN WILLOUGHBY (Brother) [phone] -- [phone]    Mirtha Willoughby (Sister) [phone]                 Discharge Summary        Chet Jones MD at 25 1213              Patient Name: Mo Willoughby  : 1955  MRN: [id number]    Date of Admission: 2025  Date of Discharge:  2025  Primary Care Physician: Jenn Davison APRN      Chief Complaint:   Abnormal Lab      Discharge Diagnoses     Active Hospital Problems    Diagnosis  POA    **Bacteremia due to Enterococcus [R78.81, B95.2]  Yes    Acute UTI (urinary tract infection) [N39.0]  Yes    Weakness of left lower extremity [R29.898]  Yes    Foot drop, left [M21.372]  Yes    Stage 3a chronic kidney disease [N18.31]  Yes      Resolved Hospital Problems    Diagnosis Date Resolved POA    OTF (acute kidney injury) [N17.9] 2025 Yes        Hospital Course     Mr. Willoughby is a 69 y.o. male with a history of CKD 3, hypertension, spinal stenosis status post recent lumbar surgery 1 month ago who presented with fevers and chills.  See H&P for details.  He had been evaluated in the ED, said to have a UTI and discharged on oral antibiotics but blood cultures came back positive for Enterococcus so he was contacted to return.  On return his blood cultures were repeated and have remained negative to date.  He was started on IV ampicillin and infectious disease was consulted.  Echocardiogram was done showing no obvious endocarditis.  They have made recommendations for antibiotics as of today and he will be discharged home as per his wishes.  He does have some chronic thrombocytopenia which is stable.      Day of Discharge     Subjective:  No events.  He feels good    Physical Exam:  Temp:  [98.1 °F (36.7 °C)-99.1 °F (37.3 °C)] 98.1 °F (36.7 °C)  Heart Rate:  [] 102  Resp:  [16-17] 16  BP: (115-135)/(81-87) 135/86  Body mass index  is 30.79 kg/m².  Physical Exam  Vitals reviewed.   Constitutional:       General: He is not in acute distress.     Appearance: He is not ill-appearing.   Cardiovascular:      Rate and Rhythm: Normal rate.   Pulmonary:      Effort: No respiratory distress.   Skin:     General: Skin is warm and dry.   Neurological:      Mental Status: He is alert.         Consultants     Consult Orders (all) (From admission, onward)       Start     Ordered    06/27/25 1349  Inpatient Cardiology Consult  Once        Specialty:  Cardiology  Provider:  Hazel Brown MD    06/27/25 1349    06/26/25 2114  Inpatient Infectious Diseases Consult  Once        Specialty:  Infectious Diseases  Provider:  Brain Pardo MD    06/26/25 2114 06/26/25 1852  [facility] (on-call MD unless specified) Details  Once,   Status:  Canceled        Specialty:  Hospitalist  Provider:  (Not yet assigned)    06/26/25 1852                  Procedures       Imaging Results (All)       None            Results for orders placed during the hospital encounter of 06/26/25    Adult Transthoracic Echo Complete W/ Cont if Necessary Per Protocol    Interpretation Summary    Left ventricular systolic function is low normal. Calculated left ventricular EF = 46.7%    Left ventricular wall thickness is consistent with moderate septal asymmetric hypertrophy.    Left ventricular diastolic function was indeterminate.    The left atrial cavity is mild to moderately dilated.    Moderate dilation of the aortic root is present. The aortic root measures 4.5 cm.    Pertinent Labs     Results from last 7 days   Lab Units 06/29/25  0340 06/28/25  0608 06/27/25  0746 06/26/25  1814   WBC 10*3/mm3 4.63 5.27 7.13 8.47   HEMOGLOBIN g/dL 9.4* 9.4* 9.5* 11.0*   PLATELETS 10*3/mm3 89* 78* 76* 94*     Results from last 7 days   Lab Units 06/29/25  0340 06/28/25  0608 06/27/25  1716 06/27/25  0746 06/26/25  1814   SODIUM mmol/L 141 139  --  139 138   POTASSIUM mmol/L 3.8 3.8 4.2 3.5  "4.1   CHLORIDE mmol/L 110* 110*  --  110* 107   CO2 mmol/L 20.0* 19.1*  --  20.6* 17.7*   BUN mg/dL 21.0 19.0  --  19.0 20.0   CREATININE mg/dL 1.14 1.05  --  1.12 1.38*   GLUCOSE mg/dL 94 86  --  97 116*   EGFR mL/min/1.73 70.5 77.8  --  72.0 56.0*     Results from last 7 days   Lab Units 06/25/25  2242   ALBUMIN g/dL 3.6   BILIRUBIN mg/dL 1.1   ALK PHOS U/L 91   AST (SGOT) U/L 30   ALT (SGPT) U/L 15     Results from last 7 days   Lab Units 06/29/25  0340 06/28/25  0608 06/27/25  0746 06/26/25  1814 06/25/25  2242   CALCIUM mg/dL 9.1 9.2 9.2 9.8 10.0   ALBUMIN g/dL  --   --   --   --  3.6               Invalid input(s): \"LDLCALC\"  Results from last 7 days   Lab Units 06/28/25  0608 06/26/25  0252 06/25/25  2246 06/25/25  2242   BLOODCX  No growth at 24 hours  --  No growth at 3 days Enterococcus faecalis*   URINECX   --  >100,000 CFU/mL Enterococcus faecalis*  --   --    BCIDPCR   --   --   --  Enterococcus faecalis. Trell/B (vancomycin resistance gene) not detected. Identification by BCID2 PCR.*     Results from last 7 days   Lab Units 06/25/25  2354   COVID19  Not Detected       Test Results Pending at Discharge     Pending Results       None              Discharge Details        Discharge Medications        New Medications        Instructions Start Date   amoxicillin 500 MG capsule  Commonly known as: AMOXIL   1,000 mg, Oral, Every 8 Hours Scheduled             Continue These Medications        Instructions Start Date   allopurinol 300 MG tablet  Commonly known as: ZYLOPRIM   300 mg, Oral, Daily      ferrous sulfate 325 (65 FE) MG tablet   325 mg, Daily With Breakfast      folic acid 1 MG tablet  Commonly known as: FOLVITE   1,000 mcg, Oral, Daily      gabapentin 300 MG capsule  Commonly known as: NEURONTIN   300 mg, Oral, 2 Times Daily      lisinopril 10 MG tablet  Commonly known as: PRINIVIL,ZESTRIL   10 mg, Oral, Daily      lisinopril 5 MG tablet  Commonly known as: PRINIVIL,ZESTRIL   5 mg, Oral, Daily    "   metoprolol succinate XL 25 MG 24 hr tablet  Commonly known as: TOPROL-XL   25 mg, Oral, Daily      pantoprazole 40 MG EC tablet  Commonly known as: PROTONIX   40 mg, Oral, 2 Times Daily      rosuvastatin 10 MG tablet  Commonly known as: CRESTOR   10 mg, Oral, Daily      tamsulosin 0.4 MG capsule 24 hr capsule  Commonly known as: FLOMAX   0.4 mg, Oral, Daily      thiamine 100 MG tablet  tablet  Commonly known as: VITAMIN B-1   100 mg, Daily      vitamin B-12 1000 MCG tablet  Commonly known as: CYANOCOBALAMIN   1,000 mcg, Daily             Stop These Medications      cephalexin 500 MG capsule  Commonly known as: KEFLEX              Allergies   Allergen Reactions    Zetia [Ezetimibe] Dizziness     Nausea, fatgue       Discharge Disposition:  Home or Self Care      Discharge Diet:  No active diet order      Discharge Activity:       CODE STATUS:    Code Status and Medical Interventions: CPR (Attempt to Resuscitate); Full Support   Ordered at: 06/26/25 2114     Code Status (Patient has no pulse and is not breathing):    CPR (Attempt to Resuscitate)     Medical Interventions (Patient has pulse or is breathing):    Full Support       Future Appointments   Date Time Provider Department Center   7/8/2025 11:15 AM Hazel Brown MD [facility] CD [id number] [facility]   7/15/2025 11:15 AM Kyra Laws APRN [facility]   7/28/2025 11:00 AM Jenn Davison APRN [facility] F[facility]   7/29/2025  3:45 PM [facility] 2 BH [facility]   8/11/2025 12:30 PM Jann Chawla MD [facility] N [facility]   8/22/2025  1:00 PM Lashae Oneil PA-C [facility] GE EA JESSICA [facility]   10/20/2025 10:00 AM Jenn Davison APRN [facility] FERN [facility]   11/7/2025  9:00 AM Jordan Ram MD [facility] N K[facility]      Follow-up Information       Jenn Davison APRN Follow up in 1 week(s).    Specialty: Nurse Practitioner  Contact information:  [address] [place], [place] [id number] [phone]                             Time Spent on Discharge:  Greater than 30  minutes      Chet Jones MD  [facility]  06/29/25  17:29 EDT    Electronically signed by Chet Jones MD at 06/29/25 8676

## 2025-07-07 NOTE — PROGRESS NOTES
Subjective:     Encounter Date:07/08/2025      Patient ID: Mo Willoughby is a 70 y.o. male.    Chief Complaint:  History of Present Illness    This is a 70-year-old with hypertension, alcohol and drug abuse, ascending aortic aneurysm, who presents for follow-up.     I saw the patient initially in 5/2022 when he presented with complaints of episodes of diaphoresis at rest, possible chest pain, and acute kidney injury.  Following his arrival he was noted to have an elevated creatinine up to 1.96 which improved with IV fluids.  Troponin remained in the indeterminate range.  EKG was unremarkable.  He did have a decline in his hemoglobin during that admission.  Prior to the admission he did admit to drinking 2-3 alcoholic drinks a night and using both marijuana and cocaine.  He was hypertensive on admission.  His symptoms sounded atypical so I did not recommend an ischemic work-up.  I did recommend proceeding with an echocardiogram which was performed on 5/3/2022 and showed normal left ventricular systolic function and wall motion with an EF of 56%, grade 1 diastolic dysfunction, lipomatous atrial septum, and no significant valvular disease.  A CT angiogram of the chest during that admission showed ascending aorta measuring 4.5 cm but no evidence of pulmonary embolism.  He was previously on lisinopril-hydrochlorothiazide but the hydrochlorothiazide was discontinued due to issues with dehydration and acute kidney injury.  He was evaluated by GI for his anemia.  He was treated with IV iron.  EGD showed grade 1 esophageal varices and portal hypertensive gastropathy as well as erosive gastropathy without bleeding.  Twice daily PPI was recommended.  He also had multiple polyps removed on her colonoscopy.  Alcohol cessation was also recommended.     He saw YANIRA Lafleur follow-up in 6/2022 at which time he reported he was doing well.  His blood pressures appear to be well controlled on his current regimen of  medications.     I saw the patient last in 8/2022 at which time he was doing well from a cardiac standpoint.  Plan was to repeat imaging in a year.     He return in 2/2023 and was seen by YANIRA Still.  Blood pressures were mildly elevated in the office but better controlled at home.  He was asked to check and keep a blood pressure log.     He was seen last in the office in 4/2024 by YANIRA Still.  He was noted to be in atrial fibrillation at his primary care's physician's office the day prior.  He denies any significant symptoms.  He does report drinking 2 mixed drinks at night.  He indicated his blood pressures were well-controlled at home.  He was started on apixaban.  An echocardiogram was recommended.  A Holter monitor was also placed.  Holter monitor showed frequent supraventricular ectopy but no evidence of atrial fibrillation.  Echocardiogram showed low normal left ventricular function with an EF of 48%, mild dilatation of the ascending aorta measuring 4.3 cm, and possible atrial fibrillation during the study.  He is kept on apixaban until his follow-up with me.     I saw the patient last in office in 7/2024 at which time he was doing fairly well from a cardiac standpoint.  I have reviewed his Holter monitor strips and his telemetry from his echocardiogram and appears to show sinus rhythm with frequent premature atrial contractions but no evidence of atrial fibrillation.  I recommended discontinuing apixaban.  I did start him on low-dose metoprolol succinate to see if this would help with his supraventricular ectopy and tachycardia in the setting of his ascending aortic aneurysm.  I felt that his low normal left ventricular function was due to his frequent premature atrial contractions.  The plan was to start with medical management.    Return in 10/2024.  He reported that he was feeling well but had stopped the metoprolol because he felt that it was causing lower extremity swelling.  Opted to  check an echocardiogram to see if there was any change in consider resuming beta-blockers with atenolol or carvedilol.  Echocardiogram in 11/2024 showed low normal left ventricular function with an EF of 46%, grade 1 diastolic dysfunction, and mild dilatation of the aortic root and 4.3 cm.  Attempted to call the patient to discuss the findings and try atenolol at the time.    The patient was then hospitalized in 5/2025 for T12-L1 disc herniation with severe spinal stenosis.  We saw the patient as a preoperative evaluation.  You are to be stable from a cardiac standpoint and he was felt to be okay to proceed with surgery.  He did well postoperatively.  He was started on beta-blocker during that admission without any issues.    He returned in 6/2025 for weakness and fatigue.  He was found to be bacteremic from a UTI.  There was concerns for atrial fibrillation but on further review it appeared to be sinus rhythm with premature atrial contractions.  Zio monitor was recommended but it does not appear that this was ever placed.  An echocardiogram was performed on 6/27/2025 and continue to show low normal left ventricular function with an EF of 47% and moderate dilatation aortic root measuring 4.5 cm.      He presents back today for follow-up.  Does not appear that he is on a beta-blocker at this time.  His blood pressures and heart rates have been doing well though.  He is slowly recovering from his back surgery as well as his hospitalizations.  Denies any chest pain, palpitations, orthopnea, near-syncope or syncope or lower extremity swelling.    Review of Systems   Constitutional: Negative for malaise/fatigue.   HENT:  Negative for hearing loss, hoarse voice, nosebleeds and sore throat.    Eyes:  Negative for pain.   Cardiovascular:  Negative for chest pain, claudication, cyanosis, dyspnea on exertion, irregular heartbeat, leg swelling, near-syncope, orthopnea, palpitations, paroxysmal nocturnal dyspnea and syncope.    Respiratory:  Negative for shortness of breath and snoring.    Endocrine: Negative for cold intolerance, heat intolerance, polydipsia, polyphagia and polyuria.   Skin:  Negative for itching and rash.   Musculoskeletal:  Positive for back pain. Negative for arthritis, falls, joint pain, joint swelling, muscle cramps, muscle weakness and myalgias.   Gastrointestinal:  Negative for constipation, diarrhea, dysphagia, heartburn, hematemesis, hematochezia, melena, nausea and vomiting.   Genitourinary:  Negative for frequency, hematuria and hesitancy.   Neurological:  Negative for excessive daytime sleepiness, dizziness, headaches, light-headedness, numbness and weakness.   Psychiatric/Behavioral:  Negative for depression. The patient is not nervous/anxious.          Current Outpatient Medications:     allopurinol (ZYLOPRIM) 300 MG tablet, Take 1 tablet by mouth Daily., Disp: 90 tablet, Rfl: 2    amoxicillin (AMOXIL) 500 MG capsule, Take 2 capsules by mouth Every 8 (Eight) Hours for 36 doses. Indications: Bacteria in the Blood, Disp: 72 capsule, Rfl: 0    ferrous sulfate 325 (65 FE) MG tablet, Take 1 tablet by mouth Daily With Breakfast., Disp: , Rfl:     folic acid (FOLVITE) 1 MG tablet, TAKE 1 TABLET BY MOUTH DAILY, Disp: 90 tablet, Rfl: 1    gabapentin (NEURONTIN) 300 MG capsule, Take 1 capsule by mouth 2 (Two) Times a Day., Disp: 60 capsule, Rfl: 5    lisinopril (PRINIVIL,ZESTRIL) 10 MG tablet, Take 1 tablet by mouth Daily., Disp: , Rfl:     lisinopril (PRINIVIL,ZESTRIL) 5 MG tablet, Take 1 tablet by mouth Daily., Disp: , Rfl:     pantoprazole (PROTONIX) 40 MG EC tablet, TAKE 1 TABLET BY MOUTH TWICE DAILY, Disp: 180 tablet, Rfl: 1    rosuvastatin (CRESTOR) 10 MG tablet, TAKE 1 TABLET BY MOUTH DAILY, Disp: 90 tablet, Rfl: 1    tamsulosin (FLOMAX) 0.4 MG capsule 24 hr capsule, Take 1 capsule by mouth Daily., Disp: 30 capsule, Rfl: 2    thiamine (VITAMIN B-1) 100 MG tablet  tablet, Take 1 tablet by mouth Daily., Disp: ,  Rfl:     vitamin B-12 (CYANOCOBALAMIN) 1000 MCG tablet, Take 1 tablet by mouth Daily. HOLD FOR SURGERY ONE WEEK PRIOR, Disp: , Rfl:     Past Medical History:   Diagnosis Date    Abnormal TSH     Arthritis     Ascending aortic aneurysm     Cirrhosis of liver without ascites 07/02/2025    Colon polyp 5/5/22    CRI (chronic renal insufficiency), stage 3 (moderate) 2016    from stage 3A to 4    DDD (degenerative disc disease), lumbar     ED (erectile dysfunction)     Erectile dysfunction     Essential hypertension, benign     Folic acid deficiency     Gout     Hip pain, right     Hx of colonic polyp     Hypercalcemia 2015    as far back as data goes so likely pre-dates even this time    Hyperparathyroidism, unspecified 2016    as far back as data goes likely pre-dates even this date, likely multifactorial some primary and some secondary , w/ imaging from 10/16 showing possible L inferior adenoma    Iron deficiency anemia     Liver disease     Low back pain with bilateral sciatica     Mixed hyperlipidemia 02/27/2023    New onset atrial fibrillation 04/15/2024    Pancreatitis     Risk factors for obstructive sleep apnea     Spinal stenosis of lumbar region with neurogenic claudication     Syncope and collapse 03/28/2017       Past Surgical History:   Procedure Laterality Date    BACK SURGERY  05/08/25    CHOLECYSTECTOMY      CHOLECYSTECTOMY WITH INTRAOPERATIVE CHOLANGIOGRAM N/A 08/03/2018    Procedure: CHOLECYSTECTOMY LAPAROSCOPIC INTRAOPERATIVE CHOLANGIOGRAM;  Surgeon: Melina Kate MD;  Location: [facility] MAIN OR;  Service: General    COLONOSCOPY      COLONOSCOPY N/A 10/03/2016    Procedure: COLONOSCOPY TO CECUM TO TERMINAL ILIUM WITH COLD BIOPSY POLYPECTOMY;  Surgeon: Benoit Vilchis MD;  Location: [facility] ENDOSCOPY;  Service:     COLONOSCOPY N/A 05/05/2022    Procedure: COLONOSCOPY TO CECUM WITH COLD SNARE POLYPECTOMIES & RESOLUTION CLIP PLACEMENT X 2;  Surgeon: Benoit Mosley MD;  Location: [facility]  ENDOSCOPY;  Service: Gastroenterology;  Laterality: N/A;  ANEMIA/POLYPS, HEMORRHOIDS     ENDOSCOPY N/A 05/05/2022    Procedure: ESOPHAGOGASTRODUODENOSCOPY WITH BX;  Surgeon: Benoit Mosley MD;  Location: [facility] ENDOSCOPY;  Service: Gastroenterology;  Laterality: N/A;  ANEMIA/PORTAL GASTROPATHY, EROSIVE GASTRITIS     EPIDURAL Right 12/07/2022    Procedure: LUMBAR/SACRAL TRANSFORAMINAL EPIDURAL Right L2-3 and right L4-5;  Surgeon: Isaura Torres MD;  Location: [facility] MAIN OR;  Service: Pain Management;  Laterality: Right;    LIPOMA EXCISION      LUMBAR DISCECTOMY FUSION INSTRUMENTATION N/A 05/08/2025    Procedure: Open thoracic twelve/lumbar one discectomy/decompression/possible interbody fusion with cages, pedicle screw/terry/crosslink fixation, thoracic twelve/lumbar one/two with Mazor robotic navigation;  Surgeon: Abel Perez MD;  Location: [facility] MAIN OR;  Service: Robotics - Neuro;  Laterality: N/A;    LUMBAR LAMINECTOMY DISCECTOMY DECOMPRESSION N/A 07/07/2023    Procedure: Open right sided lumbar decompression lumbar three/four, lumbar four/five;  Surgeon: Abel Perez MD;  Location: [facility] MAIN OR;  Service: Neurosurgery;  Laterality: N/A;    MEDIAL BRANCH BLOCK Right 01/23/2023    Procedure: LUMBAR MEDIAL BRANCH BLOCK RIGHT L3-L5 #1;  Surgeon: Isaura Torres MD;  Location: [facility] MAIN OR;  Service: Pain Management;  Laterality: Right;    MEDIAL BRANCH BLOCK Right 02/13/2023    Procedure: LUMBAR MEDIAL BRANCH BLOCK RIGHT L3-L5 #1;  Surgeon: Isaura Torres MD;  Location: [facility] MAIN OR;  Service: Pain Management;  Laterality: Right;    NERVE SURGERY Right 03/06/2023    Procedure: LUMBAR RADIOFREQUENCY ABLATION RIGHT L3-L5  70417, [id number];  Surgeon: Isaura Torres MD;  Location: [facility] MAIN OR;  Service: Pain Management;  Laterality: Right;    SACROILIAC JOINT INJECTION Right 05/05/2023    Procedure: SACROILIAC JOINT INJECTION RIGHT 32534;  Surgeon: Isaura Torres MD;  Location[facility]IN  "OR;  Service: Pain Management;  Laterality: Right;    TONSILLECTOMY         Family History   Problem Relation Age of Onset    Hypertension Mother     Breast cancer Mother     Cancer Mother     Stroke Mother     Cancer Father     Liver cancer Father     Hypertension Father     Diabetes Father     Hypertension Sister     Heart attack Sister     Hypertension Brother     Cancer Brother     Hypertension Sister     Heart attack Sister     Learning disabilities Neg Hx        Social History     Tobacco Use    Smoking status: Never    Smokeless tobacco: Never   Vaping Use    Vaping status: Never Used   Substance Use Topics    Alcohol use: Yes     Comment: occasional    Drug use: No         ECG 12 Lead    Date/Time: 7/8/2025 12:14 PM  Performed by: Hazel Brown MD    Authorized by: Hazel Brown MD  Comparison: compared with previous ECG   Similar to previous ECG  Rhythm: sinus rhythm  Ectopy: atrial premature contractions             Objective:     Visit Vitals  /82   Pulse 85   Ht 182.9 cm (72\")   Wt 108 kg (238 lb)   BMI 32.28 kg/m²         Constitutional:       Appearance: Normal appearance. Well-developed.   HENT:      Head: Normocephalic and atraumatic.   Neck:      Vascular: No carotid bruit or JVD.   Pulmonary:      Effort: Pulmonary effort is normal.      Breath sounds: Normal breath sounds.   Cardiovascular:      Normal rate. Regular rhythm.      No gallop.    Pulses:     Radial: 2+ bilaterally.  Edema:     Peripheral edema absent.   Abdominal:      Palpations: Abdomen is soft.   Skin:     General: Skin is warm and dry.   Neurological:      Mental Status: Alert and oriented to person, place, and time.             Assessment:          Diagnosis Plan   1. Premature atrial contractions        2. Mixed hyperlipidemia        3. Essential hypertension, benign        4. Aortic ectasia        5. Stage 3a chronic kidney disease               Plan:       1.  Aortic root dilatation.  Stable on last echocardiogram. "  On lisinopril.  Not on beta-blockers.  Blood pressures and heart rates have been doing okay.  Will monitor for now and consider beta-blockers in the future once he has recovered from his recent hospitalizations.  2.  Premature atrial contractions.  Frequent and asymptomatic.  No evidence of atrial fibrillation.  Continue to monitor.  3.  Hypertension.  Well-controlled on current regimen medications.  Continue the same.  4.  Hyperlipidemia.  On rosuvastatin.  5.  Chronic kidney disease stage III.    Will plan on seeing the patient back again in 6 months.

## 2025-07-08 ENCOUNTER — OFFICE VISIT (OUTPATIENT)
Age: 70
End: 2025-07-08
Payer: MEDICARE

## 2025-07-08 VITALS
SYSTOLIC BLOOD PRESSURE: 122 MMHG | WEIGHT: 238 LBS | HEART RATE: 85 BPM | HEIGHT: 72 IN | DIASTOLIC BLOOD PRESSURE: 82 MMHG | BODY MASS INDEX: 32.23 KG/M2

## 2025-07-08 DIAGNOSIS — E78.2 MIXED HYPERLIPIDEMIA: ICD-10-CM

## 2025-07-08 DIAGNOSIS — N18.31 STAGE 3A CHRONIC KIDNEY DISEASE: ICD-10-CM

## 2025-07-08 DIAGNOSIS — I49.1 PREMATURE ATRIAL CONTRACTIONS: Primary | ICD-10-CM

## 2025-07-08 DIAGNOSIS — I77.819 AORTIC ECTASIA: ICD-10-CM

## 2025-07-08 DIAGNOSIS — I10 ESSENTIAL HYPERTENSION, BENIGN: ICD-10-CM

## 2025-07-08 NOTE — LETTER
July 8, 2025     YANIRA Batista  [address] [facility] [address]  [facility] [address]    Patient: Mo Willoughby   YOB: 1955   Date of Visit: 7/8/2025       Dear YANIRA Batista    Mo Willoughby was in my office today. Below is a copy of my note.    If you have questions, please do not hesitate to call me. I look forward to following Mo along with you.         Sincerely,        Hazel Brown MD        CC: No Recipients        Subjective:     Encounter Date:07/08/2025      Patient ID: Mo Willoughby is a 70 y.o. male.    Chief Complaint:  History of Present Illness    This is a 70-year-old with hypertension, alcohol and drug abuse, ascending aortic aneurysm, who presents for follow-up.     I saw the patient initially in 5/2022 when he presented with complaints of episodes of diaphoresis at rest, possible chest pain, and acute kidney injury.  Following his arrival he was noted to have an elevated creatinine up to 1.96 which improved with IV fluids.  Troponin remained in the indeterminate range.  EKG was unremarkable.  He did have a decline in his hemoglobin during that admission.  Prior to the admission he did admit to drinking 2-3 alcoholic drinks a night and using both marijuana and cocaine.  He was hypertensive on admission.  His symptoms sounded atypical so I did not recommend an ischemic work-up.  I did recommend proceeding with an echocardiogram which was performed on 5/3/2022 and showed normal left ventricular systolic function and wall motion with an EF of 56%, grade 1 diastolic dysfunction, lipomatous atrial septum, and no significant valvular disease.  A CT angiogram of the chest during that admission showed ascending aorta measuring 4.5 cm but no evidence of pulmonary embolism.  He was previously on lisinopril-hydrochlorothiazide but the hydrochlorothiazide was discontinued due to issues with dehydration and acute kidney injury.  He was evaluated by GI for his anemia.  He was  treated with IV iron.  EGD showed grade 1 esophageal varices and portal hypertensive gastropathy as well as erosive gastropathy without bleeding.  Twice daily PPI was recommended.  He also had multiple polyps removed on her colonoscopy.  Alcohol cessation was also recommended.     He saw YANIRA Lafleur follow-up in 6/2022 at which time he reported he was doing well.  His blood pressures appear to be well controlled on his current regimen of medications.     I saw the patient last in 8/2022 at which time he was doing well from a cardiac standpoint.  Plan was to repeat imaging in a year.     He return in 2/2023 and was seen by YANIRA Still.  Blood pressures were mildly elevated in the office but better controlled at home.  He was asked to check and keep a blood pressure log.     He was seen last in the office in 4/2024 by YANIRA Still.  He was noted to be in atrial fibrillation at his primary care's physician's office the day prior.  He denies any significant symptoms.  He does report drinking 2 mixed drinks at night.  He indicated his blood pressures were well-controlled at home.  He was started on apixaban.  An echocardiogram was recommended.  A Holter monitor was also placed.  Holter monitor showed frequent supraventricular ectopy but no evidence of atrial fibrillation.  Echocardiogram showed low normal left ventricular function with an EF of 48%, mild dilatation of the ascending aorta measuring 4.3 cm, and possible atrial fibrillation during the study.  He is kept on apixaban until his follow-up with me.     I saw the patient last in office in 7/2024 at which time he was doing fairly well from a cardiac standpoint.  I have reviewed his Holter monitor strips and his telemetry from his echocardiogram and appears to show sinus rhythm with frequent premature atrial contractions but no evidence of atrial fibrillation.  I recommended discontinuing apixaban.  I did start him on low-dose metoprolol  succinate to see if this would help with his supraventricular ectopy and tachycardia in the setting of his ascending aortic aneurysm.  I felt that his low normal left ventricular function was due to his frequent premature atrial contractions.  The plan was to start with medical management.    Return in 10/2024.  He reported that he was feeling well but had stopped the metoprolol because he felt that it was causing lower extremity swelling.  Opted to check an echocardiogram to see if there was any change in consider resuming beta-blockers with atenolol or carvedilol.  Echocardiogram in 11/2024 showed low normal left ventricular function with an EF of 46%, grade 1 diastolic dysfunction, and mild dilatation of the aortic root and 4.3 cm.  Attempted to call the patient to discuss the findings and try atenolol at the time.    The patient was then hospitalized in 5/2025 for T12-L1 disc herniation with severe spinal stenosis.  We saw the patient as a preoperative evaluation.  You are to be stable from a cardiac standpoint and he was felt to be okay to proceed with surgery.  He did well postoperatively.  He was started on beta-blocker during that admission without any issues.    He returned in 6/2025 for weakness and fatigue.  He was found to be bacteremic from a UTI.  There was concerns for atrial fibrillation but on further review it appeared to be sinus rhythm with premature atrial contractions.  Zio monitor was recommended but it does not appear that this was ever placed.  An echocardiogram was performed on 6/27/2025 and continue to show low normal left ventricular function with an EF of 47% and moderate dilatation aortic root measuring 4.5 cm.      He presents back today for follow-up.  Does not appear that he is on a beta-blocker at this time.  His blood pressures and heart rates have been doing well though.  He is slowly recovering from his back surgery as well as his hospitalizations.  Denies any chest pain,  palpitations, orthopnea, near-syncope or syncope or lower extremity swelling.    Review of Systems   Constitutional: Negative for malaise/fatigue.   HENT:  Negative for hearing loss, hoarse voice, nosebleeds and sore throat.    Eyes:  Negative for pain.   Cardiovascular:  Negative for chest pain, claudication, cyanosis, dyspnea on exertion, irregular heartbeat, leg swelling, near-syncope, orthopnea, palpitations, paroxysmal nocturnal dyspnea and syncope.   Respiratory:  Negative for shortness of breath and snoring.    Endocrine: Negative for cold intolerance, heat intolerance, polydipsia, polyphagia and polyuria.   Skin:  Negative for itching and rash.   Musculoskeletal:  Positive for back pain. Negative for arthritis, falls, joint pain, joint swelling, muscle cramps, muscle weakness and myalgias.   Gastrointestinal:  Negative for constipation, diarrhea, dysphagia, heartburn, hematemesis, hematochezia, melena, nausea and vomiting.   Genitourinary:  Negative for frequency, hematuria and hesitancy.   Neurological:  Negative for excessive daytime sleepiness, dizziness, headaches, light-headedness, numbness and weakness.   Psychiatric/Behavioral:  Negative for depression. The patient is not nervous/anxious.          Current Outpatient Medications:   •  allopurinol (ZYLOPRIM) 300 MG tablet, Take 1 tablet by mouth Daily., Disp: 90 tablet, Rfl: 2  •  amoxicillin (AMOXIL) 500 MG capsule, Take 2 capsules by mouth Every 8 (Eight) Hours for 36 doses. Indications: Bacteria in the Blood, Disp: 72 capsule, Rfl: 0  •  ferrous sulfate 325 (65 FE) MG tablet, Take 1 tablet by mouth Daily With Breakfast., Disp: , Rfl:   •  folic acid (FOLVITE) 1 MG tablet, TAKE 1 TABLET BY MOUTH DAILY, Disp: 90 tablet, Rfl: 1  •  gabapentin (NEURONTIN) 300 MG capsule, Take 1 capsule by mouth 2 (Two) Times a Day., Disp: 60 capsule, Rfl: 5  •  lisinopril (PRINIVIL,ZESTRIL) 10 MG tablet, Take 1 tablet by mouth Daily., Disp: , Rfl:   •  lisinopril  (PRINIVIL,ZESTRIL) 5 MG tablet, Take 1 tablet by mouth Daily., Disp: , Rfl:   •  pantoprazole (PROTONIX) 40 MG EC tablet, TAKE 1 TABLET BY MOUTH TWICE DAILY, Disp: 180 tablet, Rfl: 1  •  rosuvastatin (CRESTOR) 10 MG tablet, TAKE 1 TABLET BY MOUTH DAILY, Disp: 90 tablet, Rfl: 1  •  tamsulosin (FLOMAX) 0.4 MG capsule 24 hr capsule, Take 1 capsule by mouth Daily., Disp: 30 capsule, Rfl: 2  •  thiamine (VITAMIN B-1) 100 MG tablet  tablet, Take 1 tablet by mouth Daily., Disp: , Rfl:   •  vitamin B-12 (CYANOCOBALAMIN) 1000 MCG tablet, Take 1 tablet by mouth Daily. HOLD FOR SURGERY ONE WEEK PRIOR, Disp: , Rfl:     Past Medical History:   Diagnosis Date   • Abnormal TSH    • Arthritis    • Ascending aortic aneurysm    • Cirrhosis of liver without ascites 07/02/2025   • Colon polyp 5/5/22   • CRI (chronic renal insufficiency), stage 3 (moderate) 2016    from stage 3A to 4   • DDD (degenerative disc disease), lumbar    • ED (erectile dysfunction)    • Erectile dysfunction    • Essential hypertension, benign    • Folic acid deficiency    • Gout    • Hip pain, right    • Hx of colonic polyp    • Hypercalcemia 2015    as far back as data goes so likely pre-dates even this time   • Hyperparathyroidism, unspecified 2016    as far back as data goes likely pre-dates even this date, likely multifactorial some primary and some secondary , w/ imaging from 10/16 showing possible L inferior adenoma   • Iron deficiency anemia    • Liver disease    • Low back pain with bilateral sciatica    • Mixed hyperlipidemia 02/27/2023   • New onset atrial fibrillation 04/15/2024   • Pancreatitis    • Risk factors for obstructive sleep apnea    • Spinal stenosis of lumbar region with neurogenic claudication    • Syncope and collapse 03/28/2017       Past Surgical History:   Procedure Laterality Date   • BACK SURGERY  05/08/25   • CHOLECYSTECTOMY     • CHOLECYSTECTOMY WITH INTRAOPERATIVE CHOLANGIOGRAM N/A 08/03/2018    Procedure: CHOLECYSTECTOMY  LAPAROSCOPIC INTRAOPERATIVE CHOLANGIOGRAM;  Surgeon: Melina Kate MD;  Location: [facility] MAIN OR;  Service: General   • COLONOSCOPY     • COLONOSCOPY N/A 10/03/2016    Procedure: COLONOSCOPY TO CECUM TO TERMINAL ILIUM WITH COLD BIOPSY POLYPECTOMY;  Surgeon: Benoit Vilchis MD;  Location: [facility] ENDOSCOPY;  Service:    • COLONOSCOPY N/A 05/05/2022    Procedure: COLONOSCOPY TO CECUM WITH COLD SNARE POLYPECTOMIES & RESOLUTION CLIP PLACEMENT X 2;  Surgeon: Benoit Mosley MD;  Location: [facility] ENDOSCOPY;  Service: Gastroenterology;  Laterality: N/A;  ANEMIA/POLYPS, HEMORRHOIDS    • ENDOSCOPY N/A 05/05/2022    Procedure: ESOPHAGOGASTRODUODENOSCOPY WITH BX;  Surgeon: Benoit Mosley MD;  Location: [facility] ENDOSCOPY;  Service: Gastroenterology;  Laterality: N/A;  ANEMIA/PORTAL GASTROPATHY, EROSIVE GASTRITIS    • EPIDURAL Right 12/07/2022    Procedure: LUMBAR/SACRAL TRANSFORAMINAL EPIDURAL Right L2-3 and right L4-5;  Surgeon: Isaura Torres MD;  [place] [facility] [place];  Service: Pain Management;  Laterality: Right;   • LIPOMA EXCISION     • LUMBAR DISCECTOMY FUSION INSTRUMENTATION N/A 05/08/2025    Procedure: Open thoracic twelve/lumbar one discectomy/decompression/possible interbody fusion with cages, pedicle screw/terry/crosslink fixation, thoracic twelve/lumbar one/two with Mazor robotic navigation;  Surgeon: Abel Perez MD;  Location: [facility] MAIN OR;  Service: Robotics - Neuro;  Laterality: N/A;   • LUMBAR LAMINECTOMY DISCECTOMY DECOMPRESSION N/A 07/07/2023    Procedure: Open right sided lumbar decompression lumbar three/four, lumbar four/five;  Surgeon: Abel Perez MD;  Location: [facility] MAIN OR;  Service: Neurosurgery;  Laterality: N/A;   • MEDIAL BRANCH BLOCK Right 01/23/2023    Procedure: LUMBAR MEDIAL BRANCH BLOCK RIGHT L3-L5 #1;  Surgeon: Isaura Torres MD;  [place] [facility] [place];  Service: Pain Management;  Laterality: Right;   • MEDIAL BRANCH BLOCK Right 02/13/2023     "Procedure: LUMBAR MEDIAL BRANCH BLOCK RIGHT L3-L5 #1;  Surgeon: Isaura Torres MD;  Location: [facility] MA[facility];  Service: Pain Management;  Laterality: Right;   • NERVE SURGERY Right 03/06/2023    Procedure: LUMBAR RADIOFREQUENCY ABLATION RIGHT L3-L5  61099, 87745;  Surgeon: Isaura Torres MD;  Location: [facility] MAIN [facility];  Service: Pain Management;  Laterality: Right;   • SACROILIAC JOINT INJECTION Right 05/05/2023    Procedure: SACROILIAC JOINT INJECTION RIGHT 94585;  Surgeon: Isaura Torres MD;  Location: [facility] MAIN [facility];  Service: Pain Management;  Laterality: Right;   • TONSILLECTOMY         Family History   Problem Relation Age of Onset   • Hypertension Mother    • Breast cancer Mother    • Cancer Mother    • Stroke Mother    • Cancer Father    • Liver cancer Father    • Hypertension Father    • Diabetes Father    • Hypertension Sister    • Heart attack Sister    • Hypertension Brother    • Cancer Brother    • Hypertension Sister    • Heart attack Sister    • Learning disabilities Neg Hx        Social History     Tobacco Use   • Smoking status: Never   • Smokeless tobacco: Never   Vaping Use   • Vaping status: Never Used   Substance Use Topics   • Alcohol use: Yes     Comment: occasional   • Drug use: No         ECG 12 Lead    Date/Time: 7/8/2025 12:14 PM  Performed by: Hazel Brown MD    Authorized by: Hzael Brown MD  Comparison: compared with previous ECG   Similar to previous ECG  Rhythm: sinus rhythm  Ectopy: atrial premature contractions             Objective:     Visit Vitals  /82   Pulse 85   Ht 182.9 cm (72\")   Wt 108 kg (238 lb)   BMI 32.28 kg/m²         Constitutional:       Appearance: Normal appearance. Well-developed.   HENT:      Head: Normocephalic and atraumatic.   Neck:      Vascular: No carotid bruit or JVD.   Pulmonary:      Effort: Pulmonary effort is normal.      Breath sounds: Normal breath sounds.   Cardiovascular:      Normal rate. Regular rhythm.      No gallop.  "   Pulses:     Radial: 2+ bilaterally.  Edema:     Peripheral edema absent.   Abdominal:      Palpations: Abdomen is soft.   Skin:     General: Skin is warm and dry.   Neurological:      Mental Status: Alert and oriented to person, place, and time.             Assessment:          Diagnosis Plan   1. Premature atrial contractions        2. Mixed hyperlipidemia        3. Essential hypertension, benign        4. Aortic ectasia        5. Stage 3a chronic kidney disease               Plan:       1.  Aortic root dilatation.  Stable on last echocardiogram.  On lisinopril.  Not on beta-blockers.  Blood pressures and heart rates have been doing okay.  Will monitor for now and consider beta-blockers in the future once he has recovered from his recent hospitalizations.  2.  Premature atrial contractions.  Frequent and asymptomatic.  No evidence of atrial fibrillation.  Continue to monitor.  3.  Hypertension.  Well-controlled on current regimen medications.  Continue the same.  4.  Hyperlipidemia.  On rosuvastatin.  5.  Chronic kidney disease stage III.    Will plan on seeing the patient back again in 6 months.

## 2025-07-12 ENCOUNTER — READMISSION MANAGEMENT (OUTPATIENT)
Dept: CALL CENTER | Facility: HOSPITAL | Age: 70
End: 2025-07-12
Payer: MEDICARE

## 2025-07-12 NOTE — OUTREACH NOTE
Medical Week 2 Survey      Flowsheet Row Responses   [facility] patient discharged from? [place]   Does the patient have one of the following disease processes/diagnoses(primary or secondary)? Other   Week 2 attempt successful? No   Unsuccessful attempts Attempt 1   Revoke Alfred PARKER - Registered Nurse

## 2025-07-14 RX ORDER — ROSUVASTATIN CALCIUM 10 MG/1
10 TABLET, COATED ORAL DAILY
Qty: 90 TABLET | Refills: 1 | Status: SHIPPED | OUTPATIENT
Start: 2025-07-14

## 2025-07-15 ENCOUNTER — OFFICE VISIT (OUTPATIENT)
Dept: NEUROSURGERY | Facility: CLINIC | Age: 70
End: 2025-07-15
Payer: MEDICARE

## 2025-07-15 ENCOUNTER — TELEPHONE (OUTPATIENT)
Dept: NEUROSURGERY | Facility: CLINIC | Age: 70
End: 2025-07-15

## 2025-07-15 VITALS
DIASTOLIC BLOOD PRESSURE: 72 MMHG | HEART RATE: 104 BPM | TEMPERATURE: 98.3 F | OXYGEN SATURATION: 98 % | SYSTOLIC BLOOD PRESSURE: 126 MMHG

## 2025-07-15 DIAGNOSIS — Z98.1 S/P LUMBAR FUSION: ICD-10-CM

## 2025-07-15 DIAGNOSIS — M43.16 SPONDYLOLISTHESIS AT L4-L5 LEVEL: Primary | ICD-10-CM

## 2025-07-15 PROCEDURE — 1160F RVW MEDS BY RX/DR IN RCRD: CPT | Performed by: NURSE PRACTITIONER

## 2025-07-15 PROCEDURE — 1159F MED LIST DOCD IN RCRD: CPT | Performed by: NURSE PRACTITIONER

## 2025-07-15 PROCEDURE — 99024 POSTOP FOLLOW-UP VISIT: CPT | Performed by: NURSE PRACTITIONER

## 2025-07-15 PROCEDURE — 3078F DIAST BP <80 MM HG: CPT | Performed by: NURSE PRACTITIONER

## 2025-07-15 PROCEDURE — 3074F SYST BP LT 130 MM HG: CPT | Performed by: NURSE PRACTITIONER

## 2025-07-15 NOTE — PROGRESS NOTES
Subjective   Patient ID: Mo Willoughby is a 70 y.o. male is here today for 1 month follow-up for degeneration of intervertebral disc of lumbar region.     Imaging    - XR Lumbar Spine 2 or 3 View completed 06/10/2025.    History of Present Illness    He was last seen in office on Rylie 10 for follow-up in first postoperative visit after undergoing T12-L1 laminectomy and bilateral L1 decompression and fusion from T12-L2.  Preop, he had a large central T12-L1 herniated disc with cord compression.  He follows up today with new x-rays.    Today, he reports he is doing and feeling really well.  He is slowly getting strength back in his legs but he still ambulating with a walker.  He does not feel comfortable even walking a short distance in his home without the use of his walker.  He continues to work weekly with physical therapy doing home health.  they just signed on an additional 4 weeks of home health PT.  He does the home PT exercises daily.  He remains in the LSO brace.  He has had no issues with the incision site that he says is now well-healed.  He did have to be hospitalized for a few days secondary to a UTI that went into his blood.  He is now doing and feeling much better.  He is very happy to report that he is no longer wearing a brief and has no continued incontinence.  He has not been driving, but is hopeful to be able to resume driving soon once he gets a bit stronger.  Overall he is doing very well and is pleased with his postoperative status.  He denies any leg or back pain.    He presents with his wife.        The following portions of the patient's history were reviewed and updated as appropriate: allergies, current medications, past family history, past medical history, past social history, past surgical history, and problem list.    Review of Systems   Constitutional:  Negative for fatigue and fever.   Eyes:  Negative for visual disturbance.   Gastrointestinal:  Negative for nausea and vomiting.    Genitourinary:  Negative for difficulty urinating (pt reports things have improved.).   Musculoskeletal:  Positive for back pain (pt reports pain level 2.) and gait problem. Negative for neck pain and neck stiffness.   Neurological:  Positive for weakness (pt reports legs.). Negative for dizziness, light-headedness, numbness and headaches.   Psychiatric/Behavioral:  Negative for confusion and decreased concentration.        /72 (BP Location: Left arm, Patient Position: Sitting, Cuff Size: Adult)   Pulse 104   Temp 98.3 °F (36.8 °C) (Temporal)   SpO2 98%       Objective     Vitals:    07/15/25 1126   BP: 126/72   BP Location: Left arm   Patient Position: Sitting   Cuff Size: Adult   Pulse: 104   Temp: 98.3 °F (36.8 °C)   TempSrc: Temporal   SpO2: 98%   PainSc: 0-No pain     There is no height or weight on file to calculate BMI.      Physical Exam  Vitals reviewed.   Constitutional:       Appearance: He is obese.   HENT:      Head: Atraumatic.   Pulmonary:      Effort: Pulmonary effort is normal.   Musculoskeletal:         General: No tenderness.      Right lower leg: Edema present.      Left lower leg: Edema present.      Comments: Significant bilateral lower extremity edema, right greater than left   wearing a right knee brace  Wearing a well-fitting LSO brace   strength appears equal and intact bilateral lower extremities with normal muscle bulk and tone  Deferred lumbar range of motion exam   Skin:     General: Skin is warm and dry.      Comments: well healed midline thoracic lumbar incision site, flat, normal surrounding skin   Neurological:      Mental Status: He is alert and oriented to person, place, and time.      GCS: GCS eye subscore is 4. GCS verbal subscore is 5. GCS motor subscore is 6.      Sensory: No sensory deficit.      Motor: No weakness or tremor.      Gait: Gait abnormal (Gait is slow purposeful and wide-based, using a walker).   Psychiatric:         Mood and Affect: Mood normal.        @KAL@        Assessment & Plan   Independent Review of Radiographic Studies:      I personally reviewed the images from the following studies.    Operative lumbar x-rays obtained in the office today show stable findings with intact surgical hardware, no new abnormalities identified    Medical Decision Making:      Returns to the office today for second postoperative visit after undergoing the above-noted lumbar decompression and fusion Dr Perez.  Exam as noted above, no red flags.  He is doing very well but does continue to have balance and gait issues.  He continues to work with PT at home, but likely will transition to outpatient therapy soon.  We will be happy to provide an order for outpatient PT when needed.  His gait is stable and upright, but when ambulating with a walker.  He has a lot of edema in the right foot and ankle that goes into the shin.  I think this is partially due to the fact that his right knee brace is too tight.  I encouraged him to take the knee brace off and to get a larger one.  Also encouraged him to keep the right foot elevated.  They will follow-up with his PCP next week and we will discuss the needs for diuretic at that time.  I encouraged him to keep walking is much as possible.  He is overall doing and feeling very well with no pain.  The x-rays show stable findings.  We will see him back in 2 months with lumbar flexion and extension x-rays.  He is to remain on a 15 to 20 pound weight limit.  With no excessive bending and twisting at the waist.  He will wear the LSO brace for a total of 3 months.      Plan: Follow-up in 2 months with lumbar flexion and extension x-rays, call our office at anytime with any questions or concerns      Diagnoses and all orders for this visit:    1. Spondylolisthesis at L4-L5 level (Primary)  -     XR Spine Lumbar Complete With Flex & Ext; Future    2. S/P lumbar fusion  -     XR Spine Lumbar Complete With Flex & Ext; Future      Return in  about 2 months (around 9/15/2025).

## 2025-07-15 NOTE — TELEPHONE ENCOUNTER
LM for patient asking that he come to the office today @ 10:45, he has an XR scheduled in the office @ that time and he will seen Kyra Laws @ 11:15    HUB OK TO GIVE MESSAGE

## 2025-07-23 ENCOUNTER — TELEPHONE (OUTPATIENT)
Dept: FAMILY MEDICINE CLINIC | Facility: CLINIC | Age: 70
End: 2025-07-23

## 2025-07-28 ENCOUNTER — OFFICE VISIT (OUTPATIENT)
Dept: FAMILY MEDICINE CLINIC | Facility: CLINIC | Age: 70
End: 2025-07-28
Payer: MEDICARE

## 2025-07-28 VITALS
DIASTOLIC BLOOD PRESSURE: 84 MMHG | TEMPERATURE: 99.1 F | SYSTOLIC BLOOD PRESSURE: 136 MMHG | OXYGEN SATURATION: 99 % | WEIGHT: 237.8 LBS | BODY MASS INDEX: 32.21 KG/M2 | HEART RATE: 102 BPM | HEIGHT: 72 IN

## 2025-07-28 DIAGNOSIS — Z98.1 S/P LUMBAR FUSION: ICD-10-CM

## 2025-07-28 DIAGNOSIS — Z00.00 MEDICARE ANNUAL WELLNESS VISIT, SUBSEQUENT: Primary | ICD-10-CM

## 2025-07-28 DIAGNOSIS — I10 ESSENTIAL HYPERTENSION, BENIGN: ICD-10-CM

## 2025-07-28 DIAGNOSIS — K74.60 CIRRHOSIS OF LIVER WITHOUT ASCITES, UNSPECIFIED HEPATIC CIRRHOSIS TYPE: ICD-10-CM

## 2025-07-28 DIAGNOSIS — E78.2 MIXED HYPERLIPIDEMIA: ICD-10-CM

## 2025-07-28 DIAGNOSIS — Z87.440 HISTORY OF UTI: ICD-10-CM

## 2025-07-28 PROCEDURE — 1170F FXNL STATUS ASSESSED: CPT | Performed by: NURSE PRACTITIONER

## 2025-07-28 PROCEDURE — 1160F RVW MEDS BY RX/DR IN RCRD: CPT | Performed by: NURSE PRACTITIONER

## 2025-07-28 PROCEDURE — 3075F SYST BP GE 130 - 139MM HG: CPT | Performed by: NURSE PRACTITIONER

## 2025-07-28 PROCEDURE — 3079F DIAST BP 80-89 MM HG: CPT | Performed by: NURSE PRACTITIONER

## 2025-07-28 PROCEDURE — 1126F AMNT PAIN NOTED NONE PRSNT: CPT | Performed by: NURSE PRACTITIONER

## 2025-07-28 PROCEDURE — 1159F MED LIST DOCD IN RCRD: CPT | Performed by: NURSE PRACTITIONER

## 2025-07-28 PROCEDURE — G0439 PPPS, SUBSEQ VISIT: HCPCS | Performed by: NURSE PRACTITIONER

## 2025-07-28 NOTE — ASSESSMENT & PLAN NOTE
Denies active symptoms.     Orders:    UA / M With / Rflx Culture([facility] ONLY) - Urine, Clean Catch    CBC & Differential

## 2025-07-28 NOTE — PROGRESS NOTES
Subjective   The ABCs of the Annual Wellness Visit  Medicare Wellness Visit      Mo Willoughby is a 70 y.o. patient who presents for a Medicare Wellness Visit.    The following portions of the patient's history were reviewed and   updated as appropriate: allergies, current medications, past family history, past medical history, past social history, past surgical history, and problem list.    Compared to one year ago, the patient's physical   health is the same.  Compared to one year ago, the patient's mental   health is the same.    Recent Hospitalizations:  This patient has had a [facility] admission record on file within the last 365 days.  Current Medical Providers:  Patient Care Team:  Jenn Davison APRN as PCP - General (Nurse Practitioner)  Abel Perez MD as Surgeon (Neurosurgery)  Hazel Brown MD as Consulting Physician (Cardiology)  Lashae Oneil PA-C as Physician Assistant (Physician Assistant)  Ashu León MD as Consulting Physician (Urology)  Jordan Ram MD as Consulting Physician (Neurology)    Outpatient Medications Prior to Visit   Medication Sig Dispense Refill    allopurinol (ZYLOPRIM) 300 MG tablet Take 1 tablet by mouth Daily. 90 tablet 2    ferrous sulfate 325 (65 FE) MG tablet Take 1 tablet by mouth Daily With Breakfast.      folic acid (FOLVITE) 1 MG tablet TAKE 1 TABLET BY MOUTH DAILY 90 tablet 1    gabapentin (NEURONTIN) 300 MG capsule Take 1 capsule by mouth 2 (Two) Times a Day. 60 capsule 5    lisinopril (PRINIVIL,ZESTRIL) 10 MG tablet Take 1 tablet by mouth Daily.      lisinopril (PRINIVIL,ZESTRIL) 5 MG tablet Take 1 tablet by mouth Daily.      pantoprazole (PROTONIX) 40 MG EC tablet TAKE 1 TABLET BY MOUTH TWICE DAILY 180 tablet 1    rosuvastatin (CRESTOR) 10 MG tablet TAKE 1 TABLET BY MOUTH DAILY 90 tablet 1    tamsulosin (FLOMAX) 0.4 MG capsule 24 hr capsule Take 1 capsule by mouth Daily. 30 capsule 2    thiamine (VITAMIN B-1) 100 MG tablet   tablet Take 1 tablet by mouth Daily.      vitamin B-12 (CYANOCOBALAMIN) 1000 MCG tablet Take 1 tablet by mouth Daily. HOLD FOR SURGERY ONE WEEK PRIOR       No facility-administered medications prior to visit.     No opioid medication identified on active medication list. I have reviewed chart for other potential  high risk medication/s and harmful drug interactions in the elderly.      Aspirin is not on active medication list.  Aspirin use is not indicated based on review of current medical condition/s. Risk of harm outweighs potential benefits.  .    Patient Active Problem List   Diagnosis    Erectile dysfunction    Gout    Hypercalcemia    Colon polyp    Sinus tachycardia    Pancreatitis    Other fatigue    Aortic ectasia    Generalized abdominal pain    Anemia    Folic acid deficiency    Iron deficiency anemia    Acute bilateral low back pain without sciatica    Swelling of the testicles    Urinary frequency    Hyperparathyroidism, unspecified    Stage 3a chronic kidney disease    Essential hypertension, benign    Spinal stenosis of lumbar region    Lumbar radiculitis    Lumbar facet arthropathy    Medicare annual wellness visit, subsequent    Mixed hyperlipidemia    Change in pigmented skin lesion of face    Pain in both testicles    Sacroiliitis    DDD (degenerative disc disease), lumbar    Spondylolisthesis at L4-L5 level    Herniation of lumbar intervertebral disc with radiculopathy    Spinal stenosis of lumbar region with radiculopathy    H/O: gout on allopurinol    Premature atrial contractions    Arthritis of right hip    Right hip pain    Imbalance    Foot drop, left    Atrophy of muscle of multiple sites    Tongue fasciculation    Acute bilateral low back pain with bilateral sciatica    Weakness of left lower extremity    Herniation of thoracolumbar intervertebral disc with myelopathy    Bacteremia due to Enterococcus    Acute UTI (urinary tract infection)    Dysuria    Cirrhosis of liver without ascites  "   Renal cyst    S/P lumbar fusion    History of UTI     Advance Care Planning Advance Directive is not on file.  ACP discussion was held with the patient during this visit. Patient has an advance directive (not in EMR), copy requested.            Objective   Vitals:    25 1033   BP: 136/84   BP Location: Left arm   Patient Position: Sitting   Cuff Size: Adult   Pulse: 102   Temp: 99.1 °F (37.3 °C)   SpO2: 99%   Weight: 108 kg (237 lb 12.8 oz)   Height: 182.9 cm (72.01\")       Estimated body mass index is 32.24 kg/m² as calculated from the following:    Height as of this encounter: 182.9 cm (72.01\").    Weight as of this encounter: 108 kg (237 lb 12.8 oz).                Does the patient have evidence of cognitive impairment? No  Lab Results   Component Value Date    HGBA1C 4.80 2025                                                                                               Health  Risk Assessment    Smoking Status:  Social History     Tobacco Use   Smoking Status Never   Smokeless Tobacco Never     Alcohol Consumption:  Social History     Substance and Sexual Activity   Alcohol Use Yes    Comment: occasional       Fall Risk Screen  STEADI Fall Risk Assessment was completed, and patient is at MODERATE risk for falls. Assessment completed on:2025    Depression Screening   Little interest or pleasure in doing things? Not at all   Feeling down, depressed, or hopeless? Not at all   PHQ-2 Total Score 0      Health Habits and Functional and Cognitive Screenin/28/2025    10:00 AM   Functional & Cognitive Status   Do you have difficulty preparing food and eating? No   Do you have difficulty bathing yourself, getting dressed or grooming yourself? No   Do you have difficulty using the toilet? No   Do you have difficulty moving around from place to place? No   Do you have trouble with steps or getting out of a bed or a chair? No   Current Diet Well Balanced Diet   Dental Exam Up to date   Eye Exam " Up to date   Exercise (times per week) 2 times per week   Current Exercises Include Walking;Aerobics        Exercise Comment strength training   Do you need help using the phone?  No   Are you deaf or do you have serious difficulty hearing?  No   Do you need help to go to places out of walking distance? No   Do you need help shopping? No   Do you need help preparing meals?  No   Do you need help with housework?  No   Do you need help with laundry? No   Do you need help taking your medications? No   Do you need help managing money? No   Do you ever drive or ride in a car without wearing a seat belt? No   Have you felt unusual fatigue (could be tiredness), stress, anger or loneliness in the last month? No   Who do you live with? Spouse   If you need help, do you have trouble finding someone available to you? No   Have you been bothered in the last four weeks by sexual problems? No   Do you have difficulty concentrating, remembering or making decisions? No           Age-appropriate Screening Schedule:  Refer to the list below for future screening recommendations based on patient's age, sex and/or medical conditions. Orders for these recommended tests are listed in the plan section. The patient has been provided with a written plan.    Health Maintenance List  Health Maintenance   Topic Date Due    LIPID PANEL  04/15/2025    Hepatitis B (1 of 3 - Risk 3-dose series) 01/24/2026 (Originally 7/1/2017)    Pneumococcal Vaccine 50+ (1 of 2 - PCV) 01/24/2026 (Originally 7/1/1976)    ZOSTER VACCINE (1 of 2) 01/24/2026 (Originally 7/1/2007)    COVID-19 Vaccine (4 - 2024-25 season) 01/28/2026 (Originally 9/1/2024)    INFLUENZA VACCINE  10/01/2025    ANNUAL WELLNESS VISIT  07/28/2026    TDAP/TD VACCINES (2 - Td or Tdap) 03/29/2027    COLORECTAL CANCER SCREENING  05/05/2032    HEPATITIS C SCREENING  Completed                                                                                                                                                 CMS Preventative Services Quick Reference  Risk Factors Identified During Encounter  Fall Risk-High or Moderate: Discussed Fall Prevention in the home and Information on Fall Prevention Shared in After Visit Summary  Immunizations Discussed/Encouraged: Pneumococcal 23, Prevnar 20 (Pneumococcal 20-valent conjugate), COVID19, and RSV (Respiratory Syncytial Virus)  Dental Screening Recommended  Vision Screening Recommended    The above risks/problems have been discussed with the patient.  Pertinent information has been shared with the patient in the After Visit Summary.  An After Visit Summary and PPPS were made available to the patient.    Follow Up:   Next Medicare Wellness visit to be scheduled in 1 year.     Assessment & Plan  Medicare annual wellness visit, subsequent  Counseling was provided on nutrition, physical activity, development, and injury prevention, dental health, and safe sex practices patient verbalizes understanding no additional questions were asked.         Essential hypertension, benign  Hypertension is stable and controlled  Continue current treatment regimen.  Blood pressure will be reassessed in 6 months.    Orders:    Comprehensive Metabolic Panel    Mixed hyperlipidemia   Lipid abnormalities are stable    Plan:  Continue same medication/s without change.      Discussed medication dosage, use, side effects, and goals of treatment in detail.    Counseled patient on lifestyle modifications to help control hyperlipidemia.     Patient Treatment Goals:   LDL goal is less than 70    Followup in 6 months.    Orders:    Lipid Panel    History of UTI  Denies active symptoms.     Orders:    UA / M With / Rflx Culture([facility] ONLY) - Urine, Clean Catch    CBC & Differential    Cirrhosis of liver without ascites, unspecified hepatic cirrhosis type  Refer to gastroenterologist.    Orders:    Ambulatory Referral to Gastroenterology    S/P lumbar fusion  In brace, follows neurosurgeon                Follow Up:   Return in about 27 weeks (around 2/2/2026), or if symptoms worsen or fail to improve.

## 2025-07-29 ENCOUNTER — HOSPITAL ENCOUNTER (OUTPATIENT)
Dept: CT IMAGING | Facility: HOSPITAL | Age: 70
End: 2025-07-29
Payer: MEDICARE

## 2025-08-01 LAB
ALBUMIN SERPL-MCNC: 3.9 G/DL (ref 3.5–5.2)
ALBUMIN/GLOB SERPL: 1.4 G/DL
ALP SERPL-CCNC: 99 U/L (ref 39–117)
ALT SERPL-CCNC: 13 U/L (ref 1–41)
APPEARANCE UR: CLEAR
AST SERPL-CCNC: 28 U/L (ref 1–40)
BACTERIA #/AREA URNS HPF: NORMAL /[HPF]
BACTERIA UR CULT: ABNORMAL
BACTERIA UR CULT: ABNORMAL
BASOPHILS # BLD AUTO: 0.04 10*3/MM3 (ref 0–0.2)
BASOPHILS NFR BLD AUTO: 0.9 % (ref 0–1.5)
BILIRUB SERPL-MCNC: 0.6 MG/DL (ref 0–1.2)
BILIRUB UR QL STRIP: NEGATIVE
BUN SERPL-MCNC: 18 MG/DL (ref 8–23)
BUN/CREAT SERPL: 14.5 (ref 7–25)
CALCIUM SERPL-MCNC: 10.2 MG/DL (ref 8.6–10.5)
CASTS URNS QL MICRO: NORMAL /LPF
CHLORIDE SERPL-SCNC: 105 MMOL/L (ref 98–107)
CHOLEST SERPL-MCNC: 157 MG/DL (ref 0–200)
CO2 SERPL-SCNC: 23.4 MMOL/L (ref 22–29)
COLOR UR: YELLOW
CREAT SERPL-MCNC: 1.24 MG/DL (ref 0.76–1.27)
EGFRCR SERPLBLD CKD-EPI 2021: 63.3 ML/MIN/1.73
EOSINOPHIL # BLD AUTO: 0.19 10*3/MM3 (ref 0–0.4)
EOSINOPHIL NFR BLD AUTO: 4.5 % (ref 0.3–6.2)
EPI CELLS #/AREA URNS HPF: NORMAL /HPF (ref 0–10)
ERYTHROCYTE [DISTWIDTH] IN BLOOD BY AUTOMATED COUNT: 13.3 % (ref 12.3–15.4)
GLOBULIN SER CALC-MCNC: 2.8 GM/DL
GLUCOSE SERPL-MCNC: 88 MG/DL (ref 65–99)
GLUCOSE UR QL STRIP: NEGATIVE
HCT VFR BLD AUTO: 34.5 % (ref 37.5–51)
HDLC SERPL-MCNC: 76 MG/DL (ref 40–60)
HGB BLD-MCNC: 11.4 G/DL (ref 13–17.7)
HGB UR QL STRIP: NEGATIVE
IMM GRANULOCYTES # BLD AUTO: 0.01 10*3/MM3 (ref 0–0.05)
IMM GRANULOCYTES NFR BLD AUTO: 0.2 % (ref 0–0.5)
KETONES UR QL STRIP: NEGATIVE
LDLC SERPL CALC-MCNC: 63 MG/DL (ref 0–100)
LEUKOCYTE ESTERASE UR QL STRIP: ABNORMAL
LYMPHOCYTES # BLD AUTO: 1.41 10*3/MM3 (ref 0.7–3.1)
LYMPHOCYTES NFR BLD AUTO: 33.2 % (ref 19.6–45.3)
MCH RBC QN AUTO: 34.3 PG (ref 26.6–33)
MCHC RBC AUTO-ENTMCNC: 33 G/DL (ref 31.5–35.7)
MCV RBC AUTO: 103.9 FL (ref 79–97)
MICRO URNS: ABNORMAL
MONOCYTES # BLD AUTO: 0.43 10*3/MM3 (ref 0.1–0.9)
MONOCYTES NFR BLD AUTO: 10.1 % (ref 5–12)
NEUTROPHILS # BLD AUTO: 2.17 10*3/MM3 (ref 1.7–7)
NEUTROPHILS NFR BLD AUTO: 51.1 % (ref 42.7–76)
NITRITE UR QL STRIP: NEGATIVE
NRBC BLD AUTO-RTO: 0 /100 WBC (ref 0–0.2)
OTHER ANTIBIOTIC SUSC ISLT: ABNORMAL
PH UR STRIP: 6.5 [PH] (ref 5–7.5)
PLATELET # BLD AUTO: 105 10*3/MM3 (ref 140–450)
POTASSIUM SERPL-SCNC: 4.5 MMOL/L (ref 3.5–5.2)
PROT SERPL-MCNC: 6.7 G/DL (ref 6–8.5)
PROT UR QL STRIP: ABNORMAL
RBC # BLD AUTO: 3.32 10*6/MM3 (ref 4.14–5.8)
RBC #/AREA URNS HPF: NORMAL /HPF (ref 0–2)
SODIUM SERPL-SCNC: 141 MMOL/L (ref 136–145)
SP GR UR STRIP: 1.02 (ref 1–1.03)
TRIGL SERPL-MCNC: 102 MG/DL (ref 0–150)
URINALYSIS REFLEX: ABNORMAL
UROBILINOGEN UR STRIP-MCNC: 1 MG/DL (ref 0.2–1)
VLDLC SERPL CALC-MCNC: 18 MG/DL (ref 5–40)
WBC # BLD AUTO: 4.25 10*3/MM3 (ref 3.4–10.8)
WBC #/AREA URNS HPF: NORMAL /HPF (ref 0–5)

## 2025-08-04 ENCOUNTER — RESULTS FOLLOW-UP (OUTPATIENT)
Dept: FAMILY MEDICINE CLINIC | Facility: CLINIC | Age: 70
End: 2025-08-04
Payer: MEDICARE

## 2025-08-04 DIAGNOSIS — N39.0 ACUTE UTI (URINARY TRACT INFECTION): Primary | ICD-10-CM

## 2025-08-04 RX ORDER — NITROFURANTOIN 25; 75 MG/1; MG/1
100 CAPSULE ORAL 2 TIMES DAILY
Qty: 14 CAPSULE | Refills: 0 | Status: SHIPPED | OUTPATIENT
Start: 2025-08-04

## 2025-08-08 ENCOUNTER — OUTSIDE FACILITY SERVICE (OUTPATIENT)
Dept: FAMILY MEDICINE CLINIC | Facility: CLINIC | Age: 70
End: 2025-08-08
Payer: MEDICARE

## 2025-08-08 ENCOUNTER — READMISSION MANAGEMENT (OUTPATIENT)
Dept: CALL CENTER | Facility: HOSPITAL | Age: 70
End: 2025-08-08
Payer: MEDICARE

## 2025-08-08 PROCEDURE — G0179 MD RECERTIFICATION HHA PT: HCPCS | Performed by: NURSE PRACTITIONER

## 2025-08-11 ENCOUNTER — OFFICE VISIT (OUTPATIENT)
Dept: NEUROLOGY | Facility: CLINIC | Age: 70
End: 2025-08-11
Payer: MEDICARE

## 2025-08-11 VITALS
OXYGEN SATURATION: 100 % | SYSTOLIC BLOOD PRESSURE: 168 MMHG | HEART RATE: 105 BPM | WEIGHT: 244 LBS | BODY MASS INDEX: 33.05 KG/M2 | HEIGHT: 72 IN | DIASTOLIC BLOOD PRESSURE: 82 MMHG

## 2025-08-11 DIAGNOSIS — G95.9 MYELOPATHY: Primary | ICD-10-CM

## 2025-08-11 DIAGNOSIS — G62.9 PERIPHERAL POLYNEUROPATHY: ICD-10-CM

## 2025-08-11 DIAGNOSIS — M62.549 ATROPHY OF MUSCLE OF HAND, UNSPECIFIED LATERALITY: ICD-10-CM

## 2025-08-11 PROCEDURE — 3079F DIAST BP 80-89 MM HG: CPT | Performed by: PSYCHIATRY & NEUROLOGY

## 2025-08-11 PROCEDURE — 99215 OFFICE O/P EST HI 40 MIN: CPT | Performed by: PSYCHIATRY & NEUROLOGY

## 2025-08-11 PROCEDURE — 3077F SYST BP >= 140 MM HG: CPT | Performed by: PSYCHIATRY & NEUROLOGY

## 2025-08-15 ENCOUNTER — HOSPITAL ENCOUNTER (OUTPATIENT)
Facility: HOSPITAL | Age: 70
Discharge: HOME | End: 2025-08-15
Payer: MEDICARE

## 2025-08-15 DIAGNOSIS — N28.1 RENAL CYST: ICD-10-CM

## 2025-08-15 PROCEDURE — 74178 CT ABD&PLV WO CNTR FLWD CNTR: CPT

## 2025-08-15 PROCEDURE — 25510000001 IOPAMIDOL 61 % SOLUTION

## 2025-08-15 RX ORDER — IOPAMIDOL 612 MG/ML
100 INJECTION, SOLUTION INTRAVASCULAR
Status: COMPLETED | OUTPATIENT
Start: 2025-08-15 | End: 2025-08-15

## 2025-08-15 RX ADMIN — IOPAMIDOL 85 ML: 612 INJECTION, SOLUTION INTRAVENOUS at 14:28

## 2025-08-22 DIAGNOSIS — R16.1 SPLENOMEGALY: ICD-10-CM

## 2025-08-22 DIAGNOSIS — D69.6 THROMBOCYTOPENIA: Primary | ICD-10-CM

## 2025-08-26 ENCOUNTER — TELEPHONE (OUTPATIENT)
Dept: GASTROENTEROLOGY | Facility: CLINIC | Age: 70
End: 2025-08-26
Payer: MEDICARE

## (undated) DEVICE — SUT VIC 4/0 P3 18IN J494G

## (undated) DEVICE — LN SMPL CO2 SHTRM SD STREAM W/M LUER

## (undated) DEVICE — BG TRANSF W/COUPLER SPK 600ML

## (undated) DEVICE — Device: Brand: XPERIENCE

## (undated) DEVICE — CATH IV INSYTE AUTOGARD 14G 1 1/2IN ORNG

## (undated) DEVICE — EPIDURAL TRAY: Brand: MEDLINE INDUSTRIES, INC.

## (undated) DEVICE — ANTIBACTERIAL UNDYED BRAIDED (POLYGLACTIN 910), SYNTHETIC ABSORBABLE SUTURE: Brand: COATED VICRYL

## (undated) DEVICE — COVER,C-ARM,41X74: Brand: MEDLINE

## (undated) DEVICE — BITEBLOCK OMNI BLOC

## (undated) DEVICE — GLV SURG TRIUMPH PF LTX 7.5 STRL

## (undated) DEVICE — GLV SURG SENSICARE PI MIC PF SZ6.5 LF STRL

## (undated) DEVICE — APPL CHLORAPREP W/TINT 26ML ORNG

## (undated) DEVICE — GOWN,SIRUS,NONRNF,SETINSLV,2XL,18/CS: Brand: MEDLINE

## (undated) DEVICE — PK ATS CUST W CARDIOTOMY RESEVOIR

## (undated) DEVICE — PK NEURO SPINE 40

## (undated) DEVICE — ENDOPATH XCEL UNIVERSAL TROCAR STABLILITY SLEEVES: Brand: ENDOPATH XCEL

## (undated) DEVICE — TOWEL,OR,DSP,ST,BLUE,STD,4/PK,20PK/CS: Brand: MEDLINE

## (undated) DEVICE — CONTAINER,SPECIMEN,OR STERILE,4OZ: Brand: MEDLINE

## (undated) DEVICE — ENDOPOUCH RETRIEVER SPECIMEN RETRIEVAL BAGS: Brand: ENDOPOUCH RETRIEVER

## (undated) DEVICE — NDL SPINE 22G 31/2IN BLK

## (undated) DEVICE — PICO 7 10CM X 30CM: Brand: PICO™ 7

## (undated) DEVICE — NEEDLE, QUINCKE, 25GX3.5": Brand: MEDLINE

## (undated) DEVICE — CONN TBG Y 5 IN 1 LF STRL

## (undated) DEVICE — DRP MICROSCP LEICA 137X381CM

## (undated) DEVICE — TOOL MR8-15MH22 MR8 15CM MATCH 2.2MM: Brand: MIDAS REX MR8

## (undated) DEVICE — ENCORE® LATEX ORTHO SIZE 6.5, STERILE LATEX POWDER-FREE SURGICAL GLOVE: Brand: ENCORE

## (undated) DEVICE — SENSR O2 OXIMAX FNGR A/ 18IN NONSTR

## (undated) DEVICE — DRAPE,REIN 53X77,STERILE: Brand: MEDLINE

## (undated) DEVICE — MSK PROC CURAPLEX O2 2/ADAPT 7FT

## (undated) DEVICE — TOOL MR8-14MH30 MR8 14CM MATCH 3MM: Brand: MIDAS REX MR8

## (undated) DEVICE — GLV SURG TRIUMPH PF LTX 7 STRL

## (undated) DEVICE — 3M™ STERI-STRIP™ REINFORCED ADHESIVE SKIN CLOSURES, R1547, 1/2 IN X 4 IN (12 MM X 100 MM), 6 STRIPS/ENVELOPE: Brand: 3M™ STERI-STRIP™

## (undated) DEVICE — ENDOPATH XCEL BLADELESS TROCARS WITH STABILITY SLEEVES: Brand: ENDOPATH XCEL

## (undated) DEVICE — KT SPINE MAZORX DISP

## (undated) DEVICE — SUT VIC 0 TN 27IN DYED JTN0G

## (undated) DEVICE — 3M™ IOBAN™ 2 ANTIMICROBIAL INCISE DRAPE 6650EZ: Brand: IOBAN™ 2

## (undated) DEVICE — SPHR MARKR STEALTH STATION

## (undated) DEVICE — GLV SURG SENSICARE POLYISPRN W/ALOE PF LF 6.5 GRN STRL

## (undated) DEVICE — DECANTER BAG 9": Brand: MEDLINE INDUSTRIES, INC.

## (undated) DEVICE — APPL CHLORAPREP HI/LITE 26ML ORNG

## (undated) DEVICE — KT ORCA ORCAPOD DISP STRL

## (undated) DEVICE — GOWN ,SIRUS,NONREINFORCED SMALL: Brand: MEDLINE

## (undated) DEVICE — TOOL MR8-31TD3030 MR8 TWST DRIL 3MMX30MM: Brand: MIDAS REX

## (undated) DEVICE — CATH CHOLANG 4.5F18IN BRGNDY

## (undated) DEVICE — ADAPT CLN BIOGUARD AIR/H2O DISP

## (undated) DEVICE — DISPOSABLE BIPOLAR FORCEPS 7 3/4" (19.7CM) SCOVILLE BAYONET, 1.5MM TIP AND 12 FT. (3.6M) CABLE: Brand: KIRWAN

## (undated) DEVICE — STPCK 3WY D201 DISCOFIX

## (undated) DEVICE — NEEDLE, QUINCKE 22GX3.5": Brand: MEDLINE INDUSTRIES, INC.

## (undated) DEVICE — EXTENSION SET, MALE LUER LOCK ADAPTER WITH RETRACTABLE COLLAR

## (undated) DEVICE — LOU LAP CHOLE: Brand: MEDLINE INDUSTRIES, INC.

## (undated) DEVICE — STRIP CLS WND CURAD MEDI/STRIP HYPOALLERG 1X5IN STRL EA/PK/4

## (undated) DEVICE — DRAPE,CHEST,FENES,15X10,STERIL: Brand: MEDLINE

## (undated) DEVICE — PCH INST SURG INVISISHIELD 2PCKT

## (undated) DEVICE — TOOL MR8-14BA50C MR8 14CM BALL CARB 5MM: Brand: MIDAS REX MR8

## (undated) DEVICE — TUBING, SUCTION, 1/4" X 10', STRAIGHT: Brand: MEDLINE

## (undated) DEVICE — DRP C/ARM 41X74IN

## (undated) DEVICE — SNAR POLYP CAPTIVATOR RND STFF 2.4 240CM 10MM 1P/U

## (undated) DEVICE — Device: Brand: PORTEX

## (undated) DEVICE — MARKR SKIN W/RULR 2TP

## (undated) DEVICE — THE SINGLE USE ETRAP – POLYP TRAP IS USED FOR SUCTION RETRIEVAL OF ENDOSCOPICALLY REMOVED POLYPS.: Brand: ETRAP

## (undated) DEVICE — SYR CONTRL LUERLOK 10CC

## (undated) DEVICE — GLV SURG PREMIERPRO ORTHO LTX PF SZ8 BRN

## (undated) DEVICE — PAD GRND E/S NT200IX RF/GEN W/CABL DISP

## (undated) DEVICE — ENDOCUT SCISSOR TIP, DISPOSABLE: Brand: RENEW

## (undated) DEVICE — NDL SPINE 22G 5IN BLK

## (undated) DEVICE — ELECTRD BLD EZ CLN STD 4IN

## (undated) DEVICE — ENDOPATH PNEUMONEEDLE INSUFFLATION NEEDLES WITH LUER LOCK CONNECTORS 120MM: Brand: ENDOPATH

## (undated) DEVICE — 1LYRTR 16FR10ML100%SIL UMS SNP: Brand: MEDLINE INDUSTRIES, INC.

## (undated) DEVICE — 3M™ STERI-STRIP™ COMPOUND BENZOIN TINCTURE 40 BAGS/CARTON 4 CARTONS/CASE C1544: Brand: 3M™ STERI-STRIP™

## (undated) DEVICE — SINGLE-USE BIOPSY FORCEPS: Brand: RADIAL JAW 4

## (undated) DEVICE — Device